# Patient Record
Sex: MALE | Race: WHITE | Employment: OTHER | URBAN - NONMETROPOLITAN AREA
[De-identification: names, ages, dates, MRNs, and addresses within clinical notes are randomized per-mention and may not be internally consistent; named-entity substitution may affect disease eponyms.]

---

## 2018-05-06 ENCOUNTER — HOSPITAL ENCOUNTER (INPATIENT)
Age: 68
LOS: 1 days | Discharge: LEFT BEFORE DISCHARGE | DRG: 313 | End: 2018-05-07
Attending: EMERGENCY MEDICINE | Admitting: INTERNAL MEDICINE
Payer: MEDICARE

## 2018-05-06 ENCOUNTER — APPOINTMENT (OUTPATIENT)
Dept: GENERAL RADIOLOGY | Age: 68
DRG: 313 | End: 2018-05-06
Payer: MEDICARE

## 2018-05-06 DIAGNOSIS — K30 INDIGESTION: ICD-10-CM

## 2018-05-06 DIAGNOSIS — D69.6 THROMBOCYTOPENIA (HCC): ICD-10-CM

## 2018-05-06 DIAGNOSIS — E87.1 HYPONATREMIA: ICD-10-CM

## 2018-05-06 DIAGNOSIS — R74.01 TRANSAMINITIS: ICD-10-CM

## 2018-05-06 DIAGNOSIS — R06.09 DOE (DYSPNEA ON EXERTION): ICD-10-CM

## 2018-05-06 DIAGNOSIS — F10.10 ALCOHOL ABUSE: ICD-10-CM

## 2018-05-06 DIAGNOSIS — R07.9 CHEST PAIN, UNSPECIFIED TYPE: Primary | ICD-10-CM

## 2018-05-06 LAB
ALBUMIN SERPL-MCNC: 3.9 G/DL (ref 3.5–5.2)
ALP BLD-CCNC: 123 U/L (ref 40–130)
ALT SERPL-CCNC: 120 U/L (ref 5–41)
ANION GAP SERPL CALCULATED.3IONS-SCNC: 21 MMOL/L (ref 7–19)
AST SERPL-CCNC: 181 U/L (ref 5–40)
BASOPHILS ABSOLUTE: 0.1 K/UL (ref 0–0.2)
BASOPHILS RELATIVE PERCENT: 1.4 % (ref 0–1)
BILIRUB SERPL-MCNC: 1.7 MG/DL (ref 0.2–1.2)
BILIRUBIN URINE: NEGATIVE
BLOOD, URINE: NEGATIVE
BUN BLDV-MCNC: 8 MG/DL (ref 8–23)
CALCIUM SERPL-MCNC: 8.9 MG/DL (ref 8.8–10.2)
CHLORIDE BLD-SCNC: 89 MMOL/L (ref 98–111)
CLARITY: CLEAR
CO2: 20 MMOL/L (ref 22–29)
COLOR: YELLOW
CREAT SERPL-MCNC: 0.6 MG/DL (ref 0.5–1.2)
EOSINOPHILS ABSOLUTE: 0.1 K/UL (ref 0–0.6)
EOSINOPHILS RELATIVE PERCENT: 1.2 % (ref 0–5)
ETHANOL: 61 MG/DL (ref 0–0.08)
GFR NON-AFRICAN AMERICAN: >60
GLUCOSE BLD-MCNC: 105 MG/DL (ref 74–109)
GLUCOSE URINE: NEGATIVE MG/DL
HCT VFR BLD CALC: 38.5 % (ref 42–52)
HEMOGLOBIN: 13.5 G/DL (ref 14–18)
INR BLD: 1.02 (ref 0.88–1.18)
KETONES, URINE: ABNORMAL MG/DL
LEUKOCYTE ESTERASE, URINE: NEGATIVE
LIPASE: 25 U/L (ref 13–60)
LYMPHOCYTES ABSOLUTE: 1.1 K/UL (ref 1.1–4.5)
LYMPHOCYTES RELATIVE PERCENT: 25.8 % (ref 20–40)
MCH RBC QN AUTO: 35.5 PG (ref 27–31)
MCHC RBC AUTO-ENTMCNC: 35.1 G/DL (ref 33–37)
MCV RBC AUTO: 101.3 FL (ref 80–94)
MONOCYTES ABSOLUTE: 0.3 K/UL (ref 0–0.9)
MONOCYTES RELATIVE PERCENT: 7.5 % (ref 0–10)
NEUTROPHILS ABSOLUTE: 2.6 K/UL (ref 1.5–7.5)
NEUTROPHILS RELATIVE PERCENT: 63.9 % (ref 50–65)
NITRITE, URINE: NEGATIVE
PDW BLD-RTO: 12.9 % (ref 11.5–14.5)
PERFORMED ON: NORMAL
PERFORMED ON: NORMAL
PH UA: 6
PLATELET # BLD: 126 K/UL (ref 130–400)
PMV BLD AUTO: 8.6 FL (ref 9.4–12.4)
POC TROPONIN I: 0.01 NG/ML (ref 0–0.08)
POC TROPONIN I: 0.01 NG/ML (ref 0–0.08)
POTASSIUM SERPL-SCNC: 3.5 MMOL/L (ref 3.5–5)
PRO-BNP: 87 PG/ML (ref 0–900)
PROTEIN UA: NEGATIVE MG/DL
PROTHROMBIN TIME: 13.3 SEC (ref 12–14.6)
RBC # BLD: 3.8 M/UL (ref 4.7–6.1)
SODIUM BLD-SCNC: 130 MMOL/L (ref 136–145)
SPECIFIC GRAVITY UA: 1.01
TOTAL PROTEIN: 7.3 G/DL (ref 6.6–8.7)
TROPONIN: <0.01 NG/ML (ref 0–0.03)
URINE REFLEX TO CULTURE: ABNORMAL
UROBILINOGEN, URINE: >=8 E.U./DL
WBC # BLD: 4.1 K/UL (ref 4.8–10.8)

## 2018-05-06 PROCEDURE — 99285 EMERGENCY DEPT VISIT HI MDM: CPT

## 2018-05-06 PROCEDURE — 71045 X-RAY EXAM CHEST 1 VIEW: CPT

## 2018-05-06 PROCEDURE — 80053 COMPREHEN METABOLIC PANEL: CPT

## 2018-05-06 PROCEDURE — 2140000000 HC CCU INTERMEDIATE R&B

## 2018-05-06 PROCEDURE — 83880 ASSAY OF NATRIURETIC PEPTIDE: CPT

## 2018-05-06 PROCEDURE — 6360000002 HC RX W HCPCS: Performed by: EMERGENCY MEDICINE

## 2018-05-06 PROCEDURE — 85610 PROTHROMBIN TIME: CPT

## 2018-05-06 PROCEDURE — 85025 COMPLETE CBC W/AUTO DIFF WBC: CPT

## 2018-05-06 PROCEDURE — 93005 ELECTROCARDIOGRAM TRACING: CPT

## 2018-05-06 PROCEDURE — 36415 COLL VENOUS BLD VENIPUNCTURE: CPT

## 2018-05-06 PROCEDURE — 2500000003 HC RX 250 WO HCPCS: Performed by: EMERGENCY MEDICINE

## 2018-05-06 PROCEDURE — G0480 DRUG TEST DEF 1-7 CLASSES: HCPCS

## 2018-05-06 PROCEDURE — 99285 EMERGENCY DEPT VISIT HI MDM: CPT | Performed by: EMERGENCY MEDICINE

## 2018-05-06 PROCEDURE — 99222 1ST HOSP IP/OBS MODERATE 55: CPT | Performed by: CLINICAL NURSE SPECIALIST

## 2018-05-06 PROCEDURE — 6370000000 HC RX 637 (ALT 250 FOR IP): Performed by: EMERGENCY MEDICINE

## 2018-05-06 PROCEDURE — 81003 URINALYSIS AUTO W/O SCOPE: CPT

## 2018-05-06 PROCEDURE — 2580000003 HC RX 258: Performed by: EMERGENCY MEDICINE

## 2018-05-06 PROCEDURE — 84484 ASSAY OF TROPONIN QUANT: CPT

## 2018-05-06 PROCEDURE — 83690 ASSAY OF LIPASE: CPT

## 2018-05-06 RX ORDER — ATORVASTATIN CALCIUM 40 MG/1
40 TABLET, FILM COATED ORAL DAILY
Status: DISCONTINUED | OUTPATIENT
Start: 2018-05-07 | End: 2018-05-07 | Stop reason: HOSPADM

## 2018-05-06 RX ORDER — ASPIRIN 81 MG/1
81 TABLET, CHEWABLE ORAL DAILY
Status: ON HOLD | COMMUNITY
End: 2020-11-18 | Stop reason: HOSPADM

## 2018-05-06 RX ORDER — ATENOLOL 50 MG/1
50 TABLET ORAL DAILY
Status: DISCONTINUED | OUTPATIENT
Start: 2018-05-07 | End: 2018-05-07 | Stop reason: HOSPADM

## 2018-05-06 RX ORDER — PRASUGREL 10 MG/1
10 TABLET, FILM COATED ORAL DAILY
Status: DISCONTINUED | OUTPATIENT
Start: 2018-05-07 | End: 2018-05-07 | Stop reason: HOSPADM

## 2018-05-06 RX ORDER — ASPIRIN 81 MG/1
81 TABLET, CHEWABLE ORAL DAILY
Status: DISCONTINUED | OUTPATIENT
Start: 2018-05-07 | End: 2018-05-07 | Stop reason: HOSPADM

## 2018-05-06 RX ADMIN — Medication 30 ML: at 19:20

## 2018-05-06 RX ADMIN — FOLIC ACID: 5 INJECTION, SOLUTION INTRAMUSCULAR; INTRAVENOUS; SUBCUTANEOUS at 19:40

## 2018-05-06 ASSESSMENT — ENCOUNTER SYMPTOMS
COUGH: 0
BACK PAIN: 0
ABDOMINAL PAIN: 0
NAUSEA: 0
SHORTNESS OF BREATH: 1
VOMITING: 0

## 2018-05-06 ASSESSMENT — PAIN SCALES - GENERAL
PAINLEVEL_OUTOF10: 0
PAINLEVEL_OUTOF10: 1

## 2018-05-07 VITALS
BODY MASS INDEX: 29.85 KG/M2 | SYSTOLIC BLOOD PRESSURE: 144 MMHG | HEART RATE: 74 BPM | WEIGHT: 220.4 LBS | DIASTOLIC BLOOD PRESSURE: 59 MMHG | TEMPERATURE: 97.8 F | OXYGEN SATURATION: 99 % | RESPIRATION RATE: 18 BRPM | HEIGHT: 72 IN

## 2018-05-07 PROBLEM — F10.10 ALCOHOL ABUSE: Chronic | Status: ACTIVE | Noted: 2018-05-07

## 2018-05-07 PROBLEM — R74.01 TRANSAMINITIS: Status: ACTIVE | Noted: 2018-05-07

## 2018-05-07 LAB
ALBUMIN SERPL-MCNC: 3.4 G/DL (ref 3.5–5.2)
ALP BLD-CCNC: 104 U/L (ref 40–130)
ALT SERPL-CCNC: 95 U/L (ref 5–41)
ANION GAP SERPL CALCULATED.3IONS-SCNC: 18 MMOL/L (ref 7–19)
AST SERPL-CCNC: 121 U/L (ref 5–40)
BILIRUB SERPL-MCNC: 1.8 MG/DL (ref 0.2–1.2)
BILIRUBIN DIRECT: 0.5 MG/DL (ref 0–0.3)
BILIRUBIN, INDIRECT: 1.3 MG/DL (ref 0.1–1)
BUN BLDV-MCNC: 10 MG/DL (ref 8–23)
CALCIUM SERPL-MCNC: 8.3 MG/DL (ref 8.8–10.2)
CHLORIDE BLD-SCNC: 96 MMOL/L (ref 98–111)
CHOLESTEROL, TOTAL: 94 MG/DL (ref 160–199)
CO2: 20 MMOL/L (ref 22–29)
CREAT SERPL-MCNC: 0.7 MG/DL (ref 0.5–1.2)
GFR NON-AFRICAN AMERICAN: >60
GLUCOSE BLD-MCNC: 80 MG/DL (ref 74–109)
HCT VFR BLD CALC: 34.8 % (ref 42–52)
HDLC SERPL-MCNC: 61 MG/DL (ref 55–121)
HEMOGLOBIN: 12.2 G/DL (ref 14–18)
LDL CHOLESTEROL CALCULATED: 21 MG/DL
MCH RBC QN AUTO: 35.8 PG (ref 27–31)
MCHC RBC AUTO-ENTMCNC: 35.1 G/DL (ref 33–37)
MCV RBC AUTO: 102.1 FL (ref 80–94)
PDW BLD-RTO: 12.8 % (ref 11.5–14.5)
PLATELET # BLD: 109 K/UL (ref 130–400)
PMV BLD AUTO: 8.9 FL (ref 9.4–12.4)
POTASSIUM REFLEX MAGNESIUM: 3.7 MMOL/L (ref 3.5–5)
RBC # BLD: 3.41 M/UL (ref 4.7–6.1)
SODIUM BLD-SCNC: 134 MMOL/L (ref 136–145)
TOTAL PROTEIN: 6.4 G/DL (ref 6.6–8.7)
TRIGL SERPL-MCNC: 58 MG/DL (ref 0–149)
TROPONIN: <0.01 NG/ML (ref 0–0.03)
TROPONIN: <0.01 NG/ML (ref 0–0.03)
WBC # BLD: 3.8 K/UL (ref 4.8–10.8)

## 2018-05-07 PROCEDURE — 85027 COMPLETE CBC AUTOMATED: CPT

## 2018-05-07 PROCEDURE — 2580000003 HC RX 258: Performed by: INTERNAL MEDICINE

## 2018-05-07 PROCEDURE — C8928 TTE W OR W/O FOL W/CON,STRES: HCPCS

## 2018-05-07 PROCEDURE — 80048 BASIC METABOLIC PNL TOTAL CA: CPT

## 2018-05-07 PROCEDURE — 6370000000 HC RX 637 (ALT 250 FOR IP): Performed by: CLINICAL NURSE SPECIALIST

## 2018-05-07 PROCEDURE — 99233 SBSQ HOSP IP/OBS HIGH 50: CPT | Performed by: PHYSICIAN ASSISTANT

## 2018-05-07 PROCEDURE — 6360000002 HC RX W HCPCS

## 2018-05-07 PROCEDURE — 36415 COLL VENOUS BLD VENIPUNCTURE: CPT

## 2018-05-07 PROCEDURE — 6360000004 HC RX CONTRAST MEDICATION: Performed by: INTERNAL MEDICINE

## 2018-05-07 PROCEDURE — 80061 LIPID PANEL: CPT

## 2018-05-07 PROCEDURE — 2580000003 HC RX 258: Performed by: CLINICAL NURSE SPECIALIST

## 2018-05-07 PROCEDURE — 80076 HEPATIC FUNCTION PANEL: CPT

## 2018-05-07 PROCEDURE — 84484 ASSAY OF TROPONIN QUANT: CPT

## 2018-05-07 RX ORDER — LORAZEPAM 1 MG/1
3 TABLET ORAL
Status: DISCONTINUED | OUTPATIENT
Start: 2018-05-07 | End: 2018-05-07 | Stop reason: HOSPADM

## 2018-05-07 RX ORDER — SODIUM CHLORIDE 0.9 % (FLUSH) 0.9 %
10 SYRINGE (ML) INJECTION PRN
Status: DISCONTINUED | OUTPATIENT
Start: 2018-05-07 | End: 2018-05-07 | Stop reason: SDUPTHER

## 2018-05-07 RX ORDER — LANOLIN ALCOHOL/MO/W.PET/CERES
1 CREAM (GRAM) TOPICAL DAILY
Status: DISCONTINUED | OUTPATIENT
Start: 2018-05-07 | End: 2018-05-07 | Stop reason: HOSPADM

## 2018-05-07 RX ORDER — LORAZEPAM 2 MG/ML
1 INJECTION INTRAMUSCULAR
Status: DISCONTINUED | OUTPATIENT
Start: 2018-05-07 | End: 2018-05-07 | Stop reason: HOSPADM

## 2018-05-07 RX ORDER — NITROGLYCERIN 0.4 MG/1
0.4 TABLET SUBLINGUAL EVERY 5 MIN PRN
Status: DISCONTINUED | OUTPATIENT
Start: 2018-05-07 | End: 2018-05-07 | Stop reason: HOSPADM

## 2018-05-07 RX ORDER — ACETAMINOPHEN 325 MG/1
650 TABLET ORAL EVERY 4 HOURS PRN
Status: DISCONTINUED | OUTPATIENT
Start: 2018-05-07 | End: 2018-05-07 | Stop reason: HOSPADM

## 2018-05-07 RX ORDER — SODIUM CHLORIDE 9 MG/ML
INJECTION, SOLUTION INTRAVENOUS
Status: COMPLETED | OUTPATIENT
Start: 2018-05-07 | End: 2018-05-07

## 2018-05-07 RX ORDER — DOBUTAMINE HYDROCHLORIDE 200 MG/100ML
10 INJECTION INTRAVENOUS CONTINUOUS PRN
Status: DISCONTINUED | OUTPATIENT
Start: 2018-05-07 | End: 2018-05-07 | Stop reason: ALTCHOICE

## 2018-05-07 RX ORDER — ONDANSETRON 2 MG/ML
4 INJECTION INTRAMUSCULAR; INTRAVENOUS EVERY 6 HOURS PRN
Status: DISCONTINUED | OUTPATIENT
Start: 2018-05-07 | End: 2018-05-07 | Stop reason: HOSPADM

## 2018-05-07 RX ORDER — LORAZEPAM 1 MG/1
4 TABLET ORAL
Status: DISCONTINUED | OUTPATIENT
Start: 2018-05-07 | End: 2018-05-07 | Stop reason: HOSPADM

## 2018-05-07 RX ORDER — LISINOPRIL 10 MG/1
10 TABLET ORAL DAILY
Status: DISCONTINUED | OUTPATIENT
Start: 2018-05-07 | End: 2018-05-07 | Stop reason: HOSPADM

## 2018-05-07 RX ORDER — LORAZEPAM 1 MG/1
1 TABLET ORAL
Status: DISCONTINUED | OUTPATIENT
Start: 2018-05-07 | End: 2018-05-07 | Stop reason: HOSPADM

## 2018-05-07 RX ORDER — DOBUTAMINE HYDROCHLORIDE 200 MG/100ML
10 INJECTION INTRAVENOUS CONTINUOUS
Status: DISCONTINUED | OUTPATIENT
Start: 2018-05-07 | End: 2018-05-07 | Stop reason: HOSPADM

## 2018-05-07 RX ORDER — SODIUM CHLORIDE 0.9 % (FLUSH) 0.9 %
10 SYRINGE (ML) INJECTION PRN
Status: DISCONTINUED | OUTPATIENT
Start: 2018-05-07 | End: 2018-05-07 | Stop reason: HOSPADM

## 2018-05-07 RX ORDER — LORAZEPAM 2 MG/ML
2 INJECTION INTRAMUSCULAR
Status: DISCONTINUED | OUTPATIENT
Start: 2018-05-07 | End: 2018-05-07 | Stop reason: HOSPADM

## 2018-05-07 RX ORDER — LORAZEPAM 2 MG/ML
3 INJECTION INTRAMUSCULAR
Status: DISCONTINUED | OUTPATIENT
Start: 2018-05-07 | End: 2018-05-07 | Stop reason: HOSPADM

## 2018-05-07 RX ORDER — MORPHINE SULFATE 10 MG/ML
2 INJECTION, SOLUTION INTRAMUSCULAR; INTRAVENOUS
Status: DISCONTINUED | OUTPATIENT
Start: 2018-05-07 | End: 2018-05-07 | Stop reason: HOSPADM

## 2018-05-07 RX ORDER — LORAZEPAM 1 MG/1
2 TABLET ORAL
Status: DISCONTINUED | OUTPATIENT
Start: 2018-05-07 | End: 2018-05-07 | Stop reason: HOSPADM

## 2018-05-07 RX ORDER — THIAMINE MONONITRATE (VIT B1) 100 MG
100 TABLET ORAL DAILY
Status: DISCONTINUED | OUTPATIENT
Start: 2018-05-07 | End: 2018-05-07 | Stop reason: HOSPADM

## 2018-05-07 RX ORDER — MORPHINE SULFATE 10 MG/ML
4 INJECTION, SOLUTION INTRAMUSCULAR; INTRAVENOUS
Status: DISCONTINUED | OUTPATIENT
Start: 2018-05-07 | End: 2018-05-07 | Stop reason: HOSPADM

## 2018-05-07 RX ORDER — SODIUM CHLORIDE 0.9 % (FLUSH) 0.9 %
10 SYRINGE (ML) INJECTION EVERY 12 HOURS SCHEDULED
Status: DISCONTINUED | OUTPATIENT
Start: 2018-05-07 | End: 2018-05-07 | Stop reason: SDUPTHER

## 2018-05-07 RX ORDER — LORAZEPAM 2 MG/ML
4 INJECTION INTRAMUSCULAR
Status: DISCONTINUED | OUTPATIENT
Start: 2018-05-07 | End: 2018-05-07 | Stop reason: HOSPADM

## 2018-05-07 RX ORDER — SODIUM CHLORIDE 0.9 % (FLUSH) 0.9 %
10 SYRINGE (ML) INJECTION EVERY 12 HOURS SCHEDULED
Status: DISCONTINUED | OUTPATIENT
Start: 2018-05-07 | End: 2018-05-07 | Stop reason: HOSPADM

## 2018-05-07 RX ADMIN — Medication 10 ML: at 15:12

## 2018-05-07 RX ADMIN — Medication 100 MG: at 08:49

## 2018-05-07 RX ADMIN — ATORVASTATIN CALCIUM 40 MG: 40 TABLET, FILM COATED ORAL at 08:49

## 2018-05-07 RX ADMIN — DOBUTAMINE HYDROCHLORIDE 10 MCG/KG/MIN: 200 INJECTION INTRAVENOUS at 15:20

## 2018-05-07 RX ADMIN — ASPIRIN 81 MG CHEWABLE TABLET 81 MG: 81 TABLET CHEWABLE at 08:49

## 2018-05-07 RX ADMIN — SODIUM CHLORIDE 250 ML: 9 INJECTION, SOLUTION INTRAVENOUS at 15:12

## 2018-05-07 RX ADMIN — FOLIC ACID TAB 400 MCG 1 MG: 400 TAB at 08:49

## 2018-05-07 RX ADMIN — Medication 10 ML: at 08:49

## 2018-05-07 RX ADMIN — PERFLUTREN 2.2 MG: 6.52 INJECTION, SUSPENSION INTRAVENOUS at 15:15

## 2018-05-07 ASSESSMENT — ENCOUNTER SYMPTOMS
HEARTBURN: 1
EYES NEGATIVE: 1
SHORTNESS OF BREATH: 1

## 2018-05-07 ASSESSMENT — PAIN SCALES - GENERAL: PAINLEVEL_OUTOF10: 0

## 2018-05-08 LAB
EKG P AXIS: 67 DEGREES
EKG P-R INTERVAL: 152 MS
EKG Q-T INTERVAL: 418 MS
EKG QRS DURATION: 112 MS
EKG QTC CALCULATION (BAZETT): 413 MS
EKG T AXIS: 32 DEGREES

## 2020-06-20 ENCOUNTER — APPOINTMENT (OUTPATIENT)
Dept: CT IMAGING | Age: 70
DRG: 286 | End: 2020-06-20
Payer: MEDICARE

## 2020-06-20 ENCOUNTER — APPOINTMENT (OUTPATIENT)
Dept: GENERAL RADIOLOGY | Age: 70
DRG: 286 | End: 2020-06-20
Payer: MEDICARE

## 2020-06-20 ENCOUNTER — HOSPITAL ENCOUNTER (INPATIENT)
Age: 70
LOS: 9 days | Discharge: ANOTHER HOSPITAL | DRG: 286 | End: 2020-06-29
Attending: EMERGENCY MEDICINE | Admitting: FAMILY MEDICINE
Payer: MEDICARE

## 2020-06-20 PROBLEM — I50.9 NEW ONSET OF CONGESTIVE HEART FAILURE (HCC): Status: ACTIVE | Noted: 2020-06-20

## 2020-06-20 PROBLEM — I48.91 ATRIAL FIBRILLATION WITH RVR (HCC): Status: ACTIVE | Noted: 2020-06-20

## 2020-06-20 PROBLEM — E78.5 DYSLIPIDEMIA: Status: ACTIVE | Noted: 2020-06-20

## 2020-06-20 LAB
ALBUMIN SERPL-MCNC: 3.6 G/DL (ref 3.5–5.2)
ALP BLD-CCNC: 113 U/L (ref 40–130)
ALT SERPL-CCNC: 5 U/L (ref 5–41)
ANION GAP SERPL CALCULATED.3IONS-SCNC: 13 MMOL/L (ref 7–19)
AST SERPL-CCNC: 13 U/L (ref 5–40)
BACTERIA: ABNORMAL /HPF
BASOPHILS ABSOLUTE: 0.1 K/UL (ref 0–0.2)
BASOPHILS RELATIVE PERCENT: 1.2 % (ref 0–1)
BILIRUB SERPL-MCNC: 2 MG/DL (ref 0.2–1.2)
BILIRUBIN URINE: NEGATIVE
BLOOD, URINE: NEGATIVE
BUN BLDV-MCNC: 13 MG/DL (ref 8–23)
CALCIUM SERPL-MCNC: 8.9 MG/DL (ref 8.8–10.2)
CHLORIDE BLD-SCNC: 98 MMOL/L (ref 98–111)
CLARITY: CLEAR
CO2: 22 MMOL/L (ref 22–29)
COLOR: YELLOW
CREAT SERPL-MCNC: 1.2 MG/DL (ref 0.5–1.2)
EOSINOPHILS ABSOLUTE: 0.2 K/UL (ref 0–0.6)
EOSINOPHILS RELATIVE PERCENT: 2.2 % (ref 0–5)
EPITHELIAL CELLS, UA: 1 /HPF (ref 0–5)
GFR NON-AFRICAN AMERICAN: >60
GLUCOSE BLD-MCNC: 105 MG/DL (ref 74–109)
GLUCOSE URINE: NEGATIVE MG/DL
HCT VFR BLD CALC: 40.1 % (ref 42–52)
HEMOGLOBIN: 13.1 G/DL (ref 14–18)
HYALINE CASTS: 0 /HPF (ref 0–8)
IMMATURE GRANULOCYTES #: 0 K/UL
INR BLD: 1.12 (ref 0.88–1.18)
KETONES, URINE: NEGATIVE MG/DL
LEUKOCYTE ESTERASE, URINE: ABNORMAL
LIPASE: 24 U/L (ref 13–60)
LYMPHOCYTES ABSOLUTE: 1.4 K/UL (ref 1.1–4.5)
LYMPHOCYTES RELATIVE PERCENT: 21.1 % (ref 20–40)
MCH RBC QN AUTO: 30.2 PG (ref 27–31)
MCHC RBC AUTO-ENTMCNC: 32.7 G/DL (ref 33–37)
MCV RBC AUTO: 92.4 FL (ref 80–94)
MONOCYTES ABSOLUTE: 0.4 K/UL (ref 0–0.9)
MONOCYTES RELATIVE PERCENT: 6.1 % (ref 0–10)
NEUTROPHILS ABSOLUTE: 4.7 K/UL (ref 1.5–7.5)
NEUTROPHILS RELATIVE PERCENT: 69.1 % (ref 50–65)
NITRITE, URINE: NEGATIVE
PDW BLD-RTO: 13.9 % (ref 11.5–14.5)
PH UA: 6 (ref 5–8)
PLATELET # BLD: 244 K/UL (ref 130–400)
PMV BLD AUTO: 9.7 FL (ref 9.4–12.4)
POTASSIUM SERPL-SCNC: 4.1 MMOL/L (ref 3.5–5)
PRO-BNP: 9061 PG/ML (ref 0–900)
PROTEIN UA: NEGATIVE MG/DL
PROTHROMBIN TIME: 14.4 SEC (ref 12–14.6)
RBC # BLD: 4.34 M/UL (ref 4.7–6.1)
RBC UA: 2 /HPF (ref 0–4)
SODIUM BLD-SCNC: 133 MMOL/L (ref 136–145)
SPECIFIC GRAVITY UA: 1.01 (ref 1–1.03)
TOTAL PROTEIN: 6.9 G/DL (ref 6.6–8.7)
TROPONIN: <0.01 NG/ML (ref 0–0.03)
TROPONIN: <0.01 NG/ML (ref 0–0.03)
TSH SERPL DL<=0.05 MIU/L-ACNC: 4.34 UIU/ML (ref 0.27–4.2)
UROBILINOGEN, URINE: 1 E.U./DL
WBC # BLD: 6.8 K/UL (ref 4.8–10.8)
WBC UA: 13 /HPF (ref 0–5)

## 2020-06-20 PROCEDURE — 84484 ASSAY OF TROPONIN QUANT: CPT

## 2020-06-20 PROCEDURE — 6370000000 HC RX 637 (ALT 250 FOR IP): Performed by: EMERGENCY MEDICINE

## 2020-06-20 PROCEDURE — 2580000003 HC RX 258: Performed by: EMERGENCY MEDICINE

## 2020-06-20 PROCEDURE — 36415 COLL VENOUS BLD VENIPUNCTURE: CPT

## 2020-06-20 PROCEDURE — 96374 THER/PROPH/DIAG INJ IV PUSH: CPT

## 2020-06-20 PROCEDURE — 83880 ASSAY OF NATRIURETIC PEPTIDE: CPT

## 2020-06-20 PROCEDURE — 87077 CULTURE AEROBIC IDENTIFY: CPT

## 2020-06-20 PROCEDURE — 85610 PROTHROMBIN TIME: CPT

## 2020-06-20 PROCEDURE — 6360000002 HC RX W HCPCS: Performed by: EMERGENCY MEDICINE

## 2020-06-20 PROCEDURE — 99285 EMERGENCY DEPT VISIT HI MDM: CPT

## 2020-06-20 PROCEDURE — 81001 URINALYSIS AUTO W/SCOPE: CPT

## 2020-06-20 PROCEDURE — 6370000000 HC RX 637 (ALT 250 FOR IP): Performed by: INTERNAL MEDICINE

## 2020-06-20 PROCEDURE — 2500000003 HC RX 250 WO HCPCS: Performed by: EMERGENCY MEDICINE

## 2020-06-20 PROCEDURE — 85025 COMPLETE CBC W/AUTO DIFF WBC: CPT

## 2020-06-20 PROCEDURE — 6360000004 HC RX CONTRAST MEDICATION: Performed by: EMERGENCY MEDICINE

## 2020-06-20 PROCEDURE — 71045 X-RAY EXAM CHEST 1 VIEW: CPT

## 2020-06-20 PROCEDURE — 71275 CT ANGIOGRAPHY CHEST: CPT

## 2020-06-20 PROCEDURE — 99223 1ST HOSP IP/OBS HIGH 75: CPT | Performed by: INTERNAL MEDICINE

## 2020-06-20 PROCEDURE — 87086 URINE CULTURE/COLONY COUNT: CPT

## 2020-06-20 PROCEDURE — 93005 ELECTROCARDIOGRAM TRACING: CPT | Performed by: EMERGENCY MEDICINE

## 2020-06-20 PROCEDURE — 87040 BLOOD CULTURE FOR BACTERIA: CPT

## 2020-06-20 PROCEDURE — 83690 ASSAY OF LIPASE: CPT

## 2020-06-20 PROCEDURE — 87186 SC STD MICRODIL/AGAR DIL: CPT

## 2020-06-20 PROCEDURE — 2140000000 HC CCU INTERMEDIATE R&B

## 2020-06-20 PROCEDURE — 80053 COMPREHEN METABOLIC PANEL: CPT

## 2020-06-20 PROCEDURE — 84443 ASSAY THYROID STIM HORMONE: CPT

## 2020-06-20 RX ORDER — DILTIAZEM HYDROCHLORIDE 5 MG/ML
5 INJECTION INTRAVENOUS ONCE
Status: COMPLETED | OUTPATIENT
Start: 2020-06-20 | End: 2020-06-20

## 2020-06-20 RX ORDER — AMLODIPINE BESYLATE 5 MG/1
5 TABLET ORAL DAILY
Status: ON HOLD | COMMUNITY
End: 2020-06-29 | Stop reason: HOSPADM

## 2020-06-20 RX ORDER — POLYETHYLENE GLYCOL 3350 17 G/17G
17 POWDER, FOR SOLUTION ORAL DAILY PRN
Status: DISCONTINUED | OUTPATIENT
Start: 2020-06-20 | End: 2020-06-29 | Stop reason: HOSPADM

## 2020-06-20 RX ORDER — ASPIRIN 81 MG/1
162 TABLET, CHEWABLE ORAL ONCE
Status: COMPLETED | OUTPATIENT
Start: 2020-06-20 | End: 2020-06-20

## 2020-06-20 RX ORDER — SODIUM CHLORIDE 0.9 % (FLUSH) 0.9 %
10 SYRINGE (ML) INJECTION EVERY 12 HOURS SCHEDULED
Status: DISCONTINUED | OUTPATIENT
Start: 2020-06-20 | End: 2020-06-24 | Stop reason: SDUPTHER

## 2020-06-20 RX ORDER — LISINOPRIL 5 MG/1
5 TABLET ORAL DAILY
Status: DISCONTINUED | OUTPATIENT
Start: 2020-06-21 | End: 2020-06-22

## 2020-06-20 RX ORDER — POTASSIUM CHLORIDE 1.5 G/1.77G
20 POWDER, FOR SOLUTION ORAL DAILY
Status: ON HOLD | COMMUNITY
End: 2020-06-29 | Stop reason: HOSPADM

## 2020-06-20 RX ORDER — ATORVASTATIN CALCIUM 40 MG/1
40 TABLET, FILM COATED ORAL DAILY
Status: DISCONTINUED | OUTPATIENT
Start: 2020-06-21 | End: 2020-06-29 | Stop reason: HOSPADM

## 2020-06-20 RX ORDER — ASPIRIN 81 MG/1
81 TABLET, CHEWABLE ORAL DAILY
Status: DISCONTINUED | OUTPATIENT
Start: 2020-06-21 | End: 2020-06-29 | Stop reason: HOSPADM

## 2020-06-20 RX ORDER — FUROSEMIDE 10 MG/ML
20 INJECTION INTRAMUSCULAR; INTRAVENOUS DAILY
Status: DISCONTINUED | OUTPATIENT
Start: 2020-06-21 | End: 2020-06-29 | Stop reason: HOSPADM

## 2020-06-20 RX ORDER — PRASUGREL 10 MG/1
10 TABLET, FILM COATED ORAL DAILY
Status: DISCONTINUED | OUTPATIENT
Start: 2020-06-21 | End: 2020-06-20

## 2020-06-20 RX ORDER — PROMETHAZINE HYDROCHLORIDE 12.5 MG/1
12.5 TABLET ORAL EVERY 6 HOURS PRN
Status: DISCONTINUED | OUTPATIENT
Start: 2020-06-20 | End: 2020-06-29 | Stop reason: HOSPADM

## 2020-06-20 RX ORDER — POTASSIUM CHLORIDE 20 MEQ/1
20 TABLET, EXTENDED RELEASE ORAL 2 TIMES DAILY
Status: DISCONTINUED | OUTPATIENT
Start: 2020-06-20 | End: 2020-06-29 | Stop reason: HOSPADM

## 2020-06-20 RX ORDER — AMLODIPINE BESYLATE 5 MG/1
5 TABLET ORAL DAILY
Status: DISCONTINUED | OUTPATIENT
Start: 2020-06-21 | End: 2020-06-22

## 2020-06-20 RX ORDER — ACETAMINOPHEN 325 MG/1
650 TABLET ORAL EVERY 6 HOURS PRN
Status: DISCONTINUED | OUTPATIENT
Start: 2020-06-20 | End: 2020-06-29 | Stop reason: HOSPADM

## 2020-06-20 RX ORDER — ATENOLOL 50 MG/1
50 TABLET ORAL DAILY
Status: DISCONTINUED | OUTPATIENT
Start: 2020-06-21 | End: 2020-06-22

## 2020-06-20 RX ORDER — FUROSEMIDE 10 MG/ML
20 INJECTION INTRAMUSCULAR; INTRAVENOUS ONCE
Status: COMPLETED | OUTPATIENT
Start: 2020-06-20 | End: 2020-06-20

## 2020-06-20 RX ORDER — ACETAMINOPHEN 650 MG/1
650 SUPPOSITORY RECTAL EVERY 6 HOURS PRN
Status: DISCONTINUED | OUTPATIENT
Start: 2020-06-20 | End: 2020-06-29 | Stop reason: HOSPADM

## 2020-06-20 RX ORDER — ONDANSETRON 2 MG/ML
4 INJECTION INTRAMUSCULAR; INTRAVENOUS EVERY 6 HOURS PRN
Status: DISCONTINUED | OUTPATIENT
Start: 2020-06-20 | End: 2020-06-29 | Stop reason: HOSPADM

## 2020-06-20 RX ORDER — SODIUM CHLORIDE 0.9 % (FLUSH) 0.9 %
10 SYRINGE (ML) INJECTION PRN
Status: DISCONTINUED | OUTPATIENT
Start: 2020-06-20 | End: 2020-06-24 | Stop reason: SDUPTHER

## 2020-06-20 RX ADMIN — ENOXAPARIN SODIUM 100 MG: 100 INJECTION SUBCUTANEOUS at 16:00

## 2020-06-20 RX ADMIN — DILTIAZEM HYDROCHLORIDE 5 MG: 5 INJECTION INTRAVENOUS at 14:14

## 2020-06-20 RX ADMIN — IOPAMIDOL 90 ML: 755 INJECTION, SOLUTION INTRAVENOUS at 14:42

## 2020-06-20 RX ADMIN — APIXABAN 5 MG: 5 TABLET, FILM COATED ORAL at 21:16

## 2020-06-20 RX ADMIN — FUROSEMIDE 20 MG: 10 INJECTION, SOLUTION INTRAVENOUS at 16:00

## 2020-06-20 RX ADMIN — ASPIRIN 81 MG 162 MG: 81 TABLET ORAL at 14:14

## 2020-06-20 RX ADMIN — DILTIAZEM HYDROCHLORIDE 5 MG/HR: 5 INJECTION INTRAVENOUS at 16:00

## 2020-06-20 ASSESSMENT — ENCOUNTER SYMPTOMS
VOMITING: 0
SHORTNESS OF BREATH: 1
BACK PAIN: 0
ABDOMINAL PAIN: 0

## 2020-06-20 ASSESSMENT — PAIN SCALES - GENERAL
PAINLEVEL_OUTOF10: 0
PAINLEVEL_OUTOF10: 0

## 2020-06-20 NOTE — H&P
use: Yes    Drug use: No    Sexual activity: None   Lifestyle    Physical activity     Days per week: None     Minutes per session: None    Stress: None   Relationships    Social connections     Talks on phone: None     Gets together: None     Attends Confucianist service: None     Active member of club or organization: None     Attends meetings of clubs or organizations: None     Relationship status: None    Intimate partner violence     Fear of current or ex partner: None     Emotionally abused: None     Physically abused: None     Forced sexual activity: None   Other Topics Concern    None   Social History Narrative    None        FAMILY HISTORY:  Family History   Problem Relation Age of Onset    No Known Problems Mother     Heart Disease Father          ALLERGIES:  No Known Allergies     PRIOR TO ADMISSION MEDS:  Medications Prior to Admission: potassium chloride (KLOR-CON) 20 MEQ packet, Take 20 mEq by mouth 2 times daily  amLODIPine (NORVASC) 5 MG tablet, Take 5 mg by mouth daily  aspirin 81 MG chewable tablet, Take 81 mg by mouth daily  atenolol (TENORMIN) 100 MG tablet, Take 50 mg by mouth daily. prasugrel (EFFIENT) 10 MG TABS, Take 10 mg by mouth daily. atorvastatin (LIPITOR) 40 MG tablet, Take 40 mg by mouth daily.      REVIEW OF SYSTEMS:  Constitutional:  No fevers, chills, nausea, vomiting, + tiredness and fatigue   Head:  No head injury, facial trauma   Eyes:  No acute visual changes, exudate, trauma   Ears:  No acute hearing loss, earaches   Nose: No nasal discharge, epistaxis   Neck: No new hoarseness, voice change, or new masses   Lungs:   No hemoptysis, pleurisy, SOB on exertion   Heart:  No chest pressure with exertion, + palpitations,    Abdomen:   No new masses, no bright red blood per rectum   Extremities: + difficulty ambulating due to weakness, no new lesions   Skin: No new changes in skin color, no rashes or lesions   Neurologic: No new motor or sensory changes       PHYSICAL 05/06/2018     A1C: No results for input(s): LABA1C in the last 72 hours. ABG:No results for input(s): PHART, FMG4HFO, PO2ART, RBZ3GXQ, BEART, HGBAE, U5PTUNFY, CARBOXHGBART in the last 72 hours. EKG:   Please see chart      IMAGING:  Xr Chest Portable    Result Date: 6/20/2020  1. There is a new dense right basilar consolidation, which partially obscures the right hemidiaphragm. This is concerning for a lower lobe pneumonia. Signed by Dr Marsha Nicholson on 6/20/2020 2:26 PM    Cta Pulmonary W Contrast    Result Date: 6/20/2020  1. No evidence of pulmonary embolus. 2. Evidence for pulmonary hypertension. Interstitial edema with bilateral layering pleural effusions. 3. There are small consolidations in the right middle lobe and left upper lobe lingula. This likely reflects small areas of antonio edema. Signed by Dr Marsha Nicholson on 6/20/2020 3:20 PM        Assessment and Plan:    Principal Problem:    New onset of congestive heart failure ([address]  Active Problems:    CAD (coronary artery disease)    Essential hypertension    Atrial fibrillation with RVR ([address]    Dyslipidemia  Resolved Problems:    * No resolved hospital problems. *     Admit patient to medical unit under full inpatient status  Continuous cardiac tele-monitoring  Patient given Aspirin in the ER  Monitor cardiac enzymes ×3  Full 2-D echo with color-flow and doppler ordered in am to evaluate cardiac structure and function  Continue with IV Cardizem drip for A. fib with RVR, to keep heart rate around 100   Keep patient NPO after midnight  Cardiology consultationfor evaluation, further treatment recommendations and work up      Repeat labs in a.m. Electrolyte replacement as per protocol. Patient will be monitored very closely on the floor. Further recommendations as per the hospital course.       Patient  is on DVT prophylaxis  Current medications reviewed  Lab work reviewed  Radiology/Chest x-ray films reviewed  Discussed with the nurse and addressed all

## 2020-06-20 NOTE — ED PROVIDER NOTES
negative except as noted above in the HPI. PAST MEDICAL HISTORY     Past Medical History:   Diagnosis Date    CAD (coronary artery disease)     Hypertension     Non-ST elevation MI (NSTEMI) ([address] 12/28/14         SURGICAL HISTORY       Past Surgical History:   Procedure Laterality Date    CARDIAC CATHETERIZATION  12/29/14  [facility]    angioplasty, stent to LAD. EF 45%    CHOLECYSTECTOMY           CURRENT MEDICATIONS       Previous Medications    AMLODIPINE (NORVASC) 5 MG TABLET    Take 5 mg by mouth daily    ASPIRIN 81 MG CHEWABLE TABLET    Take 81 mg by mouth daily    ATENOLOL (TENORMIN) 100 MG TABLET    Take 50 mg by mouth daily. ATORVASTATIN (LIPITOR) 40 MG TABLET    Take 40 mg by mouth daily. POTASSIUM CHLORIDE (KLOR-CON) 20 MEQ PACKET    Take 20 mEq by mouth 2 times daily    PRASUGREL (EFFIENT) 10 MG TABS    Take 10 mg by mouth daily. ALLERGIES     Patient has no known allergies. FAMILY HISTORY       Family History   Problem Relation Age of Onset    No Known Problems Mother     Heart Disease Father           SOCIAL HISTORY       Social History     Socioeconomic History    Marital status:      Spouse name: None    Number of children: None    Years of education: None    Highest education level: None   Occupational History    None   Social Needs    Financial resource strain: None    Food insecurity     Worry: None     Inability: None    Transportation needs     Medical: None     Non-medical: None   Tobacco Use    Smoking status: Former Smoker     Types: Cigars    Smokeless tobacco: Never Used   Substance and Sexual Activity    Alcohol use:  Yes    Drug use: No    Sexual activity: None   Lifestyle    Physical activity     Days per week: None     Minutes per session: None    Stress: None   Relationships    Social connections     Talks on phone: None     Gets together: None     Attends Rastafarian service: None     Active member of club or organization: None     Attends EKG: All EKG's are interpreted by the Emergency Department Physician who either signs or Co-signs this chart in the absence of a cardiologist.    Sinus arrhythmia versus a flutter rate 140    RADIOLOGY:   Non-plain film images such as CT, Ultrasound and MRI are read by the radiologist. Plainradiographic images are visualized and preliminarily interpreted by the emergency physician with the below findings:      Interpretation per the Radiologist below, if available at the time of this note:    CTA PULMONARY W CONTRAST   Final Result   1. No evidence of pulmonary embolus. 2. Evidence for pulmonary hypertension. Interstitial edema with   bilateral layering pleural effusions. 3. There are small consolidations in the right middle lobe and left   upper lobe lingula. This likely reflects small areas of antonio edema. Signed by Dr Felipe Neely on 6/20/2020 3:20 PM      XR CHEST PORTABLE   Final Result   1. There is a new dense right basilar consolidation, which partially   obscures the right hemidiaphragm. This is concerning for a lower lobe   pneumonia.    Signed by Dr Felipe Neely on 6/20/2020 2:26 PM            ED BEDSIDE ULTRASOUND:   Performed by ED Physician - none    LABS:  Labs Reviewed   COMPREHENSIVE METABOLIC PANEL - Abnormal; Notable for the following components:       Result Value    Sodium 133 (*)     Total Bilirubin 2.0 (*)     All other components within normal limits   CBC WITH AUTO DIFFERENTIAL - Abnormal; Notable for the following components:    RBC 4.34 (*)     Hemoglobin 13.1 (*)     Hematocrit 40.1 (*)     MCHC 32.7 (*)     Neutrophils % 69.1 (*)     Basophils % 1.2 (*)     All other components within normal limits   BRAIN NATRIURETIC PEPTIDE - Abnormal; Notable for the following components:    Pro-BNP 9,061 (*)     All other components within normal limits   TSH WITHOUT REFLEX - Abnormal; Notable for the following components:    TSH 4.340 (*)     All other components within normal limits CULTURE, BLOOD 1   CULTURE, BLOOD 2   LIPASE   PROTIME-INR   TROPONIN   URINE RT REFLEX TO CULTURE       All other labs were within normal range or not returned as of this dictation. EMERGENCY DEPARTMENT COURSE and DIFFERENTIALDIAGNOSIS/MDM:   Vitals:    Vitals:    06/20/20 1350 06/20/20 1500   BP:  100/72   Pulse: 138    Resp: 18    Temp: 98.1 °F (36.7 °C)    SpO2: 96%    Weight: 220 lb (99.8 kg)    Height: 6' (1.829 m)        MDM  Number of Diagnoses or Management Options  Acute congestive heart failure, unspecified heart failure type [facility]): new and requires workup  Atrial fibrillation with RVR ([address]): new and requires workup  Coronary artery disease involving native heart with angina pectoris, unspecified vessel or lesion type [facility]): new and requires workup  FENTON (dyspnea on exertion): new and requires workup  Pleural effusion: new and requires workup  Diagnosis management comments: Patient presents with dyspnea on exertion shortness of breath. He is found to likely be in a flutter on arrival.  His heart rate was stuck at 140 was probably an SVT a flutter. The patient got 5 ability broken into a slower A. fib and the rates around low 100s. The patient will be given Lovenox. He is in not on anticoagulation now. The patient has a history of LAD stents. He is not had any chest pain. The patient has a negative troponin this been going on for a week or 2. He does not have a cough or covert exposure. The x-ray was read as possible pneumonia I went ahead and scan him. To make sure there is no PE or obvious infection. It is all edema. I discussed with the radiologist and it is all pulmonary edema and effusions. Low-dose Lasix to start. His blood pressure is borderline. He really needs an echo more than anything he is not really in any distress he is not hypoxic now. Admit for echo, congestive heart failure new and acute. Atrial fibrillation new and acute.   Start anticoagulation admit with cardiology consult can be gotten inpatient. Patient needs an echo again. Amount and/or Complexity of Data Reviewed  Clinical lab tests: ordered and reviewed  Tests in the radiology section of CPT®: reviewed and ordered  Decide to obtain previous medical records or to obtain history from someone other than the patient: yes  Obtain history from someone other than the patient: yes  Discuss the patient with other providers: yes  Independent visualization of images, tracings, or specimens: yes    Risk of Complications, Morbidity, and/or Mortality  Presenting problems: high  Management options: high    Patient Progress  Patient progress: improved    CRITICAL CARE TIME   Total Critical Care time was 35 minutes, excluding separately reportable procedures. There was a high probability of clinically significant/life threatening deterioration in the patient's condition which required my urgent intervention. CONSULTS:  None    PROCEDURES:  Unless otherwise notedbelow, none     Procedures    FINAL IMPRESSION     1. Atrial fibrillation with RVR ([address]    2. Acute congestive heart failure, unspecified heart failure type ([address]    3. Pleural effusion    4. FENTON (dyspnea on exertion)    5. Coronary artery disease involving native heart with angina pectoris, unspecified vessel or lesion type ([address]          DISPOSITION/PLAN   DISPOSITION        PATIENT REFERRED TO:  No follow-up provider specified.     DISCHARGE MEDICATIONS:  New Prescriptions    No medications on file          (Please note that portions of this note were completed with a voice recognition program.  Efforts were made to edit the dictations butoccasionally words are mis-transcribed.)    Minus MD Bang (electronically signed)  AttendingEmergency Physician         Bre Cuenca MD  06/20/20 2252

## 2020-06-21 LAB
ALBUMIN SERPL-MCNC: 4 G/DL (ref 3.5–5.2)
ALP BLD-CCNC: 83 U/L (ref 40–130)
ALT SERPL-CCNC: 16 U/L (ref 5–41)
ANION GAP SERPL CALCULATED.3IONS-SCNC: 10 MMOL/L (ref 7–19)
AST SERPL-CCNC: 12 U/L (ref 5–40)
BILIRUB SERPL-MCNC: <0.2 MG/DL (ref 0.2–1.2)
BUN BLDV-MCNC: 23 MG/DL (ref 8–23)
CALCIUM SERPL-MCNC: 9.3 MG/DL (ref 8.8–10.2)
CHLORIDE BLD-SCNC: 97 MMOL/L (ref 98–111)
CHOLESTEROL, TOTAL: 165 MG/DL (ref 160–199)
CO2: 33 MMOL/L (ref 22–29)
CREAT SERPL-MCNC: <0.5 MG/DL (ref 0.5–1.2)
GFR NON-AFRICAN AMERICAN: >60
GLUCOSE BLD-MCNC: 138 MG/DL (ref 74–109)
HDLC SERPL-MCNC: 68 MG/DL (ref 55–121)
LDL CHOLESTEROL CALCULATED: 86 MG/DL
LV EF: 27 %
LVEF MODALITY: NORMAL
MAGNESIUM: 2.2 MG/DL (ref 1.6–2.4)
PHOSPHORUS: 2.8 MG/DL (ref 2.5–4.5)
POTASSIUM REFLEX MAGNESIUM: 4.6 MMOL/L (ref 3.5–5)
SODIUM BLD-SCNC: 140 MMOL/L (ref 136–145)
T4 FREE: 1.07 NG/DL (ref 0.93–1.7)
TOTAL PROTEIN: 6.2 G/DL (ref 6.6–8.7)
TRIGL SERPL-MCNC: 57 MG/DL (ref 0–149)
TROPONIN: <0.01 NG/ML (ref 0–0.03)

## 2020-06-21 PROCEDURE — 80061 LIPID PANEL: CPT

## 2020-06-21 PROCEDURE — 80053 COMPREHEN METABOLIC PANEL: CPT

## 2020-06-21 PROCEDURE — 36415 COLL VENOUS BLD VENIPUNCTURE: CPT

## 2020-06-21 PROCEDURE — 84100 ASSAY OF PHOSPHORUS: CPT

## 2020-06-21 PROCEDURE — 2580000003 HC RX 258: Performed by: HOSPITALIST

## 2020-06-21 PROCEDURE — 6360000004 HC RX CONTRAST MEDICATION: Performed by: INTERNAL MEDICINE

## 2020-06-21 PROCEDURE — 83735 ASSAY OF MAGNESIUM: CPT

## 2020-06-21 PROCEDURE — 2500000003 HC RX 250 WO HCPCS: Performed by: HOSPITALIST

## 2020-06-21 PROCEDURE — 99233 SBSQ HOSP IP/OBS HIGH 50: CPT | Performed by: INTERNAL MEDICINE

## 2020-06-21 PROCEDURE — 2140000000 HC CCU INTERMEDIATE R&B

## 2020-06-21 PROCEDURE — 6360000002 HC RX W HCPCS: Performed by: INTERNAL MEDICINE

## 2020-06-21 PROCEDURE — 84484 ASSAY OF TROPONIN QUANT: CPT

## 2020-06-21 PROCEDURE — 84439 ASSAY OF FREE THYROXINE: CPT

## 2020-06-21 PROCEDURE — 6360000002 HC RX W HCPCS: Performed by: HOSPITALIST

## 2020-06-21 PROCEDURE — C8929 TTE W OR WO FOL WCON,DOPPLER: HCPCS

## 2020-06-21 PROCEDURE — 6370000000 HC RX 637 (ALT 250 FOR IP): Performed by: HOSPITALIST

## 2020-06-21 PROCEDURE — 6370000000 HC RX 637 (ALT 250 FOR IP): Performed by: INTERNAL MEDICINE

## 2020-06-21 RX ORDER — FUROSEMIDE 10 MG/ML
40 INJECTION INTRAMUSCULAR; INTRAVENOUS ONCE
Status: COMPLETED | OUTPATIENT
Start: 2020-06-21 | End: 2020-06-21

## 2020-06-21 RX ADMIN — PERFLUTREN 1.65 MG: 6.52 INJECTION, SUSPENSION INTRAVENOUS at 09:00

## 2020-06-21 RX ADMIN — AMLODIPINE BESYLATE 5 MG: 5 TABLET ORAL at 09:59

## 2020-06-21 RX ADMIN — DILTIAZEM HYDROCHLORIDE 5 MG/HR: 5 INJECTION INTRAVENOUS at 04:53

## 2020-06-21 RX ADMIN — ATENOLOL 50 MG: 50 TABLET ORAL at 09:59

## 2020-06-21 RX ADMIN — APIXABAN 5 MG: 5 TABLET, FILM COATED ORAL at 09:58

## 2020-06-21 RX ADMIN — LISINOPRIL 5 MG: 5 TABLET ORAL at 09:58

## 2020-06-21 RX ADMIN — POTASSIUM CHLORIDE 20 MEQ: 20 TABLET, EXTENDED RELEASE ORAL at 21:32

## 2020-06-21 RX ADMIN — ATORVASTATIN CALCIUM 40 MG: 40 TABLET, FILM COATED ORAL at 09:59

## 2020-06-21 RX ADMIN — POTASSIUM CHLORIDE 20 MEQ: 20 TABLET, EXTENDED RELEASE ORAL at 09:58

## 2020-06-21 RX ADMIN — ASPIRIN 81 MG 81 MG: 81 TABLET ORAL at 09:59

## 2020-06-21 RX ADMIN — SODIUM CHLORIDE, PRESERVATIVE FREE 10 ML: 5 INJECTION INTRAVENOUS at 21:33

## 2020-06-21 RX ADMIN — APIXABAN 5 MG: 5 TABLET, FILM COATED ORAL at 21:32

## 2020-06-21 RX ADMIN — SODIUM CHLORIDE, PRESERVATIVE FREE 10 ML: 5 INJECTION INTRAVENOUS at 09:59

## 2020-06-21 RX ADMIN — ACETAMINOPHEN 650 MG: 325 TABLET, FILM COATED ORAL at 18:47

## 2020-06-21 RX ADMIN — FUROSEMIDE 40 MG: 10 INJECTION, SOLUTION INTRAMUSCULAR; INTRAVENOUS at 20:08

## 2020-06-21 RX ADMIN — SODIUM CHLORIDE, PRESERVATIVE FREE 1 G: 5 INJECTION INTRAVENOUS at 10:09

## 2020-06-21 RX ADMIN — FUROSEMIDE 20 MG: 10 INJECTION, SOLUTION INTRAMUSCULAR; INTRAVENOUS at 09:59

## 2020-06-21 ASSESSMENT — PAIN SCALES - GENERAL
PAINLEVEL_OUTOF10: 0
PAINLEVEL_OUTOF10: 2
PAINLEVEL_OUTOF10: 0

## 2020-06-21 NOTE — PROGRESS NOTES
Consents signed for CASTILLO and possible DCCV. Instructed pt not to eat or drink and he voiced understanding.  Electronically signed by Nehemiah Rojas RN on 6/21/2020 at 6:22 AM

## 2020-06-21 NOTE — PLAN OF CARE
Problem: Falls - Risk of:  Goal: Will remain free from falls  Description: Will remain free from falls  6/21/2020 0236 by Isidro Harvey RN  Outcome: Ongoing  6/21/2020 0233 by Isidro Harvey RN  Outcome: Ongoing  Goal: Absence of physical injury  Description: Absence of physical injury  6/21/2020 0236 by Isidro Harvey RN  Outcome: Ongoing  6/21/2020 0233 by Isidro Harvey RN  Outcome: Ongoing     Problem: Infection:  Goal: Will remain free from infection  Description: Will remain free from infection  6/21/2020 0236 by Isidro Harvey RN  Outcome: Ongoing  6/21/2020 0233 by Isidro Harvey RN  Outcome: Ongoing     Problem: Safety:  Goal: Free from accidental physical injury  Description: Free from accidental physical injury  6/21/2020 0236 by Isidro Harvey RN  Outcome: Ongoing  6/21/2020 0233 by Isidro Harvey RN  Outcome: Ongoing  Goal: Free from intentional harm  Description: Free from intentional harm  6/21/2020 0236 by Isidro Harvey RN  Outcome: Ongoing  6/21/2020 0233 by Isidro Harvey RN  Outcome: Ongoing     Problem: Daily Care:  Goal: Daily care needs are met  Description: Daily care needs are met  6/21/2020 0236 by Isidro Harvey RN  Outcome: Ongoing  6/21/2020 0233 by Isidro Harvey RN  Outcome: Ongoing     Problem: Pain:  Goal: Patient's pain/discomfort is manageable  Description: Patient's pain/discomfort is manageable  6/21/2020 0236 by Isidro Harvey RN  Outcome: Ongoing  6/21/2020 0233 by Isidro Harvey RN  Outcome: Ongoing     Problem: Skin Integrity:  Goal: Skin integrity will stabilize  Description: Skin integrity will stabilize  6/21/2020 0236 by Isidro Harvey RN  Outcome: Ongoing  6/21/2020 0233 by Isidro Harvey RN  Outcome: Ongoing     Problem: Discharge Planning:  Goal: Patients continuum of care needs are met  Description: Patients continuum of care needs are met  6/21/2020 0236 by Isidro Harvey RN  Outcome: Ongoing  6/21/2020 0233 by Isidro Harvey RN  Outcome: Ongoing   Electronically signed by Bonifacio Ybarra RN on 6/21/2020 at 2:36 AM

## 2020-06-21 NOTE — CONSULTS
(Principal) New onset of congestive heart failure ([facility]                PRIOR CARDIAC PROBLEM LIST  (IF APPLICABLE):    98/18/9132  Cath  anterolateral and apical hypo, EF 50%, 2 stents in the LAD and POA in the diagonal  5/8/2018  DSE negative for myocardial ischemia      Past Medical History:    Past Medical History:   Diagnosis Date    CAD (coronary artery disease)     Gout     Hypertension     Non-ST elevation MI (NSTEMI) ([facility] 12/28/14         Past Surgical History:    Past Surgical History:   Procedure Laterality Date    CARDIAC CATHETERIZATION  12/29/14  [facility]    angioplasty, stent to LAD. EF 45%    CHOLECYSTECTOMY           Home Medications:   Prior to Admission medications    Medication Sig Start Date End Date Taking? Authorizing Provider   potassium chloride (KLOR-CON) 20 MEQ packet Take 20 mEq by mouth daily    Yes Historical Provider, MD   amLODIPine (NORVASC) 5 MG tablet Take 5 mg by mouth daily   Yes Historical Provider, MD   aspirin 81 MG chewable tablet Take 81 mg by mouth daily   Yes Historical Provider, MD   atenolol (TENORMIN) 100 MG tablet Take 50 mg by mouth daily. 1/13/15  Yes Historical Provider, MD   prasugrel (EFFIENT) 10 MG TABS Take 10 mg by mouth daily. 12/30/14  Yes DELANEY Silverio MD   atorvastatin (LIPITOR) 40 MG tablet Take 40 mg by mouth daily. 12/30/14  Yes DELANEY Silverio MD        Facility Administered Medications:   Piero Benavides ON 6/21/2020] prasugrel  10 mg Oral Daily    [START ON 6/21/2020] atorvastatin  40 mg Oral Daily    [START ON 6/21/2020] atenolol  50 mg Oral Daily    [START ON 6/21/2020] aspirin  81 mg Oral Daily    potassium chloride  20 mEq Oral BID    [START ON 6/21/2020] amLODIPine  5 mg Oral Daily    sodium chloride flush  10 mL Intravenous 2 times per day    [START ON 6/21/2020] lisinopril  5 mg Oral Daily    [START ON 6/21/2020] furosemide  20 mg Intravenous Daily    [START ON 6/21/2020] enoxaparin  1 mg/kg Subcutaneous BID       Allergies: Patient has no known allergies. Social History:       Social History     Socioeconomic History    Marital status:      Spouse name: Not on file    Number of children: Not on file    Years of education: Not on file    Highest education level: Not on file   Occupational History    Not on file   Social Needs    Financial resource strain: Not on file    Food insecurity     Worry: Not on file     Inability: Not on file   [employer] needs     Medical: Not on file     Non-medical: Not on file   Tobacco Use    Smoking status: Former Smoker     Types: Cigars    Smokeless tobacco: Never Used   Substance and Sexual Activity    Alcohol use:  Yes     Alcohol/week: 3.0 standard drinks     Types: 3 Shots of liquor per week     Comment: daily    Drug use: No    Sexual activity: Not on file   Lifestyle    Physical activity     Days per week: Not on file     Minutes per session: Not on file    Stress: Not on file   Relationships    Social connections     Talks on phone: Not on file     Gets together: Not on file     Attends Yazdanism service: Not on file     Active member of club or organization: Not on file     Attends meetings of clubs or organizations: Not on file     Relationship status: Not on file    Intimate partner violence     Fear of current or ex partner: Not on file     Emotionally abused: Not on file     Physically abused: Not on file     Forced sexual activity: Not on file   Other Topics Concern    Not on file   Social History Narrative    Not on file       Family History:     Family History   Problem Relation Age of Onset    No Known Problems Mother     Heart Disease Father          REVIEW OF SYSTEMS:     Except as noted in the HPI, all other systems are negative        PHYSICAL EXAMINATION:     /78   Pulse 92   Temp 98.3 °F (36.8 °C) (Temporal)   Resp 20   Ht 6' (1.829 m)   Wt 197 lb 2 oz (89.4 kg)   SpO2 93%   BMI 26.73 kg/m²     GENERAL - well developed and well TSH:  Lab Results   Component Value Date    TSH 4.340 06/20/2020     UA:   Lab Results   Component Value Date    COLORU YELLOW 06/20/2020    PHUR 6.0 06/20/2020    WBCUA 13 06/20/2020    RBCUA 2 06/20/2020    BACTERIA 4+ 06/20/2020    CLARITYU Clear 06/20/2020    SPECGRAV 1.012 06/20/2020    LEUKOCYTESUR SMALL 06/20/2020    UROBILINOGEN 1.0 06/20/2020    BILIRUBINUR Negative 06/20/2020    BLOODU Negative 06/20/2020    GLUCOSEU Negative 06/20/2020             ALL THE CARDIOLOGY PROBLEMS ARE LISTED ABOVE; HOWEVER, THE FOLLOWING SPECIFIC CARDIAC PROBLEMS WERE ADDRESSED AND TREATED DURING THE HOSPITAL VISIT TODAY:                                                                                                                                                                                                                                            MEDICAL DECISION MAKING             Cardiac Specific Problem / Diagnosis  Discussion and Data Reviewed Diagnostic Procedures Ordered Management Options Selected           1.  Presenting problem / symptom    atrial fibrillation of uncertain duration  unknown   The Atrial Fibrillation Stroke Risk is calculated according to the     [id number] Score      Risk   Factors  Component Value   C CHF Yes 1   H HTN Yes 1   A2 Age >= 75 No,  (80 y.o.) 0   D DM No 0   S2 Prior Stroke/TIA No 0   V Vascular Disease No 0   A Age 74-69 Yes,  (80 y.o.) 1   Sc Sex male 0    [id number]  Score  3   Score last updated 6/20/20 8:24 PM CDT        [id number] Score Annual Stroke Risk %   0 0   1 1.3   2 2.2   3 3.2   4 4.0   5 6.7   6 9.8   7 9.6   8 12.5   9 15.2         Yes: echo Continue current medications:     Yes:            2. CAD Initial presentation during this evaluation   Review and summation of old records:    12/29/2014  Cath  anterolateral and apical hypo, EF 50%, 2 stents in the LAD and POA in the diagonal  5/8/2018  DSE negative for myocardial ischemia   No Continue current medications:    Yes:            3. Systemic arterial hypertension Initial presentation during this evaluation Systolic (52NOJ), FNZ:290 , Min:100 , MNF:822    Diastolic (00QPX), MNM:91, Min:72, Max:78   No Continue current medications:       yes         DISCUSSION AND PLAN:    1. I had a detailed discussion with the patient and / or family regarding the historical points, physical examination findings, and any diagnostic results supporting the admission diagnosis. The patient was educated on care and need for admission. questions were invited and answered. Patient and / or family shows understanding of admission information and agrees to follow. 2. Continue present medications except for changes as noted above    3. Continue to monitor rhythm    4. Further orders per clinical course. 5. Will ck echo, will probably need CASTILLO / DCCV. Dc the effient (has been on since stent in 12/2014). Begin eliquis. TTE tomorrow      Discussed with patient and family and nursing.     I greatly appreciate the opportunity and your confidence in allowing me to participate in the care of Adam Mcarthur    Electronically signed by Allison Eason MD on 6/20/20     [facility] [address] [place] [address]

## 2020-06-21 NOTE — PROGRESS NOTES
pressure for the lastr 36 hours has been:  Systolic (87AUM), YVY:065 , Min:81 , LZK:639    Diastolic (22ASH), LRU:15, Min:58, Max:87        Mariel Elliott is a 81 y.o. male with the following history as recorded in [facility]:    Patient Active Problem List    Diagnosis Date Noted    New onset of congestive heart failure ([facility] 06/20/2020     Priority: Low    Atrial fibrillation with RVR ([facility] 06/20/2020     Priority: Low    Dyslipidemia 06/20/2020     Priority: Low    Thrombocytopenia (HCC)      Priority: Low    Hyponatremia      Priority: Low    Alcohol abuse 05/07/2018     Priority: Low    Transaminitis 05/07/2018     Priority: Low    Chest pain 05/06/2018     Priority: Low    CAD (coronary artery disease)      Priority: Low    Essential hypertension      Priority: Low     Current Facility-Administered Medications   Medication Dose Route Frequency Provider Last Rate Last Dose    cefTRIAXone (ROCEPHIN) 1 g in sodium chloride (PF) 10 mL IV syringe  1 g Intravenous Q24H Carlos Cummins MD   1 g at 06/21/20 1009    dilTIAZem 125 mg in dextrose 5 % 125 mL infusion  5 mg/hr Intravenous Continuous Carlos Cummins MD   Stopped at 06/21/20 1458    atorvastatin (LIPITOR) tablet 40 mg  40 mg Oral Daily Carlos Cummins MD   40 mg at 06/21/20 0959    atenolol (TENORMIN) tablet 50 mg  50 mg Oral Daily Carlos Cummins MD   50 mg at 06/21/20 0959    aspirin chewable tablet 81 mg  81 mg Oral Daily Carlos Cummins MD   81 mg at 06/21/20 0959    potassium chloride (KLOR-CON M) extended release tablet 20 mEq  20 mEq Oral BID Carlos Cummins MD   20 mEq at 06/21/20 0958    amLODIPine (NORVASC) tablet 5 mg  5 mg Oral Daily Carlos Cummins MD   5 mg at 06/21/20 0959    sodium chloride flush 0.9 % injection 10 mL  10 mL Intravenous 2 times per day Carlos Cummins MD   10 mL at 06/21/20 0959    sodium chloride flush 0.9 % injection 10 mL  10 mL Intravenous PRN Carlos Cummins MD        acetaminophen (TYLENOL) tablet 650 mg  650 mg Oral Q6H PRN Carlos Cummins MD        Or    acetaminophen (TYLENOL) suppository 650 mg  650 mg Rectal Q6H PRN Carlos Cummins MD        polyethylene glycol (GLYCOLAX) packet 17 g  17 g Oral Daily PRN Carlos Cummins MD        promethazine (PHENERGAN) tablet 12.5 mg  12.5 mg Oral Q6H PRN Carlos Cummins MD        Or    ondansetron (ZOFRAN) injection 4 mg  4 mg Intravenous Q6H PRN Carlos Cummins MD        lisinopril (PRINIVIL;ZESTRIL) tablet 5 mg  5 mg Oral Daily Carlos Cummins MD   5 mg at 06/21/20 0958    furosemide (LASIX) injection 20 mg  20 mg Intravenous Daily Carlos Cummins MD   20 mg at 06/21/20 0959    apixaban (ELIQUIS) tablet 5 mg  5 mg Oral BID Oli Coronel MD   5 mg at 06/21/20 9941     Allergies: Patient has no known allergies. Past Medical History:   Diagnosis Date    CAD (coronary artery disease)     Gout     Hypertension     Non-ST elevation MI (NSTEMI) ([address] 12/28/14     Past Surgical History:   Procedure Laterality Date    CARDIAC CATHETERIZATION  12/29/14  [facility]    angioplasty, stent to LAD. EF 45%    CHOLECYSTECTOMY       Family History   Problem Relation Age of Onset    No Known Problems Mother     Heart Disease Father      Social History     Tobacco Use    Smoking status: Former Smoker     Types: Cigars    Smokeless tobacco: Never Used   Substance Use Topics    Alcohol use:  Yes     Alcohol/week: 3.0 standard drinks     Types: 3 Shots of liquor per week     Comment: daily          Review of Systems:    General:      Complaint / Symptom Yes / No / Description if Yes       Fatigue Yes:  chronic   Weight gain NA   Insomnia NA       Respiratory:        Complaint / Symptom Yes / No / Description if Yes       Cough No   Horseness NA       Cardiovascular:    Complaint / Symptom Yes / No / Description if Yes       Chest Pain No   Shortness of Air / Orthopnea Yes: chronic and are worsening:   Presyncope / Syncope No   Palpitations No         Objective:    BP (!) 100/58   Pulse 65   Temp 97 °F (36.1 °C) (Temporal) Age 74-69 Yes,  (80 y.o.) 1   Sc Sex male 0     [id number]  Score   3   Score last updated 6/20/20 8:24 PM CDT           [id number] Score Annual Stroke Risk %   0 0   1 1.3   2 2.2   3 3.2   4 4.0   5 6.7   6 9.8   7 9.6   8 12.5   9 15.2           Yes: echo Continue current medications:      Yes:                  2. CAD Initial presentation during this evaluation    Review and summation of old records:     12/29/2014  Cath  anterolateral and apical hypo, EF 50%, 2 stents in the LAD and POA in the diagonal  5/8/2018  DSE negative for myocardial ischemia    No Continue current medications:     Yes:                  3. Systemic arterial hypertension Initial presentation during this evaluation  The blood pressure for the lastr 36 hours has been:  Systolic (44IDZ), QPN:362 , Min:81 , LDM:653    Diastolic (44BHK), UGC:67, Min:58, Max:87    No Continue current medications:        yes          [address]:       Date Clinical Event Plan of Treatment           6/20/2020 Admitted for: dyspnea  Cardiology Concern:  Newly discovered AF  For CASTILLO / DCCV on Monday  Will check echo tomorrow   06/21/20 6/21/20 echo EF 27%, AUC indication 24, AUC score 8  For cath tomorrow as well                                   Date of the Proposed Procedure:  06/22/20      Proposed Procedure:  Selective left heart and coronary arteriography with possible percutaneous coronary interventon, (femoral approach), 06/22/20    :  VEGA Munoz MD    Indications:  6/21/20 echo EF 27%, AUC indication 24, AUC score 8     American Society of Anesthesiologists (ASA) Classification:  III    Plan of Sedation:  Moderate Sedation    Mallampati Classification:  II    I have examined this patient on 06/21/20  in PCU # [id number] in the presence of Denys Armendariz RN and find no interval changes since the original History and Physical / Consult as noted written by myself on 06/21/20     With the constellation of symptoms and these findings, I recommend cardiac catheterization and possibility of percutaneous coronary intervention. I discussed with him  in detail  the risks, benefits and alternatives to this procedure. The risks mentioned to him include but are not limited to:  vascular complications in ~ 3%, stroke <1%, renal dysfunction <5%, myocardial infarction <1%, coronary dissection <1%, need for emergency open heart surgery <1, and death <1% . He appeared to understand, had no questions, and agreed to proceed with this plan. Additionally, there are risks associated with moderate sedation which includes transient drop in blood pressure and need for assisted ventilation. This procedure is scheduled for 06/22/20 . Nikki Manzano MD           Date of the Proposed Procedures:   06/22/20     Proposed Procedures:  Transesophageal Echocardiography (CASTILLO) Guided - Direct Current Cardioversion (DDCV)    :  VEGA Booth MD    Indications:  Atrial Fibrillation of uncertain duration    American Society of Anesthesiologists (ASA) Classification III    Plan of Sedation:  Moderate Sedation    Mallampati Classification:  II    I have examined this patient and find no interval changes since the original History and Physical / Consult note written by myself, on 06/21/20     With the constellation of symptoms and these findings, I recommend Transesophageal Echocardiography (CASTILLO) Guided - Direct Current Cardioversion (DCCV). I discussed with him  in detail  the risks and benefits of these procedure, the first to be performed is the Transesophageal Echocardiography (CASTILLO). The risks mentioned to him are relative low but include trauma to the throat and esophagus, aspiration, pulmonary wheezing, accidental placement of the tube in the trachea and arrhythmia. The total risk of complications with transesophageal echocardiography (CASTILLO) is 0.2% to 0.5% and the risk of death is extremely rare (0.0004%).   (Modified from Braunwald's Heart Disease, 10th edition, page [phone]). After the Transesophageal Echocardiography is preformed and with acceptable findings, I discussed with him  in detail  the risks and benefits of Direct Current Cardioversion (DCCV). The risks of Direct Current Cardioversion I mentioned to him are rare but can potentially include:  More dangerous heart rhythms (which are treated with medications or a stronger electrical shock), other less dangerous abnormal rhythms, temporary low blood pressure, heart damage (usually temporary and without symptoms), heart failure, skin irritation, and dislodged blood clots, which can cause stroke, pulmonary embolism, or other problems. Additionally, there are risks associated with sedation which includes transient drop in blood pressure and need for assisted ventilation. These procedures are scheduled for 06/22/20 .     Ambrosio Amos MD

## 2020-06-21 NOTE — PROGRESS NOTES
Pt c/o being a little SOA, sat was 92%. Applied 2L O2 per NC, sat recheck was 98%. Will continue to monitor.  Electronically signed by Sindhu Lord RN on 6/20/2020 at 10:01 PM

## 2020-06-21 NOTE — PROGRESS NOTES
[place], [place], [address]      DEPARTMENT OF HOSPITALIST MEDICINE        PROGRESS  NOTE:    NOTE: Portions of this note have been copied forward, however, changed to reflect the most current clinical status of this patient. Patient:  Mark Fields  YOB: 1950  Date of Service: 6/21/2020  MRN: [id number]   Acct: [de-identified]   Primary Care Physician: Fang Shore MD  Advance Directive: Full Code  Admit Date: 6/20/2020       Hospital Day: 1      CHIEF COMPLAINT:  Chief Complaint   Patient presents with    Shortness of Breath     x 1 week    Irregular Heart Beat    Leg Swelling       SUBJECTIVE / [address]  COURSE:    6/20/2021  Patient feels better after aggressive management since admission. He underwent  echocardiogram which showed ejection fraction of 27%. Cardiology had a detailed discussion with the family and they are planning to take him for DC cardioversion for new onset A. fib as well as cardiac catheterization in the morning. Patient has been started on anticoagulation as per cardiology. 6/20/2020  HISTORY OF PRESENT ILLNESS:  Mark Fields is an 81 y.o. male. Very pleasant gentleman with CAD status post 2 stent placement in 2014 who is complaining of shortness of breath for the last couple of weeks with minimal exertion along with tiredness and fatigue which is getting worse. He finally agreed to come to  The ER for evaluation after insistence by the family, work-up was done which showed elevated BNP more than 9000 as well as fluid overload changes on chest x-ray consistent with new onset CHF. He also had A. fib with RVR which was improved on starting IV Cardizem drip. First set of cardiac enzymes was done which is normal.  He is being admitted for medical management, cardiopulmonary monitoring, cardiology evaluation and further work-up in the morning.     REVIEW  OF  SYSTEMS:    Constitutional:  No fevers, chills, nausea, vomiting,    Lungs:   No hemoptysis, pleurisy, + SOB   Heart:  No chest pressure with exertion, palpitations,    Abdomen:   No new masses, no bright red blood per rectum   Extremities: +difficulty ambulating due to weakness, +1 B/L pitting edema   Neurologic: No new motor or sensory changes       PAST MEDICAL HISTORY:  Past Medical History:   Diagnosis Date    CAD (coronary artery disease)     Gout     Hypertension     Non-ST elevation MI (NSTEMI) ([address] 12/28/14         PAST SURGICAL HISTORY:  Past Surgical History:   Procedure Laterality Date    CARDIAC CATHETERIZATION  12/29/14  [facility]    angioplasty, stent to LAD. EF 45%    CHOLECYSTECTOMY          FAMILY HISTORY:  Family History   Problem Relation Age of Onset    No Known Problems Mother     Heart Disease Father            OBJECTIVE:  BP (!) 100/58   Pulse 65   Temp 97 °F (36.1 °C) (Temporal)   Resp 18   Ht 6' (1.829 m)   Wt 196 lb 3.2 oz (89 kg)   SpO2 97%   BMI 26.61 kg/m²   No intake/output data recorded.     PHYSICAL  EXAMINATION:    VENKAT:  Awake, alert, oriented x 3, patient appears tired and fatigued   Head/Eyes:  Normocephalic, atraumatic, EOMI and PERRLA bilaterally   Respiratory:   Bilateral decreased air entry in both lung fields, mild B/L crackles, bilateral scattered rales   Cardiovascular:   Regularly irregular rate and rhythm, S1+S2+0, no rubs   Abdomen:   Soft, non-tender, bowel sounds +ve, no organomegaly   Extremities: Moves all, decreased range of motion, no edema   Neurologic: Awake, alert, oriented x 3, cranial nerves II-XII intact, no focal neurological deficits, sensory system intact   Psychiatric: Normal mood, non-suicidal       CURRENT MEDICATIONS:  Scheduled:   cefTRIAXone (ROCEPHIN) IV  1 g Intravenous Q24H    atorvastatin  40 mg Oral Daily    atenolol  50 mg Oral Daily    aspirin  81 mg Oral Daily    potassium chloride  20 mEq Oral BID    amLODIPine  5 mg Oral Daily    sodium chloride flush  10 mL Intravenous 2 times per day    lisinopril  5 mg Oral Daily    furosemide  20 mg Intravenous Daily    apixaban  5 mg Oral BID        PRN:  sodium chloride flush, 10 mL, PRN  acetaminophen, 650 mg, Q6H PRN    Or  acetaminophen, 650 mg, Q6H PRN  polyethylene glycol, 17 g, Daily PRN  promethazine, 12.5 mg, Q6H PRN    Or  ondansetron, 4 mg, Q6H PRN        Infusions:   dilTIAZem (CARDIZEM) 125 mg in dextrose 5% 125 mL infusion Stopped (06/21/20 1458)       Laboratory Data:  Recent Labs     06/20/20  1400   WBC 6.8   HGB 13.1*        Recent Labs     06/20/20  1400 06/21/20  0456   * 140   K 4.1 4.6   CL 98 97*   CO2 22 33*   BUN 13 23   CREATININE 1.2 <0.5   GLUCOSE 105 138*     Recent Labs     06/20/20  1400 06/21/20  0456   AST 13 12   ALT 5 16   BILITOT 2.0* <0.2   ALKPHOS 113 83     Troponin T:   Recent Labs     06/20/20  1400 06/20/20  2200 06/21/20  0456   TROPONINI <0.01 <0.01 <0.01     Pro-BNP: No results for input(s): BNP in the last 72 hours. INR:   Recent Labs     06/20/20  1400   INR 1.12     UA:  Recent Labs     06/20/20  1730   COLORU YELLOW   PHUR 6.0   WBCUA 13*   RBCUA 2   BACTERIA 4+*   CLARITYU Clear   SPECGRAV 1.012   LEUKOCYTESUR SMALL*   UROBILINOGEN 1.0   BILIRUBINUR Negative   BLOODU Negative   GLUCOSEU Negative     A1C: No results for input(s): LABA1C in the last 72 hours. ABG:No results for input(s): PHART, YRT6CKK, PO2ART, UWE0HEE, BEART, HGBAE, T6YKCENH, CARBOXHGBART in the last 72 hours. Imaging:    Xr Chest Portable    Result Date: 6/20/2020  1. There is a new dense right basilar consolidation, which partially obscures the right hemidiaphragm. This is concerning for a lower lobe pneumonia. Signed by Dr Salome Rausch on 6/20/2020 2:26 PM    Cta Pulmonary W Contrast    Result Date: 6/20/2020  1. No evidence of pulmonary embolus. 2. Evidence for pulmonary hypertension. Interstitial edema with bilateral layering pleural effusions. 3. There are small consolidations in the right middle lobe and left upper lobe lingula. This likely reflects small areas of antonio edema. Signed by Dr Heide George on 6/20/2020 3:20 PM       ASSESSMENT & PLAN:    Principal Problem:    New onset of congestive heart failure ([address]  Active Problems:    CAD (coronary artery disease)    Essential hypertension    Atrial fibrillation with RVR ([facility]    Dyslipidemia  Resolved Problems:    * No resolved hospital problems. *    Admit patient to medical unit under full inpatient status  Continuous cardiac tele-monitoring  Patient given Aspirin in the ER  Monitor cardiac enzymes ×3 which are negative  Full 2-D echo with color-flow and doppler showed ejection fraction of only 27%  Continue with IV diuresis with strict I's and O's monitoring  Continue with IV Cardizem drip for A. fib with RVR, to keep heart rate around 100   Keep patient NPO after midnight  Patient  going cardiac catheterization and DC cardioversion in the morning      Repeat labs in a.m. Electrolyte replacement as per protocol. Patient will be monitored very closely on the floor. Further recommendations as per the hospital course. Discharge Plan: By mid next week after cardiology work-up is complete and patient is stable      Patient  is on DVT prophylaxis  Current medications reviewed  Lab work reviewed  Radiology/Chest x-ray films reviewed  Treatment recommendations from suspecialities reviewed, appreciated and agreed with  Discussed with the nurse and addressed all questions/concerns  Discussed with Patient and/or Family at the bedside in detail . .. they understand and agree with the management plan. Sharmaine Ramirez MD  6/21/2020 6:58 PM      DISCLAIMER: This note was created with electronic voice recognition which does have occasional errors. If you have any questions regarding the content within the note please do not hesitate to contact me. .. Thanks.

## 2020-06-21 NOTE — PROGRESS NOTES
Notified hospitalist that pt has 4+ bacteria on his UA.  Electronically signed by Danny Denney RN on 6/21/2020 at 5:59 AM

## 2020-06-21 NOTE — PLAN OF CARE
Problem: Falls - Risk of:  Goal: Will remain free from falls  Description: Will remain free from falls  Outcome: Ongoing  Goal: Absence of physical injury  Description: Absence of physical injury  Outcome: Ongoing     Problem: Infection:  Goal: Will remain free from infection  Description: Will remain free from infection  Outcome: Ongoing     Problem: Safety:  Goal: Free from accidental physical injury  Description: Free from accidental physical injury  Outcome: Ongoing  Goal: Free from intentional harm  Description: Free from intentional harm  Outcome: Ongoing     Problem: Daily Care:  Goal: Daily care needs are met  Description: Daily care needs are met  Outcome: Ongoing     Problem: Pain:  Goal: Patient's pain/discomfort is manageable  Description: Patient's pain/discomfort is manageable  Outcome: Ongoing     Problem: Skin Integrity:  Goal: Skin integrity will stabilize  Description: Skin integrity will stabilize  Outcome: Ongoing     Problem: Discharge Planning:  Goal: Patients continuum of care needs are met  Description: Patients continuum of care needs are met  Outcome: Ongoing   Electronically signed by Gurvinder Huerta RN on 6/21/2020 at 2:34 AM

## 2020-06-21 NOTE — PROGRESS NOTES
Heart rate dropped to 60-70, dec Cardizem gtt to 5mg/hr. Will continue to monitor.  Electronically signed by Donnetta Gowers, RN on 6/21/2020 at 12:12 AM

## 2020-06-22 LAB
ANION GAP SERPL CALCULATED.3IONS-SCNC: 10 MMOL/L (ref 7–19)
BUN BLDV-MCNC: 17 MG/DL (ref 8–23)
CALCIUM SERPL-MCNC: 8.9 MG/DL (ref 8.8–10.2)
CHLORIDE BLD-SCNC: 101 MMOL/L (ref 98–111)
CO2: 23 MMOL/L (ref 22–29)
CREAT SERPL-MCNC: 1.6 MG/DL (ref 0.5–1.2)
GFR NON-AFRICAN AMERICAN: 43
GLUCOSE BLD-MCNC: 86 MG/DL (ref 74–109)
HCT VFR BLD CALC: 37 % (ref 42–52)
HEMOGLOBIN: 11.9 G/DL (ref 14–18)
MCH RBC QN AUTO: 30.5 PG (ref 27–31)
MCHC RBC AUTO-ENTMCNC: 32.2 G/DL (ref 33–37)
MCV RBC AUTO: 94.9 FL (ref 80–94)
ORGANISM: ABNORMAL
PDW BLD-RTO: 14.1 % (ref 11.5–14.5)
PLATELET # BLD: 233 K/UL (ref 130–400)
PMV BLD AUTO: 10.2 FL (ref 9.4–12.4)
POTASSIUM SERPL-SCNC: 4.4 MMOL/L (ref 3.5–5)
RBC # BLD: 3.9 M/UL (ref 4.7–6.1)
SODIUM BLD-SCNC: 134 MMOL/L (ref 136–145)
TROPONIN: 0.01 NG/ML (ref 0–0.03)
TROPONIN: <0.01 NG/ML (ref 0–0.03)
URINE CULTURE, ROUTINE: ABNORMAL
URINE CULTURE, ROUTINE: ABNORMAL
WBC # BLD: 6.3 K/UL (ref 4.8–10.8)

## 2020-06-22 PROCEDURE — 93325 DOPPLER ECHO COLOR FLOW MAPG: CPT

## 2020-06-22 PROCEDURE — B24BZZ4 ULTRASONOGRAPHY OF HEART WITH AORTA, TRANSESOPHAGEAL: ICD-10-PCS | Performed by: INTERNAL MEDICINE

## 2020-06-22 PROCEDURE — 80048 BASIC METABOLIC PNL TOTAL CA: CPT

## 2020-06-22 PROCEDURE — 2580000003 HC RX 258: Performed by: INTERNAL MEDICINE

## 2020-06-22 PROCEDURE — 37799 UNLISTED PX VASCULAR SURGERY: CPT

## 2020-06-22 PROCEDURE — 93320 DOPPLER ECHO COMPLETE: CPT | Performed by: INTERNAL MEDICINE

## 2020-06-22 PROCEDURE — 93325 DOPPLER ECHO COLOR FLOW MAPG: CPT | Performed by: INTERNAL MEDICINE

## 2020-06-22 PROCEDURE — 2580000003 HC RX 258: Performed by: HOSPITALIST

## 2020-06-22 PROCEDURE — 84484 ASSAY OF TROPONIN QUANT: CPT

## 2020-06-22 PROCEDURE — 6360000004 HC RX CONTRAST MEDICATION: Performed by: HOSPITALIST

## 2020-06-22 PROCEDURE — 6360000002 HC RX W HCPCS

## 2020-06-22 PROCEDURE — 93005 ELECTROCARDIOGRAM TRACING: CPT | Performed by: INTERNAL MEDICINE

## 2020-06-22 PROCEDURE — 2709999900 HC NON-CHARGEABLE SUPPLY

## 2020-06-22 PROCEDURE — 6360000002 HC RX W HCPCS: Performed by: INTERNAL MEDICINE

## 2020-06-22 PROCEDURE — 6370000000 HC RX 637 (ALT 250 FOR IP): Performed by: HOSPITALIST

## 2020-06-22 PROCEDURE — 99024 POSTOP FOLLOW-UP VISIT: CPT | Performed by: INTERNAL MEDICINE

## 2020-06-22 PROCEDURE — 2500000003 HC RX 250 WO HCPCS: Performed by: INTERNAL MEDICINE

## 2020-06-22 PROCEDURE — 36415 COLL VENOUS BLD VENIPUNCTURE: CPT

## 2020-06-22 PROCEDURE — 6360000002 HC RX W HCPCS: Performed by: HOSPITALIST

## 2020-06-22 PROCEDURE — B2111ZZ FLUOROSCOPY OF MULTIPLE CORONARY ARTERIES USING LOW OSMOLAR CONTRAST: ICD-10-PCS | Performed by: INTERNAL MEDICINE

## 2020-06-22 PROCEDURE — 93312 ECHO TRANSESOPHAGEAL: CPT | Performed by: INTERNAL MEDICINE

## 2020-06-22 PROCEDURE — 5A2204Z RESTORATION OF CARDIAC RHYTHM, SINGLE: ICD-10-PCS | Performed by: INTERNAL MEDICINE

## 2020-06-22 PROCEDURE — 92960 CARDIOVERSION ELECTRIC EXT: CPT | Performed by: INTERNAL MEDICINE

## 2020-06-22 PROCEDURE — 2500000003 HC RX 250 WO HCPCS

## 2020-06-22 PROCEDURE — 2100000000 HC CCU R&B

## 2020-06-22 PROCEDURE — 99152 MOD SED SAME PHYS/QHP 5/>YRS: CPT | Performed by: INTERNAL MEDICINE

## 2020-06-22 PROCEDURE — C1894 INTRO/SHEATH, NON-LASER: HCPCS

## 2020-06-22 PROCEDURE — 93458 L HRT ARTERY/VENTRICLE ANGIO: CPT | Performed by: INTERNAL MEDICINE

## 2020-06-22 PROCEDURE — B2151ZZ FLUOROSCOPY OF LEFT HEART USING LOW OSMOLAR CONTRAST: ICD-10-PCS | Performed by: INTERNAL MEDICINE

## 2020-06-22 PROCEDURE — 85027 COMPLETE CBC AUTOMATED: CPT

## 2020-06-22 PROCEDURE — 4A023N7 MEASUREMENT OF CARDIAC SAMPLING AND PRESSURE, LEFT HEART, PERCUTANEOUS APPROACH: ICD-10-PCS | Performed by: INTERNAL MEDICINE

## 2020-06-22 PROCEDURE — 6370000000 HC RX 637 (ALT 250 FOR IP): Performed by: INTERNAL MEDICINE

## 2020-06-22 PROCEDURE — 6370000000 HC RX 637 (ALT 250 FOR IP)

## 2020-06-22 PROCEDURE — 2700000000 HC OXYGEN THERAPY PER DAY

## 2020-06-22 PROCEDURE — 99153 MOD SED SAME PHYS/QHP EA: CPT | Performed by: INTERNAL MEDICINE

## 2020-06-22 RX ORDER — CARVEDILOL 3.12 MG/1
3.12 TABLET ORAL 2 TIMES DAILY WITH MEALS
Status: DISCONTINUED | OUTPATIENT
Start: 2020-06-22 | End: 2020-06-29 | Stop reason: HOSPADM

## 2020-06-22 RX ORDER — DIGOXIN 125 MCG
250 TABLET ORAL DAILY
Status: DISCONTINUED | OUTPATIENT
Start: 2020-06-22 | End: 2020-06-29 | Stop reason: HOSPADM

## 2020-06-22 RX ORDER — DOBUTAMINE HYDROCHLORIDE 200 MG/100ML
5 INJECTION INTRAVENOUS CONTINUOUS
Status: DISCONTINUED | OUTPATIENT
Start: 2020-06-22 | End: 2020-06-23

## 2020-06-22 RX ORDER — SODIUM CHLORIDE 9 MG/ML
INJECTION, SOLUTION INTRAVENOUS CONTINUOUS
Status: ACTIVE | OUTPATIENT
Start: 2020-06-22 | End: 2020-06-23

## 2020-06-22 RX ORDER — NOREPINEPHRINE BIT/0.9 % NACL 16MG/250ML
2 INFUSION BOTTLE (ML) INTRAVENOUS CONTINUOUS
Status: DISCONTINUED | OUTPATIENT
Start: 2020-06-22 | End: 2020-06-23

## 2020-06-22 RX ORDER — SPIRONOLACTONE 25 MG/1
25 TABLET ORAL DAILY
Status: DISCONTINUED | OUTPATIENT
Start: 2020-06-22 | End: 2020-06-29 | Stop reason: HOSPADM

## 2020-06-22 RX ADMIN — APIXABAN 5 MG: 5 TABLET, FILM COATED ORAL at 09:23

## 2020-06-22 RX ADMIN — SODIUM CHLORIDE, PRESERVATIVE FREE 1 G: 5 INJECTION INTRAVENOUS at 09:24

## 2020-06-22 RX ADMIN — SPIRONOLACTONE 25 MG: 25 TABLET ORAL at 20:46

## 2020-06-22 RX ADMIN — FUROSEMIDE 20 MG: 10 INJECTION, SOLUTION INTRAMUSCULAR; INTRAVENOUS at 09:23

## 2020-06-22 RX ADMIN — SODIUM CHLORIDE, PRESERVATIVE FREE 10 ML: 5 INJECTION INTRAVENOUS at 20:45

## 2020-06-22 RX ADMIN — SACUBITRIL AND VALSARTAN 1 TABLET: 24; 26 TABLET, FILM COATED ORAL at 20:46

## 2020-06-22 RX ADMIN — Medication 2 MCG/MIN: at 20:49

## 2020-06-22 RX ADMIN — SODIUM CHLORIDE: 9 INJECTION, SOLUTION INTRAVENOUS at 20:47

## 2020-06-22 RX ADMIN — ATENOLOL 50 MG: 50 TABLET ORAL at 09:23

## 2020-06-22 RX ADMIN — IOPAMIDOL 86 ML: 612 INJECTION, SOLUTION INTRAVENOUS at 17:01

## 2020-06-22 RX ADMIN — SODIUM CHLORIDE, PRESERVATIVE FREE 10 ML: 5 INJECTION INTRAVENOUS at 09:25

## 2020-06-22 RX ADMIN — ASPIRIN 81 MG 81 MG: 81 TABLET ORAL at 09:23

## 2020-06-22 RX ADMIN — DOBUTAMINE IN DEXTROSE 5 MCG/KG/MIN: 200 INJECTION, SOLUTION INTRAVENOUS at 20:47

## 2020-06-22 RX ADMIN — POTASSIUM CHLORIDE 20 MEQ: 20 TABLET, EXTENDED RELEASE ORAL at 20:46

## 2020-06-22 RX ADMIN — DIGOXIN 250 MCG: 125 TABLET ORAL at 20:46

## 2020-06-22 RX ADMIN — POTASSIUM CHLORIDE 20 MEQ: 20 TABLET, EXTENDED RELEASE ORAL at 09:25

## 2020-06-22 RX ADMIN — APIXABAN 5 MG: 5 TABLET, FILM COATED ORAL at 20:46

## 2020-06-22 RX ADMIN — AMLODIPINE BESYLATE 5 MG: 5 TABLET ORAL at 09:23

## 2020-06-22 RX ADMIN — ATORVASTATIN CALCIUM 40 MG: 40 TABLET, FILM COATED ORAL at 09:23

## 2020-06-22 RX ADMIN — CARVEDILOL 3.12 MG: 3.12 TABLET, FILM COATED ORAL at 20:46

## 2020-06-22 ASSESSMENT — PAIN DESCRIPTION - PAIN TYPE: TYPE: CHRONIC PAIN

## 2020-06-22 ASSESSMENT — PAIN SCALES - GENERAL
PAINLEVEL_OUTOF10: 0
PAINLEVEL_OUTOF10: 5

## 2020-06-22 ASSESSMENT — PAIN DESCRIPTION - LOCATION: LOCATION: BACK

## 2020-06-22 NOTE — PLAN OF CARE
Problem: Falls - Risk of:  Goal: Will remain free from falls  Description: Will remain free from falls  Outcome: Ongoing  Goal: Absence of physical injury  Description: Absence of physical injury  Outcome: Ongoing     Problem: Infection:  Goal: Will remain free from infection  Description: Will remain free from infection  Outcome: Ongoing     Problem: Safety:  Goal: Free from accidental physical injury  Description: Free from accidental physical injury  Outcome: Ongoing  Goal: Free from intentional harm  Description: Free from intentional harm  Outcome: Ongoing     Problem: Daily Care:  Goal: Daily care needs are met  Description: Daily care needs are met  Outcome: Ongoing     Problem: Pain:  Goal: Patient's pain/discomfort is manageable  Description: Patient's pain/discomfort is manageable  Outcome: Ongoing     Problem: Skin Integrity:  Goal: Skin integrity will stabilize  Description: Skin integrity will stabilize  Outcome: Ongoing     Problem: Discharge Planning:  Goal: Patients continuum of care needs are met  Description: Patients continuum of care needs are met  Outcome: Ongoing     Problem: Cardiac:  Goal: Ability to maintain an adequate cardiac output will improve  Description: Ability to maintain an adequate cardiac output will improve  Outcome: Ongoing  Goal: Complications related to the disease process, condition or treatment will be avoided or minimized  Description: Complications related to the disease process, condition or treatment will be avoided or minimized  Outcome: Ongoing   Electronically signed by Flavia Rosales RN on 6/22/2020 at 2:54 AM

## 2020-06-22 NOTE — PROGRESS NOTES
Monitor room called to report pt had 6.92 sec pause. Checked on pt and he was voiding in urinal while laying on his side. Pt denied feeling anything strange and had no complaints. Will continue to monitor.  Electronically signed by Felisa Serrano RN on 6/22/2020 at 1:35 AM

## 2020-06-22 NOTE — CONSULTS
Nutrition Assessment    Type and Reason for Visit: Initial, Consult    Nutrition Assessment: Consult received for CHF diet education. Education not appropriate at this time. Pt to undergo cardioversion and cardiac catheterization today. Pt NPO at this time. Will attempt education at a later date.      Malnutrition Assessment:  · Malnutrition Status: No malnutrition  · Context: Acute illness or injury    Nutrition Risk Level: Low    Nutrition Diagnosis:   · Problem: Altered nutrition-related lab values  · Etiology: related to Cardiac dysfunction     Signs and symptoms:  as evidenced by Lab values    Objective Information:  · Current Nutrition Therapies:  · Oral Diet Orders: NPO   · Oral Diet intake: %  · Oral Nutrition Supplement (ONS) Orders: None  · Anthropometric Measures:  · Ht: 6' (182.9 cm)   · Current Body Wt: 195 lb (88.5 kg)  · Ideal Body Wt: 178 lb (80.7 kg), % Ideal Body 110%  · BMI Classification: BMI 25.0 - 29.9 Overweight    Nutrition Interventions:   Continue current diet  Continued Inpatient Monitoring, Education Needed    Nutrition Evaluation:   · Evaluation: Goals set   · Goals: PO intake >50, improvement in BNP    · Monitoring: Nutrition Progression, Meal Intake, Pertinent Labs      Electronically signed by Curry Cast RD on 6/22/20 at 2:08 PM CDT    Contact Number: [phone]

## 2020-06-23 PROBLEM — I50.21 ACUTE SYSTOLIC HEART FAILURE (HCC): Status: ACTIVE | Noted: 2020-06-23

## 2020-06-23 PROBLEM — I95.9 HYPOTENSION: Status: ACTIVE | Noted: 2020-06-23

## 2020-06-23 LAB
ANION GAP SERPL CALCULATED.3IONS-SCNC: 15 MMOL/L (ref 7–19)
BUN BLDV-MCNC: 15 MG/DL (ref 8–23)
CALCIUM SERPL-MCNC: 8.4 MG/DL (ref 8.8–10.2)
CHLORIDE BLD-SCNC: 99 MMOL/L (ref 98–111)
CO2: 18 MMOL/L (ref 22–29)
CREAT SERPL-MCNC: 1.1 MG/DL (ref 0.5–1.2)
EKG P AXIS: 29 DEGREES
EKG P AXIS: 34 DEGREES
EKG P AXIS: 4 DEGREES
EKG P AXIS: NORMAL DEGREES
EKG P-R INTERVAL: 148 MS
EKG P-R INTERVAL: 164 MS
EKG P-R INTERVAL: 172 MS
EKG P-R INTERVAL: NORMAL MS
EKG Q-T INTERVAL: 320 MS
EKG Q-T INTERVAL: 388 MS
EKG Q-T INTERVAL: 440 MS
EKG Q-T INTERVAL: 452 MS
EKG QRS DURATION: 104 MS
EKG QRS DURATION: 112 MS
EKG QRS DURATION: 118 MS
EKG QRS DURATION: 118 MS
EKG QTC CALCULATION (BAZETT): 440 MS
EKG QTC CALCULATION (BAZETT): 456 MS
EKG QTC CALCULATION (BAZETT): 458 MS
EKG QTC CALCULATION (BAZETT): 460 MS
EKG T AXIS: 101 DEGREES
EKG T AXIS: 104 DEGREES
EKG T AXIS: 110 DEGREES
EKG T AXIS: 68 DEGREES
GFR NON-AFRICAN AMERICAN: >60
GLUCOSE BLD-MCNC: 79 MG/DL (ref 74–109)
HCT VFR BLD CALC: 34.3 % (ref 42–52)
HEMOGLOBIN: 11.2 G/DL (ref 14–18)
MCH RBC QN AUTO: 30.4 PG (ref 27–31)
MCHC RBC AUTO-ENTMCNC: 32.7 G/DL (ref 33–37)
MCV RBC AUTO: 93.2 FL (ref 80–94)
PDW BLD-RTO: 14.1 % (ref 11.5–14.5)
PLATELET # BLD: 203 K/UL (ref 130–400)
PMV BLD AUTO: 9.6 FL (ref 9.4–12.4)
POTASSIUM SERPL-SCNC: 4.5 MMOL/L (ref 3.5–5)
PRO-BNP: 5590 PG/ML (ref 0–900)
RBC # BLD: 3.68 M/UL (ref 4.7–6.1)
SODIUM BLD-SCNC: 132 MMOL/L (ref 136–145)
TROPONIN: 0.01 NG/ML (ref 0–0.03)
WBC # BLD: 5.8 K/UL (ref 4.8–10.8)

## 2020-06-23 PROCEDURE — 6370000000 HC RX 637 (ALT 250 FOR IP): Performed by: HOSPITALIST

## 2020-06-23 PROCEDURE — 2580000003 HC RX 258: Performed by: HOSPITALIST

## 2020-06-23 PROCEDURE — 6370000000 HC RX 637 (ALT 250 FOR IP): Performed by: INTERNAL MEDICINE

## 2020-06-23 PROCEDURE — 93005 ELECTROCARDIOGRAM TRACING: CPT | Performed by: INTERNAL MEDICINE

## 2020-06-23 PROCEDURE — 2100000000 HC CCU R&B

## 2020-06-23 PROCEDURE — 37799 UNLISTED PX VASCULAR SURGERY: CPT

## 2020-06-23 PROCEDURE — 6360000002 HC RX W HCPCS: Performed by: HOSPITALIST

## 2020-06-23 PROCEDURE — 83880 ASSAY OF NATRIURETIC PEPTIDE: CPT

## 2020-06-23 PROCEDURE — 80048 BASIC METABOLIC PNL TOTAL CA: CPT

## 2020-06-23 PROCEDURE — 6360000002 HC RX W HCPCS: Performed by: INTERNAL MEDICINE

## 2020-06-23 PROCEDURE — 99233 SBSQ HOSP IP/OBS HIGH 50: CPT | Performed by: INTERNAL MEDICINE

## 2020-06-23 PROCEDURE — 84484 ASSAY OF TROPONIN QUANT: CPT

## 2020-06-23 PROCEDURE — 93010 ELECTROCARDIOGRAM REPORT: CPT | Performed by: INTERNAL MEDICINE

## 2020-06-23 PROCEDURE — 85027 COMPLETE CBC AUTOMATED: CPT

## 2020-06-23 PROCEDURE — 6360000002 HC RX W HCPCS

## 2020-06-23 PROCEDURE — 2580000003 HC RX 258: Performed by: INTERNAL MEDICINE

## 2020-06-23 RX ORDER — MORPHINE SULFATE 4 MG/ML
2 INJECTION, SOLUTION INTRAMUSCULAR; INTRAVENOUS ONCE
Status: COMPLETED | OUTPATIENT
Start: 2020-06-23 | End: 2020-06-23

## 2020-06-23 RX ORDER — MORPHINE SULFATE 4 MG/ML
INJECTION, SOLUTION INTRAMUSCULAR; INTRAVENOUS
Status: COMPLETED
Start: 2020-06-23 | End: 2020-06-23

## 2020-06-23 RX ADMIN — SODIUM CHLORIDE, PRESERVATIVE FREE 10 ML: 5 INJECTION INTRAVENOUS at 20:52

## 2020-06-23 RX ADMIN — SACUBITRIL AND VALSARTAN 1 TABLET: 24; 26 TABLET, FILM COATED ORAL at 08:30

## 2020-06-23 RX ADMIN — MORPHINE SULFATE 2 MG: 4 INJECTION, SOLUTION INTRAMUSCULAR; INTRAVENOUS at 15:57

## 2020-06-23 RX ADMIN — SODIUM CHLORIDE, PRESERVATIVE FREE 1 G: 5 INJECTION INTRAVENOUS at 08:31

## 2020-06-23 RX ADMIN — ASPIRIN 81 MG 81 MG: 81 TABLET ORAL at 08:30

## 2020-06-23 RX ADMIN — APIXABAN 5 MG: 5 TABLET, FILM COATED ORAL at 20:52

## 2020-06-23 RX ADMIN — FUROSEMIDE 20 MG: 10 INJECTION, SOLUTION INTRAMUSCULAR; INTRAVENOUS at 08:30

## 2020-06-23 RX ADMIN — POTASSIUM CHLORIDE 20 MEQ: 20 TABLET, EXTENDED RELEASE ORAL at 08:30

## 2020-06-23 RX ADMIN — POTASSIUM CHLORIDE 20 MEQ: 20 TABLET, EXTENDED RELEASE ORAL at 20:52

## 2020-06-23 RX ADMIN — DIGOXIN 250 MCG: 125 TABLET ORAL at 09:03

## 2020-06-23 RX ADMIN — SACUBITRIL AND VALSARTAN 1 TABLET: 24; 26 TABLET, FILM COATED ORAL at 20:52

## 2020-06-23 RX ADMIN — APIXABAN 5 MG: 5 TABLET, FILM COATED ORAL at 08:30

## 2020-06-23 RX ADMIN — ATORVASTATIN CALCIUM 40 MG: 40 TABLET, FILM COATED ORAL at 08:30

## 2020-06-23 RX ADMIN — SPIRONOLACTONE 25 MG: 25 TABLET ORAL at 08:30

## 2020-06-23 RX ADMIN — AMIODARONE HYDROCHLORIDE 0.5 MG/MIN: 50 INJECTION, SOLUTION INTRAVENOUS at 20:06

## 2020-06-23 RX ADMIN — MORPHINE SULFATE 2 MG: 4 INJECTION INTRAVENOUS at 15:57

## 2020-06-23 RX ADMIN — CARVEDILOL 3.12 MG: 3.12 TABLET, FILM COATED ORAL at 08:30

## 2020-06-23 RX ADMIN — ACETAMINOPHEN 650 MG: 325 TABLET, FILM COATED ORAL at 04:34

## 2020-06-23 RX ADMIN — CARVEDILOL 3.12 MG: 3.12 TABLET, FILM COATED ORAL at 17:00

## 2020-06-23 ASSESSMENT — PAIN SCALES - GENERAL
PAINLEVEL_OUTOF10: 10
PAINLEVEL_OUTOF10: 5
PAINLEVEL_OUTOF10: 3

## 2020-06-23 NOTE — PROGRESS NOTES
[address]    Daily HOSPITAL Progress Note                            Date:  6/23/20  Patient: Gilmer Frye  Admission:  6/20/2020  1:47 PM  Admit DX: New onset of congestive heart failure ([address] [I50.9]  Age:  81 y.o., 1950        Date of Admission 6/20/2020  1:47 PM   Hospital Length of Stay  LOS: 3 days        Date / Time Of Initially Being Seen For This Daily Visit:  6/23/20, at approximately noon. At that time, I personally saw the patient and rounded with:  Didi Krishnamurthy RN    Date / Time That This Note Is Written:  6/23/2020  at  6:45 PM        The observations documented in this note, including the assessment   and plan are mine    Portions of this note have been copied forward, from prior notes, yet modified, to reflect today's clinical status of this patient. Reason for initial evaluation or the patient's initial complaint    1. Reason for Hospital Admission: Atrial fibrillation        2. Reason for Cardiology Consultation: Non ischemic cardiomyopathy         SUBJECTIVE:      Chief Complaint / Reason for the Visit   Follow up of:  Atrial fibrillation and non ischemic cardiomyopathy and systemic arterial hypertension    Family present and in room during examination:  No    At the time of the examination, the patient was:  Lying in bed      Specialty Problems        Cardiology Problems    CAD (coronary artery disease)        Essential hypertension        Chest pain        Atrial fibrillation with RVR ([address]        * (Principal) New onset of congestive heart failure ([address]        Acute systolic heart failure ([address]              Current Status Today According to the patient:  Today he is feeling \"ok\". Subjective:  Mr. Gilmer Frye is generally feeling are improving:  bp better    Mr. Gilmer Frye has the following cardiac complaints / symptoms today:    1. AF, now NSR on amiodarone    2. Non ischemic cardiomyopathy, will review meds    3.  Hypertension    The blood pressure for the lastr 36 hours has been:  Systolic (76ODO), ZUK:00 , Min:84 , EWN:520    Diastolic (57SWL), BOR:49, Min:60, Max:77        Anshul Sotelo is a 81 y.o. male with the following history as recorded in [facility]:    Patient Active Problem List    Diagnosis Date Noted    Acute systolic heart failure ([facility] 06/23/2020     Priority: Low    New onset of congestive heart failure ([facility] 06/20/2020     Priority: Low    Atrial fibrillation with RVR ([facility] 06/20/2020     Priority: Low    Dyslipidemia 06/20/2020     Priority: Low    Thrombocytopenia (HCC)      Priority: Low    Hyponatremia      Priority: Low    Alcohol abuse 05/07/2018     Priority: Low    Transaminitis 05/07/2018     Priority: Low    Chest pain 05/06/2018     Priority: Low    CAD (coronary artery disease)      Priority: Low    Essential hypertension      Priority: Low     Current Facility-Administered Medications   Medication Dose Route Frequency Provider Last Rate Last Dose    DOBUTamine (DOBUTREX) 500 mg in dextrose 5 % 250 mL infusion  5 mcg/kg/min Intravenous Continuous Chad Ni MD 13.3 mL/hr at 06/22/20 2047 5 mcg/kg/min at 06/22/20 2047    norepinephrine (LEVOPHED) 16 mg in sodium chloride 0.9 % 250 mL infusion  2 mcg/min Intravenous Continuous Chad Ni MD 1.9 mL/hr at 06/22/20 2049 2 mcg/min at 06/22/20 2049    sacubitril-valsartan (ENTRESTO) 24-26 MG per tablet 1 tablet  1 tablet Oral BID Chad Ni MD   1 tablet at 06/23/20 0830    carvedilol (COREG) tablet 3.125 mg  3.125 mg Oral BID WC Chad Ni MD   3.125 mg at 06/23/20 1700    spironolactone (ALDACTONE) tablet 25 mg  25 mg Oral Daily Chad Ni MD   25 mg at 06/23/20 0830    digoxin (LANOXIN) tablet 250 mcg  250 mcg Oral Daily Chad Ni MD   250 mcg at 06/23/20 0903    cefTRIAXone (ROCEPHIN) 1 g in sodium chloride (PF) 10 mL IV syringe  1 g Intravenous Q24H Carlos Cummins MD   1 g at 06/23/20 0831    atorvastatin (LIPITOR) tablet 40 mg  40 mg Oral Daily Carlos Cummins MD   40 mg at 06/23/20 0830    aspirin chewable tablet 81 mg  81 mg Oral Daily Carlos Cummins MD   81 mg at 06/23/20 0830    potassium chloride (KLOR-CON M) extended release tablet 20 mEq  20 mEq Oral BID Carlos Cummins MD   20 mEq at 06/23/20 0830    sodium chloride flush 0.9 % injection 10 mL  10 mL Intravenous 2 times per day Carlos Cummins MD   10 mL at 06/22/20 2045    sodium chloride flush 0.9 % injection 10 mL  10 mL Intravenous PRN Carlos Cummins MD        acetaminophen (TYLENOL) tablet 650 mg  650 mg Oral Q6H PRN Carlos Cummins MD   650 mg at 06/23/20 0434    Or    acetaminophen (TYLENOL) suppository 650 mg  650 mg Rectal Q6H PRN Carlos Cummins MD        polyethylene glycol (GLYCOLAX) packet 17 g  17 g Oral Daily PRN Carlos Cummins MD        promethazine (PHENERGAN) tablet 12.5 mg  12.5 mg Oral Q6H PRN Carlos Cummins MD        Or    ondansetron (ZOFRAN) injection 4 mg  4 mg Intravenous Q6H PRN Carlos Cummins MD        furosemide (LASIX) injection 20 mg  20 mg Intravenous Daily Carlos Cummins MD   20 mg at 06/23/20 0830    apixaban (ELIQUIS) tablet 5 mg  5 mg Oral BID Nikki Manzano MD   5 mg at 06/23/20 0830     Allergies: Patient has no known allergies. Past Medical History:   Diagnosis Date    CAD (coronary artery disease)     Gout     Hypertension     Non-ST elevation MI (NSTEMI) ([facility] 12/28/14     Past Surgical History:   Procedure Laterality Date    CARDIAC CATHETERIZATION  12/29/14  [facility]    angioplasty, stent to LAD. EF 45%    CHOLECYSTECTOMY       Family History   Problem Relation Age of Onset    No Known Problems Mother     Heart Disease Father      Social History     Tobacco Use    Smoking status: Former Smoker     Types: Cigars    Smokeless tobacco: Never Used   Substance Use Topics    Alcohol use:  Yes     Alcohol/week: 3.0 standard drinks     Types: 3 Shots of liquor per week     Comment: daily          Review of Systems:    General:      Complaint / Symptom Yes / No / Description if Yes       Fatigue Yes:  chronic   Weight gain NA   Insomnia NA       Respiratory:        Complaint / Symptom Yes / No / Description if Yes       Cough No   Horseness NA       Cardiovascular:    Complaint / Symptom Yes / No / Description if Yes       Chest Pain No   Shortness of Air / Orthopnea Yes: chronic and are improving:   Presyncope / Syncope No   Palpitations No         Objective:    BP 94/72   Pulse 53   Temp 97.9 °F (36.6 °C) (Temporal)   Resp 23   Ht 6' (1.829 m)   Wt 204 lb 8 oz (92.8 kg)   SpO2 93%   BMI 27.74 kg/m² ,     Intake/Output Summary (Last 24 hours) at 6/23/2020 1845  Last data filed at 6/23/2020 0600  Gross per 24 hour   Intake 1061.7 ml   Output 750 ml   Net 311.7 ml       GENERAL - well developed and well nourished, is somewhat an active participant in this examination  HEENT -  PERRLA, Hearing appears normal, conjunctiva and lids are normal, ears and nose appear normal  NECK - no thyromegaly, no JVD, trachea is in the midline  CARDIOVASCULAR - PMI is in the left mid line clavicular position, Normal S1 and S2 with a grade 1/6 systolic murmur. No S3 or S4    PULMONARY - No respiratory distress. scattered wheezes and rales.   Breath sounds in both  lung fields are Decreased  ABDOMEN  - soft, non tender, no rebound, no hepatomegaly or splenomegaly  MUSCULOSKELETAL  - Prone/Supine, digitals and nails are without clubbing or cyanosis  EXTREMITIES - trace edema  NEUROLOGIC - cranial nerves, II-XII, are normal  SKIN - turgor is normal, no rash  PSYCHIATRIC - normal mood and affect, alert and orientated x 3, judgement and insight appear appropriate      ASSESSMENT:    ALL THE CARDIOLOGY PROBLEMS ARE LISTED ABOVE; HOWEVER, THE FOLLOWING SPECIFIC CARDIAC PROBLEMS / CONDITIONS WERE ADDRESSED AND TREATED DURING THE HOSPITAL VISIT TODAY:                                                                                            MEDICAL DECISION MAKING                   Cardiac Specific Problem / Diagnosis   Discussion and Data Reviewed Diagnostic Procedures Ordered Management Options Selected                 1. Presenting problem / symptom     atrial fibrillation of uncertain duration  unknown    The Atrial Fibrillation Stroke Risk is calculated according to the      [id number] Score        Risk   Factors   Component Value   C CHF Yes 1   H HTN Yes 1   A2 Age >= 75 No,  (80 y.o.) 0   D DM No 0   S2 Prior Stroke/TIA No 0   V Vascular Disease No 0   A Age 74-69 Yes,  (80 y.o.) 1   Sc Sex male 0     [id number]  Score   3   Score last updated 6/20/20 8:24 PM CDT           [id number] Score Annual Stroke Risk %   0 0   1 1.3   2 2.2   3 3.2   4 4.0   5 6.7   6 9.8   7 9.6   8 12.5   9 15.2           Yes: echo Continue current medications:      Yes:                  2. CAD Initial presentation during this evaluation    Review and summation of old records:     12/29/2014  Cath  anterolateral and apical hypo, EF 50%, 2 stents in the LAD and POA in the diagonal  5/8/2018  DSE negative for myocardial ischemia    No Continue current medications:     Yes:                  3.  Systemic arterial hypertension Initial presentation during this evaluation The blood pressure for the lastr 36 hours has been:  Systolic (55RBP), MWO:64 , Min:84 , RYP:440    Diastolic (24PBU), JZF:57, Min:60, Max:77          No Continue current medications:        yes            DAILY [facility] COURSE:        Date Clinical Event Plan of Treatment           6/20/2020 Admitted [id number] [id number]  Cardiology Concern:  Newly discovered AF  For CASTILLO / DCCV on Monday  Will check echo tomorrow   06/21/20 6/21/20 echo EF 27%, AUC indication 24, AUC score 8  For cath tomorrow as well    06/22/20 6/22/20  Cath mild CAD, EF 5-10%, CASTILLO / DCCV successful on dilt and eliquis, will change to amiodarone To CCU, on levophed and dobutamine     Will discuss transfer options with him tomorrow, improved over the last little bit    06/23/20 improved Will dc drips and remove sheath                            CONSIDERATIONS, THOUGHTS, AND PLANS:     4. Continue present medications except for changes as noted above  5. Continue to monitor rhythm  6.  Further orders per clinical course.        Discussed with patient and nursing.     Electronically signed by Jaden Schultz MD on 06/23/20

## 2020-06-23 NOTE — PROCEDURES
[facility]        Three Procedures were performed in this single setting     1. Transesophageal Echocardiogram        .Date of Procedure:  6/22/20      : Mulugeta Brunner MD     Indications:  Paroxymal / newly discovered atrial fibrillation (sino - atrial node dysfunction) of uncertain duration     Description of Procedure:     Moderate Conscious Sedation Protocol Used During this Procedure -     An independent trained observer, registered nurse, administered medications at my direction. We, myself and the independent trained observer / registered nurse, monitored the patients's level of consciousness, vital signs, and physiological status, (including ECG and pulse oximetry) throughout the procedure duration. (See start and stop times below).            Anesthesia: Moderate   Sedation: 3 mg Midazolam (Versed)  25 mcg Fentanyl   Start time: 16:02   Stop time: 16:57   ASA Class: III   Estimated blood loss < 5 ml                Additionally, please review the \"[facility] Hem[facility] Procedure Report\", which is generated electronically through the [facility].  This report includes additional details regarding this procedure including, but not limited to:                 1.         Patient Data,              2.         Admission,              3.         Procedure,              4.         Hemodynamics,              5.         Vital Signs,              6.         Medications, including conscious sedation medications given during the procedure (as note above),              7.         Procedure Log,              8.         Device Usage,              9.         Signature Audit [place], and,              10.       Signatures.     It should be noted, that I sign the \"Signatures\" line electronically through the \"[facility] Def Portals\" tab on my computer.      The patient was placed int he left lateral decubitus position.  The probe was passed easily to the fundus of the stomach with adequate views obtained.     Findings:     1.  The aortic root was not well visualized. 2.  The aortic valve was tri leaflet.    3.  The mitral valve leaflets were of normal thickness and mobility.  There was moderate mitral regurgitation  4.  The left atrium was not well visualized.    5.  The left ventricle was of normal size with an estimated ejection fraction of > 55%. 6. Kira Comment was noted to no thrombus in the appendage, nor is there atrial \"smoke\"  7.  No significant pericardial effusion was detected. 8.  There was no intra cardiac communication noted with agitated saline injection. 9.  There was trace aortic insufficiency     Complications:  None     Impression:     This transesophageal echocardiogram revealed:     1.   No obvious intra cardiac communication or thrombus noted              2. Direct current cardioversion        Date of Procedure:   6/22/20       Indications:  Atrial fibrillation with no obvious intra cardiac communication or thrombus noted by CASTILLO (see above report).     Conscious Sedation Protocol Used During this Procedure - as above     After obtaining informed written consent and an appropriate level of conscious sedation, DCCV with 360 J was successful in restoring sinus rhythm.       Complications:  none        Impression:  Successful DC cardioversion of atrial fibrillation to normal sinus rhythm on Rythmol and eliquis           3.    Cardiac Catheterization         Cardiac Risk Profile -         Yes / No / Unknown           Gender Male   Cigarette Use No, but did use in the past   Family History of Cardiovascular Disease Yes: heart disease   Diabetes Mellitus no   Hypercholesteremia no   Hypertension yes         Prior Cardiac History -       12/29/2014  Cath  anterolateral and apical hypo, EF 50%, 2 stents in the LAD and POA in the diagonal  5/8/2018  DSE negative for myocardial ischemia  6/21/20 echo EF 27%, AUC indication 24, AUC score 8   6/22/20  Cath mild CAD, EF 5-10%, CASTILLO / DCCV successful of the vessel.      Left Anterior Coronary Artery:  The LAD is a moderate sized vessel with several diagonal branches. There is mild diffuse disease throughout the entire length of the vessel. There is a 40% proximal stenosis.       Left Circumflex Coronary Artery:  The LCx is a moderate sized vessel with several marginal branches. There is mild diffuse disease throughout the entire length of the vessel.      Right Coronary Artery:  The RCA is a moderate sized dominant vessel which arises from the right sinus of Valsalva. There is mild diffuse disease throughout the entire length of the vessel.         Left Ventriculogram:  The left ventriculogram is obtained in the right oblique projection. There is severe global profound hypokinesis,  The estimated visual ejection fraction is 5 - 10 %. There is no mitral regurgitation nor is there a pull back gradient seen across the aortic valve.        Summary:       1. Successful femoral artery ultrasound  2. Successful femoral artery arteriogram  3. Supervision of the administration of moderate conscious sedation  4. Mild coronary artery disease  5. Severe profound global left ventricular hypokinesis with a visually estimated ejection fraction of 5 - 10%    6.   Elevated left ventricular end diastolic pressure (LVEDP > 15 mmHg)           RECOMMENDATIONS:     Maximal support           Electronically signed by Hilda Bender MD on 6/22/20

## 2020-06-23 NOTE — PROGRESS NOTES
[id number] [facility]        Three Procedures were performed in this single setting     1. Transesophageal Echocardiogram        .Date of Procedure:  6/22/20      : Yohan Poole MD     Indications:  Paroxymal / newly discovered atrial fibrillation (sino - atrial node dysfunction) of uncertain duration     Description of Procedure:     Moderate Conscious Sedation Protocol Used During this Procedure -     An independent trained observer, registered nurse, administered medications at my direction. We, myself and the independent trained observer / registered nurse, monitored the patients's level of consciousness, vital signs, and physiological status, (including ECG and pulse oximetry) throughout the procedure duration. (See start and stop times below).            Anesthesia: Moderate   Sedation: 3 mg Midazolam (Versed)  25 mcg Fentanyl   Start time: 16:02   Stop time: 16:57   ASA Class: III   Estimated blood loss < 5 ml                Additionally, please review the \"[facility] Hemodynamic Procedure Report\", which is generated electronically through the [employer].  This report includes additional details regarding this procedure including, but not limited to:                 1.         Patient Data,              2.         Admission,              3.         Procedure,              4.         Hemodynamics,              5.         Vital Signs,              6.         Medications, including conscious sedation medications given during the procedure (as note above),              7.         Procedure Log,              8.         Device Usage,              9.         Signature Audit [place], and,              10.       Signatures.     It should be noted, that I sign the \"Signatures\" line electronically through the \"[facility]PF Def Portals\" tab on my computer.      The patient was placed int he left lateral decubitus position.  The probe was passed easily to the fundus of the stomach with adequate views obtained.     Findings:     1.  The aortic root was not well visualized. 2.  The aortic valve was tri leaflet.    3.  The mitral valve leaflets were of normal thickness and mobility.  There was moderate mitral regurgitation  4.  The left atrium was not well visualized.    5.  The left ventricle was of normal size with an estimated ejection fraction of > 55%. 6. Gearline Olena was noted to no thrombus in the appendage, nor is there atrial \"smoke\"  7.  No significant pericardial effusion was detected. 8.  There was no intra cardiac communication noted with agitated saline injection. 9.  There was trace aortic insufficiency     Complications:  None     Impression:     This transesophageal echocardiogram revealed:     1.   No obvious intra cardiac communication or thrombus noted              2. Direct current cardioversion        Date of Procedure:   6/22/20       Indications:  Atrial fibrillation with no obvious intra cardiac communication or thrombus noted by CASTILLO (see above report).     Conscious Sedation Protocol Used During this Procedure - as above     After obtaining informed written consent and an appropriate level of conscious sedation, DCCV with 360 J was successful in restoring sinus rhythm.       Complications:  none        Impression:  Successful DC cardioversion of atrial fibrillation to normal sinus rhythm on Rythmol and eliquis           3.    Cardiac Catheterization         Cardiac Risk Profile -         Yes / No / Unknown           Gender Male   Cigarette Use No, but did use in the past   Family History of Cardiovascular Disease Yes: heart disease   Diabetes Mellitus no   Hypercholesteremia no   Hypertension yes         Prior Cardiac History -       12/29/2014  Cath  anterolateral and apical hypo, EF 50%, 2 stents in the LAD and POA in the diagonal  5/8/2018  DSE negative for myocardial ischemia  6/21/20 echo EF 27%, AUC indication 24, AUC score 8   6/22/20  Cath mild CAD, EF 5-10%, CASTILLO / DCCV successful on dilt and eliquis, will change to amiodarone     Indications for Cardiac Catheterization -  6/21/20 echo EF 27%, AUC indication 24, AUC score 8  Diagnostic Catheterization Appropriate Use Criteria Description, [facility] 2012;59:[id number]     After obtaining informed written consent, the patient was brought to the catheterization laboratory where the right groin was prepared in the usual sterile fashion.         Conscious Sedation Protocol Used During this Procedure - as above        2% lidocaine was injected above the common femoral artery. Utilizing ultrasound assistance and the Seldinger technique, the common femoral artery was cannulated and bright red pulsatile blood returned. Using fluoroscopy guidance, the J-tip wire was placed in the common femoral artery. A 6-Fr sheath was inserted. Utilizing 6-Scottish Carlo catheters, selective coronary arteriography was performed followed by left ventriculography. At this stage, the equipment was removed. A right femoral arteriogram was performed to ensure patency of the vessel so that a vascular access closure device could be deployed. A 6-Scottish Mynx vascular access closure device was then inserted. Hemostasis was achieved. A sterile dressing was applied. He was taken to his room in stable and satisfactory condition. The results of the procedure were explained to the family in the cardiac catheterization laboratories consult / waiting room      Complications:  none        Results:     Hemodynamics:        Site Pressure mmHg           Left ventricular pressure 85/17 mmHg   Left ventricular end-diastolic pressure (LVEDP) 23 mmHg   Aortic pressure 82/53 mmHg   Mean aortic pressure 65 mmHg         Angiography:     Coronary Arteries:     Left Main Coronary Artery:  A large vessel which arises from the left sinus of Valsalva. It divides into the left anterior descending coronary artery and the left circumflex.   There is mild diffuse disease throughout the entire length

## 2020-06-23 NOTE — PROGRESS NOTES
[facility] [address]      DEPARTMENT OF HOSPITALIST MEDICINE        PROGRESS  NOTE:    NOTE: Portions of this note have been copied forward, however, changed to reflect the most current clinical status of this patient. Patient:  Mercedes Lepe  YOB: 1950  Date of Service: 6/23/2020  MRN: [id number]   Acct: [de-identified]   Primary Care Physician: Mercedes Duarte MD  Advance Directive: Full Code  Admit Date: 6/20/2020       Hospital Day: 3      CHIEF COMPLAINT:  Chief Complaint   Patient presents with    Shortness of Breath     x 1 week    Irregular Heart Beat    Leg Swelling       SUBJECTIVE / [address]  COURSE:    6/22/2020  Patient has had around thousand liters of net fluid output since admission on diuresis. He is planning to undergo CASTILLO and DC cardioversion later on today. 6/21/2020  Patient feels better after aggressive management since admission. He underwent  echocardiogram which showed ejection fraction of 27%. Cardiology had a detailed discussion with the family and they are planning to take him for DC cardioversion for new onset A. fib as well as cardiac catheterization in the morning. Patient has been started on anticoagulation as per cardiology. 6/20/2020  HISTORY OF PRESENT ILLNESS:  Mercedes Lepe is an 81 y.o. male. Very pleasant gentleman with CAD status post 2 stent placement in 2014 who is complaining of shortness of breath for the last couple of weeks with minimal exertion along with tiredness and fatigue which is getting worse. He finally agreed to come to  The ER for evaluation after insistence by the family, work-up was done which showed elevated BNP more than 9000 as well as fluid overload changes on chest x-ray consistent with new onset CHF. He also had A. fib with RVR which was improved on starting IV Cardizem drip.   First set of cardiac enzymes was done which is normal.  He is being admitted for medical management, cardiopulmonary monitoring, cardiology evaluation and further work-up in the morning. REVIEW  OF  SYSTEMS:    Constitutional:  No fevers, chills, nausea, vomiting,    Lungs:   No hemoptysis, pleurisy, + SOB   Heart:  No chest pressure with exertion, palpitations,    Abdomen:   No new masses, no bright red blood per rectum   Extremities: +difficulty ambulating due to weakness, +1 B/L pitting edema   Neurologic: No new motor or sensory changes       PAST MEDICAL HISTORY:  Past Medical History:   Diagnosis Date    CAD (coronary artery disease)     Gout     Hypertension     Non-ST elevation MI (NSTEMI) ([facility] [address] 12/28/14         PAST SURGICAL HISTORY:  Past Surgical History:   Procedure Laterality Date    CARDIAC CATHETERIZATION  12/29/14  [employer]    angioplasty, stent to LAD.  EF 45%    CHOLECYSTECTOMY          FAMILY HISTORY:  Family History   Problem Relation Age of Onset    No Known Problems Mother     Heart Disease Father            OBJECTIVE:  BP 94/72   Pulse 69   Temp 97.7 °F (36.5 °C) (Temporal)   Resp 24   Ht 6' (1.829 m)   Wt 195 lb (88.5 kg)   SpO2 97%   BMI 26.45 kg/m²   I/O this shift:  In: 226.6 [I.V.:226.6]  Out: 75 [Urine:75]    PHYSICAL  EXAMINATION:    VENKAT:  Awake, alert, oriented x 3, patient appears tired and fatigued   Head/Eyes:  Normocephalic, atraumatic, EOMI and PERRLA bilaterally   Respiratory:   Bilateral decreased air entry in both lung fields, mild B/L crackles, bilateral scattered rales   Cardiovascular:   Regularly irregular rate and rhythm, S1+S2+0, no rubs   Abdomen:   Soft, non-tender, bowel sounds +ve, no organomegaly   Extremities: Moves all, decreased range of motion, no edema   Neurologic: Awake, alert, oriented x 3, cranial nerves II-XII intact, no focal neurological deficits, sensory system intact   Psychiatric: Normal mood, non-suicidal       CURRENT MEDICATIONS:  Scheduled:   sacubitril-valsartan  1 tablet Oral BID    carvedilol  3.125 mg Oral BID WC    spironolactone  25 mg Oral Daily    digoxin  250 mcg Oral Daily    cefTRIAXone (ROCEPHIN) IV  1 g Intravenous Q24H    atorvastatin  40 mg Oral Daily    aspirin  81 mg Oral Daily    potassium chloride  20 mEq Oral BID    sodium chloride flush  10 mL Intravenous 2 times per day    furosemide  20 mg Intravenous Daily    apixaban  5 mg Oral BID        PRN:  sodium chloride flush, 10 mL, PRN  acetaminophen, 650 mg, Q6H PRN    Or  acetaminophen, 650 mg, Q6H PRN  polyethylene glycol, 17 g, Daily PRN  promethazine, 12.5 mg, Q6H PRN    Or  ondansetron, 4 mg, Q6H PRN        Infusions:   DOBUTamine 5 mcg/kg/min (06/22/20 2047)    norepinephrine 2 mcg/min (06/22/20 2049)    sodium chloride 100 mL/hr at 06/22/20 2047       Laboratory Data:  Recent Labs     06/20/20  1400 06/22/20  0305   WBC 6.8 6.3   HGB 13.1* 11.9*    233     Recent Labs     06/20/20  1400 06/21/20  0456 06/22/20  0305   * 140 134*   K 4.1 4.6 4.4   CL 98 97* 101   CO2 22 33* 23   BUN 13 23 17   CREATININE 1.2 <0.5 1.6*   GLUCOSE 105 138* 86     Recent Labs     06/20/20  1400 06/21/20  0456   AST 13 12   ALT 5 16   BILITOT 2.0* <0.2   ALKPHOS 113 83     Troponin T:   Recent Labs     06/21/20  0456 06/22/20  1813 06/22/20  2255   TROPONINI <0.01 <0.01 0.01     Pro-BNP: No results for input(s): BNP in the last 72 hours. INR:   Recent Labs     06/20/20  1400   INR 1.12     UA:  Recent Labs     06/20/20  1730   COLORU YELLOW   PHUR 6.0   WBCUA 13*   RBCUA 2   BACTERIA 4+*   CLARITYU Clear   SPECGRAV 1.012   LEUKOCYTESUR SMALL*   UROBILINOGEN 1.0   BILIRUBINUR Negative   BLOODU Negative   GLUCOSEU Negative     A1C: No results for input(s): LABA1C in the last 72 hours. ABG:No results for input(s): PHART, OAS0JOU, PO2ART, HNQ1JUE, BEART, HGBAE, F9PMVYMW, CARBOXHGBART in the last 72 hours. Imaging:    Xr Chest Portable    Result Date: 6/20/2020  1.  There is a new dense right basilar consolidation, which partially obscures the right hemidiaphragm. This is concerning for a lower lobe pneumonia. Signed by Dr Danial Duffy on 6/20/2020 2:26 PM    Cta Pulmonary W Contrast    Result Date: 6/20/2020  1. No evidence of pulmonary embolus. 2. Evidence for pulmonary hypertension. Interstitial edema with bilateral layering pleural effusions. 3. There are small consolidations in the right middle lobe and left upper lobe lingula. This likely reflects small areas of antonio edema. Signed by Dr Danial Duffy on 6/20/2020 3:20 PM       ASSESSMENT & PLAN:    Principal Problem:    New onset of congestive heart failure (HCC)  Active Problems:    CAD (coronary artery disease)    Essential hypertension    Atrial fibrillation with RVR (HCC)    Dyslipidemia    Acute systolic heart failure ([address]  Resolved Problems:    * No resolved hospital problems. *    Admit patient to medical unit under full inpatient status  Continuous cardiac tele-monitoring  Patient given Aspirin in the ER  Monitor cardiac enzymes ×3 which are negative  Full 2-D echo with color-flow and doppler showed ejection fraction of only 27%  Awaiting 2D echo later on today  Patient will go for DC cardioversion if there are no thrombotic clot noted in left atrium  Continue with IV diuresis with strict I's and O's monitoring  Continue with IV Cardizem drip for A. fib with RVR, to keep heart rate around 100   Rate to DC IV Cardizem drip to p.o. Cardizem   Continue to keep patient NPO   Patient going for CASTILLO and DC cardioversion today      Repeat labs in a.m. Electrolyte replacement as per protocol. Patient will be monitored very closely on the floor. Further recommendations as per the hospital course.         Discharge Plan: In next couple of days if cardiology work-up is complete and patient is stable for discharge      Patient  is on DVT prophylaxis  Current medications reviewed  Lab work reviewed  Radiology/Chest x-ray films reviewed  Treatment recommendations from suspecialities reviewed, appreciated and agreed with  Discussed with the nurse and addressed all questions/concerns  Discussed with Patient and/or Family at the bedside in detail . .. they understand and agree with the management plan. Carlos Cummins MD  6/23/2020 12:09 AM      DISCLAIMER: This note was created with electronic voice recognition which does have occasional errors. If you have any questions regarding the content within the note please do not hesitate to contact me. .. Thanks.

## 2020-06-23 NOTE — PROGRESS NOTES
[address]    Daily HOSPITAL Progress Note                            Date:  6/22/20  Patient: Ambrosio Cheema  Admission:  6/20/2020  1:47 PM  Admit DX: New onset of congestive heart failure ([address] [I50.9]  Age:  81 y.o., 1950        Date of Admission 6/20/2020  1:47 PM   Hospital Length of Stay  LOS: 2 days        Date / Time Of Initially Being Seen For This Daily Visit:  6/22/20, at approximately 1830. At that time, I personally saw the patient and rounded with:  CCU RN    Date / Time That This Note Is Written:  6/22/2020  at  8:05 PM        The observations documented in this note, including the assessment   and plan are mine    Portions of this note have been copied forward, from prior notes, yet modified, to reflect today's clinical status of this patient. Reason for initial evaluation or the patient's initial complaint    1. Reason for Hospital Admission: dyspena        2. Reason for Cardiology Consultation: Non ischemic cardiomyopathy         SUBJECTIVE:      Chief Complaint / Reason for the Visit   Follow up of:  dyspnea and non ischemic cardiomyopathy and systemic arterial hypertension    Family present and in room during examination:  No    At the time of the examination, the patient was:  Lying in bed      Specialty Problems        Cardiology Problems    CAD (coronary artery disease)        Essential hypertension        Chest pain        Atrial fibrillation with RVR ([address]        * (Principal) New onset of congestive heart failure ([address]              Current Status Today According to the patient:  Today he is feeling \"ok\". Subjective:  Mr. Ambrosio Cheema is generally feeling are worsening:  Became quite hypotensive after the cath    Mr. Ambrosio Cheema has the following cardiac complaints / symptoms today:    1. dyspnea, about the same    2. Non ischemic cardiomyopathy, on dopamine and levophed    3.  Hypertension    The blood pressure for the lastr 36 hours MD   5 mg at 06/22/20 4093    sodium chloride flush 0.9 % injection 10 mL  10 mL Intravenous 2 times per day Carlos Cummins MD   10 mL at 06/22/20 0925    sodium chloride flush 0.9 % injection 10 mL  10 mL Intravenous PRN Carlos Cummins MD        acetaminophen (TYLENOL) tablet 650 mg  650 mg Oral Q6H PRN Carlos Cummins MD   650 mg at 06/21/20 1847    Or    acetaminophen (TYLENOL) suppository 650 mg  650 mg Rectal Q6H PRN Carlos Cummins MD        polyethylene glycol (GLYCOLAX) packet 17 g  17 g Oral Daily PRN Carlos Cummins MD        promethazine (PHENERGAN) tablet 12.5 mg  12.5 mg Oral Q6H PRN Carlos Cummins MD        Or    ondansetron (ZOFRAN) injection 4 mg  4 mg Intravenous Q6H PRN Carlos Cummins MD        lisinopril (PRINIVIL;ZESTRIL) tablet 5 mg  5 mg Oral Daily Carlos Cummins MD   Stopped at 06/22/20 0924    furosemide (LASIX) injection 20 mg  20 mg Intravenous Daily Carlos Cummins MD   20 mg at 06/22/20 1123    apixaban (ELIQUIS) tablet 5 mg  5 mg Oral BID Mable Bazzi MD   5 mg at 06/22/20 4848     Allergies: Patient has no known allergies. Past Medical History:   Diagnosis Date    CAD (coronary artery disease)     Gout     Hypertension     Non-ST elevation MI (NSTEMI) ([facility] [address] 12/28/14     Past Surgical History:   Procedure Laterality Date    CARDIAC [facility]HETER[facility]  [facility]/14  [facility]    angioplasty, stent to LAD. EF 45%    CHOLECYSTECTOMY       Family History   Problem Relation Age of Onset    No Known Problems Mother     Heart Disease Father      Social History     Tobacco Use    Smoking status: Former Smoker     Types: Cigars    Smokeless tobacco: Never Used   Substance Use Topics    Alcohol use:  Yes     Alcohol/week: 3.0 standard drinks     Types: 3 Shots of liquor per week     Comment: daily          Review of Systems:    General:      Complaint / Symptom Yes / No / Description if Yes       Fatigue Yes:  chronic   Weight gain NA   Insomnia NA       Respiratory:        Complaint / Symptom Yes / No / Description if Yes       Cough No   Horseness NA       Cardiovascular:    Complaint / Symptom Yes / No / Description if Yes       Chest Pain No   Shortness of Air / Orthopnea Yes: chronic and are worsening:   Presyncope / Syncope No   Palpitations No         Objective:    BP 91/63   Pulse 61   Temp 96.9 °F (36.1 °C) (Temporal)   Resp 23   Ht 6' (1.829 m)   Wt 195 lb (88.5 kg)   SpO2 98%   BMI 26.45 kg/m² ,     Intake/Output Summary (Last 24 hours) at 6/22/2020 2005  Last data filed at 6/22/2020 1454  Gross per 24 hour   Intake 500 ml   Output 1017 ml   Net -517 ml       GENERAL - well developed and well nourished, is somewhat an active participant in this examination  HEENT -  PERRLA, Hearing appears normal, conjunctiva and lids are normal, ears and nose appear normal  NECK - no thyromegaly, no JVD, trachea is in the midline  CARDIOVASCULAR - PMI is in the left mid line clavicular position, Normal S1 and S2 with a grade 1/6 systolic murmur. No S3 or S4    PULMONARY - Yes: mild respiratory distress. scattered wheezes and rales. Breath sounds in both  lung fields are Decreased  ABDOMEN  - soft, non tender, no rebound, no hepatomegaly or splenomegaly  MUSCULOSKELETAL  - Prone/Supine, digitals and nails are without clubbing or cyanosis  EXTREMITIES - trace edema  NEUROLOGIC - cranial nerves, II-XII, are normal  SKIN - turgor is normal, no rash  PSYCHIATRIC - normal mood and affect, alert and orientated x 3, judgement and insight appear appropriate      ASSESSMENT:    ALL THE CARDIOLOGY PROBLEMS ARE LISTED ABOVE; HOWEVER, THE FOLLOWING SPECIFIC CARDIAC PROBLEMS / CONDITIONS WERE ADDRESSED AND TREATED DURING THE HOSPITAL VISIT TODAY:                                                                                            MEDICAL DECISION MAKING                   Cardiac Specific Problem / Diagnosis   Discussion and Data Reviewed Diagnostic Procedures Ordered Management Options Selected                 1. Presenting problem / symptom     atrial fibrillation of uncertain duration  unknown    The Atrial Fibrillation Stroke Risk is calculated according to the      [id number] Score        Risk   Factors   Component Value   C CHF Yes 1   H HTN Yes 1   A2 Age >= 75 No,  (80 y.o.) 0   D DM No 0   S2 Prior Stroke/TIA No 0   V Vascular Disease No 0   A Age 74-69 Yes,  (80 y.o.) 1   Sc Sex male 0     [id number]  Score   3   Score last updated 6/20/20 8:24 PM CDT           [id number] Score Annual Stroke Risk %   0 0   1 1.3   2 2.2   3 3.2   4 4.0   5 6.7   6 9.8   7 9.6   8 12.5   9 15.2           Yes: echo Continue current medications:      Yes:                  2. CAD Initial presentation during this evaluation    Review and summation of old records:     12/29/2014  Cath  anterolateral and apical hypo, EF 50%, 2 stents in the LAD and POA in the diagonal  5/8/2018  DSE negative for myocardial ischemia    No Continue current medications:     Yes:                  3. Systemic arterial hypertension Initial presentation during this evaluation The blood pressure for the lastr 36 hours has been:  Systolic (09UJP), NKU:82 , Min:81 , ROSALIE:475    Diastolic (18XKT), DBE:62, Min:58, Max:87       No Continue current medications:        yes            DAILY [address] COURSE:        Date Clinical Event Plan of Treatment           6/20/2020 Admitted for: dyspnea  Cardiology Concern:  Newly discovered AF  For CASTILLO / DCCV on Monday  Will check echo tomorrow   06/21/20 6/21/20 echo EF 27%, AUC indication 24, AUC score 8  For cath tomorrow as well    06/22/20 6/22/20  Cath mild CAD, EF 5-10%, CASTILLO / DCCV successful on dilt and eliquis, will change to amiodarone To CCU, on levophed and dobutamine    Will discuss transfer options with him tomorrow, improved over the last little bit                                  CONSIDERATIONS, THOUGHTS, AND PLANS:     4. Continue present medications except for changes as noted above  5.  Continue to monitor rhythm  6. Further orders per clinical course. Discussed with patient and nursing.      Electronically signed by Sheila Rodas MD on 06/22/20             [id number] [facility] [place]

## 2020-06-24 PROBLEM — E83.51 HYPOCALCEMIA: Status: ACTIVE | Noted: 2020-06-24

## 2020-06-24 PROBLEM — E87.5 HYPERKALEMIA: Status: ACTIVE | Noted: 2020-06-24

## 2020-06-24 PROBLEM — N17.9 ACUTE KIDNEY INJURY (HCC): Status: ACTIVE | Noted: 2020-06-24

## 2020-06-24 PROBLEM — J18.9 PNEUMONIA DUE TO INFECTIOUS ORGANISM: Status: ACTIVE | Noted: 2020-06-24

## 2020-06-24 LAB
ANION GAP SERPL CALCULATED.3IONS-SCNC: 13 MMOL/L (ref 7–19)
BUN BLDV-MCNC: 21 MG/DL (ref 8–23)
CALCIUM SERPL-MCNC: 8.5 MG/DL (ref 8.8–10.2)
CHLORIDE BLD-SCNC: 97 MMOL/L (ref 98–111)
CO2: 20 MMOL/L (ref 22–29)
CREAT SERPL-MCNC: 1.4 MG/DL (ref 0.5–1.2)
GFR NON-AFRICAN AMERICAN: 50
GLUCOSE BLD-MCNC: 79 MG/DL (ref 74–109)
HCT VFR BLD CALC: 36.9 % (ref 42–52)
HEMOGLOBIN: 11.8 G/DL (ref 14–18)
MCH RBC QN AUTO: 30.4 PG (ref 27–31)
MCHC RBC AUTO-ENTMCNC: 32 G/DL (ref 33–37)
MCV RBC AUTO: 95.1 FL (ref 80–94)
PDW BLD-RTO: 14.2 % (ref 11.5–14.5)
PLATELET # BLD: 236 K/UL (ref 130–400)
PMV BLD AUTO: 9.9 FL (ref 9.4–12.4)
POTASSIUM SERPL-SCNC: 5.2 MMOL/L (ref 3.5–5)
RBC # BLD: 3.88 M/UL (ref 4.7–6.1)
SODIUM BLD-SCNC: 130 MMOL/L (ref 136–145)
WBC # BLD: 7.5 K/UL (ref 4.8–10.8)

## 2020-06-24 PROCEDURE — 2580000003 HC RX 258: Performed by: INTERNAL MEDICINE

## 2020-06-24 PROCEDURE — 99233 SBSQ HOSP IP/OBS HIGH 50: CPT | Performed by: INTERNAL MEDICINE

## 2020-06-24 PROCEDURE — 80048 BASIC METABOLIC PNL TOTAL CA: CPT

## 2020-06-24 PROCEDURE — 36569 INSJ PICC 5 YR+ W/O IMAGING: CPT

## 2020-06-24 PROCEDURE — 36415 COLL VENOUS BLD VENIPUNCTURE: CPT

## 2020-06-24 PROCEDURE — 6370000000 HC RX 637 (ALT 250 FOR IP): Performed by: INTERNAL MEDICINE

## 2020-06-24 PROCEDURE — 2700000000 HC OXYGEN THERAPY PER DAY

## 2020-06-24 PROCEDURE — 6360000002 HC RX W HCPCS: Performed by: HOSPITALIST

## 2020-06-24 PROCEDURE — 6370000000 HC RX 637 (ALT 250 FOR IP): Performed by: HOSPITALIST

## 2020-06-24 PROCEDURE — 2580000003 HC RX 258: Performed by: HOSPITALIST

## 2020-06-24 PROCEDURE — 2500000003 HC RX 250 WO HCPCS: Performed by: INTERNAL MEDICINE

## 2020-06-24 PROCEDURE — 02HV33Z INSERTION OF INFUSION DEVICE INTO SUPERIOR VENA CAVA, PERCUTANEOUS APPROACH: ICD-10-PCS | Performed by: FAMILY MEDICINE

## 2020-06-24 PROCEDURE — 6360000002 HC RX W HCPCS: Performed by: INTERNAL MEDICINE

## 2020-06-24 PROCEDURE — 2100000000 HC CCU R&B

## 2020-06-24 PROCEDURE — 76937 US GUIDE VASCULAR ACCESS: CPT

## 2020-06-24 PROCEDURE — 85027 COMPLETE CBC AUTOMATED: CPT

## 2020-06-24 PROCEDURE — C1751 CATH, INF, PER/CENT/MIDLINE: HCPCS

## 2020-06-24 RX ORDER — NOREPINEPHRINE BIT/0.9 % NACL 16MG/250ML
2 INFUSION BOTTLE (ML) INTRAVENOUS CONTINUOUS
Status: DISCONTINUED | OUTPATIENT
Start: 2020-06-24 | End: 2020-06-29

## 2020-06-24 RX ORDER — SODIUM CHLORIDE 0.9 % (FLUSH) 0.9 %
10 SYRINGE (ML) INJECTION PRN
Status: DISCONTINUED | OUTPATIENT
Start: 2020-06-24 | End: 2020-06-29 | Stop reason: HOSPADM

## 2020-06-24 RX ORDER — SODIUM CHLORIDE 0.9 % (FLUSH) 0.9 %
10 SYRINGE (ML) INJECTION EVERY 12 HOURS SCHEDULED
Status: DISCONTINUED | OUTPATIENT
Start: 2020-06-24 | End: 2020-06-29 | Stop reason: HOSPADM

## 2020-06-24 RX ORDER — LIDOCAINE HYDROCHLORIDE 10 MG/ML
5 INJECTION, SOLUTION EPIDURAL; INFILTRATION; INTRACAUDAL; PERINEURAL ONCE
Status: COMPLETED | OUTPATIENT
Start: 2020-06-24 | End: 2020-06-24

## 2020-06-24 RX ADMIN — AMIODARONE HYDROCHLORIDE 0.5 MG/MIN: 50 INJECTION, SOLUTION INTRAVENOUS at 11:42

## 2020-06-24 RX ADMIN — LIDOCAINE HYDROCHLORIDE 5 ML: 10 INJECTION, SOLUTION EPIDURAL; INFILTRATION; INTRACAUDAL; PERINEURAL at 10:22

## 2020-06-24 RX ADMIN — SODIUM CHLORIDE, PRESERVATIVE FREE 1 G: 5 INJECTION INTRAVENOUS at 08:58

## 2020-06-24 RX ADMIN — ASPIRIN 81 MG 81 MG: 81 TABLET ORAL at 08:59

## 2020-06-24 RX ADMIN — SACUBITRIL AND VALSARTAN 1 TABLET: 24; 26 TABLET, FILM COATED ORAL at 08:58

## 2020-06-24 RX ADMIN — ATORVASTATIN CALCIUM 40 MG: 40 TABLET, FILM COATED ORAL at 08:59

## 2020-06-24 RX ADMIN — FUROSEMIDE 20 MG: 10 INJECTION, SOLUTION INTRAMUSCULAR; INTRAVENOUS at 08:58

## 2020-06-24 RX ADMIN — Medication 2 MCG/MIN: at 07:00

## 2020-06-24 RX ADMIN — SPIRONOLACTONE 25 MG: 25 TABLET ORAL at 08:59

## 2020-06-24 RX ADMIN — POTASSIUM CHLORIDE 20 MEQ: 20 TABLET, EXTENDED RELEASE ORAL at 09:15

## 2020-06-24 RX ADMIN — CARVEDILOL 3.12 MG: 3.12 TABLET, FILM COATED ORAL at 08:59

## 2020-06-24 RX ADMIN — SODIUM CHLORIDE, PRESERVATIVE FREE 10 ML: 5 INJECTION INTRAVENOUS at 09:00

## 2020-06-24 RX ADMIN — APIXABAN 5 MG: 5 TABLET, FILM COATED ORAL at 08:59

## 2020-06-24 RX ADMIN — SACUBITRIL AND VALSARTAN 1 TABLET: 24; 26 TABLET, FILM COATED ORAL at 20:32

## 2020-06-24 RX ADMIN — Medication 10 ML: at 20:33

## 2020-06-24 RX ADMIN — CARVEDILOL 3.12 MG: 3.12 TABLET, FILM COATED ORAL at 17:05

## 2020-06-24 RX ADMIN — APIXABAN 5 MG: 5 TABLET, FILM COATED ORAL at 20:32

## 2020-06-24 RX ADMIN — POTASSIUM CHLORIDE 20 MEQ: 20 TABLET, EXTENDED RELEASE ORAL at 20:32

## 2020-06-24 ASSESSMENT — PAIN SCALES - GENERAL
PAINLEVEL_OUTOF10: 0

## 2020-06-24 NOTE — PROGRESS NOTES
Hospitalist Progress Note  6/24/2020 4:41 PM  Subjective:   Admit Date: 6/20/2020  PCP: Yury Lowe MD    Chief Complaint: shortness of breath    Subjective: Patient examined this morning without new complaints. Heart rate remains well controlled. His blood pressure remains low end of normal but he denies any symptomatology including lightheadedness. He is eager for discharge to home soon. History is otherwise unchanged. [facility] Hospital History:   [copied from previous provider]  6/20/2020  Katina Harrison an 81 y. o. male.  Very pleasant gentleman with CAD status post 2 stent placement in 2014 who is complaining of shortness of breath for the last couple of weeks with minimal exertion along with tiredness and fatigue which is getting worse.  He finally agreed to come to [facility] for evaluation after insistence by the family, work-up was done which showed elevated BNP more than 9000 as well as fluid overload changes on chest x-ray consistent with new onset CHF.  He also had A. fib with RVR which was improved on starting IV Cardizem drip.  First set of cardiac enzymes was done which is normal.  He is being admitted for medical management, cardiopulmonary monitoring, cardiology evaluation and further work-up in the morning.  6/21/2020  Patient feels better after aggressive management since admission. He underwent  echocardiogram which showed ejection fraction of 27%. Cardiology had a detailed discussion with the family and they are planning to take him for DC cardioversion for new onset A. fib as well as cardiac catheterization in the morning. Patient has been started on anticoagulation as per cardiology. 6/22/2020  Patient has had around thousand liters of net fluid output since admission on diuresis. He is planning to undergo CASTILLO and DC cardioversion later on today. 6/23/2020  Patient underwent successful CASTILLO and DC cardioversion yesterday. Patient is still in CCU.   His hypotension has been controlled. Levophed and dopamine drips discontinued. Post cardiac cath sheath is in place which was discontinued in the afternoon and pressure applied for 45 minutes. Patient had a huge hematoma afterwards and additional pressure applied for 45 more minutes which helped resolution of hematoma. No bleeding was noted. [end copied portion]  6-24: Patient examined in CCU this morning. No further bleeding from groin, patient is feeling well and eager to work towards discharge to home. Awaiting cardiology input but likely stable for downgrade to PCU.    ROS: 14 point review of systems is negative except as specifically addressed above. DIET CARDIAC;     Intake/Output Summary (Last 24 hours) at 6/24/2020 1641  Last data filed at 6/24/2020 0530  Gross per 24 hour   Intake 10 ml   Output 475 ml   Net -465 ml     Medications:   norepinephrine 2 mcg/min (06/24/20 0700)    amiodarone 450mg/250ml D5W infusion 0.5 mg/min (06/24/20 1142)     Current Facility-Administered Medications   Medication Dose Route Frequency Provider Last Rate Last Dose    norepinephrine (LEVOPHED) 16 mg in sodium chloride 0.9 % 250 mL infusion  2 mcg/min Intravenous Continuous Marilyne Schirmer, MD 1.9 mL/hr at 06/24/20 0700 2 mcg/min at 06/24/20 0700    sodium chloride flush 0.9 % injection 10 mL  10 mL Intravenous 2 times per day Gina Smith,         sodium chloride flush 0.9 % injection 10 mL  10 mL Intravenous PRN Gina Smith DO        amiodarone (CORDARONE) 450 mg in dextrose 5 % 250 mL infusion  0.5 mg/min Intravenous Continuous Marilyne Schirmer, MD 16.7 mL/hr at 06/24/20 1142 0.5 mg/min at 06/24/20 1142    sacubitril-valsartan (ENTRESTO) 24-26 MG per tablet 1 tablet  1 tablet Oral BID Marilyne Schirmer, MD   1 tablet at 06/24/20 0858    carvedilol (COREG) tablet 3.125 mg  3.125 mg Oral BID WC Marilyne Schirmer, MD   3.125 mg at 06/24/20 0859    spironolactone (ALDACTONE) tablet 25 mg  25 mg Oral Daily Marilyne Schirmer, MD   25 mg at 06/24/20 0859    digoxin (LANOXIN) tablet 250 mcg  250 mcg Oral Daily Nikki Manzano MD   250 mcg at 06/23/20 9334    cefTRIAXone (ROCEPHIN) 1 g in sodium chloride (PF) 10 mL IV syringe  1 g Intravenous Q24H Carlos Cummins MD   1 g at 06/24/20 0858    atorvastatin (LIPITOR) tablet 40 mg  40 mg Oral Daily Carlos Cummins MD   40 mg at 06/24/20 0859    aspirin chewable tablet 81 mg  81 mg Oral Daily Carlos Cummins MD   81 mg at 06/24/20 0859    potassium chloride (KLOR-CON M) extended release tablet 20 mEq  20 mEq Oral BID Carlos Cummins MD   20 mEq at 06/24/20 0915    acetaminophen (TYLENOL) tablet 650 mg  650 mg Oral Q6H PRN Carlos Cummins MD   650 mg at 06/23/20 0434    Or    acetaminophen (TYLENOL) suppository 650 mg  650 mg Rectal Q6H PRN Carlos Cummins MD        polyethylene glycol (GLYCOLAX) packet 17 g  17 g Oral Daily PRN Carlos Cummins MD        promethazine (PHENERGAN) tablet 12.5 mg  12.5 mg Oral Q6H PRN Carlos Cummins MD        Or    ondansetron (ZOFRAN) injection 4 mg  4 mg Intravenous Q6H PRN Carlos Cummins MD        furosemide (LASIX) injection 20 mg  20 mg Intravenous Daily Carlos Cummins MD   20 mg at 06/24/20 0858    apixaban (ELIQUIS) tablet 5 mg  5 mg Oral BID Nikki Manzano MD   5 mg at 06/24/20 0859        Labs:     Recent Labs     06/22/20  0305 06/23/20  0505 06/24/20  0307   WBC 6.3 5.8 7.5   RBC 3.90* 3.68* 3.88*   HGB 11.9* 11.2* 11.8*   HCT 37.0* 34.3* 36.9*   MCV 94.9* 93.2 95.1*   MCH 30.5 30.4 30.4   MCHC 32.2* 32.7* 32.0*    203 236     Recent Labs     06/22/20  0305 06/23/20  0505 06/24/20  0307   * 132* 130*   K 4.4 4.5 5.2*   ANIONGAP 10 15 13    99 97*   CO2 23 18* 20*   BUN 17 15 21   CREATININE 1.6* 1.1 1.4*   GLUCOSE 86 79 79   CALCIUM 8.9 8.4* 8.5*     No results for input(s): MG, PHOS in the last 72 hours. No results for input(s): AST, ALT, ALB, BILITOT, ALKPHOS, ALB in the last 72 hours.   ABGs:No results for input(s): PH, PO2, PCO2, HCO3, BE, O2SAT in the last 72 hours. Troponin T:   Recent Labs     06/22/20  1813 06/22/20  2255 06/23/20  0505   TROPONINI <0.01 0.01 0.01     INR: No results for input(s): INR in the last 72 hours. Lactic Acid: No results for input(s): LACTA in the last 72 hours. Objective:   Vitals: BP (!) 93/57   Pulse 58   Temp 97.1 °F (36.2 °C) (Temporal)   Resp 14   Ht 6' (1.829 m)   Wt 204 lb 8 oz (92.8 kg)   SpO2 99%   BMI 27.74 kg/m²   24HR INTAKE/OUTPUT:      Intake/Output Summary (Last 24 hours) at 6/24/2020 1641  Last data filed at 6/24/2020 0530  Gross per 24 hour   Intake 10 ml   Output 475 ml   Net -465 ml     General appearance: alert and cooperative with exam  HEENT: atraumatic, eyes with clear conjunctiva and normal lids, pupils and irises normal, external ears and nose are normal, lips normal  Neck without masses, lympadenopathy, bruit, thyroid normal  Lungs: no increased work of breathing, diminished breath sounds bibasilar  Heart: regular rate and rhythm  Abdomen: soft, non-tender; bowel sounds normal; no masses,  no organomegaly  Extremities: edema trace bilaterally, modified Eli's negative  Neurologic: no focal neurologic deficits, normal sensation, alert and oriented, affect and mood appropriate  Skin: no rashes, nodules    Assessment and Plan:   Principal Problem:    New onset of congestive heart failure (HCC)  Active Problems:    CAD (coronary artery disease)    Essential hypertension    Hyponatremia    Atrial fibrillation with RVR (HCC)    Dyslipidemia    Acute systolic heart failure (HCC)    Hypotension    Hyperkalemia    Acute kidney injury ([facility] [address]    Hypocalcemia    Pneumonia due to infectious organism  Resolved Problems:    * No resolved hospital problems. *    POD #2 cardiac catheterization, DC cardioversion, and CASTILLO. Day #4 ceftriaxone empiric therapy for pneumonia. Responding to treatment clinically, will leave on this regimen rather than add doxycycline. Stable off pressors.  Will transfer to PCU when

## 2020-06-24 NOTE — PROCEDURES
PICC Line Insertion Procedure Note    Procedure: Insertion of #5 FR/18G PICC- Dual lumen    Indications:  Levophed/Amio    Procedure Details   Informed consent was obtained for the procedure, including local anesthetic. Risks and benefits were discussed. #5 FR/18G PICC inserted to the R Basilic vein per hospital protocol. Blood return:  yes    Findings:  Catheter inserted to 42 cm, with 1 cm exposed. Catheter was flushed with 20 cc NS. Patient did tolerate procedure well. Recommendations:  Tip location confirmed within the SVC by 3CG technology. Okay to use. PICC Brochure given to patient with teaching instruction.

## 2020-06-24 NOTE — PROGRESS NOTES
Pt in bed alert with no complains. Right groin arterial sheet intact. No hematoma noted. Dr Mario Kaiser rounded this morning and said to discontinue Dobutamine and levophed. D/giancarlo at 1045 as ordered. He said to pull the sheet out. Sheet pulled out at 1445 and pressure held for  45 minutes. No drainage noted. Pressure dressing applied. At [phone] went in to look at pt's site and patient had as huge hematoma. More pressure applied for another 45 minutes and Dr Mario Kaiser made aware. Morphine 4 mg given as ordered orders and A pressure dressing applied. No hematoma and bleeding noted.

## 2020-06-24 NOTE — PROGRESS NOTES
[place], [place], [address]      DEPARTMENT OF HOSPITALIST MEDICINE        PROGRESS  NOTE:    NOTE: Portions of this note have been copied forward, however, changed to reflect the most current clinical status of this patient. Patient:  Venkata Larios  YOB: 1950  Date of Service: 6/23/2020  MRN: [id number]   Acct: [de-identified]   Primary Care Physician: José Miguel Khalil MD  Advance Directive: Full Code  Admit Date: 6/20/2020       Hospital Day: 3      CHIEF COMPLAINT:  Chief Complaint   Patient presents with    Shortness of Breath     x 1 week    Irregular Heart Beat    Leg Swelling       SUBJECTIVE / [address]  COURSE:    6/23/2020  Patient underwent successful CASTILLO and DC cardioversion yesterday. Patient is still in CCU. His hypotension has been controlled. Levophed and dopamine drips discontinued. Post cardiac cath sheath is in place which was discontinued in the afternoon and pressure applied for 45 minutes. Patient had a huge hematoma afterwards and additional pressure applied for 45 more minutes which helped resolution of hematoma. No bleeding was noted. 6/22/2020  Patient has had around thousand liters of net fluid output since admission on diuresis. He is planning to undergo CASTILLO and DC cardioversion later on today. 6/21/2020  Patient feels better after aggressive management since admission. He underwent  echocardiogram which showed ejection fraction of 27%. Cardiology had a detailed discussion with the family and they are planning to take him for DC cardioversion for new onset A. fib as well as cardiac catheterization in the morning. Patient has been started on anticoagulation as per cardiology. 6/20/2020  HISTORY OF PRESENT ILLNESS:  Venkata Larios is an 81 y.o. male.   Very pleasant gentleman with CAD status post 2 stent placement in 2014 who is complaining of shortness of breath for the last couple of weeks with minimal exertion along with rhythm, S1+S2+0, no rubs   Abdomen:   Soft, non-tender, bowel sounds +ve, no organomegaly   Extremities: Moves all, decreased range of motion, no edema   Neurologic: Awake, alert, oriented x 3, cranial nerves II-XII intact, no focal neurological deficits, sensory system intact   Psychiatric: Normal mood, non-suicidal       CURRENT MEDICATIONS:  Scheduled:   sacubitril-valsartan  1 tablet Oral BID    carvedilol  3.125 mg Oral BID WC    spironolactone  25 mg Oral Daily    digoxin  250 mcg Oral Daily    cefTRIAXone (ROCEPHIN) IV  1 g Intravenous Q24H    atorvastatin  40 mg Oral Daily    aspirin  81 mg Oral Daily    potassium chloride  20 mEq Oral BID    sodium chloride flush  10 mL Intravenous 2 times per day    furosemide  20 mg Intravenous Daily    apixaban  5 mg Oral BID        PRN:  sodium chloride flush, 10 mL, PRN  acetaminophen, 650 mg, Q6H PRN    Or  acetaminophen, 650 mg, Q6H PRN  polyethylene glycol, 17 g, Daily PRN  promethazine, 12.5 mg, Q6H PRN    Or  ondansetron, 4 mg, Q6H PRN        Infusions:   amiodarone 450mg/250ml D5W infusion 0.5 mg/min (06/23/20 2006)       Laboratory Data:  Recent Labs     06/22/20  0305 06/23/20  0505   WBC 6.3 5.8   HGB 11.9* 11.2*    203     Recent Labs     06/21/20  0456 06/22/20  0305 06/23/20  0505    134* 132*   K 4.6 4.4 4.5   CL 97* 101 99   CO2 33* 23 18*   BUN 23 17 15   CREATININE <0.5 1.6* 1.1   GLUCOSE 138* 86 79     Recent Labs     06/21/20  0456   AST 12   ALT 16   BILITOT <0.2   ALKPHOS 83     Troponin T:   Recent Labs     06/22/20  1813 06/22/20  2255 06/23/20  0505   TROPONINI <0.01 0.01 0.01     Pro-BNP: No results for input(s): BNP in the last 72 hours. INR:   No results for input(s): INR in the last 72 hours.   UA:  No results for input(s): NITRITE, COLORU, PHUR, LABCAST, WBCUA, RBCUA, MUCUS, TRICHOMONAS, YEAST, BACTERIA, CLARITYU, SPECGRAV, LEUKOCYTESUR, UROBILINOGEN, BILIRUBINUR, BLOODU, GLUCOSEU, AMORPHOUS in the last 72 hours.    Invalid input(s): Era Feeling  A1C: No results for input(s): LABA1C in the last 72 hours. ABG:No results for input(s): PHART, UFH6FCY, PO2ART, GGJ0FJS, BEART, HGBAE, C7PCCGEW, CARBOXHGBART in the last 72 hours. Imaging:    Xr Chest Portable    Result Date: 6/20/2020  1. There is a new dense right basilar consolidation, which partially obscures the right hemidiaphragm. This is concerning for a lower lobe pneumonia. Signed by Dr Nellie Ovalle on 6/20/2020 2:26 PM    Cta Pulmonary W Contrast    Result Date: 6/20/2020  1. No evidence of pulmonary embolus. 2. Evidence for pulmonary hypertension. Interstitial edema with bilateral layering pleural effusions. 3. There are small consolidations in the right middle lobe and left upper lobe lingula. This likely reflects small areas of antonio edema. Signed by Dr Nellie Ovalle on 6/20/2020 3:20 PM       ASSESSMENT & PLAN:    Principal Problem:    New onset of congestive heart failure (HCC)  Active Problems:    CAD (coronary artery disease)    Essential hypertension    Atrial fibrillation with RVR (HCC)    Dyslipidemia    Acute systolic heart failure (HCC)    Hypotension  Resolved Problems:    * No resolved hospital problems. *      Continue with CCU care  Patient underwent successful DC cardioversion after CASTILLO  No evidence of left atrial thrombus found on CASTILLO  Patient is currently on normal sinus rhythm  Patient started on amiodarone status post cardioversion   Patient blood pressure is now stable  Patient's post-cath sheath has been removed  Full 2-D echo with color-flow and doppler showed ejection fraction of only 27%  Patient's EF determination during cardiac catheter even lower at around 10%  Continue with Entresto as well as IV and p.o. diuresis  PT/OT/case management evaluation ordered      Repeat labs in a.m. Electrolyte replacement as per protocol. Patient will be monitored very closely on the floor. Further recommendations as per the hospital course. Discharge Plan: In next couple of days when patient's vitals are stable, he is cleared by cardiology and case management arrangements are done. Patient  is on full anticoagulation with Eliquis which will cover for DVT prophylaxis  current medications reviewed  Lab work reviewed  Radiology/Chest x-ray films reviewed  Treatment recommendations from suspecialities reviewed, appreciated and agreed with  Discussed with the nurse and addressed all questions/concerns  Discussed with Patient and/or Family at the bedside in detail . .. they understand and agree with the management plan. Carlos Cummins MD  6/23/2020 10:13 PM      DISCLAIMER: This note was created with electronic voice recognition which does have occasional errors. If you have any questions regarding the content within the note please do not hesitate to contact me. .. Thanks.

## 2020-06-24 NOTE — PROGRESS NOTES
Pt's BP 63/45 map of 49, Dr Nishi Patton made aware and he said to start levophed. Started as ordered. Pt's BP up to 110/64 will continue to assess. Right groin puncture cite assessed. Old hematoma and bruising present. Pressure dressing on. BLE pulses palpable.

## 2020-06-24 NOTE — CARE COORDINATION
Contacted pt pharmacy to determine copay costs for Eliquis and informed no Rx coverage for pt on file. Spoke with the pt who reports nguyen snot have Rx coverage and SW explained will speak with the physician to inform. LEIA notified Dr Reagan Manzano via text and will continue to follow and assist with further dc needs of the pt.

## 2020-06-24 NOTE — CONSULTS
Pt is appropriate for CHF clinic. He is currently in CCU will continue to watch for status and when appropriate will do CHF teaching and arrange for 7-10 day CHF follow up visit after dc home.

## 2020-06-25 PROBLEM — R57.0 CARDIOGENIC SHOCK (HCC): Status: ACTIVE | Noted: 2020-06-25

## 2020-06-25 LAB
ANION GAP SERPL CALCULATED.3IONS-SCNC: 9 MMOL/L (ref 7–19)
BLOOD CULTURE, ROUTINE: NORMAL
BUN BLDV-MCNC: 25 MG/DL (ref 8–23)
CALCIUM SERPL-MCNC: 8.2 MG/DL (ref 8.8–10.2)
CHLORIDE BLD-SCNC: 98 MMOL/L (ref 98–111)
CO2: 23 MMOL/L (ref 22–29)
CREAT SERPL-MCNC: 1.3 MG/DL (ref 0.5–1.2)
CULTURE, BLOOD 2: NORMAL
GFR NON-AFRICAN AMERICAN: 55
GLUCOSE BLD-MCNC: 98 MG/DL (ref 74–109)
HCT VFR BLD CALC: 29.3 % (ref 42–52)
HEMOGLOBIN: 9.4 G/DL (ref 14–18)
MCH RBC QN AUTO: 30 PG (ref 27–31)
MCHC RBC AUTO-ENTMCNC: 32.1 G/DL (ref 33–37)
MCV RBC AUTO: 93.6 FL (ref 80–94)
PDW BLD-RTO: 14.5 % (ref 11.5–14.5)
PLATELET # BLD: 237 K/UL (ref 130–400)
PMV BLD AUTO: 9.8 FL (ref 9.4–12.4)
POTASSIUM SERPL-SCNC: 4.9 MMOL/L (ref 3.5–5)
RBC # BLD: 3.13 M/UL (ref 4.7–6.1)
SODIUM BLD-SCNC: 130 MMOL/L (ref 136–145)
WBC # BLD: 8.7 K/UL (ref 4.8–10.8)

## 2020-06-25 PROCEDURE — 2700000000 HC OXYGEN THERAPY PER DAY

## 2020-06-25 PROCEDURE — 6360000002 HC RX W HCPCS: Performed by: INTERNAL MEDICINE

## 2020-06-25 PROCEDURE — 2580000003 HC RX 258: Performed by: HOSPITALIST

## 2020-06-25 PROCEDURE — 85027 COMPLETE CBC AUTOMATED: CPT

## 2020-06-25 PROCEDURE — 6360000002 HC RX W HCPCS: Performed by: HOSPITALIST

## 2020-06-25 PROCEDURE — 6370000000 HC RX 637 (ALT 250 FOR IP): Performed by: HOSPITALIST

## 2020-06-25 PROCEDURE — 2580000003 HC RX 258: Performed by: INTERNAL MEDICINE

## 2020-06-25 PROCEDURE — 97530 THERAPEUTIC ACTIVITIES: CPT

## 2020-06-25 PROCEDURE — 97162 PT EVAL MOD COMPLEX 30 MIN: CPT

## 2020-06-25 PROCEDURE — 80048 BASIC METABOLIC PNL TOTAL CA: CPT

## 2020-06-25 PROCEDURE — 6370000000 HC RX 637 (ALT 250 FOR IP): Performed by: INTERNAL MEDICINE

## 2020-06-25 PROCEDURE — 97165 OT EVAL LOW COMPLEX 30 MIN: CPT

## 2020-06-25 PROCEDURE — 2100000000 HC CCU R&B

## 2020-06-25 RX ADMIN — POTASSIUM CHLORIDE 20 MEQ: 20 TABLET, EXTENDED RELEASE ORAL at 10:01

## 2020-06-25 RX ADMIN — Medication 10 ML: at 20:19

## 2020-06-25 RX ADMIN — DIGOXIN 250 MCG: 125 TABLET ORAL at 10:02

## 2020-06-25 RX ADMIN — FUROSEMIDE 20 MG: 10 INJECTION, SOLUTION INTRAMUSCULAR; INTRAVENOUS at 10:03

## 2020-06-25 RX ADMIN — CARVEDILOL 3.12 MG: 3.12 TABLET, FILM COATED ORAL at 18:22

## 2020-06-25 RX ADMIN — SACUBITRIL AND VALSARTAN 1 TABLET: 24; 26 TABLET, FILM COATED ORAL at 10:01

## 2020-06-25 RX ADMIN — CARVEDILOL 3.12 MG: 3.12 TABLET, FILM COATED ORAL at 10:03

## 2020-06-25 RX ADMIN — SACUBITRIL AND VALSARTAN 1 TABLET: 24; 26 TABLET, FILM COATED ORAL at 20:19

## 2020-06-25 RX ADMIN — SPIRONOLACTONE 25 MG: 25 TABLET ORAL at 10:03

## 2020-06-25 RX ADMIN — Medication 10 ML: at 10:04

## 2020-06-25 RX ADMIN — APIXABAN 5 MG: 5 TABLET, FILM COATED ORAL at 20:19

## 2020-06-25 RX ADMIN — SODIUM CHLORIDE, PRESERVATIVE FREE 1 G: 5 INJECTION INTRAVENOUS at 10:00

## 2020-06-25 RX ADMIN — ASPIRIN 81 MG 81 MG: 81 TABLET ORAL at 10:03

## 2020-06-25 RX ADMIN — AMIODARONE HYDROCHLORIDE 0.5 MG/MIN: 50 INJECTION, SOLUTION INTRAVENOUS at 03:18

## 2020-06-25 RX ADMIN — ATORVASTATIN CALCIUM 40 MG: 40 TABLET, FILM COATED ORAL at 10:01

## 2020-06-25 RX ADMIN — POTASSIUM CHLORIDE 20 MEQ: 20 TABLET, EXTENDED RELEASE ORAL at 20:19

## 2020-06-25 RX ADMIN — APIXABAN 5 MG: 5 TABLET, FILM COATED ORAL at 10:02

## 2020-06-25 ASSESSMENT — PAIN SCALES - GENERAL
PAINLEVEL_OUTOF10: 0

## 2020-06-25 NOTE — PROGRESS NOTES
06/25/20 1440   Subjective   Subjective Patient having problems with low BP agrees to work with therapy   Pain Screening   Patient Currently in Pain No   Oxygen Therapy   O2 Device Nasal cannula   O2 Flow Rate (L/min) 2 L/min   Bed Mobility   Supine to Sit Contact guard assistance   Transfers   Sit to Stand Contact guard assistance   Stand to sit Contact guard assistance   Bed to Chair Contact guard assistance   Comment BP in sitting 73/59   in standing   88/60. Patient asymptomatic then stepped to chair with RW   Ambulation   Ambulation? No   Safety Devices   Type of devices Call light within reach; Left in chair   Physical Therapy    Electronically signed by Salena Molina PTA on 6/25/2020 at 2:46 PM

## 2020-06-25 NOTE — PROGRESS NOTES
discontinued. Post cardiac cath sheath is in place which was discontinued in the afternoon and pressure applied for 45 minutes. Patient had a huge hematoma afterwards and additional pressure applied for 45 more minutes which helped resolution of hematoma. No bleeding was noted. [end copied portion]  6-24: Patient examined in CCU this morning. No further bleeding from groin, patient is feeling well and eager to work towards discharge to home. Awaiting cardiology input but likely stable for downgrade to PCU.  6-25: Patient still requiring norepinephrine drip today, not able to transfer to PCU yet. Cannot tolerate fluid support, will defer management to cardiology. ROS: 14 point review of systems is negative except as specifically addressed above. DIET CARDIAC;     Intake/Output Summary (Last 24 hours) at 6/25/2020 1427  Last data filed at 6/25/2020 1338  Gross per 24 hour   Intake 1130.35 ml   Output 1630 ml   Net -499.65 ml     Medications:   norepinephrine Stopped (06/25/20 1218)    amiodarone 450mg/250ml D5W infusion Stopped (06/25/20 1044)     Current Facility-Administered Medications   Medication Dose Route Frequency Provider Last Rate Last Dose    norepinephrine (LEVOPHED) 16 mg in sodium chloride 0.9 % 250 mL infusion  2 mcg/min Intravenous Continuous Nuris Miner MD   Stopped at 06/25/20 1218    sodium chloride flush 0.9 % injection 10 mL  10 mL Intravenous 2 times per day Viann Pennington, DO   10 mL at 06/25/20 1004    sodium chloride flush 0.9 % injection 10 mL  10 mL Intravenous PRN Viann Pennington, DO        amiodarone (CORDARONE) 450 mg in dextrose 5 % 250 mL infusion  0.5 mg/min Intravenous Continuous Nuris Miner MD   Stopped at 06/25/20 1044    sacubitril-valsartan (ENTRESTO) 24-26 MG per tablet 1 tablet  1 tablet Oral BID Nuris Miner MD   1 tablet at 06/25/20 1001    carvedilol (COREG) tablet 3.125 mg  3.125 mg Oral BID  Nuris Miner MD   3.125 mg at 06/25/20 1003  spironolactone (ALDACTONE) tablet 25 mg  25 mg Oral Daily Allison Eason MD   25 mg at 06/25/20 1003    digoxin (LANOXIN) tablet 250 mcg  250 mcg Oral Daily Allison Eason MD   250 mcg at 06/25/20 1002    cefTRIAXone (ROCEPHIN) 1 g in sodium chloride (PF) 10 mL IV syringe  1 g Intravenous Q24H Carlos Cummins MD   1 g at 06/25/20 1000    atorvastatin (LIPITOR) tablet 40 mg  40 mg Oral Daily Carlos Cummins MD   40 mg at 06/25/20 1001    aspirin chewable tablet 81 mg  81 mg Oral Daily Carlos Cummins MD   81 mg at 06/25/20 1003    potassium chloride (KLOR-CON M) extended release tablet 20 mEq  20 mEq Oral BID Carlos Cummins MD   20 mEq at 06/25/20 1001    acetaminophen (TYLENOL) tablet 650 mg  650 mg Oral Q6H PRN Carlos Cummins MD   650 mg at 06/23/20 0434    Or    acetaminophen (TYLENOL) suppository 650 mg  650 mg Rectal Q6H PRN Carlos Cummins MD        polyethylene glycol (GLYCOLAX) packet 17 g  17 g Oral Daily PRN Carlos Cummins MD        promethazine (PHENERGAN) tablet 12.5 mg  12.5 mg Oral Q6H PRN Carlos Cummins MD        Or    ondansetron (ZOFRAN) injection 4 mg  4 mg Intravenous Q6H PRN Carlos Cummins MD        furosemide (LASIX) injection 20 mg  20 mg Intravenous Daily Carlos Cummins MD   20 mg at 06/25/20 1003    apixaban (ELIQUIS) tablet 5 mg  5 mg Oral BID Allison Eason MD   5 mg at 06/25/20 1002        Labs:     Recent Labs     06/23/20  0505 06/24/20  0307 06/25/20  0324   WBC 5.8 7.5 8.7   RBC 3.68* 3.88* 3.13*   HGB 11.2* 11.8* 9.4*   HCT 34.3* 36.9* 29.3*   MCV 93.2 95.1* 93.6   MCH 30.4 30.4 30.0   MCHC 32.7* 32.0* 32.1*    236 237     Recent Labs     06/23/20  0505 06/24/20  0307 06/25/20  0324   * 130* 130*   K 4.5 5.2* 4.9   ANIONGAP 15 13 9   CL 99 97* 98   CO2 18* 20* 23   BUN 15 21 25*   CREATININE 1.1 1.4* 1.3*   GLUCOSE 79 79 98   CALCIUM 8.4* 8.5* 8.2*     No results for input(s): MG, PHOS in the last 72 hours.   No results for input(s): AST, ALT, ALB, BILITOT, ALKPHOS, ALB in the last 72 hours. ABGs:No results for input(s): PH, PO2, PCO2, HCO3, BE, O2SAT in the last 72 hours. Troponin T:   Recent Labs     06/22/20  1813 06/22/20  2255 06/23/20  0505   TROPONINI <0.01 0.01 0.01     INR: No results for input(s): INR in the last 72 hours. Lactic Acid: No results for input(s): LACTA in the last 72 hours. Objective:   Vitals: BP (!) 103/54   Pulse 50   Temp 97.6 °F (36.4 °C) (Temporal)   Resp 19   Ht 6' (1.829 m)   Wt 198 lb 9.6 oz (90.1 kg)   SpO2 99%   BMI 26.94 kg/m²   24HR INTAKE/OUTPUT:      Intake/Output Summary (Last 24 hours) at 6/25/2020 1427  Last data filed at 6/25/2020 1338  Gross per 24 hour   Intake 1130.35 ml   Output 1630 ml   Net -499.65 ml     General appearance: alert and cooperative with exam  HEENT: atraumatic, eyes with clear conjunctiva and normal lids, pupils and irises normal, external ears and nose are normal, lips normal  Neck without masses, lympadenopathy, bruit, thyroid normal  Lungs: no increased work of breathing, diminished breath sounds bibasilar  Heart: regular rate and rhythm  Abdomen: soft, non-tender; bowel sounds normal; no masses,  no organomegaly  Extremities: edema trace bilaterally, modified Eli's negative  Neurologic: no focal neurologic deficits, normal sensation, alert and oriented, affect and mood appropriate  Skin: no rashes, nodules    Assessment and Plan:   Principal Problem:    New onset of congestive heart failure (HCC)  Active Problems:    CAD (coronary artery disease)    Essential hypertension    Hyponatremia    Atrial fibrillation with RVR (HCC)    Dyslipidemia    Acute systolic heart failure (HCC)    Hypotension    Hyperkalemia    Acute kidney injury ([address]    Hypocalcemia    Pneumonia due to infectious organism    Cardiogenic shock ([facility] [address]  Resolved Problems:    * No resolved hospital problems. *    POD #3 cardiac catheterization, DC cardioversion, and CASTILLO. Day #5 ceftriaxone empiric therapy for pneumonia.  Responding to treatment

## 2020-06-25 NOTE — PROGRESS NOTES
Occupational Therapy   Occupational Therapy Initial Assessment  Date: 2020   Patient Name: Mercedes Lepe  MRN: [id number]     : 1950    Date of Service: 2020    Discharge Recommendations:          Assessment   Performance deficits / Impairments: Decreased functional mobility ; Decreased ADL status; Decreased endurance;Decreased balance  Assessment: Will progress as tolerated  Treatment Diagnosis: CHF  Prognosis: Good  Decision Making: Low Complexity  REQUIRES OT FOLLOW UP: Yes  Activity Tolerance  Activity Tolerance: Treatment limited secondary to medical complications (free text)  Activity Tolerance: Not symptomatic but sitting BP of 73/59, standing BP of 88/60           Patient Diagnosis(es): The primary encounter diagnosis was Atrial fibrillation with RVR ([facility] Diagnoses of Acute congestive heart failure, unspecified heart failure type ([facility] Pleural effusion, FENTON (dyspnea on exertion), and Coronary artery disease involving native heart with angina pectoris, unspecified vessel or lesion type [facility]) were also pertinent to this visit. has a past medical history of CAD (coronary artery disease), Gout, Hypertension, and Non-ST elevation MI (NSTEMI) ([facility] has a past surgical history that includes Cardiac catheterization (14  [facility]) and Cholecystectomy.     Treatment Diagnosis: CHF      Restrictions       Subjective   General  Chart Reviewed: Yes  Patient assessed for rehabilitation services?: Yes  Diagnosis: CHF  General Comment  Comments: Low BP, nursing said to take it easy and monitor BP  Patient Currently in Pain: No  Vital Signs  Patient Currently in Pain: No  Oxygen Therapy  O2 Device: Nasal cannula  O2 Flow Rate (L/min): 2 L/min  Social/Functional History  Social/Functional History  Lives With: Alone  ADL Assistance: Independent  Ambulation Assistance: Independent  Transfer Assistance: Independent  Active : Yes       Objective   Vision: Within Functional Limits  Hearing: Within

## 2020-06-25 NOTE — PROGRESS NOTES
[address]    Daily HOSPITAL Progress Note                            Date:  6/24/20  Patient: Venkata Larios  Admission:  6/20/2020  1:47 PM  Admit DX: New onset of congestive heart failure ([address] [I50.9]  Age:  81 y.o., 1950        Date of Admission 6/20/2020  1:47 PM   Hospital Length of Stay  LOS: 4 days        Date / Time Of Initially Being Seen For This Daily Visit:  6/24/20, at approximately 1400. At that time, I personally saw the patient and rounded with:  Stuart Osorio RN    Date / Time That This Note Is Written:  6/24/2020  at  9:31 PM        The observations documented in this note, including the assessment   and plan are mine    Portions of this note have been copied forward, from prior notes, yet modified, to reflect today's clinical status of this patient. Reason for initial evaluation or the patient's initial complaint    1. Reason for Hospital Admission: AF        2. Reason for Cardiology Consultation: Non ischemic cardiomyopathy         SUBJECTIVE:      Chief Complaint / Reason for the Visit   Follow up of:  AF and non ischemic cardiomyopathy and systemic arterial hypertension    Family present and in room during examination:  No    At the time of the examination, the patient was:  Lying in bed      Specialty Problems        Cardiology Problems    CAD (coronary artery disease)        Essential hypertension        Chest pain        Atrial fibrillation with RVR ([address]        * (Principal) New onset of congestive heart failure ([address]        Acute systolic heart failure ([address]        Hypotension              Current Status Today According to the patient:  Today he is feeling \"ok\". Subjective:  Mr. Venkata Larios is generally feeling show no change:  Weaning levophed    Mr. Venkata Larios has the following cardiac complaints / symptoms today:    1. AF, now NSR, no amiodaronie    2. Non ischemic cardiomyopathy, meds for now    3.  Hypertension    The blood pressure for the lastr 36 hours has been:  Systolic (21ZWG), KIK:52 , Min:72 , LDQ:135    Diastolic (02EBK), HMU:81, Min:42, Max:84        Jewel Nobles is a 81 y.o. male with the following history as recorded in [facility]:    Patient Active Problem List    Diagnosis Date Noted    Hyperkalemia 06/24/2020     Priority: Low    Acute kidney injury ([facility] 06/24/2020     Priority: Low    Hypocalcemia 06/24/2020     Priority: Low    Pneumonia due to infectious organism 06/24/2020     Priority: Low    Acute systolic heart failure ([facility] 06/23/2020     Priority: Low    Hypotension 06/23/2020     Priority: Low    New onset of congestive heart failure ([facility] 06/20/2020     Priority: Low    Atrial fibrillation with RVR ([facility] 06/20/2020     Priority: Low    Dyslipidemia 06/20/2020     Priority: Low    Thrombocytopenia (HCC)      Priority: Low    Hyponatremia      Priority: Low    Alcohol abuse 05/07/2018     Priority: Low    Transaminitis 05/07/2018     Priority: Low    Chest pain 05/06/2018     Priority: Low    CAD (coronary artery disease)      Priority: Low    Essential hypertension      Priority: Low     Current Facility-Administered Medications   Medication Dose Route Frequency Provider Last Rate Last Dose    norepinephrine (LEVOPHED) 16 mg in sodium chloride 0.9 % 250 mL infusion  2 mcg/min Intravenous Continuous Olivia Walls MD 1.9 mL/hr at 06/24/20 0700 2 mcg/min at 06/24/20 0700    sodium chloride flush 0.9 % injection 10 mL  10 mL Intravenous 2 times per day Fredrick Ferrer DO   10 mL at 06/24/20 2033    sodium chloride flush 0.9 % injection 10 mL  10 mL Intravenous PRN Fredrick Ferrer DO        amiodarone (CORDARONE) 450 mg in dextrose 5 % 250 mL infusion  0.5 mg/min Intravenous Continuous Olivia Walls MD 16.7 mL/hr at 06/24/20 1142 0.5 mg/min at 06/24/20 1142    sacubitril-valsartan (ENTRESTO) 24-26 MG per tablet 1 tablet  1 tablet Oral BID Olivia Walls MD   1 tablet at 06/24/20 2032    Smoker     Types: Cigars    Smokeless tobacco: Never Used   Substance Use Topics    Alcohol use: Yes     Alcohol/week: 3.0 standard drinks     Types: 3 Shots of liquor per week     Comment: daily          Review of Systems:    General:      Complaint / Symptom Yes / No / Description if Yes       Fatigue Yes:  chronic   Weight gain NA   Insomnia NA       Respiratory:        Complaint / Symptom Yes / No / Description if Yes       Cough No   Horseness NA       Cardiovascular:    Complaint / Symptom Yes / No / Description if Yes       Chest Pain No   Shortness of Air / Orthopnea Yes: chronic and show no change:   Presyncope / Syncope No   Palpitations No         Objective:    BP (!) 85/52   Pulse 53   Temp 98.5 °F (36.9 °C) (Temporal)   Resp 17   Ht 6' (1.829 m)   Wt 204 lb 8 oz (92.8 kg)   SpO2 98%   BMI 27.74 kg/m² ,     Intake/Output Summary (Last 24 hours) at 6/24/2020 2131  Last data filed at 6/24/2020 1705  Gross per 24 hour   Intake --   Output 925 ml   Net -925 ml       GENERAL - well developed and well nourished, is an active participant in this examination  HEENT -  PERRLA, Hearing appears normal, conjunctiva and lids are normal, ears and nose appear normal  NECK - no thyromegaly, no JVD, trachea is in the midline  CARDIOVASCULAR - PMI is in the left mid line clavicular position, Normal S1 and S2 with a grade 1/6 systolic murmur. No S3 or S4    PULMONARY - No respiratory distress. scattered wheezes and rales.   Breath sounds in both  lung fields are Decreased  ABDOMEN  - soft, non tender, no rebound, no hepatomegaly or splenomegaly  MUSCULOSKELETAL  - Prone/Supine, digitals and nails are without clubbing or cyanosis  EXTREMITIES - trace edema  NEUROLOGIC - cranial nerves, II-XII, are normal  SKIN - turgor is normal, no rash  PSYCHIATRIC - normal mood and affect, alert and orientated x 3, judgement and insight appear appropriate      ASSESSMENT:    ALL THE CARDIOLOGY PROBLEMS ARE LISTED ABOVE; HOWEVER, THE FOLLOWING SPECIFIC CARDIAC PROBLEMS / CONDITIONS WERE ADDRESSED AND TREATED DURING THE HOSPITAL VISIT TODAY:                                                                                            MEDICAL DECISION MAKING                   Cardiac Specific Problem / Diagnosis   Discussion and Data Reviewed Diagnostic Procedures Ordered Management Options Selected                 1. Presenting problem / symptom     atrial fibrillation of uncertain duration  unknown    The Atrial Fibrillation Stroke Risk is calculated according to the      [id number] Score        Risk   Factors   Component Value   C CHF Yes 1   H HTN Yes 1   A2 Age >= 75 No,  (80 y.o.) 0   D DM No 0   S2 Prior Stroke/TIA No 0   V Vascular Disease No 0   A Age 74-69 Yes,  (80 y.o.) 1   Sc Sex male 0     [id number]  Score   3   Score last updated 6/20/20 8:24 PM CDT           [id number] Score Annual Stroke Risk %   0 0   1 1.3   2 2.2   3 3.2   4 4.0   5 6.7   6 9.8   7 9.6   8 12.5   9 15.2           Yes: echo Continue current medications:      Yes:                  2. CAD Initial presentation during this evaluation    Review and summation of old records:     12/29/2014  Cath  anterolateral and apical hypo, EF 50%, 2 stents in the LAD and POA in the diagonal  5/8/2018  DSE negative for myocardial ischemia    No Continue current medications:     Yes:                  3.  Systemic arterial hypertension Initial presentation during this evaluation The blood pressure for the lastr 36 hours has been:  Systolic (08QIJ), CUK:72 , Min:72 , HRK:254    Diastolic (80FZF), IIX:28, Min:42, Max:84             No Continue current medications:        yes            DAILY [address] COURSE:        Date Clinical Event Plan of Treatment           6/20/2020 Admitted [id number] [place] [address]  Cardiology Concern:  Newly discovered AF  For CASTILLO / DCCV on Monday  Will check echo tomorrow   06/21/20 6/21/20 echo EF 27%, AUC indication 24, AUC score 8  For cath tomorrow as well    06/22/20 6/22/20  Cath mild CAD, EF 5-10%, CASTILLO / DCCV successful on dilt and eliquis, will change to amiodarone To CCU, on levophed and dobutamine     Will discuss transfer options with him tomorrow, improved over the last little bit    06/23/20  improved Will dc drips and remove sheath    06/24/20  Slightly hypotensive, placed on levophed Wean levophed                    CONSIDERATIONS, THOUGHTS, AND PLANS:     4. Continue present medications except for changes as noted above  5. Continue to monitor rhythm  6.  Further orders per clinical course.        Discussed with patient and nursing.     Electronically signed by Mae Jacobo MD on 06/24/20

## 2020-06-25 NOTE — PROGRESS NOTES
Physical Therapy. [facility]      Facility/Department: Health system CORONARY CARE UNIT  Initial Assessment    NAME: Marie Nobles  : 1950  MRN: [id number]    Date of Service: 2020    Discharge Recommendations:  Continue to assess pending progress, Patient would benefit from continued therapy after discharge   PT Equipment Recommendations  Other: ASSESSING    Assessment   Body structures, Functions, Activity limitations: Decreased functional mobility ; Decreased posture;Decreased endurance;Decreased strength  Assessment: Pt PRESENTS WITH DECREASED ENDURANCE AND IMPAIRMENT OF GT/MOBILITY RELATED TO CURRENT ILLNESS. Prognosis: Good  Decision Making: Medium Complexity  PT Education: Transfer Training;General Safety; Functional Mobility Training  REQUIRES PT FOLLOW UP: Yes  Activity Tolerance  Activity Tolerance: Patient Tolerated treatment well       Patient Diagnosis(es): The primary encounter diagnosis was Atrial fibrillation with RVR ([facility] Diagnoses of Acute congestive heart failure, unspecified heart failure type ([facility] Pleural effusion, FENTON (dyspnea on exertion), and Coronary artery disease involving native heart with angina pectoris, unspecified vessel or lesion type [facility]) were also pertinent to this visit. has a past medical history of CAD (coronary artery disease), Gout, Hypertension, and Non-ST elevation MI (NSTEMI) ([facility] has a past surgical history that includes Cardiac catheterization (14  [facility]) and Cholecystectomy. Restrictions     Vision/Hearing        Subjective  General  Diagnosis: NEW ONSET CHF  Follows Commands: Within Functional Limits  General Comment  Comments: CHART NOTES Pt WAS DISHELVED IN ER AND NOTED POOR CARE OF FEET. Subjective  Subjective: RN STATES OK TO WORK WITH pt BUT TO OBSERVE CLOSELY AND \"TAKE IT EASY\". Pt STATES HE WOULD LIKE TO GET OOB AND SIT UP IN RECLINER. REPORTS PAIN IN R LE WITH WB WHICH HE REPORTS AS CHRONIC.   Pain Screening  Patient Currently in Pain: No Orientation     Social/Functional History  Social/Functional History  Lives With: Alone  ADL Assistance: Independent  Ambulation Assistance: Independent  Transfer Assistance: Independent  Active : Yes  Cognition        Objective     Observation/Palpation  Edema: R LE>L LE WITH REDNESS AND FLAKING SKIN    AROM RLE (degrees)  RLE AROM: WFL  AROM LLE (degrees)  LLE AROM : WFL  Strength RLE  Comment: GROSSLY WFL WITH HIP FLEX -3/5 DUE IN PART TO PAIN  Strength LLE  Comment: GROSSLY 3 TO 3+/5     Sensation  Overall Sensation Status: (INTACT TO LIGHT TOUCH)  Bed mobility  Supine to Sit: Contact guard assistance;Minimal assistance  Transfers  Sit to Stand: Minimal Assistance  Stand to sit: Minimal Assistance  Bed to Chair: Minimal assistance  Stand Pivot Transfers: Minimal Assistance  Ambulation  Ambulation?: Yes  Ambulation 1  Surface: level tile  Device: No Device  Other Apparatus: O2(ATTACHED TO MONITOR)  Assistance: Minimal assistance  Quality of Gait: ANTALGIC WITH WB ON R LE, UNSTEADY.  NO REPORT OF DIZZINESS  Gait Deviations: Slow Hyacinth;Decreased step length;Decreased step height  Distance: 4 FT  Comments: WILL TRY RW     Balance  Comments: ABLE TO SIT EOB WITHOUT LOB        Plan   Plan  Times per week: 5-7  Current Treatment Recommendations: Strengthening, Gait Training, Patient/Caregiver Education & Training, Functional Mobility Training, Positioning, Transfer Training, Safety Education & Training  Plan Comment: TAKE RW, PROGRESS MOBILITY AS OK'D BY RN/MD  Safety Devices  Type of devices: Call light within reach, Left in chair    G-Code       OutComes Score                                                  AM-PAC Score             Goals  Short term goals  Time Frame for Short term goals: 2 WKS  Short term goal 1: SUP<>SIT, IND  Short term goal 2: SIT<>STAND, IND  Short term goal 3:  FT WITH AD AS INDICATED, SBA       Therapy Time   Individual Concurrent Group Co-treatment   Time In           Time

## 2020-06-25 NOTE — PLAN OF CARE
Problem: Falls - Risk of:  Goal: Will remain free from falls  Description: Will remain free from falls  Outcome: Met This Shift  Goal: Absence of physical injury  Description: Absence of physical injury  Outcome: Met This Shift     Problem: Safety:  Goal: Free from accidental physical injury  Description: Free from accidental physical injury  Outcome: Met This Shift  Goal: Free from intentional harm  Description: Free from intentional harm  Outcome: Met This Shift     Problem: Daily Care:  Goal: Daily care needs are met  Description: Daily care needs are met  Outcome: Met This Shift     Problem: Infection:  Goal: Will remain free from infection  Description: Will remain free from infection  Outcome: Ongoing     Problem: Pain:  Goal: Patient's pain/discomfort is manageable  Description: Patient's pain/discomfort is manageable  Outcome: Ongoing  Goal: Pain level will decrease  Description: Pain level will decrease  Outcome: Ongoing  Goal: Control of acute pain  Description: Control of acute pain  Outcome: Ongoing  Goal: Control of chronic pain  Description: Control of chronic pain  Outcome: Ongoing     Problem: Skin Integrity:  Goal: Skin integrity will stabilize  Description: Skin integrity will stabilize  Outcome: Ongoing     Problem: Discharge Planning:  Goal: Patients continuum of care needs are met  Description: Patients continuum of care needs are met  Outcome: Ongoing     Problem: Cardiac:  Goal: Ability to maintain an adequate cardiac output will improve  Description: Ability to maintain an adequate cardiac output will improve  Outcome: Ongoing  Goal: Complications related to the disease process, condition or treatment will be avoided or minimized  Description: Complications related to the disease process, condition or treatment will be avoided or minimized  Outcome: Ongoing     Problem: Infection - Central Venous Catheter-Associated Bloodstream Infection:  Goal: Will show no infection signs and symptoms  Description: Will show no infection signs and symptoms  Outcome: Ongoing

## 2020-06-25 NOTE — PROGRESS NOTES
Assumed care of patient at [phone], patient resting comfortably with no needs expressed and showing no signs of distress. Assessment complete and medications administered. Call light is within reach and bed alarm is set, will continue to monitor.   Electronically signed by Jeanie Cline RN on 6/24/2020 at 10:26 PM

## 2020-06-26 LAB
ANION GAP SERPL CALCULATED.3IONS-SCNC: 11 MMOL/L (ref 7–19)
BUN BLDV-MCNC: 23 MG/DL (ref 8–23)
CALCIUM SERPL-MCNC: 8.2 MG/DL (ref 8.8–10.2)
CHLORIDE BLD-SCNC: 100 MMOL/L (ref 98–111)
CO2: 23 MMOL/L (ref 22–29)
CREAT SERPL-MCNC: 1.2 MG/DL (ref 0.5–1.2)
GFR NON-AFRICAN AMERICAN: >60
GLUCOSE BLD-MCNC: 100 MG/DL (ref 74–109)
HCT VFR BLD CALC: 26.2 % (ref 42–52)
HEMOGLOBIN: 8.5 G/DL (ref 14–18)
INR BLD: 1.62 (ref 0.88–1.18)
MCH RBC QN AUTO: 30.4 PG (ref 27–31)
MCHC RBC AUTO-ENTMCNC: 32.4 G/DL (ref 33–37)
MCV RBC AUTO: 93.6 FL (ref 80–94)
PDW BLD-RTO: 14.3 % (ref 11.5–14.5)
PLATELET # BLD: 196 K/UL (ref 130–400)
PMV BLD AUTO: 9.7 FL (ref 9.4–12.4)
POTASSIUM SERPL-SCNC: 5 MMOL/L (ref 3.5–5)
PRO-BNP: 890 PG/ML (ref 0–900)
PROTHROMBIN TIME: 19.4 SEC (ref 12–14.6)
RBC # BLD: 2.8 M/UL (ref 4.7–6.1)
SODIUM BLD-SCNC: 134 MMOL/L (ref 136–145)
WBC # BLD: 8 K/UL (ref 4.8–10.8)

## 2020-06-26 PROCEDURE — 83880 ASSAY OF NATRIURETIC PEPTIDE: CPT

## 2020-06-26 PROCEDURE — 2580000003 HC RX 258: Performed by: INTERNAL MEDICINE

## 2020-06-26 PROCEDURE — 6370000000 HC RX 637 (ALT 250 FOR IP): Performed by: INTERNAL MEDICINE

## 2020-06-26 PROCEDURE — 2700000000 HC OXYGEN THERAPY PER DAY

## 2020-06-26 PROCEDURE — 85610 PROTHROMBIN TIME: CPT

## 2020-06-26 PROCEDURE — 2100000000 HC CCU R&B

## 2020-06-26 PROCEDURE — 6360000002 HC RX W HCPCS: Performed by: HOSPITALIST

## 2020-06-26 PROCEDURE — 85027 COMPLETE CBC AUTOMATED: CPT

## 2020-06-26 PROCEDURE — 2580000003 HC RX 258: Performed by: HOSPITALIST

## 2020-06-26 PROCEDURE — 80048 BASIC METABOLIC PNL TOTAL CA: CPT

## 2020-06-26 PROCEDURE — 6370000000 HC RX 637 (ALT 250 FOR IP): Performed by: HOSPITALIST

## 2020-06-26 RX ORDER — WARFARIN SODIUM 5 MG/1
5 TABLET ORAL
Status: DISCONTINUED | OUTPATIENT
Start: 2020-06-26 | End: 2020-06-26 | Stop reason: SDUPTHER

## 2020-06-26 RX ADMIN — Medication 10 ML: at 08:48

## 2020-06-26 RX ADMIN — SACUBITRIL AND VALSARTAN 1 TABLET: 24; 26 TABLET, FILM COATED ORAL at 08:47

## 2020-06-26 RX ADMIN — ATORVASTATIN CALCIUM 40 MG: 40 TABLET, FILM COATED ORAL at 08:48

## 2020-06-26 RX ADMIN — FUROSEMIDE 20 MG: 10 INJECTION, SOLUTION INTRAMUSCULAR; INTRAVENOUS at 08:48

## 2020-06-26 RX ADMIN — CARVEDILOL 3.12 MG: 3.12 TABLET, FILM COATED ORAL at 20:44

## 2020-06-26 RX ADMIN — SPIRONOLACTONE 25 MG: 25 TABLET ORAL at 08:48

## 2020-06-26 RX ADMIN — SACUBITRIL AND VALSARTAN 1 TABLET: 24; 26 TABLET, FILM COATED ORAL at 20:44

## 2020-06-26 RX ADMIN — CARVEDILOL 3.12 MG: 3.12 TABLET, FILM COATED ORAL at 08:47

## 2020-06-26 RX ADMIN — ASPIRIN 81 MG 81 MG: 81 TABLET ORAL at 08:48

## 2020-06-26 RX ADMIN — SODIUM CHLORIDE, PRESERVATIVE FREE 1 G: 5 INJECTION INTRAVENOUS at 08:48

## 2020-06-26 RX ADMIN — APIXABAN 5 MG: 5 TABLET, FILM COATED ORAL at 20:44

## 2020-06-26 RX ADMIN — APIXABAN 5 MG: 5 TABLET, FILM COATED ORAL at 08:48

## 2020-06-26 RX ADMIN — Medication 10 ML: at 20:44

## 2020-06-26 ASSESSMENT — PAIN SCALES - GENERAL
PAINLEVEL_OUTOF10: 0

## 2020-06-26 NOTE — PLAN OF CARE
Problem: Falls - Risk of:  Goal: Will remain free from falls  Description: Will remain free from falls  Outcome: Met This Shift  Goal: Absence of physical injury  Description: Absence of physical injury  Outcome: Met This Shift     Problem: Safety:  Goal: Free from accidental physical injury  Description: Free from accidental physical injury  Outcome: Met This Shift  Goal: Free from intentional harm  Description: Free from intentional harm  Outcome: Met This Shift     Problem: Daily Care:  Goal: Daily care needs are met  Description: Daily care needs are met  Outcome: Met This Shift     Problem: Pain:  Goal: Patient's pain/discomfort is manageable  Description: Patient's pain/discomfort is manageable  Outcome: Met This Shift  Goal: Pain level will decrease  Description: Pain level will decrease  Outcome: Met This Shift  Goal: Control of acute pain  Description: Control of acute pain  Outcome: Met This Shift  Goal: Control of chronic pain  Description: Control of chronic pain  Outcome: Met This Shift     Problem: Infection:  Goal: Will remain free from infection  Description: Will remain free from infection  Outcome: Ongoing     Problem: Skin Integrity:  Goal: Skin integrity will stabilize  Description: Skin integrity will stabilize  Outcome: Ongoing     Problem: Discharge Planning:  Goal: Patients continuum of care needs are met  Description: Patients continuum of care needs are met  Outcome: Ongoing     Problem: Cardiac:  Goal: Ability to maintain an adequate cardiac output will improve  Description: Ability to maintain an adequate cardiac output will improve  Outcome: Ongoing  Goal: Complications related to the disease process, condition or treatment will be avoided or minimized  Description: Complications related to the disease process, condition or treatment will be avoided or minimized  Outcome: Ongoing     Problem: Infection - Central Venous Catheter-Associated Bloodstream Infection:  Goal: Will show no infection signs and symptoms  Description: Will show no infection signs and symptoms  Outcome: Ongoing

## 2020-06-26 NOTE — PROGRESS NOTES
Clinical Pharmacy Note     Kailyn Yepez is a 81 y.o. male for whom pharmacy has been asked to manage warfarin therapy. Reason for Admission: Shortness of breath     Consulting Physician: Jones  Warfarin dose prior to admission: New start, patient previously receiving Effient  Warfarin indication: Atrial fibrillation with RVR   Target INR range: 2 - 3   Outpatient warfarin provider: New start          Past Medical History:   Diagnosis Date    CAD (coronary artery disease)      Gout      Hypertension      Non-ST elevation MI (NSTEMI) ([facility] 12/28/14      No results for input(s): INR in the last 72 hours. Recent Labs     06/24/20  0307 06/25/20  0324 06/26/20  0215   HGB 11.8* 9.4* 8.5*   HCT 36.9* 29.3* 26.2*    237 196         Current warfarin drug-drug interactions:   Amiodarone - Amiodarone may enhance the anticoagulant effect of Vitamin K Antagonists. Amiodarone may increase the serum concentration of Vitamin K Antagonists. Monitor patients extra closely for evidence of increased anticoagulant effects if amiodarone is initiated/dose increased, or decreased effects if amiodarone is discontinued/dose decreased, though due to the long half-life of amiodarone, any interaction may persist for several days/weeks following any dose decrease or discontinuation. An empiric warfarin dosage reduction of 30% to 50% at the initiation of amiodarone might be considered, with close monitoring of anticoagulant effect to further refine optimal dosing. Ceftriaxone - Cephalosporins may enhance the anticoagulant effect of Vitamin K Antagonists. Monitor for elevated INR and bleeding if a vitamin K antagonist is used in combination with cephalosporins. Enoxaparin - Bridging with Enoxaparin     Date INR Warfarin Dose   06/26/20  1.62  5 mg                                                      Note: Give Warfarin 5 mg po x 1 tonight. Daily PT/INR until stable within therapeutic range.       Thank you for the consult.      Electronically signed by Traci Bowie [facility] on 6/26/20 at 3:23 PM CDT

## 2020-06-26 NOTE — PROGRESS NOTES
Nutrition Assessment    Type and Reason for Visit: Consult    Nutrition Recommendations: follow up for questions that arise re: diet    Nutrition Assessment: CHF education completed with patient and family member. Pt does not use salt at all. However does consume foods hi in sodium such as potted meat, bologna, crackers. Discussed other foods he could use instead. Educational material given to pt and family    Malnutrition Assessment:  · Malnutrition Status: No malnutrition  · Context: Acute illness or injury  · Findings of the 6 clinical characteristics of malnutrition (Minimum of 2 out of 6 clinical characteristics is required to make the diagnosis of moderate or severe Protein Calorie Malnutrition based on AND/ASPEN Guidelines):  1. Energy Intake-Less than or equal to 50% of estimated energy requirement(2 days),      2. Weight Loss-No significant weight loss,    3. Fat Loss-No significant subcutaneous fat loss,    4. Muscle Loss-No significant muscle mass loss,    5. Fluid Accumulation-Moderate to severe fluid accumulation, Extremities  6.   Strength-Not measured    Nutrition Risk Level: Low    Nutrition Diagnosis:   · Problem: Altered nutrition-related lab values  · Etiology: related to Cardiac dysfunction     Signs and symptoms:  as evidenced by Lab values    Objective Information:  · Nutrition-Focused Physical Findings: well nourished  · Wound Type: None  · Current Nutrition Therapies:  · Oral Diet Orders: Cardiac   · Oral Diet intake: 26-50%  · Oral Nutrition Supplement (ONS) Orders: None    · Anthropometric Measures:  · Ht: 6' (182.9 cm)   · Current Body Wt: 194 lb 11 oz (88.3 kg)  · Admission Body Wt: 195 lb (88.5 kg)  · Ideal Body Wt: 178 lb (80.7 kg), % Ideal Body 110%  · BMI Classification: BMI 25.0 - 29.9 Overweight    Nutrition Interventions:   Continue current diet  Continued Inpatient Monitoring, Education Initiated, Education Completed    Nutrition Evaluation:   · Evaluation: Progressing toward goals   · Goals: PO intake >50, improvement in BNP    · Monitoring: Meal Intake, Diet Tolerance, Skin Integrity, Weight, Pertinent Labs, Patient/Family Education      Electronically signed by Cyn Daniel, MS, RD, LD on 6/26/20 at 2:32 PM CDT    Contact Number: [phone]

## 2020-06-26 NOTE — PROGRESS NOTES
Pt's sister, Maira Guzman,  had a heart-to-heart talk with her brother regarding his ability to be able to take care of himself at home. She is requesting a        Palliative Care consult.

## 2020-06-26 NOTE — PROGRESS NOTES
Assumed care of patient at [phone], patient resting comfortably with no signs of distress. Assessment complete and medications administered, patient is sinus will and on 2L O2 per NC. Call light is within reach and bed alarm is set, will continue to monitor.   Electronically signed by Lester Ashraf RN on 6/25/2020 at 10:09 PM

## 2020-06-26 NOTE — PROGRESS NOTES
Hospitalist Progress Note  6/26/2020 12:47 PM  Subjective:   Admit Date: 6/20/2020  PCP: Huy Morgan MD    Chief Complaint: shortness of breath    Subjective: Patient was up to chair and within room with PT yesterday. Blood pressure is improved, hypotension resolved off pressors. Noted that patient has no coverage for NOACs and will require warfarin on discharge so will change. History is otherwise unchanged. Cumulative Hospital History:   [copied from previous provider]  6/20/2020  Leo Nieves an 81 y. o. male.  Very pleasant gentleman with CAD status post 2 stent placement in 2014 who is complaining of shortness of breath for the last couple of weeks with minimal exertion along with tiredness and fatigue which is getting worse.  He finally agreed to come to [facility] for evaluation after insistence by the family, work-up was done which showed elevated BNP more than 9000 as well as fluid overload changes on chest x-ray consistent with new onset CHF.  He also had A. fib with RVR which was improved on starting IV Cardizem drip.  First set of cardiac enzymes was done which is normal.  He is being admitted for medical management, cardiopulmonary monitoring, cardiology evaluation and further work-up in the morning.  6/21/2020  Patient feels better after aggressive management since admission. He underwent  echocardiogram which showed ejection fraction of 27%. Cardiology had a detailed discussion with the family and they are planning to take him for DC cardioversion for new onset A. fib as well as cardiac catheterization in the morning. Patient has been started on anticoagulation as per cardiology. 6/22/2020  Patient has had around thousand liters of net fluid output since admission on diuresis. He is planning to undergo CASTILLO and DC cardioversion later on today. 6/23/2020  Patient underwent successful CASTILLO and DC cardioversion yesterday. Patient is still in CCU. His hypotension has been controlled. Levophed and dopamine drips discontinued. Post cardiac cath sheath is in place which was discontinued in the afternoon and pressure applied for 45 minutes. Patient had a huge hematoma afterwards and additional pressure applied for 45 more minutes which helped resolution of hematoma. No bleeding was noted. [end copied portion]  6-24: Patient examined in CCU this morning. No further bleeding from groin, patient is feeling well and eager to work towards discharge to home. Awaiting cardiology input but likely stable for downgrade to PCU.  6-25: Patient still requiring norepinephrine drip today, not able to transfer to PCU yet. Cannot tolerate fluid support, will defer management to cardiology. 6-26: Hypotension improved off pressors, patient working with physical therapy. Noted that patient cannot afford NOAC, will switch to warfarin with enoxaparin bridge. Possibly transfer to PCU today if cleared by cardiology. ROS: 14 point review of systems is negative except as specifically addressed above. DIET CARDIAC;     Intake/Output Summary (Last 24 hours) at 6/26/2020 1247  Last data filed at 6/26/2020 1126  Gross per 24 hour   Intake 896.24 ml   Output 2200 ml   Net -1303.76 ml     Medications:   norepinephrine Stopped (06/26/20 0600)    amiodarone 450mg/250ml D5W infusion Stopped (06/25/20 1044)     Current Facility-Administered Medications   Medication Dose Route Frequency Provider Last Rate Last Dose    enoxaparin (LOVENOX) injection 90 mg  1 mg/kg Subcutaneous BID Larose Fleischer, DO        norepinephrine (LEVOPHED) 16 mg in sodium chloride 0.9 % 250 mL infusion  2 mcg/min Intravenous Continuous Darshan Mercedes MD   Stopped at 06/26/20 0600    sodium chloride flush 0.9 % injection 10 mL  10 mL Intravenous 2 times per day Scott Castro DO   10 mL at 06/26/20 0848    sodium chloride flush 0.9 % injection 10 mL  10 mL Intravenous PRN Scott Castro DO        amiodarone (CORDARONE) 450 mg in dextrose 5 % 250 mL infusion  0.5 mg/min Intravenous Continuous Salbador Cifuentes MD   Stopped at 06/25/20 1044    sacubitril-valsartan (ENTRESTO) 24-26 MG per tablet 1 tablet  1 tablet Oral BID Salbador Cifuentes MD   1 tablet at 06/26/20 0847    carvedilol (COREG) tablet 3.125 mg  3.125 mg Oral BID WC Salbador Cifuentes MD   3.125 mg at 06/26/20 0847    spironolactone (ALDACTONE) tablet 25 mg  25 mg Oral Daily Salbador Cifuentes MD   25 mg at 06/26/20 0848    digoxin (LANOXIN) tablet 250 mcg  250 mcg Oral Daily Salbador Cifuentes MD   250 mcg at 06/25/20 1002    cefTRIAXone (ROCEPHIN) 1 g in sodium chloride (PF) 10 mL IV syringe  1 g Intravenous Q24H Carlos Cummins MD   1 g at 06/26/20 0848    atorvastatin (LIPITOR) tablet 40 mg  40 mg Oral Daily Carlos Cummins MD   40 mg at 06/26/20 0848    aspirin chewable tablet 81 mg  81 mg Oral Daily Carlos Cummins MD   81 mg at 06/26/20 0848    potassium chloride (KLOR-CON M) extended release tablet 20 mEq  20 mEq Oral BID Carlos Cummins MD   20 mEq at 06/25/20 2019    acetaminophen (TYLENOL) tablet 650 mg  650 mg Oral Q6H PRN Carlos Cummins MD   650 mg at 06/23/20 0434    Or    acetaminophen (TYLENOL) suppository 650 mg  650 mg Rectal Q6H PRN Carlos Cummins MD        polyethylene glycol (GLYCOLAX) packet 17 g  17 g Oral Daily PRN Carlos Cummins MD        promethazine (PHENERGAN) tablet 12.5 mg  12.5 mg Oral Q6H PRN Carlos Cummins MD        Or    ondansetron (ZOFRAN) injection 4 mg  4 mg Intravenous Q6H PRN Carlos Cummins MD        furosemide (LASIX) injection 20 mg  20 mg Intravenous Daily Carlos Cummins MD   20 mg at 06/26/20 0848        Labs:     Recent Labs     06/24/20  0307 06/25/20  0324 06/26/20  0215   WBC 7.5 8.7 8.0   RBC 3.88* 3.13* 2.80*   HGB 11.8* 9.4* 8.5*   HCT 36.9* 29.3* 26.2*   MCV 95.1* 93.6 93.6   MCH 30.4 30.0 30.4   MCHC 32.0* 32.1* 32.4*    237 196     Recent Labs     06/24/20  0307 06/25/20  0324 06/26/20  0215   * 130* 134*   K 5.2* 4.9 5.0   ANIONGAP 13 9 11   CL 97* 98 100   CO2 20* 23 23   BUN 21 25* 23   CREATININE 1.4* 1.3* 1.2   GLUCOSE 79 98 100   CALCIUM 8.5* 8.2* 8.2*     No results for input(s): MG, PHOS in the last 72 hours. No results for input(s): AST, ALT, ALB, BILITOT, ALKPHOS, ALB in the last 72 hours. ABGs:No results for input(s): PH, PO2, PCO2, HCO3, BE, O2SAT in the last 72 hours. Troponin T:   No results for input(s): TROPONINI in the last 72 hours. INR: No results for input(s): INR in the last 72 hours. Lactic Acid: No results for input(s): LACTA in the last 72 hours.     Objective:   Vitals: BP (!) 98/53   Pulse 59   Temp 97.3 °F (36.3 °C) (Temporal)   Resp 14   Ht 6' (1.829 m)   Wt 194 lb 11.2 oz (88.3 kg)   SpO2 100%   BMI 26.41 kg/m²   24HR INTAKE/OUTPUT:      Intake/Output Summary (Last 24 hours) at 6/26/2020 1247  Last data filed at 6/26/2020 1126  Gross per 24 hour   Intake 896.24 ml   Output 2200 ml   Net -1303.76 ml     General appearance: alert and cooperative with exam  HEENT: atraumatic, eyes with clear conjunctiva and normal lids, pupils and irises normal, external ears and nose are normal, lips normal  Neck without masses, lympadenopathy, bruit, thyroid normal  Lungs: no increased work of breathing, diminished breath sounds bibasilar  Heart: regular rate and rhythm  Abdomen: soft, non-tender; bowel sounds normal; no masses,  no organomegaly  Extremities: edema trace bilaterally, modified Eli's negative  Neurologic: no focal neurologic deficits, normal sensation, alert and oriented, affect and mood appropriate  Skin: no rashes, nodules    Assessment and Plan:   Principal Problem:    New onset of congestive heart failure (HCC)  Active Problems:    CAD (coronary artery disease)    Essential hypertension    Hyponatremia    Atrial fibrillation with RVR (HCC)    Dyslipidemia    Acute systolic heart failure (HCC)    Hypotension    Hyperkalemia    Acute kidney injury ([address]    Hypocalcemia    Pneumonia due to infectious organism Cardiogenic shock ([facility] [address]  Resolved Problems:    * No resolved hospital problems. *    POD #4 cardiac catheterization, DC cardioversion, and CASTILLO. Day #6 ceftriaxone empiric therapy for pneumonia. Responding to treatment clinically, will leave on this regimen rather than add doxycycline. Off norepinephrine. Continue to work with PT. Change apixaban to warfarin with enoxaparin bridge.     Advance Directive: Full Code    DVT prophylaxis: warfarin with enoxaparin bridge    Discharge planning: ANTONI Lutz, DO  Rounding Hospitalist

## 2020-06-27 LAB
ANION GAP SERPL CALCULATED.3IONS-SCNC: 10 MMOL/L (ref 7–19)
BUN BLDV-MCNC: 22 MG/DL (ref 8–23)
CALCIUM SERPL-MCNC: 8.4 MG/DL (ref 8.8–10.2)
CHLORIDE BLD-SCNC: 98 MMOL/L (ref 98–111)
CO2: 25 MMOL/L (ref 22–29)
CREAT SERPL-MCNC: 1 MG/DL (ref 0.5–1.2)
GFR NON-AFRICAN AMERICAN: >60
GLUCOSE BLD-MCNC: 78 MG/DL (ref 74–109)
HCT VFR BLD CALC: 25.7 % (ref 42–52)
HEMOGLOBIN: 8 G/DL (ref 14–18)
INR BLD: 1.59 (ref 0.88–1.18)
MCH RBC QN AUTO: 29.4 PG (ref 27–31)
MCHC RBC AUTO-ENTMCNC: 31.1 G/DL (ref 33–37)
MCV RBC AUTO: 94.5 FL (ref 80–94)
PDW BLD-RTO: 14.3 % (ref 11.5–14.5)
PLATELET # BLD: 180 K/UL (ref 130–400)
PMV BLD AUTO: 9.6 FL (ref 9.4–12.4)
POTASSIUM SERPL-SCNC: 4.6 MMOL/L (ref 3.5–5)
PROTHROMBIN TIME: 19.1 SEC (ref 12–14.6)
RBC # BLD: 2.72 M/UL (ref 4.7–6.1)
SODIUM BLD-SCNC: 133 MMOL/L (ref 136–145)
WBC # BLD: 5.9 K/UL (ref 4.8–10.8)

## 2020-06-27 PROCEDURE — 99232 SBSQ HOSP IP/OBS MODERATE 35: CPT | Performed by: INTERNAL MEDICINE

## 2020-06-27 PROCEDURE — 2700000000 HC OXYGEN THERAPY PER DAY

## 2020-06-27 PROCEDURE — 85610 PROTHROMBIN TIME: CPT

## 2020-06-27 PROCEDURE — 2100000000 HC CCU R&B

## 2020-06-27 PROCEDURE — 80048 BASIC METABOLIC PNL TOTAL CA: CPT

## 2020-06-27 PROCEDURE — 85027 COMPLETE CBC AUTOMATED: CPT

## 2020-06-27 PROCEDURE — 6370000000 HC RX 637 (ALT 250 FOR IP): Performed by: INTERNAL MEDICINE

## 2020-06-27 PROCEDURE — 2580000003 HC RX 258: Performed by: HOSPITALIST

## 2020-06-27 PROCEDURE — 6360000002 HC RX W HCPCS: Performed by: HOSPITALIST

## 2020-06-27 PROCEDURE — 2580000003 HC RX 258: Performed by: INTERNAL MEDICINE

## 2020-06-27 PROCEDURE — 6370000000 HC RX 637 (ALT 250 FOR IP): Performed by: HOSPITALIST

## 2020-06-27 RX ADMIN — FUROSEMIDE 20 MG: 10 INJECTION, SOLUTION INTRAMUSCULAR; INTRAVENOUS at 08:24

## 2020-06-27 RX ADMIN — APIXABAN 5 MG: 5 TABLET, FILM COATED ORAL at 08:25

## 2020-06-27 RX ADMIN — Medication 10 ML: at 19:27

## 2020-06-27 RX ADMIN — POTASSIUM CHLORIDE 20 MEQ: 20 TABLET, EXTENDED RELEASE ORAL at 15:17

## 2020-06-27 RX ADMIN — ATORVASTATIN CALCIUM 40 MG: 40 TABLET, FILM COATED ORAL at 08:24

## 2020-06-27 RX ADMIN — SODIUM CHLORIDE, PRESERVATIVE FREE 1 G: 5 INJECTION INTRAVENOUS at 08:25

## 2020-06-27 RX ADMIN — SPIRONOLACTONE 25 MG: 25 TABLET ORAL at 08:25

## 2020-06-27 RX ADMIN — CARVEDILOL 3.12 MG: 3.12 TABLET, FILM COATED ORAL at 07:55

## 2020-06-27 RX ADMIN — ASPIRIN 81 MG 81 MG: 81 TABLET ORAL at 08:24

## 2020-06-27 RX ADMIN — SACUBITRIL AND VALSARTAN 1 TABLET: 24; 26 TABLET, FILM COATED ORAL at 19:27

## 2020-06-27 RX ADMIN — Medication 10 ML: at 07:55

## 2020-06-27 RX ADMIN — APIXABAN 5 MG: 5 TABLET, FILM COATED ORAL at 19:26

## 2020-06-27 RX ADMIN — SACUBITRIL AND VALSARTAN 1 TABLET: 24; 26 TABLET, FILM COATED ORAL at 08:24

## 2020-06-27 RX ADMIN — POTASSIUM CHLORIDE 20 MEQ: 20 TABLET, EXTENDED RELEASE ORAL at 19:26

## 2020-06-27 ASSESSMENT — PAIN SCALES - GENERAL
PAINLEVEL_OUTOF10: 0
PAINLEVEL_OUTOF10: 0

## 2020-06-27 NOTE — PROGRESS NOTES
Pt and his sister, Rebecca Hernandez, asked Dr. Martina Hallman if he would reorder the Eliquis, as the patient can afford the cost.  Sister's concern was that pt would not follow up with the labs required for Coumadin therapy. Dr. Martina Hallman agreed to reorder Eliquis.

## 2020-06-27 NOTE — PLAN OF CARE
Problem: Falls - Risk of:  Goal: Will remain free from falls  Description: Will remain free from falls  Outcome: Ongoing  Goal: Absence of physical injury  Description: Absence of physical injury  Outcome: Ongoing     Problem: Infection:  Goal: Will remain free from infection  Description: Will remain free from infection  Outcome: Ongoing     Problem: Safety:  Goal: Free from accidental physical injury  Description: Free from accidental physical injury  Outcome: Ongoing  Goal: Free from intentional harm  Description: Free from intentional harm  Outcome: Ongoing     Problem: Daily Care:  Goal: Daily care needs are met  Description: Daily care needs are met  Outcome: Ongoing     Problem: Pain:  Goal: Patient's pain/discomfort is manageable  Description: Patient's pain/discomfort is manageable  Outcome: Ongoing  Goal: Pain level will decrease  Description: Pain level will decrease  Outcome: Ongoing  Goal: Control of acute pain  Description: Control of acute pain  Outcome: Ongoing  Goal: Control of chronic pain  Description: Control of chronic pain  Outcome: Ongoing     Problem: Skin Integrity:  Goal: Skin integrity will stabilize  Description: Skin integrity will stabilize  Outcome: Ongoing     Problem: Discharge Planning:  Goal: Patients continuum of care needs are met  Description: Patients continuum of care needs are met  Outcome: Ongoing     Problem: Cardiac:  Goal: Ability to maintain an adequate cardiac output will improve  Description: Ability to maintain an adequate cardiac output will improve  Outcome: Ongoing  Goal: Complications related to the disease process, condition or treatment will be avoided or minimized  Description: Complications related to the disease process, condition or treatment will be avoided or minimized  Outcome: Ongoing     Problem: Infection - Central Venous Catheter-Associated Bloodstream Infection:  Goal: Will show no infection signs and symptoms  Description: Will show no infection

## 2020-06-27 NOTE — PROGRESS NOTES
Hospitalist Progress Note  6/27/2020 12:54 PM  Subjective:   Admit Date: 6/20/2020  PCP: Iza López MD    Chief Complaint: shortness of breath    Subjective: Patient states he is feeling well this morning. Slightly bradycardic and remains a little hypotensive, not cleared for transfer to PCU yet. History is otherwise unchanged. Cumulative Hospital History:   [copied from previous provider]  6/20/2020  Melida nj 81 y. o. male.  Very pleasant gentleman with CAD status post 2 stent placement in 2014 who is complaining of shortness of breath for the last couple of weeks with minimal exertion along with tiredness and fatigue which is getting worse.  He finally agreed to come to [facility] for evaluation after insistence by the family, work-up was done which showed elevated BNP more than 9000 as well as fluid overload changes on chest x-ray consistent with new onset CHF.  He also had A. fib with RVR which was improved on starting IV Cardizem drip.  First set of cardiac enzymes was done which is normal.  He is being admitted for medical management, cardiopulmonary monitoring, cardiology evaluation and further work-up in the morning.  6/21/2020  Patient feels better after aggressive management since admission. He underwent  echocardiogram which showed ejection fraction of 27%. Cardiology had a detailed discussion with the family and they are planning to take him for DC cardioversion for new onset A. fib as well as cardiac catheterization in the morning. Patient has been started on anticoagulation as per cardiology. 6/22/2020  Patient has had around thousand liters of net fluid output since admission on diuresis. He is planning to undergo CASTILLO and DC cardioversion later on today. 6/23/2020  Patient underwent successful CASTILLO and DC cardioversion yesterday. Patient is still in CCU. His hypotension has been controlled. Levophed and dopamine drips discontinued.   Post cardiac cath sheath is in place which was discontinued in the afternoon and pressure applied for 45 minutes. Patient had a huge hematoma afterwards and additional pressure applied for 45 more minutes which helped resolution of hematoma. No bleeding was noted. [end copied portion]  6-24: Patient examined in CCU this morning. No further bleeding from groin, patient is feeling well and eager to work towards discharge to home. Awaiting cardiology input but likely stable for downgrade to PCU.  6-25: Patient still requiring norepinephrine drip today, not able to transfer to PCU yet. Cannot tolerate fluid support, will defer management to cardiology. 6-26: Hypotension improved off pressors, patient working with physical therapy. Noted that patient cannot afford NOAC, will switch to warfarin with enoxaparin bridge. ROS: 14 point review of systems is negative except as specifically addressed above. DIET CARDIAC;     Intake/Output Summary (Last 24 hours) at 6/27/2020 1254  Last data filed at 6/27/2020 0818  Gross per 24 hour   Intake 610 ml   Output 1550 ml   Net -940 ml     Medications:   norepinephrine Stopped (06/26/20 0600)    amiodarone 450mg/250ml D5W infusion Stopped (06/25/20 1044)     Current Facility-Administered Medications   Medication Dose Route Frequency Provider Last Rate Last Dose    apixaban (ELIQUIS) tablet 5 mg  5 mg Oral BID Bianca Garcia MD   5 mg at 06/27/20 0825    norepinephrine (LEVOPHED) 16 mg in sodium chloride 0.9 % 250 mL infusion  2 mcg/min Intravenous Continuous Bianca Garcia MD   Stopped at 06/26/20 0600    sodium chloride flush 0.9 % injection 10 mL  10 mL Intravenous 2 times per day Zoe Sails, DO   10 mL at 06/27/20 0755    sodium chloride flush 0.9 % injection 10 mL  10 mL Intravenous PRN Zoe Sails, DO        amiodarone (CORDARONE) 450 mg in dextrose 5 % 250 mL infusion  0.5 mg/min Intravenous Continuous Bianca Garcia MD   Stopped at 06/25/20 1044    sacubitril-valsartan (ENTRESTO) 24-26 MG per tablet 1 tablet  1 tablet Oral BID Darshan Mercedes MD   1 tablet at 06/27/20 0701    carvedilol (COREG) tablet 3.125 mg  3.125 mg Oral BID  Darshan Mercedes MD   3.125 mg at 06/27/20 8699    spironolactone (ALDACTONE) tablet 25 mg  25 mg Oral Daily Darshan Mercedes MD   25 mg at 06/27/20 0825    digoxin (LANOXIN) tablet 250 mcg  250 mcg Oral Daily Darshan Merecdes MD   250 mcg at 06/25/20 1002    cefTRIAXone (ROCEPHIN) 1 g in sodium chloride (PF) 10 mL IV syringe  1 g Intravenous Q24H Carlos Cummins MD   1 g at 06/27/20 0825    atorvastatin (LIPITOR) tablet 40 mg  40 mg Oral Daily Carlos Cummins MD   40 mg at 06/27/20 9199    aspirin chewable tablet 81 mg  81 mg Oral Daily Carlos Cummins MD   81 mg at 06/27/20 0824    potassium chloride (KLOR-CON M) extended release tablet 20 mEq  20 mEq Oral BID Carlos Cummins MD   20 mEq at 06/25/20 2019    acetaminophen (TYLENOL) tablet 650 mg  650 mg Oral Q6H PRN Carlos Cummins MD   650 mg at 06/23/20 0434    Or    acetaminophen (TYLENOL) suppository 650 mg  650 mg Rectal Q6H PRN Carlos Cummins MD        polyethylene glycol (GLYCOLAX) packet 17 g  17 g Oral Daily PRN Carlos Cummins MD        promethazine (PHENERGAN) tablet 12.5 mg  12.5 mg Oral Q6H PRN Carlos Cummins MD        Or    ondansetron (ZOFRAN) injection 4 mg  4 mg Intravenous Q6H PRN Carlos Cummins MD        furosemide (LASIX) injection 20 mg  20 mg Intravenous Daily Carlos Cummins MD   20 mg at 06/27/20 0824        Labs:     Recent Labs     06/25/20  0324 06/26/20  0215 06/27/20  0300   WBC 8.7 8.0 5.9   RBC 3.13* 2.80* 2.72*   HGB 9.4* 8.5* 8.0*   HCT 29.3* 26.2* 25.7*   MCV 93.6 93.6 94.5*   MCH 30.0 30.4 29.4   MCHC 32.1* 32.4* 31.1*    196 180     Recent Labs     06/25/20  0324 06/26/20  0215 06/27/20  0300   * 134* 133*   K 4.9 5.0 4.6   ANIONGAP 9 11 10   CL 98 100 98   CO2 23 23 25   BUN 25* 23 22   CREATININE 1.3* 1.2 1.0   GLUCOSE 98 100 78   CALCIUM 8.2* 8.2* 8.4*     No results for input(s): MG, PHOS in the last 72 hours. No results for input(s): AST, ALT, ALB, BILITOT, ALKPHOS, ALB in the last 72 hours. ABGs:No results for input(s): PH, PO2, PCO2, HCO3, BE, O2SAT in the last 72 hours. Troponin T:   No results for input(s): TROPONINI in the last 72 hours. INR:   Recent Labs     06/26/20  1310 06/27/20  0300   INR 1.62* 1.59*     Lactic Acid: No results for input(s): LACTA in the last 72 hours. Objective:   Vitals: BP (!) 96/50   Pulse 62   Temp 97.5 °F (36.4 °C) (Temporal)   Resp 20   Ht 6' (1.829 m)   Wt 191 lb 4.8 oz (86.8 kg)   SpO2 99%   BMI 25.94 kg/m²   24HR INTAKE/OUTPUT:      Intake/Output Summary (Last 24 hours) at 6/27/2020 1254  Last data filed at 6/27/2020 0818  Gross per 24 hour   Intake 610 ml   Output 1550 ml   Net -940 ml     General appearance: alert and cooperative with exam  HEENT: atraumatic, eyes with clear conjunctiva and normal lids, pupils and irises normal, external ears and nose are normal, lips normal  Neck without masses, lympadenopathy, bruit, thyroid normal  Lungs: no increased work of breathing, diminished breath sounds bibasilar  Heart: regular rate and rhythm  Abdomen: soft, non-tender; bowel sounds normal; no masses,  no organomegaly  Extremities: edema trace bilaterally, modified Eli's negative  Neurologic: no focal neurologic deficits, normal sensation, alert and oriented, affect and mood appropriate  Skin: no rashes, nodules    Assessment and Plan:   Principal Problem:    New onset of congestive heart failure (HCC)  Active Problems:    CAD (coronary artery disease)    Essential hypertension    Hyponatremia    Atrial fibrillation with RVR (HCC)    Dyslipidemia    Acute systolic heart failure (HCC)    Hypotension    Hyperkalemia    Acute kidney injury ([address]    Hypocalcemia    Pneumonia due to infectious organism    Cardiogenic shock ([address]  Resolved Problems:    * No resolved hospital problems.  *    POD #5 cardiac catheterization, DC cardioversion, and CASTILLO.    Day #7 ceftriaxone empiric therapy for pneumonia. Responding to treatment clinically, will leave on this regimen rather than add doxycycline. Off norepinephrine. Continue to work with PT.     Advance Directive: Full Code    DVT prophylaxis: warfarin with enoxaparin bridge    Discharge planning: TBEMMANUELLE Roth, DO  Rounding Hospitalist

## 2020-06-27 NOTE — PROGRESS NOTES
sinus rhythm on the monitor. Blood pressure little soft 96/50 heart rate 62. No other complaints or issues reported. Objective:   BP (!) 96/50   Pulse 62   Temp 97.5 °F (36.4 °C) (Temporal)   Resp 20   Ht 6' (1.829 m)   Wt 191 lb 4.8 oz (86.8 kg)   SpO2 99%   BMI 25.94 kg/m²       Intake/Output Summary (Last 24 hours) at 6/27/2020 1237  Last data filed at 6/27/2020 0818  Gross per 24 hour   Intake 610 ml   Output 1550 ml   Net -940 ml       Prior to Admission medications    Medication Sig Start Date End Date Taking? Authorizing Provider   potassium chloride (KLOR-CON) 20 MEQ packet Take 20 mEq by mouth daily    Yes Historical Provider, MD   amLODIPine (NORVASC) 5 MG tablet Take 5 mg by mouth daily   Yes Historical Provider, MD   aspirin 81 MG chewable tablet Take 81 mg by mouth daily   Yes Historical Provider, MD   atenolol (TENORMIN) 100 MG tablet Take 50 mg by mouth daily. 1/13/15  Yes Historical Provider, MD   prasugrel (EFFIENT) 10 MG TABS Take 10 mg by mouth daily. 12/30/14  Yes DELANEY Marquez MD   atorvastatin (LIPITOR) 40 MG tablet Take 40 mg by mouth daily. 12/30/14  Yes DELANEY Marquez MD        apixaban  5 mg Oral BID    sodium chloride flush  10 mL Intravenous 2 times per day    sacubitril-valsartan  1 tablet Oral BID    carvedilol  3.125 mg Oral BID WC    spironolactone  25 mg Oral Daily    digoxin  250 mcg Oral Daily    cefTRIAXone (ROCEPHIN) IV  1 g Intravenous Q24H    atorvastatin  40 mg Oral Daily    aspirin  81 mg Oral Daily    potassium chloride  20 mEq Oral BID    furosemide  20 mg Intravenous Daily       TELEMETRY: Sinus     Physical Exam:      Physical Exam      General:  Awake, alert, NAD  Skin:  Warm and dry  Neck:  no jvd , no carotid bruits  Chest:  Clear to auscultation, no wheezing or rales  Cardiovascular:  RRR E3S8 no murmurs, clicks, gallups, or rubs  Abdomen:  Soft nontender, nondistended, bowel sounds present  Extremities:  Edema: none       Lab Data:  CBC:   Recent Labs     06/25/20  0324 06/26/20  0215 06/27/20  0300   WBC 8.7 8.0 5.9   HGB 9.4* 8.5* 8.0*   HCT 29.3* 26.2* 25.7*   MCV 93.6 93.6 94.5*    196 180     BMP:   Recent Labs     06/25/20  0324 06/26/20  0215 06/27/20  0300   * 134* 133*   K 4.9 5.0 4.6   CL 98 100 98   CO2 23 23 25   BUN 25* 23 22   CREATININE 1.3* 1.2 1.0     LIVER PROFILE: No results for input(s): AST, ALT, LIPASE, BILIDIR, BILITOT, ALKPHOS in the last 72 hours. Invalid input(s): AMYLASE,  ALB  PT/INR:   Recent Labs     06/26/20  1310 06/27/20  0300   PROTIME 19.4* 19.1*   INR 1.62* 1.59*     APTT: No results for input(s): APTT in the last 72 hours. BNP:  No results for input(s): BNP in the last 72 hours. CK, CKMB, Troponin: @LABRCNT (CKTOTAL:3, CKMB:3, TROPONINI:3)@    IMAGING:  Xr Chest Portable    Result Date: 6/20/2020  XR CHEST PORTABLE 6/20/2020 1:15 PM HISTORY: Shortness of breath COMPARISON: Chest x-ray dated 5/6/2018. FINDINGS: Dense right basilar consolidation with partially obscured right hemidiaphragm. Blunting of the right costophrenic angle. Lung fields are otherwise clear. No pneumothorax. Heart size is stable. The pulmonary vasculature are nondilated. Old healed right clavicle fracture. No acute bony abnormality. 1. There is a new dense right basilar consolidation, which partially obscures the right hemidiaphragm. This is concerning for a lower lobe pneumonia. Signed by Dr Ramon Chang on 6/20/2020 2:26 PM    Cta Pulmonary W Contrast    Result Date: 6/20/2020  CTA PULMONARY W CONTRAST 6/20/2020 1:45 PM HISTORY: Abnormal chest radiograph, shortness of breath, A. fib COMPARISON: Chest x-ray dated 6/20/2020. DLP: 719 mGy cm TECHNIQUE: Helical tomographic images of the chest were obtained after the administration of intravenous contrast following angiogram protocol. Additionally, 3D MIP reconstructions in the coronal and sagittal planes were provided.  Automated exposure control was also utilized to decrease patient radiation dose. FINDINGS:  Pulmonary arteries: There is adequate enhancement of the pulmonary arteries to evaluate for central and segmental pulmonary emboli. There are no filling defects within the main, lobar, segmental or visualized subsegmental pulmonary arteries. Main pulmonary artery is dilated up to 3.6 cm. Aorta and great vessels: The aorta is not well opacified with contrast due to the phase of the exam. Great vessel origins are normal in caliber. Neck base: The imaged portion of the base of the neck appears unremarkable. Lungs: There is a small dense right middle lobe consolidation. There is an additional mixed groundglass and consolidative infiltrate in the left upper lobe lingula (series 6-image 32). Atelectasis identified in the right lower lobe. There are bilateral layering pleural effusions, moderate on the right and small on the left. Heart: Mild cardiomegaly. There is no pericardial effusion. Advanced coronary artery atheromatous calcification. Contrast reflux into the IVC. Lymph nodes: No pathologically enlarged mediastinal, hilar, or axillary lymph nodes are present. Bones and soft tissues: No acute bony abnormality. Upper abdomen: The imaged portion of the upper abdomen demonstrates no acute process. 1. No evidence of pulmonary embolus. 2. Evidence for pulmonary hypertension. Interstitial edema with bilateral layering pleural effusions. 3. There are small consolidations in the right middle lobe and left upper lobe lingula. This likely reflects small areas of antonio edema. Signed by Dr Yamila Brown on 6/20/2020 3:20 PM        Assessment:  1. Complaints of dyspnea  2. Coronary artery disease history of stent 2014  3. Echocardiogram 6/20/2020 ejection fraction 27% mild concentric LVH mild to moderate MR trivial AR mild TR  4.  CTA pulmonary 6/20/2020 no evidence of pulmonary embolism evidence of pulmonary hypertension interstitial edema bilateral layering pleural effusions small consolidations right middle lobe left upper lobe lingula likely represent small areas of antonio edema  5. Elevated proBNP level 9061 on 6/20/2020 consistent with acutely decompensated congestive heart failure now decreased 890 as of 6/26/2020 indicating good response  6. Chronic anticoagulation with Eliquis 5 mg p.o. twice daily  7. Hyperlipidemia    Plan:  1.  Continue current treatment cardiac status stable    Rufina Torres MD 6/27/2020 12:37 PM

## 2020-06-27 NOTE — PROGRESS NOTES
Offered pt a bath this morning, but he refused. He is hoping he can transfer to [facility] today and then take a shower in his room.  Electronically signed by Reece Schlatter, RN on 6/27/2020 at 4:16 AM

## 2020-06-28 LAB
ANION GAP SERPL CALCULATED.3IONS-SCNC: 11 MMOL/L (ref 7–19)
BUN BLDV-MCNC: 21 MG/DL (ref 8–23)
CALCIUM SERPL-MCNC: 8.6 MG/DL (ref 8.8–10.2)
CHLORIDE BLD-SCNC: 96 MMOL/L (ref 98–111)
CO2: 25 MMOL/L (ref 22–29)
CREAT SERPL-MCNC: 1.1 MG/DL (ref 0.5–1.2)
GFR NON-AFRICAN AMERICAN: >60
GLUCOSE BLD-MCNC: 81 MG/DL (ref 74–109)
HCT VFR BLD CALC: 25.6 % (ref 42–52)
HEMOGLOBIN: 8.4 G/DL (ref 14–18)
MCH RBC QN AUTO: 30.8 PG (ref 27–31)
MCHC RBC AUTO-ENTMCNC: 32.8 G/DL (ref 33–37)
MCV RBC AUTO: 93.8 FL (ref 80–94)
PDW BLD-RTO: 14.3 % (ref 11.5–14.5)
PLATELET # BLD: 203 K/UL (ref 130–400)
PMV BLD AUTO: 9.6 FL (ref 9.4–12.4)
POTASSIUM SERPL-SCNC: 4.5 MMOL/L (ref 3.5–5)
PRO-BNP: 1204 PG/ML (ref 0–900)
RBC # BLD: 2.73 M/UL (ref 4.7–6.1)
SODIUM BLD-SCNC: 132 MMOL/L (ref 136–145)
WBC # BLD: 7.3 K/UL (ref 4.8–10.8)

## 2020-06-28 PROCEDURE — 2580000003 HC RX 258: Performed by: INTERNAL MEDICINE

## 2020-06-28 PROCEDURE — 6360000002 HC RX W HCPCS: Performed by: HOSPITALIST

## 2020-06-28 PROCEDURE — 97116 GAIT TRAINING THERAPY: CPT

## 2020-06-28 PROCEDURE — 36415 COLL VENOUS BLD VENIPUNCTURE: CPT

## 2020-06-28 PROCEDURE — 2700000000 HC OXYGEN THERAPY PER DAY

## 2020-06-28 PROCEDURE — 6370000000 HC RX 637 (ALT 250 FOR IP): Performed by: INTERNAL MEDICINE

## 2020-06-28 PROCEDURE — 80048 BASIC METABOLIC PNL TOTAL CA: CPT

## 2020-06-28 PROCEDURE — 2580000003 HC RX 258: Performed by: HOSPITALIST

## 2020-06-28 PROCEDURE — 6370000000 HC RX 637 (ALT 250 FOR IP): Performed by: HOSPITALIST

## 2020-06-28 PROCEDURE — 99232 SBSQ HOSP IP/OBS MODERATE 35: CPT | Performed by: INTERNAL MEDICINE

## 2020-06-28 PROCEDURE — 2000000000 HC ICU R&B

## 2020-06-28 PROCEDURE — 85027 COMPLETE CBC AUTOMATED: CPT

## 2020-06-28 PROCEDURE — 83880 ASSAY OF NATRIURETIC PEPTIDE: CPT

## 2020-06-28 RX ORDER — FENTANYL CITRATE 50 UG/ML
25 INJECTION, SOLUTION INTRAMUSCULAR; INTRAVENOUS
Status: DISCONTINUED | OUTPATIENT
Start: 2020-06-28 | End: 2020-06-29

## 2020-06-28 RX ADMIN — FUROSEMIDE 20 MG: 10 INJECTION, SOLUTION INTRAMUSCULAR; INTRAVENOUS at 09:12

## 2020-06-28 RX ADMIN — FENTANYL CITRATE 25 MCG: 50 INJECTION, SOLUTION INTRAMUSCULAR; INTRAVENOUS at 21:15

## 2020-06-28 RX ADMIN — APIXABAN 5 MG: 5 TABLET, FILM COATED ORAL at 08:20

## 2020-06-28 RX ADMIN — Medication 10 ML: at 20:32

## 2020-06-28 RX ADMIN — ASPIRIN 81 MG 81 MG: 81 TABLET ORAL at 08:20

## 2020-06-28 RX ADMIN — SPIRONOLACTONE 25 MG: 25 TABLET ORAL at 08:20

## 2020-06-28 RX ADMIN — FENTANYL CITRATE 25 MCG: 50 INJECTION, SOLUTION INTRAMUSCULAR; INTRAVENOUS at 22:17

## 2020-06-28 RX ADMIN — POTASSIUM CHLORIDE 20 MEQ: 20 TABLET, EXTENDED RELEASE ORAL at 09:12

## 2020-06-28 RX ADMIN — CARVEDILOL 3.12 MG: 3.12 TABLET, FILM COATED ORAL at 08:20

## 2020-06-28 RX ADMIN — Medication 10 ML: at 09:13

## 2020-06-28 RX ADMIN — POTASSIUM CHLORIDE 20 MEQ: 20 TABLET, EXTENDED RELEASE ORAL at 20:29

## 2020-06-28 RX ADMIN — APIXABAN 5 MG: 5 TABLET, FILM COATED ORAL at 20:29

## 2020-06-28 RX ADMIN — CARVEDILOL 3.12 MG: 3.12 TABLET, FILM COATED ORAL at 20:33

## 2020-06-28 RX ADMIN — SODIUM CHLORIDE, PRESERVATIVE FREE 1 G: 5 INJECTION INTRAVENOUS at 09:12

## 2020-06-28 RX ADMIN — ATORVASTATIN CALCIUM 40 MG: 40 TABLET, FILM COATED ORAL at 09:11

## 2020-06-28 RX ADMIN — SACUBITRIL AND VALSARTAN 1 TABLET: 24; 26 TABLET, FILM COATED ORAL at 12:04

## 2020-06-28 ASSESSMENT — PAIN DESCRIPTION - PROGRESSION
CLINICAL_PROGRESSION: GRADUALLY IMPROVING
CLINICAL_PROGRESSION: GRADUALLY WORSENING

## 2020-06-28 ASSESSMENT — PAIN SCALES - GENERAL
PAINLEVEL_OUTOF10: 0
PAINLEVEL_OUTOF10: 4
PAINLEVEL_OUTOF10: 0
PAINLEVEL_OUTOF10: 0
PAINLEVEL_OUTOF10: 5
PAINLEVEL_OUTOF10: 0
PAINLEVEL_OUTOF10: 0

## 2020-06-28 ASSESSMENT — PAIN DESCRIPTION - ONSET
ONSET: PROGRESSIVE
ONSET: UNABLE TO TELL

## 2020-06-28 ASSESSMENT — PAIN DESCRIPTION - LOCATION
LOCATION: BACK
LOCATION: BACK

## 2020-06-28 ASSESSMENT — PAIN DESCRIPTION - DESCRIPTORS
DESCRIPTORS: ACHING
DESCRIPTORS: ACHING;DULL;DISCOMFORT

## 2020-06-28 ASSESSMENT — PAIN DESCRIPTION - PAIN TYPE
TYPE: CHRONIC PAIN
TYPE: CHRONIC PAIN

## 2020-06-28 ASSESSMENT — PAIN DESCRIPTION - FREQUENCY
FREQUENCY: INTERMITTENT
FREQUENCY: INTERMITTENT

## 2020-06-28 ASSESSMENT — PAIN DESCRIPTION - ORIENTATION
ORIENTATION: LOWER
ORIENTATION: LOWER

## 2020-06-28 ASSESSMENT — PAIN - FUNCTIONAL ASSESSMENT
PAIN_FUNCTIONAL_ASSESSMENT: PREVENTS OR INTERFERES SOME ACTIVE ACTIVITIES AND ADLS
PAIN_FUNCTIONAL_ASSESSMENT: PREVENTS OR INTERFERES SOME ACTIVE ACTIVITIES AND ADLS

## 2020-06-28 NOTE — PROGRESS NOTES
Cardiology Daily Note Indio Carlson MD      Patient:  Zuri López  [id number]    Patient Active Problem List    Diagnosis Date Noted    Cardiogenic shock ([address] 06/25/2020     Priority: Low    Hyperkalemia 06/24/2020     Priority: Low    Acute kidney injury ([address] 06/24/2020     Priority: Low    Hypocalcemia 06/24/2020     Priority: Low    Pneumonia due to infectious organism 06/24/2020     Priority: Low    Acute systolic heart failure ([address] 06/23/2020     Priority: Low    Hypotension 06/23/2020     Priority: Low    New onset of congestive heart failure ([address] 06/20/2020     Priority: Low    Atrial fibrillation with RVR ([address] 06/20/2020     Priority: Low    Dyslipidemia 06/20/2020     Priority: Low    Thrombocytopenia (HCC)      Priority: Low    Hyponatremia      Priority: Low    Alcohol abuse 05/07/2018     Priority: Low    Transaminitis 05/07/2018     Priority: Low    Chest pain 05/06/2018     Priority: Low    CAD (coronary artery disease)      Priority: Low    Essential hypertension      Priority: Low       Admit Date:  6/20/2020    Admission Problem List: Present on Admission:   New onset of congestive heart failure ([address]   CAD (coronary artery disease)   Atrial fibrillation with RVR ([address]   Essential hypertension   Dyslipidemia   Acute systolic heart failure (HCC)   Hypotension   Hyponatremia   Hyperkalemia   Acute kidney injury ([address]   Hypocalcemia   Pneumonia due to infectious organism   Cardiogenic shock [facility])      Cardiac Specific Data:  Specialty Problems        Cardiology Problems    CAD (coronary artery disease)        Essential hypertension        Chest pain        Atrial fibrillation with RVR (HCC)        * (Principal) New onset of congestive heart failure (HCC)        Acute systolic heart failure (HCC)        Hypotension        Cardiogenic shock (HCC)              Subjective:  Mr. Dina Gama seen today resting comfortably.   Reportedly had some mild bradycardia last evening Soft nontender, nondistended, bowel sounds present  Extremities:  Edema: none     Lab Data:  CBC:   Recent Labs     06/26/20  0215 06/27/20  0300 06/28/20  0400   WBC 8.0 5.9 7.3   HGB 8.5* 8.0* 8.4*   HCT 26.2* 25.7* 25.6*   MCV 93.6 94.5* 93.8    180 203     BMP:   Recent Labs     06/26/20  0215 06/27/20  0300 06/28/20  0400   * 133* 132*   K 5.0 4.6 4.5    98 96*   CO2 23 25 25   BUN 23 22 21   CREATININE 1.2 1.0 1.1     LIVER PROFILE: No results for input(s): AST, ALT, LIPASE, BILIDIR, BILITOT, ALKPHOS in the last 72 hours. Invalid input(s): AMYLASE,  ALB  PT/INR:   Recent Labs     06/26/20  1310 06/27/20  0300   PROTIME 19.4* 19.1*   INR 1.62* 1.59*     APTT: No results for input(s): APTT in the last 72 hours. BNP:  No results for input(s): BNP in the last 72 hours. CK, CKMB, Troponin: @LABRCNT (CKTOTAL:3, CKMB:3, TROPONINI:3)@    IMAGING:  Xr Chest Portable    Result Date: 6/20/2020  XR CHEST PORTABLE 6/20/2020 1:15 PM HISTORY: Shortness of breath COMPARISON: Chest x-ray dated 5/6/2018. FINDINGS: Dense right basilar consolidation with partially obscured right hemidiaphragm. Blunting of the right costophrenic angle. Lung fields are otherwise clear. No pneumothorax. Heart size is stable. The pulmonary vasculature are nondilated. Old healed right clavicle fracture. No acute bony abnormality. 1. There is a new dense right basilar consolidation, which partially obscures the right hemidiaphragm. This is concerning for a lower lobe pneumonia. Signed by Dr Amelia Arevalo on 6/20/2020 2:26 PM    Cta Pulmonary W Contrast    Result Date: 6/20/2020  CTA PULMONARY W CONTRAST 6/20/2020 1:45 PM HISTORY: Abnormal chest radiograph, shortness of breath, A. fib COMPARISON: Chest x-ray dated 6/20/2020. DLP: 719 mGy cm TECHNIQUE: Helical tomographic images of the chest were obtained after the administration of intravenous contrast following angiogram protocol.  Additionally, 3D MIP reconstructions in the coronal and sagittal planes were provided. Automated exposure control was also utilized to decrease patient radiation dose. FINDINGS:  Pulmonary arteries: There is adequate enhancement of the pulmonary arteries to evaluate for central and segmental pulmonary emboli. There are no filling defects within the main, lobar, segmental or visualized subsegmental pulmonary arteries. Main pulmonary artery is dilated up to 3.6 cm. Aorta and great vessels: The aorta is not well opacified with contrast due to the phase of the exam. Great vessel origins are normal in caliber. Neck base: The imaged portion of the base of the neck appears unremarkable. Lungs: There is a small dense right middle lobe consolidation. There is an additional mixed groundglass and consolidative infiltrate in the left upper lobe lingula (series 6-image 32). Atelectasis identified in the right lower lobe. There are bilateral layering pleural effusions, moderate on the right and small on the left. Heart: Mild cardiomegaly. There is no pericardial effusion. Advanced coronary artery atheromatous calcification. Contrast reflux into the IVC. Lymph nodes: No pathologically enlarged mediastinal, hilar, or axillary lymph nodes are present. Bones and soft tissues: No acute bony abnormality. Upper abdomen: The imaged portion of the upper abdomen demonstrates no acute process. 1. No evidence of pulmonary embolus. 2. Evidence for pulmonary hypertension. Interstitial edema with bilateral layering pleural effusions. 3. There are small consolidations in the right middle lobe and left upper lobe lingula. This likely reflects small areas of antonio edema. Signed by Dr Bansal Comes on 6/20/2020 3:20 PM        Assessment:  1. Complaints of dyspnea  2. Coronary artery disease history of stent 2014  3. Echocardiogram 6/20/2020 ejection fraction 27% mild concentric LVH mild to moderate MR trivial AR mild TR  4.  CTA pulmonary 6/20/2020 no evidence of pulmonary embolism evidence of pulmonary hypertension interstitial edema bilateral layering pleural effusions small consolidations right middle lobe left upper lobe lingula likely represent small areas of antonio edema  5. Elevated proBNP level 9061 on 6/20/2020 consistent with acutely decompensated congestive heart failure now decreased 890 as of 6/26/2020 indicating good response  6. Chronic anticoagulation with Eliquis 5 mg p.o. twice daily  7. Hyperlipidemia       Plan:  1. Continue current treatment including Coreg for now monitor blood pressure and heart rate response  2.  If tolerating well this morning could be transferred to the floor later    Socrates Olmedo MD 6/28/2020 10:32 AM

## 2020-06-28 NOTE — PROGRESS NOTES
Joselito Bynum transferred to 145 from [phone] via bed. At 17:45. Reason for transfer: lateral transfer from CCU to ICU, units were combined. Explained reason for transfer to Patient. Belongings: Glasses, phone, clothing, bag of belonging with patient at bedside . Soft chart transferred with patient: Yes. Telemetry box number N/A transferred with patient: N/A. Transferred on portable monitor  Report given to: PRAVIN Brady, at bedside by Amalia Ibarra, [address] [place] [address].       Electronically signed by Hannah Macias RN on 6/28/2020 at 5:57 PM

## 2020-06-28 NOTE — PROGRESS NOTES
dopamine drips discontinued. Post cardiac cath sheath is in place which was discontinued in the afternoon and pressure applied for 45 minutes. Patient had a huge hematoma afterwards and additional pressure applied for 45 more minutes which helped resolution of hematoma. No bleeding was noted. [end copied portion]  6-24: Patient examined in CCU this morning. No further bleeding from groin, patient is feeling well and eager to work towards discharge to home. Awaiting cardiology input but likely stable for downgrade to PCU.  6-25: Patient still requiring norepinephrine drip today, not able to transfer to PCU yet. Cannot tolerate fluid support, will defer management to cardiology. 6-26: Hypotension improved off pressors, patient working with physical therapy. Noted that patient cannot afford NOAC, will switch to warfarin with enoxaparin bridge. ROS: 14 point review of systems is negative except as specifically addressed above. DIET CARDIAC;     Intake/Output Summary (Last 24 hours) at 6/28/2020 1439  Last data filed at 6/28/2020 1355  Gross per 24 hour   Intake 360 ml   Output 1950 ml   Net -1590 ml     Medications:   norepinephrine Stopped (06/26/20 0600)    amiodarone 450mg/250ml D5W infusion Stopped (06/25/20 1044)     Current Facility-Administered Medications   Medication Dose Route Frequency Provider Last Rate Last Dose    apixaban (ELIQUIS) tablet 5 mg  5 mg Oral BID Ethel Carlton MD   5 mg at 06/28/20 0820    norepinephrine (LEVOPHED) 16 mg in sodium chloride 0.9 % 250 mL infusion  2 mcg/min Intravenous Continuous Ethel Carlton MD   Stopped at 06/26/20 0600    sodium chloride flush 0.9 % injection 10 mL  10 mL Intravenous 2 times per day Vanita Mckenzie DO   10 mL at 06/28/20 0913    sodium chloride flush 0.9 % injection 10 mL  10 mL Intravenous PRN Vanita Mckenzie DO        amiodarone (CORDARONE) 450 mg in dextrose 5 % 250 mL infusion  0.5 mg/min Intravenous Continuous Carlene Ferrer Holden Ashton MD   Stopped at 06/25/20 1044    sacubitril-valsartan (ENTRESTO) 24-26 MG per tablet 1 tablet  1 tablet Oral BID Arzella Bosworth, MD   1 tablet at 06/28/20 1204    carvedilol (COREG) tablet 3.125 mg  3.125 mg Oral BID WC Arzella Bosworth, MD   3.125 mg at 06/28/20 0820    spironolactone (ALDACTONE) tablet 25 mg  25 mg Oral Daily Arzella Bosworth, MD   25 mg at 06/28/20 0820    digoxin (LANOXIN) tablet 250 mcg  250 mcg Oral Daily Arzella Bosworth, MD   250 mcg at 06/25/20 1002    atorvastatin (LIPITOR) tablet 40 mg  40 mg Oral Daily Carlos Cummins MD   40 mg at 06/28/20 0911    aspirin chewable tablet 81 mg  81 mg Oral Daily Carlos Cummins MD   81 mg at 06/28/20 0820    potassium chloride (KLOR-CON M) extended release tablet 20 mEq  20 mEq Oral BID Carlos Cummins MD   20 mEq at 06/28/20 0912    acetaminophen (TYLENOL) tablet 650 mg  650 mg Oral Q6H PRN Carlos Cummins MD   650 mg at 06/23/20 0434    Or    acetaminophen (TYLENOL) suppository 650 mg  650 mg Rectal Q6H PRN Carlos Cummins MD        polyethylene glycol (GLYCOLAX) packet 17 g  17 g Oral Daily PRN Carlos Cummins MD        promethazine (PHENERGAN) tablet 12.5 mg  12.5 mg Oral Q6H PRN Carlos Cummins MD        Or    ondansetron (ZOFRAN) injection 4 mg  4 mg Intravenous Q6H PRN Carlos Cummins MD        furosemide (LASIX) injection 20 mg  20 mg Intravenous Daily Carlos Cummins MD   20 mg at 06/28/20 0912        Labs:     Recent Labs     06/26/20  0215 06/27/20  0300 06/28/20  0400   WBC 8.0 5.9 7.3   RBC 2.80* 2.72* 2.73*   HGB 8.5* 8.0* 8.4*   HCT 26.2* 25.7* 25.6*   MCV 93.6 94.5* 93.8   MCH 30.4 29.4 30.8   MCHC 32.4* 31.1* 32.8*    180 203     Recent Labs     06/26/20  0215 06/27/20  0300 06/28/20  0400   * 133* 132*   K 5.0 4.6 4.5   ANIONGAP 11 10 11    98 96*   CO2 23 25 25   BUN 23 22 21   CREATININE 1.2 1.0 1.1   GLUCOSE 100 78 81   CALCIUM 8.2* 8.4* 8.6*     No results for input(s): MG, PHOS in the last 72 hours.   No results for input(s): AST, ALT, ALB, BILITOT, ALKPHOS, ALB in the last 72 hours. ABGs:No results for input(s): PH, PO2, PCO2, HCO3, BE, O2SAT in the last 72 hours. Troponin T:   No results for input(s): TROPONINI in the last 72 hours. INR:   Recent Labs     06/26/20  1310 06/27/20  0300   INR 1.62* 1.59*     Lactic Acid: No results for input(s): LACTA in the last 72 hours. Objective:   Vitals: BP (!) 97/52   Pulse 66   Temp 97 °F (36.1 °C) (Temporal)   Resp 20   Ht 6' (1.829 m)   Wt 191 lb 4.8 oz (86.8 kg)   SpO2 93%   BMI 25.94 kg/m²   24HR INTAKE/OUTPUT:      Intake/Output Summary (Last 24 hours) at 6/28/2020 1439  Last data filed at 6/28/2020 1355  Gross per 24 hour   Intake 360 ml   Output 1950 ml   Net -1590 ml     General appearance: alert and cooperative with exam  HEENT: atraumatic, eyes with clear conjunctiva and normal lids, pupils and irises normal, external ears and nose are normal, lips normal  Neck without masses, lympadenopathy, bruit, thyroid normal  Lungs: no increased work of breathing, diminished breath sounds bibasilar  Heart: regular rate and rhythm  Abdomen: soft, non-tender; bowel sounds normal; no masses,  no organomegaly  Extremities: edema trace bilaterally, modified Eli's negative  Neurologic: no focal neurologic deficits, normal sensation, alert and oriented, affect and mood appropriate  Skin: no rashes, nodules    Assessment and Plan:   Principal Problem:    New onset of congestive heart failure (HCC)  Active Problems:    CAD (coronary artery disease)    Essential hypertension    Hyponatremia    Atrial fibrillation with RVR (HCC)    Dyslipidemia    Acute systolic heart failure (HCC)    Hypotension    Hyperkalemia    Acute kidney injury ([address]    Hypocalcemia    Pneumonia due to infectious organism    Cardiogenic shock ([address]  Resolved Problems:    * No resolved hospital problems. *    POD #6 cardiac catheterization, DC cardioversion, and CASTILLO.     Discontinue ceftriaxone due to completion of

## 2020-06-28 NOTE — PROGRESS NOTES
Physical Therapy  Mariel Lexi  [id number]     06/28/20 3289   Subjective   Subjective Agrees to work with therapy. Bed Mobility   Supine to Sit Stand by assistance   Transfers   Sit to Stand Stand by assistance   Stand to sit Stand by assistance   Ambulation   Ambulation? Yes   Ambulation 1   Surface level tile   Device Rolling Walker   Other Apparatus O2  (monitor lines)   Assistance Contact guard assistance   Distance 5'   Other Activities   Comment Patient did well with therapy. Patient is hoping to move to the floor today and able to increase gait distance. Patient agreed to sit up in recliner, positioned for comfort with all needs in reach. Short term goals   Time Frame for Short term goals 2 WKS   Short term goal 1 SUP<>SIT, IND   Short term goal 2 SIT<>STAND, IND   Short term goal 3  FT WITH AD AS INDICATED, SBA   Activity Tolerance   Activity Tolerance Patient Tolerated treatment well   Safety Devices   Type of devices Call light within reach; Left in chair   Electronically signed by Marvin Mills PTA on 6/28/2020 at 1:48 PM

## 2020-06-29 VITALS
WEIGHT: 189.2 LBS | HEART RATE: 70 BPM | RESPIRATION RATE: 17 BRPM | OXYGEN SATURATION: 96 % | DIASTOLIC BLOOD PRESSURE: 65 MMHG | SYSTOLIC BLOOD PRESSURE: 110 MMHG | BODY MASS INDEX: 25.63 KG/M2 | HEIGHT: 72 IN | TEMPERATURE: 98.7 F

## 2020-06-29 PROBLEM — Z51.5 PALLIATIVE CARE PATIENT: Status: ACTIVE | Noted: 2020-06-29

## 2020-06-29 LAB
ANION GAP SERPL CALCULATED.3IONS-SCNC: 12 MMOL/L (ref 7–19)
BUN BLDV-MCNC: 20 MG/DL (ref 8–23)
CALCIUM SERPL-MCNC: 8.8 MG/DL (ref 8.8–10.2)
CHLORIDE BLD-SCNC: 96 MMOL/L (ref 98–111)
CO2: 24 MMOL/L (ref 22–29)
CREAT SERPL-MCNC: 1 MG/DL (ref 0.5–1.2)
GFR NON-AFRICAN AMERICAN: >60
GLUCOSE BLD-MCNC: 85 MG/DL (ref 74–109)
HCT VFR BLD CALC: 27.6 % (ref 42–52)
HEMOGLOBIN: 9 G/DL (ref 14–18)
MCH RBC QN AUTO: 30.2 PG (ref 27–31)
MCHC RBC AUTO-ENTMCNC: 32.6 G/DL (ref 33–37)
MCV RBC AUTO: 92.6 FL (ref 80–94)
PDW BLD-RTO: 14.3 % (ref 11.5–14.5)
PLATELET # BLD: 240 K/UL (ref 130–400)
PMV BLD AUTO: 9.1 FL (ref 9.4–12.4)
POTASSIUM SERPL-SCNC: 4.8 MMOL/L (ref 3.5–5)
PRO-BNP: 1407 PG/ML (ref 0–900)
RBC # BLD: 2.98 M/UL (ref 4.7–6.1)
SODIUM BLD-SCNC: 132 MMOL/L (ref 136–145)
WBC # BLD: 8.3 K/UL (ref 4.8–10.8)

## 2020-06-29 PROCEDURE — 6370000000 HC RX 637 (ALT 250 FOR IP): Performed by: INTERNAL MEDICINE

## 2020-06-29 PROCEDURE — 99239 HOSP IP/OBS DSCHRG MGMT >30: CPT | Performed by: INTERNAL MEDICINE

## 2020-06-29 PROCEDURE — 6370000000 HC RX 637 (ALT 250 FOR IP): Performed by: HOSPITALIST

## 2020-06-29 PROCEDURE — 6360000002 HC RX W HCPCS: Performed by: HOSPITALIST

## 2020-06-29 PROCEDURE — 2580000003 HC RX 258: Performed by: INTERNAL MEDICINE

## 2020-06-29 PROCEDURE — 2700000000 HC OXYGEN THERAPY PER DAY

## 2020-06-29 PROCEDURE — 80048 BASIC METABOLIC PNL TOTAL CA: CPT

## 2020-06-29 PROCEDURE — 83880 ASSAY OF NATRIURETIC PEPTIDE: CPT

## 2020-06-29 PROCEDURE — 36592 COLLECT BLOOD FROM PICC: CPT

## 2020-06-29 PROCEDURE — 85027 COMPLETE CBC AUTOMATED: CPT

## 2020-06-29 RX ORDER — DIGOXIN 250 MCG
250 TABLET ORAL DAILY
Qty: 30 TABLET | Refills: 3 | Status: SHIPPED
Start: 2020-06-30 | End: 2020-07-28 | Stop reason: ALTCHOICE

## 2020-06-29 RX ORDER — AMIODARONE HYDROCHLORIDE 200 MG/1
200 TABLET ORAL DAILY
Status: DISCONTINUED | OUTPATIENT
Start: 2020-06-29 | End: 2020-06-29 | Stop reason: HOSPADM

## 2020-06-29 RX ORDER — CARVEDILOL 3.12 MG/1
3.12 TABLET ORAL 2 TIMES DAILY WITH MEALS
Qty: 60 TABLET | Refills: 3 | Status: SHIPPED
Start: 2020-06-29 | End: 2020-07-28 | Stop reason: ALTCHOICE

## 2020-06-29 RX ORDER — SPIRONOLACTONE 25 MG/1
25 TABLET ORAL DAILY
Qty: 30 TABLET | Refills: 3 | Status: SHIPPED | OUTPATIENT
Start: 2020-06-29 | End: 2020-07-28

## 2020-06-29 RX ORDER — AMIODARONE HYDROCHLORIDE 200 MG/1
200 TABLET ORAL DAILY
Qty: 30 TABLET | Refills: 3 | Status: SHIPPED | OUTPATIENT
Start: 2020-06-29 | End: 2020-07-28

## 2020-06-29 RX ADMIN — SACUBITRIL AND VALSARTAN 1 TABLET: 24; 26 TABLET, FILM COATED ORAL at 01:31

## 2020-06-29 RX ADMIN — Medication 10 ML: at 09:06

## 2020-06-29 RX ADMIN — CARVEDILOL 3.12 MG: 3.12 TABLET, FILM COATED ORAL at 07:28

## 2020-06-29 RX ADMIN — FUROSEMIDE 20 MG: 10 INJECTION, SOLUTION INTRAMUSCULAR; INTRAVENOUS at 09:06

## 2020-06-29 RX ADMIN — SACUBITRIL AND VALSARTAN 1 TABLET: 24; 26 TABLET, FILM COATED ORAL at 09:06

## 2020-06-29 RX ADMIN — APIXABAN 5 MG: 5 TABLET, FILM COATED ORAL at 09:05

## 2020-06-29 RX ADMIN — DIGOXIN 250 MCG: 125 TABLET ORAL at 09:05

## 2020-06-29 RX ADMIN — SPIRONOLACTONE 25 MG: 25 TABLET ORAL at 12:59

## 2020-06-29 RX ADMIN — CARVEDILOL 3.12 MG: 3.12 TABLET, FILM COATED ORAL at 18:23

## 2020-06-29 RX ADMIN — POTASSIUM CHLORIDE 20 MEQ: 20 TABLET, EXTENDED RELEASE ORAL at 09:06

## 2020-06-29 RX ADMIN — AMIODARONE HYDROCHLORIDE 200 MG: 200 TABLET ORAL at 12:56

## 2020-06-29 RX ADMIN — ATORVASTATIN CALCIUM 40 MG: 40 TABLET, FILM COATED ORAL at 09:06

## 2020-06-29 RX ADMIN — ASPIRIN 81 MG 81 MG: 81 TABLET ORAL at 09:05

## 2020-06-29 ASSESSMENT — PAIN SCALES - GENERAL
PAINLEVEL_OUTOF10: 0

## 2020-06-29 NOTE — PROGRESS NOTES
06/29/20 1445   General Comment   Comments No TX per [facility], RN. Patient preparing for TR to [facility]   Physical Therapy    Electronically signed by Pari Salcedo PTA on 6/29/2020 at 2:46 PM

## 2020-06-29 NOTE — PROGRESS NOTES
Consent for release of medical information to [facility] obtained from patient and placed to chart. [facility] contacted and made aware of pending transfer.

## 2020-06-29 NOTE — DISCHARGE SUMMARY
[facility]    Discharge Summary          I personally saw the patient and rounded with:  Chyna Rene, on  6/29/20      The observations documented in this note, including the assessment and plan are mine              Patient ID: Doc Mcgill      Patient's PCP: Gurinder Saba MD    Admit Date: 6/20/2020     Discharge Date:  06/29/20     Admitting Physician:  Denny Del Angel MD       Discharge Physician: Denny Del Angel MD     Discharge Diagnoses:    1. Non ischemic cardiomyopathy    12/29/2014  Cath  anterolateral and apical hypo, EF 50%, 2 stents in the LAD and POA in the diagonal  5/8/2018  DSE negative for myocardial ischemia  6/21/20 echo EF 27%, AUC indication 24, AUC score 8   6/22/20  Cath mild CAD, EF 5-10%, CASTILLO / DCCV successful on dilt and eliquis, will change to amiodarone    2. Systemic arterial hypertension  3. Prior cigarette use  4. Family history of cardiovascular disease      Cardiac Specific Diagnoses:    Specialty Problems        Cardiology Problems    CAD (coronary artery disease)        Essential hypertension        Chest pain        Atrial fibrillation with RVR (HCC)        * (Principal) New onset of congestive heart failure (HCC)        Acute systolic heart failure ([address]        Hypotension        Cardiogenic shock ([address]                The patient was seen and examined on day of discharge and this discharge summary is in conjunction with any daily progress note from day of discharge. History of Present Illness:     Doc Mcgill is a 81 y.o. male who presents to [facility] ED with symptoms / signs / problem or diagnosis of palpitatons / atrial fibrillation. His daughter noticed that he is more short of air. The last time she noticed him being less short of air was in February. When being examined this afternoon, he has had no symptoms of exertional chest discomfort, unusual or change in shortness of air, presyncope or syncope.   He does not feel his 06/29/2020       Renal:    Lab Results   Component Value Date     06/29/2020    K 4.8 06/29/2020    K 4.6 06/21/2020    CL 96 06/29/2020    CO2 24 06/29/2020    BUN 20 06/29/2020    CREATININE 1.0 06/29/2020    CALCIUM 8.8 06/29/2020    PHOS 2.8 06/21/2020         Discharge Medications:     Current Discharge Medication List           Details   amiodarone (CORDARONE) 200 MG tablet Take 1 tablet by mouth daily  Qty: 30 tablet, Refills: 3      apixaban (ELIQUIS) 5 MG TABS tablet Take 1 tablet by mouth 2 times daily  Qty: 60 tablet, Refills: 1      carvedilol (COREG) 3.125 MG tablet Take 1 tablet by mouth 2 times daily (with meals)  Qty: 60 tablet, Refills: 3      digoxin (LANOXIN) 250 MCG tablet Take 1 tablet by mouth daily  Qty: 30 tablet, Refills: 3      sacubitril-valsartan (ENTRESTO) 24-26 MG per tablet Take 1 tablet by mouth 2 times daily  Qty: 60 tablet, Refills: 1      spironolactone (ALDACTONE) 25 MG tablet Take 1 tablet by mouth daily  Qty: 30 tablet, Refills: 3              Details   aspirin 81 MG chewable tablet Take 81 mg by mouth daily      atorvastatin (LIPITOR) 40 MG tablet Take 40 mg by mouth daily. Condition At Discharge:  Improved    Disposition:        1. \"transfer\" to [address] [place] [address] under the care of Dr. Jayshree Grant  2. Follow up with cardiology as arranged  3. Follow up with primary care provider as arranged      (Note to coding and for clarification:   This discharge took > 30 minutes to arrange and included: discussion with the family, conversations (both orally and with texting) with advanced heart failure team and Dr. Jayshree Grant, completing a detailed discharge summary, signing appropriate transfer documents, and ensuring that the images (echocardiogram and cardiac catheterization) were copied and provided to the patient to take with him to the destination hospital.          Electronically signed by Olivia Walls MD on 6/29/20

## 2020-06-29 NOTE — CONSULTS
Attempted to complete PC eval at this time. Pt is being transferred to [facility] for higher level of care.      Electronically signed by Cassidy Savage RN on 6/29/2020 at 3:37 PM

## 2020-06-29 NOTE — PLAN OF CARE
Problem: Falls - Risk of:  Goal: Will remain free from falls  Description: Will remain free from falls  6/29/2020 0942 by Chan Courtney RN  Outcome: Ongoing  6/29/2020 0059 by Amanda Ba RN  Outcome: Ongoing  Goal: Absence of physical injury  Description: Absence of physical injury  6/29/2020 0942 by Chan Courtney RN  Outcome: Ongoing  6/29/2020 0059 by Amanda Ba RN  Outcome: Ongoing     Problem: Infection:  Goal: Will remain free from infection  Description: Will remain free from infection  6/29/2020 0942 by Chan Courtney RN  Outcome: Ongoing  6/29/2020 0059 by Amanda Ba RN  Outcome: Ongoing     Problem: Safety:  Goal: Free from accidental physical injury  Description: Free from accidental physical injury  6/29/2020 0942 by Chan Courtney RN  Outcome: Ongoing  6/29/2020 0059 by Amanda Ba RN  Outcome: Ongoing  Goal: Free from intentional harm  Description: Free from intentional harm  6/29/2020 0942 by Chan Courtney RN  Outcome: Ongoing  6/29/2020 0059 by Amanda Ba RN  Outcome: Ongoing     Problem: Daily Care:  Goal: Daily care needs are met  Description: Daily care needs are met  6/29/2020 0942 by Chan Courtney RN  Outcome: Ongoing  6/29/2020 0059 by Amanda Ba RN  Outcome: Ongoing     Problem: Pain:  Goal: Patient's pain/discomfort is manageable  Description: Patient's pain/discomfort is manageable  6/29/2020 0942 by Chan Courtney RN  Outcome: Ongoing  6/29/2020 0059 by Amanda Ba RN  Outcome: Ongoing  Goal: Pain level will decrease  Description: Pain level will decrease  6/29/2020 0942 by Chan Courtney RN  Outcome: Ongoing  6/29/2020 0059 by Amanda Ba RN  Outcome: Ongoing  Goal: Control of acute pain  Description: Control of acute pain  6/29/2020 0942 by Chan Courtney RN  Outcome: Ongoing  6/29/2020 0059 by Amanda Ba RN  Outcome: Ongoing  Goal: Control of chronic pain  Description: Control of chronic pain  6/29/2020 0942 by Chan Courtney RN  Outcome: Ongoing  6/29/2020 0059 by Corrie Philippe RN  Outcome: Ongoing     Problem: Skin Integrity:  Goal: Skin integrity will stabilize  Description: Skin integrity will stabilize  6/29/2020 0942 by Rene Robles RN  Outcome: Ongoing  6/29/2020 0059 by Corrie Philippe RN  Outcome: Ongoing     Problem: Discharge Planning:  Goal: Patients continuum of care needs are met  Description: Patients continuum of care needs are met  6/29/2020 0942 by Rene Robles RN  Outcome: Ongoing  6/29/2020 0059 by Corrie Philippe RN  Outcome: Ongoing     Problem: Cardiac:  Goal: Ability to maintain an adequate cardiac output will improve  Description: Ability to maintain an adequate cardiac output will improve  6/29/2020 0942 by Rene Robles RN  Outcome: Ongoing  6/29/2020 0059 by Corrie Philippe RN  Outcome: Ongoing  Goal: Complications related to the disease process, condition or treatment will be avoided or minimized  Description: Complications related to the disease process, condition or treatment will be avoided or minimized  6/29/2020 0942 by Rene Robles RN  Outcome: Ongoing  6/29/2020 0059 by Corrie Philippe RN  Outcome: Ongoing     Problem: Infection - Central Venous Catheter-Associated Bloodstream Infection:  Goal: Will show no infection signs and symptoms  Description: Will show no infection signs and symptoms  6/29/2020 0942 by Rene Robles RN  Outcome: Ongoing  6/29/2020 0059 by Corrie Philippe RN  Outcome: Ongoing

## 2020-06-29 NOTE — PROGRESS NOTES
Report given to Damion Goldberg RN at [facility], [facility] [phone]. Pt will be admitted to Dr. Gloria Jules.

## 2020-06-29 NOTE — PLAN OF CARE
Problem: Falls - Risk of:  Goal: Will remain free from falls  Outcome: Ongoing  Goal: Absence of physical injury  Outcome: Ongoing     Problem: Infection:  Goal: Will remain free from infection  Outcome: Ongoing     Problem: Safety:  Goal: Free from accidental physical injury  Outcome: Ongoing  Goal: Free from intentional harm  Outcome: Ongoing     Problem: Daily Care:  Goal: Daily care needs are met  Outcome: Ongoing     Problem: Pain:  Goal: Patient's pain/discomfort is manageable  Outcome: Ongoing  Goal: Pain level will decrease  Outcome: Ongoing  Goal: Control of acute pain  Outcome: Ongoing  Goal: Control of chronic pain  Outcome: Ongoing

## 2020-06-29 NOTE — PROGRESS NOTES
Griselda Drought RN in to speak with patient about transfer to [address] [place] [address] [place].

## 2020-06-29 NOTE — PROGRESS NOTES
Hospitalist Progress Note  6/29/2020 3:52 PM  Subjective:   Admit Date: 6/20/2020  PCP: José Miguel Khalil MD    Chief Complaint: shortness of breath    Subjective: Patient is feeling well today. Blood pressure again low today after receiving Entresto but later recovered. Cardiology feels he needs transfer and has arranged transfer to [place]. History is otherwise unchanged. [facility]:   [copied from previous provider]  6/20/2020  Nirmala Calderon an 81 y. o. male.  Very pleasant gentleman with CAD status post 2 stent placement in 2014 who is complaining of shortness of breath for the last couple of weeks with minimal exertion along with tiredness and fatigue which is getting worse.  He finally agreed to come to [facility] for evaluation after insistence by the family, work-up was done which showed elevated BNP more than 9000 as well as fluid overload changes on chest x-ray consistent with new onset CHF.  He also had A. fib with RVR which was improved on starting IV Cardizem drip.  First set of cardiac enzymes was done which is normal.  He is being admitted for medical management, cardiopulmonary monitoring, cardiology evaluation and further work-up in the morning.  6/21/2020  Patient feels better after aggressive management since admission. He underwent  echocardiogram which showed ejection fraction of 27%. Cardiology had a detailed discussion with the family and they are planning to take him for DC cardioversion for new onset A. fib as well as cardiac catheterization in the morning. Patient has been started on anticoagulation as per cardiology. 6/22/2020  Patient has had around thousand liters of net fluid output since admission on diuresis. He is planning to undergo CASTILLO and DC cardioversion later on today. 6/23/2020  Patient underwent successful CASTILLO and DC cardioversion yesterday. Patient is still in CCU. His hypotension has been controlled. Levophed and dopamine drips discontinued. Post cardiac cath sheath is in place which was discontinued in the afternoon and pressure applied for 45 minutes. Patient had a huge hematoma afterwards and additional pressure applied for 45 more minutes which helped resolution of hematoma. No bleeding was noted. [end copied portion]  6-24: Patient examined in CCU this morning. No further bleeding from groin, patient is feeling well and eager to work towards discharge to home. Awaiting cardiology input but likely stable for downgrade to PCU.  6-25: Patient still requiring norepinephrine drip today, not able to transfer to PCU yet. Cannot tolerate fluid support, will defer management to cardiology. 6-26: Hypotension improved off pressors, patient working with physical therapy. Noted that patient cannot afford NOAC, will switch to warfarin with enoxaparin bridge. 6-29: Transfer to [facility] per cardiology. ROS: 14 point review of systems is negative except as specifically addressed above. DIET CARDIAC;     Intake/Output Summary (Last 24 hours) at 6/29/2020 1552  Last data filed at 6/29/2020 1704  Gross per 24 hour   Intake 320 ml   Output 1250 ml   Net -930 ml     Medications:    Current Facility-Administered Medications   Medication Dose Route Frequency Provider Last Rate Last Dose    amiodarone (CORDARONE) tablet 200 mg  200 mg Oral Daily Martell Adame MD   200 mg at 06/29/20 1256    apixaban (ELIQUIS) tablet 5 mg  5 mg Oral BID Martell Adame MD   5 mg at 06/29/20 9232    sodium chloride flush 0.9 % injection 10 mL  10 mL Intravenous 2 times per day Mari Villalpando DO   10 mL at 06/29/20 2100    sodium chloride flush 0.9 % injection 10 mL  10 mL Intravenous PRN Mari Villalpando DO        sacubitril-valsartan (ENTRESTO) 24-26 MG per tablet 1 tablet  1 tablet Oral BID Martell Adame MD   1 tablet at 06/29/20 0906    carvedilol (COREG) tablet 3.125 mg  3.125 mg Oral BID WC Martell Adame MD   3.125 mg at 06/29/20 7333    spironolactone (ALDACTONE) tablet 25 mg  25 mg Oral Daily Salbador Cifuentes MD   25 mg at 06/29/20 1259    digoxin (LANOXIN) tablet 250 mcg  250 mcg Oral Daily Salbador Cifuentes MD   250 mcg at 06/29/20 0905    atorvastatin (LIPITOR) tablet 40 mg  40 mg Oral Daily Carlos Cummins MD   40 mg at 06/29/20 0906    aspirin chewable tablet 81 mg  81 mg Oral Daily Carlos Cummins MD   81 mg at 06/29/20 0905    potassium chloride (KLOR-CON M) extended release tablet 20 mEq  20 mEq Oral BID Carlos Cummins MD   20 mEq at 06/29/20 0906    acetaminophen (TYLENOL) tablet 650 mg  650 mg Oral Q6H PRN Carlos Cummins MD   650 mg at 06/23/20 0434    Or    acetaminophen (TYLENOL) suppository 650 mg  650 mg Rectal Q6H PRN Carlos Cummins MD        polyethylene glycol (GLYCOLAX) packet 17 g  17 g Oral Daily PRN Carlos Cummins MD        promethazine (PHENERGAN) tablet 12.5 mg  12.5 mg Oral Q6H PRN Carlos Cummins MD        Or    ondansetron (ZOFRAN) injection 4 mg  4 mg Intravenous Q6H PRN Carlos Cummins MD        furosemide (LASIX) injection 20 mg  20 mg Intravenous Daily Carlos Cummins MD   20 mg at 06/29/20 0906        Labs:     Recent Labs     06/27/20  0300 06/28/20  0400 06/29/20  0330   WBC 5.9 7.3 8.3   RBC 2.72* 2.73* 2.98*   HGB 8.0* 8.4* 9.0*   HCT 25.7* 25.6* 27.6*   MCV 94.5* 93.8 92.6   MCH 29.4 30.8 30.2   MCHC 31.1* 32.8* 32.6*    203 240     Recent Labs     06/27/20  0300 06/28/20  0400 06/29/20  0330   * 132* 132*   K 4.6 4.5 4.8   ANIONGAP 10 11 12   CL 98 96* 96*   CO2 25 25 24   BUN 22 21 20   CREATININE 1.0 1.1 1.0   GLUCOSE 78 81 85   CALCIUM 8.4* 8.6* 8.8     No results for input(s): MG, PHOS in the last 72 hours. No results for input(s): AST, ALT, ALB, BILITOT, ALKPHOS, ALB in the last 72 hours. ABGs:No results for input(s): PH, PO2, PCO2, HCO3, BE, O2SAT in the last 72 hours. Troponin T:   No results for input(s): TROPONINI in the last 72 hours.   INR:   Recent Labs     06/27/20  0300   INR 1.59*     Lactic Acid:

## 2020-06-30 NOTE — PROGRESS NOTES
[facility] here at [address] [facility] to transport pt to [address] [place], [place] [address]e was loaded onto stretcher and hooked to cardiac monitoring per EMS. Pt tolerated well. Pt alert and oriented and in no distress at time of discharge. Phoned [facility] unit and spoke to receiving nurse to let her know that pt is on the way.     Electronically signed by Deborah Hardin RN on 6/29/2020 at 8:00 PM

## 2020-06-30 NOTE — PLAN OF CARE
Pt transferred to [address] [place] [address]      Problem: Falls - Risk of:  Goal: Will remain free from falls  Description: Will remain free from falls  6/29/2020 2000 by Maia Burch RN  Outcome: Completed  6/29/2020 0942 by Angelica Ferrer RN  Outcome: Ongoing  Goal: Absence of physical injury  Description: Absence of physical injury  6/29/2020 2000 by Maia Burch RN  Outcome: Completed  6/29/2020 0942 by Angelica Ferrer RN  Outcome: Ongoing     Problem: Infection:  Goal: Will remain free from infection  Description: Will remain free from infection  6/29/2020 2000 by Maia Burch RN  Outcome: Completed  6/29/2020 0942 by Angelica Ferrer RN  Outcome: Ongoing     Problem: Safety:  Goal: Free from accidental physical injury  Description: Free from accidental physical injury  6/29/2020 2000 by Maia Burch RN  Outcome: Completed  6/29/2020 0942 by Angelica Ferrer RN  Outcome: Ongoing  Goal: Free from intentional harm  Description: Free from intentional harm  6/29/2020 2000 by Maia Burch RN  Outcome: Completed  6/29/2020 0942 by Angelica Ferrer RN  Outcome: Ongoing     Problem: Daily Care:  Goal: Daily care needs are met  Description: Daily care needs are met  6/29/2020 2000 by Maia Burch RN  Outcome: Completed  6/29/2020 0942 by Angelica Ferrer RN  Outcome: Ongoing     Problem: Pain:  Description: Pain management should include both nonpharmacologic and pharmacologic interventions.   Goal: Patient's pain/discomfort is manageable  Description: Patient's pain/discomfort is manageable  6/29/2020 2000 by Maia Burch RN  Outcome: Completed  6/29/2020 0942 by Angelica Ferrer RN  Outcome: Ongoing  Goal: Pain level will decrease  Description: Pain level will decrease  6/29/2020 2000 by Maia Burch RN  Outcome: Completed  6/29/2020 0942 by Angelica Ferrer RN  Outcome: Ongoing  Goal: Control of acute pain  Description: Control of acute pain  6/29/2020 2000 by Maia Burch

## 2020-07-07 ENCOUNTER — LAB REQUISITION (OUTPATIENT)
Dept: LAB | Facility: HOSPITAL | Age: 70
End: 2020-07-07

## 2020-07-07 DIAGNOSIS — Z00.00 ENCOUNTER FOR GENERAL ADULT MEDICAL EXAMINATION WITHOUT ABNORMAL FINDINGS: ICD-10-CM

## 2020-07-07 LAB
ANION GAP SERPL CALCULATED.3IONS-SCNC: 14 MMOL/L (ref 5–15)
BUN SERPL-MCNC: 51 MG/DL (ref 8–23)
BUN/CREAT SERPL: 30 (ref 7–25)
CALCIUM SPEC-SCNC: 8.7 MG/DL (ref 8.6–10.5)
CHLORIDE SERPL-SCNC: 92 MMOL/L (ref 98–107)
CO2 SERPL-SCNC: 22 MMOL/L (ref 22–29)
CREAT SERPL-MCNC: 1.7 MG/DL (ref 0.76–1.27)
GFR SERPL CREATININE-BSD FRML MDRD: 40 ML/MIN/1.73
GFR SERPL CREATININE-BSD FRML MDRD: 49 ML/MIN/1.73
GLUCOSE SERPL-MCNC: 82 MG/DL (ref 65–99)
POTASSIUM SERPL-SCNC: 4.5 MMOL/L (ref 3.5–5.2)
SODIUM SERPL-SCNC: 128 MMOL/L (ref 136–145)

## 2020-07-07 PROCEDURE — 36415 COLL VENOUS BLD VENIPUNCTURE: CPT | Performed by: FAMILY MEDICINE

## 2020-07-07 PROCEDURE — 80048 BASIC METABOLIC PNL TOTAL CA: CPT | Performed by: FAMILY MEDICINE

## 2020-07-10 ENCOUNTER — LAB REQUISITION (OUTPATIENT)
Dept: LAB | Facility: HOSPITAL | Age: 70
End: 2020-07-10

## 2020-07-10 DIAGNOSIS — Z00.00 ENCOUNTER FOR GENERAL ADULT MEDICAL EXAMINATION WITHOUT ABNORMAL FINDINGS: ICD-10-CM

## 2020-07-10 LAB
ANION GAP SERPL CALCULATED.3IONS-SCNC: 14 MMOL/L (ref 5–15)
BUN SERPL-MCNC: 48 MG/DL (ref 8–23)
BUN/CREAT SERPL: 26.5 (ref 7–25)
CALCIUM SPEC-SCNC: 9.8 MG/DL (ref 8.6–10.5)
CHLORIDE SERPL-SCNC: 99 MMOL/L (ref 98–107)
CO2 SERPL-SCNC: 24 MMOL/L (ref 22–29)
CREAT SERPL-MCNC: 1.81 MG/DL (ref 0.76–1.27)
GFR SERPL CREATININE-BSD FRML MDRD: 38 ML/MIN/1.73
GFR SERPL CREATININE-BSD FRML MDRD: 46 ML/MIN/1.73
GLUCOSE SERPL-MCNC: 98 MG/DL (ref 65–99)
POTASSIUM SERPL-SCNC: 4.3 MMOL/L (ref 3.5–5.2)
SODIUM SERPL-SCNC: 137 MMOL/L (ref 136–145)

## 2020-07-10 PROCEDURE — 80048 BASIC METABOLIC PNL TOTAL CA: CPT | Performed by: FAMILY MEDICINE

## 2020-07-13 ENCOUNTER — LAB REQUISITION (OUTPATIENT)
Dept: LAB | Facility: HOSPITAL | Age: 70
End: 2020-07-13

## 2020-07-13 DIAGNOSIS — Z00.00 ENCOUNTER FOR GENERAL ADULT MEDICAL EXAMINATION WITHOUT ABNORMAL FINDINGS: ICD-10-CM

## 2020-07-13 LAB
ANION GAP SERPL CALCULATED.3IONS-SCNC: 13 MMOL/L (ref 5–15)
BUN SERPL-MCNC: 49 MG/DL (ref 8–23)
BUN/CREAT SERPL: 28.3 (ref 7–25)
CALCIUM SPEC-SCNC: 9.1 MG/DL (ref 8.6–10.5)
CHLORIDE SERPL-SCNC: 97 MMOL/L (ref 98–107)
CO2 SERPL-SCNC: 23 MMOL/L (ref 22–29)
CREAT SERPL-MCNC: 1.73 MG/DL (ref 0.76–1.27)
GFR SERPL CREATININE-BSD FRML MDRD: 40 ML/MIN/1.73
GFR SERPL CREATININE-BSD FRML MDRD: 48 ML/MIN/1.73
GLUCOSE SERPL-MCNC: 82 MG/DL (ref 65–99)
POTASSIUM SERPL-SCNC: 4.5 MMOL/L (ref 3.5–5.2)
SODIUM SERPL-SCNC: 133 MMOL/L (ref 136–145)

## 2020-07-13 PROCEDURE — 36415 COLL VENOUS BLD VENIPUNCTURE: CPT | Performed by: NURSE PRACTITIONER

## 2020-07-13 PROCEDURE — 80048 BASIC METABOLIC PNL TOTAL CA: CPT | Performed by: NURSE PRACTITIONER

## 2020-07-15 LAB
ANION GAP SERPL CALCULATED.3IONS-SCNC: 19 MMOL/L (ref 7–19)
BUN BLDV-MCNC: 58 MG/DL (ref 8–23)
CALCIUM SERPL-MCNC: 8.5 MG/DL (ref 8.8–10.2)
CHLORIDE BLD-SCNC: 90 MMOL/L (ref 98–111)
CO2: 16 MMOL/L (ref 22–29)
CREAT SERPL-MCNC: 3.2 MG/DL (ref 0.5–1.2)
GFR AFRICAN AMERICAN: 23
GFR NON-AFRICAN AMERICAN: 19
GLUCOSE BLD-MCNC: 100 MG/DL (ref 74–109)
POTASSIUM SERPL-SCNC: 4.8 MMOL/L (ref 3.5–5)
PRO-BNP: 1173 PG/ML (ref 0–900)
SODIUM BLD-SCNC: 125 MMOL/L (ref 136–145)

## 2020-07-23 LAB — PRO-BNP: 2759 PG/ML (ref 0–900)

## 2020-07-27 LAB — PRO-BNP: 1095 PG/ML (ref 0–900)

## 2020-07-28 ENCOUNTER — OFFICE VISIT (OUTPATIENT)
Dept: CARDIOLOGY | Age: 70
End: 2020-07-28
Payer: MEDICARE

## 2020-07-28 VITALS
WEIGHT: 183 LBS | HEIGHT: 72 IN | HEART RATE: 56 BPM | SYSTOLIC BLOOD PRESSURE: 98 MMHG | BODY MASS INDEX: 24.79 KG/M2 | DIASTOLIC BLOOD PRESSURE: 50 MMHG

## 2020-07-28 DIAGNOSIS — I50.22 CHRONIC SYSTOLIC HEART FAILURE (HCC): ICD-10-CM

## 2020-07-28 PROBLEM — I42.8 NONISCHEMIC CARDIOMYOPATHY (HCC): Status: ACTIVE | Noted: 2020-07-28

## 2020-07-28 LAB
ANION GAP SERPL CALCULATED.3IONS-SCNC: 16 MMOL/L (ref 7–19)
BUN BLDV-MCNC: 45 MG/DL (ref 8–23)
CALCIUM SERPL-MCNC: 9.2 MG/DL (ref 8.8–10.2)
CHLORIDE BLD-SCNC: 99 MMOL/L (ref 98–111)
CO2: 20 MMOL/L (ref 22–29)
CREAT SERPL-MCNC: 1.7 MG/DL (ref 0.5–1.2)
GFR AFRICAN AMERICAN: 49
GFR NON-AFRICAN AMERICAN: 40
GLUCOSE BLD-MCNC: 88 MG/DL (ref 74–109)
POTASSIUM SERPL-SCNC: 5 MMOL/L (ref 3.5–5)
PRO-BNP: 1047 PG/ML (ref 0–900)
SODIUM BLD-SCNC: 135 MMOL/L (ref 136–145)

## 2020-07-28 PROCEDURE — 4040F PNEUMOC VAC/ADMIN/RCVD: CPT | Performed by: CLINICAL NURSE SPECIALIST

## 2020-07-28 PROCEDURE — G8427 DOCREV CUR MEDS BY ELIG CLIN: HCPCS | Performed by: CLINICAL NURSE SPECIALIST

## 2020-07-28 PROCEDURE — 93000 ELECTROCARDIOGRAM COMPLETE: CPT | Performed by: CLINICAL NURSE SPECIALIST

## 2020-07-28 PROCEDURE — 1036F TOBACCO NON-USER: CPT | Performed by: CLINICAL NURSE SPECIALIST

## 2020-07-28 PROCEDURE — G8420 CALC BMI NORM PARAMETERS: HCPCS | Performed by: CLINICAL NURSE SPECIALIST

## 2020-07-28 PROCEDURE — 1123F ACP DISCUSS/DSCN MKR DOCD: CPT | Performed by: CLINICAL NURSE SPECIALIST

## 2020-07-28 PROCEDURE — 99214 OFFICE O/P EST MOD 30 MIN: CPT | Performed by: CLINICAL NURSE SPECIALIST

## 2020-07-28 PROCEDURE — 1111F DSCHRG MED/CURRENT MED MERGE: CPT | Performed by: CLINICAL NURSE SPECIALIST

## 2020-07-28 PROCEDURE — 3017F COLORECTAL CA SCREEN DOC REV: CPT | Performed by: CLINICAL NURSE SPECIALIST

## 2020-07-28 RX ORDER — FUROSEMIDE 40 MG/1
40 TABLET ORAL SEE ADMIN INSTRUCTIONS
COMMUNITY
End: 2020-07-28 | Stop reason: SDUPTHER

## 2020-07-28 RX ORDER — ACETAMINOPHEN 325 MG/1
650 TABLET ORAL EVERY 6 HOURS PRN
Status: ON HOLD | COMMUNITY
End: 2020-11-18 | Stop reason: HOSPADM

## 2020-07-28 RX ORDER — LISINOPRIL 2.5 MG/1
2.5 TABLET ORAL DAILY
COMMUNITY
End: 2020-10-22

## 2020-07-28 RX ORDER — LEVOTHYROXINE SODIUM 0.12 MG/1
125 TABLET ORAL DAILY
Status: ON HOLD | COMMUNITY
End: 2020-11-18 | Stop reason: HOSPADM

## 2020-07-28 RX ORDER — ONDANSETRON 4 MG/1
4 TABLET, FILM COATED ORAL EVERY 8 HOURS PRN
COMMUNITY
End: 2020-10-02 | Stop reason: ALTCHOICE

## 2020-07-28 RX ORDER — FUROSEMIDE 40 MG/1
40 TABLET ORAL SEE ADMIN INSTRUCTIONS
Qty: 90 TABLET | Refills: 3 | Status: ON HOLD
Start: 2020-07-28 | End: 2020-11-03 | Stop reason: HOSPADM

## 2020-07-28 RX ORDER — METOPROLOL SUCCINATE 25 MG/1
12.5 TABLET, EXTENDED RELEASE ORAL DAILY
Status: ON HOLD | COMMUNITY
End: 2020-11-18 | Stop reason: HOSPADM

## 2020-07-28 RX ORDER — SPIRONOLACTONE 25 MG/1
12.5 TABLET ORAL DAILY
Qty: 45 TABLET | Refills: 3 | Status: ON HOLD
Start: 2020-07-28 | End: 2020-11-03 | Stop reason: HOSPADM

## 2020-07-28 RX ORDER — DOCUSATE SODIUM 100 MG/1
100 CAPSULE, LIQUID FILLED ORAL 2 TIMES DAILY
COMMUNITY
End: 2020-10-02 | Stop reason: ALTCHOICE

## 2020-07-28 RX ORDER — AMIODARONE HYDROCHLORIDE 200 MG/1
200 TABLET ORAL DAILY
Qty: 30 TABLET | Refills: 3 | Status: SHIPPED | OUTPATIENT
Start: 2020-07-28 | End: 2021-08-16

## 2020-07-28 ASSESSMENT — ENCOUNTER SYMPTOMS
FACIAL SWELLING: 0
WHEEZING: 0
VOMITING: 0
COUGH: 0
CHEST TIGHTNESS: 0
EYE REDNESS: 0
SHORTNESS OF BREATH: 0
ABDOMINAL PAIN: 0
NAUSEA: 0

## 2020-07-28 NOTE — PROGRESS NOTES
[facility], [address], [address]  [address]  Phone: [phone]  Fax: [phone]    OFFICE VISIT:  7/28/2020    [address]: 1950    Reason For Visit:  Christian Steiner is a 81 y.o. male who is here for Atrial Fibrillation (No cardiac sx today. ) and Hypertension       Diagnosis Orders   1. Chronic systolic heart failure [facility])  Basic Metabolic Panel    Brain Natriuretic Elier Cornelius MD, [facility]   2. Nonischemic cardiomyopathy [facility])  Johana Isaac MD, [facility]   3. Essential hypertension  EKG 12 lead    Johana Isaac MD, [facility]   4. Coronary artery disease involving native coronary artery of native heart without angina pectoris  Johana Isaac MD, [facility]   5. Atrial fibrillation with RVR [facility])  Ingrid Damon, Tammy Suarez MD, [facility]   6. Alcohol abuse  Johana Isaac MD, [facility]  Patient is here for hospital follow-up. He was admitted to [facility] in June and had a heart cath that showed an ejection fraction of 5 to 10% with cardiogenic shock. Patient was also in atrial fibrillation with rapid ventricular response and started on amiodarone. He was transferred to the advanced heart failure team at [facility]. At discharge she was sent to [facility] for a few days of rehab. He is now back at home with the assistance of caregivers. He is here with his sister at today's appointment. Sister simply drove him here today and really does not know much about his care. I currently do not have records from [facility] discharge. Patient did not bring in a medication list today so it is unclear what he is taking. Patient denies chest pain. His dyspnea has improved since hospital discharge. He denies orthopnea, PND, edema, or sudden weight gain.   He is weighing on a daily basis and in fact has lost weight he states. He did call Reggie Pearce and was instructed to increase a fluid pill due to weight gain since hospital discharge. He is unclear if he was supposed to go back to normal dosing    Patient sister is requesting an order for a lift chair and referral to her primary care provider    Iza López MD is PCP. Susan Smith has the following history as recorded in [facility]:    Patient Active Problem List    Diagnosis Date Noted    Chronic systolic heart failure ([address] 07/28/2020    Nonischemic cardiomyopathy ([address] 07/28/2020    Palliative care patient 06/29/2020    Cardiogenic shock ([address] 06/25/2020    Hyperkalemia 06/24/2020    Acute kidney injury ([address] 06/24/2020    Hypocalcemia 06/24/2020    Pneumonia due to infectious organism 48/57/7853    Acute systolic heart failure ([address] 06/23/2020    Hypotension 06/23/2020    New onset of congestive heart failure ([address] 06/20/2020    Atrial fibrillation with RVR ([address] 06/20/2020    Dyslipidemia 06/20/2020    Thrombocytopenia (HCC)     Hyponatremia     Alcohol abuse 05/07/2018    Transaminitis 05/07/2018    Chest pain 05/06/2018    CAD (coronary artery disease)     Essential hypertension      Past Medical History:   Diagnosis Date    CAD (coronary artery disease)     Gout     Hypertension     Non-ST elevation MI (NSTEMI) ([address] 12/28/14    Palliative care patient 06/29/2020     Past Surgical History:   Procedure Laterality Date    CARDIAC CATHETERIZATION  12/29/14  [facility] [employer]    angioplasty, stent to LAD. EF 45%    CHOLECYSTECTOMY       Family History   Problem Relation Age of Onset    No Known Problems Mother     Heart Disease Father      Social History     Tobacco Use    Smoking status: Former Smoker     Types: Cigars    Smokeless tobacco: Never Used   Substance Use Topics    Alcohol use:  Yes     Alcohol/week: 3.0 standard drinks     Types: 3 Shots of liquor per week     Comment: daily      Current Outpatient Medications   Medication Sig Dispense Refill    acetaminophen (TYLENOL) 325 MG tablet Take 650 mg by mouth every 6 hours as needed for Pain      docusate sodium (COLACE) 100 MG capsule Take 100 mg by mouth 2 times daily      levothyroxine (SYNTHROID) 125 MCG tablet Take 125 mcg by mouth Daily      lisinopril (PRINIVIL;ZESTRIL) 2.5 MG tablet Take 2.5 mg by mouth daily      metoprolol succinate (TOPROL XL) 25 MG extended release tablet Take 12.5 mg by mouth daily      ondansetron (ZOFRAN) 4 MG tablet Take 4 mg by mouth every 8 hours as needed for Nausea or Vomiting      spironolactone (ALDACTONE) 25 MG tablet Take 0.5 tablets by mouth daily 45 tablet 3    amiodarone (CORDARONE) 200 MG tablet Take 1 tablet by mouth daily 30 tablet 3    furosemide (LASIX) 40 MG tablet Take 1 tablet by mouth See Admin Instructions May take extra 40mg for weight gain 90 tablet 3    apixaban (ELIQUIS) 5 MG TABS tablet Take 1 tablet by mouth 2 times daily 60 tablet 1    atorvastatin (LIPITOR) 40 MG tablet Take 40 mg by mouth daily.  aspirin 81 MG chewable tablet Take 81 mg by mouth daily       No current facility-administered medications for this visit. Allergies: Patient has no known allergies. Review of Systems  Review of Systems   Constitutional: Positive for fatigue. Negative for activity change, diaphoresis, fever and unexpected weight change. HENT: Negative for facial swelling and nosebleeds. Eyes: Negative for redness and visual disturbance. Respiratory: Negative for cough, chest tightness, shortness of breath and wheezing. Cardiovascular: Negative for chest pain, palpitations and leg swelling. Gastrointestinal: Negative for abdominal pain, nausea and vomiting. Endocrine: Negative for cold intolerance and heat intolerance. Genitourinary: Negative for dysuria and hematuria. Musculoskeletal: Negative for arthralgias and myalgias. C/o balance issues   Skin: Negative for pallor and rash. Neurological: Negative for dizziness, seizures, syncope, weakness and light-headedness. Hematological: Does not bruise/bleed easily. Psychiatric/Behavioral: Negative for agitation. The patient is not nervous/anxious. Objective  Vital Signs - BP (!) 98/50   Pulse 56   Ht 6' (1.829 m)   Wt 183 lb (83 kg)   BMI 24.82 kg/m²    Wt Readings from Last 3 Encounters:   07/28/20 183 lb (83 kg)   06/29/20 189 lb 3.2 oz (85.8 kg)   05/07/18 220 lb 6.4 oz (100 kg)      Physical Exam  Vitals signs and nursing note reviewed. Constitutional:       General: He is not in acute distress. Appearance: He is well-developed. He is not diaphoretic. Comments: Deconditioned   HENT:      Head: Normocephalic and atraumatic. Right Ear: Hearing and external ear normal.      Left Ear: Hearing and external ear normal.      Nose: Nose normal.   Eyes:      General:         Right eye: No discharge. Left eye: No discharge. Pupils: Pupils are equal, round, and reactive to light. Neck:      Musculoskeletal: Neck supple. No muscular tenderness. Thyroid: No thyromegaly. Vascular: No carotid bruit or JVD. Trachea: No tracheal deviation. Cardiovascular:      Rate and Rhythm: Normal rate and regular rhythm. Heart sounds: Normal heart sounds. No murmur. No friction rub. No gallop. Pulmonary:      Effort: Pulmonary effort is normal. No respiratory distress. Breath sounds: Normal breath sounds. No wheezing or rales. Abdominal:      Palpations: Abdomen is soft. Tenderness: There is no abdominal tenderness. Musculoskeletal:         General: Swelling present. No deformity. Right lower leg: Edema (trace) present. Left lower leg: Edema (trace) present. Comments: Ambulates with cane   Skin:     General: Skin is warm and dry. Findings: No rash. Neurological:      General: No focal deficit present.       Mental Status: He is alert and oriented to person, place, and time. Cranial Nerves: No cranial nerve deficit. Psychiatric:         Mood and Affect: Mood normal.         Behavior: Behavior normal.         Judgment: Judgment normal.         Data:  Cardiac catheterization 6/20:               a  Successful femoral artery ultrasound            b.  Successful femoral artery arteriogram            c.  Supervision of the administration of moderate conscious sedation            d.  Mild coronary artery disease            e. Laurel Najera profound global left ventricular hypokinesis with a visually estimated ejection fraction of 5 - 10%              f.  Elevated left ventricular end diastolic pressure (LVEDP > 34 mmHg), the LVEDP was 23 mmHg     EKG shows sinus bradycardia rate 56 with a QTc interval of 0.449 ms    Assessment:     Diagnosis Orders   1. Chronic systolic heart failure [facility])  Basic Metabolic Panel    Brain Natriuretic Elier Cornelius MD, [facility]   2. Nonischemic cardiomyopathy [facility])  Yoli Rahman MD, [facility]   3. Essential hypertension  EKG 12 lead    Yoli Rahman MD, [facility]   4. Coronary artery disease involving native coronary artery of native heart without angina pectoris  Yoli Rahman MD, [facility]   5. Atrial fibrillation with RVR [facility])  Peewee Ham, Ariel Rojas MD, [facility]   6. Alcohol abuse  Yoli Rahman MD, [facility]     Nonischemic cardiomyopathy/chronic systolic heart failure NYHA class III, stage C-EF 5 to 10% at [facility]-patient appears euvolemic. He is likely on guideline directed medical therapy, but he does not have medication list with him today and I do not have hospital records. He is weighing on a daily basis. A fluid pill was increased recently to twice a day, but he is unable to tell me the name of the pill or who told him to do this and if it was temporary or permanent.   Check BMP clarify what he is taking. Also reviewed labs that were ordered earlier today. Potassium is borderline high at 5.0. Decrease spironolactone to 12.5 mg daily. Decrease furosemide to 40 mg once a day, may take extra 40 mg for weight gain of 3 pounds or more in 24 hours or 5 pounds in a week. Decrease amiodarone to 200 mg once a day. Patient verbalizes understanding    Call with any questionsor concerns  Follow up with Jorge Son MD for non cardiac problems  Report any new problems  Cardiovascular Fitness-Exercise as tolerated. Strive for 15 minutes of exercise most days of the week. Cardiac / HealthyDiet  Continue current medications as directed  Continue plan of treatment  It is always recommended that you bring your medicationsbottles with you to each visit - this is for your safety!        Naomia Carrel, APRN

## 2020-07-28 NOTE — PATIENT INSTRUCTIONS
Return in about 1 month (around 8/28/2020) for APRN. Work on pt assistance for [employer]- BNP, BMP  Refer  To Primary Care Provider  Order for [address] daily and report weight gain of 3lbs or more in 24hrs or 5lbs in one week. - Call for increasing shortness of breath or increasing swelling in feet and legs.     (This could mean you are retaining too much fluid)  - 2000mg low sodium diet  - Fluid restriction of 1500ml per day (about 6 cups of fluid per day)

## 2020-08-04 LAB — PRO-BNP: 710 PG/ML (ref 0–900)

## 2020-08-11 LAB — PRO-BNP: 335 PG/ML (ref 0–900)

## 2020-08-18 LAB
ANION GAP SERPL CALCULATED.3IONS-SCNC: 12 MMOL/L (ref 7–19)
BUN BLDV-MCNC: 48 MG/DL (ref 8–23)
CALCIUM SERPL-MCNC: 9.4 MG/DL (ref 8.8–10.2)
CHLORIDE BLD-SCNC: 102 MMOL/L (ref 98–111)
CO2: 20 MMOL/L (ref 22–29)
CREAT SERPL-MCNC: 1.7 MG/DL (ref 0.5–1.2)
GFR AFRICAN AMERICAN: 49
GFR NON-AFRICAN AMERICAN: 40
GLUCOSE BLD-MCNC: 89 MG/DL (ref 74–109)
POTASSIUM SERPL-SCNC: 5.6 MMOL/L (ref 3.5–5)
PRO-BNP: 445 PG/ML (ref 0–900)
SODIUM BLD-SCNC: 134 MMOL/L (ref 136–145)

## 2020-09-01 ENCOUNTER — OFFICE VISIT (OUTPATIENT)
Dept: CARDIOLOGY | Age: 70
End: 2020-09-01
Payer: MEDICARE

## 2020-09-01 VITALS
SYSTOLIC BLOOD PRESSURE: 124 MMHG | HEART RATE: 64 BPM | DIASTOLIC BLOOD PRESSURE: 68 MMHG | BODY MASS INDEX: 24.65 KG/M2 | HEIGHT: 72 IN | WEIGHT: 182 LBS

## 2020-09-01 PROBLEM — E78.2 MIXED HYPERLIPIDEMIA: Status: ACTIVE | Noted: 2020-09-01

## 2020-09-01 PROBLEM — I48.0 PAROXYSMAL ATRIAL FIBRILLATION (HCC): Status: ACTIVE | Noted: 2020-09-01

## 2020-09-01 PROCEDURE — 4040F PNEUMOC VAC/ADMIN/RCVD: CPT | Performed by: CLINICAL NURSE SPECIALIST

## 2020-09-01 PROCEDURE — G8420 CALC BMI NORM PARAMETERS: HCPCS | Performed by: CLINICAL NURSE SPECIALIST

## 2020-09-01 PROCEDURE — 99214 OFFICE O/P EST MOD 30 MIN: CPT | Performed by: CLINICAL NURSE SPECIALIST

## 2020-09-01 PROCEDURE — 1036F TOBACCO NON-USER: CPT | Performed by: CLINICAL NURSE SPECIALIST

## 2020-09-01 PROCEDURE — G8427 DOCREV CUR MEDS BY ELIG CLIN: HCPCS | Performed by: CLINICAL NURSE SPECIALIST

## 2020-09-01 PROCEDURE — 1123F ACP DISCUSS/DSCN MKR DOCD: CPT | Performed by: CLINICAL NURSE SPECIALIST

## 2020-09-01 PROCEDURE — 3017F COLORECTAL CA SCREEN DOC REV: CPT | Performed by: CLINICAL NURSE SPECIALIST

## 2020-09-01 ASSESSMENT — ENCOUNTER SYMPTOMS
WHEEZING: 0
NAUSEA: 0
SHORTNESS OF BREATH: 0
FACIAL SWELLING: 0
VOMITING: 0
CHEST TIGHTNESS: 0
EYE REDNESS: 0
COUGH: 0
ABDOMINAL PAIN: 0

## 2020-09-01 NOTE — PATIENT INSTRUCTIONS
Return in about 3 months (around 12/1/2020) for APRN. Refer to Dr. Hodan Peter for PCP    OK to take an extra Lasix for weight gain over 3lbs in 24 hours or 5lbs over a week. If weight is not improving by the next day, call the office.    - Weigh daily and report weight gain of 3lbs or more in 24hrs or 5lbs in one week. - Call for increasing shortness of breath or increasing swelling in feet and legs.     (This could mean you are retaining too much fluid)  - 2000mg low sodium diet  - Fluid restriction of 1500ml per day (about 6 cups of fluid per day)

## 2020-09-01 NOTE — PROGRESS NOTES
[facility] [address] [place], [address] [address]  Phone: [phone]  Fax: [phone]    OFFICE VISIT:  2020    Mark Fields - : 1950    Reason For Visit:  Simin Mckenzie is a 78 y.o. male who is here for Congestive Heart Failure (No cardiac sx today. ) and Coronary Artery Disease       Diagnosis Orders   1. Chronic systolic heart failure [facility])  Raffi Way MD, Internal Medicine, [place]   2. Nonischemic cardiomyopathy ([facility]     3. Coronary artery disease involving native coronary artery of native heart without angina pectoris  Raffi Way MD, Internal Medicine, [place]   4. Paroxysmal atrial fibrillation (HCC)     5. Essential hypertension  Raffi Way MD, Internal Medicine, [place]   6. Mixed hyperlipidemia     7. Alcohol abuse     8. History of hyperkalemia     9. On amiodarone therapy         HPI  Patient is here for CAD, systolic heart failure, hypertension, PAF, hyperlipidemia. He was admitted to [facility] in 2020 and had a heart cath that showed an ejection fraction of 5 to 10% with cardiogenic shock. Patient was also in atrial fibrillation with rapid ventricular response and started on amiodarone. He was transferred to the advanced heart failure team at [facility]. Repeat echo at [facility] on 2020 showed an EF of 40%. He is now back at home with the assistance of caregivers. He is here with his sister at today's appointment. Patient denies chest pain, dyspnea, orthopnea, PND, edema, palpitations or sudden weight gain. Abigail Sutherland He is weighing on a daily basis and in fact has lost weight he states. His PCP, Dr. Aiden Gonzalez, discontinued spironolactone about 2 weeks ago. He seems to be doing well without it with no sudden weight gain. He also has a history of hyperkalemia    Patient sister is requesting referral to an internist rather than primary care physician    Fang Shore MD is PCP.   Mark Fields has the following history as recorded in [facility]:    Patient Active Problem List    Diagnosis Date Noted    Mixed hyperlipidemia 09/01/2020    Paroxysmal atrial fibrillation ([facility] 09/01/2020    Chronic systolic heart failure ([address] 07/28/2020    Nonischemic cardiomyopathy ([facility] 07/28/2020    Palliative care patient 06/29/2020    Cardiogenic shock ([address] 06/25/2020    Hyperkalemia 06/24/2020    Acute kidney injury ([address] 06/24/2020    Hypocalcemia 06/24/2020    Pneumonia due to infectious organism 54/86/0307    Acute systolic heart failure ([address] 06/23/2020    Hypotension 06/23/2020    New onset of congestive heart failure ([facility] 06/20/2020    Atrial fibrillation with RVR ([facility] 06/20/2020    Dyslipidemia 06/20/2020    Thrombocytopenia ([facility]     Hyponatremia     Alcohol abuse 05/07/2018    Transaminitis 05/07/2018    Chest pain 05/06/2018    CAD (coronary artery disease)     Essential hypertension      Past Medical History:   Diagnosis Date    CAD (coronary artery disease)     Gout     Hypertension     Non-ST elevation MI (NSTEMI) ([facility] 12/28/14    Palliative care patient 06/29/2020     Past Surgical History:   Procedure Laterality Date    CARDIAC CATHETERIZATION  12/29/[facility]  [facility]    angioplasty, stent to LAD. EF 45%    CHOLECYSTECTOMY       Family History   Problem Relation Age of Onset    No Known Problems Mother     Heart Disease Father      Social History     Tobacco Use    Smoking status: Former Smoker     Types: Cigars    Smokeless tobacco: Never Used   Substance Use Topics    Alcohol use:  Yes     Alcohol/week: 3.0 standard drinks     Types: 3 Shots of liquor per week     Comment: daily      Current Outpatient Medications   Medication Sig Dispense Refill    acetaminophen (TYLENOL) 325 MG tablet Take 650 mg by mouth every 6 hours as needed for Pain      docusate sodium (COLACE) 100 MG capsule Take 100 mg by mouth 2 times daily      levothyroxine (SYNTHROID) 125 MCG tablet Take 125 mcg by mouth Daily      lisinopril (PRINIVIL;ZESTRIL) 2.5 MG tablet Take 2.5 mg by mouth daily      metoprolol succinate (TOPROL XL) 25 MG extended release tablet Take 12.5 mg by mouth daily      ondansetron (ZOFRAN) 4 MG tablet Take 4 mg by mouth every 8 hours as needed for Nausea or Vomiting      spironolactone (ALDACTONE) 25 MG tablet Take 0.5 tablets by mouth daily (Patient taking differently: Take 12.5 mg by mouth as needed ) 45 tablet 3    amiodarone (CORDARONE) 200 MG tablet Take 1 tablet by mouth daily 30 tablet 3    furosemide (LASIX) 40 MG tablet Take 1 tablet by mouth See Admin Instructions May take extra 40mg for weight gain 90 tablet 3    apixaban (ELIQUIS) 5 MG TABS tablet Take 1 tablet by mouth 2 times daily 60 tablet 1    aspirin 81 MG chewable tablet Take 81 mg by mouth daily      atorvastatin (LIPITOR) 40 MG tablet Take 40 mg by mouth daily. No current facility-administered medications for this visit. Allergies: Patient has no known allergies. Review of Systems  Review of Systems   Constitutional: Positive for fatigue. Negative for activity change, diaphoresis, fever and unexpected weight change. HENT: Negative for facial swelling and nosebleeds. Eyes: Negative for redness and visual disturbance. Respiratory: Negative for cough, chest tightness, shortness of breath and wheezing. Cardiovascular: Negative for chest pain, palpitations and leg swelling. Gastrointestinal: Negative for abdominal pain, nausea and vomiting. Endocrine: Negative for cold intolerance and heat intolerance. Genitourinary: Negative for dysuria and hematuria. Musculoskeletal: Negative for arthralgias and myalgias. C/o balance issues   Skin: Negative for pallor and rash. Neurological: Negative for dizziness, seizures, syncope, weakness and light-headedness. Hematological: Does not bruise/bleed easily. Psychiatric/Behavioral: Negative for agitation. The patient is not nervous/anxious. Objective  Vital Signs - /68   Pulse 64   Ht 6' (1.829 m)   Wt 182 lb (82.6 kg)   BMI 24.68 kg/m²    Wt Readings from Last 3 Encounters:   09/01/20 182 lb (82.6 kg)   07/28/20 183 lb (83 kg)   06/29/20 189 lb 3.2 oz (85.8 kg)      Physical Exam  Vitals signs and nursing note reviewed. Constitutional:       General: He is not in acute distress. Appearance: He is well-developed. He is not diaphoretic. Comments: Deconditioned   HENT:      Head: Normocephalic and atraumatic. Right Ear: Hearing and external ear normal.      Left Ear: Hearing and external ear normal.      Nose: Nose normal.   Eyes:      General:         Right eye: No discharge. Left eye: No discharge. Pupils: Pupils are equal, round, and reactive to light. Neck:      Musculoskeletal: Neck supple. No muscular tenderness. Thyroid: No thyromegaly. Vascular: No carotid bruit or JVD. Trachea: No tracheal deviation. Cardiovascular:      Rate and Rhythm: Normal rate and regular rhythm. Heart sounds: Normal heart sounds. No murmur. No friction rub. No gallop. Pulmonary:      Effort: Pulmonary effort is normal. No respiratory distress. Breath sounds: Normal breath sounds. No wheezing or rales. Abdominal:      Palpations: Abdomen is soft. Tenderness: There is no abdominal tenderness. Musculoskeletal:         General: Swelling present. No deformity. Right lower leg: Edema (trace) present. Left lower leg: Edema (trace) present. Comments: Ambulates with cane   Skin:     General: Skin is warm and dry. Findings: No rash. Neurological:      General: No focal deficit present. Mental Status: He is alert and oriented to person, place, and time. Cranial Nerves: No cranial nerve deficit.    Psychiatric:         Mood and Affect: Mood normal.         Behavior: Behavior normal.         Judgment: Judgment normal.         Data:  Cardiac catheterization 6/20:    months    Alcohol abuse- history of, patient currently reports moderation    Stable cardiovascular status. No evidence of overt heart failure,angina or dysrhythmia. Plan    Orders Placed This Encounter   Procedures   Tamika Kaplan MD, Internal Medicine, [place]     Referral Priority:   Routine     Referral Type:   Eval and Treat     Referral Reason:   Specialty Services Required     Referred to Provider:   Merary Nichols MD     Requested Specialty:   Internal Medicine     Number of Visits Requested:   1     Return in about 3 months (around 12/1/2020) for APRN. OK to take an extra Lasix for weight gain over 3lbs in 24 hours or 5lbs over a week. If weight is not improving by the next day, call the office. Refer to Dr. Dat Rausch to establish care    - Weigh daily and report weight gain of 3lbs or more in 24hrs or 5lbs in one week. - Call for increasing shortness of breath or increasing swelling in feet and legs. (This could mean you are retaining too much fluid)  - 2000mg low sodium diet  - Fluid restriction of 1500ml per day (about 6 cups of fluid per day)      Call with any questionsor concerns  Follow up with Leesa López MD for non cardiac problems  Report any new problems  Cardiovascular Fitness-Exercise as tolerated. Strive for 15 minutes of exercise most days of the week. Cardiac / HealthyDiet  Continue current medications as directed  Continue plan of treatment  It is always recommended that you bring your medicationsbottles with you to each visit - this is for your safety!        ADY Warren

## 2020-09-16 ENCOUNTER — HOSPITAL ENCOUNTER (EMERGENCY)
Age: 70
Discharge: HOME | End: 2020-09-16
Attending: EMERGENCY MEDICINE
Payer: MEDICARE

## 2020-09-16 ENCOUNTER — APPOINTMENT (OUTPATIENT)
Dept: GENERAL RADIOLOGY | Age: 70
End: 2020-09-16
Payer: MEDICARE

## 2020-09-16 VITALS
BODY MASS INDEX: 23.43 KG/M2 | TEMPERATURE: 100.6 F | WEIGHT: 173 LBS | SYSTOLIC BLOOD PRESSURE: 136 MMHG | HEIGHT: 72 IN | HEART RATE: 107 BPM | DIASTOLIC BLOOD PRESSURE: 87 MMHG | OXYGEN SATURATION: 98 % | RESPIRATION RATE: 20 BRPM

## 2020-09-16 DIAGNOSIS — R11.2 NON-INTRACTABLE VOMITING WITH NAUSEA, UNSPECIFIED VOMITING TYPE: Primary | ICD-10-CM

## 2020-09-16 LAB
ADENOVIRUS BY PCR: NOT DETECTED
ALBUMIN SERPL-MCNC: 4.1 G/DL (ref 3.5–5.2)
ALP BLD-CCNC: 120 U/L (ref 40–130)
ALT SERPL-CCNC: 32 U/L (ref 5–41)
ANION GAP SERPL CALCULATED.3IONS-SCNC: 16 MMOL/L (ref 7–19)
AST SERPL-CCNC: 35 U/L (ref 5–40)
BACTERIA: NEGATIVE /HPF
BASOPHILS ABSOLUTE: 0 K/UL (ref 0–0.2)
BASOPHILS RELATIVE PERCENT: 0.5 % (ref 0–1)
BILIRUB SERPL-MCNC: 1.3 MG/DL (ref 0.2–1.2)
BILIRUBIN URINE: NEGATIVE
BLOOD, URINE: ABNORMAL
BORDETELLA PARAPERTUSSIS BY PCR: NOT DETECTED
BORDETELLA PERTUSSIS BY PCR: NOT DETECTED
BUN BLDV-MCNC: 42 MG/DL (ref 8–23)
CALCIUM SERPL-MCNC: 9.2 MG/DL (ref 8.8–10.2)
CHLAMYDOPHILIA PNEUMONIAE BY PCR: NOT DETECTED
CHLORIDE BLD-SCNC: 96 MMOL/L (ref 98–111)
CLARITY: CLEAR
CO2: 22 MMOL/L (ref 22–29)
COLOR: YELLOW
CORONAVIRUS 229E BY PCR: NOT DETECTED
CORONAVIRUS HKU1 BY PCR: NOT DETECTED
CORONAVIRUS NL63 BY PCR: NOT DETECTED
CORONAVIRUS OC43 BY PCR: NOT DETECTED
CREAT SERPL-MCNC: 1.7 MG/DL (ref 0.5–1.2)
CRYSTALS, UA: ABNORMAL /HPF
EOSINOPHILS ABSOLUTE: 0.1 K/UL (ref 0–0.6)
EOSINOPHILS RELATIVE PERCENT: 2.8 % (ref 0–5)
EPITHELIAL CELLS, UA: 1 /HPF (ref 0–5)
GFR AFRICAN AMERICAN: 49
GFR NON-AFRICAN AMERICAN: 40
GLUCOSE BLD-MCNC: 106 MG/DL (ref 74–109)
GLUCOSE URINE: NEGATIVE MG/DL
HCT VFR BLD CALC: 30.3 % (ref 42–52)
HEMOGLOBIN: 9.8 G/DL (ref 14–18)
HUMAN METAPNEUMOVIRUS BY PCR: NOT DETECTED
HUMAN RHINOVIRUS/ENTEROVIRUS BY PCR: NOT DETECTED
HYALINE CASTS: 2 /HPF (ref 0–8)
IMMATURE GRANULOCYTES #: 0 K/UL
INFLUENZA A BY PCR: NOT DETECTED
INFLUENZA B BY PCR: NOT DETECTED
KETONES, URINE: NEGATIVE MG/DL
LACTIC ACID: 2.8 MMOL/L (ref 0.5–1.9)
LEUKOCYTE ESTERASE, URINE: NEGATIVE
LYMPHOCYTES ABSOLUTE: 0.3 K/UL (ref 1.1–4.5)
LYMPHOCYTES RELATIVE PERCENT: 7.6 % (ref 20–40)
MCH RBC QN AUTO: 30.2 PG (ref 27–31)
MCHC RBC AUTO-ENTMCNC: 32.3 G/DL (ref 33–37)
MCV RBC AUTO: 93.2 FL (ref 80–94)
MONOCYTES ABSOLUTE: 0 K/UL (ref 0–0.9)
MONOCYTES RELATIVE PERCENT: 0.9 % (ref 0–10)
MYCOPLASMA PNEUMONIAE BY PCR: NOT DETECTED
NEUTROPHILS ABSOLUTE: 3.7 K/UL (ref 1.5–7.5)
NEUTROPHILS RELATIVE PERCENT: 88 % (ref 50–65)
NITRITE, URINE: NEGATIVE
PARAINFLUENZA VIRUS 1 BY PCR: NOT DETECTED
PARAINFLUENZA VIRUS 2 BY PCR: NOT DETECTED
PARAINFLUENZA VIRUS 3 BY PCR: NOT DETECTED
PARAINFLUENZA VIRUS 4 BY PCR: NOT DETECTED
PDW BLD-RTO: 15.1 % (ref 11.5–14.5)
PH UA: 5 (ref 5–8)
PLATELET # BLD: 253 K/UL (ref 130–400)
PMV BLD AUTO: 8.6 FL (ref 9.4–12.4)
POTASSIUM REFLEX MAGNESIUM: 4.7 MMOL/L (ref 3.5–5)
PROTEIN UA: NEGATIVE MG/DL
RBC # BLD: 3.25 M/UL (ref 4.7–6.1)
RBC UA: 3 /HPF (ref 0–4)
RESPIRATORY SYNCYTIAL VIRUS BY PCR: NOT DETECTED
SARS-COV-2, PCR: NOT DETECTED
SODIUM BLD-SCNC: 134 MMOL/L (ref 136–145)
SPECIFIC GRAVITY UA: 1.01 (ref 1–1.03)
TOTAL PROTEIN: 7.8 G/DL (ref 6.6–8.7)
UROBILINOGEN, URINE: 0.2 E.U./DL
WBC # BLD: 4.2 K/UL (ref 4.8–10.8)
WBC UA: 1 /HPF (ref 0–5)

## 2020-09-16 PROCEDURE — 83605 ASSAY OF LACTIC ACID: CPT

## 2020-09-16 PROCEDURE — 0202U NFCT DS 22 TRGT SARS-COV-2: CPT

## 2020-09-16 PROCEDURE — 99283 EMERGENCY DEPT VISIT LOW MDM: CPT

## 2020-09-16 PROCEDURE — 99284 EMERGENCY DEPT VISIT MOD MDM: CPT

## 2020-09-16 PROCEDURE — 80053 COMPREHEN METABOLIC PANEL: CPT

## 2020-09-16 PROCEDURE — 0100U HC RESPIRPTHGN MULT REV TRANS & AMP PRB TECH 21 TRGT: CPT

## 2020-09-16 PROCEDURE — 85025 COMPLETE CBC W/AUTO DIFF WBC: CPT

## 2020-09-16 PROCEDURE — 81001 URINALYSIS AUTO W/SCOPE: CPT

## 2020-09-16 PROCEDURE — 71045 X-RAY EXAM CHEST 1 VIEW: CPT

## 2020-09-16 PROCEDURE — 96374 THER/PROPH/DIAG INJ IV PUSH: CPT

## 2020-09-16 PROCEDURE — 2580000003 HC RX 258: Performed by: EMERGENCY MEDICINE

## 2020-09-16 PROCEDURE — 36415 COLL VENOUS BLD VENIPUNCTURE: CPT

## 2020-09-16 PROCEDURE — 96375 TX/PRO/DX INJ NEW DRUG ADDON: CPT

## 2020-09-16 PROCEDURE — 6360000002 HC RX W HCPCS: Performed by: EMERGENCY MEDICINE

## 2020-09-16 PROCEDURE — 99999 PR OFFICE/OUTPT VISIT,PROCEDURE ONLY: CPT | Performed by: EMERGENCY MEDICINE

## 2020-09-16 RX ORDER — 0.9 % SODIUM CHLORIDE 0.9 %
1000 INTRAVENOUS SOLUTION INTRAVENOUS ONCE
Status: COMPLETED | OUTPATIENT
Start: 2020-09-16 | End: 2020-09-16

## 2020-09-16 RX ORDER — LORAZEPAM 2 MG/ML
2 INJECTION INTRAMUSCULAR ONCE
Status: COMPLETED | OUTPATIENT
Start: 2020-09-16 | End: 2020-09-16

## 2020-09-16 RX ORDER — ONDANSETRON 2 MG/ML
4 INJECTION INTRAMUSCULAR; INTRAVENOUS ONCE
Status: COMPLETED | OUTPATIENT
Start: 2020-09-16 | End: 2020-09-16

## 2020-09-16 RX ORDER — ONDANSETRON 4 MG/1
4 TABLET, ORALLY DISINTEGRATING ORAL EVERY 8 HOURS PRN
Qty: 15 TABLET | Refills: 0 | Status: SHIPPED | OUTPATIENT
Start: 2020-09-16 | End: 2020-10-02 | Stop reason: ALTCHOICE

## 2020-09-16 RX ADMIN — LORAZEPAM 2 MG: 2 INJECTION INTRAMUSCULAR; INTRAVENOUS at 16:10

## 2020-09-16 RX ADMIN — ONDANSETRON 4 MG: 2 INJECTION INTRAMUSCULAR; INTRAVENOUS at 16:10

## 2020-09-16 RX ADMIN — SODIUM CHLORIDE 1000 ML: 9 INJECTION, SOLUTION INTRAVENOUS at 17:48

## 2020-09-16 ASSESSMENT — ENCOUNTER SYMPTOMS
SORE THROAT: 0
ABDOMINAL PAIN: 0
NAUSEA: 1
DIARRHEA: 0
COUGH: 0

## 2020-09-16 NOTE — ED NOTES
Bed: 08  Expected date:   Expected time:   Means of arrival:   Comments:  Pt in room     Karlie Nunez, PRAVIN  09/16/20 9036

## 2020-09-16 NOTE — ED PROVIDER NOTES
[facility] EMERGENCY DEPT  eMERGENCY dEPARTMENT eNCOUnter      Pt Name: Chapincito Miranda  MRN: [id number]  [place] 1950  Date of evaluation: 9/16/2020  Provider: Anny Zacarias MD    [address] [place] [address]       Chief Complaint   Patient presents with    Emesis     starting approx 1 hour ago         HISTORY OF PRESENT ILLNESS   (Location/Symptom, Timing/Onset,Context/Setting, Quality, Duration, Modifying Factors, Severity)  Note limiting factors. Chapincito Miranda is a 78 y.o. male who presents to the emergency department      The history is provided by the patient. Nausea & Vomiting   Severity:  Moderate  Duration:  1 hour  Number of daily episodes:  3  Quality:  Bilious material  Progression:  Unchanged  Chronicity:  New  Recent urination:  Normal  Relieved by:  None tried  Worsened by:  Nothing  Ineffective treatments:  None tried  Associated symptoms: fever (noted here)    Associated symptoms: no abdominal pain, no arthralgias, no chills, no cough, no diarrhea, no headaches, no myalgias, no sore throat and no URI    Associated symptoms comment:  Tremor. Daily drinker  Risk factors: alcohol use (last pm)        NursingNotes were reviewed. REVIEW OF SYSTEMS    (2-9 systems for level 4, 10 or more for level 5)     Review of Systems   Constitutional: Positive for fever (noted here). Negative for chills. HENT: Negative for sore throat. Respiratory: Negative for cough. Gastrointestinal: Positive for nausea. Negative for abdominal pain and diarrhea. Musculoskeletal: Negative for arthralgias and myalgias. Neurological: Negative for headaches. All other systems reviewed and are negative. Except as noted above the remainder of the review of systems was reviewed and negative.        PAST MEDICAL HISTORY     Past Medical History:   Diagnosis Date    CAD (coronary artery disease)     Gout     Hypertension     Non-ST elevation MI (NSTEMI) ([facility] [address] 12/28/14    Palliative care patient 06/29/2020 SURGICALHISTORY       Past Surgical History:   Procedure Laterality Date    CARDIAC CATHETERIZATION  12/29/14  [facility]    angioplasty, stent to LAD. EF 45%    CHOLECYSTECTOMY           CURRENT MEDICATIONS       Discharge Medication List as of 9/16/2020  6:14 PM      CONTINUE these medications which have NOT CHANGED    Details   acetaminophen (TYLENOL) 325 MG tablet Take 650 mg by mouth every 6 hours as needed for PainHistorical Med      aspirin 81 MG chewable tablet Take 81 mg by mouth dailyHistorical Med      docusate sodium (COLACE) 100 MG capsule Take 100 mg by mouth 2 times dailyHistorical Med      levothyroxine (SYNTHROID) 125 MCG tablet Take 125 mcg by mouth DailyHistorical Med      lisinopril (PRINIVIL;ZESTRIL) 2.5 MG tablet Take 2.5 mg by mouth dailyHistorical Med      metoprolol succinate (TOPROL XL) 25 MG extended release tablet Take 12.5 mg by mouth dailyHistorical Med      ondansetron (ZOFRAN) 4 MG tablet Take 4 mg by mouth every 8 hours as needed for Nausea or VomitingHistorical Med      spironolactone (ALDACTONE) 25 MG tablet Take 0.5 tablets by mouth daily, Disp-45 tablet,R-3Dose decreaseNormal      amiodarone (CORDARONE) 200 MG tablet Take 1 tablet by mouth daily, Disp-30 tablet,R-3Dose decreaseNormal      furosemide (LASIX) 40 MG tablet Take 1 tablet by mouth See Admin Instructions May take extra 40mg for weight gain, Disp-90 tablet,R-3Dose decreaseNormal      apixaban (ELIQUIS) 5 MG TABS tablet Take 1 tablet by mouth 2 times daily, Disp-60 tablet, R-1Normal      atorvastatin (LIPITOR) 40 MG tablet Take 40 mg by mouth daily. ALLERGIES     Patient has no known allergies.     FAMILY HISTORY       Family History   Problem Relation Age of Onset    No Known Problems Mother     Heart Disease Father           SOCIAL HISTORY       Social History     Socioeconomic History    Marital status:      Spouse name: None    Number of children: None    Years of education: None    Highest education level: None   Occupational History    None   Social Needs    Financial resource strain: None    Food insecurity     Worry: None     Inability: None    Transportation needs     Medical: None     Non-medical: None   Tobacco Use    Smoking status: Former Smoker     Types: Cigars    Smokeless tobacco: Never Used   Substance and Sexual Activity    Alcohol use: Yes     Alcohol/week: 3.0 standard drinks     Types: 3 Shots of liquor per week     Comment: daily    Drug use: No    Sexual activity: None   Lifestyle    Physical activity     Days per week: None     Minutes per session: None    Stress: None   Relationships    Social connections     Talks on phone: None     Gets together: None     Attends Congregation service: None     Active member of club or organization: None     Attends meetings of clubs or organizations: None     Relationship status: None    Intimate partner violence     Fear of current or ex partner: None     Emotionally abused: None     Physically abused: None     Forced sexual activity: None   Other Topics Concern    None   Social History Narrative    None       SCREENINGS    Joaquina Coma Scale  Eye Opening: Spontaneous  Best Verbal Response: Oriented  Best Motor Response: Obeys commands  Joaquina Coma Scale Score: 15 @FLOW([id number])@      PHYSICAL EXAM    (up to 7 for level 4, 8 or more for level 5)     ED Triage Vitals   BP Temp Temp Source Pulse Resp SpO2 Height Weight   09/16/20 1541 09/16/20 1540 09/16/20 1540 09/16/20 1541 09/16/20 1541 -- 09/16/20 1540 09/16/20 1540   136/87 100.6 °F (38.1 °C) Oral 107 20  6' (1.829 m) 173 lb (78.5 kg)       Physical Exam  Vitals signs and nursing note reviewed. Constitutional:       General: He is not in acute distress. Appearance: Normal appearance. He is normal weight. He is not ill-appearing, toxic-appearing or diaphoretic.    HENT:      Nose: Nose normal.      Mouth/Throat:      Mouth: Mucous membranes are moist.      Pharynx: Oropharynx is clear. Eyes:      Conjunctiva/sclera: Conjunctivae normal.   Neck:      Musculoskeletal: Normal range of motion and neck supple. Cardiovascular:      Rate and Rhythm: Regular rhythm. Tachycardia present. Heart sounds: Normal heart sounds. Comments: Mild tachy  Pulmonary:      Effort: Pulmonary effort is normal.      Breath sounds: Normal breath sounds. Abdominal:      Tenderness: There is no abdominal tenderness. Musculoskeletal:      Right lower leg: No edema. Left lower leg: No edema. Skin:     General: Skin is warm and dry. Neurological:      General: No focal deficit present. Mental Status: He is alert and oriented to person, place, and time. Cranial Nerves: No cranial nerve deficit. Psychiatric:         Mood and Affect: Mood normal.         DIAGNOSTIC RESULTS     EKG: All EKG's are interpreted by the Emergency Department Physician who either signs or Co-signsthis chart in the absence of a cardiologist.        RADIOLOGY:   [place] Gun such as CT, Ultrasound and MRI are read by the radiologist. Plain radiographic images are visualized and preliminarily interpreted by the emergency physician with the below findings:        Interpretation per the Radiologist below, if available at the time ofthis note:    XR CHEST PORTABLE   Final Result   . No acute disease.    Signed by Dr Royce Cavanaugh on 9/16/2020 5:14 PM            ED BEDSIDE ULTRASOUND:   Performed by ED Physician - none    LABS:  Labs Reviewed   CBC WITH AUTO DIFFERENTIAL - Abnormal; Notable for the following components:       Result Value    WBC 4.2 (*)     RBC 3.25 (*)     Hemoglobin 9.8 (*)     Hematocrit 30.3 (*)     MCHC 32.3 (*)     RDW 15.1 (*)     MPV 8.6 (*)     Neutrophils % 88.0 (*)     Lymphocytes % 7.6 (*)     Lymphocytes Absolute 0.3 (*)     All other components within normal limits   COMPREHENSIVE METABOLIC PANEL W/ REFLEX TO MG FOR LOW K - Abnormal; Notable for the following components:    Sodium 134 (*)     Chloride 96 (*)     BUN 42 (*)     CREATININE 1.7 (*)     GFR Non- 40 (*)     GFR  49 (*)     Total Bilirubin 1.3 (*)     All other components within normal limits   URINE RT REFLEX TO CULTURE - Abnormal; Notable for the following components:    Blood, Urine TRACE (*)     All other components within normal limits   LACTIC ACID, PLASMA - Abnormal; Notable for the following components:    Lactic Acid 2.8 (*)     All other components within normal limits    Narrative:     CALL  [facility]  [facility] tel. ,  Chemistry results called to and read back by Marcin Ramos in ED, 09/16/2020  17:30, by [address] - Abnormal; Notable for the following components:    Bacteria, UA NEGATIVE (*)     Crystals, UA NEG (*)     All other components within normal limits   RESPIRATORY PANEL, MOLECULAR       All other labs were within normal range or not returned as of this dictation. EMERGENCY DEPARTMENT COURSE and DIFFERENTIAL DIAGNOSIS/MDM:   Vitals:    Vitals:    09/16/20 1540 09/16/20 1541 09/16/20 1828   BP:  136/87    Pulse:  107    Resp:  20    Temp: 100.6 °F (38.1 °C)     TempSrc: Oral     SpO2:   98%   Weight: 173 lb (78.5 kg)     Height: 6' (1.829 m)             MDM  Number of Diagnoses or Management Options  Non-intractable vomiting with nausea, unspecified vomiting type:   Diagnosis management comments: Improved at d/c. No emesis in ED. CRITICAL CARE TIME   Total Critical Care time was 0 minutes, excluding separately reportable procedures. There was a high probability of clinically significant/lifethreatening deterioration in the patient's condition which required my urgent intervention. CONSULTS:  None    PROCEDURES:  Unless otherwise noted below, none     Procedures    FINAL IMPRESSION      1.  Non-intractable vomiting with nausea, unspecified vomiting type          DISPOSITION/PLAN   DISPOSITION        PATIENT REFERRED TO:  Erin Webb MD  [address] [facility]  [phone]      As needed      DISCHARGE MEDICATIONS:  Discharge Medication List as of 9/16/2020  6:14 PM      START taking these medications    Details   ondansetron (ZOFRAN ODT) 4 MG disintegrating tablet Take 1 tablet by mouth every 8 hours as needed for Nausea or Vomiting, Disp-15 tablet,R-0Print                (Please note that portions of this note were completed with a voice recognition program.  Efforts were made to edit the dictations but occasionally words are mis-transcribed.)    Brandy Gross MD (electronically signed)  Attending Emergency Physician         Brandy Gross MD  09/16/20 3616

## 2020-09-17 ENCOUNTER — CARE COORDINATION (OUTPATIENT)
Dept: CARE COORDINATION | Age: 70
End: 2020-09-17

## 2020-09-17 NOTE — CARE COORDINATION
ACM unable to reach pt for CT call following ER visit on 9/16. Pt does not have voicemail. ACM left HIPAA compliant msg on sister's phone - requested that she ask pt to call back to AC.   Electronically signed by Melinda Medina RN on 9/17/2020 at 10:52 AM

## 2020-09-17 NOTE — ACP (ADVANCE CARE PLANNING)
Advance Care Planning   The patient has the following advanced directives on file:  Advanced Directives     Power of IDA & [employer] PATO Will    Not on File Filed on 05/06/18          The patient has appointed the following active healthcare agents:    Primary Decision Maker: Wm Edgar - Brother/Sister - [phone]    The Patient has the following current code status:    Code Status: Prior    Visit Documentation:  I called and spoke with Zuri López regarding Advance Care Planning and Advance Care Directives, including Living Will and [address] [place] [address]. I discussed Advance Care Planning with Zuri López today which included the importance of making their choices for care and treatment in the case of a health event that adversely affects their decision-making abilities. He has not completed the Advance Care Directives. He said he has an active health care agent at this time. Zuri López was encouraged to complete the declaration forms and provide a signed copy of his medical records.    Ray Vazquez RN  9/17/2020

## 2020-09-17 NOTE — CARE COORDINATION
Patient contacted regarding recent discharge and COVID-19 risk. Discussed COVID-19 related testing which was available at this time. Test results were negative. Patient informed of results, if available? Yes     Care Transition Nurse/ Ambulatory Care Manager contacted the family by telephone to perform post discharge assessment. Verified name and  with patient as identifiers. Patient has following risk factors of: heart failure. CTN/ACM reviewed discharge instructions, medical action plan and red flags related to discharge diagnosis. Reviewed and educated them on any new and changed medications related to discharge diagnosis. Advised obtaining a 90-day supply of all daily and as-needed medications. Marjan Bills said he has had no further vomiting since his ED visit. He plans to get his Zofran today but does not feel he needs it currently. Pt has been eating and drinking without difficulty. He denied nausea, fever, CP, SOA or weakness. He said he does not want to make an appointment with his PCP as he feels back to normal. Pt was encouraged to get a flu shot but said he does not get them routinely. Pt said he is aware of the COVID precautions and is very careful to avoid exposure. Pt agreed to f/u with ACM as below. Education provided regarding infection prevention, and signs and symptoms of COVID-19 and when to seek medical attention with patient who verbalized understanding. Discussed exposure protocols and quarantine from [address] you at higher risk for severe illness  and given an opportunity for questions and concerns. The patient agrees to contact the COVID-19 hotline [phone] or PCP office for questions related to their healthcare. CTN/ACM provided contact information for future reference. From CDC: Are you at higher risk for severe illness?  Wash your hands often.  Avoid close contact (6 feet, which is about two arm lengths) with people who are sick.    Put distance between yourself and other people if COVID-19 is spreading in your community.  Clean and disinfect frequently touched surfaces.  Avoid all cruise travel and non-essential air travel.  Call your healthcare professional if you have concerns about COVID-19 and your underlying condition or if you are sick. For more information on steps you can take to protect yourself, see CDC's How to [id number] [place] [address] for follow-up call in 7-14 days based on severity of symptoms and risk factors.

## 2020-10-01 ENCOUNTER — CARE COORDINATION (OUTPATIENT)
Dept: CARE COORDINATION | Age: 70
End: 2020-10-01

## 2020-10-01 NOTE — CARE COORDINATION
Your Patient resolved from the Care Transitions episode on 10/1/2020  Discussed COVID-19 related testing which was available at this time. Test results were negative. Patient informed of results, if available? Yes    Patient/family has been provided the following resources and education related to COVID-19:                         Signs, symptoms and red flags related to COVID-19            CDC exposure and quarantine guidelines            [facility] exposure contact - [phone]            Contact for their local Department of Health                 Patient currently reports that the following symptoms have improved:  no new/worsening symptoms     No further outreach scheduled with this CTN/ACM. Episode of Care resolved. Patient has this CTN/ACM contact information if future needs arise.

## 2020-10-01 NOTE — PROGRESS NOTES
[facility] INTERNAL MEDICINE  [address] [place] [address] Felipe Membreno [address]  Phone: [phone]  Fax: [phone]    Visit Date:  10/2/2020    SUBJECTIVE:     Trish Alamo is a 78 y.o. male presents today in the office for:    Chief Complaint   Patient presents with   [address]       HPI  78year old male NPV  HX of Nonischemic Cardiomyopathy, recently went home from NH, afte he was discharged from [place]  PAF on anticoagulation  HX of CKD, unclear etiology  Hypothyroidism  Chronic Low Back pains,   Frequent falls  Remote Hx of alcohol consumption  Lives alone, single with a dog  Has sisters    Past Medical History:   Diagnosis Date    CAD (coronary artery disease)     Gout     Hypertension     Non-ST elevation MI (NSTEMI) ([address] 14    Palliative care patient 2020       Past Surgical History:   Procedure Laterality Date    CARDIAC CATHETERIZATION  14  [facility]    angioplasty, stent to LAD. EF 45%    CHOLECYSTECTOMY         Social History     Socioeconomic History    Marital status:      Spouse name: Not on file    Number of children: Not on file    Years of education: Not on file    Highest education level: Not on file   Occupational History    Not on file   Social Needs    Financial resource strain: Not on file    Food insecurity     Worry: Not on file     Inability: Not on file    Transportation needs     Medical: Not on file     Non-medical: Not on file   Tobacco Use    Smoking status: Former Smoker     Packs/day: 1.00     Years: 3.00     Pack years: 3.00     Types: Cigars     Start date:      Last attempt to quit:      Years since quittin.7    Smokeless tobacco: Never Used    Tobacco comment: on occasion   Substance and Sexual Activity    Alcohol use:  Yes     Alcohol/week: 3.0 standard drinks     Types: 3 Shots of liquor per week     Frequency: 4 or more times a week     Drinks per session: 1 or 2     Binge frequency: Daily or almost daily  Basophils Absolute 09/16/2020 0.00  0.00 - 0.20 K/uL Final    Sodium 09/16/2020 134* 136 - 145 mmol/L Final    Potassium reflex Magnesium 09/16/2020 4.7  3.5 - 5.0 mmol/L Final    Chloride 09/16/2020 96* 98 - 111 mmol/L Final    CO2 09/16/2020 22  22 - 29 mmol/L Final    Anion Gap 09/16/2020 16  7 - 19 mmol/L Final    Glucose 09/16/2020 106  74 - 109 mg/dL Final    BUN 09/16/2020 42* 8 - 23 mg/dL Final    CREATININE 09/16/2020 1.7* 0.5 - 1.2 mg/dL Final    GFR Non- 09/16/2020 40* >60 Final    Comment: This calculation may be inaccurate for patients under the age of 25 years. For ages 25 and older, a GFR >60 mL/min/1.73m2 (not corrected for weight) is  valid for stable renal function.  GFR  09/16/2020 49* >59 Final    Comment: Chronic Kidney Disease: less than 60 ml/min/1.73 sq.m. Kidney Failure: less than 15 ml/min/1.73 sq.m. Results valid for patients 18 years and older.       Calcium 09/16/2020 9.2  8.8 - 10.2 mg/dL Final    Total Protein 09/16/2020 7.8  6.6 - 8.7 g/dL Final    Alb 09/16/2020 4.1  3.5 - 5.2 g/dL Final    Total Bilirubin 09/16/2020 1.3* 0.2 - 1.2 mg/dL Final    Alkaline Phosphatase 09/16/2020 120  40 - 130 U/L Final    ALT 09/16/2020 32  5 - 41 U/L Final    AST 09/16/2020 35  5 - 40 U/L Final    Color, UA 09/16/2020 YELLOW  Straw/Yellow Final    Clarity, UA 09/16/2020 Clear  Clear Final    Glucose, Ur 09/16/2020 Negative  Negative mg/dL Final    Bilirubin Urine 09/16/2020 Negative  Negative Final    Ketones, Urine 09/16/2020 Negative  Negative mg/dL Final    Specific [place], UA 09/16/2020 1.012  1.005 - 1.030 Final    Blood, Urine 09/16/2020 TRACE* Negative Final    pH, UA 09/16/2020 5.0  5.0 - 8.0 Final    Protein, UA 09/16/2020 Negative  Negative mg/dL Final    Urobilinogen, Urine 09/16/2020 0.2  <2.0 E.U./dL Final    Nitrite, Urine 09/16/2020 Negative  Negative Final    Leukocyte Esterase, Urine 09/16/2020 Negative Negative Final    Lactic Acid 09/16/2020 2.8* 0.5 - 1.9 mmol/L Final    Adenovirus by PCR 09/16/2020 Not Detected  Not Detected Final    Bordetella parapertussis by PCR 09/16/2020 Not Detected  Not Detected Final    Bordetella pertussis by PCR 09/16/2020 Not Detected  Not Detected Final    Chlamydophilia pneumoniae by PCR 09/16/2020 Not Detected  Not Detected Final    Coronavirus 229E by PCR 09/16/2020 Not Detected  Not Detected Final    Coronavirus HKU1 by PCR 09/16/2020 Not Detected  Not Detected Final    Coronavirus NL63 by PCR 09/16/2020 Not Detected  Not Detected Final    Coronavirus OC43 by PCR 09/16/2020 Not Detected  Not Detected Final    SARS-CoV-2, PCR 09/16/2020 Not Detected  Not Detected Final    Comment: This test has been authorized by FDA under an  Emergency Use Authorization (EUA). This test is only authorized for the duration of the  time of declaration that circumstances exist justifying the  authorization of the emergency use of in vitro diagnostic  testing for detection of the SARS-CoV-2 virus  and/or diagnosis of COVID-19 infection under  section 564 (b)(1) of the Act, 21 U. S.C. 462ECK-8 (b) (1),  unless the authorization is terminated or revoked sooner.     Patient Fact SettlementContracts.gl  Provider Fact SettlementContracts.gl    METHODOLOGY: Multiplex PCR      Human Metapneumovirus by PCR 09/16/2020 Not Detected  Not Detected Final    Human Rhinovirus/Enterovirus by PCR 09/16/2020 Not Detected  Not Detected Final    Influenza A by PCR 09/16/2020 Not Detected  Not Detected Final    Influenza B by PCR 09/16/2020 Not Detected  Not Detected Final    Mycoplasma pneumoniae by PCR 09/16/2020 Not Detected  Not Detected Final    Parainfluenza Virus 1 by PCR 09/16/2020 Not Detected  Not Detected Final    Parainfluenza Virus 2 by PCR 09/16/2020 Not Detected  Not Detected Final    Parainfluenza Virus 3 by PCR 09/16/2020 Not Detected Not Detected Final    Parainfluenza Virus 4 by PCR 09/16/2020 Not Detected  Not Detected Final    Respiratory Syncytial Virus by PCR 09/16/2020 Not Detected  Not Detected Final    Bacteria, UA 09/16/2020 NEGATIVE* None Seen /HPF Final    Crystals, UA 09/16/2020 NEG* None Seen /HPF Final    Hyaline Casts, UA 09/16/2020 2  0 - 8 /HPF Final    WBC, UA 09/16/2020 1  0 - 5 /HPF Final    RBC, UA 09/16/2020 3  0 - 4 /HPF Final    Epithelial Cells, UA 09/16/2020 1  0 - 5 /HPF Final    Comment: Urinalysis microscopic performed using the  automated methodology (AUWI analyzer). ASSESSMENT:      Diagnosis Orders   1. Nonischemic cardiomyopathy ([facility] [address] [address] , may need echo if low EF possibly need and Aaron Martinez MD, Cardiology, [place]   2. Paroxysmal atrial fibrillation ([facility] [address] on A/C Comprehensive Metabolic Panel    Jeremy Bills MD, Cardiology, [place]   3. Coronary artery disease involving native coronary artery of native heart without angina pectoris  CBC Auto Differential    Hemoglobin A1C   4. Mixed hyperlipidemia   Cont Atorvastatin Lipid Panel   5. Acquired hypothyroidism  TSH without Reflex   6. Hypovitaminosis D  Vitamin D 25 Hydroxy    vitamin D (ERGOCALCIFEROL) 1.25 MG (00636 UT) CAPS capsule   7. Stage 3 chronic kidney disease, unspecified whether stage 3a or 3b CKD  Comprehensive Metabolic Panel    Urinalysis   8. Alcohol abuse  Gamma GT   9. Low back pain with sciatica, sciatica laterality unspecified, unspecified back pain laterality, unspecified chronicity  XR LUMBAR SPINE (2-3 VIEWS)   10. Need for vaccination against Streptococcus pneumoniae  PNEUMOVAX 23 subcutaneous/IM (Pneumococcal polysaccharide vaccine 23-valent >= 1yo)   11.  Compression fracture of T12 vertebra, initial encounter ([facility])  vitamin D (ERGOCALCIFEROL) 1.25 MG (11072 UT) CAPS capsule    Tissue Transglutaminase Antobody IgA w Reflex    Testosterone Free and Total Male    PTH, Intact    Electrophoresis Protein, Serum    DEXA BONE DENSITY 2 SITES       PLAN:      Medications Ordered:  Orders Placed This Encounter   Medications    vitamin D (ERGOCALCIFEROL) 1.25 MG (86154 UT) CAPS capsule     Sig: Take 1 capsule by mouth once a week     Dispense:  12 capsule     Refill:  1       Other Orders Placed:  Orders Placed This Encounter   Procedures    XR LUMBAR SPINE (2-3 VIEWS)     Standing Status:   Future     Number of Occurrences:   1     Standing Expiration Date:   10/2/2021     Order Specific Question:   Reason for exam:     Answer:   back pain    DEXA BONE DENSITY 2 SITES     Standing Status:   Future     Standing Expiration Date:   10/4/2021     Order Specific Question:   Reason for exam:     Answer:   compression Fx T 12    PNEUMOVAX 23 subcutaneous/IM (Pneumococcal polysaccharide vaccine 23-valent >= 3yo)    CBC Auto Differential     Standing Status:   Future     Number of Occurrences:   1     Standing Expiration Date:   1/2/2021    Comprehensive Metabolic Panel     Standing Status:   Future     Number of Occurrences:   1     Standing Expiration Date:   1/2/2021    Hemoglobin A1C     Standing Status:   Future     Standing Expiration Date:   1/2/2021    Lipid Panel     Standing Status:   Future     Number of Occurrences:   1     Standing Expiration Date:   1/2/2021     Order Specific Question:   Is Patient Fasting?/# of Hours     Answer:   yes    TSH without Reflex     Standing Status:   Future     Number of Occurrences:   1     Standing Expiration Date:   1/2/2021    Vitamin D 25 Hydroxy     Standing Status:   Future     Number of Occurrences:   1     Standing Expiration Date:   1/2/2021    Urinalysis     Standing Status:   Future     Number of Occurrences:   1     Standing Expiration Date:   1/2/2021    Gamma GT     Standing Status:   Future     Number of Occurrences:   1     Standing Expiration Date:   1/2/2021    Tissue Transglutaminase Antobody IgA w Reflex     Standing Status:   Future Standing Expiration Date:   10/4/2021    Testosterone Free and Total Male     Standing Status:   Future     Standing Expiration Date:   10/4/2021    PTH, Intact     Standing Status:   Future     Standing Expiration Date:   10/4/2021    Electrophoresis Protein, Serum     Standing Status:   Future     Standing Expiration Date:   10/4/2021   Carmen Negrete MD, Cardiology, [place]     Standing Status:   Future     Standing Expiration Date:   1/2/2021     Referral Priority:   Routine     Referral Type:   Eval and Treat     Referral Reason:   Specialty Services Required     Referred to Provider:   Cristina Mccall DO     Requested Specialty:   Cardiology     Number of Visits Requested:   1       Return in about 2 months (around 12/2/2020).            Electronically signed by Peterson Spencer MD on 10/4/2020 at 7:36 PM

## 2020-10-02 ENCOUNTER — HOSPITAL ENCOUNTER (OUTPATIENT)
Dept: GENERAL RADIOLOGY | Age: 70
Discharge: HOME | End: 2020-10-02
Payer: MEDICARE

## 2020-10-02 ENCOUNTER — OFFICE VISIT (OUTPATIENT)
Dept: INTERNAL MEDICINE | Age: 70
End: 2020-10-02
Payer: MEDICARE

## 2020-10-02 VITALS
HEIGHT: 72 IN | HEART RATE: 103 BPM | WEIGHT: 180 LBS | SYSTOLIC BLOOD PRESSURE: 138 MMHG | DIASTOLIC BLOOD PRESSURE: 80 MMHG | OXYGEN SATURATION: 93 % | BODY MASS INDEX: 24.38 KG/M2

## 2020-10-02 DIAGNOSIS — F10.10 ALCOHOL ABUSE: Chronic | ICD-10-CM

## 2020-10-02 DIAGNOSIS — I48.0 PAROXYSMAL ATRIAL FIBRILLATION (HCC): ICD-10-CM

## 2020-10-02 DIAGNOSIS — E55.9 HYPOVITAMINOSIS D: ICD-10-CM

## 2020-10-02 DIAGNOSIS — E03.9 ACQUIRED HYPOTHYROIDISM: ICD-10-CM

## 2020-10-02 DIAGNOSIS — I25.10 CORONARY ARTERY DISEASE INVOLVING NATIVE CORONARY ARTERY OF NATIVE HEART WITHOUT ANGINA PECTORIS: ICD-10-CM

## 2020-10-02 DIAGNOSIS — N18.30 STAGE 3 CHRONIC KIDNEY DISEASE, UNSPECIFIED WHETHER STAGE 3A OR 3B CKD (HCC): ICD-10-CM

## 2020-10-02 DIAGNOSIS — E78.2 MIXED HYPERLIPIDEMIA: ICD-10-CM

## 2020-10-02 PROBLEM — I50.21 ACUTE SYSTOLIC HEART FAILURE (HCC): Status: RESOLVED | Noted: 2020-06-23 | Resolved: 2020-10-02

## 2020-10-02 PROBLEM — R57.0 CARDIOGENIC SHOCK (HCC): Status: RESOLVED | Noted: 2020-06-25 | Resolved: 2020-10-02

## 2020-10-02 PROBLEM — N17.9 ACUTE KIDNEY INJURY (HCC): Status: RESOLVED | Noted: 2020-06-24 | Resolved: 2020-10-02

## 2020-10-02 PROBLEM — E87.5 HYPERKALEMIA: Status: RESOLVED | Noted: 2020-06-24 | Resolved: 2020-10-02

## 2020-10-02 PROBLEM — I48.91 ATRIAL FIBRILLATION WITH RVR (HCC): Status: RESOLVED | Noted: 2020-06-20 | Resolved: 2020-10-02

## 2020-10-02 PROBLEM — I50.9 NEW ONSET OF CONGESTIVE HEART FAILURE (HCC): Status: RESOLVED | Noted: 2020-06-20 | Resolved: 2020-10-02

## 2020-10-02 PROBLEM — E83.51 HYPOCALCEMIA: Status: RESOLVED | Noted: 2020-06-24 | Resolved: 2020-10-02

## 2020-10-02 PROBLEM — R07.9 CHEST PAIN: Status: RESOLVED | Noted: 2018-05-06 | Resolved: 2020-10-02

## 2020-10-02 PROBLEM — I50.22 CHRONIC SYSTOLIC HEART FAILURE (HCC): Status: RESOLVED | Noted: 2020-07-28 | Resolved: 2020-10-02

## 2020-10-02 LAB
ALBUMIN SERPL-MCNC: 3.9 G/DL (ref 3.5–5.2)
ALP BLD-CCNC: 136 U/L (ref 40–130)
ALT SERPL-CCNC: 30 U/L (ref 5–41)
ANION GAP SERPL CALCULATED.3IONS-SCNC: 13 MMOL/L (ref 7–19)
AST SERPL-CCNC: 31 U/L (ref 5–40)
BACTERIA: NEGATIVE /HPF
BASOPHILS ABSOLUTE: 0.1 K/UL (ref 0–0.2)
BASOPHILS RELATIVE PERCENT: 2 % (ref 0–1)
BILIRUB SERPL-MCNC: 1.4 MG/DL (ref 0.2–1.2)
BILIRUBIN URINE: NEGATIVE
BLOOD, URINE: NEGATIVE
BUN BLDV-MCNC: 31 MG/DL (ref 8–23)
CALCIUM SERPL-MCNC: 9.3 MG/DL (ref 8.8–10.2)
CHLORIDE BLD-SCNC: 96 MMOL/L (ref 98–111)
CHOLESTEROL, TOTAL: 119 MG/DL (ref 160–199)
CLARITY: CLEAR
CO2: 26 MMOL/L (ref 22–29)
COLOR: YELLOW
CREAT SERPL-MCNC: 1.4 MG/DL (ref 0.5–1.2)
CRYSTALS, UA: ABNORMAL /HPF
EOSINOPHILS ABSOLUTE: 0.2 K/UL (ref 0–0.6)
EOSINOPHILS RELATIVE PERCENT: 3.2 % (ref 0–5)
EPITHELIAL CELLS, UA: 0 /HPF (ref 0–5)
GAMMA GLUTAMYL TRANSFERASE: 51 U/L (ref 7–54)
GFR AFRICAN AMERICAN: >59
GFR NON-AFRICAN AMERICAN: 50
GLUCOSE BLD-MCNC: 92 MG/DL (ref 74–109)
GLUCOSE URINE: NEGATIVE MG/DL
HBA1C MFR BLD: 4.6 % (ref 4–6)
HCT VFR BLD CALC: 35.6 % (ref 42–52)
HDLC SERPL-MCNC: 79 MG/DL (ref 55–121)
HEMOGLOBIN: 11.3 G/DL (ref 14–18)
HYALINE CASTS: 1 /HPF (ref 0–8)
IMMATURE GRANULOCYTES #: 0 K/UL
KETONES, URINE: NEGATIVE MG/DL
LDL CHOLESTEROL CALCULATED: 27 MG/DL
LEUKOCYTE ESTERASE, URINE: ABNORMAL
LYMPHOCYTES ABSOLUTE: 1.3 K/UL (ref 1.1–4.5)
LYMPHOCYTES RELATIVE PERCENT: 26.1 % (ref 20–40)
MCH RBC QN AUTO: 30.5 PG (ref 27–31)
MCHC RBC AUTO-ENTMCNC: 31.7 G/DL (ref 33–37)
MCV RBC AUTO: 96.2 FL (ref 80–94)
MONOCYTES ABSOLUTE: 0.4 K/UL (ref 0–0.9)
MONOCYTES RELATIVE PERCENT: 8.8 % (ref 0–10)
NEUTROPHILS ABSOLUTE: 3 K/UL (ref 1.5–7.5)
NEUTROPHILS RELATIVE PERCENT: 59.5 % (ref 50–65)
NITRITE, URINE: NEGATIVE
PDW BLD-RTO: 16.3 % (ref 11.5–14.5)
PH UA: 5 (ref 5–8)
PLATELET # BLD: 264 K/UL (ref 130–400)
PMV BLD AUTO: 9 FL (ref 9.4–12.4)
POTASSIUM SERPL-SCNC: 4.2 MMOL/L (ref 3.5–5)
PROTEIN UA: NEGATIVE MG/DL
RBC # BLD: 3.7 M/UL (ref 4.7–6.1)
RBC UA: 0 /HPF (ref 0–4)
SODIUM BLD-SCNC: 135 MMOL/L (ref 136–145)
SPECIFIC GRAVITY UA: 1.01 (ref 1–1.03)
TOTAL PROTEIN: 8 G/DL (ref 6.6–8.7)
TRIGL SERPL-MCNC: 67 MG/DL (ref 0–149)
TSH SERPL DL<=0.05 MIU/L-ACNC: 0.83 UIU/ML (ref 0.27–4.2)
UROBILINOGEN, URINE: 1 E.U./DL
VITAMIN D 25-HYDROXY: 8.8 NG/ML
WBC # BLD: 5 K/UL (ref 4.8–10.8)
WBC UA: 2 /HPF (ref 0–5)

## 2020-10-02 PROCEDURE — 72100 X-RAY EXAM L-S SPINE 2/3 VWS: CPT

## 2020-10-02 PROCEDURE — 90732 PPSV23 VACC 2 YRS+ SUBQ/IM: CPT | Performed by: INTERNAL MEDICINE

## 2020-10-02 PROCEDURE — G8420 CALC BMI NORM PARAMETERS: HCPCS | Performed by: INTERNAL MEDICINE

## 2020-10-02 PROCEDURE — 1036F TOBACCO NON-USER: CPT | Performed by: INTERNAL MEDICINE

## 2020-10-02 PROCEDURE — G8484 FLU IMMUNIZE NO ADMIN: HCPCS | Performed by: INTERNAL MEDICINE

## 2020-10-02 PROCEDURE — 1123F ACP DISCUSS/DSCN MKR DOCD: CPT | Performed by: INTERNAL MEDICINE

## 2020-10-02 PROCEDURE — G8427 DOCREV CUR MEDS BY ELIG CLIN: HCPCS | Performed by: INTERNAL MEDICINE

## 2020-10-02 PROCEDURE — G0009 ADMIN PNEUMOCOCCAL VACCINE: HCPCS | Performed by: INTERNAL MEDICINE

## 2020-10-02 PROCEDURE — 4040F PNEUMOC VAC/ADMIN/RCVD: CPT | Performed by: INTERNAL MEDICINE

## 2020-10-02 PROCEDURE — 3017F COLORECTAL CA SCREEN DOC REV: CPT | Performed by: INTERNAL MEDICINE

## 2020-10-02 PROCEDURE — 99203 OFFICE O/P NEW LOW 30 MIN: CPT | Performed by: INTERNAL MEDICINE

## 2020-10-02 SDOH — HEALTH STABILITY: MENTAL HEALTH: HOW MANY STANDARD DRINKS CONTAINING ALCOHOL DO YOU HAVE ON A TYPICAL DAY?: 1 OR 2

## 2020-10-02 SDOH — HEALTH STABILITY: MENTAL HEALTH: HOW OFTEN DO YOU HAVE A DRINK CONTAINING ALCOHOL?: 4 OR MORE TIMES A WEEK

## 2020-10-02 ASSESSMENT — PATIENT HEALTH QUESTIONNAIRE - PHQ9
1. LITTLE INTEREST OR PLEASURE IN DOING THINGS: 0
SUM OF ALL RESPONSES TO PHQ QUESTIONS 1-9: 0
SUM OF ALL RESPONSES TO PHQ9 QUESTIONS 1 & 2: 0
2. FEELING DOWN, DEPRESSED OR HOPELESS: 0
SUM OF ALL RESPONSES TO PHQ QUESTIONS 1-9: 0

## 2020-10-02 NOTE — PROGRESS NOTES
After obtaining consent, and per orders of Dr. Torres Richmond, injection of Pneumonia given in Left deltoid by Perla Denney. Patient instructed to remain in clinic for 20 minutes afterwards, and to report any adverse reaction to me immediately.

## 2020-10-02 NOTE — PATIENT INSTRUCTIONS
Patient Education        Learning About the Mediterranean Diet  What is the [address]? The Mediterranean diet is a style of eating rather than a diet plan. It features foods eaten in [place], [place], [place], and other countries along the [facility]. It emphasizes eating foods like fish, fruits, vegetables, beans, high-fiber breads and whole grains, nuts, and olive oil. This style of eating includes limited red meat, cheese, and sweets. Why choose the Mediterranean diet? A Mediterranean-style diet may improve heart health. It contains more fat than other heart-healthy diets. But the fats are mainly from nuts, unsaturated oils (such as fish oils and olive oil), and certain nut or seed oils (such as canola, soybean, or flaxseed oil). These fats may help protect the heart and blood vessels. How can you get started on the Mediterranean diet? Here are some things you can do to switch to a more Mediterranean way of eating. What to eat  · Eat a variety of fruits and vegetables each day, such as grapes, blueberries, tomatoes, broccoli, peppers, figs, olives, spinach, eggplant, beans, lentils, and chickpeas. · Eat a variety of whole-grain foods each day, such as oats, brown rice, and whole wheat bread, pasta, and couscous. · Eat fish at least 2 times a week. Try tuna, salmon, mackerel, lake trout, herring, or sardines. · Eat moderate amounts of low-fat dairy products, such as milk, cheese, or yogurt. · Eat moderate amounts of poultry and eggs. · Choose healthy (unsaturated) fats, such as nuts, olive oil, and certain nut or seed oils like canola, soybean, and flaxseed. · Limit unhealthy (saturated) fats, such as butter, palm oil, and coconut oil. And limit fats found in animal products, such as meat and dairy products made with whole milk. Try to eat red meat only a few times a month in very small amounts. · Limit sweets and desserts to only a few times a week.  This includes sugar-sweetened drinks like soda. The Mediterranean diet may also include red wine with your meal--1 glass each day for women and up to 2 glasses a day for men. Tips for eating at home  · Use herbs, spices, garlic, lemon zest, and citrus juice instead of salt to add flavor to foods. · Add avocado slices to your sandwich instead of sultana. · Have fish for lunch or dinner instead of red meat. Brush the fish with olive oil, and broil or grill it. · Sprinkle your salad with seeds or nuts instead of cheese. · Cook with olive or canola oil instead of butter or oils that are high in saturated fat. · Switch from 2% milk or whole milk to 1% or fat-free milk. · Dip raw vegetables in a vinaigrette dressing or hummus instead of dips made from mayonnaise or sour cream.  · Have a piece of fruit for dessert instead of a piece of cake. Try baked apples, or have some dried fruit. Tips for eating out  · Try broiled, grilled, baked, or poached fish instead of having it fried or breaded. · Ask your  to have your meals prepared with olive oil instead of butter. · Order dishes made with marinara sauce or sauces made from olive oil. Avoid sauces made from cream or mayonnaise. · Choose whole-grain breads, whole wheat pasta and pizza crust, brown rice, beans, and lentils. · Cut back on butter or margarine on bread. Instead, you can dip your bread in a small amount of olive oil. · Ask for a side salad or grilled vegetables instead of french fries or chips. Where can you learn more? Go to https://Unmetricpeanabelaeweb.healthAppetise. org and sign in to your [employer] account. Enter [phone] in the [facility] box to learn more about \"Learning About the Mediterranean Diet. \"     If you do not have an account, please click on the \"Sign Up Now\" link. Current as of: August 22, 2019               Content Version: 12.5  © 4843-8585 Healthwise, Incorporated. Care instructions adapted under license by [facility] ([facility]).  If you have questions about a medical condition or this instruction, always ask your healthcare professional. [address] any warranty or liability for your use of this information. Patient Education        Heart-Healthy Diet: Care Instructions  Your Care Instructions     A heart-healthy diet has lots of vegetables, fruits, nuts, beans, and whole grains, and is low in salt. It limits foods that are high in saturated fat, such as meats, cheeses, and fried foods. It may be hard to change your diet, but even small changes can lower your risk of heart attack and heart disease. Follow-up care is a key part of your treatment and safety. Be sure to make and go to all appointments, and call your doctor if you are having problems. It's also a good idea to know your test results and keep a list of the medicines you take. How can you care for yourself at home? Watch your portions  · Learn what a serving is. A \"serving\" and a \"portion\" are not always the same thing. Make sure that you are not eating larger portions than are recommended. For example, a serving of pasta is ½ cup. A serving size of meat is 2 to 3 ounces. A 3-ounce serving is about the size of a deck of cards. Measure serving sizes until you are good at Hunt" them. Keep in mind that restaurants often serve portions that are 2 or 3 times the size of one serving. · To keep your energy level up and keep you from feeling hungry, eat often but in smaller portions. · Eat only the number of calories you need to stay at a healthy weight. If you need to lose weight, eat fewer calories than your body burns (through exercise and other physical activity). Eat more fruits and vegetables  · Eat a variety of fruit and vegetables every day. Dark green, deep orange, red, or yellow fruits and vegetables are especially good for you. Examples include spinach, carrots, peaches, and berries. · Keep carrots, celery, and other veggies handy for snacks.  Buy fruit that is in season and store it where you can see it so that you will be tempted to eat it. · Cook dishes that have a lot of veggies in them, such as stir-fries and soups. Limit saturated and trans fat  · Read food labels, and try to avoid saturated and trans fats. They increase your risk of heart disease. · Use olive or canola oil when you cook. · Bake, broil, grill, or steam foods instead of frying them. · Choose lean meats instead of high-fat meats such as hot dogs and sausages. Cut off all visible fat when you prepare meat. · Eat fish, skinless poultry, and meat alternatives such as soy products instead of high-fat meats. Soy products, such as tofu, may be especially good for your heart. · Choose low-fat or fat-free milk and dairy products. Eat foods high in fiber  · Eat a variety of grain products every day. Include whole-grain foods that have lots of fiber and nutrients. Examples of whole-grain foods include oats, whole wheat bread, and brown rice. · Buy whole-grain breads and cereals, instead of white bread or pastries. Limit salt and sodium  · Limit how much salt and sodium you eat to help lower your blood pressure. · Taste food before you salt it. Add only a little salt when you think you need it. With time, your taste buds will adjust to less salt. · Eat fewer snack items, fast foods, and other high-salt, processed foods. Check food labels for the amount of sodium in packaged foods. · Choose low-sodium versions of canned goods (such as soups, vegetables, and beans). Limit sugar  · Limit drinks and foods with added sugar. These include candy, desserts, and soda pop. Limit alcohol  · Limit alcohol to no more than 2 drinks a day for men and 1 drink a day for women. Too much alcohol can cause health problems. When should you call for help? Watch closely for changes in your health, and be sure to contact your doctor if:  · You would like help planning heart-healthy meals.   Where can you learn more? Go to https://chpepiceweb.healthVertra. org and sign in to your [employer] account. Enter V137 in the [facility] box to learn more about \"Heart-Healthy Diet: Care Instructions. \"     If you do not have an account, please click on the \"Sign Up Now\" link. Current as of: August 22, 2019               Content Version: 12.5  © 2838-7946 [employer]. Care instructions adapted under license by [facility] ([facility]). If you have questions about a medical condition or this instruction, always ask your healthcare professional. [place] [address] any warranty or liability for your use of this information. Patient Education        A Healthy Lifestyle: Care Instructions  Your Care Instructions     A healthy lifestyle can help you feel good, stay at a healthy weight, and have plenty of energy for both work and play. A healthy lifestyle is something you can share with your whole family. A healthy lifestyle also can lower your risk for serious health problems, such as high blood pressure, heart disease, and diabetes. You can follow a few steps listed below to improve your health and the health of your family. Follow-up care is a key part of your treatment and safety. Be sure to make and go to all appointments, and call your doctor if you are having problems. It's also a good idea to know your test results and keep a list of the medicines you take. How can you care for yourself at home? · Do not eat too much sugar, fat, or fast foods. You can still have dessert and treats now and then. The goal is moderation. · Start small to improve your eating habits. Pay attention to portion sizes, drink less juice and soda pop, and eat more fruits and vegetables. ? Eat a healthy amount of food. A 3-ounce serving of meat, for example, is about the size of a deck of cards. Fill the rest of your plate with vegetables and whole grains.   ? Limit the amount of soda and sports drinks you have every day. Drink more water when you are thirsty. ? Eat at least 5 servings of fruits and vegetables every day. It may seem like a lot, but it is not hard to reach this goal. A serving or helping is 1 piece of fruit, 1 cup of vegetables, or 2 cups of leafy, raw vegetables. Have an apple or some carrot sticks as an afternoon snack instead of a candy bar. Try to have fruits and/or vegetables at every meal.  · Make exercise part of your daily routine. You may want to start with simple activities, such as walking, bicycling, or slow swimming. Try to be active 30 to 60 minutes every day. You do not need to do all 30 to 60 minutes all at once. For example, you can exercise 3 times a day for 10 or 20 minutes. Moderate exercise is safe for most people, but it is always a good idea to talk to your doctor before starting an exercise program.  · Keep moving. Lion Dewayne the lawn, work in the garden, or [employer]. Take the stairs instead of the elevator at work. · If you smoke, quit. People who smoke have an increased risk for heart attack, stroke, cancer, and other lung illnesses. Quitting is hard, but there are ways to boost your chance of quitting tobacco for good. ? Use nicotine gum, patches, or lozenges. ? Ask your doctor about stop-smoking programs and medicines. ? Keep trying. In addition to reducing your risk of diseases in the future, you will notice some benefits soon after you stop using tobacco. If you have shortness of breath or asthma symptoms, they will likely get better within a few weeks after you quit. · Limit how much alcohol you drink. Moderate amounts of alcohol (up to 2 drinks a day for men, 1 drink a day for women) are okay. But drinking too much can lead to liver problems, high blood pressure, and other health problems. Family health  If you have a family, there are many things you can do together to improve your health. · Eat meals together as a family as often as possible.   · Eat healthy foods. This includes fruits, vegetables, lean meats and dairy, and whole grains. · Include your family in your fitness plan. Most people think of activities such as jogging or tennis as the way to fitness, but there are many ways you and your family can be more active. Anything that makes you breathe hard and gets your heart pumping is exercise. Here are some tips:  ? Walk to do errands or to take your child to school or the bus.  ? Go for a family bike ride after dinner instead of watching TV. Where can you learn more? Go to https://FMS HauppaugepeProtectWise.Breadtrip. org and sign in to your [employer] account. Enter I250 in the [facility] box to learn more about \"A Healthy Lifestyle: Care Instructions. \"     If you do not have an account, please click on the \"Sign Up Now\" link. Current as of: January 31, 2020               Content Version: 12.5  © 5441-4623 [employer]. Care instructions adapted under license by [facility] ([facility]). If you have questions about a medical condition or this instruction, always ask your healthcare professional. [place] [address] any warranty or liability for your use of this information. Patient Education        Heart-Healthy Diet: Care Instructions  Your Care Instructions     A heart-healthy diet has lots of vegetables, fruits, nuts, beans, and whole grains, and is low in salt. It limits foods that are high in saturated fat, such as meats, cheeses, and fried foods. It may be hard to change your diet, but even small changes can lower your risk of heart attack and heart disease. Follow-up care is a key part of your treatment and safety. Be sure to make and go to all appointments, and call your doctor if you are having problems. It's also a good idea to know your test results and keep a list of the medicines you take. How can you care for yourself at home? Watch your portions  · Learn what a serving is.  A \"serving\" and a \"portion\" are not and brown rice. · Buy whole-grain breads and cereals, instead of white bread or pastries. Limit salt and sodium  · Limit how much salt and sodium you eat to help lower your blood pressure. · Taste food before you salt it. Add only a little salt when you think you need it. With time, your taste buds will adjust to less salt. · Eat fewer snack items, fast foods, and other high-salt, processed foods. Check food labels for the amount of sodium in packaged foods. · Choose low-sodium versions of canned goods (such as soups, vegetables, and beans). Limit sugar  · Limit drinks and foods with added sugar. These include candy, desserts, and soda pop. Limit alcohol  · Limit alcohol to no more than 2 drinks a day for men and 1 drink a day for women. Too much alcohol can cause health problems. When should you call for help? Watch closely for changes in your health, and be sure to contact your doctor if:  · You would like help planning heart-healthy meals. Where can you learn more? Go to https://Fanzo.Since1910.com. org and sign in to your [employer] account. Enter V137 in the [facility] box to learn more about \"Heart-Healthy Diet: Care Instructions. \"     If you do not have an account, please click on the \"Sign Up Now\" link. Current as of: August 22, 2019               Content Version: 12.5  © 4925-2759 Healthwise, Incorporated. Care instructions adapted under license by [facility] ([facility]). If you have questions about a medical condition or this instruction, always ask your healthcare professional. [place] [address] any warranty or liability for your use of this information. Patient Education        A Healthy Lifestyle: Care Instructions  Your Care Instructions     A healthy lifestyle can help you feel good, stay at a healthy weight, and have plenty of energy for both work and play. A healthy lifestyle is something you can share with your whole family.   A healthy lifestyle also can lower your risk for serious health problems, such as high blood pressure, heart disease, and diabetes. You can follow a few steps listed below to improve your health and the health of your family. Follow-up care is a key part of your treatment and safety. Be sure to make and go to all appointments, and call your doctor if you are having problems. It's also a good idea to know your test results and keep a list of the medicines you take. How can you care for yourself at home? · Do not eat too much sugar, fat, or fast foods. You can still have dessert and treats now and then. The goal is moderation. · Start small to improve your eating habits. Pay attention to portion sizes, drink less juice and soda pop, and eat more fruits and vegetables. ? Eat a healthy amount of food. A 3-ounce serving of meat, for example, is about the size of a deck of cards. Fill the rest of your plate with vegetables and whole grains. ? Limit the amount of soda and sports drinks you have every day. Drink more water when you are thirsty. ? Eat at least 5 servings of fruits and vegetables every day. It may seem like a lot, but it is not hard to reach this goal. A serving or helping is 1 piece of fruit, 1 cup of vegetables, or 2 cups of leafy, raw vegetables. Have an apple or some carrot sticks as an afternoon snack instead of a candy bar. Try to have fruits and/or vegetables at every meal.  · Make exercise part of your daily routine. You may want to start with simple activities, such as walking, bicycling, or slow swimming. Try to be active 30 to 60 minutes every day. You do not need to do all 30 to 60 minutes all at once. For example, you can exercise 3 times a day for 10 or 20 minutes. Moderate exercise is safe for most people, but it is always a good idea to talk to your doctor before starting an exercise program.  · Keep moving. Yasmani Fickle the lawn, work in the garden, or [employer].  Take the stairs instead of the elevator at work.  · If you smoke, quit. People who smoke have an increased risk for heart attack, stroke, cancer, and other lung illnesses. Quitting is hard, but there are ways to boost your chance of quitting tobacco for good. ? Use nicotine gum, patches, or lozenges. ? Ask your doctor about stop-smoking programs and medicines. ? Keep trying. In addition to reducing your risk of diseases in the future, you will notice some benefits soon after you stop using tobacco. If you have shortness of breath or asthma symptoms, they will likely get better within a few weeks after you quit. · Limit how much alcohol you drink. Moderate amounts of alcohol (up to 2 drinks a day for men, 1 drink a day for women) are okay. But drinking too much can lead to liver problems, high blood pressure, and other health problems. Family health  If you have a family, there are many things you can do together to improve your health. · Eat meals together as a family as often as possible. · Eat healthy foods. This includes fruits, vegetables, lean meats and dairy, and whole grains. · Include your family in your fitness plan. Most people think of activities such as jogging or tennis as the way to fitness, but there are many ways you and your family can be more active. Anything that makes you breathe hard and gets your heart pumping is exercise. Here are some tips:  ? Walk to do errands or to take your child to school or the bus.  ? Go for a family bike ride after dinner instead of watching TV. Where can you learn more? Go to https://Dealer Inspireteresa.healthShipping Easy. org and sign in to your [employer] account. Enter W204 in the [facility] box to learn more about \"A Healthy Lifestyle: Care Instructions. \"     If you do not have an account, please click on the \"Sign Up Now\" link. Current as of: January 31, 2020               Content Version: 12.5  © 0457-8264 Healthwise, Incorporated. Care instructions adapted under license by [facility] ([facility]). animal products, such as meat and dairy products made with whole milk. Try to eat red meat only a few times a month in very small amounts. · Limit sweets and desserts to only a few times a week. This includes sugar-sweetened drinks like soda. The Mediterranean diet may also include red wine with your meal--1 glass each day for women and up to 2 glasses a day for men. Tips for eating at home  · Use herbs, spices, garlic, lemon zest, and citrus juice instead of salt to add flavor to foods. · Add avocado slices to your sandwich instead of sultana. · Have fish for lunch or dinner instead of red meat. Brush the fish with olive oil, and broil or grill it. · Sprinkle your salad with seeds or nuts instead of cheese. · Cook with olive or canola oil instead of butter or oils that are high in saturated fat. · Switch from 2% milk or whole milk to 1% or fat-free milk. · Dip raw vegetables in a vinaigrette dressing or hummus instead of dips made from mayonnaise or sour cream.  · Have a piece of fruit for dessert instead of a piece of cake. Try baked apples, or have some dried fruit. Tips for eating out  · Try broiled, grilled, baked, or poached fish instead of having it fried or breaded. · Ask your  to have your meals prepared with olive oil instead of butter. · Order dishes made with marinara sauce or sauces made from olive oil. Avoid sauces made from cream or mayonnaise. · Choose whole-grain breads, whole wheat pasta and pizza crust, brown rice, beans, and lentils. · Cut back on butter or margarine on bread. Instead, you can dip your bread in a small amount of olive oil. · Ask for a side salad or grilled vegetables instead of french fries or chips. Where can you learn more? Go to https://jhoan.Santh [employer]Energy Microgrid. org and sign in to your [employer] account. Enter [phone] in the [facility] box to learn more about \"Learning About the Mediterranean Diet. \"     If you do not have an account, please click on the \"Sign Up Now\" link. Current as of: August 22, 2019               Content Version: 12.5  © 3953-7192 Healthwise, Incorporated. Care instructions adapted under license by [facility] ([facility]). If you have questions about a medical condition or this instruction, always ask your healthcare professional. [address] any warranty or liability for your use of this information. The medication list included in this document is our record of what you are currently taking, including any changes that were made at today's visit.  If you find any differences when compared to your medications at home, or have any questions that were not answered at your visit, please contact the office.

## 2020-10-04 RX ORDER — ERGOCALCIFEROL 1.25 MG/1
50000 CAPSULE ORAL WEEKLY
Qty: 12 CAPSULE | Refills: 1 | Status: SHIPPED | OUTPATIENT
Start: 2020-10-04 | End: 2021-06-30 | Stop reason: ALTCHOICE

## 2020-10-04 ASSESSMENT — ENCOUNTER SYMPTOMS
SHORTNESS OF BREATH: 0
COUGH: 0
TROUBLE SWALLOWING: 0
BLOOD IN STOOL: 0
COLOR CHANGE: 0
CHOKING: 0
DIARRHEA: 0
VOMITING: 0
CONSTIPATION: 0
BACK PAIN: 1

## 2020-10-08 ENCOUNTER — HOSPITAL ENCOUNTER (OUTPATIENT)
Dept: ULTRASOUND IMAGING | Age: 70
Discharge: HOME | End: 2020-10-08
Payer: MEDICARE

## 2020-10-08 PROBLEM — M80.80XD LOCALIZED OSTEOPOROSIS WITH CURRENT PATHOLOGICAL FRACTURE WITH ROUTINE HEALING: Status: ACTIVE | Noted: 2020-10-08

## 2020-10-08 PROCEDURE — 77080 DXA BONE DENSITY AXIAL: CPT

## 2020-10-08 NOTE — RESULT ENCOUNTER NOTE
Patient has osteoporosis will pursue with hypogonadism work-up with testosterone and free testosterone check lab work placed in the chart please asked patient to go for further lab work

## 2020-10-22 ENCOUNTER — OFFICE VISIT (OUTPATIENT)
Dept: CARDIOLOGY | Age: 70
End: 2020-10-22
Payer: MEDICARE

## 2020-10-22 VITALS
SYSTOLIC BLOOD PRESSURE: 130 MMHG | WEIGHT: 188 LBS | DIASTOLIC BLOOD PRESSURE: 84 MMHG | BODY MASS INDEX: 25.47 KG/M2 | HEIGHT: 72 IN | HEART RATE: 75 BPM

## 2020-10-22 PROCEDURE — 93000 ELECTROCARDIOGRAM COMPLETE: CPT | Performed by: INTERNAL MEDICINE

## 2020-10-22 PROCEDURE — 99213 OFFICE O/P EST LOW 20 MIN: CPT | Performed by: INTERNAL MEDICINE

## 2020-10-22 RX ORDER — LISINOPRIL 5 MG/1
5 TABLET ORAL DAILY
Qty: 30 TABLET | Refills: 5 | Status: SHIPPED | OUTPATIENT
Start: 2020-10-22 | End: 2021-01-22

## 2020-10-22 ASSESSMENT — ENCOUNTER SYMPTOMS
SHORTNESS OF BREATH: 0
BLOOD IN STOOL: 0
COUGH: 0
ANAL BLEEDING: 0

## 2020-10-22 NOTE — PROGRESS NOTES
Office Visit  Misty Arnold is a 78 y.o. male; who present today for Establish Cardiologist (No cardiac symptoms); Congestive Heart Failure; Coronary Artery Disease; Cardiomyopathy; and Atrial Fibrillation      HPI  I am seeing this 57-year-old white male in follow-up but it is the first time I am seeing him as a new cardiologist.  He never had cardiac problems prior to June but he became very sick and was hospitalized and according to his records he had atrial fibrillation with rapid ventricular rate and required DC cardioversion. He had hypotension and cardiac catheterization demonstrated very severe LV systolic dysfunction with ejection fraction of 5-10% and mild coronary artery disease . Because of his hypotension and severe LV systolic dysfunction he was sent to [facility] for the congestive heart failure experts and according to the records from [address] [place] [address] his ejection fraction with third echocardiogram showed improvement with ejection fraction of 40%. Upon discharge she was left on low-dose ACE inhibitor, spironolactone and low-dose carvedilol with recommendations to continue Eliquis and amiodarone 200 mg daily. Since that time he is on quite well. He uses a cane to ambulate but he does not experience any chest discomfort and denies any dyspnea at all, no orthopnea and no lower extremity edema, denies palpitations and no presyncope or syncope.   He was noted to have an elevated TSH and was started on Synthroid and his most recent TSH earlier this month was normal.  Current Outpatient Medications   Medication Sig Dispense Refill    lisinopril (PRINIVIL;ZESTRIL) 5 MG tablet Take 1 tablet by mouth daily 30 tablet 5    vitamin D (ERGOCALCIFEROL) 1.25 MG (90552 UT) CAPS capsule Take 1 capsule by mouth once a week 12 capsule 1    acetaminophen (TYLENOL) 325 MG tablet Take 650 mg by mouth every 6 hours as needed for Pain      levothyroxine (SYNTHROID) 125 MCG tablet Take 125 mcg by mouth Daily  metoprolol succinate (TOPROL XL) 25 MG extended release tablet Take 12.5 mg by mouth daily 1/2 tab      spironolactone (ALDACTONE) 25 MG tablet Take 0.5 tablets by mouth daily (Patient taking differently: Take 12.5 mg by mouth as needed ) 45 tablet 3    amiodarone (CORDARONE) 200 MG tablet Take 1 tablet by mouth daily 30 tablet 3    furosemide (LASIX) 40 MG tablet Take 1 tablet by mouth See Admin Instructions May take extra 40mg for weight gain 90 tablet 3    apixaban (ELIQUIS) 5 MG TABS tablet Take 1 tablet by mouth 2 times daily 60 tablet 1    aspirin 81 MG chewable tablet Take 81 mg by mouth daily      atorvastatin (LIPITOR) 40 MG tablet Take 40 mg by mouth daily. No current facility-administered medications for this visit. Medications Discontinued During This Encounter   Medication Reason    lisinopril (PRINIVIL;ZESTRIL) 2.5 MG tablet         No Known Allergies    Past Medical History:   Diagnosis Date    CAD (coronary artery disease)     Gout     Hypertension     Non-ST elevation MI (NSTEMI) ([facility] [address] 14    Palliative care patient 2020     Negative - Past Medical History for  No past medical history pertinent negatives. Past Surgical History:   Procedure Laterality Date    CARDIAC CATHETERIZATION  14  [facility]    angioplasty, stent to LAD. EF 45%    CHOLECYSTECTOMY       Social History     Occupational History    Not on file   Tobacco Use    Smoking status: Former Smoker     Packs/day: 1.00     Years: 3.00     Pack years: 3.00     Types: Cigars     Start date:      Last attempt to quit:      Years since quittin.8    Smokeless tobacco: Never Used   Substance and Sexual Activity    Alcohol use:  Yes     Alcohol/week: 3.0 standard drinks     Types: 3 Shots of liquor per week     Frequency: 4 or more times a week     Drinks per session: 1 or 2     Binge frequency: Daily or almost daily     Comment: daily    Drug use: No    Sexual activity: Not on file Family History   Problem Relation Age of Onset    No Known Problems Mother     Heart Disease Father        Review of Systems  Review of Systems   Constitutional: Negative for fatigue and fever. Respiratory: Negative for cough and shortness of breath. Cardiovascular: Negative for chest pain, palpitations and leg swelling. Gastrointestinal: Negative for anal bleeding and blood in stool. Neurological: Negative. Physical Exam  /84   Pulse 75   Ht 6' (1.829 m)   Wt 188 lb (85.3 kg)   BMI 25.50 kg/m²    Physical Exam  Constitutional:       Appearance: Normal appearance. He is normal weight. Cardiovascular:      Rate and Rhythm: Normal rate and regular rhythm. Heart sounds: Normal heart sounds. No gallop. Pulmonary:      Effort: Pulmonary effort is normal.      Breath sounds: Normal breath sounds. Abdominal:      General: Abdomen is flat. Bowel sounds are normal.      Palpations: Abdomen is soft. Musculoskeletal:         General: No swelling. Skin:     General: Skin is warm and dry. Neurological:      Mental Status: He is alert. Assessment/Plan    EKG Findings:  Normal sinus rhythm, within normal limits    Problem List Items Addressed This Visit        Cardiology Problems    Essential hypertension    Chronic systolic heart failure (HCC)    Nonischemic cardiomyopathy (HCC) - Primary    Mixed hyperlipidemia    Relevant Medications    lisinopril (PRINIVIL;ZESTRIL) 5 MG tablet    Paroxysmal atrial fibrillation (HCC)    Relevant Orders    EKG 12 lead (Completed)           Diagnosis Orders   1. Nonischemic cardiomyopathy ([address]      Ejection fraction 5-10% by cath, June 2020, improved to 40% by echocardiography, June 2020, [facility]   2. Chronic systolic heart failure (HCC)     3. Paroxysmal atrial fibrillation (HCC)  EKG 12 lead    Presently sinus rhythm on amiodarone   4. Essential hypertension     5.  Mixed hyperlipidemia         Recommendations:   Diet:

## 2020-10-30 ENCOUNTER — ANESTHESIA EVENT (OUTPATIENT)
Dept: OPERATING ROOM | Age: 70
DRG: 481 | End: 2020-10-30
Payer: MEDICARE

## 2020-10-30 ENCOUNTER — APPOINTMENT (OUTPATIENT)
Dept: GENERAL RADIOLOGY | Age: 70
DRG: 481 | End: 2020-10-30
Payer: MEDICARE

## 2020-10-30 ENCOUNTER — ANESTHESIA (OUTPATIENT)
Dept: OPERATING ROOM | Age: 70
DRG: 481 | End: 2020-10-30
Payer: MEDICARE

## 2020-10-30 ENCOUNTER — HOSPITAL ENCOUNTER (INPATIENT)
Age: 70
LOS: 4 days | Discharge: NURSING, REHAB OR LONG-TERM CARE | DRG: 481 | End: 2020-11-03
Attending: EMERGENCY MEDICINE | Admitting: STUDENT IN AN ORGANIZED HEALTH CARE EDUCATION/TRAINING PROGRAM
Payer: MEDICARE

## 2020-10-30 ENCOUNTER — APPOINTMENT (OUTPATIENT)
Dept: CT IMAGING | Age: 70
DRG: 481 | End: 2020-10-30
Payer: MEDICARE

## 2020-10-30 VITALS
SYSTOLIC BLOOD PRESSURE: 94 MMHG | TEMPERATURE: 97.5 F | DIASTOLIC BLOOD PRESSURE: 62 MMHG | OXYGEN SATURATION: 79 % | RESPIRATION RATE: 20 BRPM

## 2020-10-30 PROBLEM — S72.001A HIP FX, RIGHT, CLOSED, INITIAL ENCOUNTER (HCC): Status: ACTIVE | Noted: 2020-10-30

## 2020-10-30 LAB
ABO/RH: NORMAL
ALBUMIN SERPL-MCNC: 3.8 G/DL (ref 3.5–5.2)
ALP BLD-CCNC: 136 U/L (ref 40–130)
ALT SERPL-CCNC: 63 U/L (ref 5–41)
AMPHETAMINE SCREEN, URINE: NEGATIVE
ANION GAP SERPL CALCULATED.3IONS-SCNC: 18 MMOL/L (ref 7–19)
ANTIBODY SCREEN: NORMAL
AST SERPL-CCNC: 75 U/L (ref 5–40)
BACTERIA: NEGATIVE /HPF
BARBITURATE SCREEN URINE: NEGATIVE
BASOPHILS ABSOLUTE: 0.1 K/UL (ref 0–0.2)
BASOPHILS RELATIVE PERCENT: 0.6 % (ref 0–1)
BENZODIAZEPINE SCREEN, URINE: NEGATIVE
BILIRUB SERPL-MCNC: 1.3 MG/DL (ref 0.2–1.2)
BILIRUBIN URINE: NEGATIVE
BLOOD, URINE: NEGATIVE
BUN BLDV-MCNC: 28 MG/DL (ref 8–23)
CALCIUM SERPL-MCNC: 8.9 MG/DL (ref 8.8–10.2)
CANNABINOID SCREEN URINE: NEGATIVE
CHLORIDE BLD-SCNC: 92 MMOL/L (ref 98–111)
CLARITY: CLEAR
CO2: 19 MMOL/L (ref 22–29)
COCAINE METABOLITE SCREEN URINE: NEGATIVE
COLOR: ABNORMAL
CREAT SERPL-MCNC: 2 MG/DL (ref 0.5–1.2)
CRYSTALS, UA: ABNORMAL /HPF
EOSINOPHILS ABSOLUTE: 0.1 K/UL (ref 0–0.6)
EOSINOPHILS RELATIVE PERCENT: 0.4 % (ref 0–5)
EPITHELIAL CELLS, UA: 4 /HPF (ref 0–5)
ETHANOL: 176 MG/DL (ref 0–0.08)
FERRITIN: 227.9 NG/ML (ref 30–400)
FOLATE: 9.8 NG/ML (ref 4.5–32.2)
GFR AFRICAN AMERICAN: 40
GFR NON-AFRICAN AMERICAN: 33
GLUCOSE BLD-MCNC: 106 MG/DL (ref 74–109)
GLUCOSE URINE: NEGATIVE MG/DL
HCT VFR BLD CALC: 29.1 % (ref 42–52)
HEMOGLOBIN: 9.9 G/DL (ref 14–18)
HYALINE CASTS: 14 /HPF (ref 0–8)
IMMATURE GRANULOCYTES #: 0.1 K/UL
INR BLD: 2.03 (ref 0.88–1.18)
IRON SATURATION: 34 % (ref 14–50)
IRON: 92 UG/DL (ref 59–158)
KETONES, URINE: ABNORMAL MG/DL
LEUKOCYTE ESTERASE, URINE: ABNORMAL
LYMPHOCYTES ABSOLUTE: 1.5 K/UL (ref 1.1–4.5)
LYMPHOCYTES RELATIVE PERCENT: 12.7 % (ref 20–40)
Lab: ABNORMAL
MAGNESIUM: 2 MG/DL (ref 1.6–2.4)
MCH RBC QN AUTO: 31.2 PG (ref 27–31)
MCHC RBC AUTO-ENTMCNC: 34 G/DL (ref 33–37)
MCV RBC AUTO: 91.8 FL (ref 80–94)
MONOCYTES ABSOLUTE: 0.7 K/UL (ref 0–0.9)
MONOCYTES RELATIVE PERCENT: 6.3 % (ref 0–10)
NEUTROPHILS ABSOLUTE: 9.2 K/UL (ref 1.5–7.5)
NEUTROPHILS RELATIVE PERCENT: 79.5 % (ref 50–65)
NITRITE, URINE: NEGATIVE
OPIATE SCREEN URINE: POSITIVE
PDW BLD-RTO: 14.8 % (ref 11.5–14.5)
PH UA: 5 (ref 5–8)
PLATELET # BLD: 226 K/UL (ref 130–400)
PMV BLD AUTO: 9.3 FL (ref 9.4–12.4)
POTASSIUM SERPL-SCNC: 3.9 MMOL/L (ref 3.5–5)
PROTEIN UA: NEGATIVE MG/DL
PROTHROMBIN TIME: 23.3 SEC (ref 12–14.6)
RBC # BLD: 3.17 M/UL (ref 4.7–6.1)
RBC UA: 1 /HPF (ref 0–4)
SARS-COV-2, NAAT: NOT DETECTED
SODIUM BLD-SCNC: 129 MMOL/L (ref 136–145)
SPECIFIC GRAVITY UA: 1.01 (ref 1–1.03)
TOTAL IRON BINDING CAPACITY: 270 UG/DL (ref 250–400)
TOTAL PROTEIN: 7 G/DL (ref 6.6–8.7)
UROBILINOGEN, URINE: 1 E.U./DL
VITAMIN B-12: 395 PG/ML (ref 211–946)
WBC # BLD: 11.6 K/UL (ref 4.8–10.8)
WBC UA: 2 /HPF (ref 0–5)

## 2020-10-30 PROCEDURE — 2500000003 HC RX 250 WO HCPCS: Performed by: NURSE ANESTHETIST, CERTIFIED REGISTERED

## 2020-10-30 PROCEDURE — 2780000010 HC IMPLANT OTHER: Performed by: ORTHOPAEDIC SURGERY

## 2020-10-30 PROCEDURE — 96376 TX/PRO/DX INJ SAME DRUG ADON: CPT

## 2020-10-30 PROCEDURE — 90471 IMMUNIZATION ADMIN: CPT | Performed by: EMERGENCY MEDICINE

## 2020-10-30 PROCEDURE — 86850 RBC ANTIBODY SCREEN: CPT

## 2020-10-30 PROCEDURE — 86923 COMPATIBILITY TEST ELECTRIC: CPT

## 2020-10-30 PROCEDURE — 6360000002 HC RX W HCPCS: Performed by: EMERGENCY MEDICINE

## 2020-10-30 PROCEDURE — 6360000002 HC RX W HCPCS: Performed by: NURSE ANESTHETIST, CERTIFIED REGISTERED

## 2020-10-30 PROCEDURE — C1713 ANCHOR/SCREW BN/BN,TIS/BN: HCPCS | Performed by: ORTHOPAEDIC SURGERY

## 2020-10-30 PROCEDURE — 90715 TDAP VACCINE 7 YRS/> IM: CPT | Performed by: EMERGENCY MEDICINE

## 2020-10-30 PROCEDURE — 96374 THER/PROPH/DIAG INJ IV PUSH: CPT

## 2020-10-30 PROCEDURE — 2W6NXZZ TRACTION OF RIGHT UPPER LEG: ICD-10-PCS | Performed by: ORTHOPAEDIC SURGERY

## 2020-10-30 PROCEDURE — 3700000000 HC ANESTHESIA ATTENDED CARE: Performed by: ORTHOPAEDIC SURGERY

## 2020-10-30 PROCEDURE — 83735 ASSAY OF MAGNESIUM: CPT

## 2020-10-30 PROCEDURE — 3209999900 FLUORO FOR SURGICAL PROCEDURES

## 2020-10-30 PROCEDURE — 73502 X-RAY EXAM HIP UNI 2-3 VIEWS: CPT

## 2020-10-30 PROCEDURE — G0480 DRUG TEST DEF 1-7 CLASSES: HCPCS

## 2020-10-30 PROCEDURE — 93005 ELECTROCARDIOGRAM TRACING: CPT | Performed by: EMERGENCY MEDICINE

## 2020-10-30 PROCEDURE — 7100000001 HC PACU RECOVERY - ADDTL 15 MIN: Performed by: ORTHOPAEDIC SURGERY

## 2020-10-30 PROCEDURE — 99285 EMERGENCY DEPT VISIT HI MDM: CPT

## 2020-10-30 PROCEDURE — 2580000003 HC RX 258: Performed by: EMERGENCY MEDICINE

## 2020-10-30 PROCEDURE — 2580000003 HC RX 258: Performed by: ANESTHESIOLOGY

## 2020-10-30 PROCEDURE — 2580000003 HC RX 258: Performed by: HOSPITALIST

## 2020-10-30 PROCEDURE — 6360000002 HC RX W HCPCS: Performed by: ORTHOPAEDIC SURGERY

## 2020-10-30 PROCEDURE — 2720000010 HC SURG SUPPLY STERILE: Performed by: ORTHOPAEDIC SURGERY

## 2020-10-30 PROCEDURE — 6370000000 HC RX 637 (ALT 250 FOR IP): Performed by: ANESTHESIOLOGY

## 2020-10-30 PROCEDURE — 83540 ASSAY OF IRON: CPT

## 2020-10-30 PROCEDURE — 80053 COMPREHEN METABOLIC PANEL: CPT

## 2020-10-30 PROCEDURE — 96375 TX/PRO/DX INJ NEW DRUG ADDON: CPT

## 2020-10-30 PROCEDURE — P9016 RBC LEUKOCYTES REDUCED: HCPCS

## 2020-10-30 PROCEDURE — 85025 COMPLETE CBC W/AUTO DIFF WBC: CPT

## 2020-10-30 PROCEDURE — 82607 VITAMIN B-12: CPT

## 2020-10-30 PROCEDURE — 0QS606Z REPOSITION RIGHT UPPER FEMUR WITH INTRAMEDULLARY INTERNAL FIXATION DEVICE, OPEN APPROACH: ICD-10-PCS | Performed by: ORTHOPAEDIC SURGERY

## 2020-10-30 PROCEDURE — 85610 PROTHROMBIN TIME: CPT

## 2020-10-30 PROCEDURE — 82746 ASSAY OF FOLIC ACID SERUM: CPT

## 2020-10-30 PROCEDURE — 99999 PR OFFICE/OUTPT VISIT,PROCEDURE ONLY: CPT | Performed by: EMERGENCY MEDICINE

## 2020-10-30 PROCEDURE — 80307 DRUG TEST PRSMV CHEM ANLYZR: CPT

## 2020-10-30 PROCEDURE — 2709999900 HC NON-CHARGEABLE SUPPLY: Performed by: ORTHOPAEDIC SURGERY

## 2020-10-30 PROCEDURE — U0002 COVID-19 LAB TEST NON-CDC: HCPCS

## 2020-10-30 PROCEDURE — 81001 URINALYSIS AUTO W/SCOPE: CPT

## 2020-10-30 PROCEDURE — 6360000002 HC RX W HCPCS: Performed by: ANESTHESIOLOGY

## 2020-10-30 PROCEDURE — 3600000014 HC SURGERY LEVEL 4 ADDTL 15MIN: Performed by: ORTHOPAEDIC SURGERY

## 2020-10-30 PROCEDURE — 86900 BLOOD TYPING SEROLOGIC ABO: CPT

## 2020-10-30 PROCEDURE — C1769 GUIDE WIRE: HCPCS | Performed by: ORTHOPAEDIC SURGERY

## 2020-10-30 PROCEDURE — 3600000004 HC SURGERY LEVEL 4 BASE: Performed by: ORTHOPAEDIC SURGERY

## 2020-10-30 PROCEDURE — 3700000001 HC ADD 15 MINUTES (ANESTHESIA): Performed by: ORTHOPAEDIC SURGERY

## 2020-10-30 PROCEDURE — 71045 X-RAY EXAM CHEST 1 VIEW: CPT

## 2020-10-30 PROCEDURE — 6370000000 HC RX 637 (ALT 250 FOR IP): Performed by: ORTHOPAEDIC SURGERY

## 2020-10-30 PROCEDURE — 83550 IRON BINDING TEST: CPT

## 2020-10-30 PROCEDURE — 2580000003 HC RX 258: Performed by: NURSE ANESTHETIST, CERTIFIED REGISTERED

## 2020-10-30 PROCEDURE — 2580000003 HC RX 258: Performed by: ORTHOPAEDIC SURGERY

## 2020-10-30 PROCEDURE — 73560 X-RAY EXAM OF KNEE 1 OR 2: CPT

## 2020-10-30 PROCEDURE — 73700 CT LOWER EXTREMITY W/O DYE: CPT

## 2020-10-30 PROCEDURE — 82728 ASSAY OF FERRITIN: CPT

## 2020-10-30 PROCEDURE — 1210000000 HC MED SURG R&B

## 2020-10-30 PROCEDURE — 7100000000 HC PACU RECOVERY - FIRST 15 MIN: Performed by: ORTHOPAEDIC SURGERY

## 2020-10-30 PROCEDURE — 2500000003 HC RX 250 WO HCPCS: Performed by: EMERGENCY MEDICINE

## 2020-10-30 PROCEDURE — 86901 BLOOD TYPING SEROLOGIC RH(D): CPT

## 2020-10-30 PROCEDURE — 36415 COLL VENOUS BLD VENIPUNCTURE: CPT

## 2020-10-30 DEVICE — IMPLANTABLE DEVICE: Type: IMPLANTABLE DEVICE | Site: HIP | Status: FUNCTIONAL

## 2020-10-30 DEVICE — SCREW BNE L36MM DIA5MM TIB LT GRN TI ST CANN LOK FULL THRD: Type: IMPLANTABLE DEVICE | Site: HIP | Status: FUNCTIONAL

## 2020-10-30 RX ORDER — ONDANSETRON 2 MG/ML
4 INJECTION INTRAMUSCULAR; INTRAVENOUS EVERY 6 HOURS PRN
Status: DISCONTINUED | OUTPATIENT
Start: 2020-10-30 | End: 2020-11-03 | Stop reason: HOSPADM

## 2020-10-30 RX ORDER — MORPHINE SULFATE 4 MG/ML
2 INJECTION, SOLUTION INTRAMUSCULAR; INTRAVENOUS
Status: DISCONTINUED | OUTPATIENT
Start: 2020-10-30 | End: 2020-10-30

## 2020-10-30 RX ORDER — LIDOCAINE HYDROCHLORIDE 10 MG/ML
INJECTION, SOLUTION EPIDURAL; INFILTRATION; INTRACAUDAL; PERINEURAL PRN
Status: DISCONTINUED | OUTPATIENT
Start: 2020-10-30 | End: 2020-10-30 | Stop reason: SDUPTHER

## 2020-10-30 RX ORDER — ETOMIDATE 2 MG/ML
INJECTION INTRAVENOUS PRN
Status: DISCONTINUED | OUTPATIENT
Start: 2020-10-30 | End: 2020-10-30 | Stop reason: SDUPTHER

## 2020-10-30 RX ORDER — PHYTONADIONE 5 MG/1
5 TABLET ORAL ONCE
Status: DISCONTINUED | OUTPATIENT
Start: 2020-10-30 | End: 2020-10-31

## 2020-10-30 RX ORDER — SODIUM CHLORIDE 0.9 % (FLUSH) 0.9 %
10 SYRINGE (ML) INJECTION EVERY 12 HOURS SCHEDULED
Status: DISCONTINUED | OUTPATIENT
Start: 2020-10-30 | End: 2020-10-30 | Stop reason: HOSPADM

## 2020-10-30 RX ORDER — PHYTONADIONE 10 MG/ML
5 INJECTION, EMULSION INTRAMUSCULAR; INTRAVENOUS; SUBCUTANEOUS ONCE
Status: DISCONTINUED | OUTPATIENT
Start: 2020-10-30 | End: 2020-10-30

## 2020-10-30 RX ORDER — ONDANSETRON 2 MG/ML
4 INJECTION INTRAMUSCULAR; INTRAVENOUS ONCE
Status: COMPLETED | OUTPATIENT
Start: 2020-10-30 | End: 2020-10-30

## 2020-10-30 RX ORDER — FUROSEMIDE 40 MG/1
40 TABLET ORAL SEE ADMIN INSTRUCTIONS
Status: DISCONTINUED | OUTPATIENT
Start: 2020-10-30 | End: 2020-10-30

## 2020-10-30 RX ORDER — METOPROLOL SUCCINATE 25 MG/1
12.5 TABLET, EXTENDED RELEASE ORAL DAILY
Status: DISCONTINUED | OUTPATIENT
Start: 2020-10-30 | End: 2020-10-31

## 2020-10-30 RX ORDER — SODIUM CHLORIDE 0.9 % (FLUSH) 0.9 %
10 SYRINGE (ML) INJECTION EVERY 12 HOURS SCHEDULED
Status: DISCONTINUED | OUTPATIENT
Start: 2020-10-30 | End: 2020-11-03 | Stop reason: HOSPADM

## 2020-10-30 RX ORDER — MORPHINE SULFATE 4 MG/ML
2 INJECTION, SOLUTION INTRAMUSCULAR; INTRAVENOUS ONCE
Status: COMPLETED | OUTPATIENT
Start: 2020-10-30 | End: 2020-10-30

## 2020-10-30 RX ORDER — SODIUM CHLORIDE 0.9 % (FLUSH) 0.9 %
10 SYRINGE (ML) INJECTION PRN
Status: DISCONTINUED | OUTPATIENT
Start: 2020-10-30 | End: 2020-10-30 | Stop reason: HOSPADM

## 2020-10-30 RX ORDER — MEPERIDINE HYDROCHLORIDE 50 MG/ML
12.5 INJECTION INTRAMUSCULAR; INTRAVENOUS; SUBCUTANEOUS EVERY 5 MIN PRN
Status: DISCONTINUED | OUTPATIENT
Start: 2020-10-30 | End: 2020-10-30 | Stop reason: HOSPADM

## 2020-10-30 RX ORDER — LEVOTHYROXINE SODIUM 0.12 MG/1
125 TABLET ORAL DAILY
Status: DISCONTINUED | OUTPATIENT
Start: 2020-10-31 | End: 2020-11-03 | Stop reason: HOSPADM

## 2020-10-30 RX ORDER — LORAZEPAM 2 MG/ML
1 INJECTION INTRAMUSCULAR EVERY 6 HOURS PRN
Status: DISCONTINUED | OUTPATIENT
Start: 2020-10-30 | End: 2020-11-03 | Stop reason: HOSPADM

## 2020-10-30 RX ORDER — PROMETHAZINE HYDROCHLORIDE 12.5 MG/1
12.5 TABLET ORAL EVERY 6 HOURS PRN
Status: DISCONTINUED | OUTPATIENT
Start: 2020-10-30 | End: 2020-11-03 | Stop reason: HOSPADM

## 2020-10-30 RX ORDER — LISINOPRIL 5 MG/1
5 TABLET ORAL DAILY
Status: DISCONTINUED | OUTPATIENT
Start: 2020-10-30 | End: 2020-10-30

## 2020-10-30 RX ORDER — SODIUM CHLORIDE, SODIUM LACTATE, POTASSIUM CHLORIDE, CALCIUM CHLORIDE 600; 310; 30; 20 MG/100ML; MG/100ML; MG/100ML; MG/100ML
INJECTION, SOLUTION INTRAVENOUS CONTINUOUS PRN
Status: DISCONTINUED | OUTPATIENT
Start: 2020-10-30 | End: 2020-10-30

## 2020-10-30 RX ORDER — OXYCODONE HYDROCHLORIDE 5 MG/1
5 TABLET ORAL EVERY 4 HOURS PRN
Status: DISCONTINUED | OUTPATIENT
Start: 2020-10-30 | End: 2020-11-03 | Stop reason: HOSPADM

## 2020-10-30 RX ORDER — FENTANYL CITRATE 50 UG/ML
50 INJECTION, SOLUTION INTRAMUSCULAR; INTRAVENOUS
Status: DISCONTINUED | OUTPATIENT
Start: 2020-10-30 | End: 2020-10-30 | Stop reason: HOSPADM

## 2020-10-30 RX ORDER — PROMETHAZINE HYDROCHLORIDE 25 MG/1
12.5 TABLET ORAL EVERY 6 HOURS PRN
Status: DISCONTINUED | OUTPATIENT
Start: 2020-10-30 | End: 2020-10-30 | Stop reason: SDUPTHER

## 2020-10-30 RX ORDER — HYDROMORPHONE HYDROCHLORIDE 1 MG/ML
0.5 INJECTION, SOLUTION INTRAMUSCULAR; INTRAVENOUS; SUBCUTANEOUS
Status: DISCONTINUED | OUTPATIENT
Start: 2020-10-30 | End: 2020-10-31

## 2020-10-30 RX ORDER — MORPHINE SULFATE 4 MG/ML
2 INJECTION, SOLUTION INTRAMUSCULAR; INTRAVENOUS EVERY 5 MIN PRN
Status: DISCONTINUED | OUTPATIENT
Start: 2020-10-30 | End: 2020-10-30 | Stop reason: HOSPADM

## 2020-10-30 RX ORDER — POLYETHYLENE GLYCOL 3350 17 G/17G
17 POWDER, FOR SOLUTION ORAL DAILY PRN
Status: DISCONTINUED | OUTPATIENT
Start: 2020-10-30 | End: 2020-11-03 | Stop reason: HOSPADM

## 2020-10-30 RX ORDER — ASPIRIN 81 MG/1
81 TABLET, CHEWABLE ORAL DAILY
Status: DISCONTINUED | OUTPATIENT
Start: 2020-10-30 | End: 2020-10-31

## 2020-10-30 RX ORDER — MORPHINE SULFATE 4 MG/ML
4 INJECTION, SOLUTION INTRAMUSCULAR; INTRAVENOUS EVERY 5 MIN PRN
Status: DISCONTINUED | OUTPATIENT
Start: 2020-10-30 | End: 2020-10-30 | Stop reason: HOSPADM

## 2020-10-30 RX ORDER — FENTANYL CITRATE 50 UG/ML
INJECTION, SOLUTION INTRAMUSCULAR; INTRAVENOUS PRN
Status: DISCONTINUED | OUTPATIENT
Start: 2020-10-30 | End: 2020-10-30 | Stop reason: SDUPTHER

## 2020-10-30 RX ORDER — MORPHINE SULFATE 4 MG/ML
4 INJECTION, SOLUTION INTRAMUSCULAR; INTRAVENOUS EVERY 4 HOURS PRN
Status: DISCONTINUED | OUTPATIENT
Start: 2020-10-30 | End: 2020-10-31

## 2020-10-30 RX ORDER — SODIUM CHLORIDE, SODIUM LACTATE, POTASSIUM CHLORIDE, CALCIUM CHLORIDE 600; 310; 30; 20 MG/100ML; MG/100ML; MG/100ML; MG/100ML
INJECTION, SOLUTION INTRAVENOUS CONTINUOUS
Status: DISCONTINUED | OUTPATIENT
Start: 2020-10-30 | End: 2020-10-30

## 2020-10-30 RX ORDER — ACETAMINOPHEN 325 MG/1
650 TABLET ORAL EVERY 6 HOURS PRN
Status: DISCONTINUED | OUTPATIENT
Start: 2020-10-30 | End: 2020-10-30 | Stop reason: SDUPTHER

## 2020-10-30 RX ORDER — SODIUM CHLORIDE 0.9 % (FLUSH) 0.9 %
10 SYRINGE (ML) INJECTION EVERY 12 HOURS SCHEDULED
Status: DISCONTINUED | OUTPATIENT
Start: 2020-10-30 | End: 2020-10-30 | Stop reason: SDUPTHER

## 2020-10-30 RX ORDER — DIPHENHYDRAMINE HYDROCHLORIDE 50 MG/ML
12.5 INJECTION INTRAMUSCULAR; INTRAVENOUS
Status: DISCONTINUED | OUTPATIENT
Start: 2020-10-30 | End: 2020-10-30 | Stop reason: HOSPADM

## 2020-10-30 RX ORDER — OXYCODONE HYDROCHLORIDE 5 MG/1
10 TABLET ORAL EVERY 4 HOURS PRN
Status: DISCONTINUED | OUTPATIENT
Start: 2020-10-30 | End: 2020-10-31

## 2020-10-30 RX ORDER — HYDROMORPHONE HYDROCHLORIDE 1 MG/ML
0.5 INJECTION, SOLUTION INTRAMUSCULAR; INTRAVENOUS; SUBCUTANEOUS EVERY 5 MIN PRN
Status: DISCONTINUED | OUTPATIENT
Start: 2020-10-30 | End: 2020-10-30 | Stop reason: HOSPADM

## 2020-10-30 RX ORDER — ACETAMINOPHEN 325 MG/1
650 TABLET ORAL EVERY 6 HOURS PRN
Status: DISCONTINUED | OUTPATIENT
Start: 2020-10-30 | End: 2020-11-03 | Stop reason: HOSPADM

## 2020-10-30 RX ORDER — SODIUM CHLORIDE 0.9 % (FLUSH) 0.9 %
10 SYRINGE (ML) INJECTION PRN
Status: DISCONTINUED | OUTPATIENT
Start: 2020-10-30 | End: 2020-11-03 | Stop reason: HOSPADM

## 2020-10-30 RX ORDER — ROCURONIUM BROMIDE 10 MG/ML
INJECTION, SOLUTION INTRAVENOUS PRN
Status: DISCONTINUED | OUTPATIENT
Start: 2020-10-30 | End: 2020-10-30 | Stop reason: SDUPTHER

## 2020-10-30 RX ORDER — SODIUM CHLORIDE 9 MG/ML
INJECTION, SOLUTION INTRAVENOUS CONTINUOUS
Status: DISCONTINUED | OUTPATIENT
Start: 2020-10-30 | End: 2020-11-01

## 2020-10-30 RX ORDER — EPHEDRINE SULFATE 50 MG/ML
INJECTION, SOLUTION INTRAVENOUS PRN
Status: DISCONTINUED | OUTPATIENT
Start: 2020-10-30 | End: 2020-10-30 | Stop reason: SDUPTHER

## 2020-10-30 RX ORDER — MORPHINE SULFATE 4 MG/ML
4 INJECTION, SOLUTION INTRAMUSCULAR; INTRAVENOUS
Status: DISCONTINUED | OUTPATIENT
Start: 2020-10-30 | End: 2020-10-30 | Stop reason: HOSPADM

## 2020-10-30 RX ORDER — PROPOFOL 10 MG/ML
INJECTION, EMULSION INTRAVENOUS PRN
Status: DISCONTINUED | OUTPATIENT
Start: 2020-10-30 | End: 2020-10-30 | Stop reason: SDUPTHER

## 2020-10-30 RX ORDER — AMIODARONE HYDROCHLORIDE 200 MG/1
200 TABLET ORAL DAILY
Status: DISCONTINUED | OUTPATIENT
Start: 2020-10-30 | End: 2020-11-03 | Stop reason: HOSPADM

## 2020-10-30 RX ORDER — HYDRALAZINE HYDROCHLORIDE 20 MG/ML
5 INJECTION INTRAMUSCULAR; INTRAVENOUS EVERY 10 MIN PRN
Status: DISCONTINUED | OUTPATIENT
Start: 2020-10-30 | End: 2020-10-30 | Stop reason: HOSPADM

## 2020-10-30 RX ORDER — MIDAZOLAM HYDROCHLORIDE 1 MG/ML
2 INJECTION INTRAMUSCULAR; INTRAVENOUS
Status: DISCONTINUED | OUTPATIENT
Start: 2020-10-30 | End: 2020-10-30 | Stop reason: HOSPADM

## 2020-10-30 RX ORDER — LIDOCAINE HYDROCHLORIDE 10 MG/ML
1 INJECTION, SOLUTION EPIDURAL; INFILTRATION; INTRACAUDAL; PERINEURAL
Status: DISCONTINUED | OUTPATIENT
Start: 2020-10-30 | End: 2020-10-30 | Stop reason: HOSPADM

## 2020-10-30 RX ORDER — SCOLOPAMINE TRANSDERMAL SYSTEM 1 MG/1
1 PATCH, EXTENDED RELEASE TRANSDERMAL ONCE
Status: DISCONTINUED | OUTPATIENT
Start: 2020-10-30 | End: 2020-11-02

## 2020-10-30 RX ORDER — PROMETHAZINE HYDROCHLORIDE 25 MG/ML
6.25 INJECTION, SOLUTION INTRAMUSCULAR; INTRAVENOUS
Status: DISCONTINUED | OUTPATIENT
Start: 2020-10-30 | End: 2020-10-30 | Stop reason: HOSPADM

## 2020-10-30 RX ORDER — METOCLOPRAMIDE HYDROCHLORIDE 5 MG/ML
10 INJECTION INTRAMUSCULAR; INTRAVENOUS
Status: DISCONTINUED | OUTPATIENT
Start: 2020-10-30 | End: 2020-10-30 | Stop reason: HOSPADM

## 2020-10-30 RX ORDER — ACETAMINOPHEN 650 MG/1
650 SUPPOSITORY RECTAL EVERY 6 HOURS PRN
Status: DISCONTINUED | OUTPATIENT
Start: 2020-10-30 | End: 2020-11-03 | Stop reason: HOSPADM

## 2020-10-30 RX ORDER — LABETALOL HYDROCHLORIDE 5 MG/ML
5 INJECTION, SOLUTION INTRAVENOUS EVERY 10 MIN PRN
Status: DISCONTINUED | OUTPATIENT
Start: 2020-10-30 | End: 2020-10-30 | Stop reason: HOSPADM

## 2020-10-30 RX ORDER — ONDANSETRON 2 MG/ML
4 INJECTION INTRAMUSCULAR; INTRAVENOUS EVERY 6 HOURS PRN
Status: DISCONTINUED | OUTPATIENT
Start: 2020-10-30 | End: 2020-10-30 | Stop reason: SDUPTHER

## 2020-10-30 RX ORDER — SODIUM CHLORIDE 0.9 % (FLUSH) 0.9 %
10 SYRINGE (ML) INJECTION PRN
Status: DISCONTINUED | OUTPATIENT
Start: 2020-10-30 | End: 2020-10-30 | Stop reason: SDUPTHER

## 2020-10-30 RX ORDER — SODIUM CHLORIDE, SODIUM LACTATE, POTASSIUM CHLORIDE, CALCIUM CHLORIDE 600; 310; 30; 20 MG/100ML; MG/100ML; MG/100ML; MG/100ML
INJECTION, SOLUTION INTRAVENOUS CONTINUOUS PRN
Status: DISCONTINUED | OUTPATIENT
Start: 2020-10-30 | End: 2020-10-30 | Stop reason: SDUPTHER

## 2020-10-30 RX ORDER — SPIRONOLACTONE 25 MG/1
12.5 TABLET ORAL DAILY
Status: DISCONTINUED | OUTPATIENT
Start: 2020-10-30 | End: 2020-10-30

## 2020-10-30 RX ORDER — HYDROMORPHONE HYDROCHLORIDE 1 MG/ML
0.25 INJECTION, SOLUTION INTRAMUSCULAR; INTRAVENOUS; SUBCUTANEOUS EVERY 5 MIN PRN
Status: DISCONTINUED | OUTPATIENT
Start: 2020-10-30 | End: 2020-10-30 | Stop reason: HOSPADM

## 2020-10-30 RX ORDER — MORPHINE SULFATE 4 MG/ML
4 INJECTION, SOLUTION INTRAMUSCULAR; INTRAVENOUS ONCE
Status: COMPLETED | OUTPATIENT
Start: 2020-10-30 | End: 2020-10-30

## 2020-10-30 RX ORDER — SUCCINYLCHOLINE CHLORIDE 20 MG/ML
INJECTION INTRAMUSCULAR; INTRAVENOUS PRN
Status: DISCONTINUED | OUTPATIENT
Start: 2020-10-30 | End: 2020-10-30 | Stop reason: SDUPTHER

## 2020-10-30 RX ORDER — ATORVASTATIN CALCIUM 40 MG/1
40 TABLET, FILM COATED ORAL DAILY
Status: DISCONTINUED | OUTPATIENT
Start: 2020-10-30 | End: 2020-11-03 | Stop reason: HOSPADM

## 2020-10-30 RX ORDER — SODIUM CHLORIDE 9 MG/ML
INJECTION, SOLUTION INTRAVENOUS CONTINUOUS
Status: DISCONTINUED | OUTPATIENT
Start: 2020-10-30 | End: 2020-10-30 | Stop reason: SDUPTHER

## 2020-10-30 RX ADMIN — HYDROMORPHONE HYDROCHLORIDE 0.5 MG: 1 INJECTION, SOLUTION INTRAMUSCULAR; INTRAVENOUS; SUBCUTANEOUS at 18:06

## 2020-10-30 RX ADMIN — FENTANYL CITRATE 50 MCG: 50 INJECTION INTRAMUSCULAR; INTRAVENOUS at 16:03

## 2020-10-30 RX ADMIN — ROCURONIUM BROMIDE 5 MG: 10 INJECTION, SOLUTION INTRAVENOUS at 16:03

## 2020-10-30 RX ADMIN — ATORVASTATIN CALCIUM 40 MG: 40 TABLET, FILM COATED ORAL at 22:09

## 2020-10-30 RX ADMIN — MORPHINE SULFATE 4 MG: 4 INJECTION, SOLUTION INTRAMUSCULAR; INTRAVENOUS at 05:30

## 2020-10-30 RX ADMIN — LIDOCAINE HYDROCHLORIDE 50 MG: 10 INJECTION, SOLUTION EPIDURAL; INFILTRATION; INTRACAUDAL; PERINEURAL at 16:03

## 2020-10-30 RX ADMIN — Medication 2 G: at 16:14

## 2020-10-30 RX ADMIN — APIXABAN 5 MG: 5 TABLET, FILM COATED ORAL at 22:09

## 2020-10-30 RX ADMIN — SUCCINYLCHOLINE CHLORIDE 100 MG: 20 INJECTION, SOLUTION INTRAMUSCULAR; INTRAVENOUS at 16:03

## 2020-10-30 RX ADMIN — MORPHINE SULFATE 2 MG: 4 INJECTION, SOLUTION INTRAMUSCULAR; INTRAVENOUS at 04:54

## 2020-10-30 RX ADMIN — TETANUS TOXOID, REDUCED DIPHTHERIA TOXOID AND ACELLULAR PERTUSSIS VACCINE, ADSORBED 0.5 ML: 5; 2.5; 8; 8; 2.5 SUSPENSION INTRAMUSCULAR at 04:00

## 2020-10-30 RX ADMIN — PHENYLEPHRINE HYDROCHLORIDE 100 MCG: 10 INJECTION INTRAVENOUS at 16:53

## 2020-10-30 RX ADMIN — PROPOFOL 30 MG: 10 INJECTION, EMULSION INTRAVENOUS at 16:03

## 2020-10-30 RX ADMIN — PHENYLEPHRINE HYDROCHLORIDE 100 MCG: 10 INJECTION INTRAVENOUS at 16:48

## 2020-10-30 RX ADMIN — SODIUM CHLORIDE: 9 INJECTION, SOLUTION INTRAVENOUS at 22:17

## 2020-10-30 RX ADMIN — FENTANYL CITRATE 50 MCG: 50 INJECTION INTRAMUSCULAR; INTRAVENOUS at 16:31

## 2020-10-30 RX ADMIN — EPHEDRINE SULFATE 10 MG: 50 INJECTION INTRAMUSCULAR; INTRAVENOUS; SUBCUTANEOUS at 16:56

## 2020-10-30 RX ADMIN — FENTANYL CITRATE 50 MCG: 50 INJECTION INTRAMUSCULAR; INTRAVENOUS at 16:19

## 2020-10-30 RX ADMIN — PHENYLEPHRINE HYDROCHLORIDE 100 MCG: 10 INJECTION INTRAVENOUS at 16:43

## 2020-10-30 RX ADMIN — FOLIC ACID: 5 INJECTION, SOLUTION INTRAMUSCULAR; INTRAVENOUS; SUBCUTANEOUS at 06:33

## 2020-10-30 RX ADMIN — SODIUM CHLORIDE, SODIUM LACTATE, POTASSIUM CHLORIDE, AND CALCIUM CHLORIDE: 600; 310; 30; 20 INJECTION, SOLUTION INTRAVENOUS at 15:59

## 2020-10-30 RX ADMIN — SUGAMMADEX 200 MG: 100 INJECTION, SOLUTION INTRAVENOUS at 17:17

## 2020-10-30 RX ADMIN — SODIUM CHLORIDE, SODIUM LACTATE, POTASSIUM CHLORIDE, AND CALCIUM CHLORIDE: 600; 310; 30; 20 INJECTION, SOLUTION INTRAVENOUS at 14:53

## 2020-10-30 RX ADMIN — ROCURONIUM BROMIDE 45 MG: 10 INJECTION, SOLUTION INTRAVENOUS at 16:09

## 2020-10-30 RX ADMIN — ONDANSETRON 4 MG: 2 INJECTION INTRAMUSCULAR; INTRAVENOUS at 04:53

## 2020-10-30 RX ADMIN — ASPIRIN 81 MG: 81 TABLET, CHEWABLE ORAL at 22:08

## 2020-10-30 RX ADMIN — ACETAMINOPHEN 650 MG: 325 TABLET ORAL at 22:09

## 2020-10-30 RX ADMIN — SODIUM CHLORIDE: 9 INJECTION, SOLUTION INTRAVENOUS at 08:11

## 2020-10-30 RX ADMIN — PHENYLEPHRINE HYDROCHLORIDE 100 MCG: 10 INJECTION INTRAVENOUS at 16:30

## 2020-10-30 RX ADMIN — PHENYLEPHRINE HYDROCHLORIDE 100 MCG: 10 INJECTION INTRAVENOUS at 17:11

## 2020-10-30 RX ADMIN — ETOMIDATE 10 MG: 2 INJECTION, SOLUTION INTRAVENOUS at 16:03

## 2020-10-30 ASSESSMENT — ENCOUNTER SYMPTOMS
SINUS PRESSURE: 0
PHOTOPHOBIA: 0
NAUSEA: 0
COLOR CHANGE: 0
ABDOMINAL PAIN: 0
CONSTIPATION: 0
CHEST TIGHTNESS: 0
WHEEZING: 0
VOMITING: 0
EYE PAIN: 0
SHORTNESS OF BREATH: 0
BACK PAIN: 0
TROUBLE SWALLOWING: 0
SHORTNESS OF BREATH: 0
DIARRHEA: 0

## 2020-10-30 ASSESSMENT — PAIN SCALES - GENERAL
PAINLEVEL_OUTOF10: 8
PAINLEVEL_OUTOF10: 7
PAINLEVEL_OUTOF10: 7
PAINLEVEL_OUTOF10: 6
PAINLEVEL_OUTOF10: 7

## 2020-10-30 ASSESSMENT — PAIN DESCRIPTION - ORIENTATION: ORIENTATION: RIGHT

## 2020-10-30 ASSESSMENT — PAIN DESCRIPTION - LOCATION: LOCATION: KNEE

## 2020-10-30 ASSESSMENT — LIFESTYLE VARIABLES: SMOKING_STATUS: 0

## 2020-10-30 NOTE — PROGRESS NOTES
Informed consent explained to patient about his surgery. Patient stated that he did not have any questions or concerns. Informed consent in patient chart.  Electronically signed by Yesenia Bellamy RN on 10/30/2020 at 11:17 AM

## 2020-10-30 NOTE — OP NOTE
with appropriate placement of hardware.     Wound was then copiously irrigated with bulb syringe lavage. The deep tissue  was approximated with Vicryl sutures. Skin was closed with staples. The  patient was placed in a sterile dressing. He awoke from anesthesia without  difficulty and transferred to the PACU in stable condition.  All sponge,  needle, and instrument counts were correct at the end of the procedure.           Romeo Ackerman MD

## 2020-10-30 NOTE — H&P
History and Physical    Patient Name:  Jairo Dunham    :  1950    Chief Complaint:   Hip fx    History of Present Illness:   Jairo Dunham presents to [facility] after a fall today when his right knee became weak and made him fall to the ground. Initially he thought he broke the knee. He has knee pain. In ED he was found to have right hip fx, ortho is consulted. Currenlty pain controlled with 4 mg IV Morphine. Denies headache, N/V/abd pain/D/C/urinary issues. Denies CP, palpitations, dyspnea. Pt has PMH of alcoholism. Alchochol level is 176. Labs show hyponatremia, GER vs CKD, elevated LFTs, anemia, and elevated INR. Pt is started on IVFs, and given vit K. Anemia work up pending. He will receive banana bag. PMH afib on chronic anticoagulation with eliquis. Past Medical History:   has a past medical history of CAD (coronary artery disease), Gout, Hypertension, Non-ST elevation MI (NSTEMI) ([facility] [address] and Palliative care patient. Surgical History:   has a past surgical history that includes Cardiac catheterization (14  [facility]) and Cholecystectomy. Social History:   reports that he quit smoking about 41 years ago. His smoking use included cigars. He started smoking about 44 years ago. He has a 3.00 pack-year smoking history. He has never used smokeless tobacco. He reports current alcohol use of about 3.0 standard drinks of alcohol per week. He reports that he does not use drugs. Family History:  family history includes Heart Disease in his father; No Known Problems in his mother. Medications:  Prior to Admission medications    Medication Sig Start Date End Date Taking?  Authorizing Provider   lisinopril (PRINIVIL;ZESTRIL) 5 MG tablet Take 1 tablet by mouth daily 10/22/20  Yes George Kaufman DO   vitamin D (ERGOCALCIFEROL) 1.25 MG (93429 UT) CAPS capsule Take 1 capsule by mouth once a week 10/4/20  Yes Medardo Smith MD   levothyroxine (SYNTHROID) 125 MCG tablet Take 125 mcg by mouth Daily   Yes Historical Provider, MD   metoprolol succinate (TOPROL XL) 25 MG extended release tablet Take 12.5 mg by mouth daily 1/2 tab   Yes Historical Provider, MD   spironolactone (ALDACTONE) 25 MG tablet Take 0.5 tablets by mouth daily  Patient taking differently: Take 12.5 mg by mouth as needed  7/28/20  Yes Verner Lyons, APRN   amiodarone (CORDARONE) 200 MG tablet Take 1 tablet by mouth daily 7/28/20  Yes Verner Lyons, APRN   apixaban (ELIQUIS) 5 MG TABS tablet Take 1 tablet by mouth 2 times daily 6/29/20  Yes Amy Garg MD   aspirin 81 MG chewable tablet Take 81 mg by mouth daily   Yes Historical Provider, MD   atorvastatin (LIPITOR) 40 MG tablet Take 40 mg by mouth daily. 12/30/14  Yes DELANEY Carlson MD   acetaminophen (TYLENOL) 325 MG tablet Take 650 mg by mouth every 6 hours as needed for Pain    Historical Provider, MD   furosemide (LASIX) 40 MG tablet Take 1 tablet by mouth See Admin Instructions May take extra 40mg for weight gain 7/28/20   Verner Lyons, APRN       Allergies:  Patient has no known allergies. Review of Systems:   · Constitutional: there has been no unanticipated weight loss. There's been change in energy level activity level. · Eyes: No visual changes or diplopia. No scleral icterus. · ENT: No Headaches, hearing loss or vertigo. No mouth sores or sore throat. · Cardiovascular: No chest pain, palpitations or loss of consciousness. No pleuritic pain or phlebitis. No orthopnea, PND or peripheral edema. · Respiratory: No cough or wheezing, no sputum production. No hemoptysis. No dyspnea     · Gastrointestinal: No abdominal pain, appetite loss, blood in stools. No change in bowel habits. No N/V. No blood per rectum. · Genitourinary: No dysuria, trouble voiding, or hematuria. · Musculoskeletal:  Right knee pain, right hip fx  · Integumentary: No rash or pruritis.   · Neurological: No headache, diplopia, change in muscle strength, numbness or tingling. Psychiatric: No anxiety, or depression. · Endocrine: No temperature intolerance. No excessive thirst, fluid intake, or urination. No tremor. · Hematologic/Lymphatic: No abnormal bruising or bleeding, blood clots or swollen lymph nodes. · Allergic/Immunologic: No nasal congestion or hives. Physical Examination:    Vital Signs: /64   Pulse 54   Temp 98.1 °F (36.7 °C)   Resp 18   Ht 6' (1.829 m)   Wt 175 lb (79.4 kg)   SpO2 96%   BMI 23.73 kg/m²   General appearance: Well preserved, mesomorphic body habitus, alert, mild to moderate distress. Skin: Skin color, texture, turgor normal. No rashes or lesions. No induration or tightening palpated. Head: Normocephalic. No masses, lesions, tenderness or abnormalities  Eyes: conjunctivae/corneas clear. EOM's intact. Sclera non icteric. Ears: External ears normal.  Hearing normal to finger rub. Nose/Sinuses: Nares normal. Septum midline. Mucosa normal. No drainage or sinus tenderness. Oropharynx: Lips, mucosa, and tongue normal. Oropharynx clear with no exudate seen. Neck: Neck supple, and symmetric. No adenopathy. Trachea is midline. Carotids brisk in upstroke without bruits, No abnormal JVP noted at 45°. Lungs: Lungs clear to auscultation bilaterally. No retractions or use of accessory muscles. No vocal fremitus. No ronchi, crackle or rale. Heart:  S1 > S2. Regular rate and rhythm. No gallop, murmur, rub, palpable thrill or heave noted. Abdomen: Abdomen soft, non-tender. BS normal. No masses, organomegaly. No hernia noted. Extremities: Extremities normal. No deformities, edema, or skin discoloration. No cyanosis or clubbing noted to the nails. Peripheral pulses 4/4. Musculoskeletal: Spine ROM normal.   Neuro: Cranial nerves intact. No focal weakness. Sensory: grossly normal to touch.      Pertinent Labs:  CBC:   Recent Labs     10/30/20  0452   WBC 11.6*   RBC 3.17*   HGB 9.9*   HCT 29.1*   MCV 91.8   MCH 31.2*   MCHC 34.0   RDW 14.8*      MPV 9.3*     BMP:   Recent Labs     10/30/20  0452   *   K 3.9   CL 92*   CO2 19*   BUN 28*   CREATININE 2.0*   GLUCOSE 106   CALCIUM 8.9     ABGs: No results for input(s): PO2, PCO2, PH, HCO3, BE, O2SAT in the last 72 hours. INR:   Recent Labs     10/30/20  0452   INR 2.03*   PROTIME 23.3*     BNP:    Lab Results   Component Value Date    .6 12/27/2014     TSH:   Lab Results   Component Value Date    TSH 0.831 10/02/2020     Cardiac Injury Profile:   Lab Results   Component Value Date    TROPONINI 0.01 06/23/2020     Lipid Profile: No components found for: CHLPL  Lab Results   Component Value Date    TRIG 67 10/02/2020    TRIG 57 06/21/2020    TRIG 58 05/07/2018     Lab Results   Component Value Date    HDL 79 10/02/2020    HDL 68 06/21/2020    HDL 61 05/07/2018     Lab Results   Component Value Date    LDLCALC 27 10/02/2020    [address] [employer] 86 06/21/2020    [address] [employer] 21 05/07/2018     No results found for: LABVLDL  Hemoglobin A1C:   Lab Results   Component Value Date    LABA1C 4.6 10/02/2020     No results found for: EAG      Assessment/Plan:  ·   Right hip fx- per ortho- cont pain control and ivf  · Hyponatremia- ivf  · Alcohol intoxication - ivf  · Coagulopathy- vit k now   · afib with chronic anticoagulation - hold blood thinner prior to surgery   · GER vs CKD- IVF  · Anemia - work up  Patient Active Problem List:     CAD (coronary artery disease)     Essential hypertension     Alcohol abuse     Transaminitis     Dyslipidemia     Mixed hyperlipidemia         Npo for now unless ortho says otherwise           I have reviewed my findings and recommendations in detail with Kwaku Austin.     David Lombardo MD     Admission level 2

## 2020-10-30 NOTE — ED NOTES
Report called to 5th floor nurse. Will transport pt up when nurse is available in ER to transport.      Jaciel Briseno RN  10/30/20 [id number]

## 2020-10-30 NOTE — ED PROVIDER NOTES
[facility] EMERGENCY DEPT  eMERGENCY dEPARTMENT eNCOUnter      Pt Name: Megan Traylor  MRN: [id number]  [place] 1950  Date of evaluation: 10/30/2020  Provider: Nacho Zapata MD    [address] [place] [address]       Chief Complaint   Patient presents with    Knee Pain     \"my right knee gave out\"    Abrasion     left forearm         HISTORY OF PRESENT ILLNESS   (Location/Symptom, Timing/Onset,Context/Setting, Quality, Duration, Modifying Factors, Severity)  Note limiting factors. Megan Traylor is a 78 y.o. male who presents to the emergency department for right hip and knee pain. Pt had gotten up to go to bathroom. He was on the way to the bathroom when the knee \"buckled\". He had pain in the knee immediately after the fall. He is now also c/o right pain following the fall. ROM at the right hip is limited now due to pain. He was not able get up after the fall 2/2 pain. HPI    NursingNotes were reviewed. REVIEW OF SYSTEMS    (2-9 systems for level 4, 10 or more for level 5)     Review of Systems   Constitutional: Negative for fatigue and fever. HENT: Negative for congestion, drooling, sinus pressure and trouble swallowing. Eyes: Negative for photophobia, pain and visual disturbance. Respiratory: Negative for chest tightness, shortness of breath and wheezing. Cardiovascular: Negative for chest pain, palpitations and leg swelling. Gastrointestinal: Negative for abdominal pain, constipation, diarrhea, nausea and vomiting. Genitourinary: Negative for dysuria, flank pain and urgency. Musculoskeletal: Positive for arthralgias (right hip pain). Negative for back pain, myalgias, neck pain and neck stiffness. Skin: Negative for color change, pallor and rash. Neurological: Negative for dizziness, facial asymmetry, speech difficulty, weakness, light-headedness, numbness and headaches. Psychiatric/Behavioral: Negative for agitation, confusion, hallucinations and suicidal ideas.  The patient is not nervous/anxious. PAST MEDICALHISTORY     Past Medical History:   Diagnosis Date    CAD (coronary artery disease)     Gout     Hypertension     Non-ST elevation MI (NSTEMI) ([facility] [address] 12/28/14    Palliative care patient 06/29/2020         SURGICAL HISTORY       Past Surgical History:   Procedure Laterality Date    CARDIAC CATHETERIZATION  12/29/14  [facility]    angioplasty, stent to LAD. EF 45%    CHOLECYSTECTOMY           CURRENT MEDICATIONS     Previous Medications    ACETAMINOPHEN (TYLENOL) 325 MG TABLET    Take 650 mg by mouth every 6 hours as needed for Pain    AMIODARONE (CORDARONE) 200 MG TABLET    Take 1 tablet by mouth daily    APIXABAN (ELIQUIS) 5 MG TABS TABLET    Take 1 tablet by mouth 2 times daily    ASPIRIN 81 MG CHEWABLE TABLET    Take 81 mg by mouth daily    ATORVASTATIN (LIPITOR) 40 MG TABLET    Take 40 mg by mouth daily. FUROSEMIDE (LASIX) 40 MG TABLET    Take 1 tablet by mouth See Admin Instructions May take extra 40mg for weight gain    LEVOTHYROXINE (SYNTHROID) 125 MCG TABLET    Take 125 mcg by mouth Daily    LISINOPRIL (PRINIVIL;ZESTRIL) 5 MG TABLET    Take 1 tablet by mouth daily    METOPROLOL SUCCINATE (TOPROL XL) 25 MG EXTENDED RELEASE TABLET    Take 12.5 mg by mouth daily 1/2 tab    SPIRONOLACTONE (ALDACTONE) 25 MG TABLET    Take 0.5 tablets by mouth daily    VITAMIN D (ERGOCALCIFEROL) 1.25 MG (60582 UT) CAPS CAPSULE    Take 1 capsule by mouth once a week       ALLERGIES     Patient has no known allergies.     FAMILY HISTORY       Family History   Problem Relation Age of Onset    No Known Problems Mother     Heart Disease Father           SOCIAL HISTORY       Social History     Socioeconomic History    Marital status:      Spouse name: None    Number of children: None    Years of education: None    Highest education level: None   Occupational History    None   Social Needs    Financial resource strain: None    Food insecurity     Worry: None     Inability: None    Transportation needs     Medical: None     Non-medical: None   Tobacco Use    Smoking status: Former Smoker     Packs/day: 1.00     Years: 3.00     Pack years: 3.00     Types: Cigars     Start date:      Last attempt to quit:      Years since quittin.8    Smokeless tobacco: Never Used   Substance and Sexual Activity    Alcohol use: Yes     Alcohol/week: 3.0 standard drinks     Types: 3 Shots of liquor per week     Frequency: 4 or more times a week     Drinks per session: 1 or 2     Binge frequency: Daily or almost daily     Comment: daily    Drug use: No    Sexual activity: Yes     Partners: Female   Lifestyle    Physical activity     Days per week: None     Minutes per session: None    Stress: None   Relationships    Social connections     Talks on phone: None     Gets together: None     Attends [employer] service: None     Active member of club or organization: None     Attends meetings of clubs or organizations: None     Relationship status: None    Intimate partner violence     Fear of current or ex partner: None     Emotionally abused: None     Physically abused: None     Forced sexual activity: None   Other Topics Concern    None   Social History Narrative    None       SCREENINGS    [place] Coma Scale  Eye Opening: Spontaneous  Best Verbal Response: Oriented  Best Motor Response: Obeys commands  [place] Coma Scale Score: 15        PHYSICAL EXAM    (up to 7 for level 4, 8 or more for level 5)     ED Triage Vitals [10/30/20 0351]   BP Temp Temp src Pulse Resp SpO2 Height Weight   100/64 98.1 °F (36.7 °C) -- 87 20 98 % 6' (1.829 m) 175 lb (79.4 kg)       Physical Exam  Vitals signs and nursing note reviewed. Constitutional:       General: He is not in acute distress. Appearance: He is well-developed. HENT:      Head: Normocephalic and atraumatic. Nose: Nose normal.      Mouth/Throat:      Mouth: Mucous membranes are moist.      Pharynx: Oropharynx is clear.    Eyes: Conjunctiva/sclera: Conjunctivae normal.      Pupils: Pupils are equal, round, and reactive to light. Neck:      Musculoskeletal: Normal range of motion and neck supple. Cardiovascular:      Rate and Rhythm: Normal rate and regular rhythm. Heart sounds: Normal heart sounds. No murmur. Pulmonary:      Effort: Pulmonary effort is normal.      Breath sounds: Normal breath sounds. No wheezing or rales. Abdominal:      General: Bowel sounds are normal. There is no distension. Palpations: Abdomen is soft. Tenderness: There is no abdominal tenderness. There is no guarding or rebound. Musculoskeletal:         General: Tenderness and signs of injury present. No deformity. Right hip: He exhibits decreased range of motion, decreased strength, tenderness and bony tenderness. Comments: Movement & strength at the right hip is reduced 2/2 pain. Pulses intact distally. Patient is not complaining of any pain with palpation to his knee. He tells me that is all coming from the hip. Skin:     General: Skin is warm and dry. Capillary Refill: Capillary refill takes less than 2 seconds. Neurological:      General: No focal deficit present. Mental Status: He is alert and oriented to person, place, and time. Cranial Nerves: No cranial nerve deficit. Psychiatric:         Mood and Affect: Mood normal.         Behavior: Behavior normal.         Thought Content: Thought content normal.         DIAGNOSTIC RESULTS     EKG: All EKG's areinterpreted by the Emergency Department Physician who either signs or Co-signs this chart in the absence of a cardiologist.        RADIOLOGY:  Non-plain film images such as CT, Ultrasound and MRI are read by the radiologist. Plain radiographic images are visualized and preliminarily interpreted bythe emergency physician with the below findings:    Severe osteoarthritis, osteophytes present, but I do not appreciate a fracture.     X-ray right hip: I do not appreciate an acute fracture or dislocation. After CT confirmed the FX, I can now appreciate the fx location on the XR. CT RIGHT HIP:    There is a comminuted impacted right intertrochanteric femur fracture with fracture lucencies through the basicervical femoral neck, greater trochanter, and lesser trochanter, Varus angulation of the right hip. No evidence of dislocation. Advanced degenerative disc disease of the right hip with subchondral cysts and superolateral osteophytes. Vascular calcifications. CXR:  No acute process      XR KNEE RIGHT (1-2 VIEWS)    (Results Pending)   XR HIP 2-3 VW W PELVIS RIGHT    (Results Pending)   CT HIP RIGHT WO CONTRAST    (Results Pending)   XR CHEST PORTABLE    (Results Pending)           LABS:  Labs Reviewed   CBC WITH AUTO DIFFERENTIAL - Abnormal; Notable for the following components:       Result Value    WBC 11.6 (*)     RBC 3.17 (*)     Hemoglobin 9.9 (*)     Hematocrit 29.1 (*)     MCH 31.2 (*)     RDW 14.8 (*)     MPV 9.3 (*)     Neutrophils % 79.5 (*)     Lymphocytes % 12.7 (*)     Neutrophils Absolute 9.2 (*)     All other components within normal limits   COMPREHENSIVE METABOLIC PANEL - Abnormal; Notable for the following components:    Sodium 129 (*)     Chloride 92 (*)     CO2 19 (*)     BUN 28 (*)     CREATININE 2.0 (*)     GFR Non- 33 (*)     GFR  40 (*)     Total Bilirubin 1.3 (*)     Alkaline Phosphatase 136 (*)     ALT 63 (*)     AST 75 (*)     All other components within normal limits   PROTIME-INR - Abnormal; Notable for the following components:    Protime 23.3 (*)     INR 2.03 (*)     All other components within normal limits   ETHANOL   URINE DRUG SCREEN   URINE RT REFLEX TO CULTURE   COVID-19   COVID-19   COVID-19   TYPE AND SCREEN       All other labs were within normal range or not returned as of this dictation.     EMERGENCY DEPARTMENT COURSE and DIFFERENTIAL DIAGNOSIS/MDM:   Vitals:    Vitals:    10/30/20 7112 10/30/20 0356 10/30/20 0532   BP: 100/64  102/64   Pulse: 87 80 54   Resp: 20  18   Temp: 98.1 °F (36.7 °C)     SpO2: 98%  96%   Weight: 175 lb (79.4 kg)     Height: 6' (1.829 m)         MDM  Number of Diagnoses or Management Options  Acute alcoholic intoxication without complication ([facility] [address]): new and requires workup  Anemia, unspecified type: new and requires workup  Closed nondisplaced intertrochanteric fracture of right femur, initial encounter [facility]): new and requires workup  Renal insufficiency: new and requires workup  Diagnosis management comments: Patient was found to have a right comminuted intertrochanteric fracture. I spoke to Dr. Lawyer Hook on-call for Ortho. He would like the hospitalist admit this patient. He asked that patient be kept n.p.o. Patient will be admitted to the hospitalist service for further evaluation and treatment of this intertrochanteric fracture. Amount and/or Complexity of Data Reviewed  Decide to obtain previous medical records or to obtain history from someone other than the patient: yes    Patient Progress  Patient progress: stable      Reassessment      CONSULTS:  [address] [place] TO HOSPITALIST    PROCEDURES:  Unless otherwise noted below, none     Procedures    FINAL IMPRESSION      1. Closed nondisplaced intertrochanteric fracture of right femur, initial encounter ([facility] [address]    2. Renal insufficiency    3. Anemia, unspecified type    4. Acute alcoholic intoxication without complication ([facility] [address]          DISPOSITION/PLAN   DISPOSITION Decision To Admit 10/30/2020 05:25:46 AM      PATIENT REFERRED TO:  No follow-up provider specified.     DISCHARGE MEDICATIONS:  New Prescriptions    No medications on file          (Please note that portions of this note were completed with a voice recognition program.  Efforts were made to edit thedictations but occasionally words are mis-transcribed.)    Edelmira Alamo MD (electronically signed)  Attending Emergency Physician         Marv Monk MD  10/30/20 Garo Bullock MD  10/30/20 3653

## 2020-10-30 NOTE — CONSULTS
Orthopaedic Surgery  Inpatient Consultation    Bandar Tipton (1950)  10/30/2020    Requesting Physician: DR Satish Wong Physician: DR De La Rosa Number  10/30/2020  3:47 AM    CHIEF COMPLAINT:  right HIP FX S/P FALL    History Obtained From:  Patient/CHART    HISTORY OF PRESENT ILLNESS:                The patient is a 78 y.o. male who presents with above chief complaint. Bandar Tipton is a 78 y.o. male who presents to the emergency department for right hip and knee pain. Pt had gotten up to go to bathroom. He was on the way to the bathroom when the knee \"buckled\". He had pain in the knee immediately after the fall. He is now also c/o right pain following the fall. ROM at the right hip is limited now due to pain. He was not able get up after the fall 2/2 pain. PAIN IS MODERATE/CONSTANT/DULL. AGGRAVATED WITH MOVEMENT, BETTER WITH REST. NO SIMILAR C/O. DENIES ANY OTHER ISSUES. Past Medical History:        Diagnosis Date    CAD (coronary artery disease)     Gout     Hypertension     Non-ST elevation MI (NSTEMI) ([facility] [address] 12/28/14    Palliative care patient 06/29/2020       Past Surgical History:        Procedure Laterality Date    CARDIAC CATHETERIZATION  12/29/14  [facility]    angioplasty, stent to LAD.  EF 45%    CHOLECYSTECTOMY         Current Medications:   Current Facility-Administered Medications: 0.9 % sodium chloride infusion, , Intravenous, Continuous  sodium chloride flush 0.9 % injection 10 mL, 10 mL, Intravenous, 2 times per day  sodium chloride flush 0.9 % injection 10 mL, 10 mL, Intravenous, PRN  acetaminophen (TYLENOL) tablet 650 mg, 650 mg, Oral, Q6H PRN **OR** acetaminophen (TYLENOL) suppository 650 mg, 650 mg, Rectal, Q6H PRN  polyethylene glycol (GLYCOLAX) packet 17 g, 17 g, Oral, Daily PRN  promethazine (PHENERGAN) tablet 12.5 mg, 12.5 mg, Oral, Q6H PRN **OR** ondansetron (ZOFRAN) injection 4 mg, 4 mg, Intravenous, Q6H PRN  [START ON 10/31/2020] enoxaparin (LOVENOX) injection 30 mg, 30 mg, Subcutaneous, Daily  morphine injection 2 mg, 2 mg, Intravenous, Q2H PRN  phytonadione (VITAMIN K) tablet 5 mg, 5 mg, Oral, Once  Prior to Admission medications    Medication Sig Start Date End Date Taking? Authorizing Provider   lisinopril (PRINIVIL;ZESTRIL) 5 MG tablet Take 1 tablet by mouth daily 10/22/20  Yes Maryann Kaufman DO   vitamin D (ERGOCALCIFEROL) 1.25 MG (88795 UT) CAPS capsule Take 1 capsule by mouth once a week 10/4/20  Yes Erich Smith MD   levothyroxine (SYNTHROID) 125 MCG tablet Take 125 mcg by mouth Daily   Yes Historical Provider, MD   metoprolol succinate (TOPROL XL) 25 MG extended release tablet Take 12.5 mg by mouth daily 1/2 tab   Yes Historical Provider, MD   spironolactone (ALDACTONE) 25 MG tablet Take 0.5 tablets by mouth daily  Patient taking differently: Take 12.5 mg by mouth as needed  7/28/20  Yes ADY Pinto   amiodarone (CORDARONE) 200 MG tablet Take 1 tablet by mouth daily 7/28/20  Yes ADY Pinto   apixaban (ELIQUIS) 5 MG TABS tablet Take 1 tablet by mouth 2 times daily 6/29/20  Yes Latoya Long MD   aspirin 81 MG chewable tablet Take 81 mg by mouth daily   Yes Historical Provider, MD   atorvastatin (LIPITOR) 40 MG tablet Take 40 mg by mouth daily. 12/30/14  Yes DELANEY Alston MD   acetaminophen (TYLENOL) 325 MG tablet Take 650 mg by mouth every 6 hours as needed for Pain    Historical Provider, MD   furosemide (LASIX) 40 MG tablet Take 1 tablet by mouth See Admin Instructions May take extra 40mg for weight gain 7/28/20   ADY Pinto       Allergies:  Patient has no known allergies.     Social History:   Social History     Socioeconomic History    Marital status:      Spouse name: Not on file    Number of children: Not on file    Years of education: Not on file    Highest education level: Not on file   Occupational History    Not on file   Social Needs    Financial resource strain: Not on file    Food insecurity Worry: Not on file     Inability: Not on file    Transportation needs     Medical: Not on file     Non-medical: Not on file   Tobacco Use    Smoking status: Former Smoker     Packs/day: 1.00     Years: 3.00     Pack years: 3.00     Types: Cigars     Start date:      Last attempt to quit:      Years since quittin.8    Smokeless tobacco: Never Used   Substance and Sexual Activity    Alcohol use:  Yes     Alcohol/week: 3.0 standard drinks     Types: 3 Shots of liquor per week     Frequency: 4 or more times a week     Drinks per session: 1 or 2     Binge frequency: Daily or almost daily     Comment: daily    Drug use: No    Sexual activity: Yes     Partners: Female   Lifestyle    Physical activity     Days per week: Not on file     Minutes per session: Not on file    Stress: Not on file   Relationships    Social connections     Talks on phone: Not on file     Gets together: Not on file     Attends Christianity service: Not on file     Active member of club or organization: Not on file     Attends meetings of clubs or organizations: Not on file     Relationship status: Not on file    Intimate partner violence     Fear of current or ex partner: Not on file     Emotionally abused: Not on file     Physically abused: Not on file     Forced sexual activity: Not on file   Other Topics Concern    Not on file   Social History Narrative    Not on file       Family History:   Family History   Problem Relation Age of Onset    No Known Problems Mother     Heart Disease Father        REVIEW OF SYSTEMS:    CONSTITUTIONAL:  negative for  fevers, chills, sweats, fatigue, malaise, anorexia and weight loss  EYES:  negative for  double vision, blurred vision and visual disturbance  HEENT:  negative for  hearing loss, tinnitus, ear drainage, earaches, nasal congestion, epistaxis, snoring, sore mouth, sore throat, hoarseness and voice change  RESPIRATORY:  negative for  dry cough, cough with sputum, dyspnea, wheezing, hemoptysis, chest pain, pleuritic pain and cyanosis  CARDIOVASCULAR:  negative for  chest pain, palpitations, orthopnea, exertional chest pressure/discomfort, edema  GASTROINTESTINAL:  negative for nausea, vomiting, change in bowel habits, diarrhea, constipation, abdominal pain, jaundice, dysphagia, regurgitation, hematemesis and hemtochezia  GENITOURINARY:  negative for frequency, dysuria, nocturia, hesitancy and hematuria  INTEGUMENT/BREAST:  negative for rash, skin lesion(s), dryness, skin color change, pruritus, changes in hair and changes in nails  HEMATOLOGIC/LYMPHATIC:  negative for easy bruising, bleeding, lymphadenopathy, petechiae and swelling/edema  ALLERGIC/IMMUNOLOGIC:  negative for recurrent infections and urticaria  ENDOCRINE:  negative for heat intolerance, cold intolerance, tremor, weight changes, hair loss and diabetic symptoms including neither polyuria nor polydipsia  MUSCULOSKELETAL:  AS ABOVE  NEUROLOGICAL:  negative for headaches, dizziness, seizures, memory problems, speech problems, visual disturbance, coordination problems, gait problems, tremor, syncope and near syncope  BEHAVIOR/PSYCH:  negative for depressed mood, elated mood, increased agitation and anxiety    PHYSICAL EXAM:    Vitals:   Vitals:    10/30/20 0351 10/30/20 0356 10/30/20 0532   BP: 100/64  102/64   Pulse: 87 80 54   Resp: 20  18   Temp: 98.1 °F (36.7 °C)     SpO2: 98%  96%   Weight: 175 lb (79.4 kg)     Height: 6' (1.829 m)       General:  Appears stated age, no distress. Orientation:  Alert and oriented to time, place, and person. Mood and Affect:  Cooperative and pleasant. Gait:  Resting comfortably in bed. Cardiovascular:  Symmetric 1-2 plus pulses in upper and lower extremities. Lymph:  No cervical or inguinal lymphadenopathy noted. Sensation:  Grossly intact to light touch. DTR:  Normal, no pathologic reflexes.   Coordination/balance:  NOT TESTED    Musculoskeletal:  Right upper extremity exam:  There is no (1-2 VIEWS) [[id number]]      Resulted: 10/30/20 0710     Updated: 10/30/20 2641     Narrative:      Examination. XR KNEE RIGHT (1-2 VIEWS) 10/30/2020 3:00 AM   History: The patient fell and complains of pain in the right kidney   with swelling and bruising. The frontal and crosstable portable lateral views of the right kidney   are obtained. There is no previous study for comparison. There is marked diffuse osteopenia which may obscure a subtle   nondisplaced fracture. No obvious displaced fracture is noted. There is severe, grade 3-4, chronic osteoarthritis, more severely   involving the tibiofemoral compartment. There are bone fragments in the posterior aspect of the tibiofemoral   articulation which may represent loose bodies. Similar small bony   fragment seen located anteriorly in the tibiofemoral articulation. Severe atheromatous changes of the femoral popliteal and tibial   arteries are seen. There is marked soft tissue atrophy. Impression:      No displaced fracture noted. Due to marked osteopenia, a subtle nondisplaced fracture may be   obscured. If clinically warranted further evaluation with CT scan of   the knee joint may be obtained   The severe chronic osteoarthritis. XR HIP 2-3 Ben [place]von PELVIS RIGHT [[id number]]      Resulted: 10/30/20 9229     Updated: 10/30/20 0716     Narrative:      Examination. XR HIP 2-3 VW W PELVIS RIGHT 10/30/2020 3:29 AM   History: The patient fell and complains of right hip pain. A frontal projection of the pelvis and frontal and crosstable   laterally of the right hip are obtained. There is no previous study   for comparison. There is an intertrochanteric fracture of the right proximal femur. No   significant displacement. There is osteochondral fracture of the femoral head which may be a   chronic process. This may be a stress fracture of the femoral neck? Gómez Rodgers The severe chronic osteoarthritis of right hip joints.    There is moderate diffuse osteopenia. There is deformity of the inferior pubic rami and ischiopubic junction   which may represent a previous trauma/healed fractures. Sacroiliac joints and symphysis pubis are normal.    Impression:      An intertrochanteric fracture of the right proximal femur. No significant displacement. An osteochondral fracture of the femoral head which probably represent   a chronic process. There is probable stress fracture of the femoral neck? .   Further follow-up with CT scan of the right hip pain may be obtained. Signed by Dr Jessenia Rosales on 10/30/2020 7:14 AM            IMPRESSION/RECOMMENDATIONS:    Assessment:   RIGHT INTERTROCHANTERIC FEMUR FX, CLOSED NONDISPLACED S/P FALL AT HOME    Plan:  1. SHORT TFN THIS AFTERNOON. WILL FOLLOW ACCORDINGLY. THANKS FOR THE CONSULT. Approximately 30 minutes was spent face to face with the patient discussing the current condition. All risks and benefits of treatment options were discussed at great length and all expressed understanding and agreement with medial reasoning.     Johana Balbuena PA-C 10/30/20 7:14 AM

## 2020-10-30 NOTE — ED NOTES
Bed: 08  Expected date: 10/30/20  Expected time:   Means of arrival: [facility]  Comments:  [facility] knee pain     Germania Mauricio [place]  10/30/20 5119

## 2020-10-30 NOTE — ANESTHESIA PRE PROCEDURE
Department of Anesthesiology  Preprocedure Note       Name:  Petra Alcazar   Age:  78 y.o.  :  1950                                          MRN:  [id number]         Date:  10/30/2020      Surgeon: Matty Callejas):  Melva Michael MD    Procedure: Procedure(s):  TFN, SHORT    Medications prior to admission:   Prior to Admission medications    Medication Sig Start Date End Date Taking? Authorizing Provider   lisinopril (PRINIVIL;ZESTRIL) 5 MG tablet Take 1 tablet by mouth daily 10/22/20  Yes Raz Kaufman DO   vitamin D (ERGOCALCIFEROL) 1.25 MG (23666 UT) CAPS capsule Take 1 capsule by mouth once a week 10/4/20  Yes Claudeen Cannon Teves-Mani, MD   levothyroxine (SYNTHROID) 125 MCG tablet Take 125 mcg by mouth Daily   Yes Historical Provider, MD   metoprolol succinate (TOPROL XL) 25 MG extended release tablet Take 12.5 mg by mouth daily 1/2 tab   Yes Historical Provider, MD   spironolactone (ALDACTONE) 25 MG tablet Take 0.5 tablets by mouth daily  Patient taking differently: Take 12.5 mg by mouth as needed  20  Yes ADY Espino   amiodarone (CORDARONE) 200 MG tablet Take 1 tablet by mouth daily 20  Yes ADY Espino   apixaban (ELIQUIS) 5 MG TABS tablet Take 1 tablet by mouth 2 times daily 20  Yes Angel Brothers MD   aspirin 81 MG chewable tablet Take 81 mg by mouth daily   Yes Historical Provider, MD   atorvastatin (LIPITOR) 40 MG tablet Take 40 mg by mouth daily. 14  Yes DELANEY Thomson MD   acetaminophen (TYLENOL) 325 MG tablet Take 650 mg by mouth every 6 hours as needed for Pain    Historical Provider, MD   furosemide (LASIX) 40 MG tablet Take 1 tablet by mouth See Admin Instructions May take extra 40mg for weight gain 20   ADY Espino       Current medications:    Current Facility-Administered Medications   Medication Dose Route Frequency Provider Last Rate Last Dose    [MAR Hold] 0.9 % sodium chloride infusion   Intravenous Continuous Mayra Gutierrez MD 75 mL/hr at 10/30/20 0811      [MAR Hold] sodium chloride flush 0.9 % injection 10 mL  10 mL Intravenous 2 times per day Burgess Uri MD        [facility] Hold] sodium chloride flush 0.9 % injection 10 mL  10 mL Intravenous PRN Burgess Uri MD        [facility] Hold] acetaminophen (TYLENOL) tablet 650 mg  650 mg Oral Q6H PRN Burgess Uri MD        Or   [facility] Hold] acetaminophen (TYLENOL) suppository 650 mg  650 mg Rectal Q6H PRN Burgess Uri MD        [facility] Hold] polyethylene glycol (GLYCOLAX) packet 17 g  17 g Oral Daily PRN Burgess Uri MD        [facility] Hold] promethazine (PHENERGAN) tablet 12.5 mg  12.5 mg Oral Q6H PRN Burgess Uri MD        Or   [facility] Hold] ondansetron [facility]) injection 4 mg  4 mg Intravenous Q6H PRN Burgess Uri MD        [facility] Hold] phytonadione (VITAMIN K) tablet 5 mg  5 mg Oral Once Burgess Uri MD        [facility] Hold] morphine injection 4 mg  4 mg Intravenous Q4H PRN Burgess Uri MD        [facility] Hold] LORazepam (ATIVAN) injection 1 mg  1 mg Intravenous Q6H PRN Burgess Uri MD        lactated ringers infusion   Intravenous Continuous Cindy Gaitan  mL/hr at 10/30/20 1453         Allergies:  No Known Allergies    Problem List:    Patient Active Problem List   Diagnosis Code    CAD (coronary artery disease) I25.10    Essential hypertension I10    Alcohol abuse F10.10    Transaminitis R74.01    Hyponatremia E87.1    Dyslipidemia E78.5    Hypotension I95.9    Pneumonia due to infectious organism J18.9    Palliative care patient Z51.5    Chronic systolic heart failure ([address] I50.22    Nonischemic cardiomyopathy ([facility] [address] I42.8    Mixed hyperlipidemia E78.2    Paroxysmal atrial fibrillation (HCC) I48.0    Localized osteoporosis with current pathological fracture with routine healing M80.80XD    Hip fx, right, closed, initial encounter ([facility] [address] S72.001A       Past Medical History:        Diagnosis Date    CAD (coronary artery disease)     Gout     Hypertension     Non-ST elevation MI (NSTEMI) ([facility] [facility] 14    Palliative care patient 2020       Past Surgical History:        Procedure Laterality Date    CARDIAC CATHETERIZATION  14  [facility]    angioplasty, stent to LAD. EF 45%    CHOLECYSTECTOMY         Social History:    Social History     Tobacco Use    Smoking status: Former Smoker     Packs/day: 1.00     Years: 3.00     Pack years: 3.00     Types: Cigars     Start date:      Last attempt to quit:      Years since quittin.8    Smokeless tobacco: Never Used   Substance Use Topics    Alcohol use: Yes     Alcohol/week: 3.0 standard drinks     Types: 3 Shots of liquor per week     Frequency: 4 or more times a week     Drinks per session: 1 or 2     Binge frequency: Daily or almost daily     Comment: daily                                Counseling given: Not Answered      Vital Signs (Current):   Vitals:    10/30/20 0356 10/30/20 0532 10/30/20 0825 10/30/20 1011   BP:  102/64 (!) 86/52 (!) 103/58   Pulse: 80 54 58 64   Resp:  18 18 18   Temp:   97.3 °F (36.3 °C) 97.3 °F (36.3 °C)   TempSrc:   Temporal Temporal   SpO2:  96% 92% 93%   Weight:       Height:                                                  BP Readings from Last 3 Encounters:   10/30/20 (!) 103/58   10/22/20 130/84   10/02/20 138/80       NPO Status: Time of last liquid consumption:                         Time of last solid consumption:                         Date of last liquid consumption: 10/29/20                        Date of last solid food consumption: 10/29/20    BMI:   Wt Readings from Last 3 Encounters:   10/30/20 175 lb (79.4 kg)   10/22/20 188 lb (85.3 kg)   10/02/20 180 lb (81.6 kg)     Body mass index is 23.73 kg/m².     CBC:   Lab Results   Component Value Date    WBC 11.6 10/30/2020    RBC 3.17 10/30/2020    HGB 9.9 10/30/2020    HCT 29.1 10/30/2020    MCV 91.8 10/30/2020    RDW 14.8 10/30/2020     10/30/2020       CMP:   Lab Results   Component Value Date     10/30/2020    K 3.9 10/30/2020    K 4.7 09/16/2020    CL 92 10/30/2020    CO2 19 10/30/2020    BUN 28 10/30/2020    CREATININE 2.0 10/30/2020    GFRAA 40 10/30/2020    LABGLOM 33 10/30/2020    GLUCOSE 106 10/30/2020    PROT 7.0 10/30/2020    CALCIUM 8.9 10/30/2020    BILITOT 1.3 10/30/2020    ALKPHOS 136 10/30/2020    AST 75 10/30/2020    ALT 63 10/30/2020       POC Tests: No results for input(s): POCGLU, POCNA, POCK, POCCL, POCBUN, POCHEMO, POCHCT in the last 72 hours. Coags:   Lab Results   Component Value Date    PROTIME 23.3 10/30/2020    INR 2.03 10/30/2020       HCG (If Applicable): No results found for: PREGTESTUR, PREGSERUM, HCG, HCGQUANT     ABGs: No results found for: PHART, PO2ART, JLW9DJY, KHW5RKW, BEART, F2WCENEG     Type & Screen (If Applicable):  No results found for: LABABO, LABRH    Drug/Infectious Status (If Applicable):  No results found for: HIV, HEPCAB    COVID-19 Screening (If Applicable):   Lab Results   Component Value Date    COVID19 Not Detected 10/30/2020    COVID19 Not Detected 09/16/2020         Anesthesia Evaluation  Patient summary reviewed and Nursing notes reviewed no history of anesthetic complications:   Airway: Mallampati: II  TM distance: >3 FB   Neck ROM: full  Mouth opening: > = 3 FB Dental: normal exam         Pulmonary:Negative Pulmonary ROS and normal exam  breath sounds clear to auscultation  (+) pneumonia:      (-) shortness of breath and not a current smoker          Patient did not smoke on day of surgery.                  Cardiovascular:    (+) hypertension:, past MI:, CAD:, dysrhythmias: atrial fibrillation, CHF: systolic, hyperlipidemia    (-)  angina    NYHA Classification: I  ECG reviewed  Rhythm: irregular  Rate: normal           Beta Blocker:  Not on Beta Blocker         Neuro/Psych:   Negative Neuro/Psych ROS  (+) psychiatric history:   (-) seizures, CVA and depression/anxiety            GI/Hepatic/Renal: Neg GI/Hepatic/Renal ROS       (-) hiatal hernia and GERD       Endo/Other: Negative Endo/Other ROS   (+) blood dyscrasia: anticoagulation therapy and anemia:., electrolyte abnormalities, . Pt had PAT visit. Abdominal:       Abdomen: soft. Vascular:                                        Anesthesia Plan      general     ASA 4     (Iv zofran within 30 min of closing   nonischeimc cardiomyopathy ef 10% very high risk )  Induction: intravenous. BIS  MIPS: Postoperative opioids intended and Prophylactic antiemetics administered. Anesthetic plan and risks discussed with patient. Use of blood products discussed with patient whom. Plan discussed with CRNA.     Attending anesthesiologist reviewed and agrees with Pre Eval content              Renee Syed MD   10/30/2020

## 2020-10-31 PROBLEM — D62 POSTOPERATIVE ANEMIA DUE TO ACUTE BLOOD LOSS: Status: ACTIVE | Noted: 2020-10-31

## 2020-10-31 LAB
ALBUMIN SERPL-MCNC: 2.8 G/DL (ref 3.5–5.2)
ALP BLD-CCNC: 104 U/L (ref 40–130)
ALT SERPL-CCNC: 74 U/L (ref 5–41)
ANION GAP SERPL CALCULATED.3IONS-SCNC: 11 MMOL/L (ref 7–19)
ANION GAP SERPL CALCULATED.3IONS-SCNC: 14 MMOL/L (ref 7–19)
AST SERPL-CCNC: 119 U/L (ref 5–40)
BILIRUB SERPL-MCNC: 1.2 MG/DL (ref 0.2–1.2)
BUN BLDV-MCNC: 35 MG/DL (ref 8–23)
BUN BLDV-MCNC: 38 MG/DL (ref 8–23)
CALCIUM SERPL-MCNC: 7.2 MG/DL (ref 8.8–10.2)
CALCIUM SERPL-MCNC: 7.3 MG/DL (ref 8.8–10.2)
CHLORIDE BLD-SCNC: 96 MMOL/L (ref 98–111)
CHLORIDE BLD-SCNC: 97 MMOL/L (ref 98–111)
CO2: 20 MMOL/L (ref 22–29)
CO2: 21 MMOL/L (ref 22–29)
CREAT SERPL-MCNC: 2.2 MG/DL (ref 0.5–1.2)
CREAT SERPL-MCNC: 2.5 MG/DL (ref 0.5–1.2)
EKG P AXIS: 61 DEGREES
EKG P-R INTERVAL: 116 MS
EKG Q-T INTERVAL: 532 MS
EKG QRS DURATION: 126 MS
EKG QTC CALCULATION (BAZETT): 518 MS
EKG T AXIS: 85 DEGREES
GFR AFRICAN AMERICAN: 31
GFR AFRICAN AMERICAN: 36
GFR NON-AFRICAN AMERICAN: 26
GFR NON-AFRICAN AMERICAN: 30
GLUCOSE BLD-MCNC: 101 MG/DL (ref 74–109)
GLUCOSE BLD-MCNC: 124 MG/DL (ref 74–109)
HCT VFR BLD CALC: 19.3 % (ref 42–52)
HCT VFR BLD CALC: 20.6 % (ref 42–52)
HCT VFR BLD CALC: 22.4 % (ref 42–52)
HCT VFR BLD CALC: 25.3 % (ref 42–52)
HEMOGLOBIN: 6.2 G/DL (ref 14–18)
HEMOGLOBIN: 6.7 G/DL (ref 14–18)
HEMOGLOBIN: 7.3 G/DL (ref 14–18)
HEMOGLOBIN: 8.1 G/DL (ref 14–18)
INR BLD: 2.27 (ref 0.88–1.18)
MAGNESIUM: 1.7 MG/DL (ref 1.6–2.4)
MCH RBC QN AUTO: 30.3 PG (ref 27–31)
MCH RBC QN AUTO: 30.4 PG (ref 27–31)
MCH RBC QN AUTO: 30.5 PG (ref 27–31)
MCH RBC QN AUTO: 30.7 PG (ref 27–31)
MCHC RBC AUTO-ENTMCNC: 32 G/DL (ref 33–37)
MCHC RBC AUTO-ENTMCNC: 32.1 G/DL (ref 33–37)
MCHC RBC AUTO-ENTMCNC: 32.5 G/DL (ref 33–37)
MCHC RBC AUTO-ENTMCNC: 32.6 G/DL (ref 33–37)
MCV RBC AUTO: 93.3 FL (ref 80–94)
MCV RBC AUTO: 94.5 FL (ref 80–94)
MCV RBC AUTO: 94.8 FL (ref 80–94)
MCV RBC AUTO: 95.1 FL (ref 80–94)
PDW BLD-RTO: 14.9 % (ref 11.5–14.5)
PDW BLD-RTO: 15 % (ref 11.5–14.5)
PDW BLD-RTO: 15.1 % (ref 11.5–14.5)
PDW BLD-RTO: 15.1 % (ref 11.5–14.5)
PLATELET # BLD: 65 K/UL (ref 130–400)
PLATELET # BLD: 74 K/UL (ref 130–400)
PLATELET # BLD: 75 K/UL (ref 130–400)
PLATELET # BLD: 99 K/UL (ref 130–400)
PMV BLD AUTO: 9.3 FL (ref 9.4–12.4)
PMV BLD AUTO: 9.4 FL (ref 9.4–12.4)
PMV BLD AUTO: 9.7 FL (ref 9.4–12.4)
PMV BLD AUTO: 9.8 FL (ref 9.4–12.4)
POTASSIUM SERPL-SCNC: 4.3 MMOL/L (ref 3.5–5)
POTASSIUM SERPL-SCNC: 4.4 MMOL/L (ref 3.5–5)
PROTHROMBIN TIME: 25.5 SEC (ref 12–14.6)
RBC # BLD: 2.03 M/UL (ref 4.7–6.1)
RBC # BLD: 2.18 M/UL (ref 4.7–6.1)
RBC # BLD: 2.4 M/UL (ref 4.7–6.1)
RBC # BLD: 2.67 M/UL (ref 4.7–6.1)
SODIUM BLD-SCNC: 128 MMOL/L (ref 136–145)
SODIUM BLD-SCNC: 131 MMOL/L (ref 136–145)
TOTAL PROTEIN: 5 G/DL (ref 6.6–8.7)
TSH SERPL DL<=0.05 MIU/L-ACNC: 0.34 UIU/ML (ref 0.27–4.2)
WBC # BLD: 6.4 K/UL (ref 4.8–10.8)
WBC # BLD: 8.1 K/UL (ref 4.8–10.8)
WBC # BLD: 8.9 K/UL (ref 4.8–10.8)
WBC # BLD: 9.4 K/UL (ref 4.8–10.8)

## 2020-10-31 PROCEDURE — 2580000003 HC RX 258: Performed by: HOSPITALIST

## 2020-10-31 PROCEDURE — 80053 COMPREHEN METABOLIC PANEL: CPT

## 2020-10-31 PROCEDURE — 2700000000 HC OXYGEN THERAPY PER DAY

## 2020-10-31 PROCEDURE — 36430 TRANSFUSION BLD/BLD COMPNT: CPT

## 2020-10-31 PROCEDURE — 85610 PROTHROMBIN TIME: CPT

## 2020-10-31 PROCEDURE — 36415 COLL VENOUS BLD VENIPUNCTURE: CPT

## 2020-10-31 PROCEDURE — 84443 ASSAY THYROID STIM HORMONE: CPT

## 2020-10-31 PROCEDURE — 6370000000 HC RX 637 (ALT 250 FOR IP): Performed by: ORTHOPAEDIC SURGERY

## 2020-10-31 PROCEDURE — 85027 COMPLETE CBC AUTOMATED: CPT

## 2020-10-31 PROCEDURE — 2580000003 HC RX 258: Performed by: STUDENT IN AN ORGANIZED HEALTH CARE EDUCATION/TRAINING PROGRAM

## 2020-10-31 PROCEDURE — P9016 RBC LEUKOCYTES REDUCED: HCPCS

## 2020-10-31 PROCEDURE — 2580000003 HC RX 258: Performed by: ORTHOPAEDIC SURGERY

## 2020-10-31 PROCEDURE — 93010 ELECTROCARDIOGRAM REPORT: CPT | Performed by: INTERNAL MEDICINE

## 2020-10-31 PROCEDURE — 6370000000 HC RX 637 (ALT 250 FOR IP): Performed by: HOSPITALIST

## 2020-10-31 PROCEDURE — 6360000002 HC RX W HCPCS: Performed by: ORTHOPAEDIC SURGERY

## 2020-10-31 PROCEDURE — 83735 ASSAY OF MAGNESIUM: CPT

## 2020-10-31 PROCEDURE — 1210000000 HC MED SURG R&B

## 2020-10-31 RX ORDER — 0.9 % SODIUM CHLORIDE 0.9 %
500 INTRAVENOUS SOLUTION INTRAVENOUS ONCE
Status: COMPLETED | OUTPATIENT
Start: 2020-10-31 | End: 2020-10-31

## 2020-10-31 RX ORDER — 0.9 % SODIUM CHLORIDE 0.9 %
20 INTRAVENOUS SOLUTION INTRAVENOUS ONCE
Status: COMPLETED | OUTPATIENT
Start: 2020-10-31 | End: 2020-10-31

## 2020-10-31 RX ORDER — PHYTONADIONE 5 MG/1
5 TABLET ORAL ONCE
Status: DISCONTINUED | OUTPATIENT
Start: 2020-10-31 | End: 2020-10-31

## 2020-10-31 RX ORDER — 0.9 % SODIUM CHLORIDE 0.9 %
1000 INTRAVENOUS SOLUTION INTRAVENOUS ONCE
Status: COMPLETED | OUTPATIENT
Start: 2020-10-31 | End: 2020-10-31

## 2020-10-31 RX ADMIN — SODIUM CHLORIDE 20 ML: 9 INJECTION, SOLUTION INTRAVENOUS at 05:46

## 2020-10-31 RX ADMIN — Medication 2 G: at 00:28

## 2020-10-31 RX ADMIN — LEVOTHYROXINE SODIUM 125 MCG: 125 TABLET ORAL at 10:49

## 2020-10-31 RX ADMIN — APIXABAN 5 MG: 5 TABLET, FILM COATED ORAL at 10:49

## 2020-10-31 RX ADMIN — AMIODARONE HYDROCHLORIDE 200 MG: 200 TABLET ORAL at 10:49

## 2020-10-31 RX ADMIN — ATORVASTATIN CALCIUM 40 MG: 40 TABLET, FILM COATED ORAL at 10:49

## 2020-10-31 RX ADMIN — SODIUM CHLORIDE 500 ML: 9 INJECTION, SOLUTION INTRAVENOUS at 04:39

## 2020-10-31 RX ADMIN — SODIUM CHLORIDE, PRESERVATIVE FREE 10 ML: 5 INJECTION INTRAVENOUS at 22:04

## 2020-10-31 RX ADMIN — SODIUM CHLORIDE 1000 ML: 9 INJECTION, SOLUTION INTRAVENOUS at 15:15

## 2020-10-31 NOTE — PROGRESS NOTES
Orthopedic Surgery Progress Note    Sharon Mcbride  10/31/2020    Subjective:     Post-Operative Day # 1 s/p right intramedullary nailing    Systemic or Specific Complaints: No Complaints    Objective:     BP (!) 82/41   Pulse 77   Temp 97.5 °F (36.4 °C) (Temporal)   Resp 18   Ht 6' (1.829 m)   Wt 175 lb (79.4 kg)   SpO2 97%   BMI 23.73 kg/m²     General: alert, appears stated age and cooperative   Wound: clean, dry, intact              Dressing: clean, dry, and intact   Extremity: Distal NVI            DVT Exam: No evidence of DVT seen on physical exam.                   Data Review  CBC:   Lab Results   Component Value Date    WBC 9.4 10/31/2020    RBC 2.03 10/31/2020    HGB 6.2 10/31/2020    HCT 19.3 10/31/2020    PLT 99 10/31/2020       Assessment:     Status Post right hip surgery, doing well     Plan:      1:  DVT prophylaxis  2:  Continue pain control  3:  Physical therapy as per protocol  4:  Anticipate discharge when medically stable  5: Weight bearing as tolerated    Richy Baca

## 2020-10-31 NOTE — PROGRESS NOTES
Physical Therapy    Name: Damián Lai  MRN:  [id number]  Date of service:  10/31/2020   Pt. receiving 2nd unit of blood and still hypotensive with BP in 70s/40s. Will hold PT at this time.   Electronically signed by Lucia Bishop PT on 10/31/2020 at 3:28 PM

## 2020-10-31 NOTE — PROGRESS NOTES
Daily Progress Note    Date:10/31/2020  Patient: Shikha Tinajero  : 1950  BCX:[id number]  CODE:Full Code No additional code details  PCP:Ramona Smith MD    Admit Date: 10/30/2020  3:47 AM   LOS: 1 day       Subjective:   Patient underwent right intramedullary nailing on 10/30 for right intertrochanteric hip fracture. Significant drop in Hgb <7, which remained below 7 on repeat after transfusion. Received 2 units pRBCs. Has been hypotension. No chest pain, shortness of breath, palpitations, or lightheadedness. No back pain, epistaxis, hemoptysis, hematemesis, hematochezia or melena.       Review of Systems    Comprehensive ROS completed and is negative except as otherwise noted    Objective:      Vital signs in last 24 hours:  Patient Vitals for the past 24 hrs:   BP Temp Temp src Pulse Resp SpO2   10/31/20 0729 (!) 82/41 97.5 °F (36.4 °C) Temporal 77 18 97 %   10/31/20 0615 (!) 88/52 98.4 °F (36.9 °C) Temporal 88 18 98 %   10/31/20 0545 (!) 82/49 98.2 °F (36.8 °C) Temporal 83 18 97 %   10/31/20 0240 88/60 98.7 °F (37.1 °C) Temporal 91 18 97 %   10/31/20 0049 (!) 88/52 98.7 °F (37.1 °C) Temporal 90 18 92 %   10/30/20 2320 (!) 81/53 97 °F (36.1 °C) Temporal 90 18 94 %   10/30/20 2128 (!) 82/52 97.6 °F (36.4 °C) Temporal 92 18 98 %   10/30/20 2027 (!) 87/53 97.5 °F (36.4 °C) Temporal 82 18 94 %   10/30/20 1956 (!) 87/56 97 °F (36.1 °C) Temporal 68 16 94 %   10/30/20 190 (!) 90/58 97 °F (36.1 °C) Temporal 70 17 90 %   10/30/20 1830 (!) 97/50 97.5 °F (36.4 °C) -- 65 9 100 %   10/30/20 1820 -- -- -- 69 -- --   10/30/20 1815 (!) 100/51 -- -- 71 11 95 %   10/30/20 1805 -- -- -- 85 -- --   10/30/20 1800 (!) 91/59 -- -- 78 13 94 %   10/30/20 1755 (!) 98/50 -- -- 80 9 92 %   10/30/20 1750 (!) 93/55 -- -- 87 14 93 %   10/30/20 1745 98/79 97.5 °F (36.4 °C) Temporal 82 14 92 %     Physical exam    General: No acute distress  Neck: Supple, nontender  HEENT: Normocephalic/atraumatic, no scleral icterus  Cardiovascular: Normal rate with regular rhythm, peripheral pulses symmetric  Respiratory: Lungs clear to auscultation bilaterally  Abdomen: Soft, nontender, nondistended, bowel sounds present  Neurologic: No focal neurologic deficits  Extremities: Operative site clean dry and intact, neurovascularly intact distally  Skin: No rashes appreciated    Lab Review   Recent Results (from the past 24 hour(s))   Urine Drug Screen    Collection Time: 10/30/20  3:02 PM   Result Value Ref Range    Amphetamine Screen, Urine Negative Negative <1000 ng/mL    Barbiturate Screen, Ur Negative Negative < 200 ng/mL    Benzodiazepine Screen, Urine Negative Negative <100 ng/mL    Cannabinoid Scrn, Ur Negative Negative <50 ng/mL    Cocaine Metabolite Screen, Urine Negative Negative <300 ng/mL    Opiate Scrn, Ur Positive (A) Negative < 300 ng/mL    Drug Screen Comment: see below    Urinalysis Reflex to Culture    Collection Time: 10/30/20  3:02 PM    Specimen: Urine, clean catch   Result Value Ref Range    Color, UA DARK YELLOW (A) Straw/Yellow    Clarity, UA Clear Clear    Glucose, Ur Negative Negative mg/dL    Bilirubin Urine Negative Negative    Ketones, Urine TRACE (A) Negative mg/dL    Specific Gravity, UA 1.014 1.005 - 1.030    Blood, Urine Negative Negative    pH, UA 5.0 5.0 - 8.0    Protein, UA Negative Negative mg/dL    Urobilinogen, Urine 1.0 <2.0 E.U./dL    Nitrite, Urine Negative Negative    Leukocyte Esterase, Urine TRACE (A) Negative   Microscopic Urinalysis    Collection Time: 10/30/20  3:02 PM   Result Value Ref Range    Bacteria, UA NEGATIVE (A) None Seen /HPF    Crystals, UA NEG (A) None Seen /HPF    Hyaline Casts, UA 14 (H) 0 - 8 /HPF    WBC, UA 2 0 - 5 /HPF    RBC, UA 1 0 - 4 /HPF    Epithelial Cells, UA 4 0 - 5 /HPF   CBC    Collection Time: 10/31/20  2:55 AM   Result Value Ref Range    WBC 9.4 4.8 - 10.8 K/uL    RBC 2.03 (L) 4.70 - 6.10 M/uL    Hemoglobin 6.2 (L) 14.0 - 18.0 g/dL    Hematocrit 19.3 (LL) 42.0 - 52.0 %    MCV 95.1 (H) 80.0 - 94.0 fL    MCH 30.5 27.0 - 31.0 pg    MCHC 32.1 (L) 33.0 - 37.0 g/dL    RDW 15.0 (H) 11.5 - 14.5 %    Platelets 99 (L) 204 - 400 K/uL    MPV 9.3 (L) 9.4 - 12.4 fL   Comprehensive Metabolic Panel    Collection Time: 10/31/20  2:55 AM   Result Value Ref Range    Sodium 131 (L) 136 - 145 mmol/L    Potassium 4.3 3.5 - 5.0 mmol/L    Chloride 97 (L) 98 - 111 mmol/L    CO2 20 (L) 22 - 29 mmol/L    Anion Gap 14 7 - 19 mmol/L    Glucose 101 74 - 109 mg/dL    BUN 35 (H) 8 - 23 mg/dL    CREATININE 2.5 (H) 0.5 - 1.2 mg/dL    GFR Non-African American 26 (A) >60    GFR  31 (L) >59    Calcium 7.2 (L) 8.8 - 10.2 mg/dL    Total Protein 5.0 (L) 6.6 - 8.7 g/dL    Alb 2.8 (L) 3.5 - 5.2 g/dL    Total Bilirubin 1.2 0.2 - 1.2 mg/dL    Alkaline Phosphatase 104 40 - 130 U/L    ALT 74 (H) 5 - 41 U/L     (H) 5 - 40 U/L   Magnesium    Collection Time: 10/31/20  2:55 AM   Result Value Ref Range    Magnesium 1.7 1.6 - 2.4 mg/dL   Protime-INR    Collection Time: 10/31/20  2:55 AM   Result Value Ref Range    Protime 25.5 (H) 12.0 - 14.6 sec    INR 2.27 (H) 0.88 - 1.18   TSH without Reflex    Collection Time: 10/31/20  2:55 AM   Result Value Ref Range    TSH 0.340 0.270 - 4.200 uIU/mL   Basic Metabolic Panel    Collection Time: 10/31/20 10:14 AM   Result Value Ref Range    Sodium 128 (L) 136 - 145 mmol/L    Potassium 4.4 3.5 - 5.0 mmol/L    Chloride 96 (L) 98 - 111 mmol/L    CO2 21 (L) 22 - 29 mmol/L    Anion Gap 11 7 - 19 mmol/L    Glucose 124 (H) 74 - 109 mg/dL    BUN 38 (H) 8 - 23 mg/dL    CREATININE 2.2 (H) 0.5 - 1.2 mg/dL    GFR Non-African American 30 (A) >60    GFR  36 (L) >59    Calcium 7.3 (L) 8.8 - 10.2 mg/dL   CBC    Collection Time: 10/31/20 10:14 AM   Result Value Ref Range    WBC 8.9 4.8 - 10.8 K/uL    RBC 2.18 (L) 4.70 - 6.10 M/uL    Hemoglobin 6.7 (L) 14.0 - 18.0 g/dL    Hematocrit 20.6 (L) 42.0 - 52.0 %    MCV 94.5 (H) 80.0 - 94.0 fL    MCH 30.7 27.0 - 31.0 pg MCHC 32.5 (L) 33.0 - 37.0 g/dL    RDW 14.9 (H) 11.5 - 14.5 %    Platelets 75 (L) 012 - 400 K/uL    MPV 9.4 9.4 - 12.4 fL       I/O last 3 completed shifts: In: 3806 [P.O.:650; I.V.:2831; Blood:325]  Out: 1020 [Urine:870; Blood:150]  No intake/output data recorded. Current Facility-Administered Medications:     apixaban (ELIQUIS) tablet 5 mg, 5 mg, Oral, BID, Rachel Johnson MD    acetaminophen (TYLENOL) tablet 650 mg, 650 mg, Oral, Q6H PRN, 650 mg at 10/30/20 2209 **OR** acetaminophen (TYLENOL) suppository 650 mg, 650 mg, Rectal, Q6H PRN, Simeon Crystal MD    polyethylene glycol [facility]) packet 17 g, 17 g, Oral, Daily PRN, Simeon Crystal MD    LORazepam (ATIVAN) injection 1 mg, 1 mg, Intravenous, Q6H PRN, Simeon Crystal MD    amiodarone (CORDARONE) tablet 200 mg, 200 mg, Oral, Daily, Simeon Crystal MD    atorvastatin (LIPITOR) tablet 40 mg, 40 mg, Oral, Daily, Simeon Crystal MD, 40 mg at 10/30/20 2209    levothyroxine (SYNTHROID) tablet 125 mcg, 125 mcg, Oral, Daily, Simeon Crystal MD    sodium chloride flush 0.9 % injection 10 mL, 10 mL, Intravenous, 2 times per day, Simeon Crystal MD    sodium chloride flush 0.9 % injection 10 mL, 10 mL, Intravenous, PRN, Simeon Crystal MD    promethazine (PHENERGAN) tablet 12.5 mg, 12.5 mg, Oral, Q6H PRN **OR** ondansetron (ZOFRAN) injection 4 mg, 4 mg, Intravenous, Q6H PRN, Simeon Crystal MD    0.9 % sodium chloride infusion, , Intravenous, Continuous, Simeon Crystal MD, Last Rate: 125 mL/hr at 10/30/20 2217    ceFAZolin (ANCEF) 2 g in 0.9% sodium chloride 50 mL IVPB, 2 g, Intravenous, Q8H, Simeon Crystal MD, Stopped at 10/31/20 0058    oxyCODONE (ROXICODONE) immediate release tablet 5 mg, 5 mg, Oral, Q4H PRN **OR** [DISCONTINUED] oxyCODONE (ROXICODONE) immediate release tablet 10 mg, 10 mg, Oral, Q4H PRN, Simeon Crystal MD      Assessment/plan  Active Problems:    Hip fx, right, closed, initial encounter [facility])  Resolved Problems:    * No resolved hospital problems.  *        Right intertrochanteric hip fracture  -S/p cephalomedullary nailing on 10/30  -Postoperative management per orthopedics  -Pain control  -PT/OT  --Supportive care    Post-operative Acute blood loss anemia, on Eliquis  Hypotension, due to hypovolemia with blood loss  --Volume resuscitate with crystalloid and pRBCs  --CBC q6h, transfuse for Hgb <7 and hemodynamic instability  --monitor closely for improvement in hemodynamics following crystalloid and transfusions    Chronic issues:   Paroxysmal A. fib-continue amiodarone, anticoagulated on Eliquis  Hypothyroidism-levothyroxine  CAD/hypertension-currently holding regimen    DVT prophylaxis: Hold Eliquis    Disposition TBD        Monica Jack MD 10/31/2020 10:47 AM

## 2020-10-31 NOTE — PROGRESS NOTES
Physical Therapy  Name: Nathaniel Yu  MRN:  [id number]  Date of service:  10/31/2020  Pt. receiving 1st unit of 2 of blood per RN, Andrea Teresa. Will f/u at a later time.    Electronically signed by Bharat Campbell PT on 10/31/2020 at 3:26 PM

## 2020-11-01 LAB
HCT VFR BLD CALC: 21.5 % (ref 42–52)
HCT VFR BLD CALC: 22.3 % (ref 42–52)
HCT VFR BLD CALC: 23.1 % (ref 42–52)
HEMOGLOBIN: 7 G/DL (ref 14–18)
HEMOGLOBIN: 7.2 G/DL (ref 14–18)
HEMOGLOBIN: 7.5 G/DL (ref 14–18)
MCH RBC QN AUTO: 30.3 PG (ref 27–31)
MCH RBC QN AUTO: 30.3 PG (ref 27–31)
MCH RBC QN AUTO: 30.5 PG (ref 27–31)
MCHC RBC AUTO-ENTMCNC: 32.3 G/DL (ref 33–37)
MCHC RBC AUTO-ENTMCNC: 32.5 G/DL (ref 33–37)
MCHC RBC AUTO-ENTMCNC: 32.6 G/DL (ref 33–37)
MCV RBC AUTO: 93.1 FL (ref 80–94)
MCV RBC AUTO: 93.7 FL (ref 80–94)
MCV RBC AUTO: 93.9 FL (ref 80–94)
PDW BLD-RTO: 15 % (ref 11.5–14.5)
PDW BLD-RTO: 15.2 % (ref 11.5–14.5)
PDW BLD-RTO: 15.2 % (ref 11.5–14.5)
PLATELET # BLD: 74 K/UL (ref 130–400)
PLATELET # BLD: 80 K/UL (ref 130–400)
PLATELET # BLD: 86 K/UL (ref 130–400)
PMV BLD AUTO: 9.6 FL (ref 9.4–12.4)
PMV BLD AUTO: 9.8 FL (ref 9.4–12.4)
PMV BLD AUTO: 9.9 FL (ref 9.4–12.4)
RBC # BLD: 2.31 M/UL (ref 4.7–6.1)
RBC # BLD: 2.38 M/UL (ref 4.7–6.1)
RBC # BLD: 2.46 M/UL (ref 4.7–6.1)
WBC # BLD: 5.6 K/UL (ref 4.8–10.8)
WBC # BLD: 6.3 K/UL (ref 4.8–10.8)
WBC # BLD: 6.4 K/UL (ref 4.8–10.8)

## 2020-11-01 PROCEDURE — 36415 COLL VENOUS BLD VENIPUNCTURE: CPT

## 2020-11-01 PROCEDURE — 1210000000 HC MED SURG R&B

## 2020-11-01 PROCEDURE — 97162 PT EVAL MOD COMPLEX 30 MIN: CPT

## 2020-11-01 PROCEDURE — 6370000000 HC RX 637 (ALT 250 FOR IP): Performed by: ORTHOPAEDIC SURGERY

## 2020-11-01 PROCEDURE — 2580000003 HC RX 258: Performed by: ORTHOPAEDIC SURGERY

## 2020-11-01 PROCEDURE — 97530 THERAPEUTIC ACTIVITIES: CPT

## 2020-11-01 PROCEDURE — 2700000000 HC OXYGEN THERAPY PER DAY

## 2020-11-01 PROCEDURE — 97116 GAIT TRAINING THERAPY: CPT

## 2020-11-01 PROCEDURE — 85027 COMPLETE CBC AUTOMATED: CPT

## 2020-11-01 RX ADMIN — ATORVASTATIN CALCIUM 40 MG: 40 TABLET, FILM COATED ORAL at 08:21

## 2020-11-01 RX ADMIN — AMIODARONE HYDROCHLORIDE 200 MG: 200 TABLET ORAL at 08:21

## 2020-11-01 RX ADMIN — ACETAMINOPHEN 650 MG: 325 TABLET ORAL at 00:14

## 2020-11-01 RX ADMIN — OXYCODONE 5 MG: 5 TABLET ORAL at 05:45

## 2020-11-01 RX ADMIN — OXYCODONE 5 MG: 5 TABLET ORAL at 00:15

## 2020-11-01 RX ADMIN — LEVOTHYROXINE SODIUM 125 MCG: 125 TABLET ORAL at 05:45

## 2020-11-01 RX ADMIN — SODIUM CHLORIDE: 9 INJECTION, SOLUTION INTRAVENOUS at 05:45

## 2020-11-01 RX ADMIN — OXYCODONE 5 MG: 5 TABLET ORAL at 14:13

## 2020-11-01 ASSESSMENT — PAIN SCALES - GENERAL
PAINLEVEL_OUTOF10: 5

## 2020-11-01 ASSESSMENT — PAIN DESCRIPTION - LOCATION: LOCATION: LEG

## 2020-11-01 ASSESSMENT — PAIN DESCRIPTION - DESCRIPTORS: DESCRIPTORS: SORE

## 2020-11-01 ASSESSMENT — PAIN DESCRIPTION - PAIN TYPE: TYPE: SURGICAL PAIN

## 2020-11-01 ASSESSMENT — PAIN - FUNCTIONAL ASSESSMENT: PAIN_FUNCTIONAL_ASSESSMENT: PREVENTS OR INTERFERES WITH ALL ACTIVE AND SOME PASSIVE ACTIVITIES

## 2020-11-01 ASSESSMENT — PAIN DESCRIPTION - ORIENTATION: ORIENTATION: RIGHT

## 2020-11-01 NOTE — PROGRESS NOTES
Daily Progress Note    Date:2020  Patient: Shikha Tinajero  : 1950  WQ  CODE:Full Code No additional code details  PCP:Ramona Smith MD    Admit Date: 10/30/2020  3:47 AM   LOS: 2 days       Subjective:   Hemodynamic status improved following crystalloid resuscitation and transfusion with 2 units PRBCs. Patient out of bed, resting comfortably in chair. He denies any shortness of breath, chest pain, palpitations, lightheadedness/dizziness. He denies any nausea/vomiting, epistaxis, hematochezia, melena or any other bleeding episodes.       Review of Systems    Comprehensive ROS completed and is negative except as otherwise noted    Objective:      Vital signs in last 24 hours:  Patient Vitals for the past 24 hrs:   BP Temp Temp src Pulse Resp SpO2   20 1414 -- -- -- -- 16 90 %   20 1136 (!) 91/52 97.5 °F (36.4 °C) Temporal 56 18 91 %   20 1019 -- -- -- -- -- (!) 81 %   20 0625 104/65 96.8 °F (36 °C) Temporal 60 16 92 %   20 0322 (!) 98/56 97 °F (36.1 °C) Temporal 64 18 97 %   20 0008 102/61 98.2 °F (36.8 °C) Temporal 75 18 95 %   10/31/20 1941 (!) 99/56 98.8 °F (37.1 °C) Temporal 85 17 96 %   10/31/20 1659 (!) 94/58 98.8 °F (37.1 °C) Temporal 79 14 93 %     Physical exam     General: No acute distress  Neck: Supple, nontender  HEENT: Normocephalic/atraumatic, no scleral icterus  Cardiovascular: Normal rate with regular rhythm, peripheral pulses symmetric  Respiratory: Lungs clear to auscultation bilaterally  Abdomen: Soft, nontender, nondistended, bowel sounds present  Neurologic: No focal neurologic deficits  Extremities: Operative site clean dry and intact  Skin: No rashes appreciated       Lab Review   Recent Results (from the past 24 hour(s))   CBC    Collection Time: 10/31/20  3:56 PM   Result Value Ref Range    WBC 8.1 4.8 - 10.8 K/uL    RBC 2.67 (L) 4.70 - 6.10 M/uL    Hemoglobin 8.1 (L) 14.0 - 18.0 g/dL    Hematocrit 25.3 (L) 42.0 - 52.0 % MCV 94.8 (H) 80.0 - 94.0 fL    MCH 30.3 27.0 - 31.0 pg    MCHC 32.0 (L) 33.0 - 37.0 g/dL    RDW 15.1 (H) 11.5 - 14.5 %    Platelets 65 (L) 677 - 400 K/uL    MPV 9.8 9.4 - 12.4 fL   CBC    Collection Time: 10/31/20 10:16 PM   Result Value Ref Range    WBC 6.4 4.8 - 10.8 K/uL    RBC 2.40 (L) 4.70 - 6.10 M/uL    Hemoglobin 7.3 (L) 14.0 - 18.0 g/dL    Hematocrit 22.4 (L) 42.0 - 52.0 %    MCV 93.3 80.0 - 94.0 fL    MCH 30.4 27.0 - 31.0 pg    MCHC 32.6 (L) 33.0 - 37.0 g/dL    RDW 15.1 (H) 11.5 - 14.5 %    Platelets 74 (L) 518 - 400 K/uL    MPV 9.7 9.4 - 12.4 fL   CBC    Collection Time: 11/01/20  4:16 AM   Result Value Ref Range    WBC 6.4 4.8 - 10.8 K/uL    RBC 2.46 (L) 4.70 - 6.10 M/uL    Hemoglobin 7.5 (L) 14.0 - 18.0 g/dL    Hematocrit 23.1 (L) 42.0 - 52.0 %    MCV 93.9 80.0 - 94.0 fL    MCH 30.5 27.0 - 31.0 pg    MCHC 32.5 (L) 33.0 - 37.0 g/dL    RDW 15.0 (H) 11.5 - 14.5 %    Platelets 74 (L) 207 - 400 K/uL    MPV 9.6 9.4 - 12.4 fL   CBC    Collection Time: 11/01/20 10:14 AM   Result Value Ref Range    WBC 6.3 4.8 - 10.8 K/uL    RBC 2.38 (L) 4.70 - 6.10 M/uL    Hemoglobin 7.2 (L) 14.0 - 18.0 g/dL    Hematocrit 22.3 (L) 42.0 - 52.0 %    MCV 93.7 80.0 - 94.0 fL    MCH 30.3 27.0 - 31.0 pg    MCHC 32.3 (L) 33.0 - 37.0 g/dL    RDW 15.2 (H) 11.5 - 14.5 %    Platelets 86 (L) 225 - 400 K/uL    MPV 9.8 9.4 - 12.4 fL       I/O last 3 completed shifts: In: 2893.3 [P.O.:210; I.V.:677; Blood:2006.3]  Out: 750 [Urine:750]  I/O this shift:   In: 450 [P.O.:450]  Out: 150 [Urine:150]      Current Facility-Administered Medications:     [Held by provider] apixaban (ELIQUIS) tablet 5 mg, 5 mg, Oral, BID, Kostas Lipscomb MD, 5 mg at 10/31/20 1049    acetaminophen (TYLENOL) tablet 650 mg, 650 mg, Oral, Q6H PRN, 650 mg at 11/01/20 0014 **OR** acetaminophen (TYLENOL) suppository 650 mg, 650 mg, Rectal, Q6H PRN, Mauro Vogel MD    polyethylene glycol [facility]) packet 17 g, 17 g, Oral, Daily PRN, Mauro Vogel MD    LORazepam (ATIVAN) injection 1 mg, 1 mg, Intravenous, Q6H PRN, Nichole Tse MD    amiodarone (CORDARONE) tablet 200 mg, 200 mg, Oral, Daily, Nichole Tse MD, 200 mg at 11/01/20 6520    atorvastatin (LIPITOR) tablet 40 mg, 40 mg, Oral, Daily, Nichole Tse MD, 40 mg at 11/01/20 6662    levothyroxine (SYNTHROID) tablet 125 mcg, 125 mcg, Oral, Daily, Nichole Tse MD, 125 mcg at 11/01/20 0545    sodium chloride flush 0.9 % injection 10 mL, 10 mL, Intravenous, 2 times per day, Nichole Tse MD, 10 mL at 10/31/20 2204    sodium chloride flush 0.9 % injection 10 mL, 10 mL, Intravenous, PRN, Nichole Tse MD    promethazine (PHENERGAN) tablet 12.5 mg, 12.5 mg, Oral, Q6H PRN **OR** ondansetron (ZOFRAN) injection 4 mg, 4 mg, Intravenous, Q6H PRN, Nichole Tse MD    0.9 % sodium chloride infusion, , Intravenous, Continuous, Nichole Tse MD, Last Rate: 125 mL/hr at 11/01/20 0545    oxyCODONE (ROXICODONE) immediate release tablet 5 mg, 5 mg, Oral, Q4H PRN, 5 mg at 11/01/20 1413 **OR** [DISCONTINUED] oxyCODONE (ROXICODONE) immediate release tablet 10 mg, 10 mg, Oral, Q4H PRN, Nichole Tse MD        Assessment/plan  Principal Problem:    Hip fx, right, closed, initial encounter [facility])  Active Problems:    Hypotension    Postoperative anemia due to acute blood loss    CAD (coronary artery disease)    Dyslipidemia    Chronic systolic heart failure (HCC)    Paroxysmal atrial fibrillation ([facility] [address]  Resolved Problems:    * No resolved hospital problems. [facility] course: 81-year-old male with chronic systolic heart failure, CAD, paroxysmal A. fib on Eliquis presented following a fall and found to have right hip fracture and subsequently underwent cephalomedullary nailing on 10/30 with orthopedic surgery. Patient did develop some postoperative acute blood loss anemia (on Eliquis), with hemoglobin persistently below 7 requiring transfusion with 2 units PRBCs.   He also developed some hypotension which improved with crystalloid resuscitation and blood products. Eliquis on hold.   Working with PT and will benefit from rehab.     Right intertrochanteric hip fracture  -S/p cephalomedullary nailing on 10/30  -Postoperative management per orthopedics  -Pain control  -PT/OT  --Supportive care     Post-operative Acute blood loss anemia, on Eliquis  Hypotension, due to hypovolemia with blood loss ---Improved  --Volume resuscitated  --scale back CBC to q12h  -s/p 2 units pRBCs    Chronic systolic heart failure  --d/c IVFs     Chronic issues:   Paroxysmal A. fib-continue amiodarone, hold Eliquis  Hypothyroidism-levothyroxine  CAD/hypertension-currently holding regimen     DVT prophylaxis: Hold Eliquis     Disposition pending improvement in anemia and rehab/placement        Daniel Lane MD 11/1/2020 2:31 PM

## 2020-11-01 NOTE — PROGRESS NOTES
Physical Therapy    Facility/Department: [facility] SURG SERVICES  Initial Assessment    NAME: Yamila Strickland  : 1950  MRN: [id number]    Date of Service: 2020    Discharge Recommendations:  Continue to assess pending progress, 24 hour supervision or assist, [address] [facility], Patient would benefit from continued therapy after discharge        Assessment   Body structures, Functions, Activity limitations: Decreased functional mobility ; Decreased ROM; Decreased strength;Decreased safe awareness;Decreased endurance;Decreased posture; Increased pain;Decreased balance;Decreased coordination  Assessment: Pt. will benefit from PT to decrease impairments. Pt. a fall risk and should use RW, gait belt and 2A for transfers. Anticipate pt will need cont PT once d/c from [facility] with 24 hr. care due to weakness and difficulty with transfers. Pt. plans on having a friend come live with him for a little while upon d/c, but rehab vs SNF may be more realistic at this time. Treatment Diagnosis: impaired gait and mobility  Prognosis: Good  Decision Making: Medium Complexity  PT Education: Goals;PT Role;Plan of Care;General Safety;Transfer Training;Family Education;Gait Training;Functional Mobility Training;Weight-bearing Education  Patient Education: use of call light for staff A  Barriers to Learning: none noted  REQUIRES PT FOLLOW UP: Yes  Activity Tolerance  Activity Tolerance: Patient limited by fatigue;Patient limited by pain; Patient limited by endurance  Activity Tolerance: O2 sat in mid 80s during transfer       Patient Diagnosis(es): The primary encounter diagnosis was Closed nondisplaced intertrochanteric fracture of right femur, initial encounter ([facility] [address] Diagnoses of Renal insufficiency, Anemia, unspecified type, and Acute alcoholic intoxication without complication ([facility] [address] were also pertinent to this visit.      has a past medical history of CAD (coronary artery disease), Gout, Hypertension, Non-ST elevation MI (NSTEMI) ([facility] [address] and Palliative care patient. has a past surgical history that includes Cardiac catheterization (12/29/14  [address]); Cholecystectomy; and Femur fracture surgery (Right, 10/30/2020). Restrictions  Restrictions/Precautions  Restrictions/Precautions: Weight Bearing, Fall Risk  Required Braces or Orthoses?: No  Lower Extremity Weight Bearing Restrictions  Right Lower Extremity Weight Bearing: Weight Bearing As Tolerated  Vision/Hearing  Vision: Impaired  Vision Exceptions: Wears glasses for reading;Wears glasses at all times  Hearing: Within functional limits     Subjective  General  Chart Reviewed: Yes  Patient assessed for rehabilitation services?: Yes  Response To Previous Treatment: Not applicable  Family / Caregiver Present: No  Referring Practitioner: Melva Michael MD  Referral Date : 10/31/20  Diagnosis: RIGHT INTERTROCHANTERIC HIP FRACTURE, renal insufficiency, anemia, acute alcohol intoxication  Follows Commands: Within Functional Limits  General Comment  Comments: CEPHALOMEDULLARY NAILING OF A RIGHT INTERTROCHANTERIC HIP FRACTURE 10/30  Subjective  Subjective: Pt. willing to get out of bed. Pain Screening  Patient Currently in Pain: Yes  Pain Assessment  Pain Assessment: 0-10  Pain Level: 5  Pain Type: Surgical pain  Pain Location: Leg  Pain Orientation: Right  Pain Descriptors: Sore  Functional Pain Assessment: Prevents or interferes with all active and some passive activities  Non-Pharmaceutical Pain Intervention(s): Ambulation/Increased Activity;Cold applied; Rest;Repositioned  Response to Pain Intervention: Patient Satisfied  Vital Signs  Patient Currently in Pain: Yes  Oxygen Therapy  SpO2: (!) 81 %(upon entry to pt's room, nasal cannula slipped to side of pt's face, repositioned in nostrils and pt's sat increased to 90, then 95% once up in chair)  O2 Device: Nasal cannula  O2 Flow Rate (L/min): 2 L/min  Pre Treatment Pain Screening  Intervention List: Patient able to continue with treatment    Orientation  Orientation  Overall Orientation Status: Within Normal Limits  Social/Functional History  Social/Functional History  Lives With: Alone  Type of Home: House  Home Layout: One level  Home Access: Ramped entrance  Home Equipment: Standard walker, Cane  ADL Assistance: Independent  Ambulation Assistance: Independent(typically uses cane to amb.)  Transfer Assistance: Independent  Active : Yes  Occupation: Retired  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  Additional Comments: pt plans on having a friend move in with him for a little while to help once he goes home  Cognition   Cognition  Overall Cognitive Status: WNL    Objective     Observation/Palpation  Posture: Fair  Observation: forward head, increased thoracic spine flexion, IV    AROM RLE (degrees)  RLE AROM: Exceptions  RLE General AROM: knee and hip flex to 90 in sitting, but difficulty extending knee against gravity, ankle WFLs  AROM LLE (degrees)  LLE AROM : WFL  Strength RLE  Strength RLE: Exception  Comment: hip and knee 2/5 grossly, ankle 3/5  Strength LLE  Strength LLE: WFL  Comment: grossly 4/5     Sensation  Overall Sensation Status: WFL  Bed mobility  Supine to Sit: Moderate assistance  Sit to Supine: Unable to assess  Comment: pt sat on EOB x 5 mins CGA  Transfers  Sit to Stand: Moderate Assistance  Stand to sit: Moderate Assistance(pt sat quickly once in front of chair)  Bed to Chair: Moderate assistance  Stand Pivot Transfers: Moderate Assistance  Comment: small shuffling steps to chair  Ambulation  Ambulation?: Yes  WB Status: FWBAT RLE  Ambulation 1  Surface: level tile  Device: Rolling Walker  Assistance: Moderate assistance  Quality of Gait: unsteady, v. cues to roll wx, not pick it up  Gait Deviations: Slow Hyacinth; Shuffles;Decreased step height;Decreased step length  Distance: 2' to chair  Comments: IV, O2     Balance  Posture: Fair  Sitting - Static: Fair;+  Sitting - Dynamic: Fair;+  Standing - Static: Poor;+  Standing - Dynamic: Poor  Exercises  Comments: AP x 10 reps and LAQ x 5 reps in sitting     Plan   Plan  Times per week: 3-7  Times per day: Daily  Plan weeks: 2 wks  Current Treatment Recommendations: Strengthening, ROM, Balance Training, Functional Mobility Training, Transfer Training, Endurance Training, Gait Training, Safety Education & Training, Positioning, Equipment Evaluation, Education, & procurement, Patient/Caregiver Education & Training  Plan Comment: cont. PT per POC. Safety Devices  Type of devices: Gait belt, Patient at risk for falls, Nurse notified, Left in chair, Call light within reach(ice pack on R hip and B feet for the floor.  Pt. educated to fall for help when wanting to get back to bed or recline in chair)    G-Code       OutComes Score                                                  AM-PAC Score             Goals  Short term goals  Time Frame for Short term goals: 2 wks  Short term goal 1: supine to sit indep  Short term goal 2: sit to stand indep  Short term goal 3: amb. 48' with RW SBA, FWBAT  Short term goal 4: up/down 1 step Min A  Patient Goals   Patient goals : go home       Therapy Time   Individual Concurrent Group Co-treatment   Time In           Time Out           Minutes                   Anthony Martin PT    Electronically signed by Anthony Martin PT on 11/1/2020 at 10:32 AM

## 2020-11-01 NOTE — PROGRESS NOTES
Physical Therapy  Rasta Escoto  [id number]     11/01/20 1410   Subjective   Subjective Patient up in chair and states he is ready to go back to bed. Bed Mobility   Sit to Supine Moderate assistance   Transfers   Sit to Stand Moderate Assistance;Maximum Assistance   Stand to sit Moderate Assistance;Maximum Assistance   Bed to Chair Maximum assistance   Stand Pivot Transfers Maximum Assistance   Other Activities   Comment Patient having a lot of pain this treatment. Patient required Max assist to pivot from chair to bed. Patient was not bearing weight through R LE secondary to pain. Patient returned to supine, positioned for comfort with all needs in reach. Short term goals   Time Frame for Short term goals 2 wks   Short term goal 1 supine to sit indep   Short term goal 2 sit to stand indep   Short term goal 3 amb. 48' with RW SBA, FWBAT   Short term goal 4 up/down 1 step Min A   Activity Tolerance   Activity Tolerance Patient limited by pain; Patient limited by endurance   Safety Devices   Type of devices Bed alarm in place;Call light within reach; Left in bed   Electronically signed by Rhea Kelley PTA on 11/1/2020 at 2:15 PM

## 2020-11-02 LAB
ANION GAP SERPL CALCULATED.3IONS-SCNC: 6 MMOL/L (ref 7–19)
BLOOD BANK DISPENSE STATUS: NORMAL
BLOOD BANK PRODUCT CODE: NORMAL
BPU ID: NORMAL
BUN BLDV-MCNC: 23 MG/DL (ref 8–23)
CALCIUM SERPL-MCNC: 7.5 MG/DL (ref 8.8–10.2)
CHLORIDE BLD-SCNC: 103 MMOL/L (ref 98–111)
CO2: 23 MMOL/L (ref 22–29)
CREAT SERPL-MCNC: 1.2 MG/DL (ref 0.5–1.2)
DESCRIPTION BLOOD BANK: NORMAL
GFR AFRICAN AMERICAN: >59
GFR NON-AFRICAN AMERICAN: >60
GLUCOSE BLD-MCNC: 89 MG/DL (ref 74–109)
HCT VFR BLD CALC: 20.5 % (ref 42–52)
HCT VFR BLD CALC: 25.3 % (ref 42–52)
HCT VFR BLD CALC: 25.4 % (ref 42–52)
HEMOGLOBIN: 6.6 G/DL (ref 14–18)
HEMOGLOBIN: 8.2 G/DL (ref 14–18)
HEMOGLOBIN: 8.3 G/DL (ref 14–18)
MCH RBC QN AUTO: 30.5 PG (ref 27–31)
MCH RBC QN AUTO: 30.6 PG (ref 27–31)
MCHC RBC AUTO-ENTMCNC: 32.2 G/DL (ref 33–37)
MCHC RBC AUTO-ENTMCNC: 32.8 G/DL (ref 33–37)
MCV RBC AUTO: 93 FL (ref 80–94)
MCV RBC AUTO: 94.9 FL (ref 80–94)
PDW BLD-RTO: 15.2 % (ref 11.5–14.5)
PDW BLD-RTO: 15.8 % (ref 11.5–14.5)
PLATELET # BLD: 113 K/UL (ref 130–400)
PLATELET # BLD: 91 K/UL (ref 130–400)
PMV BLD AUTO: 9.5 FL (ref 9.4–12.4)
PMV BLD AUTO: 9.7 FL (ref 9.4–12.4)
POTASSIUM SERPL-SCNC: 4 MMOL/L (ref 3.5–5)
RBC # BLD: 2.16 M/UL (ref 4.7–6.1)
RBC # BLD: 2.72 M/UL (ref 4.7–6.1)
SODIUM BLD-SCNC: 132 MMOL/L (ref 136–145)
WBC # BLD: 5.6 K/UL (ref 4.8–10.8)
WBC # BLD: 6.1 K/UL (ref 4.8–10.8)

## 2020-11-02 PROCEDURE — 6370000000 HC RX 637 (ALT 250 FOR IP): Performed by: ORTHOPAEDIC SURGERY

## 2020-11-02 PROCEDURE — 97530 THERAPEUTIC ACTIVITIES: CPT

## 2020-11-02 PROCEDURE — 1210000000 HC MED SURG R&B

## 2020-11-02 PROCEDURE — 36415 COLL VENOUS BLD VENIPUNCTURE: CPT

## 2020-11-02 PROCEDURE — P9016 RBC LEUKOCYTES REDUCED: HCPCS

## 2020-11-02 PROCEDURE — 2580000003 HC RX 258: Performed by: ORTHOPAEDIC SURGERY

## 2020-11-02 PROCEDURE — 2580000003 HC RX 258: Performed by: HOSPITALIST

## 2020-11-02 PROCEDURE — 85018 HEMOGLOBIN: CPT

## 2020-11-02 PROCEDURE — 85027 COMPLETE CBC AUTOMATED: CPT

## 2020-11-02 PROCEDURE — 36430 TRANSFUSION BLD/BLD COMPNT: CPT

## 2020-11-02 PROCEDURE — 80048 BASIC METABOLIC PNL TOTAL CA: CPT

## 2020-11-02 PROCEDURE — 2700000000 HC OXYGEN THERAPY PER DAY

## 2020-11-02 PROCEDURE — 85014 HEMATOCRIT: CPT

## 2020-11-02 PROCEDURE — 97165 OT EVAL LOW COMPLEX 30 MIN: CPT

## 2020-11-02 PROCEDURE — 6370000000 HC RX 637 (ALT 250 FOR IP): Performed by: HOSPITALIST

## 2020-11-02 RX ORDER — 0.9 % SODIUM CHLORIDE 0.9 %
20 INTRAVENOUS SOLUTION INTRAVENOUS ONCE
Status: COMPLETED | OUTPATIENT
Start: 2020-11-02 | End: 2020-11-02

## 2020-11-02 RX ORDER — PANTOPRAZOLE SODIUM 40 MG/1
40 TABLET, DELAYED RELEASE ORAL
Status: DISCONTINUED | OUTPATIENT
Start: 2020-11-02 | End: 2020-11-03 | Stop reason: HOSPADM

## 2020-11-02 RX ADMIN — LEVOTHYROXINE SODIUM 125 MCG: 125 TABLET ORAL at 05:56

## 2020-11-02 RX ADMIN — OXYCODONE 5 MG: 5 TABLET ORAL at 05:56

## 2020-11-02 RX ADMIN — ACETAMINOPHEN 650 MG: 325 TABLET ORAL at 05:56

## 2020-11-02 RX ADMIN — PANTOPRAZOLE SODIUM 40 MG: 40 TABLET, DELAYED RELEASE ORAL at 05:56

## 2020-11-02 RX ADMIN — SODIUM CHLORIDE 20 ML: 9 INJECTION, SOLUTION INTRAVENOUS at 05:57

## 2020-11-02 RX ADMIN — SODIUM CHLORIDE, PRESERVATIVE FREE 10 ML: 5 INJECTION INTRAVENOUS at 08:47

## 2020-11-02 RX ADMIN — OXYCODONE 5 MG: 5 TABLET ORAL at 16:07

## 2020-11-02 RX ADMIN — SODIUM CHLORIDE, PRESERVATIVE FREE 10 ML: 5 INJECTION INTRAVENOUS at 20:12

## 2020-11-02 RX ADMIN — AMIODARONE HYDROCHLORIDE 200 MG: 200 TABLET ORAL at 08:47

## 2020-11-02 RX ADMIN — ATORVASTATIN CALCIUM 40 MG: 40 TABLET, FILM COATED ORAL at 08:47

## 2020-11-02 ASSESSMENT — PAIN SCALES - GENERAL
PAINLEVEL_OUTOF10: 4
PAINLEVEL_OUTOF10: 6
PAINLEVEL_OUTOF10: 2

## 2020-11-02 ASSESSMENT — PAIN DESCRIPTION - LOCATION: LOCATION: LEG

## 2020-11-02 ASSESSMENT — PAIN DESCRIPTION - ORIENTATION: ORIENTATION: RIGHT

## 2020-11-02 ASSESSMENT — PAIN SCALES - WONG BAKER: WONGBAKER_NUMERICALRESPONSE: 6

## 2020-11-02 NOTE — PROGRESS NOTES
Physical Therapy  Name: Megan Traylor  MRN:  [id number]  Date of service:  11/2/2020 11/02/20 1534   Subjective   Subjective Pt states he wants to get back to bed, states he does not think he will be able to stand and take any steps. Bed Mobility   Sit to Supine Minimal assistance   Transfers   Sit to Stand Moderate Assistance;Maximum Assistance  (to richi stedy)   Stand to sit Moderate Assistance   Comment Pt transferred from recliner to bed via richi stedy. Ambulation   Ambulation? No   WB Status FWBAT RLE   Short term goals   Time Frame for Short term goals 2 wks   Short term goal 1 supine to sit indep   Short term goal 2 sit to stand indep   Short term goal 3 amb. 48' with RW SBA, FWBAT   Short term goal 4 up/down 1 step Min A   Conditions Requiring Skilled Therapeutic Intervention   Body structures, Functions, Activity limitations Decreased functional mobility ; Decreased ROM; Decreased strength;Decreased safe awareness;Decreased endurance;Decreased posture; Increased pain;Decreased balance;Decreased coordination   Assessment Pt had difficulty coming to standing in richi stedy. Pt feeling weak and states he is unable to take any steps this afternoon. . Pt required gown and bed change. Activity Tolerance   Activity Tolerance Patient limited by pain; Patient limited by endurance   Safety Devices   Type of devices Left in bed;Call light within reach; Bed alarm in place         Electronically signed by Renae Anderson PTA on 11/2/2020 at 3:38 PM

## 2020-11-02 NOTE — PROGRESS NOTES
Result Value Ref Range    WBC 6.3 4.8 - 10.8 K/uL    RBC 2.38 (L) 4.70 - 6.10 M/uL    Hemoglobin 7.2 (L) 14.0 - 18.0 g/dL    Hematocrit 22.3 (L) 42.0 - 52.0 %    MCV 93.7 80.0 - 94.0 fL    MCH 30.3 27.0 - 31.0 pg    MCHC 32.3 (L) 33.0 - 37.0 g/dL    RDW 15.2 (H) 11.5 - 14.5 %    Platelets 86 (L) 397 - 400 K/uL    MPV 9.8 9.4 - 12.4 fL   CBC    Collection Time: 11/01/20  5:17 PM   Result Value Ref Range    WBC 5.6 4.8 - 10.8 K/uL    RBC 2.31 (L) 4.70 - 6.10 M/uL    Hemoglobin 7.0 (L) 14.0 - 18.0 g/dL    Hematocrit 21.5 (L) 42.0 - 52.0 %    MCV 93.1 80.0 - 94.0 fL    MCH 30.3 27.0 - 31.0 pg    MCHC 32.6 (L) 33.0 - 37.0 g/dL    RDW 15.2 (H) 11.5 - 14.5 %    Platelets 80 (L) 574 - 400 K/uL    MPV 9.9 9.4 - 12.4 fL   CBC    Collection Time: 11/02/20  2:39 AM   Result Value Ref Range    WBC 5.6 4.8 - 10.8 K/uL    RBC 2.16 (L) 4.70 - 6.10 M/uL    Hemoglobin 6.6 (L) 14.0 - 18.0 g/dL    Hematocrit 20.5 (L) 42.0 - 52.0 %    MCV 94.9 (H) 80.0 - 94.0 fL    MCH 30.6 27.0 - 31.0 pg    MCHC 32.2 (L) 33.0 - 37.0 g/dL    RDW 15.2 (H) 11.5 - 14.5 %    Platelets 91 (L) 796 - 400 K/uL    MPV 9.7 9.4 - 12.4 fL   Basic Metabolic Panel    Collection Time: 11/02/20  2:39 AM   Result Value Ref Range    Sodium 132 (L) 136 - 145 mmol/L    Potassium 4.0 3.5 - 5.0 mmol/L    Chloride 103 98 - 111 mmol/L    CO2 23 22 - 29 mmol/L    Anion Gap 6 (L) 7 - 19 mmol/L    Glucose 89 74 - 109 mg/dL    BUN 23 8 - 23 mg/dL    CREATININE 1.2 0.5 - 1.2 mg/dL    GFR Non-African American >60 >60    GFR African American >59 >59    Calcium 7.5 (L) 8.8 - 10.2 mg/dL       I/O last 3 completed shifts:   In: 7408 [P.O.:990; I.V.:4950; Blood:300]  Out: 1200 [Urine:1200]  I/O this shift:  In: 221.2 [I.V.:221.2]  Out: 100 [Urine:100]      Current Facility-Administered Medications:     pantoprazole (PROTONIX) tablet 40 mg, 40 mg, Oral, [facility], Zina Schilder, MD, 40 mg at 11/02/20 0556    [Held by provider] apixaban (ELIQUIS) tablet 5 mg, 5 mg, Oral, BID, Michelle Wakefield MD, 5 mg at 10/31/20 1049    acetaminophen (TYLENOL) tablet 650 mg, 650 mg, Oral, Q6H PRN, 650 mg at 11/02/20 0556 **OR** acetaminophen (TYLENOL) suppository 650 mg, 650 mg, Rectal, Q6H PRN, Jabari Nolasco MD    polyethylene glycol [facility]) packet 17 g, 17 g, Oral, Daily PRN, Jabari Nolasco MD    LORazepam (ATIVAN) injection 1 mg, 1 mg, Intravenous, Q6H PRN, Jabari Nolasco MD    amiodarone (CORDARONE) tablet 200 mg, 200 mg, Oral, Daily, Jabari Nolasco MD, 200 mg at 11/02/20 0847    atorvastatin (LIPITOR) tablet 40 mg, 40 mg, Oral, Daily, Jabari Nolasco MD, 40 mg at 11/02/20 0847    levothyroxine (SYNTHROID) tablet 125 mcg, 125 mcg, Oral, Daily, Jabari Nolasco MD, 125 mcg at 11/02/20 0556    sodium chloride flush 0.9 % injection 10 mL, 10 mL, Intravenous, 2 times per day, Jabari Nolasco MD, 10 mL at 11/02/20 0847    sodium chloride flush 0.9 % injection 10 mL, 10 mL, Intravenous, PRN, Jabari Nolasco MD    promethazine (PHENERGAN) tablet 12.5 mg, 12.5 mg, Oral, Q6H PRN **OR** ondansetron (ZOFRAN) injection 4 mg, 4 mg, Intravenous, Q6H PRN, Jabari Nolasco MD    oxyCODONE (ROXICODONE) immediate release tablet 5 mg, 5 mg, Oral, Q4H PRN, 5 mg at 11/02/20 0556 **OR** [DISCONTINUED] oxyCODONE (ROXICODONE) immediate release tablet 10 mg, 10 mg, Oral, Q4H PRN, Jabari Nolasco MD      Assessment/plan  Principal Problem:    Hip fx, right, closed, initial encounter [facility])  Active Problems:    Hypotension    Postoperative anemia due to acute blood loss    CAD (coronary artery disease)    Dyslipidemia    Chronic systolic heart failure (HCC)    Paroxysmal atrial fibrillation ([facility] [address]  Resolved Problems:    * No resolved hospital problems. *        Hospital course: 29-year-old male with chronic systolic heart failure, CAD, paroxysmal A. fib on Eliquis presented following a fall and found to have right hip fracture and subsequently underwent cephalomedullary nailing on 10/30 with orthopedic surgery.   Patient did develop some

## 2020-11-02 NOTE — PROGRESS NOTES
Occupational Therapy   Occupational Therapy Initial Assessment  Date: 2020   Patient Name: Jairo Dunham  MRN: [id number]     : 1950    Date of Service: 2020    Discharge Recommendations:          Assessment   Performance deficits / Impairments: Decreased ADL status; Decreased balance;Decreased functional mobility ; Decreased endurance  Assessment: Pt. lived alone previously and would be a good candidate for inpatient rehab unit due to PLOF and motivation  Treatment Diagnosis: s/p R hip nail  Prognosis: Good  Decision Making: Low Complexity  REQUIRES OT FOLLOW UP: Yes  Activity Tolerance  Activity Tolerance: Patient Tolerated treatment well           Patient Diagnosis(es): The primary encounter diagnosis was Closed nondisplaced intertrochanteric fracture of right femur, initial encounter ([facility] Diagnoses of Renal insufficiency, Anemia, unspecified type, and Acute alcoholic intoxication without complication ([facility] were also pertinent to this visit. has a past medical history of CAD (coronary artery disease), Gout, Hypertension, Non-ST elevation MI (NSTEMI) ([facility] and Palliative care patient. has a past surgical history that includes Cardiac catheterization (14  [facility]); Cholecystectomy; and Femur fracture surgery (Right, 10/30/2020).     Treatment Diagnosis: s/p R hip nail      Restrictions  Restrictions/Precautions  Restrictions/Precautions: Weight Bearing, Fall Risk  Required Braces or Orthoses?: No  Lower Extremity Weight Bearing Restrictions  Right Lower Extremity Weight Bearing: Weight Bearing As Tolerated    Subjective   General  Chart Reviewed: Yes  Patient assessed for rehabilitation services?: Yes  Family / Caregiver Present: No  Diagnosis: s/p R hip nail  Patient Currently in Pain: Yes  Pain Assessment  Pain Assessment: Faces  Stark-Baker Pain Rating: Hurts even more  Pain Location: Leg  Pain Orientation: Right  Vital Signs  Temp: 97.5 °F (36.4 °C)  Temp Source: Temporal  Pulse: 65  Heart Rate Source: Monitor  Resp: 18  BP: 107/62  BP Location: Left Arm  BP Upper/Lower: Upper  Patient Currently in Pain: Yes  Oxygen Therapy  SpO2: 96 %  O2 Device: Nasal cannula  Social/Functional History  Social/Functional History  Lives With: Alone  Type of Home: House  Home Layout: One level  Home Access: Ramped entrance  Home Equipment: Standard walker, Cane  ADL Assistance: Independent  Ambulation Assistance: Independent  Transfer Assistance: Independent  Active : Yes  Occupation: Retired  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  Additional Comments: pt plans on having a friend move in with him for a little while to help once he goes home       Objective   Vision: Impaired  Vision Exceptions: Wears glasses for reading;Wears glasses at all times  Hearing: Within functional limits    Orientation  Overall Orientation Status: Within Normal Limits        ADL  Feeding: Independent  Grooming: Independent  UE Bathing: Supervision  LE Bathing: Maximum assistance  UE Dressing: Supervision  LE Dressing: Maximum assistance  Toileting: Moderate assistance           Transfers  Stand Step Transfers: Minimal assistance; Moderate assistance  Sit to stand:  Moderate assistance  Transfer Comments: Pt with limited weight bearing ability in RLE     Cognition  Overall Cognitive Status: WNL                 LUE AROM (degrees)  LUE AROM : WNL  RUE AROM (degrees)  RUE AROM : WNL  RUE Strength  Gross RUE Strength: WFL                   Plan   Plan  Times per week: 3-5x/week  Times per day: Daily    G-Code     OutComes Score                                                  AM-PAC Score             Goals  Short term goals  Time Frame for Short term goals: 1 week  Short term goal 1: CGA with toilet tfers  Short term goal 2: CGA with clothing mgmt in standing  Short term goal 3: [place]  with seated  LB dsg  Long term goals  Long term goal 1: Return to PLOF       Therapy Time   Individual Concurrent Group Co-treatment Time In           Time Out           Minutes                   Mikey Whitten, OT

## 2020-11-02 NOTE — CARE COORDINATION
The [address] at [facility]  Notification of Admission Decision      [x] Patient has been accepted for admit to [facility] on     [address], 11/3/2020  :       Please write discharge orders and summary prior to discharge. [] Patient acceptance to Rehab pending the following :    [] Eval in progress       [] Patient determined to be ineligible for services at [facility] because : We recommend you consider        Thank you for your referral, we appreciate you.

## 2020-11-03 ENCOUNTER — HOSPITAL ENCOUNTER (INPATIENT)
Age: 70
LOS: 15 days | Discharge: HOME HEALTH | DRG: 560 | End: 2020-11-18
Attending: PSYCHIATRY & NEUROLOGY | Admitting: PSYCHIATRY & NEUROLOGY
Payer: MEDICARE

## 2020-11-03 VITALS
HEIGHT: 72 IN | DIASTOLIC BLOOD PRESSURE: 73 MMHG | TEMPERATURE: 97.8 F | HEART RATE: 70 BPM | OXYGEN SATURATION: 97 % | RESPIRATION RATE: 14 BRPM | BODY MASS INDEX: 23.7 KG/M2 | SYSTOLIC BLOOD PRESSURE: 124 MMHG | WEIGHT: 175 LBS

## 2020-11-03 PROBLEM — N17.9 ACUTE KIDNEY INJURY (HCC): Status: ACTIVE | Noted: 2020-11-03

## 2020-11-03 PROBLEM — S72.124D CLOSED NONDISPLACED FRACTURE OF LESSER TROCHANTER OF RIGHT FEMUR WITH ROUTINE HEALING: Status: ACTIVE | Noted: 2020-11-03

## 2020-11-03 LAB
ANION GAP SERPL CALCULATED.3IONS-SCNC: 10 MMOL/L (ref 7–19)
BUN BLDV-MCNC: 17 MG/DL (ref 8–23)
CALCIUM SERPL-MCNC: 8.1 MG/DL (ref 8.8–10.2)
CHLORIDE BLD-SCNC: 105 MMOL/L (ref 98–111)
CO2: 19 MMOL/L (ref 22–29)
CREAT SERPL-MCNC: 1.2 MG/DL (ref 0.5–1.2)
GFR AFRICAN AMERICAN: >59
GFR NON-AFRICAN AMERICAN: >60
GLUCOSE BLD-MCNC: 88 MG/DL (ref 74–109)
HCT VFR BLD CALC: 23.4 % (ref 42–52)
HEMOGLOBIN: 7.7 G/DL (ref 14–18)
MCH RBC QN AUTO: 30.1 PG (ref 27–31)
MCHC RBC AUTO-ENTMCNC: 32.9 G/DL (ref 33–37)
MCV RBC AUTO: 91.4 FL (ref 80–94)
PDW BLD-RTO: 15.6 % (ref 11.5–14.5)
PLATELET # BLD: 123 K/UL (ref 130–400)
PMV BLD AUTO: 9.3 FL (ref 9.4–12.4)
POTASSIUM REFLEX MAGNESIUM: 4 MMOL/L (ref 3.5–5)
RBC # BLD: 2.56 M/UL (ref 4.7–6.1)
SODIUM BLD-SCNC: 134 MMOL/L (ref 136–145)
WBC # BLD: 5.6 K/UL (ref 4.8–10.8)

## 2020-11-03 PROCEDURE — 80048 BASIC METABOLIC PNL TOTAL CA: CPT

## 2020-11-03 PROCEDURE — 2580000003 HC RX 258: Performed by: STUDENT IN AN ORGANIZED HEALTH CARE EDUCATION/TRAINING PROGRAM

## 2020-11-03 PROCEDURE — 1180000000 HC REHAB R&B

## 2020-11-03 PROCEDURE — 2700000000 HC OXYGEN THERAPY PER DAY

## 2020-11-03 PROCEDURE — 85027 COMPLETE CBC AUTOMATED: CPT

## 2020-11-03 PROCEDURE — 6370000000 HC RX 637 (ALT 250 FOR IP): Performed by: STUDENT IN AN ORGANIZED HEALTH CARE EDUCATION/TRAINING PROGRAM

## 2020-11-03 PROCEDURE — 6370000000 HC RX 637 (ALT 250 FOR IP): Performed by: ORTHOPAEDIC SURGERY

## 2020-11-03 PROCEDURE — 2580000003 HC RX 258: Performed by: ORTHOPAEDIC SURGERY

## 2020-11-03 PROCEDURE — 6370000000 HC RX 637 (ALT 250 FOR IP): Performed by: PSYCHIATRY & NEUROLOGY

## 2020-11-03 PROCEDURE — 6370000000 HC RX 637 (ALT 250 FOR IP): Performed by: HOSPITALIST

## 2020-11-03 PROCEDURE — 97116 GAIT TRAINING THERAPY: CPT

## 2020-11-03 PROCEDURE — 1210000000 HC MED SURG R&B

## 2020-11-03 PROCEDURE — 36415 COLL VENOUS BLD VENIPUNCTURE: CPT

## 2020-11-03 RX ORDER — SODIUM CHLORIDE 0.9 % (FLUSH) 0.9 %
10 SYRINGE (ML) INJECTION PRN
Status: DISCONTINUED | OUTPATIENT
Start: 2020-11-03 | End: 2020-11-18 | Stop reason: HOSPADM

## 2020-11-03 RX ORDER — LISINOPRIL 5 MG/1
5 TABLET ORAL DAILY
Status: DISCONTINUED | OUTPATIENT
Start: 2020-11-03 | End: 2020-11-18 | Stop reason: HOSPADM

## 2020-11-03 RX ORDER — ATORVASTATIN CALCIUM 40 MG/1
40 TABLET, FILM COATED ORAL DAILY
Status: CANCELLED | OUTPATIENT
Start: 2020-11-04

## 2020-11-03 RX ORDER — SODIUM CHLORIDE 0.9 % (FLUSH) 0.9 %
10 SYRINGE (ML) INJECTION EVERY 12 HOURS SCHEDULED
Status: CANCELLED | OUTPATIENT
Start: 2020-11-03

## 2020-11-03 RX ORDER — ONDANSETRON 2 MG/ML
4 INJECTION INTRAMUSCULAR; INTRAVENOUS EVERY 6 HOURS PRN
Status: CANCELLED | OUTPATIENT
Start: 2020-11-03

## 2020-11-03 RX ORDER — SODIUM CHLORIDE 0.9 % (FLUSH) 0.9 %
10 SYRINGE (ML) INJECTION EVERY 12 HOURS SCHEDULED
Status: DISCONTINUED | OUTPATIENT
Start: 2020-11-03 | End: 2020-11-18 | Stop reason: HOSPADM

## 2020-11-03 RX ORDER — PANTOPRAZOLE SODIUM 40 MG/1
40 TABLET, DELAYED RELEASE ORAL
Status: DISCONTINUED | OUTPATIENT
Start: 2020-11-04 | End: 2020-11-18 | Stop reason: HOSPADM

## 2020-11-03 RX ORDER — PANTOPRAZOLE SODIUM 40 MG/1
40 TABLET, DELAYED RELEASE ORAL
Status: CANCELLED | OUTPATIENT
Start: 2020-11-04

## 2020-11-03 RX ORDER — POLYETHYLENE GLYCOL 3350 17 G/17G
17 POWDER, FOR SOLUTION ORAL DAILY PRN
Status: CANCELLED | OUTPATIENT
Start: 2020-11-03

## 2020-11-03 RX ORDER — POLYETHYLENE GLYCOL 3350 17 G/17G
17 POWDER, FOR SOLUTION ORAL DAILY
Status: DISCONTINUED | OUTPATIENT
Start: 2020-11-03 | End: 2020-11-18 | Stop reason: HOSPADM

## 2020-11-03 RX ORDER — SODIUM CHLORIDE 0.9 % (FLUSH) 0.9 %
10 SYRINGE (ML) INJECTION PRN
Status: CANCELLED | OUTPATIENT
Start: 2020-11-03

## 2020-11-03 RX ORDER — LORAZEPAM 2 MG/ML
1 INJECTION INTRAMUSCULAR EVERY 6 HOURS PRN
Status: CANCELLED | OUTPATIENT
Start: 2020-11-03

## 2020-11-03 RX ORDER — PROMETHAZINE HYDROCHLORIDE 12.5 MG/1
12.5 TABLET ORAL EVERY 6 HOURS PRN
Status: CANCELLED | OUTPATIENT
Start: 2020-11-03

## 2020-11-03 RX ORDER — AMIODARONE HYDROCHLORIDE 200 MG/1
200 TABLET ORAL DAILY
Status: CANCELLED | OUTPATIENT
Start: 2020-11-04

## 2020-11-03 RX ORDER — ACETAMINOPHEN 650 MG/1
650 SUPPOSITORY RECTAL EVERY 6 HOURS PRN
Status: CANCELLED | OUTPATIENT
Start: 2020-11-03

## 2020-11-03 RX ORDER — METOPROLOL SUCCINATE 25 MG/1
12.5 TABLET, EXTENDED RELEASE ORAL DAILY
Status: DISCONTINUED | OUTPATIENT
Start: 2020-11-03 | End: 2020-11-18 | Stop reason: HOSPADM

## 2020-11-03 RX ORDER — SODIUM PHOSPHATE, DIBASIC AND SODIUM PHOSPHATE, MONOBASIC 7; 19 G/133ML; G/133ML
1 ENEMA RECTAL DAILY PRN
Status: DISCONTINUED | OUTPATIENT
Start: 2020-11-03 | End: 2020-11-18 | Stop reason: HOSPADM

## 2020-11-03 RX ORDER — OXYCODONE HYDROCHLORIDE 5 MG/1
5 TABLET ORAL EVERY 4 HOURS PRN
Status: CANCELLED | OUTPATIENT
Start: 2020-11-03

## 2020-11-03 RX ORDER — METOPROLOL SUCCINATE 25 MG/1
12.5 TABLET, EXTENDED RELEASE ORAL DAILY
Status: CANCELLED | OUTPATIENT
Start: 2020-11-03

## 2020-11-03 RX ORDER — POLYETHYLENE GLYCOL 3350 17 G/17G
17 POWDER, FOR SOLUTION ORAL DAILY PRN
Status: DISCONTINUED | OUTPATIENT
Start: 2020-11-03 | End: 2020-11-18 | Stop reason: HOSPADM

## 2020-11-03 RX ORDER — OXYCODONE HYDROCHLORIDE 5 MG/1
5 TABLET ORAL EVERY 4 HOURS PRN
Status: DISCONTINUED | OUTPATIENT
Start: 2020-11-03 | End: 2020-11-07

## 2020-11-03 RX ORDER — ATORVASTATIN CALCIUM 40 MG/1
40 TABLET, FILM COATED ORAL DAILY
Status: DISCONTINUED | OUTPATIENT
Start: 2020-11-04 | End: 2020-11-18 | Stop reason: HOSPADM

## 2020-11-03 RX ORDER — ONDANSETRON 2 MG/ML
4 INJECTION INTRAMUSCULAR; INTRAVENOUS EVERY 6 HOURS PRN
Status: DISCONTINUED | OUTPATIENT
Start: 2020-11-03 | End: 2020-11-18 | Stop reason: HOSPADM

## 2020-11-03 RX ORDER — ACETAMINOPHEN 325 MG/1
650 TABLET ORAL EVERY 6 HOURS PRN
Status: CANCELLED | OUTPATIENT
Start: 2020-11-03

## 2020-11-03 RX ORDER — ACETAMINOPHEN 325 MG/1
650 TABLET ORAL EVERY 4 HOURS PRN
Status: DISCONTINUED | OUTPATIENT
Start: 2020-11-03 | End: 2020-11-18 | Stop reason: HOSPADM

## 2020-11-03 RX ORDER — LEVOTHYROXINE SODIUM 0.12 MG/1
125 TABLET ORAL DAILY
Status: CANCELLED | OUTPATIENT
Start: 2020-11-04

## 2020-11-03 RX ORDER — LORAZEPAM 2 MG/ML
1 INJECTION INTRAMUSCULAR EVERY 6 HOURS PRN
Status: DISCONTINUED | OUTPATIENT
Start: 2020-11-03 | End: 2020-11-18 | Stop reason: HOSPADM

## 2020-11-03 RX ORDER — ERGOCALCIFEROL 1.25 MG/1
50000 CAPSULE ORAL WEEKLY
Status: DISCONTINUED | OUTPATIENT
Start: 2020-11-03 | End: 2020-11-18 | Stop reason: HOSPADM

## 2020-11-03 RX ORDER — LISINOPRIL 5 MG/1
5 TABLET ORAL DAILY
Status: CANCELLED | OUTPATIENT
Start: 2020-11-03

## 2020-11-03 RX ORDER — PROMETHAZINE HYDROCHLORIDE 12.5 MG/1
12.5 TABLET ORAL EVERY 6 HOURS PRN
Status: DISCONTINUED | OUTPATIENT
Start: 2020-11-03 | End: 2020-11-18 | Stop reason: HOSPADM

## 2020-11-03 RX ORDER — ACETAMINOPHEN 650 MG/1
650 SUPPOSITORY RECTAL EVERY 6 HOURS PRN
Status: DISCONTINUED | OUTPATIENT
Start: 2020-11-03 | End: 2020-11-18 | Stop reason: HOSPADM

## 2020-11-03 RX ORDER — AMIODARONE HYDROCHLORIDE 200 MG/1
200 TABLET ORAL DAILY
Status: DISCONTINUED | OUTPATIENT
Start: 2020-11-04 | End: 2020-11-18 | Stop reason: HOSPADM

## 2020-11-03 RX ORDER — ERGOCALCIFEROL 1.25 MG/1
50000 CAPSULE ORAL WEEKLY
Status: CANCELLED | OUTPATIENT
Start: 2020-11-03

## 2020-11-03 RX ORDER — ACETAMINOPHEN 325 MG/1
650 TABLET ORAL EVERY 6 HOURS PRN
Status: DISCONTINUED | OUTPATIENT
Start: 2020-11-03 | End: 2020-11-03 | Stop reason: SDUPTHER

## 2020-11-03 RX ORDER — SENNA PLUS 8.6 MG/1
1 TABLET ORAL 2 TIMES DAILY
Status: DISCONTINUED | OUTPATIENT
Start: 2020-11-03 | End: 2020-11-14

## 2020-11-03 RX ADMIN — OXYCODONE 5 MG: 5 TABLET ORAL at 07:36

## 2020-11-03 RX ADMIN — SODIUM CHLORIDE, PRESERVATIVE FREE 10 ML: 5 INJECTION INTRAVENOUS at 09:03

## 2020-11-03 RX ADMIN — OXYCODONE HYDROCHLORIDE 5 MG: 5 TABLET ORAL at 18:08

## 2020-11-03 RX ADMIN — LEVOTHYROXINE SODIUM 125 MCG: 125 TABLET ORAL at 06:06

## 2020-11-03 RX ADMIN — AMIODARONE HYDROCHLORIDE 200 MG: 200 TABLET ORAL at 09:03

## 2020-11-03 RX ADMIN — OXYCODONE 5 MG: 5 TABLET ORAL at 15:55

## 2020-11-03 RX ADMIN — STANDARDIZED SENNA CONCENTRATE 8.6 MG: 8.6 TABLET ORAL at 21:25

## 2020-11-03 RX ADMIN — ATORVASTATIN CALCIUM 40 MG: 40 TABLET, FILM COATED ORAL at 09:03

## 2020-11-03 RX ADMIN — SODIUM CHLORIDE, PRESERVATIVE FREE 10 ML: 5 INJECTION INTRAVENOUS at 21:25

## 2020-11-03 RX ADMIN — PANTOPRAZOLE SODIUM 40 MG: 40 TABLET, DELAYED RELEASE ORAL at 06:06

## 2020-11-03 ASSESSMENT — PAIN SCALES - GENERAL
PAINLEVEL_OUTOF10: 0
PAINLEVEL_OUTOF10: 2
PAINLEVEL_OUTOF10: 7
PAINLEVEL_OUTOF10: 0
PAINLEVEL_OUTOF10: 3

## 2020-11-03 NOTE — PLAN OF CARE
saturation management, energy conservation, stress management techniques, fall prevention, alarms protocol, seating and positioning, skin/wound care, pressure relief instruction,dressing changes,  infection protection, DVT prophylaxis, and/or assistance with in room safety with transfers to bed, toilet, wheelchair, shower as well as bathroom activities and hygiene. Patient/caregiver education for:   [x] Disease/sustained injury/management      [x] Medication Use   [] Surgical intervention   [x] Safety   [] Body mechanics and or joint protection   [] Health maintenance       IRF-DEQUAN  Bladder and Bowel Continence  Bladder Continence: Always continent  Bowel Continence: Always continent       PHYSICAL THERAPY:  Goals:                  Short term goals  Time Frame for Short term goals: 1 week  Short term goal 1: CGA all bed mobility  Short term goal 2: Min assist transfers  Short term goal 3: CGA WC propulsion 150'  Short term goal 4: CGA amb 48' w/RW            Long term goals  Time Frame for Long term goals : 2 weeks  Long term goal 1: IND bed mobility  Long term goal 2: IND transfers  Long term goal 3: IND WC propulsion 150'  Long term goal 4: IND amb 150'+ w/RW  Long term goal 5: IND HEP  These goals were reviewed with this patient at the time of assessment and Merlinda Mantel is in agreement.      Plan of Care: Frequency:  [x] 5 days per week, 90 minutes per day                             []  5 days per week, 60 minutes per day               Current Treatment Recommendations: Strengthening, ROM, Balance Training, Functional Mobility Training, Transfer Training, ADL/Self-care Training, IADL Training, Endurance Training, Wheelchair Mobility Training, Gait Training, Stair training, Home Exercise Program, Modalities, Patient/Caregiver Education & Training, Safety Education & Training, Pain Management    IRF-DEQUAN  Roll Left and Right  Assistance Needed: Partial/moderate assistance  CARE Score: 3  Discharge Goal: Independent  Sit to Lying  Assistance Needed: Partial/moderate assistance  CARE Score: 3  Discharge Goal: Independent  Lying to Sitting on Side of Bed  Assistance Needed: Partial/moderate assistance  CARE Score: 3  Discharge Goal: Independent  Sit to Stand  Assistance Needed: Partial/moderate assistance  CARE Score: 3  Discharge Goal: Independent  Chair/Bed-to-Chair Transfer  Assistance Needed: Partial/moderate assistance  CARE Score: 3  Discharge Goal: Independent  Car Transfer  Reason if not Attempted: Not attempted due to medical condition or safety concerns  CARE Score: 88  Discharge Goal: Independent  Walk 10 Feet  Assistance Needed: Partial/moderate assistance  CARE Score: 3  Discharge Goal: Independent  Walk 50 Feet with Two Turns  Reason if not Attempted: Not attempted due to medical condition or safety concerns  CARE Score: 88  Discharge Goal: Independent  Walk 150 Feet  Reason if not Attempted: Not attempted due to medical condition or safety concerns  CARE Score: 88  Discharge Goal: Independent  Walking 10 Feet on Uneven Surfaces  Reason if not Attempted: Not attempted due to medical condition or safety concerns  CARE Score: 88  Discharge Goal: Not Attempted  1 Step (Curb)  Reason if not Attempted: Not attempted due to medical condition or safety concerns  CARE Score: 88  Discharge Goal: Not Attempted  4 Steps  Reason if not Attempted: Not attempted due to medical condition or safety concerns  CARE Score: 88  Discharge Goal: Not Attempted  12 Steps  Reason if not Attempted: Not attempted due to medical condition or safety concerns  CARE Score: 88  Discharge Goal: Not Attempted  Wheel 50 Feet with Two Turns  Reason if not Attempted: Not attempted due to medical condition or safety concerns  CARE Score: 88  Discharge Goal: Independent  Wheel 150 Feet  Reason if not Attempted: Not attempted due to medical condition or safety concerns  CARE Score: 88  Discharge Goal: Independent    OCCUPATIONAL THERAPY:  Goals:             Short term goals  Time Frame for Short term goals: 1 week  Short term goal 1: Supervision with toilet hygiene. Short term goal 2: Supervision with toilet transfer. Short term goal 3: Supervision wih showering/bathing with AE as needed. Short term goal 4: Supervision with LB dressing with AE as needed. Short term goal 5: Supervision with UB dressing. Short term goal 6: Supervision with putting on/taking off footwear with AE as needed. :  Long term goals  Time Frame for Long term goals : 2 weeks  Long term goal 1: Modified Independent with toilet hygiene. Long term goal 2: Modified Independent with toilet transfer. Long term goal 3: Modified Independent wih showering/bathing with AE as needed. Long term goal 4: Modified Independent with LB dressing with AE as needed. Long term goal 5: Independent with UB dressing. Long term goal 7: Modified Independent with putting on/taking off footwear with AE as needed. Long term goal 8: Verbalize DME needs.   Long term goal 9: Complete IADL/homemaking task with Modified [place]. :    These goals were reviewed with this patient at the time of assessment and Concetta Nance is in agreement    Plan of Care: Frequency:   [x] 5 days per week, 90 minutes per day     [] 5 days per week, 60 minutes per day                Plan  Current Treatment Recommendations: Gait Training, Patient/Caregiver Education & Training, Strengthening, Home Management Training, Balance Training, Equipment Evaluation, Education, & procurement, Functional Mobility Training, Endurance Training, Self-Care / ADL, Safety Education & Training            IRF-DEQUAN  Eating  Assistance Needed: Setup or clean-up assistance  CARE Score: 5  Discharge Goal: Independent  Oral Hygiene  Assistance Needed: Setup or clean-up assistance  CARE Score: 5  Discharge Goal: Independent  Toileting Hygiene  Assistance Needed: Substantial/maximal assistance  CARE Score: 2  Discharge Goal: Independent  Shower/Bathe Self  Assistance Needed: Partial/moderate assistance  CARE Score: 3  Discharge Goal: Independent  Upper Body Dressing  Assistance Needed: Partial/moderate assistance  CARE Score: 3  Discharge Goal: Independent  Lower Body Dressing  Assistance Needed: Substantial/maximal assistance  CARE Score: 2  Discharge Goal: Independent  Putting On/Taking Off Footwear  Assistance Needed: Dependent  CARE Score: 1  Discharge Goal: Independent  Toilet Transfer  Assistance Needed: Partial/moderate assistance  CARE Score: 3  Discharge Goal: Independent  Picking Up Object  Assistance Needed: Dependent  CARE Score: 1  Discharge Goal: Supervision or touching assistance  Treatments may include IADL retraining, strengthening, safety education and training, patient/caregiver education and training, equipment evaluation/ training/procurement, neuromuscular reeducation, wheelchair mobility training. SPEECH THERAPY:   Plan of Care and Goals:   LTG    FIM Goals: Comprehension:    Expression:    Social:    Problem Solving:    Memory:                                                  IRF-DEQUAN  Hearing, Speech, and Vision  Expression of Ideas and Wants: Without difficulty  Understanding Verbal and Non-Verbal Content: Understands  Cognitive Patterns  Cognitive Pattern Assessment Used: BIMS  Repetition of Three Words (First Attempt): 3  Temporal Orientation: Year: Correct  Temporal Orientation: Month: Accurate within 5 days  Temporal Orientation: Day: Correct  Able to recall \"sock: Yes, no cue required  Able to recall \"blue\": Yes, no cue required  Able to recall \"bed\": Yes, no cue required  BIMS Summary Score: 15          Plan of Care:  Frequency:   [] 5 days per week, 60 minutes per day                            [x] Not appropriate for Speech Therapy  Treatments may include speech/language/communication therapy, cognitive training, group therapy, education, and/or dysphagia therapy based on the above goals. anticipated functional outcomes: Independent household ambulation with assistive device  IPOC brief synthesis: Acute inpatient rehabilitation with occupational   physical therapy, 180 minutes, 5 every 7   days will address basic and advancing mobility with self-care   instruction and adaptive equipment training. Caregiver education will   be offered. Expected length  of stay prior to the supervised level of   functional discharge to home with home health services is 13 days. Assessment and Plan:  1. Right hip fracture status post cephalomedullary nailing-pain control/PT/OT  2. DVT prophylaxis-SCDs  3. History of A. fib-Eliquis currently on hold. On amiodarone  4. Acute blood loss anemia with recent transfusions. Stable. Continue to monitor  5.   Essential hypertension-continue current medications and monitoring  Patient's functional assessment  Prior to hospitalization the patient was continent of bowel and bladder             Case Mgmt: Electronically signed by IVAN Topete on 11/4/2020 at 3:34 PM    OT: Electronically signed by Madeline Winters OT on 11/4/20 at 3:30 PM CST    PT:Electronically signed by Kirby Garza PT on 11/4/20 at 4:20 PM CST    RN: Electronically signed by Isrrael Bryan RN on 11/3/20 at 4:37 PM CST    ST:Electronically signed by Jaime Dockery, SLP on 11/4/20 at 4:03 PM CST

## 2020-11-03 NOTE — PROGRESS NOTES
Physical Therapy  Name: Mejia Murray  MRN:  [id number]  Date of service:  11/3/2020     11/03/20 0958   Subjective   Subjective Pt states he thinks he will be able to take steps to recliner. He also states his R knee is hurting limiting his amb. Bed Mobility   Supine to Sit Minimal assistance   Scooting Minimal assistance   Transfers   Sit to Stand Moderate Assistance;Minimal Assistance   Stand to sit Minimal Assistance   Bed to Chair Minimal assistance   Ambulation   Ambulation? Yes   WB Status FWBAT RLE   Ambulation 1   Surface level tile   Device Rolling Walker   Assistance Minimal assistance   Gait Deviations Slow Hyacinth; [place]uffles;Decreased step height;Decreased step length   Distance 2' from bed to recliner   Short term goals   Time Frame for Short term goals 2 wks   Short term goal 1 supine to sit indep   Short term goal 2 sit to stand indep   Short term goal 3 amb. 48' with RW SBA, FWBAT   Short term goal 4 up/down 1 step Min A   Conditions Requiring Skilled Therapeutic Intervention   Body structures, Functions, Activity limitations Decreased functional mobility ; Decreased ROM; Decreased strength;Decreased safe awareness;Decreased endurance;Decreased posture; Increased pain;Decreased balance;Decreased coordination   Assessment Pt was able to take steps from bed to recliner but unable to amb any further. Pt would need 24/7 care until his mobility improves. Activity Tolerance   Activity Tolerance Patient limited by pain; Patient limited by endurance   Safety Devices   Type of devices Left in chair;Call light within reach       Electronically signed by Susan Brooks PTA on 11/3/2020 at 10:02 AM

## 2020-11-03 NOTE — PLAN OF CARE
Problem: Falls - Risk of:  Goal: Will remain free from falls  Description: Will remain free from falls  11/3/2020 0031 by Vicente Lynn RN  Outcome: Ongoing  11/2/2020 1821 by José Miguel Landis RN  Outcome: Ongoing  Goal: Absence of physical injury  Description: Absence of physical injury  11/3/2020 0031 by Vicente Lynn RN  Outcome: Ongoing  11/2/2020 1821 by José Miguel Landis RN  Outcome: Ongoing     Problem: Pain:  Goal: Pain level will decrease  Description: Pain level will decrease  11/3/2020 0031 by Vicente Lynn RN  Outcome: Ongoing  11/2/2020 1821 by José Miguel Landis RN  Outcome: Ongoing  Goal: Control of acute pain  Description: Control of acute pain  11/3/2020 0031 by Vicente Lynn RN  Outcome: Ongoing  11/2/2020 1821 by José Miguel Landis RN  Outcome: Ongoing  Goal: Control of chronic pain  Description: Control of chronic pain  11/3/2020 0031 by Vicente Lynn RN  Outcome: Ongoing  11/2/2020 1821 by José Miguel Landis RN  Outcome: Ongoing

## 2020-11-03 NOTE — DISCHARGE SUMMARY
Discharge Summary    NAME: Bandar Tipton  :  1950  MRN:  [id number]    Admit date:  10/30/2020  Discharge date:   11/3/2020    Admitting Physician:  Basilio Stanley MD    Advance Directive: Full Code    Consults: Orthopedic Surgery    Primary Care Physician:  Mila Cuevas MD    Discharge Diagnoses:  Principal Problem:    Hip fx, right, closed, initial encounter ([facility] [address]     Acute kidney injury ([facility] [address]    Hypotension    Postoperative anemia due to acute blood loss    CAD (coronary artery disease)    Dyslipidemia    Chronic systolic heart failure (HCC)    Paroxysmal atrial fibrillation ([facility] [address]      Significant Diagnostic Studies:   Xr Knee Right (1-2 Views)    Result Date: 10/30/2020  Examination. XR KNEE RIGHT (1-2 VIEWS) 10/30/2020 3:00 AM History: The patient fell and complains of pain in the right kidney with swelling and bruising. The frontal and crosstable portable lateral views of the right kidney are obtained. There is no previous study for comparison. There is marked diffuse osteopenia which may obscure a subtle nondisplaced fracture. No obvious displaced fracture is noted. There is severe, grade 3-4, chronic osteoarthritis, more severely involving the tibiofemoral compartment. There are bone fragments in the posterior aspect of the tibiofemoral articulation which may represent loose bodies. Similar small bony fragment seen located anteriorly in the tibiofemoral articulation. Severe atheromatous changes of the femoral popliteal and tibial arteries are seen. There is marked soft tissue atrophy. No displaced fracture noted. Due to marked osteopenia, a subtle nondisplaced fracture may be obscured. If clinically warranted further evaluation with CT scan of the knee joint may be obtained The severe chronic osteoarthritis. Signed by Dr Sia Gifford on 10/30/2020 7:10 AM    Xr Chest Portable    Result Date: 10/30/2020  XR CHEST PORTABLE 10/30/2020 7:20 AM History: Hip fracture. Preoperative evaluation. Portable chest x-ray compared with 9/16/2020. The heart size is normal. The mediastinum is within normal limits. The lungs are adequately expanded with no pneumonia or pneumothorax. There is no significant pleural fluid. No congestive failure changes. 1. No acute disease. Signed by Dr Jessica Meraz on 10/30/2020 7:20 AM    Ct Hip Right Wo Contrast    Result Date: 10/30/2020  Examination. CT HIP RIGHT WO CONTRAST 10/30/2020 3:49 AM History: The patient fell and complains of right hip pain and decreased range of motion. DLP: 987 mGycm. The CT scan of the right hip is performed without intravenous or intra-articular contrast enhancement. The images are acquired in axial plane with subsequent reconstruction in coronal and sagittal planes. There is no previous study for comparison. The correlation is made with the radiographs of the pelvis and right hip obtained earlier today. There is a comminuted intertrochanteric fracture fracture of the proximal femur through the base of the neck with a moderate  varus rotation. There is moderate impaction of the bony fragments. There is osteochondrosis and subchondral cystic changes involving the posterior aspect of the femoral head. The subtle irregularity of the proximal femoral neck suggesting stress fracture is not seen in this study. There are moderate size osteophytes from the acetabulum and the femoral head with asymmetrical narrowing of the hip joint space suggesting chronic osteoarthritis, grade 3. A comminuted intertrochanteric fracture of the right proximal femur with a moderate varus displacement and impaction. The other findings as above. The above study was initially reviewed and reported by [facility]. I do not find any discrepancies.  Signed by Dr Vandana Carver on 10/30/2020 7:22 AM    Xr Hip 2-3 Vw W Pelvis Right    Result Date: 11/2/2020  EXAMINATION:  XR HIP 2-3 VW W PELVIS RIGHT Recent Labs     11/02/20  0239 11/03/20  0349   * 134*   K 4.0 4.0    105   CO2 23 19*   BUN 23 17   CREATININE 1.2 1.2   GLUCOSE 89 88           Procedures:    Date of Procedure: 10/30/2020     OPERATIVE NOTE     PREOPERATIVE DIAGNOSIS: RIGHT INTERTROCHANTERIC HIP FRACTURE          POSTOPERATIVE DIAGNOSIS:  RIGHT INTERTROCHANTERIC HIP FRACTURE     PROCEDURE:  Procedure(s): CEPHALOMEDULLARY NAILING OF A RIGHT INTERTROCHANTERIC HIP FRACTURE      SURGEON:  Duncan Alexandre MD     ASSISTANT:  Lebron Rodriguez PA-C  The assistant aided in limb position as well as retractor placement.  The assistant was also critical in implantation of the intramedullary device and helped to hold the fracture reduced.  In addition to this, the assistant was responsible for wound closure.  It was necessary to have the assistant present in order to complete the procedure.     ANESTHESIA:  General     ESTIMATED BLOOD LOSS:  Minimal.     COMPLICATIONS:  None.     CONDITION:  Stable.     IMPLANTS:  Synthes Short  Size 11 TFN     BRIEF HISTORY: This is a 46-year-old male who presented to the Emergency  Department where x-rays demonstrated an intertrochanteric femur fracture after a fall onto the hip. Based on this, decision made to take the patient to the operating room for a  cephalomedullary nailing.     DESCRIPTION OF PROCEDURE: The patient was interviewed in the preanesthesia  area where the right hip was marked with a marking pen. The patient was then  taken to the operative suite where general endotracheal anesthesia was  performed by the anesthesia team. A timeout was then called, confirming the  patient, the operative site as well as the planned procedure and the  administration of antibiotics. The patient was then positioned on a standard  fracture table with the non-operative limb placed in a well-padded well leg  haynes, taking care to pad the peroneal nerve.  The operative limb was placed  in a well-padded traction boot, and she was placed over a padded perineal  post. Initially, gentle traction was placed across the limb and care was  taken to make sure that the patella was facing directly anterior.    Intraoperative fluoroscopy was then obtained to confirm our reduction. The  patient was then prepped and draped in a standard sterile fashion using  ChloraPrep.     Fluoroscopy was brought in to localize the tip of the trochanter. An incision  was then made from the tip of the trochanter approximately 5 cm proximally.    Careful dissection was then carried down through the subcutaneous tissue and  the glutofemoral fascia down to the tip of the trochanter. A guidepin was  then placed at the tip of the trochanter and advanced down the femur. This  was checked on the AP and lateral views to be certain it was at the tip of the  trochanter on the center of the canal on the lateral view. We then brought  the Synthes entry reamer in to enter the femoral canal.     A size 11 short Synthes TFN nail was then placed down the shaft of the femur.    The outrigger guide was placed and a small incision was made laterally and  careful dissection was carried down to the lateral cortex of the femur. The  outrigger sleeve was then placed against the lateral cortex of the femur  through our incision. A guidepin was then placed into the center position of the femoral head on the  AP and  lateral view. This measured for an 354HT helical blade. The outer cortex was drilled we then reamed 5 mm short of our measured distance into the femoral head. We then placed the 072 mm helical blade in  a standard fashion into the subchondral bone approximately 5 mm from the tip  of the subchondral bone on the AP and lateral views in the center of the  femoral head. The nail was then locked proximally.       Our attention was then turned distally to the interlocking screw. A standard  32 mm bicortical interlocking screw was placed through the sleeve of the outrigger  guide. The outrigger was then removed. AP and lateral views confirmed an  appropriate reduction of the fracture with appropriate placement of hardware.     Wound was then copiously irrigated with bulb syringe lavage. The deep tissue  was approximated with Vicryl sutures. Skin was closed with staples. The  patient was placed in a sterile dressing. He awoke from anesthesia without  difficulty and transferred to the PACU in stable condition. All sponge,  needle, and instrument counts were correct at the end of the procedure.     Duncan Alexandre MD           Hospital Course:    61-year-old male with chronic systolic heart failure, CAD, paroxysmal A. fib on Eliquis presented following a fall and found to have right hip fracture and subsequently underwent cephalomedullary nailing on 10/30 with orthopedic surgery.  Also with GER and Patient did develop some postoperative acute blood loss anemia (on Eliquis, which has been on hold) with some hypotension which improved with crystalloid resuscitation and blood products, noted to have hemoglobin persistently below 7 requiring transfusion with 2 units PRBCs on 10/31, then required 3rd unit pRBCs on 11/2 with Hgb <7. Hemodynamics and EGR improved with resuscitation.    Approved for [facility] inpatient rehab on 11/2. H/H remained stable and renal function improved. Stable for discharge 11/3 with plan for readmit to [facility] inpatient rehab. Follow up with Orthopedics for post-op evaluation as scheduled.       Physical Exam:  Vital Signs: /73   Pulse 70   Temp 97.8 °F (36.6 °C) (Temporal)   Resp 14   Ht 6' (1.829 m)   Wt 175 lb (79.4 kg)   SpO2 97%   BMI 23.73 kg/m²   Physical exam     General: No acute distress  Neck: Supple, nontender  HEENT: Normocephalic/atraumatic, no scleral icterus  Cardiovascular: Normal rate with regular rhythm, peripheral pulses symmetric  Respiratory: Lungs clear to auscultation bilaterally  Abdomen: Soft, nontender, nondistended, bowel sounds present  Neurologic: No focal neurologic deficits  Extremities: Operative site clean dry and intact  Skin: No rashes appreciated       Discharge Medications:       Manas Salmeron   Home Medication Instructions ZNT:[id number]    Printed on:11/03/20 1035   Medication Information                      acetaminophen (TYLENOL) 325 MG tablet  Take 650 mg by mouth every 6 hours as needed for Pain             amiodarone (CORDARONE) 200 MG tablet  Take 1 tablet by mouth daily             aspirin 81 MG chewable tablet  Take 81 mg by mouth daily             atorvastatin (LIPITOR) 40 MG tablet  Take 40 mg by mouth daily. levothyroxine (SYNTHROID) 125 MCG tablet  Take 125 mcg by mouth Daily             lisinopril (PRINIVIL;ZESTRIL) 5 MG tablet  Take 1 tablet by mouth daily             metoprolol succinate (TOPROL XL) 25 MG extended release tablet  Take 12.5 mg by mouth daily 1/2 tab             vitamin D (ERGOCALCIFEROL) 1.25 MG (37884 UT) CAPS capsule  Take 1 capsule by mouth once a week                 Discharge Instructions: Follow up with Marilyn Rhodes MD.  Take medications as directed. Resume activity as tolerated. Diet:  Cardiac diet    Disposition: Patient is medically stable and will be discharged and readmit to [facility]. Time spent on discharge over 30 minutes.     Signed:  Anya Castellano MD  11/3/2020 10:39 AM

## 2020-11-03 NOTE — PROGRESS NOTES
Misty Arnold arrived to room # 0   Presented with: right hip fx  Mental Status: Patient is oriented and alert. Vitals:    11/03/20 1623   BP: (!) 154/78   Pulse: 70   Resp: 16   Temp: 97.8 °F (36.6 °C)   SpO2: 97%     Patient safety contract and falls prevention contract reviewed with patient Yes. Oriented Patient to room. Call light within reach. Yes.   Needs, issues or concerns expressed at this time: no.      Electronically signed by Good [place] RN on 11/3/2020 at 4:36 PM

## 2020-11-03 NOTE — PROGRESS NOTES
Palliative Care/Spiritual Care: Met with pt who says he had a fall and fractured his hip. Pt says \"they put a nail in it. \"         Advance Directives: Pt says he doesn't not have an AD/LW but his sister Alexis Jara will make medical decisions. Pt is a full code and wants CPR and to be placed on a ventilator. Pt has a previous ACP note with no changes to decision maker. SEE ACP NOTE. Pain/other symptoms: Pt is having pain in his knee but is also receiving pain medication. Social/Spiritual: Pt says he does not have a Roman Catholic or jitendra preference. Pt/family discussion r/t goals:  Pt has two sisters and a brother. Pt lives at home alone but says he has a lady that will come and stay with him to help him when he goes home from the hospital. Prior to hospitalization, pt says he cared for himself and his daily needs. Goal is to return home with previous quality of life and previous daily living skills. Provided spiritual care with sustaining presence, nurtured hope, and prayer. Pt expressed gratitude for spiritual care.       Electronically signed by Fernandez Cid on 11/3/2020 at 3:13 PM

## 2020-11-04 LAB
ANION GAP SERPL CALCULATED.3IONS-SCNC: 6 MMOL/L (ref 7–19)
BASOPHILS ABSOLUTE: 0 K/UL (ref 0–0.2)
BASOPHILS RELATIVE PERCENT: 0.9 % (ref 0–1)
BUN BLDV-MCNC: 14 MG/DL (ref 8–23)
CALCIUM SERPL-MCNC: 8.5 MG/DL (ref 8.8–10.2)
CHLORIDE BLD-SCNC: 104 MMOL/L (ref 98–111)
CO2: 26 MMOL/L (ref 22–29)
CREAT SERPL-MCNC: 1 MG/DL (ref 0.5–1.2)
EOSINOPHILS ABSOLUTE: 0.2 K/UL (ref 0–0.6)
EOSINOPHILS RELATIVE PERCENT: 4.1 % (ref 0–5)
GFR AFRICAN AMERICAN: >59
GFR NON-AFRICAN AMERICAN: >60
GLUCOSE BLD-MCNC: 82 MG/DL (ref 74–109)
HCT VFR BLD CALC: 23.7 % (ref 42–52)
HEMOGLOBIN: 7.6 G/DL (ref 14–18)
IMMATURE GRANULOCYTES #: 0 K/UL
LYMPHOCYTES ABSOLUTE: 1 K/UL (ref 1.1–4.5)
LYMPHOCYTES RELATIVE PERCENT: 23 % (ref 20–40)
MCH RBC QN AUTO: 30 PG (ref 27–31)
MCHC RBC AUTO-ENTMCNC: 32.1 G/DL (ref 33–37)
MCV RBC AUTO: 93.7 FL (ref 80–94)
MONOCYTES ABSOLUTE: 0.6 K/UL (ref 0–0.9)
MONOCYTES RELATIVE PERCENT: 13.4 % (ref 0–10)
NEUTROPHILS ABSOLUTE: 2.5 K/UL (ref 1.5–7.5)
NEUTROPHILS RELATIVE PERCENT: 57.7 % (ref 50–65)
PDW BLD-RTO: 15.4 % (ref 11.5–14.5)
PLATELET # BLD: 114 K/UL (ref 130–400)
PMV BLD AUTO: 8.9 FL (ref 9.4–12.4)
POTASSIUM REFLEX MAGNESIUM: 4.2 MMOL/L (ref 3.5–5)
PREALBUMIN: 8 MG/DL (ref 20–40)
RBC # BLD: 2.53 M/UL (ref 4.7–6.1)
SODIUM BLD-SCNC: 136 MMOL/L (ref 136–145)
WBC # BLD: 4.3 K/UL (ref 4.8–10.8)

## 2020-11-04 PROCEDURE — 6370000000 HC RX 637 (ALT 250 FOR IP): Performed by: PSYCHIATRY & NEUROLOGY

## 2020-11-04 PROCEDURE — 6370000000 HC RX 637 (ALT 250 FOR IP): Performed by: STUDENT IN AN ORGANIZED HEALTH CARE EDUCATION/TRAINING PROGRAM

## 2020-11-04 PROCEDURE — 80048 BASIC METABOLIC PNL TOTAL CA: CPT

## 2020-11-04 PROCEDURE — 97161 PT EVAL LOW COMPLEX 20 MIN: CPT

## 2020-11-04 PROCEDURE — 36415 COLL VENOUS BLD VENIPUNCTURE: CPT

## 2020-11-04 PROCEDURE — 97535 SELF CARE MNGMENT TRAINING: CPT

## 2020-11-04 PROCEDURE — 99223 1ST HOSP IP/OBS HIGH 75: CPT | Performed by: PSYCHIATRY & NEUROLOGY

## 2020-11-04 PROCEDURE — 2700000000 HC OXYGEN THERAPY PER DAY

## 2020-11-04 PROCEDURE — 84134 ASSAY OF PREALBUMIN: CPT

## 2020-11-04 PROCEDURE — 97110 THERAPEUTIC EXERCISES: CPT

## 2020-11-04 PROCEDURE — 97165 OT EVAL LOW COMPLEX 30 MIN: CPT

## 2020-11-04 PROCEDURE — 85025 COMPLETE CBC W/AUTO DIFF WBC: CPT

## 2020-11-04 PROCEDURE — 1180000000 HC REHAB R&B

## 2020-11-04 PROCEDURE — 97530 THERAPEUTIC ACTIVITIES: CPT

## 2020-11-04 PROCEDURE — 2580000003 HC RX 258: Performed by: STUDENT IN AN ORGANIZED HEALTH CARE EDUCATION/TRAINING PROGRAM

## 2020-11-04 PROCEDURE — 97116 GAIT TRAINING THERAPY: CPT

## 2020-11-04 RX ADMIN — ATORVASTATIN CALCIUM 40 MG: 40 TABLET, FILM COATED ORAL at 09:04

## 2020-11-04 RX ADMIN — PANTOPRAZOLE SODIUM 40 MG: 40 TABLET, DELAYED RELEASE ORAL at 06:07

## 2020-11-04 RX ADMIN — POLYETHYLENE GLYCOL 3350 17 G: 17 POWDER, FOR SOLUTION ORAL at 09:04

## 2020-11-04 RX ADMIN — OXYCODONE HYDROCHLORIDE 5 MG: 5 TABLET ORAL at 13:30

## 2020-11-04 RX ADMIN — METOPROLOL SUCCINATE 12.5 MG: 25 TABLET, EXTENDED RELEASE ORAL at 09:04

## 2020-11-04 RX ADMIN — OXYCODONE HYDROCHLORIDE 5 MG: 5 TABLET ORAL at 09:34

## 2020-11-04 RX ADMIN — STANDARDIZED SENNA CONCENTRATE 8.6 MG: 8.6 TABLET ORAL at 09:04

## 2020-11-04 RX ADMIN — AMIODARONE HYDROCHLORIDE 200 MG: 200 TABLET ORAL at 09:04

## 2020-11-04 RX ADMIN — SODIUM CHLORIDE, PRESERVATIVE FREE 10 ML: 5 INJECTION INTRAVENOUS at 21:14

## 2020-11-04 RX ADMIN — SODIUM CHLORIDE, PRESERVATIVE FREE 10 ML: 5 INJECTION INTRAVENOUS at 09:13

## 2020-11-04 RX ADMIN — LEVOTHYROXINE SODIUM 125 MCG: 25 TABLET ORAL at 06:07

## 2020-11-04 RX ADMIN — OXYCODONE HYDROCHLORIDE 5 MG: 5 TABLET ORAL at 21:13

## 2020-11-04 RX ADMIN — LISINOPRIL 5 MG: 5 TABLET ORAL at 09:04

## 2020-11-04 RX ADMIN — STANDARDIZED SENNA CONCENTRATE 8.6 MG: 8.6 TABLET ORAL at 21:13

## 2020-11-04 RX ADMIN — OXYCODONE HYDROCHLORIDE 5 MG: 5 TABLET ORAL at 04:16

## 2020-11-04 ASSESSMENT — PAIN SCALES - GENERAL
PAINLEVEL_OUTOF10: 4
PAINLEVEL_OUTOF10: 0
PAINLEVEL_OUTOF10: 5
PAINLEVEL_OUTOF10: 2
PAINLEVEL_OUTOF10: 0
PAINLEVEL_OUTOF10: 4
PAINLEVEL_OUTOF10: 5
PAINLEVEL_OUTOF10: 0

## 2020-11-04 ASSESSMENT — PAIN DESCRIPTION - PAIN TYPE
TYPE: ACUTE PAIN

## 2020-11-04 ASSESSMENT — PAIN DESCRIPTION - LOCATION
LOCATION: HIP;LEG
LOCATION: LEG;HIP
LOCATION: LEG;HIP

## 2020-11-04 ASSESSMENT — PAIN DESCRIPTION - PROGRESSION
CLINICAL_PROGRESSION: NOT CHANGED
CLINICAL_PROGRESSION: NOT CHANGED

## 2020-11-04 ASSESSMENT — PAIN DESCRIPTION - FREQUENCY
FREQUENCY: CONTINUOUS
FREQUENCY: CONTINUOUS

## 2020-11-04 ASSESSMENT — PAIN DESCRIPTION - ONSET
ONSET: ON-GOING
ONSET: ON-GOING

## 2020-11-04 ASSESSMENT — PAIN DESCRIPTION - ORIENTATION
ORIENTATION: RIGHT

## 2020-11-04 ASSESSMENT — PAIN - FUNCTIONAL ASSESSMENT
PAIN_FUNCTIONAL_ASSESSMENT: ACTIVITIES ARE NOT PREVENTED
PAIN_FUNCTIONAL_ASSESSMENT: PREVENTS OR INTERFERES SOME ACTIVE ACTIVITIES AND ADLS

## 2020-11-04 ASSESSMENT — PAIN DESCRIPTION - DESCRIPTORS
DESCRIPTORS: ACHING
DESCRIPTORS: ACHING
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
## 2020-11-04 NOTE — H&P
[facility]   History and Physical        Patient:   Cathie Angela  MR#:    [id number]  Account Number:                   [id number]      Room:    [address] [place] [address]   YOB: 1950  Date of Progress Note: 11/4/2020  Time of Note                           7:30 AM  Attending Physician:  Brayden Nix MD        Admitting diagnosis: Right hip fracture status post cephalomedullary nailing    Secondary diagnoses: CHF, atrial fibrillation, coronary artery disease, hypertension, hypothyroidism, acute blood loss anemia with a history of 3 units of packed red blood cells recently. CHIEF COMPLAINT: Right hip pain      HISTORY OF PRESENT ILLNESS:   This 78 y.o. male  with chronic systolic heart failure, CAD, paroxysmal A. fib on Eliquis admitted to [facility] on  10/30/2020 following a fall at his home. Pt has PMH of alcoholism. Alcohol level on presentation was 176. Banana bag was hung and withdrawal protocol followed. Pt has not shown any signs of withdrawal. X-ray revealed a right hip fracture. Dr Tangela Perdue was consulted and pt underwent a cephalomedullary nailing of his R hip on 10/30. He will be WBAT in his RLE. Post op, pt had an GER and did develop some postoperative acute blood loss anemia (on Eliquis, which has been on hold) with some hypotension, which improved with crystalloid resuscitation and blood products. He was  noted to have hemoglobin persistently below 7 requiring transfusion with 2 units PRBCs on 10/31, then required 3rd unit pRBCs on 11/2 with Hgb <7. Hemodynamics and GER improved with resuscitation. H/H remained stable and renal function improved. He is now medically stable and is participating with therapy. He is ready to begin rehab w/plan of returning home after rehab dc w/assistance from a friend. REVIEW OF SYSTEMS:  Constitutional - No fever or chills. No diaphoresis or significant fatigue. HENT -  No tinnitus or significant hearing loss.   Eyes - no sudden vision change or eye pain  Respiratory - no significant shortness of breath or cough  Cardiovascular - no chest pain No palpitations or significant leg swelling  Gastrointestinal - no abdominal swelling or pain. Genitourinary - No difficulty urinating, dysuria  Musculoskeletal - no back pain or myalgia. Skin - no color change or rash  Neurologic - No seizures. No lateralizing weakness. Hematologic - no easy bruising or excessive bleeding. Psychiatric - no severe anxiety or nervousness. All other review of systems are negative. Past Medical History:      Diagnosis Date    CAD (coronary artery disease)     Gout     Hypertension     Non-ST elevation MI (NSTEMI) ([facility] [address] 12/28/14    Palliative care patient 06/29/2020       Past Surgical History:      Procedure Laterality Date    CARDIAC CATHETERIZATION  12/29/14  [facility]    angioplasty, stent to LAD.  EF 45%    CHOLECYSTECTOMY      FEMUR FRACTURE SURGERY Right 10/30/2020    TFN, SHORT performed by Alesia Chin MD at [address] [place] [address]       Medications in Hospital:      Current Facility-Administered Medications:     influenza quadrivalent subunit vaccine (FLUCELVAX) injection 0.5 mL, 0.5 mL, Intramuscular, Once, Christine House MD    oxyCODONE (ROXICODONE) immediate release tablet 5 mg, 5 mg, Oral, Q4H PRN, Karol Cooper MD, 5 mg at 11/04/20 0416    lisinopril (PRINIVIL;ZESTRIL) tablet 5 mg, 5 mg, Oral, Daily, Karol Cooper MD    metoprolol succinate (TOPROL XL) extended release tablet 12.5 mg, 12.5 mg, Oral, Daily, Karol Cooper MD    vitamin D (ERGOCALCIFEROL) capsule 50,000 Units, 50,000 Units, Oral, Weekly, Karol Cooper MD    promethazine (PHENERGAN) tablet 12.5 mg, 12.5 mg, Oral, Q6H PRN **OR** ondansetron (ZOFRAN) injection 4 mg, 4 mg, Intravenous, Q6H PRN, Karol Cooper MD    sodium chloride flush 0.9 % injection 10 mL, 10 mL, Intravenous, 2 times per day, Karol Cooper MD, 10 mL at 11/03/20 2125    sodium chloride flush 0.9 % injection 10 mL, 10 mL, Intravenous, PRN, Hunt Cam MD Ramon    amiodarone (CORDARONE) tablet 200 mg, 200 mg, Oral, Daily, Myrna Sykes MD    atorvastatin (LIPITOR) tablet 40 mg, 40 mg, Oral, Daily, Myrna Sykes MD    levothyroxine (SYNTHROID) tablet 125 mcg, 125 mcg, Oral, Daily, Myrna Sykes MD, 125 mcg at 11/04/20 0607    [DISCONTINUED] acetaminophen (TYLENOL) tablet 650 mg, 650 mg, Oral, Q6H PRN **OR** acetaminophen (TYLENOL) suppository 650 mg, 650 mg, Rectal, Q6H PRN, Myrna Sykes MD    polyethylene glycol (GLYCOLAX) packet 17 g, 17 g, Oral, Daily PRN, Myrna Sykes MD    LORazepam (ATIVAN) injection 1 mg, 1 mg, Intravenous, Q6H PRN, Myrna Sykes MD    pantoprazole (PROTONIX) tablet 40 mg, 40 mg, Oral, QAM AC, Myrna Sykes MD, 40 mg at 11/04/20 0607    acetaminophen (TYLENOL) tablet 650 mg, 650 mg, Oral, Q4H PRN, Joshua Nugent MD    fleet rectal enema 1 enema, 1 enema, Rectal, Daily PRN, Joshua Nugent MD    [facility]) tablet 8.6 mg, 1 tablet, Oral, BID, Joshua Nugent MD, 8.6 mg at 11/03/20 2125    polyethylene glycol (GLYCOLAX) packet 17 g, 17 g, Oral, Daily, Joshua Nugent MD    Allergies:  Patient has no known allergies. Social History:   TOBACCO:   reports that he quit smoking about 41 years ago. His smoking use included cigars. He started smoking about 44 years ago. He has a 3.00 pack-year smoking history. He has never used smokeless tobacco.  ETOH:   reports current alcohol use of about 3.0 standard drinks of alcohol per week. Family History:       Problem Relation Age of Onset    No Known Problems Mother     Heart Disease Father            Physical Exam:    Vitals: BP (!) 143/79   Pulse 64   Temp 96.9 °F (36.1 °C) (Temporal)   Resp 20   Ht 6' (1.829 m)   Wt 175 lb (79.4 kg)   SpO2 97%   BMI 23.73 kg/m²     Constitutional - well developed, well nourished.     Eyes - conjunctiva normal.  Pupils react to light  Ear, nose, throat -hearing intact to finger rub No scars, masses, or lesions over external nose or ears, no atrophy of tongue  Neck-symmetric, no masses noted, no jugular vein distension  Respiration- chest wall appears symmetric, good expansion,   normal effort without use of accessory muscles  Cardiovascular- RRR without m,r,g  Musculoskeletal - no significant wasting of muscles noted, no bony deformities  Extremities-no clubbing, cyanosis or edema. Hammertoes bilaterally  Skin - warm, dry, and intact. No rash, erythema, or pallor. Psychiatric - mood, affect, and behavior appear normal.      Neurological exam  Awake, alert, fluent oriented x 3 appropriate affect  Attention and concentration appear appropriate  Recent and remote memory appears unremarkable  Speech normal without dysarthria  No clear issues with language of fund of knowledge    Cranial Nerve Exam   CN II- Visual fields grossly unremarkable  CN III, IV,VI-EOMI, No nystagmus, conjugate eye movements, no ptosis  CN VII-no facial assymetry  CN VIII-Hearing intact   CN IX and X- Palate elevates in midline  CN XI-good shoulder shrug  CN XII-Tongue midline with no fasciculations or fibrillations    Motor Exam  V/V throughout upper and lower extremities bilaterally, no cogwheeling, normal tone    Sensory Exam  Diminished pinprick and vibration in the lower extremities. Reflexes   Absent throughout  No clonus ankles  No Kelley's sign bilateral hands    Tremors- no tremors in hands or head noted    Gait  Not tested    Coordination  Finger to nose-unremarkable        CBC:   Recent Labs     11/02/20  1759 11/03/20  0349 11/04/20  0533   WBC 6.1 5.6 4.3*   HGB 8.3* 7.7* 7.6*   * 123* 114*       BMP:    Recent Labs     11/02/20  0239 11/03/20  0349 11/04/20  0533   * 134* 136   K 4.0 4.0 4.2    105 104   CO2 23 19* 26   BUN 23 17 14   CREATININE 1.2 1.2 1.0   GLUCOSE 89 88 82       Hepatic: No results for input(s): AST, ALT, ALB, BILITOT, ALKPHOS in the last 72 hours.     Lipids: No results for input(s): CHOL, HDL in occupational therapy and/or speech therapy    Intensive therapy: The patient requires and is reasonably expected to actively participate in at least 3 hours of therapy per day at least 5 days per week, and expected to make measurable improvements that will be of practical value to improve the patient's functional capacity or adaptation to impairments. In addition, therapy treatments will begin within 36 hours from midnight of the day of the patient's admission to the inpatient rehabilitation facility    Expected duration and frequency therapy: 180 minutes per day, 5 days per week    Interdisciplinary team: The patient demonstrates the need for an interdisciplinary team for active management of the following medical issues including ataxia, motor planning, balance, disease management, elimination, endurance, family training, education, independent ADLs, pain management, precautions, range of motion, safety, strength, and transfers    I have reviewed the preadmission screening documents and concur with the findings. I believe the patient meets criteria and is sufficiently stable to allow participation in the program. This requires an intensive level of therapy, close medical supervision, and an interdisciplinary team approach provided through an individualized plan of care. I approve admitting this patient for an intensive inpatient rehabilitation program.      Marvetta Severs.  Whitley Michelle MD

## 2020-11-04 NOTE — PROGRESS NOTES
4 Eyes Skin Assessment    Sunny Whittaker is being assessed upon: Admission    I agree that Day Adam, along with Rommel Castillo, RN (either 2 RN's or 1 LPN and 1 RN) have performed a thorough Head to Toe Skin Assessment on the patient. ALL assessment sites listed below have been assessed. Areas assessed by both nurses:     [x]   Head, Face, and Ears   [x]   Shoulders, Back, and Chest  [x]   Arms, Elbows, and Hands   [x]   Coccyx, Sacrum, and Ischium  [x]   Legs, Feet, and Heels    Does the Patient have Skin Breakdown? Yes, wound(s) noted upon assessment. It is the responsibility of the Primary Nurse to assure that the following documentation, preventions, orders, and consults are complete on the above noted wound(s): Wound LDA initiated. LDA Flowsheet Documentation includes the [place]-wound, Wound Assessment, Measurements, Dressing Treatment, Drainage, and Color.     Christiano Prevention initiated: Yes  Wound Care Orders initiated: Yes    [address]  nurse consulted for Pressure Injury (Stage 3,4, Unstageable, DTI, NWPT, and Complex wounds) and New or Established Ostomies: Yes        Primary Nurse eSignature: Zoe David RN on 11/3/2020 at 6:00 PM      Co-Signer eSignature: Electronically signed by Ken Castañeda RN on 11/3/2020 at 6:22 PM

## 2020-11-04 NOTE — PROGRESS NOTES
Wound care consultation completed. Consult was for concern of deep tissue pressure injury to the right ankle. There was brown debris adhered to the skin at the right ankle. Cleaned with soap and water and all debris removed. Skin is intact but edematous at the right ankle and foot. No pressure injury noted at this assessment. No need for wound care at this time. Recommend float heels at all times while in bed.

## 2020-11-04 NOTE — PROGRESS NOTES
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     Objective   Vision: Impaired  Vision Exceptions: Wears glasses for reading  Hearing: Within functional limits    Orientation  Overall Orientation Status: Within Normal Limits     Balance  Sitting Balance: Stand by assistance  Standing Balance: Moderate assistance     Bed mobility  Supine to Sit: Moderate assistance  Scooting: Contact guard assistance  Transfers  Stand Step Transfers: Moderate assistance  Sit to stand:  Moderate assistance  Stand to sit: Contact guard assistance  Transfer Comments: bed to w/c     LUE AROM (degrees)  LUE AROM : WFL  Left Hand AROM (degrees)  Left Hand AROM: WFL  RUE AROM (degrees)  RUE AROM : WFL  Right Hand AROM (degrees)  Right Hand AROM: WFL  LUE Strength  L Hand General: 4/5  LUE Strength Comment: General: 4/5  RUE Strength  R Hand General: 4/5  RUE Strength Comment: General: 4/5     Plan   Plan  Current Treatment Recommendations: Gait Training, Patient/Caregiver Education & Training, Strengthening, Home Management Training, Balance Training, Equipment Evaluation, Education, & procurement, Functional Mobility Training, Endurance Training, Self-Care / ADL, Safety Education & Training       OutComes Score     11/04/20 0820   Eating   Assistance Needed Setup or clean-up assistance   CARE Score 5   Discharge Goal 6   Oral Hygiene   Assistance Needed Setup or clean-up assistance   CARE Score 5   Discharge Goal 6   [address] assistance   CARE Score 2   Discharge Goal 6   Shower/Bathe Self   Assistance Needed Partial/moderate assistance   CARE Score 3   Discharge Goal 6   Upper Body Dressing   Assistance Needed Partial/moderate assistance   CARE Score 3   Discharge Goal 6   Lower Body Dressing   Assistance Needed Substantial/maximal assistance   CARE Score 2   Discharge Goal 6   Putting On/Taking Off Footwear   Assistance Needed Dependent   CARE Score 1   Discharge Goal 6        11/04/20 0820   Toilet Transfer   Assistance Needed Partial/moderate assistance   CARE Score 3   Discharge Goal 6        11/04/20 0820   Picking Up Object   Assistance Needed Dependent   CARE Score 1   Discharge Goal 4     Goals  Short term goals  Time Frame for Short term goals: 1 week  Short term goal 1: Supervision with toilet hygiene. Short term goal 2: Supervision with toilet transfer. Short term goal 3: Supervision wih showering/bathing with AE as needed. Short term goal 4: Supervision with LB dressing with AE as needed. Short term goal 5: Supervision with UB dressing. Short term goal 6: Supervision with putting on/taking off footwear with AE as needed. Long term goals  Time Frame for Long term goals : 2 weeks  Long term goal 1: Modified Independent with toilet hygiene. Long term goal 2: Modified Independent with toilet transfer. Long term goal 3: Modified Independent wih showering/bathing with AE as needed. Long term goal 4: Modified Independent with LB dressing with AE as needed. Long term goal 5: Independent with UB dressing. Long term goal 7: Modified Independent with putting on/taking off footwear with AE as needed. Long term goal 8: Verbalize DME needs. Long term goal 9: Complete IADL/homemaking task with Modified [place].        Therapy Time   Individual Concurrent Group Co-treatment   Time In 0820         Time Out 1000         Minutes 100         Timed Code Treatment Minutes: 830 [facility]

## 2020-11-05 LAB
ANION GAP SERPL CALCULATED.3IONS-SCNC: 10 MMOL/L (ref 7–19)
BASOPHILS ABSOLUTE: 0 K/UL (ref 0–0.2)
BASOPHILS RELATIVE PERCENT: 0.6 % (ref 0–1)
BUN BLDV-MCNC: 13 MG/DL (ref 8–23)
CALCIUM SERPL-MCNC: 8.4 MG/DL (ref 8.8–10.2)
CHLORIDE BLD-SCNC: 100 MMOL/L (ref 98–111)
CO2: 24 MMOL/L (ref 22–29)
CREAT SERPL-MCNC: 0.8 MG/DL (ref 0.5–1.2)
EOSINOPHILS ABSOLUTE: 0.1 K/UL (ref 0–0.6)
EOSINOPHILS RELATIVE PERCENT: 2.5 % (ref 0–5)
GFR AFRICAN AMERICAN: >59
GFR NON-AFRICAN AMERICAN: >60
GLUCOSE BLD-MCNC: 80 MG/DL (ref 74–109)
HCT VFR BLD CALC: 22.4 % (ref 42–52)
HEMOGLOBIN: 7.3 G/DL (ref 14–18)
IMMATURE GRANULOCYTES #: 0 K/UL
LYMPHOCYTES ABSOLUTE: 1 K/UL (ref 1.1–4.5)
LYMPHOCYTES RELATIVE PERCENT: 20.5 % (ref 20–40)
MCH RBC QN AUTO: 30 PG (ref 27–31)
MCHC RBC AUTO-ENTMCNC: 32.6 G/DL (ref 33–37)
MCV RBC AUTO: 92.2 FL (ref 80–94)
MONOCYTES ABSOLUTE: 0.7 K/UL (ref 0–0.9)
MONOCYTES RELATIVE PERCENT: 15.3 % (ref 0–10)
NEUTROPHILS ABSOLUTE: 2.9 K/UL (ref 1.5–7.5)
NEUTROPHILS RELATIVE PERCENT: 60.5 % (ref 50–65)
PDW BLD-RTO: 15 % (ref 11.5–14.5)
PLATELET # BLD: 156 K/UL (ref 130–400)
PMV BLD AUTO: 9.1 FL (ref 9.4–12.4)
POTASSIUM REFLEX MAGNESIUM: 4.1 MMOL/L (ref 3.5–5)
RBC # BLD: 2.43 M/UL (ref 4.7–6.1)
SODIUM BLD-SCNC: 134 MMOL/L (ref 136–145)
WBC # BLD: 4.8 K/UL (ref 4.8–10.8)

## 2020-11-05 PROCEDURE — 36415 COLL VENOUS BLD VENIPUNCTURE: CPT

## 2020-11-05 PROCEDURE — 2700000000 HC OXYGEN THERAPY PER DAY

## 2020-11-05 PROCEDURE — 99232 SBSQ HOSP IP/OBS MODERATE 35: CPT | Performed by: PSYCHIATRY & NEUROLOGY

## 2020-11-05 PROCEDURE — 97530 THERAPEUTIC ACTIVITIES: CPT

## 2020-11-05 PROCEDURE — 1180000000 HC REHAB R&B

## 2020-11-05 PROCEDURE — 2580000003 HC RX 258: Performed by: STUDENT IN AN ORGANIZED HEALTH CARE EDUCATION/TRAINING PROGRAM

## 2020-11-05 PROCEDURE — 97110 THERAPEUTIC EXERCISES: CPT

## 2020-11-05 PROCEDURE — 6370000000 HC RX 637 (ALT 250 FOR IP): Performed by: STUDENT IN AN ORGANIZED HEALTH CARE EDUCATION/TRAINING PROGRAM

## 2020-11-05 PROCEDURE — 97535 SELF CARE MNGMENT TRAINING: CPT

## 2020-11-05 PROCEDURE — 6370000000 HC RX 637 (ALT 250 FOR IP): Performed by: PSYCHIATRY & NEUROLOGY

## 2020-11-05 PROCEDURE — 80048 BASIC METABOLIC PNL TOTAL CA: CPT

## 2020-11-05 PROCEDURE — 85025 COMPLETE CBC W/AUTO DIFF WBC: CPT

## 2020-11-05 RX ORDER — ASPIRIN 325 MG
325 TABLET ORAL DAILY
Status: DISCONTINUED | OUTPATIENT
Start: 2020-11-05 | End: 2020-11-17

## 2020-11-05 RX ADMIN — STANDARDIZED SENNA CONCENTRATE 8.6 MG: 8.6 TABLET ORAL at 20:56

## 2020-11-05 RX ADMIN — SODIUM PHOSPHATE 1 ENEMA: 7; 19 ENEMA RECTAL at 20:56

## 2020-11-05 RX ADMIN — LISINOPRIL 5 MG: 5 TABLET ORAL at 08:20

## 2020-11-05 RX ADMIN — PANTOPRAZOLE SODIUM 40 MG: 40 TABLET, DELAYED RELEASE ORAL at 05:50

## 2020-11-05 RX ADMIN — STANDARDIZED SENNA CONCENTRATE 8.6 MG: 8.6 TABLET ORAL at 08:20

## 2020-11-05 RX ADMIN — SODIUM CHLORIDE, PRESERVATIVE FREE 10 ML: 5 INJECTION INTRAVENOUS at 22:20

## 2020-11-05 RX ADMIN — OXYCODONE HYDROCHLORIDE 5 MG: 5 TABLET ORAL at 20:56

## 2020-11-05 RX ADMIN — OXYCODONE HYDROCHLORIDE 5 MG: 5 TABLET ORAL at 10:38

## 2020-11-05 RX ADMIN — OXYCODONE HYDROCHLORIDE 5 MG: 5 TABLET ORAL at 01:23

## 2020-11-05 RX ADMIN — ASPIRIN 325 MG ORAL TABLET 325 MG: 325 PILL ORAL at 09:38

## 2020-11-05 RX ADMIN — AMIODARONE HYDROCHLORIDE 200 MG: 200 TABLET ORAL at 08:20

## 2020-11-05 RX ADMIN — OXYCODONE HYDROCHLORIDE 5 MG: 5 TABLET ORAL at 05:50

## 2020-11-05 RX ADMIN — SODIUM CHLORIDE, PRESERVATIVE FREE 10 ML: 5 INJECTION INTRAVENOUS at 08:22

## 2020-11-05 RX ADMIN — METOPROLOL SUCCINATE 12.5 MG: 25 TABLET, EXTENDED RELEASE ORAL at 08:20

## 2020-11-05 RX ADMIN — LEVOTHYROXINE SODIUM 125 MCG: 25 TABLET ORAL at 05:50

## 2020-11-05 RX ADMIN — ATORVASTATIN CALCIUM 40 MG: 40 TABLET, FILM COATED ORAL at 08:20

## 2020-11-05 RX ADMIN — POLYETHYLENE GLYCOL 3350 17 G: 17 POWDER, FOR SOLUTION ORAL at 08:20

## 2020-11-05 ASSESSMENT — PAIN SCALES - GENERAL
PAINLEVEL_OUTOF10: 4
PAINLEVEL_OUTOF10: 2
PAINLEVEL_OUTOF10: 0
PAINLEVEL_OUTOF10: 4
PAINLEVEL_OUTOF10: 3
PAINLEVEL_OUTOF10: 5

## 2020-11-05 ASSESSMENT — PAIN DESCRIPTION - LOCATION
LOCATION: HIP
LOCATION: HIP;LEG
LOCATION: HIP;LEG

## 2020-11-05 ASSESSMENT — PAIN DESCRIPTION - ORIENTATION
ORIENTATION: RIGHT

## 2020-11-05 ASSESSMENT — PAIN DESCRIPTION - PAIN TYPE
TYPE: ACUTE PAIN
TYPE: ACUTE PAIN

## 2020-11-05 ASSESSMENT — PAIN DESCRIPTION - DESCRIPTORS: DESCRIPTORS: ACHING

## 2020-11-05 NOTE — PROGRESS NOTES
Visited with pt to initiate Palliative care. Pt says he is doing ok and agreed rehab is working him a lot. Provided spiritual care with sustaining presence, nurtured hope, and prayer. Pt expressed gratitude for spiritual care.     Electronically signed by Lashawn Castaneda on 11/5/2020 at 2:41 PM

## 2020-11-05 NOTE — PLAN OF CARE
Problem: Falls - Risk of:  Goal: Will remain free from falls  Description: Will remain free from falls  11/5/2020 1146 by Maxi Andrade LPN  Outcome: Ongoing  11/4/2020 2354 by Alyssia Hsieh LPN  Outcome: Ongoing  Goal: Absence of physical injury  Description: Absence of physical injury  11/5/2020 1146 by Maxi Andrade LPN  Outcome: Ongoing  11/4/2020 2354 by Alyssia Hsieh LPN  Outcome: Ongoing     Problem: SAFETY  Goal: Free from accidental physical injury  11/5/2020 1146 by Maxi Andrade LPN  Outcome: Ongoing  11/4/2020 2354 by Alyssia Hsieh LPN  Outcome: Ongoing     Problem: DAILY CARE  Goal: Daily care needs are met  11/5/2020 1146 by Maxi Andrade LPN  Outcome: Ongoing  11/4/2020 2354 by Alyssia Hsieh LPN  Outcome: Ongoing     Problem: PAIN  Goal: Patient's pain/discomfort is manageable  11/5/2020 1146 by Maxi Andrade LPN  Outcome: Ongoing  11/4/2020 2354 by Alyssia Hsieh LPN  Outcome: Ongoing     Problem: SKIN INTEGRITY  Goal: Skin integrity is maintained or improved  11/5/2020 1146 by Maxi Andrade LPN  Outcome: Ongoing  11/4/2020 2354 by Alyssia Hsieh LPN  Outcome: Ongoing     Problem: KNOWLEDGE DEFICIT  Goal: Patient/S.O. demonstrates understanding of disease process, treatment plan, medications, and discharge instructions.   11/5/2020 1146 by Maxi Andrade LPN  Outcome: Ongoing  11/4/2020 2354 by Alyssia Hsieh LPN  Outcome: Ongoing     Problem: DISCHARGE BARRIERS  Goal: Patient's continuum of care needs are met  11/5/2020 1146 by Maxi Andrade LPN  Outcome: Ongoing  11/4/2020 2354 by Alyssia Hsieh LPN  Outcome: Ongoing     Problem: Pain:  Goal: Pain level will decrease  Description: Pain level will decrease  Outcome: Ongoing     Problem: Infection - Surgical Site:  Goal: Will show no infection signs and symptoms  Description: Will show no infection signs and symptoms  Outcome: Ongoing

## 2020-11-05 NOTE — PROGRESS NOTES
Patient:   Kam Coffman  MR#:    [id number]   Room:    3595/872-   YOB: 1950  Date of Progress Note: 11/5/2020  Time of Note                           8:55 AM  Consulting Physician:   Vincent Breaux M.D. Attending Physician:  Vincent Breaux MD     CHIEF COMPLAINT: Right hip pain     Subjective  This 78 y.o. male  with chronic systolic heart failure, CAD, paroxysmal A. fib on Eliquis admitted to [facility] on  10/30/2020 following a fall at his home. Pt has PMH of alcoholism. Alcohol level on presentation was 176. Banana bag was hung and withdrawal protocol followed. Pt has not shown any signs of withdrawal. X-ray revealed a right hip fracture. Dr Makayla Monteiro was consulted and pt underwent a cephalomedullary nailing of his R hip on 10/30. He will be WBAT in his RLE. Post op, pt had an GER and did develop some postoperative acute blood loss anemia (on Eliquis, which has been on hold) with some hypotension, which improved with crystalloid resuscitation and blood products. He was  noted to have hemoglobin persistently below 7 requiring transfusion with 2 units PRBCs on 10/31, then required 3rd unit pRBCs on 11/2 with Hgb <7. Hemodynamics and GER improved with resuscitation. H/H remained stable and renal function improved. He is now medically stable and is participating with therapy. No complaints this morning  REVIEW OF SYSTEMS:  Constitutional: No fevers No chills  Neck:No stiffness  Respiratory: No shortness of breath  Cardiovascular: No chest pain No palpitations  Gastrointestinal: No abdominal pain    Genitourinary: No Dysuria  Neurological: No headache, no confusion      PHYSICAL EXAM:  /66   Pulse 90   Temp 97.6 °F (36.4 °C) (Temporal)   Resp 17   Ht 6' (1.829 m)   Wt 175 lb (79.4 kg)   SpO2 100%   BMI 23.73 kg/m²     Constitutional: he appears well-developed and well-nourished. Eyes - conjunctiva normal.  Pupils react to light  Ear, nose, throat -hearing intact to voice.  No scars, masses, or lesions over external nose or ears, no atrophy of tongue  Neck-symmetric, no masses noted, no jugular vein distension  Respiration- chest wall appears symmetric, good expansion,   normal effort without use of accessory muscles  Cardiovascular- RRR  Musculoskeletal - no significant wasting of muscles noted, no bony deformities, gait no gross ataxia  Extremities-no clubbing, cyanosis or edema  Skin - warm, dry, and intact. No rash, erythema, or pallor. Psychiatric - mood, affect, and behavior appear normal.      Neurology  NEUROLOGICAL EXAM:      Mental status   Awake, alert, fluent oriented x 3 appropriate affect  Attention and concentration appear appropriate  Recent and remote memory appears unremarkable  Speech normal without dysarthria  No clear issues with language       Cranial Nerves   CN II- Visual fields grossly unremarkable  CN III, IV,VI-EOMI, No nystagmus, conjugate eye movements, no ptosis  CN VII-no facial asymmetry  CN VIII-Hearing intact   CN IX and X- Palate elevates in midline  CN XI-good shoulder shrug  CN XII-Tongue midline with no fasciculations or fibrillations          Motor function  Antigravity x 4     Sensory function Intact to light touch     Cerebellar F-N intact     Tremor None present     Gait                  Not tested           Nursing/pcp notes, imaging,labs and vitals reviewed.      PT,OT and/or speech notes reviewed    Lab Results   Component Value Date    WBC 4.8 11/05/2020    HGB 7.3 (L) 11/05/2020    HCT 22.4 (L) 11/05/2020    MCV 92.2 11/05/2020     11/05/2020     Lab Results   Component Value Date     (L) 11/05/2020    K 4.1 11/05/2020     11/05/2020    CO2 24 11/05/2020    BUN 13 11/05/2020    CREATININE 0.8 11/05/2020    GLUCOSE 80 11/05/2020    CALCIUM 8.4 (L) 11/05/2020    PROT 5.0 (L) 10/31/2020    LABALBU 2.8 (L) 10/31/2020    BILITOT 1.2 10/31/2020    ALKPHOS 104 10/31/2020     (H) 10/31/2020    ALT 74 (H) 10/31/2020    LABGLOM >60 11/05/2020    GFRAA >59 11/05/2020     Lab Results   Component Value Date    INR 2.27 (H) 10/31/2020    INR 2.03 (H) 10/30/2020    INR 1.59 (H) 06/27/2020   No results found for: PHENYTOIN, ESR, CRP  BED MOBILITY  Bed Mobility  Rolling: Minimal assistance  Supine to Sit: Moderate assistance  Sit to Supine: Moderate assistance(LE on bed)  Scooting: Contact guard assistance  TRANSFERS  Sit to Stand: Moderate Assistance      Bed to Chair: Minimal assistance, Moderate assistance        WHEELCHAIR PROPULSION 1  WHEELCHAIR PROPULSION 2  AMBULATION 1  Ambulation 1  Surface: level tile  Device: Rolling Walker  Assistance: Minimal assistance  Quality of Gait: Unsteady, FLOR knee flexion w/static standing, VC's to weight shift and sequence gait, fwd flexed posture, decrease stance time on R leg  Gait Deviations: Slow Hyac[place], Decreased step length, Decreased step height  Distance: 10'x2  Comments: 3 point gait pattern    Objective   Vision: Impaired  Vision Exceptions: Wears glasses for reading  Hearing: Within functional limits    Orientation  Overall Orientation Status: Within Normal Limits  Balance  Sitting Balance: Stand by assistance  Standing Balance: Moderate assistance  Bed mobility  Supine to Sit: Moderate assistance  Scooting: Contact guard assistance  Transfers  Stand Step Transfers: Moderate assistance  Sit to stand: Moderate assistance  Stand to sit: Contact guard assistance  Transfer Comments: bed to w/c  LUE AROM (degrees)  LUE AROM : WFL  Left Hand AROM (degrees)  Left Hand AROM: WFL  RUE AROM (degrees)  RUE AROM : WFL  Right Hand AROM (degrees)  Right Hand AROM: WFL  LUE Strength  L Hand General: 4/5  LUE Strength Comment: General: 4/5  RUE Strength  R Hand General: 4/5  RUE Strength Comment: General: 4/5    RECORD REVIEW: Previous medical records, medications were reviewed at today's visit    IMPRESSION:   1. Right hip fracture status post cephalomedullary nailing-pain control/PT/OT  2.   DVT prophylaxis-SCDs

## 2020-11-05 NOTE — PLAN OF CARE
Problem: Falls - Risk of:  Goal: Will remain free from falls  Description: Will remain free from falls  11/4/2020 2354 by Deon Mercado LPN  Outcome: Ongoing  11/4/2020 1443 by Madhu Garcia RN  Outcome: Ongoing  Goal: Absence of physical injury  Description: Absence of physical injury  11/4/2020 2354 by Deon Mercado LPN  Outcome: Ongoing  11/4/2020 1443 by Madhu Garcia RN  Outcome: Ongoing     Problem: SAFETY  Goal: Free from accidental physical injury  11/4/2020 2354 by Deon Mercado LPN  Outcome: Ongoing  11/4/2020 1443 by Madhu Garcia RN  Outcome: Ongoing     Problem: DAILY CARE  Goal: Daily care needs are met  11/4/2020 2354 by Deon Mercado LPN  Outcome: Ongoing  11/4/2020 1443 by Madhu Garcia RN  Outcome: Ongoing     Problem: PAIN  Goal: Patient's pain/discomfort is manageable  11/4/2020 2354 by Deon Mercado LPN  Outcome: Ongoing  11/4/2020 1443 by Madhu Garcia RN  Outcome: Ongoing     Problem: SKIN INTEGRITY  Goal: Skin integrity is maintained or improved  11/4/2020 2354 by Deon Mercado LPN  Outcome: Ongoing  11/4/2020 1443 by Madhu Garcia RN  Outcome: Ongoing     Problem: KNOWLEDGE DEFICIT  Goal: Patient/S.O. demonstrates understanding of disease process, treatment plan, medications, and discharge instructions.   11/4/2020 2354 by Deon Mercado LPN  Outcome: Ongoing  11/4/2020 1443 by Madhu Garcia RN  Outcome: Ongoing     Problem: DISCHARGE BARRIERS  Goal: Patient's continuum of care needs are met  11/4/2020 2354 by Deon Mercado LPN  Outcome: Ongoing  11/4/2020 1443 by Madhu Garcia RN  Outcome: Ongoing

## 2020-11-05 NOTE — PROGRESS NOTES
11/05/20 1345   Other exercises   Other exercises 1 supine bilat le ex 2x10   Conditions Requiring Skilled Therapeutic Intervention   Body structures, Functions, Activity limitations Decreased functional mobility ; Decreased ADL status; Decreased ROM; Decreased strength;Decreased safe awareness;Decreased endurance;Decreased balance;Decreased high-level IADLs;Decreased coordination; Increased pain;Decreased posture   Assessment significant right lower extremity range of motion limitations due to pain. also left lower extremity limitations to a lesser extent.

## 2020-11-05 NOTE — PROGRESS NOTES
11/05/20 0815   Oxygen Therapy   O2 Device Nasal cannula   O2 Flow Rate (L/min) 2 L/min   Bed Mobility   Rolling   (did not roll)   Supine to Sit Moderate assistance  (triplanar to left; assist with right lower extremity)   Sit to Supine Moderate assistance;Minimal assistance   Transfers   Sit to Stand Moderate Assistance  (FEET EXCESSIVLEY ANTERIOR, EXCESSIVE USE OF UES)   Stand to sit Moderate Assistance  (DECREASED ECCENTRIC CONTRO)   Bed to Chair Moderate assistance  (STAND PIVOT WITH RW)   Other exercises   Other exercises 1 SITTING BILAT LE EX   Conditions Requiring Skilled Therapeutic Intervention   Body structures, Functions, Activity limitations Decreased functional mobility ; Decreased ADL status; Decreased ROM; Decreased strength;Decreased safe awareness;Decreased endurance;Decreased balance;Decreased high-level IADLs;Decreased coordination; Increased pain;Decreased posture   Assessment SIGNIFICANT RIGHT LE PAIN.

## 2020-11-05 NOTE — PROGRESS NOTES
Occupational Therapy  Facility/Department: [facility] 8 REHAB UNIT  Daily Treatment Note  NAME: Rasta Escoto  : 1950  MRN: [id number]    Date of Service: 2020    Discharge Recommendations:  Continue to assess pending progress     Assessment   Performance deficits / Impairments: Decreased endurance;Decreased coordination;Decreased functional mobility ; Decreased ADL status; Decreased balance;Decreased strength;Decreased high-level IADLs  Assessment: Pt was shaking severely due to begin cold. Pt was dressed in warm clothing and placed in bed with warm blankets. Oxygen level dropped from 96% to 89%. Pt placed on 1 L O2. Treatment Diagnosis: s/p R hip nail  OT Education: ADL Adaptive Strategies  Patient Education: Pt was instructed on using reacher to don pants. Activity Tolerance  Activity Tolerance: Patient Tolerated treatment well  Safety Devices  Type of devices: Left in bed;Bed alarm in place;Call light within reach       Patient Diagnosis(es): There were no encounter diagnoses. has a past medical history of CAD (coronary artery disease), Gout, Hypertension, Non-ST elevation MI (NSTEMI) ([facility] [address] and Palliative care patient. has a past surgical history that includes Cardiac catheterization (14  [facility]); Cholecystectomy; and Femur fracture surgery (Right, 10/30/2020). Restrictions  Restrictions/Precautions  Restrictions/Precautions: Weight Bearing, Fall Risk  Required Braces or Orthoses?: No  Lower Extremity Weight Bearing Restrictions  Right Lower Extremity Weight Bearing: Weight Bearing As Tolerated    Objective    Balance  Sitting Balance: Stand by assistance  Standing Balance: Moderate assistance  Bed mobility  Sit to Supine: Moderate assistance  Transfers  Stand Step Transfers: Moderate assistance  Sit to stand:  Moderate assistance  Stand to sit: Contact guard assistance  Transfer Comments: w/c to bed         20 1000   Oxygen Therapy   SpO2 96 %   Pulse Oximeter Device Mode Intermittent Pulse Oximeter Device Location Left   O2 Device None (Room air)        11/05/20 1040   Oxygen Therapy   SpO2 (!) 89 %   Pulse Oximeter Device Mode Intermittent   Pulse Oximeter Device Location Left   O2 Device None (Room air)      11/05/20 1055   Oxygen Therapy   SpO2 100 %   Pulse Oximeter Device Mode Intermittent   Pulse Oximeter Device Location Left   O2 Device Nasal cannula   O2 Flow Rate (L/min) 1 L/min       Plan   Plan  Current Treatment Recommendations: Gait Training, Patient/Caregiver Education & Training, Strengthening, Home Management Training, Balance Training, Equipment Evaluation, Education, & procurement, Functional Mobility Training, Endurance Training, Self-Care / ADL, Safety Education & Training     OutComes Score       11/05/20 1000   Upper Body Dressing   Assistance Needed Supervision or touching assistance   Comment CGA   CARE Score 4   Lower Body Dressing   Assistance Needed Substantial/maximal assistance   Comment Max A with reacher   CARE Score 2   Putting On/Taking Off Footwear   Assistance Needed Substantial/maximal assistance   Comment Max A   CARE Score 2     Goals  Short term goals  Time Frame for Short term goals: 1 week  Short term goal 1: Supervision with toilet hygiene. Short term goal 2: Supervision with toilet transfer. Short term goal 3: Supervision wih showering/bathing with AE as needed. Short term goal 4: Supervision with LB dressing with AE as needed. Short term goal 5: Supervision with UB dressing. Short term goal 6: Supervision with putting on/taking off footwear with AE as needed. Long term goals  Time Frame for Long term goals : 2 weeks  Long term goal 1: Modified Independent with toilet hygiene. Long term goal 2: Modified Independent with toilet transfer. Long term goal 3: Modified Independent wih showering/bathing with AE as needed. Long term goal 4: Modified Independent with LB dressing with AE as needed. Long term goal 5: Independent with UB dressing.   Long term goal 7: Modified Independent with putting on/taking off footwear with AE as needed. Long term goal 8: Verbalize DME needs. Long term goal 9: Complete IADL/homemaking task with Modified [place].        Therapy Time   Individual Concurrent Group Co-treatment   Time In 1000         Time Out 1100         Minutes 60         Timed Code Treatment Minutes: [address]

## 2020-11-05 NOTE — PROGRESS NOTES
Occupational Therapy  Facility/Department: [facility] 8 REHAB UNIT  Daily Treatment Note  NAME: Ari Jenkins  : 1950  MRN: [id number]    Date of Service: 2020    Discharge Recommendations:  Continue to assess pending progress     Assessment   Performance deficits / Impairments: Decreased endurance;Decreased coordination;Decreased functional mobility ; Decreased ADL status; Decreased balance;Decreased strength;Decreased high-level IADLs  Assessment: Pt's condition appears to have improved since this morning. Treatment Diagnosis: s/p R hip nail  OT Education: ADL Adaptive Strategies  Patient Education: Pt was instructed on using reacher to don pants. Activity Tolerance  Activity Tolerance: Patient Tolerated treatment well  Safety Devices  Safety Devices in place: Yes  Type of devices: Left in bed;Bed alarm in place;Call light within reach       Patient Diagnosis(es): There were no encounter diagnoses. has a past medical history of CAD (coronary artery disease), Gout, Hypertension, Non-ST elevation MI (NSTEMI) ([facility] [address] and Palliative care patient. has a past surgical history that includes Cardiac catheterization (14  [facility]); Cholecystectomy; and Femur fracture surgery (Right, 10/30/2020).     Restrictions  Restrictions/Precautions  Restrictions/Precautions: Weight Bearing, Fall Risk  Required Braces or Orthoses?: No  Lower Extremity Weight Bearing Restrictions  Right Lower Extremity Weight Bearing: Weight Bearing As Tolerated    Objective    Type of ROM/Therapeutic Exercise  Type of ROM/Therapeutic Exercise: Free weights  Comment: BUE 2#, 2 sets of 15 reps, all planes         20 1435   Oxygen Therapy   SpO2 98 %   Pulse Oximeter Device Mode Intermittent   Pulse Oximeter Device Location Right   O2 Device Nasal cannula   O2 Flow Rate (L/min) 1 L/min     Plan   Plan  Current Treatment Recommendations: Gait Training, Patient/Caregiver Education & Training, Strengthening, Home Management Training, Balance Training, Equipment Evaluation, Education, & procurement, Functional Mobility Training, Endurance Training, Self-Care / ADL, Safety Education & Training    Goals  Short term goals  Time Frame for Short term goals: 1 week  Short term goal 1: Supervision with toilet hygiene. Short term goal 2: Supervision with toilet transfer. Short term goal 3: Supervision wih showering/bathing with AE as needed. Short term goal 4: Supervision with LB dressing with AE as needed. Short term goal 5: Supervision with UB dressing. Short term goal 6: Supervision with putting on/taking off footwear with AE as needed. Long term goals  Time Frame for Long term goals : 2 weeks  Long term goal 1: Modified Independent with toilet hygiene. Long term goal 2: Modified Independent with toilet transfer. Long term goal 3: Modified Independent wih showering/bathing with AE as needed. Long term goal 4: Modified Independent with LB dressing with AE as needed. Long term goal 5: Independent with UB dressing. Long term goal 7: Modified Independent with putting on/taking off footwear with AE as needed. Long term goal 8: Verbalize DME needs. Long term goal 9: Complete IADL/homemaking task with Modified [place].        Therapy Time   Individual Concurrent Group Co-treatment   Time In 2533         Time Out 1505         Minutes 30         Timed Code Treatment Minutes: [address]

## 2020-11-06 LAB
ANION GAP SERPL CALCULATED.3IONS-SCNC: 11 MMOL/L (ref 7–19)
BUN BLDV-MCNC: 14 MG/DL (ref 8–23)
CALCIUM SERPL-MCNC: 7.9 MG/DL (ref 8.8–10.2)
CHLORIDE BLD-SCNC: 99 MMOL/L (ref 98–111)
CO2: 23 MMOL/L (ref 22–29)
CREAT SERPL-MCNC: 0.9 MG/DL (ref 0.5–1.2)
GFR AFRICAN AMERICAN: >59
GFR NON-AFRICAN AMERICAN: >60
GLUCOSE BLD-MCNC: 83 MG/DL (ref 74–109)
HCT VFR BLD CALC: 22.9 % (ref 42–52)
HEMOGLOBIN: 7.4 G/DL (ref 14–18)
MCH RBC QN AUTO: 30.3 PG (ref 27–31)
MCHC RBC AUTO-ENTMCNC: 32.3 G/DL (ref 33–37)
MCV RBC AUTO: 93.9 FL (ref 80–94)
PDW BLD-RTO: 14.8 % (ref 11.5–14.5)
PLATELET # BLD: 198 K/UL (ref 130–400)
PMV BLD AUTO: 9.2 FL (ref 9.4–12.4)
POTASSIUM REFLEX MAGNESIUM: 3.9 MMOL/L (ref 3.5–5)
RBC # BLD: 2.44 M/UL (ref 4.7–6.1)
REASON FOR REJECTION: NORMAL
REJECTED TEST: NORMAL
SODIUM BLD-SCNC: 133 MMOL/L (ref 136–145)
WBC # BLD: 5.2 K/UL (ref 4.8–10.8)

## 2020-11-06 PROCEDURE — 85027 COMPLETE CBC AUTOMATED: CPT

## 2020-11-06 PROCEDURE — 97530 THERAPEUTIC ACTIVITIES: CPT

## 2020-11-06 PROCEDURE — 97110 THERAPEUTIC EXERCISES: CPT

## 2020-11-06 PROCEDURE — 36415 COLL VENOUS BLD VENIPUNCTURE: CPT

## 2020-11-06 PROCEDURE — 6370000000 HC RX 637 (ALT 250 FOR IP): Performed by: STUDENT IN AN ORGANIZED HEALTH CARE EDUCATION/TRAINING PROGRAM

## 2020-11-06 PROCEDURE — 97116 GAIT TRAINING THERAPY: CPT

## 2020-11-06 PROCEDURE — 97535 SELF CARE MNGMENT TRAINING: CPT

## 2020-11-06 PROCEDURE — 2700000000 HC OXYGEN THERAPY PER DAY

## 2020-11-06 PROCEDURE — 2580000003 HC RX 258: Performed by: STUDENT IN AN ORGANIZED HEALTH CARE EDUCATION/TRAINING PROGRAM

## 2020-11-06 PROCEDURE — 1180000000 HC REHAB R&B

## 2020-11-06 PROCEDURE — 80048 BASIC METABOLIC PNL TOTAL CA: CPT

## 2020-11-06 PROCEDURE — 6370000000 HC RX 637 (ALT 250 FOR IP): Performed by: PSYCHIATRY & NEUROLOGY

## 2020-11-06 PROCEDURE — 99232 SBSQ HOSP IP/OBS MODERATE 35: CPT | Performed by: PSYCHIATRY & NEUROLOGY

## 2020-11-06 RX ORDER — IRON POLYSACCHARIDE COMPLEX 150 MG
150 CAPSULE ORAL DAILY
Status: DISCONTINUED | OUTPATIENT
Start: 2020-11-06 | End: 2020-11-18 | Stop reason: HOSPADM

## 2020-11-06 RX ADMIN — LEVOTHYROXINE SODIUM 125 MCG: 25 TABLET ORAL at 05:12

## 2020-11-06 RX ADMIN — LISINOPRIL 5 MG: 5 TABLET ORAL at 08:33

## 2020-11-06 RX ADMIN — STANDARDIZED SENNA CONCENTRATE 8.6 MG: 8.6 TABLET ORAL at 20:46

## 2020-11-06 RX ADMIN — SODIUM CHLORIDE, PRESERVATIVE FREE 10 ML: 5 INJECTION INTRAVENOUS at 08:35

## 2020-11-06 RX ADMIN — PANTOPRAZOLE SODIUM 40 MG: 40 TABLET, DELAYED RELEASE ORAL at 05:12

## 2020-11-06 RX ADMIN — OXYCODONE HYDROCHLORIDE 5 MG: 5 TABLET ORAL at 17:30

## 2020-11-06 RX ADMIN — OXYCODONE HYDROCHLORIDE 5 MG: 5 TABLET ORAL at 09:01

## 2020-11-06 RX ADMIN — SODIUM CHLORIDE, PRESERVATIVE FREE 10 ML: 5 INJECTION INTRAVENOUS at 20:46

## 2020-11-06 RX ADMIN — AMIODARONE HYDROCHLORIDE 200 MG: 200 TABLET ORAL at 08:33

## 2020-11-06 RX ADMIN — OXYCODONE HYDROCHLORIDE 5 MG: 5 TABLET ORAL at 13:12

## 2020-11-06 RX ADMIN — ASPIRIN 325 MG ORAL TABLET 325 MG: 325 PILL ORAL at 08:32

## 2020-11-06 RX ADMIN — STANDARDIZED SENNA CONCENTRATE 8.6 MG: 8.6 TABLET ORAL at 08:33

## 2020-11-06 RX ADMIN — Medication 150 MG: at 17:29

## 2020-11-06 RX ADMIN — OXYCODONE HYDROCHLORIDE 5 MG: 5 TABLET ORAL at 05:12

## 2020-11-06 RX ADMIN — METOPROLOL SUCCINATE 12.5 MG: 25 TABLET, EXTENDED RELEASE ORAL at 08:32

## 2020-11-06 RX ADMIN — ATORVASTATIN CALCIUM 40 MG: 40 TABLET, FILM COATED ORAL at 08:33

## 2020-11-06 ASSESSMENT — PAIN SCALES - GENERAL
PAINLEVEL_OUTOF10: 5
PAINLEVEL_OUTOF10: 0
PAINLEVEL_OUTOF10: 5
PAINLEVEL_OUTOF10: 0
PAINLEVEL_OUTOF10: 0
PAINLEVEL_OUTOF10: 5
PAINLEVEL_OUTOF10: 4

## 2020-11-06 ASSESSMENT — PAIN DESCRIPTION - ORIENTATION
ORIENTATION: RIGHT

## 2020-11-06 ASSESSMENT — PAIN DESCRIPTION - LOCATION
LOCATION: HIP;LEG

## 2020-11-06 ASSESSMENT — PAIN - FUNCTIONAL ASSESSMENT
PAIN_FUNCTIONAL_ASSESSMENT: ACTIVITIES ARE NOT PREVENTED
PAIN_FUNCTIONAL_ASSESSMENT: ACTIVITIES ARE NOT PREVENTED

## 2020-11-06 ASSESSMENT — PAIN DESCRIPTION - PAIN TYPE
TYPE: ACUTE PAIN

## 2020-11-06 ASSESSMENT — PAIN DESCRIPTION - PROGRESSION
CLINICAL_PROGRESSION: NOT CHANGED
CLINICAL_PROGRESSION: NOT CHANGED

## 2020-11-06 ASSESSMENT — PAIN DESCRIPTION - DESCRIPTORS
DESCRIPTORS: ACHING

## 2020-11-06 ASSESSMENT — PAIN DESCRIPTION - FREQUENCY
FREQUENCY: CONTINUOUS

## 2020-11-06 ASSESSMENT — PAIN DESCRIPTION - ONSET
ONSET: ON-GOING
ONSET: ON-GOING

## 2020-11-06 NOTE — PROGRESS NOTES
Patient:   Megan Traylor  MR#:    [id number]   Room:    [facility]-   YOB: 1950  Date of Progress Note: 11/6/2020  Time of Note                           12:47 PM  Consulting Physician:   Jackie Kohli M.D. Attending Physician:  Jackie Kohli MD     CHIEF COMPLAINT: Right hip pain     Subjective  This 78 y.o. male  with chronic systolic heart failure, CAD, paroxysmal A. fib on Eliquis admitted to [address] [place] [address] on  10/30/2020 following a fall at his home. Pt has PMH of alcoholism. Alcohol level on presentation was 176. Banana bag was hung and withdrawal protocol followed. Pt has not shown any signs of withdrawal. X-ray revealed a right hip fracture. Dr Curt Edwards was consulted and pt underwent a cephalomedullary nailing of his R hip on 10/30. He will be WBAT in his RLE. Post op, pt had an GER and did develop some postoperative acute blood loss anemia (on Eliquis, which has been on hold) with some hypotension, which improved with crystalloid resuscitation and blood products. He was  noted to have hemoglobin persistently below 7 requiring transfusion with 2 units PRBCs on 10/31, then required 3rd unit pRBCs on 11/2 with Hgb <7. Hemodynamics and GER improved with resuscitation. H/H remained stable and renal function improved. He is now medically stable and is participating with therapy. No complaints today  REVIEW OF SYSTEMS:  Constitutional: No fevers No chills  Neck:No stiffness  Respiratory: No shortness of breath  Cardiovascular: No chest pain No palpitations  Gastrointestinal: No abdominal pain    Genitourinary: No Dysuria  Neurological: No headache, no confusion      PHYSICAL EXAM:  /70   Pulse 69   Temp 98 °F (36.7 °C) (Temporal)   Resp 16   Ht 6' (1.829 m)   Wt 175 lb (79.4 kg)   SpO2 96%   BMI 23.73 kg/m²     Constitutional: he appears well-developed and well-nourished. Eyes - conjunctiva normal.  Pupils react to light  Ear, nose, throat -hearing intact to voice.  No scars, masses, or lesions over external nose or ears, no atrophy of tongue  Neck-symmetric, no masses noted, no jugular vein distension  Respiration- chest wall appears symmetric, good expansion,   normal effort without use of accessory muscles  Cardiovascular- RRR  Musculoskeletal - no significant wasting of muscles noted, no bony deformities, gait no gross ataxia  Extremities-no clubbing, cyanosis or edema  Skin - warm, dry, and intact. No rash, erythema, or pallor. Psychiatric - mood, affect, and behavior appear normal.      Neurology  NEUROLOGICAL EXAM:      Mental status   Awake, alert, fluent oriented x 3 appropriate affect  Attention and concentration appear appropriate  Recent and remote memory appears unremarkable  Speech normal without dysarthria  No clear issues with language       Cranial Nerves   CN II- Visual fields grossly unremarkable  CN III, IV,VI-EOMI, No nystagmus, conjugate eye movements, no ptosis  CN VII-no facial asymmetry  CN VIII-Hearing intact   CN IX and X- Palate elevates in midline  CN XI-good shoulder shrug  CN XII-Tongue midline with no fasciculations or fibrillations          Motor function  Antigravity x 4     Sensory function Intact to light touch     Cerebellar F-N intact     Tremor None present     Gait                  Not tested           Nursing/pcp notes, imaging,labs and vitals reviewed.      PT,OT and/or speech notes reviewed    Lab Results   Component Value Date    WBC 5.2 11/06/2020    HGB 7.4 (L) 11/06/2020    HCT 22.9 (L) 11/06/2020    MCV 93.9 11/06/2020     11/06/2020     Lab Results   Component Value Date     (L) 11/06/2020    K 3.9 11/06/2020    CL 99 11/06/2020    CO2 23 11/06/2020    BUN 14 11/06/2020    CREATININE 0.9 11/06/2020    GLUCOSE 83 11/06/2020    CALCIUM 7.9 (L) 11/06/2020    PROT 5.0 (L) 10/31/2020    LABALBU 2.8 (L) 10/31/2020    BILITOT 1.2 10/31/2020    ALKPHOS 104 10/31/2020     (H) 10/31/2020    ALT 74 (H) 10/31/2020    LABGLOM >60 11/06/2020 GFRAA >59 11/06/2020     Lab Results   Component Value Date    INR 2.27 (H) 10/31/2020    INR 2.03 (H) 10/30/2020    INR 1.59 (H) 06/27/2020   No results found for: PHENYTOIN, ESR, CRP  BED MOBILITY  Bed Mobility  Rolling: Minimal assistance  Supine to Sit: Moderate assistance  Sit to Supine: Moderate assistance(LE on bed)  Scooting: Contact guard assistance  TRANSFERS  Sit to Stand: Moderate Assistance      Bed to Chair: Minimal assistance, Moderate assistance        WHEELCHAIR PROPULSION 1  WHEELCHAIR PROPULSION 2  AMBULATION 1  Ambulation 1  Surface: level tile  Device: Rolling Walker  Assistance: Minimal assistance  Quality of Gait: Unsteady, FLOR knee flexion w/static standing, VC's to weight shift and sequence gait, fwd flexed posture, decrease stance time on R leg  Gait Deviations: Slow [place], Decreased step length, Decreased step height  Distance: 10'x2  Comments: 3 point gait pattern    Objective   Vision: Impaired  Vision Exceptions: Wears glasses for reading  Hearing: Within functional limits    Orientation  Overall Orientation Status: Within Normal Limits  Balance  Sitting Balance: Stand by assistance  Standing Balance: Moderate assistance  Bed mobility  Supine to Sit: Moderate assistance  Scooting: Contact guard assistance  Transfers  Stand Step Transfers: Moderate assistance  Sit to stand: Moderate assistance  Stand to sit: Contact guard assistance  Transfer Comments: bed to w/c  LUE AROM (degrees)  LUE AROM : WFL  Left Hand AROM (degrees)  Left Hand AROM: WFL  RUE AROM (degrees)  RUE AROM : WFL  Right Hand AROM (degrees)  Right Hand AROM: WFL  LUE Strength  L Hand General: 4/5  LUE Strength Comment: General: 4/5  RUE Strength  R Hand General: 4/5  RUE Strength Comment: General: 4/5    RECORD REVIEW: Previous medical records, medications were reviewed at today's visit    IMPRESSION:   1. Right hip fracture status post cephalomedullary nailing-pain control/PT/OT  2.   DVT prophylaxis-SCDs and aspirin for now. Due to severe anemia will continue to hold Eliquis for now. 3.  History of A. fib-Eliquis currently on hold. Hemoglobin remains very low. On amiodarone  4. Acute blood loss anemia with recent transfusions. Stable. Continue to monitor  5.   Essential hypertension-continue current medications and monitoring    Staff next week

## 2020-11-06 NOTE — PROGRESS NOTES
Occupational Therapy  Facility/Department: [facility] 8 REHAB UNIT  Daily Treatment Note  NAME: Maryana Francois  : 1950  MRN: [id number]    Date of Service: 2020    Discharge Recommendations:  Continue to assess pending progress     Assessment   Performance deficits / Impairments: Decreased endurance;Decreased coordination;Decreased functional mobility ; Decreased ADL status; Decreased balance;Decreased strength;Decreased high-level IADLs  Treatment Diagnosis: s/p R hip nail  Activity Tolerance  Activity Tolerance: Patient Tolerated treatment well;Patient limited by pain  Safety Devices  Safety Devices in place: Yes  Type of devices: Left in bed;Bed alarm in place;Call light within reach       Patient Diagnosis(es): There were no encounter diagnoses. has a past medical history of CAD (coronary artery disease), Gout, Hypertension, Non-ST elevation MI (NSTEMI) ([facility] [address] and Palliative care patient. has a past surgical history that includes Cardiac catheterization (14  [facility]); Cholecystectomy; and Femur fracture surgery (Right, 10/30/2020).     Restrictions  Restrictions/Precautions  Restrictions/Precautions: Weight Bearing, Fall Risk  Required Braces or Orthoses?: No  Lower Extremity Weight Bearing Restrictions  Right Lower Extremity Weight Bearing: Weight Bearing As Tolerated    Objective    Balance  Sitting Balance: Stand by assistance  Standing Balance: Minimal assistance  Standing Balance  Time: 45 seconds  Activity: standing at grab bar     Transfers  Stand Step Transfers: Minimal assistance  Sit to stand: Minimal assistance  Stand to sit: Contact guard assistance  Transfer Comments: w/c to bed     Type of ROM/Therapeutic Exercise  Type of ROM/Therapeutic Exercise: Rafy  Comment: DIEUDONNEE 5#, 3 sets of 15 reps, all planes     Plan   Plan  Current Treatment Recommendations: Gait Training, Patient/Caregiver Education & Training, Strengthening, Home Management Training, Balance Training, Equipment Evaluation, Education, & procurement, Functional Mobility Training, Endurance Training, Self-Care / ADL, Safety Education & Training     OutComes Score       11/06/20 1000   Oral Hygiene   Assistance Needed Setup or clean-up assistance   CARE Score 5   Putting On/Taking Off Footwear   Assistance Needed Partial/moderate assistance   Comment Mod A with sock aid and dressing stick to don/doff socks. CARE Score 3     Goals  Short term goals  Time Frame for Short term goals: 1 week  Short term goal 1: Supervision with toilet hygiene. Short term goal 2: Supervision with toilet transfer. Short term goal 3: Supervision wih showering/bathing with AE as needed. Short term goal 4: Supervision with LB dressing with AE as needed. Short term goal 5: Supervision with UB dressing. Short term goal 6: Supervision with putting on/taking off footwear with AE as needed. Long term goals  Time Frame for Long term goals : 2 weeks  Long term goal 1: Modified Independent with toilet hygiene. Long term goal 2: Modified Independent with toilet transfer. Long term goal 3: Modified Independent wih showering/bathing with AE as needed. Long term goal 4: Modified Independent with LB dressing with AE as needed. Long term goal 5: Independent with UB dressing. Long term goal 7: Modified Independent with putting on/taking off footwear with AE as needed. Long term goal 8: Verbalize DME needs. Long term goal 9: Complete IADL/homemaking task with Modified [place].        Therapy Time   Individual Concurrent Group Co-treatment   Time In   1000       Time Out   1110       Minutes   70       Timed Code Treatment Minutes: [address]

## 2020-11-06 NOTE — PLAN OF CARE
Problem: Falls - Risk of:  Goal: Will remain free from falls  Description: Will remain free from falls  11/6/2020 0124 by Vinson Felty, LPN  Outcome: Ongoing  11/5/2020 1146 by Surinder Abarca LPN  Outcome: Ongoing  Goal: Absence of physical injury  Description: Absence of physical injury  11/6/2020 0124 by Vinson Felty, LPN  Outcome: Ongoing  11/5/2020 1146 by Surinder Aabrca LPN  Outcome: Ongoing     Problem: SAFETY  Goal: Free from accidental physical injury  11/6/2020 0124 by Vinson Felty, LPN  Outcome: Ongoing  11/5/2020 1146 by Surinder Abarca LPN  Outcome: Ongoing     Problem: DAILY CARE  Goal: Daily care needs are met  11/6/2020 0124 by Vinson Felty, LPN  Outcome: Ongoing  11/5/2020 1146 by Surinder Abarca LPN  Outcome: Ongoing     Problem: PAIN  Goal: Patient's pain/discomfort is manageable  11/6/2020 0124 by Vinson Felty, LPN  Outcome: Ongoing  11/5/2020 1146 by Surinder Abarca LPN  Outcome: Ongoing     Problem: SKIN INTEGRITY  Goal: Skin integrity is maintained or improved  11/6/2020 0124 by Vinson Felty, LPN  Outcome: Ongoing  11/5/2020 1146 by Surinder Abarca LPN  Outcome: Ongoing     Problem: KNOWLEDGE DEFICIT  Goal: Patient/S.O. demonstrates understanding of disease process, treatment plan, medications, and discharge instructions.   11/6/2020 0124 by Vinson Felty, LPN  Outcome: Ongoing  11/5/2020 1146 by Surinder Abarca LPN  Outcome: Ongoing     Problem: DISCHARGE BARRIERS  Goal: Patient's continuum of care needs are met  11/6/2020 0124 by Vinson Felty, LPN  Outcome: Ongoing  11/5/2020 1146 by Surinder Abarca LPN  Outcome: Ongoing     Problem: Pain:  Goal: Pain level will decrease  Description: Pain level will decrease  11/6/2020 0124 by Vinson Felty, LPN  Outcome: Ongoing  11/5/2020 1146 by Surinder Abarca LPN  Outcome: Ongoing     Problem: Infection - Surgical Site:  Goal: Will show no infection signs and symptoms  Description: Will show no

## 2020-11-06 NOTE — PROGRESS NOTES
Occupational Therapy  Facility/Department: [facility] 8 REHAB UNIT  Daily Treatment Note  NAME: Ari Jenkins  : 1950  MRN: [id number]    Date of Service: 2020    Discharge Recommendations:  Continue to assess pending progress     Assessment   Performance deficits / Impairments: Decreased endurance;Decreased coordination;Decreased functional mobility ; Decreased ADL status; Decreased balance;Decreased strength;Decreased high-level IADLs  Treatment Diagnosis: s/p R hip nail  Activity Tolerance  Activity Tolerance: Patient Tolerated treatment well  Safety Devices  Safety Devices in place: Yes  Type of devices: Left in bed;Bed alarm in place;Call light within reach       Patient Diagnosis(es): There were no encounter diagnoses. has a past medical history of CAD (coronary artery disease), Gout, Hypertension, Non-ST elevation MI (NSTEMI) ([facility] [address] and Palliative care patient. has a past surgical history that includes Cardiac catheterization (14  [facility]); Cholecystectomy; and Femur fracture surgery (Right, 10/30/2020).     Restrictions  Restrictions/Precautions  Restrictions/Precautions: Weight Bearing  Required Braces or Orthoses?: No  Lower Extremity Weight Bearing Restrictions  Right Lower Extremity Weight Bearing: Weight Bearing As Tolerated     Objective    Type of ROM/Therapeutic Exercise  Type of ROM/Therapeutic Exercise: Cane/Wand;Free weights  Comment: Cane/Wand: 3#, 2 sets of 10 reps, 3 planes  Free weights: 2#, 1 set of 15 reps, 3 planes        Plan   Plan  Current Treatment Recommendations: Gait Training, Patient/Caregiver Education & Training, Strengthening, Home Management Training, Balance Training, Equipment Evaluation, Education, & procurement, Functional Mobility Training, Endurance Training, Self-Care / ADL, Safety Education & Training  Plan Comment: Continue to work with AE    Goals  Short term goals  Time Frame for Short term goals: 1 week  Short term goal 1: Supervision with toilet hygiene. Short term goal 2: Supervision with toilet transfer. Short term goal 3: Supervision wih showering/bathing with AE as needed. Short term goal 4: Supervision with LB dressing with AE as needed. Short term goal 5: Supervision with UB dressing. Short term goal 6: Supervision with putting on/taking off footwear with AE as needed. Long term goals  Time Frame for Long term goals : 2 weeks  Long term goal 1: Modified Independent with toilet hygiene. Long term goal 2: Modified Independent with toilet transfer. Long term goal 3: Modified Independent wih showering/bathing with AE as needed. Long term goal 4: Modified Independent with LB dressing with AE as needed. Long term goal 5: Independent with UB dressing. Long term goal 7: Modified Independent with putting on/taking off footwear with AE as needed. Long term goal 8: Verbalize DME needs. Long term goal 9: Complete IADL/homemaking task with Modified [place].       Therapy Time   Individual Concurrent Group Co-treatment   Time In   1430       Time Out   1450       Minutes   20       Timed Code Treatment Minutes: [address]

## 2020-11-07 LAB
ABO/RH: NORMAL
ANION GAP SERPL CALCULATED.3IONS-SCNC: 11 MMOL/L (ref 7–19)
ANTIBODY SCREEN: NORMAL
BLOOD BANK DISPENSE STATUS: NORMAL
BLOOD BANK PRODUCT CODE: NORMAL
BPU ID: NORMAL
BUN BLDV-MCNC: 15 MG/DL (ref 8–23)
CALCIUM SERPL-MCNC: 8.2 MG/DL (ref 8.8–10.2)
CHLORIDE BLD-SCNC: 100 MMOL/L (ref 98–111)
CO2: 24 MMOL/L (ref 22–29)
CREAT SERPL-MCNC: 0.9 MG/DL (ref 0.5–1.2)
DESCRIPTION BLOOD BANK: NORMAL
GFR AFRICAN AMERICAN: >59
GFR NON-AFRICAN AMERICAN: >60
GLUCOSE BLD-MCNC: 82 MG/DL (ref 74–109)
HCT VFR BLD CALC: 21.1 % (ref 42–52)
HEMOGLOBIN: 6.8 G/DL (ref 14–18)
MCH RBC QN AUTO: 29.3 PG (ref 27–31)
MCHC RBC AUTO-ENTMCNC: 32.2 G/DL (ref 33–37)
MCV RBC AUTO: 90.9 FL (ref 80–94)
PDW BLD-RTO: 14.9 % (ref 11.5–14.5)
PLATELET # BLD: 221 K/UL (ref 130–400)
PMV BLD AUTO: 9.2 FL (ref 9.4–12.4)
POTASSIUM REFLEX MAGNESIUM: 3.7 MMOL/L (ref 3.5–5)
RBC # BLD: 2.32 M/UL (ref 4.7–6.1)
SODIUM BLD-SCNC: 135 MMOL/L (ref 136–145)
WBC # BLD: 4.9 K/UL (ref 4.8–10.8)

## 2020-11-07 PROCEDURE — 86923 COMPATIBILITY TEST ELECTRIC: CPT

## 2020-11-07 PROCEDURE — 36415 COLL VENOUS BLD VENIPUNCTURE: CPT

## 2020-11-07 PROCEDURE — 99232 SBSQ HOSP IP/OBS MODERATE 35: CPT | Performed by: PSYCHIATRY & NEUROLOGY

## 2020-11-07 PROCEDURE — 2700000000 HC OXYGEN THERAPY PER DAY

## 2020-11-07 PROCEDURE — 2580000003 HC RX 258: Performed by: PSYCHIATRY & NEUROLOGY

## 2020-11-07 PROCEDURE — 86850 RBC ANTIBODY SCREEN: CPT

## 2020-11-07 PROCEDURE — 6370000000 HC RX 637 (ALT 250 FOR IP): Performed by: STUDENT IN AN ORGANIZED HEALTH CARE EDUCATION/TRAINING PROGRAM

## 2020-11-07 PROCEDURE — 36430 TRANSFUSION BLD/BLD COMPNT: CPT

## 2020-11-07 PROCEDURE — 80048 BASIC METABOLIC PNL TOTAL CA: CPT

## 2020-11-07 PROCEDURE — P9016 RBC LEUKOCYTES REDUCED: HCPCS

## 2020-11-07 PROCEDURE — 2580000003 HC RX 258: Performed by: STUDENT IN AN ORGANIZED HEALTH CARE EDUCATION/TRAINING PROGRAM

## 2020-11-07 PROCEDURE — 97535 SELF CARE MNGMENT TRAINING: CPT

## 2020-11-07 PROCEDURE — 86900 BLOOD TYPING SEROLOGIC ABO: CPT

## 2020-11-07 PROCEDURE — 1180000000 HC REHAB R&B

## 2020-11-07 PROCEDURE — 6370000000 HC RX 637 (ALT 250 FOR IP): Performed by: PSYCHIATRY & NEUROLOGY

## 2020-11-07 PROCEDURE — 86901 BLOOD TYPING SEROLOGIC RH(D): CPT

## 2020-11-07 PROCEDURE — 85027 COMPLETE CBC AUTOMATED: CPT

## 2020-11-07 RX ORDER — LIDOCAINE 4 G/G
1 PATCH TOPICAL DAILY
Status: DISCONTINUED | OUTPATIENT
Start: 2020-11-07 | End: 2020-11-18 | Stop reason: HOSPADM

## 2020-11-07 RX ORDER — 0.9 % SODIUM CHLORIDE 0.9 %
20 INTRAVENOUS SOLUTION INTRAVENOUS ONCE
Status: COMPLETED | OUTPATIENT
Start: 2020-11-07 | End: 2020-11-07

## 2020-11-07 RX ORDER — OXYCODONE AND ACETAMINOPHEN 7.5; 325 MG/1; MG/1
1 TABLET ORAL 4 TIMES DAILY
Status: DISCONTINUED | OUTPATIENT
Start: 2020-11-07 | End: 2020-11-18 | Stop reason: HOSPADM

## 2020-11-07 RX ADMIN — PANTOPRAZOLE SODIUM 40 MG: 40 TABLET, DELAYED RELEASE ORAL at 05:12

## 2020-11-07 RX ADMIN — METOPROLOL SUCCINATE 12.5 MG: 25 TABLET, EXTENDED RELEASE ORAL at 08:09

## 2020-11-07 RX ADMIN — ASPIRIN 325 MG ORAL TABLET 325 MG: 325 PILL ORAL at 08:09

## 2020-11-07 RX ADMIN — OXYCODONE HYDROCHLORIDE 5 MG: 5 TABLET ORAL at 00:27

## 2020-11-07 RX ADMIN — OXYCODONE HYDROCHLORIDE AND ACETAMINOPHEN 1 TABLET: 7.5; 325 TABLET ORAL at 17:09

## 2020-11-07 RX ADMIN — OXYCODONE HYDROCHLORIDE AND ACETAMINOPHEN 1 TABLET: 7.5; 325 TABLET ORAL at 12:39

## 2020-11-07 RX ADMIN — AMIODARONE HYDROCHLORIDE 200 MG: 200 TABLET ORAL at 08:09

## 2020-11-07 RX ADMIN — OXYCODONE HYDROCHLORIDE 5 MG: 5 TABLET ORAL at 09:29

## 2020-11-07 RX ADMIN — SODIUM CHLORIDE 20 ML: 9 INJECTION, SOLUTION INTRAVENOUS at 10:38

## 2020-11-07 RX ADMIN — LEVOTHYROXINE SODIUM 125 MCG: 25 TABLET ORAL at 05:12

## 2020-11-07 RX ADMIN — STANDARDIZED SENNA CONCENTRATE 8.6 MG: 8.6 TABLET ORAL at 20:37

## 2020-11-07 RX ADMIN — ATORVASTATIN CALCIUM 40 MG: 40 TABLET, FILM COATED ORAL at 08:09

## 2020-11-07 RX ADMIN — STANDARDIZED SENNA CONCENTRATE 8.6 MG: 8.6 TABLET ORAL at 08:09

## 2020-11-07 RX ADMIN — LISINOPRIL 5 MG: 5 TABLET ORAL at 08:09

## 2020-11-07 RX ADMIN — OXYCODONE HYDROCHLORIDE AND ACETAMINOPHEN 1 TABLET: 7.5; 325 TABLET ORAL at 20:37

## 2020-11-07 RX ADMIN — OXYCODONE HYDROCHLORIDE 5 MG: 5 TABLET ORAL at 05:12

## 2020-11-07 RX ADMIN — Medication 150 MG: at 08:09

## 2020-11-07 RX ADMIN — SODIUM CHLORIDE, PRESERVATIVE FREE 10 ML: 5 INJECTION INTRAVENOUS at 20:37

## 2020-11-07 RX ADMIN — POLYETHYLENE GLYCOL 3350 17 G: 17 POWDER, FOR SOLUTION ORAL at 08:09

## 2020-11-07 ASSESSMENT — PAIN SCALES - GENERAL
PAINLEVEL_OUTOF10: 5
PAINLEVEL_OUTOF10: 5
PAINLEVEL_OUTOF10: 3
PAINLEVEL_OUTOF10: 4
PAINLEVEL_OUTOF10: 4
PAINLEVEL_OUTOF10: 3
PAINLEVEL_OUTOF10: 6
PAINLEVEL_OUTOF10: 5
PAINLEVEL_OUTOF10: 5
PAINLEVEL_OUTOF10: 4

## 2020-11-07 ASSESSMENT — PAIN DESCRIPTION - PROGRESSION
CLINICAL_PROGRESSION: NOT CHANGED
CLINICAL_PROGRESSION: NOT CHANGED

## 2020-11-07 ASSESSMENT — PAIN DESCRIPTION - FREQUENCY
FREQUENCY: CONTINUOUS
FREQUENCY: INTERMITTENT

## 2020-11-07 ASSESSMENT — PAIN - FUNCTIONAL ASSESSMENT: PAIN_FUNCTIONAL_ASSESSMENT: ACTIVITIES ARE NOT PREVENTED

## 2020-11-07 ASSESSMENT — PAIN DESCRIPTION - LOCATION
LOCATION: BACK;HIP
LOCATION: HIP;LEG

## 2020-11-07 ASSESSMENT — PAIN DESCRIPTION - PAIN TYPE
TYPE: ACUTE PAIN
TYPE: CHRONIC PAIN

## 2020-11-07 ASSESSMENT — PAIN DESCRIPTION - ORIENTATION
ORIENTATION: RIGHT
ORIENTATION: RIGHT

## 2020-11-07 ASSESSMENT — PAIN DESCRIPTION - DESCRIPTORS
DESCRIPTORS: ACHING
DESCRIPTORS: ACHING

## 2020-11-07 ASSESSMENT — PAIN DESCRIPTION - ONSET
ONSET: ON-GOING
ONSET: ON-GOING

## 2020-11-07 NOTE — PLAN OF CARE
Problem: Falls - Risk of:  Goal: Will remain free from falls  Description: Will remain free from falls  11/7/2020 0035 by Arthur Moreno LPN  Outcome: Ongoing  11/6/2020 1142 by Danyelle Jama RN  Outcome: Ongoing  Goal: Absence of physical injury  Description: Absence of physical injury  11/7/2020 0035 by Arthur Moreno LPN  Outcome: Ongoing  11/6/2020 1142 by Danyelle Jama RN  Outcome: Ongoing     Problem: SAFETY  Goal: Free from accidental physical injury  11/7/2020 0035 by Arthur Moreno LPN  Outcome: Ongoing  11/6/2020 1142 by Danyelle Jama RN  Outcome: Ongoing     Problem: DAILY CARE  Goal: Daily care needs are met  11/7/2020 0035 by Arthur Moreno LPN  Outcome: Ongoing  11/6/2020 1142 by Danyelle Jama RN  Outcome: Ongoing     Problem: PAIN  Goal: Patient's pain/discomfort is manageable  11/7/2020 0035 by Arthur Moreno LPN  Outcome: Ongoing  11/6/2020 1142 by Danyelle Jama RN  Outcome: Ongoing     Problem: SKIN INTEGRITY  Goal: Skin integrity is maintained or improved  11/7/2020 0035 by Arthur Moreno LPN  Outcome: Ongoing  11/6/2020 1142 by Danyelle Jama RN  Outcome: Ongoing     Problem: KNOWLEDGE DEFICIT  Goal: Patient/S.O. demonstrates understanding of disease process, treatment plan, medications, and discharge instructions.   11/7/2020 0035 by Arthur Moreno LPN  Outcome: Ongoing  11/6/2020 1142 by Danyelle Jama RN  Outcome: Ongoing     Problem: DISCHARGE BARRIERS  Goal: Patient's continuum of care needs are met  11/7/2020 0035 by Arthur Moreno LPN  Outcome: Ongoing  11/6/2020 1142 by Danyelle Jama RN  Outcome: Ongoing     Problem: Pain:  Goal: Pain level will decrease  Description: Pain level will decrease  11/7/2020 0035 by Arthur Moreno LPN  Outcome: Ongoing  11/6/2020 1142 by Danyelle Jama RN  Outcome: Ongoing     Problem: Infection - Surgical Site:  Goal: Will show no infection signs and symptoms  Description: Will show no infection signs and symptoms  11/7/2020 0035 by Arthur Moreno LPN  Outcome: Ongoing  11/6/2020 1142 by Bartolo Enriquez RN  Outcome: Ongoing     Problem: Skin Integrity:  Goal: Will show no infection signs and symptoms  Description: Will show no infection signs and symptoms  11/7/2020 0035 by Layla Vargas LPN  Outcome: Ongoing  11/6/2020 1142 by Bartolo Enriquez RN  Outcome: Ongoing  Goal: Absence of new skin breakdown  Description: Absence of new skin breakdown  11/7/2020 0035 by Layla Vargas LPN  Outcome: Ongoing  11/6/2020 1142 by Bartolo Enriquez RN  Outcome: Ongoing

## 2020-11-07 NOTE — PROGRESS NOTES
11/07/20 1015 11/07/20 1036   Vital Signs   Temp  --  99.2 °F (37.3 °C)   Pulse  --  61   Resp  --  20   BP  --  (!) 105/59   Oxygen Therapy   SpO2  --  96 %   Conditions Requiring Skilled Therapeutic Intervention   Assessment On hold per Nsg.  Getting Blood.   --    Electronically signed by Sarika Henson PTA on 11/7/2020 at 11:02 AM

## 2020-11-07 NOTE — PROGRESS NOTES
Occupational Therapy     11/07/20 0815   General   Diagnosis s/p R hip nail   Pain Assessment   Patient Currently in Pain No   Pain Assessment 0-10   Pain Level 6   Pain Type Chronic pain   Pain Location Back; Hip   Pain Orientation Right   Pain Descriptors Aching   Pain Frequency Intermittent   Clinical Progression Not changed   Response to Pain Intervention Patient Satisfied   Balance   Sitting Balance Stand by assistance   Standing Balance Minimal assistance  (At RW.)   Transfers   Stand Step Transfers Minimal assistance  (Using RW.)   Sit to stand Minimal assistance   Stand to sit Contact guard assistance  (Cues to control sit.)   Assessment   Performance deficits / Impairments Decreased endurance;Decreased coordination;Decreased functional mobility ; Decreased ADL status; Decreased balance;Decreased strength;Decreased high-level IADLs   Treatment Diagnosis s/p R hip nail   Timed Code Treatment Minutes 45 Minutes   Activity Tolerance   Activity Tolerance Patient limited by pain   Safety Devices   Safety Devices in place Yes   Type of devices Bed alarm in place;Call light within reach   Plan   Current Treatment Recommendations Gait Training;Patient/Caregiver Education & Training;Strengthening;Home Management Training;Balance Training;Equipment Evaluation, Education, & procurement; Functional Mobility Training; Endurance Training;Self-Care / ADL; Safety Education & Training      11/07/20 0815   Oral Hygiene   Assistance Needed Setup or clean-up assistance   CARE Score 5   Shower/Bathe Self   Assistance Needed Partial/moderate assistance   CARE Score 3   Upper Body Dressing   Assistance Needed Partial/moderate assistance   Comment Min A.    CARE Score 3   Lower Body Dressing   Assistance Needed Substantial/maximal assistance   CARE Score 2

## 2020-11-07 NOTE — PROGRESS NOTES
Patient:   Afshan Miles  MR#:    [id number]   Room:    [facility]6/147-29   YOB: 1950  Date of Progress Note: 11/7/2020  Time of Note                           9:47 AM  Consulting Physician:   Brooklyn Wyatt M.D. Attending Physician:  Brooklyn Wyatt MD     CHIEF COMPLAINT: Right hip pain     Subjective  This 78 y.o. male  with chronic systolic heart failure, CAD, paroxysmal A. fib on Eliquis admitted to [facility] on  10/30/2020 following a fall at his home. Pt has PMH of alcoholism. Alcohol level on presentation was 176. Banana bag was hung and withdrawal protocol followed. Pt has not shown any signs of withdrawal. X-ray revealed a right hip fracture. Dr Landon Hernandez was consulted and pt underwent a cephalomedullary nailing of his R hip on 10/30. He will be WBAT in his RLE. Post op, pt had an GER and did develop some postoperative acute blood loss anemia (on Eliquis, which has been on hold) with some hypotension, which improved with crystalloid resuscitation and blood products. He was  noted to have hemoglobin persistently below 7 requiring transfusion with 2 units PRBCs on 10/31, then required 3rd unit pRBCs on 11/2 with Hgb <7. Hemodynamics and GER improved with resuscitation. H/H remained stable and renal function improved. He is now medically stable and is participating with therapy. Complains of chronic lower back pain which is aggravating him. Usually takes Advil at home. REVIEW OF SYSTEMS:  Constitutional: No fevers No chills  Neck:No stiffness  Respiratory: No shortness of breath  Cardiovascular: No chest pain No palpitations  Gastrointestinal: No abdominal pain    Genitourinary: No Dysuria  Neurological: No headache, no confusion      PHYSICAL EXAM:  /65   Pulse 59   Temp 96.4 °F (35.8 °C)   Resp 16   Ht 6' (1.829 m)   Wt 175 lb (79.4 kg)   SpO2 95%   BMI 23.73 kg/m²     Constitutional: he appears well-developed and well-nourished.    Eyes - conjunctiva normal.  Pupils react to light  Ear, nose, throat -hearing intact to voice. No scars, masses, or lesions over external nose or ears, no atrophy of tongue  Neck-symmetric, no masses noted, no jugular vein distension  Respiration- chest wall appears symmetric, good expansion,   normal effort without use of accessory muscles  Cardiovascular- RRR  Musculoskeletal - no significant wasting of muscles noted, no bony deformities, gait no gross ataxia  Extremities-no clubbing, cyanosis or edema  Skin - warm, dry, and intact. No rash, erythema, or pallor. Psychiatric - mood, affect, and behavior appear normal.      Neurology  NEUROLOGICAL EXAM:      Mental status   Awake, alert, fluent oriented x 3 appropriate affect  Attention and concentration appear appropriate  Recent and remote memory appears unremarkable  Speech normal without dysarthria  No clear issues with language       Cranial Nerves   CN II- Visual fields grossly unremarkable  CN III, IV,VI-EOMI, No nystagmus, conjugate eye movements, no ptosis  CN VII-no facial asymmetry  CN VIII-Hearing intact   CN IX and X- Palate elevates in midline  CN XI-good shoulder shrug  CN XII-Tongue midline with no fasciculations or fibrillations          Motor function  Antigravity x 4     Sensory function Intact to light touch     Cerebellar F-N intact     Tremor None present     Gait                  Not tested           Nursing/pcp notes, imaging,labs and vitals reviewed.      PT,OT and/or speech notes reviewed    Lab Results   Component Value Date    WBC 4.9 11/07/2020    HGB 6.8 (L) 11/07/2020    HCT 21.1 (L) 11/07/2020    MCV 90.9 11/07/2020     11/07/2020     Lab Results   Component Value Date     (L) 11/07/2020    K 3.7 11/07/2020     11/07/2020    CO2 24 11/07/2020    BUN 15 11/07/2020    CREATININE 0.9 11/07/2020    GLUCOSE 82 11/07/2020    CALCIUM 8.2 (L) 11/07/2020    PROT 5.0 (L) 10/31/2020    LABALBU 2.8 (L) 10/31/2020    BILITOT 1.2 10/31/2020    ALKPHOS 104 10/31/2020 cephalomedullary nailing-pain control/PT/OT  2. DVT prophylaxis-SCDs and aspirin for now. Due to severe anemia will continue to hold Eliquis for now. 3.  History of A. fib-Eliquis currently on hold. Hemoglobin remains very low. On amiodarone  4. Acute blood loss anemia with recent transfusions. Hemoglobin below 7 this morning transfuse 1 unit. 5.  Essential hypertension-continue current medications and monitoring  6. Chronic low back pain. We will schedule hydrocodone and increase the dose slightly. Lidoderm patch.     Staff next week

## 2020-11-07 NOTE — PLAN OF CARE
Problem: Falls - Risk of:  Goal: Will remain free from falls  Description: Will remain free from falls  11/7/2020 0036 by Vinson Felty, LPN  Outcome: Ongoing  11/7/2020 0035 by Vinson Felty, LPN  Outcome: Ongoing  11/6/2020 1142 by Laura Goldstein RN  Outcome: Ongoing  Goal: Absence of physical injury  Description: Absence of physical injury  11/7/2020 0036 by Vinson Felty, LPN  Outcome: Ongoing  11/7/2020 0035 by Vinson Felty, LPN  Outcome: Ongoing  11/6/2020 1142 by Laura Goldstein RN  Outcome: Ongoing     Problem: SAFETY  Goal: Free from accidental physical injury  11/7/2020 0036 by Vinson Felty, LPN  Outcome: Ongoing  11/7/2020 0035 by Vinson Felty, LPN  Outcome: Ongoing  11/6/2020 1142 by Laura Goldstein RN  Outcome: Ongoing     Problem: DAILY CARE  Goal: Daily care needs are met  11/7/2020 0036 by Vinson Felty, LPN  Outcome: Ongoing  11/7/2020 0035 by Vinson Felty, LPN  Outcome: Ongoing  11/6/2020 1142 by Laura Goldstein RN  Outcome: Ongoing     Problem: PAIN  Goal: Patient's pain/discomfort is manageable  11/7/2020 0036 by Vinson Felty, LPN  Outcome: Ongoing  11/7/2020 0035 by Vinson Felty, LPN  Outcome: Ongoing  11/6/2020 1142 by Laura Goldstein RN  Outcome: Ongoing     Problem: SKIN INTEGRITY  Goal: Skin integrity is maintained or improved  11/7/2020 0036 by Vinson Felty, LPN  Outcome: Ongoing  11/7/2020 0035 by Vinson Felty, LPN  Outcome: Ongoing  11/6/2020 1142 by Laura Goldstein RN  Outcome: Ongoing     Problem: KNOWLEDGE DEFICIT  Goal: Patient/S.O. demonstrates understanding of disease process, treatment plan, medications, and discharge instructions.   11/7/2020 0036 by Vinson Felty, LPN  Outcome: Ongoing  11/7/2020 0035 by Vinson Felty, LPN  Outcome: Ongoing  11/6/2020 1142 by Laura Goldstein RN  Outcome: Ongoing     Problem: DISCHARGE BARRIERS  Goal: Patient's continuum of care needs are met  11/7/2020 0036 by Vinson Felty, LPN  Outcome: Ongoing  11/7/2020 0035 by Sol Novak ONEAL Brooks  Outcome: Ongoing  11/6/2020 1142 by Ananda Edwards RN  Outcome: Ongoing     Problem: Pain:  Goal: Pain level will decrease  Description: Pain level will decrease  11/7/2020 0036 by Rhiannon Cardoza LPN  Outcome: Ongoing  11/7/2020 0035 by Rhiannon Cardoza LPN  Outcome: Ongoing  11/6/2020 1142 by Ananda Edwards RN  Outcome: Ongoing     Problem: Infection - Surgical Site:  Goal: Will show no infection signs and symptoms  Description: Will show no infection signs and symptoms  11/7/2020 0036 by Rhiannon Cardoza LPN  Outcome: Ongoing  11/7/2020 0035 by Rhiannon Cardoza LPN  Outcome: Ongoing  11/6/2020 1142 by Ananda Edwards RN  Outcome: Ongoing     Problem: Skin Integrity:  Goal: Will show no infection signs and symptoms  Description: Will show no infection signs and symptoms  11/7/2020 0036 by Rhiannon Cardoza LPN  Outcome: Ongoing  11/7/2020 0035 by Rhiannon Cardoza LPN  Outcome: Ongoing  11/6/2020 1142 by Ananda Edwards RN  Outcome: Ongoing  Goal: Absence of new skin breakdown  Description: Absence of new skin breakdown  11/7/2020 0036 by Rhiannon Cardoza LPN  Outcome: Ongoing  11/7/2020 0035 by Rhiannon Cardoza LPN  Outcome: Ongoing  11/6/2020 1142 by Ananda Edwards RN  Outcome: Ongoing

## 2020-11-08 LAB
ANION GAP SERPL CALCULATED.3IONS-SCNC: 8 MMOL/L (ref 7–19)
BUN BLDV-MCNC: 17 MG/DL (ref 8–23)
CALCIUM SERPL-MCNC: 8 MG/DL (ref 8.8–10.2)
CHLORIDE BLD-SCNC: 100 MMOL/L (ref 98–111)
CO2: 26 MMOL/L (ref 22–29)
CREAT SERPL-MCNC: 1 MG/DL (ref 0.5–1.2)
GFR AFRICAN AMERICAN: >59
GFR NON-AFRICAN AMERICAN: >60
GLUCOSE BLD-MCNC: 80 MG/DL (ref 74–109)
HCT VFR BLD CALC: 23.6 % (ref 42–52)
HEMOGLOBIN: 7.7 G/DL (ref 14–18)
MCH RBC QN AUTO: 29.6 PG (ref 27–31)
MCHC RBC AUTO-ENTMCNC: 32.6 G/DL (ref 33–37)
MCV RBC AUTO: 90.8 FL (ref 80–94)
PDW BLD-RTO: 15.2 % (ref 11.5–14.5)
PLATELET # BLD: 280 K/UL (ref 130–400)
PMV BLD AUTO: 9 FL (ref 9.4–12.4)
POTASSIUM REFLEX MAGNESIUM: 3.8 MMOL/L (ref 3.5–5)
RBC # BLD: 2.6 M/UL (ref 4.7–6.1)
SODIUM BLD-SCNC: 134 MMOL/L (ref 136–145)
WBC # BLD: 5.9 K/UL (ref 4.8–10.8)

## 2020-11-08 PROCEDURE — 36415 COLL VENOUS BLD VENIPUNCTURE: CPT

## 2020-11-08 PROCEDURE — 1180000000 HC REHAB R&B

## 2020-11-08 PROCEDURE — 2700000000 HC OXYGEN THERAPY PER DAY

## 2020-11-08 PROCEDURE — 6370000000 HC RX 637 (ALT 250 FOR IP): Performed by: STUDENT IN AN ORGANIZED HEALTH CARE EDUCATION/TRAINING PROGRAM

## 2020-11-08 PROCEDURE — 85027 COMPLETE CBC AUTOMATED: CPT

## 2020-11-08 PROCEDURE — 2580000003 HC RX 258: Performed by: STUDENT IN AN ORGANIZED HEALTH CARE EDUCATION/TRAINING PROGRAM

## 2020-11-08 PROCEDURE — 6370000000 HC RX 637 (ALT 250 FOR IP): Performed by: PSYCHIATRY & NEUROLOGY

## 2020-11-08 PROCEDURE — 80048 BASIC METABOLIC PNL TOTAL CA: CPT

## 2020-11-08 RX ADMIN — ASPIRIN 325 MG ORAL TABLET 325 MG: 325 PILL ORAL at 08:49

## 2020-11-08 RX ADMIN — SODIUM CHLORIDE, PRESERVATIVE FREE 10 ML: 5 INJECTION INTRAVENOUS at 08:57

## 2020-11-08 RX ADMIN — LEVOTHYROXINE SODIUM 125 MCG: 25 TABLET ORAL at 05:53

## 2020-11-08 RX ADMIN — OXYCODONE HYDROCHLORIDE AND ACETAMINOPHEN 1 TABLET: 7.5; 325 TABLET ORAL at 08:50

## 2020-11-08 RX ADMIN — POLYETHYLENE GLYCOL 3350 17 G: 17 POWDER, FOR SOLUTION ORAL at 08:51

## 2020-11-08 RX ADMIN — STANDARDIZED SENNA CONCENTRATE 8.6 MG: 8.6 TABLET ORAL at 20:54

## 2020-11-08 RX ADMIN — OXYCODONE HYDROCHLORIDE AND ACETAMINOPHEN 1 TABLET: 7.5; 325 TABLET ORAL at 17:19

## 2020-11-08 RX ADMIN — Medication 150 MG: at 08:50

## 2020-11-08 RX ADMIN — LISINOPRIL 5 MG: 5 TABLET ORAL at 08:49

## 2020-11-08 RX ADMIN — OXYCODONE HYDROCHLORIDE AND ACETAMINOPHEN 1 TABLET: 7.5; 325 TABLET ORAL at 12:34

## 2020-11-08 RX ADMIN — PANTOPRAZOLE SODIUM 40 MG: 40 TABLET, DELAYED RELEASE ORAL at 05:53

## 2020-11-08 RX ADMIN — OXYCODONE HYDROCHLORIDE AND ACETAMINOPHEN 1 TABLET: 7.5; 325 TABLET ORAL at 20:54

## 2020-11-08 RX ADMIN — METOPROLOL SUCCINATE 12.5 MG: 25 TABLET, EXTENDED RELEASE ORAL at 08:55

## 2020-11-08 RX ADMIN — AMIODARONE HYDROCHLORIDE 200 MG: 200 TABLET ORAL at 08:49

## 2020-11-08 RX ADMIN — ATORVASTATIN CALCIUM 40 MG: 40 TABLET, FILM COATED ORAL at 08:49

## 2020-11-08 ASSESSMENT — PAIN SCALES - GENERAL
PAINLEVEL_OUTOF10: 4
PAINLEVEL_OUTOF10: 3
PAINLEVEL_OUTOF10: 4
PAINLEVEL_OUTOF10: 6
PAINLEVEL_OUTOF10: 0

## 2020-11-08 ASSESSMENT — PAIN DESCRIPTION - ORIENTATION
ORIENTATION: RIGHT

## 2020-11-08 ASSESSMENT — PAIN DESCRIPTION - PAIN TYPE
TYPE: ACUTE PAIN

## 2020-11-08 ASSESSMENT — PAIN DESCRIPTION - LOCATION
LOCATION: LEG
LOCATION: LEG
LOCATION: HIP

## 2020-11-08 ASSESSMENT — PAIN DESCRIPTION - PROGRESSION
CLINICAL_PROGRESSION: NOT CHANGED

## 2020-11-08 ASSESSMENT — PAIN - FUNCTIONAL ASSESSMENT
PAIN_FUNCTIONAL_ASSESSMENT: ACTIVITIES ARE NOT PREVENTED

## 2020-11-08 ASSESSMENT — PAIN DESCRIPTION - DESCRIPTORS
DESCRIPTORS: ACHING

## 2020-11-08 ASSESSMENT — PAIN DESCRIPTION - FREQUENCY
FREQUENCY: CONTINUOUS

## 2020-11-08 ASSESSMENT — PAIN DESCRIPTION - ONSET
ONSET: ON-GOING

## 2020-11-08 NOTE — PLAN OF CARE
Problem: Falls - Risk of:  Goal: Will remain free from falls  Description: Will remain free from falls  Outcome: Ongoing  Goal: Absence of physical injury  Description: Absence of physical injury  Outcome: Ongoing     Problem: SAFETY  Goal: Free from accidental physical injury  Outcome: Ongoing     Problem: DAILY CARE  Goal: Daily care needs are met  Outcome: Ongoing     Problem: PAIN  Goal: Patient's pain/discomfort is manageable  Outcome: Ongoing     Problem: SKIN INTEGRITY  Goal: Skin integrity is maintained or improved  Outcome: Ongoing     Problem: KNOWLEDGE DEFICIT  Goal: Patient/S.O. demonstrates understanding of disease process, treatment plan, medications, and discharge instructions.   Outcome: Ongoing     Problem: DISCHARGE BARRIERS  Goal: Patient's continuum of care needs are met  Outcome: Ongoing     Problem: Pain:  Goal: Pain level will decrease  Description: Pain level will decrease  Outcome: Ongoing     Problem: Infection - Surgical Site:  Goal: Will show no infection signs and symptoms  Description: Will show no infection signs and symptoms  Outcome: Ongoing     Problem: Skin Integrity:  Goal: Will show no infection signs and symptoms  Description: Will show no infection signs and symptoms  Outcome: Ongoing  Goal: Absence of new skin breakdown  Description: Absence of new skin breakdown  Outcome: Ongoing

## 2020-11-09 LAB
ANION GAP SERPL CALCULATED.3IONS-SCNC: 8 MMOL/L (ref 7–19)
BASOPHILS ABSOLUTE: 0.1 K/UL (ref 0–0.2)
BASOPHILS RELATIVE PERCENT: 1.2 % (ref 0–1)
BUN BLDV-MCNC: 17 MG/DL (ref 8–23)
CALCIUM SERPL-MCNC: 8.1 MG/DL (ref 8.8–10.2)
CHLORIDE BLD-SCNC: 102 MMOL/L (ref 98–111)
CO2: 26 MMOL/L (ref 22–29)
CREAT SERPL-MCNC: 1.1 MG/DL (ref 0.5–1.2)
EOSINOPHILS ABSOLUTE: 0.2 K/UL (ref 0–0.6)
EOSINOPHILS RELATIVE PERCENT: 2.8 % (ref 0–5)
GFR AFRICAN AMERICAN: >59
GFR NON-AFRICAN AMERICAN: >60
GLUCOSE BLD-MCNC: 78 MG/DL (ref 74–109)
HCT VFR BLD CALC: 25.6 % (ref 42–52)
HEMOGLOBIN: 8 G/DL (ref 14–18)
IMMATURE GRANULOCYTES #: 0 K/UL
LYMPHOCYTES ABSOLUTE: 0.9 K/UL (ref 1.1–4.5)
LYMPHOCYTES RELATIVE PERCENT: 15.6 % (ref 20–40)
MCH RBC QN AUTO: 29.4 PG (ref 27–31)
MCHC RBC AUTO-ENTMCNC: 31.3 G/DL (ref 33–37)
MCV RBC AUTO: 94.1 FL (ref 80–94)
MONOCYTES ABSOLUTE: 0.5 K/UL (ref 0–0.9)
MONOCYTES RELATIVE PERCENT: 8.7 % (ref 0–10)
NEUTROPHILS ABSOLUTE: 4.3 K/UL (ref 1.5–7.5)
NEUTROPHILS RELATIVE PERCENT: 71.4 % (ref 50–65)
PDW BLD-RTO: 15.2 % (ref 11.5–14.5)
PLATELET # BLD: 333 K/UL (ref 130–400)
PMV BLD AUTO: 8.7 FL (ref 9.4–12.4)
POTASSIUM REFLEX MAGNESIUM: 4.1 MMOL/L (ref 3.5–5)
RBC # BLD: 2.72 M/UL (ref 4.7–6.1)
SODIUM BLD-SCNC: 136 MMOL/L (ref 136–145)
WBC # BLD: 6 K/UL (ref 4.8–10.8)

## 2020-11-09 PROCEDURE — 97116 GAIT TRAINING THERAPY: CPT

## 2020-11-09 PROCEDURE — 36415 COLL VENOUS BLD VENIPUNCTURE: CPT

## 2020-11-09 PROCEDURE — 1180000000 HC REHAB R&B

## 2020-11-09 PROCEDURE — 6370000000 HC RX 637 (ALT 250 FOR IP): Performed by: STUDENT IN AN ORGANIZED HEALTH CARE EDUCATION/TRAINING PROGRAM

## 2020-11-09 PROCEDURE — 2700000000 HC OXYGEN THERAPY PER DAY

## 2020-11-09 PROCEDURE — 2580000003 HC RX 258: Performed by: STUDENT IN AN ORGANIZED HEALTH CARE EDUCATION/TRAINING PROGRAM

## 2020-11-09 PROCEDURE — 6370000000 HC RX 637 (ALT 250 FOR IP): Performed by: PSYCHIATRY & NEUROLOGY

## 2020-11-09 PROCEDURE — 97110 THERAPEUTIC EXERCISES: CPT

## 2020-11-09 PROCEDURE — 97530 THERAPEUTIC ACTIVITIES: CPT

## 2020-11-09 PROCEDURE — 80048 BASIC METABOLIC PNL TOTAL CA: CPT

## 2020-11-09 PROCEDURE — 97535 SELF CARE MNGMENT TRAINING: CPT

## 2020-11-09 PROCEDURE — 99232 SBSQ HOSP IP/OBS MODERATE 35: CPT | Performed by: PSYCHIATRY & NEUROLOGY

## 2020-11-09 PROCEDURE — 85025 COMPLETE CBC W/AUTO DIFF WBC: CPT

## 2020-11-09 RX ADMIN — PANTOPRAZOLE SODIUM 40 MG: 40 TABLET, DELAYED RELEASE ORAL at 05:54

## 2020-11-09 RX ADMIN — OXYCODONE HYDROCHLORIDE AND ACETAMINOPHEN 1 TABLET: 7.5; 325 TABLET ORAL at 12:50

## 2020-11-09 RX ADMIN — METOPROLOL SUCCINATE 12.5 MG: 25 TABLET, EXTENDED RELEASE ORAL at 09:00

## 2020-11-09 RX ADMIN — AMIODARONE HYDROCHLORIDE 200 MG: 200 TABLET ORAL at 09:00

## 2020-11-09 RX ADMIN — ASPIRIN 325 MG ORAL TABLET 325 MG: 325 PILL ORAL at 09:00

## 2020-11-09 RX ADMIN — POLYETHYLENE GLYCOL 3350 17 G: 17 POWDER, FOR SOLUTION ORAL at 21:02

## 2020-11-09 RX ADMIN — LISINOPRIL 5 MG: 5 TABLET ORAL at 09:00

## 2020-11-09 RX ADMIN — SODIUM CHLORIDE, PRESERVATIVE FREE 10 ML: 5 INJECTION INTRAVENOUS at 21:03

## 2020-11-09 RX ADMIN — STANDARDIZED SENNA CONCENTRATE 8.6 MG: 8.6 TABLET ORAL at 09:00

## 2020-11-09 RX ADMIN — ATORVASTATIN CALCIUM 40 MG: 40 TABLET, FILM COATED ORAL at 09:00

## 2020-11-09 RX ADMIN — Medication 150 MG: at 09:00

## 2020-11-09 RX ADMIN — OXYCODONE HYDROCHLORIDE AND ACETAMINOPHEN 1 TABLET: 7.5; 325 TABLET ORAL at 21:02

## 2020-11-09 RX ADMIN — LEVOTHYROXINE SODIUM 125 MCG: 25 TABLET ORAL at 05:53

## 2020-11-09 RX ADMIN — STANDARDIZED SENNA CONCENTRATE 8.6 MG: 8.6 TABLET ORAL at 21:02

## 2020-11-09 RX ADMIN — OXYCODONE HYDROCHLORIDE AND ACETAMINOPHEN 1 TABLET: 7.5; 325 TABLET ORAL at 16:38

## 2020-11-09 RX ADMIN — POLYETHYLENE GLYCOL 3350 17 G: 17 POWDER, FOR SOLUTION ORAL at 09:00

## 2020-11-09 RX ADMIN — OXYCODONE HYDROCHLORIDE AND ACETAMINOPHEN 1 TABLET: 7.5; 325 TABLET ORAL at 09:00

## 2020-11-09 RX ADMIN — SODIUM CHLORIDE, PRESERVATIVE FREE 10 ML: 5 INJECTION INTRAVENOUS at 09:15

## 2020-11-09 ASSESSMENT — PAIN SCALES - GENERAL
PAINLEVEL_OUTOF10: 4
PAINLEVEL_OUTOF10: 0
PAINLEVEL_OUTOF10: 3
PAINLEVEL_OUTOF10: 2
PAINLEVEL_OUTOF10: 2
PAINLEVEL_OUTOF10: 4
PAINLEVEL_OUTOF10: 5
PAINLEVEL_OUTOF10: 4
PAINLEVEL_OUTOF10: 4

## 2020-11-09 ASSESSMENT — PAIN DESCRIPTION - LOCATION
LOCATION: HIP
LOCATION: LEG

## 2020-11-09 ASSESSMENT — PAIN DESCRIPTION - FREQUENCY
FREQUENCY: INTERMITTENT
FREQUENCY: CONTINUOUS

## 2020-11-09 ASSESSMENT — PAIN - FUNCTIONAL ASSESSMENT: PAIN_FUNCTIONAL_ASSESSMENT: ACTIVITIES ARE NOT PREVENTED

## 2020-11-09 ASSESSMENT — PAIN DESCRIPTION - PAIN TYPE
TYPE: ACUTE PAIN
TYPE: ACUTE PAIN

## 2020-11-09 ASSESSMENT — PAIN DESCRIPTION - DESCRIPTORS
DESCRIPTORS: ACHING
DESCRIPTORS: ACHING

## 2020-11-09 ASSESSMENT — PAIN DESCRIPTION - PROGRESSION: CLINICAL_PROGRESSION: NOT CHANGED

## 2020-11-09 ASSESSMENT — PAIN DESCRIPTION - ONSET: ONSET: ON-GOING

## 2020-11-09 ASSESSMENT — PAIN DESCRIPTION - ORIENTATION
ORIENTATION: RIGHT
ORIENTATION: RIGHT

## 2020-11-09 NOTE — PLAN OF CARE
Problem: Falls - Risk of:  Goal: Will remain free from falls  Description: Will remain free from falls  11/8/2020 2331 by Jerelene Severin, LPN  Outcome: Ongoing  11/8/2020 1041 by Sunil Richardson RN  Outcome: Ongoing  Goal: Absence of physical injury  Description: Absence of physical injury  11/8/2020 2331 by Jerelene Severin, LPN  Outcome: Ongoing  11/8/2020 1041 by Sunil Richardson RN  Outcome: Ongoing     Problem: SAFETY  Goal: Free from accidental physical injury  11/8/2020 2331 by Jerelene Severin, LPN  Outcome: Ongoing  11/8/2020 1041 by Sunil Richardson RN  Outcome: Ongoing     Problem: DAILY CARE  Goal: Daily care needs are met  11/8/2020 2331 by Jerelene Severin, LPN  Outcome: Ongoing  11/8/2020 1041 by Sunil Richardson RN  Outcome: Ongoing     Problem: PAIN  Goal: Patient's pain/discomfort is manageable  11/8/2020 2331 by Jerelene Severin, LPN  Outcome: Ongoing  11/8/2020 1041 by Sunil Richardson RN  Outcome: Ongoing     Problem: SKIN INTEGRITY  Goal: Skin integrity is maintained or improved  11/8/2020 2331 by Jerelene Severin, LPN  Outcome: Ongoing  11/8/2020 1041 by Sunil Richardson RN  Outcome: Ongoing     Problem: KNOWLEDGE DEFICIT  Goal: Patient/S.O. demonstrates understanding of disease process, treatment plan, medications, and discharge instructions.   11/8/2020 2331 by Jerelene Severin, LPN  Outcome: Ongoing  11/8/2020 1041 by Sunil Richardson RN  Outcome: Ongoing     Problem: DISCHARGE BARRIERS  Goal: Patient's continuum of care needs are met  11/8/2020 2331 by Jerelene Severin, LPN  Outcome: Ongoing  11/8/2020 1041 by Sunil Richardson RN  Outcome: Ongoing     Problem: Pain:  Goal: Pain level will decrease  Description: Pain level will decrease  11/8/2020 2331 by Jerelene Severin, LPN  Outcome: Ongoing  11/8/2020 1041 by Sunil Richardson RN  Outcome: Ongoing     Problem: Infection - Surgical Site:  Goal: Will show no infection signs and symptoms  Description: Will show no infection signs and symptoms  11/8/2020 2331 by Jerelene Severin, LPN  Outcome: Ongoing  11/8/2020 1041 by Danyelle Jama RN  Outcome: Ongoing     Problem: Skin Integrity:  Goal: Will show no infection signs and symptoms  Description: Will show no infection signs and symptoms  11/8/2020 2331 by Danis Alas LPN  Outcome: Ongoing  11/8/2020 1041 by Danyelle Jama RN  Outcome: Ongoing  Goal: Absence of new skin breakdown  Description: Absence of new skin breakdown  11/8/2020 2331 by Danis Alas LPN  Outcome: Ongoing  11/8/2020 1041 by Danyelle Jama RN  Outcome: Ongoing

## 2020-11-09 NOTE — PLAN OF CARE
Problem: Falls - Risk of:  Goal: Will remain free from falls  Description: Will remain free from falls  11/9/2020 1255 by Delma Almeida RN  Outcome: Ongoing  11/8/2020 2331 by Elma Merino LPN  Outcome: Ongoing  Goal: Absence of physical injury  Description: Absence of physical injury  11/9/2020 1255 by Delma Almeida RN  Outcome: Ongoing  11/8/2020 2331 by Elma Merino LPN  Outcome: Ongoing     Problem: SAFETY  Goal: Free from accidental physical injury  11/9/2020 1255 by Delma Almeida RN  Outcome: Ongoing  11/8/2020 2331 by Elma Merino LPN  Outcome: Ongoing     Problem: DAILY CARE  Goal: Daily care needs are met  11/9/2020 1255 by Delma Almeida RN  Outcome: Ongoing  11/8/2020 2331 by Elma Merino LPN  Outcome: Ongoing     Problem: PAIN  Goal: Patient's pain/discomfort is manageable  11/9/2020 1255 by Delma Almeida RN  Outcome: Ongoing  11/8/2020 2331 by Elma Merino LPN  Outcome: Ongoing     Problem: SKIN INTEGRITY  Goal: Skin integrity is maintained or improved  11/9/2020 1255 by Delma Almeida RN  Outcome: Ongoing  11/8/2020 2331 by Elma Merino LPN  Outcome: Ongoing     Problem: KNOWLEDGE DEFICIT  Goal: Patient/S.O. demonstrates understanding of disease process, treatment plan, medications, and discharge instructions.   11/9/2020 1255 by Delma Almeida RN  Outcome: Ongoing  11/8/2020 2331 by Elma Merino LPN  Outcome: Ongoing     Problem: DISCHARGE BARRIERS  Goal: Patient's continuum of care needs are met  11/9/2020 1255 by Delma Almeida RN  Outcome: Ongoing  11/8/2020 2331 by Elma Merino LPN  Outcome: Ongoing     Problem: Pain:  Goal: Pain level will decrease  Description: Pain level will decrease  11/9/2020 1255 by Delma Almeida RN  Outcome: Ongoing  11/8/2020 2331 by Elma Merino LPN  Outcome: Ongoing     Problem: Infection - Surgical Site:  Goal: Will show no infection signs and symptoms  Description: Will show no infection signs and symptoms  11/9/2020 1255 by Rivas Solitario RN  Outcome: Ongoing  11/8/2020 2331 by Parvin Lewis LPN  Outcome: Ongoing     Problem: Skin Integrity:  Goal: Will show no infection signs and symptoms  Description: Will show no infection signs and symptoms  11/9/2020 1255 by Rivas Solitario RN  Outcome: Ongoing  11/8/2020 2331 by Parvin Lewis LPN  Outcome: Ongoing  Goal: Absence of new skin breakdown  Description: Absence of new skin breakdown  11/9/2020 1255 by Rivas Solitario RN  Outcome: Ongoing  11/8/2020 2331 by Parvin Lewis LPN  Outcome: Ongoing

## 2020-11-09 NOTE — PROGRESS NOTES
11/09/20 1407 11/09/20 1429 11/09/20 1430   Bed Mobility   Sit to Supine  --   --  Minimal assistance   Transfers   Sit to Stand Contact guard assistance  --   --    Stand to sit Contact guard assistance  --   --    Bed to Chair Contact guard assistance  (With RW)  --   --    Ambulation   Ambulation? Yes  --   --    WB Status WBAT RLE  --   --    Ambulation 1   Surface level tile  --   --    Device Echo[employer]  --   --    Other Apparatus O2  (2L)  --   --    Assistance Contact guard assistance  --   --    Quality of Gait Good three point gait sequence w/RW, began to step through w/LLE, fwd flexed posture, increased knee flexion on R  --   --    Gait Deviations Slow Hyacinth  --   --    Distance 20'  --   --    Comments [employer] turns, pt showed signs of SOA, Stepping through w/ left foot  --   --    Exercises   Comments  --  Seated FLOR LE exercises 10-15 reps manual resistance LLE and AAROM RLE  --    Conditions Requiring Skilled Therapeutic Intervention   Body structures, Functions, Activity limitations  --   --   --    Assessment  --   --   --    Activity Tolerance   Activity Tolerance  --  Patient Tolerated treatment well  --    Safety Devices   Type of devices  --   --  Bed alarm in place;Call light within reach      11/09/20 1431   Bed Mobility   Sit to Supine  --    Transfers   Sit to Stand  --    Stand to sit  --    Bed to Chair  --    Ambulation   Ambulation?  --    WB Status  --    Ambulation 1   Surface  --    Device  --    Other Apparatus  --    Assistance  --    Quality of Gait  --    Gait Deviations  --    Distance  --    Comments  --    Exercises   Comments  --    Conditions Requiring Skilled Therapeutic Intervention   Body structures, Functions, Activity limitations Decreased functional mobility ; Decreased ADL status; Decreased ROM; Decreased strength;Decreased safe awareness;Decreased endurance;Decreased balance;Decreased high-level IADLs;Decreased coordination; Increased pain;Decreased posture Assessment Pt amb 20' w/ CGA and performed seated BLE ex x15. RLE ROM is limited due to pain.    Activity Tolerance   Activity Tolerance  --    Safety Devices   Type of devices  --    Electronically signed by Maricel Pritchard PTA on 11/9/2020 at 3:27 PM

## 2020-11-09 NOTE — PROGRESS NOTES
Occupational Therapy  Facility/Department: [facility] 8 REHAB UNIT  Daily Treatment Note  NAME: Canelo Brooks  : 1950  MRN: [id number]    Date of Service: 2020    Discharge Recommendations:  Continue to assess pending progress       Assessment   Performance deficits / Impairments: Decreased endurance;Decreased coordination;Decreased functional mobility ; Decreased ADL status; Decreased balance;Decreased strength;Decreased high-level IADLs  Treatment Diagnosis: s/p R hip nail  Activity Tolerance  Activity Tolerance: Patient Tolerated treatment well  Safety Devices  Safety Devices in place: Yes(Left with PT)  Type of devices: Left in chair         Patient Diagnosis(es): There were no encounter diagnoses. has a past medical history of CAD (coronary artery disease), Gout, Hypertension, Non-ST elevation MI (NSTEMI) ([facility] [address] and Palliative care patient. has a past surgical history that includes Cardiac catheterization (14  Done In :60 Seconds); Cholecystectomy; and Femur fracture surgery (Right, 10/30/2020).     Restrictions  Restrictions/Precautions  Restrictions/Precautions: Weight Bearing, Fall Risk  Required Braces or Orthoses?: No  Lower Extremity Weight Bearing Restrictions  Right Lower Extremity Weight Bearing: Weight Bearing As Tolerated  Objective       Instrumental ADL's  Instrumental ADLs: Yes  Light Housekeeping  Light Housekeeping Level: Levern Bangura Housekeeping Level of Assistance: Contact guard assistance  Light Housekeeping: Standing and folding laundry     Balance  Sitting Balance: Stand by assistance  Standing Balance: Contact guard assistance  Standing Balance  Time: 3 minutes x2  Activity: 2 handed static standing task     Transfers  Stand Step Transfers: Contact guard assistance  Sit to stand: Contact guard assistance  Stand to sit: Contact guard assistance  Transfer Comments: bed>w/c using RW        Type of ROM/Therapeutic Exercise  Type of ROM/Therapeutic Exercise: Cane/Rick  Comment: 3#; 3 sets of 10 in all planes       Plan   Plan  Current Treatment Recommendations: Gait Training, Patient/Caregiver Education & Training, Strengthening, Home Management Training, Balance Training, Equipment Evaluation, Education, & procurement, Functional Mobility Training, Endurance Training, Self-Care / ADL, Safety Education & Training  Plan Comment: Continue to work with AE       OutComes Score       11/09/20 1310   Putting On/Taking Off Footwear   Assistance Needed Supervision or touching assistance   Comment Donning/doffing socks with dressing stick and sock aide-VCs, will need to assess shoes   CARE Score 4       Goals  Short term goals  Time Frame for Short term goals: 1 week  Short term goal 1: Supervision with toilet hygiene. Short term goal 2: Supervision with toilet transfer. Short term goal 3: Supervision wih showering/bathing with AE as needed. Short term goal 4: Supervision with LB dressing with AE as needed. Short term goal 5: Supervision with UB dressing. Short term goal 6: Supervision with putting on/taking off footwear with AE as needed. Long term goals  Time Frame for Long term goals : 2 weeks  Long term goal 1: Modified Independent with toilet hygiene. Long term goal 2: Modified Independent with toilet transfer. Long term goal 3: Modified Independent wih showering/bathing with AE as needed. Long term goal 4: Modified Independent with LB dressing with AE as needed. Long term goal 5: Independent with UB dressing. Long term goal 7: Modified Independent with putting on/taking off footwear with AE as needed. Long term goal 8: Verbalize DME needs. Long term goal 9: Complete IADL/homemaking task with Modified [place].        Therapy Time   Individual Concurrent Group Co-treatment   Time In   1310       Time Out   1355       Minutes   45       Timed Code Treatment Minutes: [address], OT

## 2020-11-09 NOTE — PROGRESS NOTES
Occupational Therapy  Facility/Department: [facility] 8 REHAB UNIT  Daily Treatment Note  NAME: Kwaku Austin  : 1950  MRN: [id number]    Date of Service: 2020    Discharge Recommendations:  Continue to assess pending progress       Assessment   Performance deficits / Impairments: Decreased endurance;Decreased coordination;Decreased functional mobility ; Decreased ADL status; Decreased balance;Decreased strength;Decreased high-level IADLs  Treatment Diagnosis: s/p R hip nail  Activity Tolerance  Activity Tolerance: Patient Tolerated treatment well  Safety Devices  Safety Devices in place: Yes  Type of devices: Bed alarm in place;Call light within reach         Patient Diagnosis(es): There were no encounter diagnoses. has a past medical history of CAD (coronary artery disease), Gout, Hypertension, Non-ST elevation MI (NSTEMI) ([facility] [address] and Palliative care patient. has a past surgical history that includes [place] Cholecystectomy; and Femur fracture surgery (Right, 10/30/2020).     Restrictions  Restrictions/Precautions  Restrictions/Precautions: Weight Bearing, Fall Risk  Required Braces or Orthoses?: No  Lower Extremity Weight Bearing Restrictions  Right Lower Extremity Weight Bearing: Weight Bearing As Tolerated     20 1000   Vital Signs   Pulse 54   Patient Currently in Pain Yes   Pain Assessment   Pain Assessment 0-10   Pain Level 4   Pain Type Acute pain   Pain Location Hip   Pain Orientation Right   Pain Descriptors Aching   Pain Frequency Intermittent   Oxygen Therapy   SpO2 100 %   Pulse Oximeter Device Mode Intermittent   Pulse Oximeter Device Location Left   O2 Device None (Room air)     Objective    Balance  Sitting Balance: Stand by assistance  Standing Balance: Contact guard assistance  Standing Balance  Time: 2 minutes  Activity: one handed static standing task  Functional Mobility  Functional - Mobility Device: Rolling Walker  Activity: Other  Assist Level: Contact guard assistance  Functional Mobility Comments: short distance     Transfers  Stand Step Transfers: Contact guard assistance  Sit to stand: Contact guard assistance  Stand to sit: Contact guard assistance  Transfer Comments: bed>w/c using RW           Type of ROM/Therapeutic Exercise  Type of ROM/Therapeutic Exercise: Cane/Wand;Rickshaw  Comment: Cane/wand: 3#, 10 reps in 1 plane; Rickshaw: 10#, 3 sets of 10        Plan   Plan  Current Treatment Recommendations: Gait Training, Patient/Caregiver Education & Training, Strengthening, Home Management Training, Balance Training, Equipment Evaluation, Education, & procurement, Functional Mobility Training, Endurance Training, Self-Care / ADL, Safety Education & Training  Plan Comment: Continue to work with AE       Goals  Short term goals  Time Frame for Short term goals: 1 week  Short term goal 1: Supervision with toilet hygiene. Short term goal 2: Supervision with toilet transfer. Short term goal 3: Supervision wih showering/bathing with AE as needed. Short term goal 4: Supervision with LB dressing with AE as needed. Short term goal 5: Supervision with UB dressing. Short term goal 6: Supervision with putting on/taking off footwear with AE as needed. Long term goals  Time Frame for Long term goals : 2 weeks  Long term goal 1: Modified Independent with toilet hygiene. Long term goal 2: Modified Independent with toilet transfer. Long term goal 3: Modified Independent wih showering/bathing with AE as needed. Long term goal 4: Modified Independent with LB dressing with AE as needed. Long term goal 5: Independent with UB dressing. Long term goal 7: Modified Independent with putting on/taking off footwear with AE as needed. Long term goal 8: Verbalize DME needs. Long term goal 9: Complete IADL/homemaking task with Modified [place].        Therapy Time   Individual Concurrent Group Co-treatment   Time In   1000       Time Out   1100       Minutes   60 Timed Code Treatment Minutes: 75 [facility] [place], OT

## 2020-11-09 NOTE — PROGRESS NOTES
Patient:   Eleni Maciel  MR#:    [id number]   Room:    0430/608-74   YOB: 1950  Date of Progress Note: 11/9/2020  Time of Note                           8:31 AM  Consulting Physician:   Debora Manzano M.D. Attending Physician:  Debora Manzano MD     CHIEF COMPLAINT: Right hip pain     Subjective  This 78 y.o. male  with chronic systolic heart failure, CAD, paroxysmal A. fib on Eliquis admitted to [facility] on  10/30/2020 following a fall at his home. Pt has PMH of alcoholism. Alcohol level on presentation was 176. Banana bag was hung and withdrawal protocol followed. Pt has not shown any signs of withdrawal. X-ray revealed a right hip fracture. Dr Sofie Chávez was consulted and pt underwent a cephalomedullary nailing of his R hip on 10/30. He will be WBAT in his RLE. Post op, pt had an GER and did develop some postoperative acute blood loss anemia (on Eliquis, which has been on hold) with some hypotension, which improved with crystalloid resuscitation and blood products. He was  noted to have hemoglobin persistently below 7 requiring transfusion with 2 units PRBCs on 10/31, then required 3rd unit pRBCs on 11/2 with Hgb <7. Hemodynamics and GER improved with resuscitation. H/H remained stable and renal function improved. He is now medically stable and is participating with therapy. Did better over the rest of the weekend. No complaints this morning. REVIEW OF SYSTEMS:  Constitutional: No fevers No chills  Neck:No stiffness  Respiratory: No shortness of breath  Cardiovascular: No chest pain No palpitations  Gastrointestinal: No abdominal pain    Genitourinary: No Dysuria  Neurological: No headache, no confusion      PHYSICAL EXAM:  /65   Pulse 53   Temp 98.2 °F (36.8 °C) (Temporal)   Resp 18   Ht 6' (1.829 m)   Wt 178 lb 4.8 oz (80.9 kg)   SpO2 96%   BMI 24.18 kg/m²     Constitutional: he appears well-developed and well-nourished.    Eyes - conjunctiva normal.  Pupils react to light  Ear, nose, throat -hearing intact to voice. No scars, masses, or lesions over external nose or ears, no atrophy of tongue  Neck-symmetric, no masses noted, no jugular vein distension  Respiration- chest wall appears symmetric, good expansion,   normal effort without use of accessory muscles  Cardiovascular- RRR  Musculoskeletal - no significant wasting of muscles noted, no bony deformities, gait no gross ataxia  Extremities-no clubbing, cyanosis or edema  Skin - warm, dry, and intact. No rash, erythema, or pallor. Psychiatric - mood, affect, and behavior appear normal.      Neurology  NEUROLOGICAL EXAM:      Mental status   Awake, alert, fluent oriented x 3 appropriate affect  Attention and concentration appear appropriate  Recent and remote memory appears unremarkable  Speech normal without dysarthria  No clear issues with language       Cranial Nerves   CN II- Visual fields grossly unremarkable  CN III, IV,VI-EOMI, No nystagmus, conjugate eye movements, no ptosis  CN VII-no facial asymmetry  CN VIII-Hearing intact   CN IX and X- Palate elevates in midline  CN XI-good shoulder shrug  CN XII-Tongue midline with no fasciculations or fibrillations          Motor function  Antigravity x 4     Sensory function Intact to light touch     Cerebellar F-N intact     Tremor None present     Gait                  Not tested           Nursing/pcp notes, imaging,labs and vitals reviewed.      PT,OT and/or speech notes reviewed    Lab Results   Component Value Date    WBC 6.0 11/09/2020    HGB 8.0 (L) 11/09/2020    HCT 25.6 (L) 11/09/2020    MCV 94.1 (H) 11/09/2020     11/09/2020     Lab Results   Component Value Date     11/09/2020    K 4.1 11/09/2020     11/09/2020    CO2 26 11/09/2020    BUN 17 11/09/2020    CREATININE 1.1 11/09/2020    GLUCOSE 78 11/09/2020    CALCIUM 8.1 (L) 11/09/2020    PROT 5.0 (L) 10/31/2020    LABALBU 2.8 (L) 10/31/2020    BILITOT 1.2 10/31/2020    ALKPHOS 104 10/31/2020     (H) 10/31/2020    ALT 74 (H) 10/31/2020    LABGLOM >60 11/09/2020    GFRAA >59 11/09/2020     Lab Results   Component Value Date    INR 2.27 (H) 10/31/2020    INR 2.03 (H) 10/30/2020    INR 1.59 (H) 06/27/2020   No results found for: PHENYTOIN, ESR, CRP  BED MOBILITY  Bed Mobility  Rolling: Minimal assistance  Supine to Sit: Moderate assistance  Sit to Supine: Moderate assistance(LE on bed)  Scooting: Contact guard assistance  TRANSFERS  Sit to Stand: Moderate Assistance      Bed to Chair: Minimal assistance, Moderate assistance        WHEELCHAIR PROPULSION 1  WHEELCHAIR PROPULSION 2  AMBULATION 1  Ambulation 1  Surface: level tile  Device: Rolling Walker  Assistance: Minimal assistance  Quality of Gait: Unsteady, FLOR knee flexion w/static standing, VC's to weight shift and sequence gait, fwd flexed posture, decrease stance time on R leg  Gait Deviations: Slow Hyacinth, Decreased step length, Decreased step height  Distance: 10'x2  Comments: 3 point gait pattern    Objective   Vision: Impaired  Vision Exceptions: Wears glasses for reading  Hearing: Within functional limits    Orientation  Overall Orientation Status: Within Normal Limits  Balance  Sitting Balance: Stand by assistance  Standing Balance: Moderate assistance  Bed mobility  Supine to Sit: Moderate assistance  Scooting: Contact guard assistance  Transfers  Stand Step Transfers: Moderate assistance  Sit to stand: Moderate assistance  Stand to sit: Contact guard assistance  Transfer Comments: bed to w/c  LUE AROM (degrees)  LUE AROM : WFL  Left Hand AROM (degrees)  Left Hand AROM: WFL  RUE AROM (degrees)  RUE AROM : WFL  Right Hand AROM (degrees)  Right Hand AROM: WFL  LUE Strength  L Hand General: 4/5  LUE Strength Comment: General: 4/5  RUE Strength  R Hand General: 4/5  RUE Strength Comment: General: 4/5    RECORD REVIEW: Previous medical records, medications were reviewed at today's visit    IMPRESSION:   1.   Right hip fracture status post cephalomedullary nailing-pain control/PT/OT  2. DVT prophylaxis-SCDs and aspirin for now. Due to severe anemia will continue to hold Eliquis for now. 3.  History of A. fib-Eliquis currently on hold. Hemoglobin remains very low. On amiodarone. Hopefully can resume Eliquis later this week if hemoglobin remains stable/improving  4. Acute blood loss anemia with recent transfusions. Status post 1 unit packed red blood cells on 11/7. Hemoglobin improved. On Niferex  5. Essential hypertension-continue current medications and monitoring  6. Chronic low back pain.  scheduled hydrocodone and increase the dose slightly. Lidoderm patch.   Improved    Staff this week

## 2020-11-09 NOTE — PROGRESS NOTES
11/09/20 0900   Restrictions/Precautions   Restrictions/Precautions Weight Bearing   Required Braces or Orthoses? No   Lower Extremity Weight Bearing Restrictions   Right Lower Extremity Weight Bearing Weight Bearing As Tolerated   Pain Assessment   Pain Level 4   Bed Mobility   Rolling Stand by assistance   Supine to Sit Contact guard assistance   Scooting Contact guard assistance   Transfers   Sit to Stand Contact guard assistance   Stand to sit Contact guard assistance   Ambulation   Ambulation? Yes   WB Status WBAT RLE   Ambulation 1   Surface level tile   Device Rolling Walker   Other Apparatus O2;Wheelchair follow   Assistance Contact guard assistance   Quality of Gait Good three point gait sequence w/RW, began to step through w/LLE, fwd flexed posture, increased knee flexion on R   Gait Deviations Slow Hyacinth   Distance 15'   Comments Pt is able to tolerate more weight through RLE, VC's to take smaller steps   Stairs/Curb   Stairs?  No   Wheelchair Activities   Propulsion Yes   Propulsion 1   Propulsion Manual   Level Level Tile   Method RUE;LUE   Level of Assistance Contact guard assistance   Description/ Details Pt's room to therapy gym   Distance 225'   Balance   Posture Poor   Standing - Static Fair   Standing - Dynamic Fair   Conditions Requiring Skilled Therapeutic Intervention   Assessment Improved bed mobility, transfers and amb today requiring less assitance to perform, pt is progressing well    Activity Tolerance   Activity Tolerance Patient Tolerated treatment well   Safety Devices   Type of devices   (Left w/OT)     Electronically signed by BRITANY Berger on 11/9/2020 at 10:02 AM

## 2020-11-10 LAB
ANION GAP SERPL CALCULATED.3IONS-SCNC: 7 MMOL/L (ref 7–19)
BUN BLDV-MCNC: 18 MG/DL (ref 8–23)
CALCIUM SERPL-MCNC: 8.2 MG/DL (ref 8.8–10.2)
CHLORIDE BLD-SCNC: 102 MMOL/L (ref 98–111)
CO2: 28 MMOL/L (ref 22–29)
CREAT SERPL-MCNC: 1.1 MG/DL (ref 0.5–1.2)
GFR AFRICAN AMERICAN: >59
GFR NON-AFRICAN AMERICAN: >60
GLUCOSE BLD-MCNC: 80 MG/DL (ref 74–109)
HCT VFR BLD CALC: 25.8 % (ref 42–52)
HEMOGLOBIN: 8 G/DL (ref 14–18)
MCH RBC QN AUTO: 29.4 PG (ref 27–31)
MCHC RBC AUTO-ENTMCNC: 31 G/DL (ref 33–37)
MCV RBC AUTO: 94.9 FL (ref 80–94)
PDW BLD-RTO: 15.2 % (ref 11.5–14.5)
PLATELET # BLD: 392 K/UL (ref 130–400)
PMV BLD AUTO: 8.8 FL (ref 9.4–12.4)
POTASSIUM REFLEX MAGNESIUM: 3.9 MMOL/L (ref 3.5–5)
RBC # BLD: 2.72 M/UL (ref 4.7–6.1)
SODIUM BLD-SCNC: 137 MMOL/L (ref 136–145)
WBC # BLD: 4.8 K/UL (ref 4.8–10.8)

## 2020-11-10 PROCEDURE — 97110 THERAPEUTIC EXERCISES: CPT

## 2020-11-10 PROCEDURE — 2580000003 HC RX 258: Performed by: STUDENT IN AN ORGANIZED HEALTH CARE EDUCATION/TRAINING PROGRAM

## 2020-11-10 PROCEDURE — 97535 SELF CARE MNGMENT TRAINING: CPT

## 2020-11-10 PROCEDURE — 1180000000 HC REHAB R&B

## 2020-11-10 PROCEDURE — 80048 BASIC METABOLIC PNL TOTAL CA: CPT

## 2020-11-10 PROCEDURE — 97116 GAIT TRAINING THERAPY: CPT

## 2020-11-10 PROCEDURE — 6370000000 HC RX 637 (ALT 250 FOR IP): Performed by: STUDENT IN AN ORGANIZED HEALTH CARE EDUCATION/TRAINING PROGRAM

## 2020-11-10 PROCEDURE — 99232 SBSQ HOSP IP/OBS MODERATE 35: CPT | Performed by: PSYCHIATRY & NEUROLOGY

## 2020-11-10 PROCEDURE — 97530 THERAPEUTIC ACTIVITIES: CPT

## 2020-11-10 PROCEDURE — 85027 COMPLETE CBC AUTOMATED: CPT

## 2020-11-10 PROCEDURE — 36415 COLL VENOUS BLD VENIPUNCTURE: CPT

## 2020-11-10 PROCEDURE — 2700000000 HC OXYGEN THERAPY PER DAY

## 2020-11-10 PROCEDURE — 6370000000 HC RX 637 (ALT 250 FOR IP): Performed by: PSYCHIATRY & NEUROLOGY

## 2020-11-10 RX ADMIN — Medication 150 MG: at 08:27

## 2020-11-10 RX ADMIN — LEVOTHYROXINE SODIUM 125 MCG: 25 TABLET ORAL at 06:01

## 2020-11-10 RX ADMIN — OXYCODONE HYDROCHLORIDE AND ACETAMINOPHEN 1 TABLET: 7.5; 325 TABLET ORAL at 16:58

## 2020-11-10 RX ADMIN — OXYCODONE HYDROCHLORIDE AND ACETAMINOPHEN 1 TABLET: 7.5; 325 TABLET ORAL at 12:56

## 2020-11-10 RX ADMIN — STANDARDIZED SENNA CONCENTRATE 8.6 MG: 8.6 TABLET ORAL at 22:49

## 2020-11-10 RX ADMIN — OXYCODONE HYDROCHLORIDE AND ACETAMINOPHEN 1 TABLET: 7.5; 325 TABLET ORAL at 08:27

## 2020-11-10 RX ADMIN — SODIUM CHLORIDE, PRESERVATIVE FREE 10 ML: 5 INJECTION INTRAVENOUS at 08:25

## 2020-11-10 RX ADMIN — ATORVASTATIN CALCIUM 40 MG: 40 TABLET, FILM COATED ORAL at 08:26

## 2020-11-10 RX ADMIN — METOPROLOL SUCCINATE 12.5 MG: 25 TABLET, EXTENDED RELEASE ORAL at 08:26

## 2020-11-10 RX ADMIN — SODIUM CHLORIDE, PRESERVATIVE FREE 10 ML: 5 INJECTION INTRAVENOUS at 21:23

## 2020-11-10 RX ADMIN — POLYETHYLENE GLYCOL 3350 17 G: 17 POWDER, FOR SOLUTION ORAL at 08:29

## 2020-11-10 RX ADMIN — POLYETHYLENE GLYCOL 3350 17 G: 17 POWDER, FOR SOLUTION ORAL at 21:22

## 2020-11-10 RX ADMIN — AMIODARONE HYDROCHLORIDE 200 MG: 200 TABLET ORAL at 08:27

## 2020-11-10 RX ADMIN — PANTOPRAZOLE SODIUM 40 MG: 40 TABLET, DELAYED RELEASE ORAL at 06:01

## 2020-11-10 RX ADMIN — LISINOPRIL 5 MG: 5 TABLET ORAL at 08:27

## 2020-11-10 RX ADMIN — STANDARDIZED SENNA CONCENTRATE 8.6 MG: 8.6 TABLET ORAL at 08:27

## 2020-11-10 RX ADMIN — OXYCODONE HYDROCHLORIDE AND ACETAMINOPHEN 1 TABLET: 7.5; 325 TABLET ORAL at 21:22

## 2020-11-10 RX ADMIN — ASPIRIN 325 MG ORAL TABLET 325 MG: 325 PILL ORAL at 08:27

## 2020-11-10 RX ADMIN — ERGOCALCIFEROL 50000 UNITS: 1.25 CAPSULE ORAL at 08:25

## 2020-11-10 ASSESSMENT — PAIN DESCRIPTION - ORIENTATION
ORIENTATION: RIGHT
ORIENTATION: RIGHT

## 2020-11-10 ASSESSMENT — PAIN DESCRIPTION - DESCRIPTORS: DESCRIPTORS: ACHING;SORE

## 2020-11-10 ASSESSMENT — PAIN DESCRIPTION - LOCATION
LOCATION: HIP
LOCATION: HIP

## 2020-11-10 ASSESSMENT — PAIN DESCRIPTION - PAIN TYPE
TYPE: ACUTE PAIN
TYPE: ACUTE PAIN

## 2020-11-10 ASSESSMENT — PAIN SCALES - GENERAL
PAINLEVEL_OUTOF10: 4
PAINLEVEL_OUTOF10: 4
PAINLEVEL_OUTOF10: 6
PAINLEVEL_OUTOF10: 0
PAINLEVEL_OUTOF10: 4

## 2020-11-10 ASSESSMENT — PAIN DESCRIPTION - PROGRESSION: CLINICAL_PROGRESSION: NOT CHANGED

## 2020-11-10 ASSESSMENT — PAIN DESCRIPTION - ONSET: ONSET: ON-GOING

## 2020-11-10 ASSESSMENT — PAIN DESCRIPTION - FREQUENCY: FREQUENCY: CONTINUOUS

## 2020-11-10 NOTE — PROGRESS NOTES
Nutrition Assessment     Type and Reason for Visit: Reassess    Nutrition Assessment:  Pt continues to be nutritionally stable with PO intake avg  >50% and many meals >75%. Wt gain of 3 lbs documented since admit. Pt remains at low risk for nutritional decline. Will continue to follow. Malnutrition Assessment:  Malnutrition Status: No malnutrition    Nutrition Related Findings: well-nourished      Current Nutrition Therapies:    DIET CARDIAC;     Anthropometric Measures:  · Height: 6' (182.9 cm)  · Current Body Wt: 178 lb 4.8 oz (80.9 kg)   · BMI: 24.2    Nutrition Diagnosis:   · Increased nutrient needs related to acute injury/trauma as evidenced by wounds      Nutrition Interventions:   Food and/or Nutrient Delivery:  Continue Current Diet  Nutrition Education/Counseling:  No recommendation at this time   Coordination of Nutrition Care:  Continue to monitor while inpatient    Goals:  PO intake >50%, wt stable, incision healing       Nutrition Monitoring and Evaluation:   Behavioral-Environmental Outcomes:  None Identified   Food/Nutrient Intake Outcomes:  Food and Nutrient Intake  Physical Signs/Symptoms Outcomes:  Biochemical Data, Nutrition Focused Physical Findings, Skin, Weight     Discharge Planning:    No discharge needs at this time     Electronically signed by Samuel Estes RD, LD on 11/10/20 at 12:55 PM CST    Contact: [phone]

## 2020-11-10 NOTE — PROGRESS NOTES
Name: Concetta Nance  MRN:  [id number]  Date of service:  11/10/2020       11/10/20 1007 11/10/20 1008 11/10/20 1015   Restrictions/Precautions   Restrictions/Precautions Weight Bearing; Fall Risk  --   --    Lower Extremity Weight Bearing Restrictions   Right Lower Extremity Weight Bearing Weight Bearing As Tolerated  --   --    Subjective   Subjective  --  Pt brought from OT room. Agrees to therapy. --    Pain Screening   Patient Currently in Pain  --  Yes  --    Pain Assessment   Pain Assessment  --  0-10  --    Pain Level  --  4  --    Pain Type  --  Acute pain  --    Pain Location  --  Hip  --    Pain Orientation  --  Right  --    Bed Mobility   Rolling  --   --   --    Sit to Supine  --   --   --    Scooting  --   --   --    Transfers   Sit to Stand  --   --   --    Stand to sit  --   --   --    Bed to Chair  --   --   --    Lateral Transfers  --   --   --    Comment  --   --   --    Ambulation   Ambulation?  --   --  Yes   WB Status  --   --  WBAT RLE   Ambulation 1   Surface  --   --  level tile   Device  --   --  Rolling Walker   Other Apparatus  --   --  O2;Wheelchair follow   Assistance  --   --  Contact guard assistance   Quality of Gait  --   --  Gait speed decreased, Limited weight through RLE, increased knee flexion, slight forward flexed posture, eyes down   Gait Deviations  --   --  Slow Hyacinth;Decreased step length   Distance  --   --  15'   Comments  --   --  Pt has a step to pattern with LLE stepping to RLE, increasing weight bear through RLE. States distance is limited due to pain through RLE rather than endurance.    Exercises   Comments  --   --   --    Activity Tolerance   Activity Tolerance  --   --   --    Safety Devices   Type of devices  --   --   --       11/10/20 1021 11/10/20 1033 11/10/20 1045   Restrictions/Precautions   Restrictions/Precautions  --   --   --    Lower Extremity Weight Bearing Restrictions   Right Lower Extremity Weight Bearing  --   --   --    Subjective   Subjective --   --   --    Pain Screening   Patient Currently in Pain  --   --   --    Pain Assessment   Pain Assessment  --   --   --    Pain Level  --   --   --    Pain Type  --   --   --    Pain Location  --   --   --    Pain Orientation  --   --   --    Bed Mobility   Rolling  --   --  Stand by assistance   Sit to Supine  --   --  Stand by assistance   Scooting  --   --  Stand by assistance   Transfers   Sit to Stand  --   --   --    Stand to sit  --   --   --    Bed to Chair  --   --   --    Lateral Transfers  --   --   --    Comment  --   --   --    Ambulation   Ambulation?  --   --   --    WB Status  --   --   --    Ambulation 1   Surface  --   --   --    Device  --   --   --    Other Apparatus  --   --   --    Assistance  --   --   --    Quality of Gait  --   --   --    Gait Deviations  --   --   --    Distance  --   --   --    Comments  --   --   --    Exercises   Comments  --  Seated BLE x15 AAROM RLE, manually resisted LLE. Attempt to get on bed and perform supine this afternoon.   --    Activity Tolerance   Activity Tolerance Patient limited by pain  (R hip)  --   --    Safety Devices   Type of devices  --   --   --       11/10/20 1046 11/10/20 1049   Restrictions/Precautions   Restrictions/Precautions  --   --    Lower Extremity Weight Bearing Restrictions   Right Lower Extremity Weight Bearing  --   --    Subjective   Subjective  --   --    Pain Screening   Patient Currently in Pain  --   --    Pain Assessment   Pain Assessment  --   --    Pain Level  --   --    Pain Type  --   --    Pain Location  --   --    Pain Orientation  --   --    Bed Mobility   Rolling  --   --    Sit to Supine  --   --    Scooting  --   --    Transfers   Sit to Stand Contact guard assistance  --    Stand to sit Contact guard assistance  --    Bed to Chair Contact guard assistance  --    Lateral Transfers Contact guard assistance;Minimal Assistance  (Min A ti lift RLE into bed)  --    Comment Pt SBA with transfers requiring min A to lift RLE into bed when performing stand step transfer WC to bed w/ RW.  --    Ambulation   Ambulation?  --   --    WB Status  --   --    Ambulation 1   Surface  --   --    Device  --   --    Other Apparatus  --   --    Assistance  --   --    Quality of Gait  --   --    Gait Deviations  --   --    Distance  --   --    Comments  --   --    Exercises   Comments  --   --    Activity Tolerance   Activity Tolerance  --   --    Safety Devices   Type of devices  --  Bed alarm in place;Call light within reach       Electronically signed by Noemí Moreau PTA on 11/10/2020 at 10:53 AM

## 2020-11-10 NOTE — PROGRESS NOTES
Occupational Therapy  Facility/Department: [facility] 8 REHAB UNIT  Daily Treatment Note  NAME: Ari Jenkins  : 1950  MRN: [id number]    Date of Service: 11/10/2020    Discharge Recommendations:  Continue to assess pending progress       Assessment   Performance deficits / Impairments: Decreased endurance;Decreased coordination;Decreased functional mobility ; Decreased ADL status; Decreased balance;Decreased strength;Decreased high-level IADLs  Assessment: Patient now on room air. O2 sats above 95%. Treatment Diagnosis: s/p R hip nail  Prognosis: Good  OT Education: Transfer Training  Activity Tolerance  Activity Tolerance: Patient Tolerated treatment well  Safety Devices  Safety Devices in place: Yes(Left with PT)  Type of devices: Left in chair         Patient Diagnosis(es): There were no encounter diagnoses. has a past medical history of CAD (coronary artery disease), Gout, Hypertension, Non-ST elevation MI (NSTEMI) ([facility] [address] and Palliative care patient. has a past surgical history that includes Cardiac catheterization (14  [facility]); Cholecystectomy; and Femur fracture surgery (Right, 10/30/2020). Restrictions  Restrictions/Precautions  Restrictions/Precautions: Weight Bearing, Fall Risk  Required Braces or Orthoses?: No  Lower Extremity Weight Bearing Restrictions  Right Lower Extremity Weight Bearing: Weight Bearing As Tolerated  Subjective   General  Patient assessed for rehabilitation services?: Yes  Diagnosis: s/p R hip nail  Vital Signs  Pulse: 54  Oxygen Therapy  SpO2: 95 %  Pulse Oximeter Device Mode: Intermittent  Pulse Oximeter Device Location: Left;Finger  O2 Device: None (Room air)   Objective    Balance  Sitting Balance: Supervision  Standing Balance: Contact guard assistance  Functional Mobility  Functional - Mobility Device: Rolling Walker  Activity: To/from bathroom; Other  Assist Level: Contact guard assistance  Functional Mobility Comments: and short distance  Shower Transfers  Shower - Transfer From: Casandra White - Transfer Type: To and From  Shower - Transfer To: Shower seat with back  Shower - Technique: To right; To left  Shower Transfers: Contact Guard  Shower Transfers Comments: Stepping over lip using GB     Transfers  Sit to stand: Contact guard assistance  Stand to sit: Contact guard assistance     Type of ROM/Therapeutic Exercise  Type of ROM/Therapeutic Exercise: Free weights  Comment: BUE 2#, 2 sets of 10 in all planes        Plan   Plan  Current Treatment Recommendations: Gait Training, Patient/Caregiver Education & Training, Strengthening, Home Management Training, Balance Training, Equipment Evaluation, Education, & procurement, Functional Mobility Training, Endurance Training, Self-Care / ADL, Safety Education & Training  Plan Comment: Continue to work with AE       OutComes Score       11/10/20 1305   Toilet Transfer   Assistance Needed Supervision or touching assistance   Comment CGA   CARE Score 4       Goals  Short term goals  Time Frame for Short term goals: 1 week  Short term goal 1: Supervision with toilet hygiene. Short term goal 2: Supervision with toilet transfer. Short term goal 3: Supervision wih showering/bathing with AE as needed. Short term goal 4: Supervision with LB dressing with AE as needed. Short term goal 5: Supervision with UB dressing. Short term goal 6: Supervision with putting on/taking off footwear with AE as needed. Long term goals  Time Frame for Long term goals : 2 weeks  Long term goal 1: Modified Independent with toilet hygiene. Long term goal 2: Modified Independent with toilet transfer. Long term goal 3: Modified Independent wih showering/bathing with AE as needed. Long term goal 4: Modified Independent with LB dressing with AE as needed. Long term goal 5: Independent with UB dressing. Long term goal 7: Modified Independent with putting on/taking off footwear with AE as needed. Long term goal 8: Verbalize DME needs.   Long term goal 9: Complete IADL/homemaking task with Modified [place].        Therapy Time   Individual Concurrent Group Co-treatment   Time In 6768         Time Out 1345         Minutes 40         Timed Code Treatment Minutes: [address], OT

## 2020-11-10 NOTE — PROGRESS NOTES
--   --    Subjective   Subjective  --   --   --    Pain Screening   Patient Currently in Pain  --   --   --    Pain Assessment   Pain Assessment  --   --   --    Pain Level  --   --   --    Vital Signs   Pulse  --   --   --    Resp  --  18  --    Oxygen Therapy   SpO2  --  98 %  --    Pulse Oximeter Device Mode  --   --   --    Pulse Oximeter Device Location  --   --   --    O2 Device  --  Nasal cannula  --    O2 Flow Rate (L/min)  --  2 L/min  --    Bed Mobility   Rolling  --   --  Stand by assistance   Sit to Supine  --   --  Minimal assistance   Transfers   Sit to Stand Contact guard assistance  --   --    Stand to sit Contact guard assistance  --   --    Ambulation   Ambulation? Yes  --   --    WB Status WBAT  --   --    Ambulation 1   Surface level tile  --   --    Device Rolling Walker  --   --    Other Apparatus Wheelchair follow  --   --    Assistance Contact guard assistance  --   --    Quality of Gait decreased weight through RLE, increased knee flexion in RLE, LLE step to RLE, decreased stance time on RLE  --   --    Gait Deviations Slow Hyacinth;Decreased step length;Deviated path  --   --    Distance 30'  --   --    Comments Pt amb without O2, pre amb stats 99, post amb stats 98. Pt able to amb further this afternoon compared to this morning. --   --    Wheelchair Activities   Propulsion Yes  --   --    Propulsion 1   Propulsion Manual  --   --    Level Level Tile  --   --    Method RUE;LUE;RLE;LLE  (BLE for 250')  --   --    Level of Assistance Stand by assistance  --   --    Description/ Details Pt able to propel self one lap around floor, used BUE throughout entire 500', only being able to tolerate use of BLE for 250'. Reports BLE mm to be burning due to use during propulsion.   --   --    Distance 750', 225'  (225' only BUE)  --   --    Exercises   Comments  --   --   --    Conditions Requiring Skilled Therapeutic Intervention   Body structures, Functions, Activity limitations  --   --   -- Assessment  --   --   --    Safety Devices   Type of devices  --   --   --       11/10/20 1443 11/10/20 1446 11/10/20 1447   Restrictions/Precautions   Restrictions/Precautions  --   --   --    Lower Extremity Weight Bearing Restrictions   Right Lower Extremity Weight Bearing  --   --   --    Subjective   Subjective  --   --   --    Pain Screening   Patient Currently in Pain  --   --   --    Pain Assessment   Pain Assessment  --   --   --    Pain Level  --   --   --    Vital Signs   Pulse  --   --   --    Resp  --   --   --    Oxygen Therapy   SpO2  --   --   --    Pulse Oximeter Device Mode  --   --   --    Pulse Oximeter Device Location  --   --   --    O2 Device  --   --   --    O2 Flow Rate (L/min)  --   --   --    Bed Mobility   Rolling  --   --   --    Sit to Supine  --   --   --    Transfers   Sit to Stand  --   --   --    Stand to sit  --   --   --    Ambulation   Ambulation?  --   --   --    WB Status  --   --   --    Ambulation 1   Surface  --   --   --    Device  --   --   --    Other Apparatus  --   --   --    Assistance  --   --   --    Quality of Gait  --   --   --    Gait Deviations  --   --   --    Distance  --   --   --    Comments  --   --   --    Wheelchair Activities   Propulsion  --   --   --    Propulsion 1   Propulsion  --   --   --    Level  --   --   --    Method  --   --   --    Level of Assistance  --   --   --    Description/ Details  --   --   --    Distance  --   --   --    Exercises   Comments  --   --  Supine BLE ex x10-15 AROM LLE and AAROM RLE. RLE ROM limited due to pain in hip. Conditions Requiring Skilled Therapeutic Intervention   Body structures, Functions, Activity limitations Decreased functional mobility ; Decreased ADL status; Decreased ROM; Decreased strength;Decreased endurance;Decreased balance;Decreased high-level IADLs;Decreased coordination; Increased pain  --   --    Assessment Pt tolerated session within limits of pain today. Pt amb 30' CGA without O2.  Pt maintained good O2 stats only dropping from 99 to 98 post amb. Pt propulsed self down santo for 750' and 225' SBA only requiring VC to correct deviation. Pt performed supine BLE ex x10-15 AROm on LLE and AAROM on RLE.  Limited in ROM on RLE due to pain in hip.  --   --    Safety Devices   Type of devices  --  Bed alarm in place;Call light within reach  --        Electronically signed by Saqib Church PTA on 11/10/2020 at 3:39 PM

## 2020-11-10 NOTE — PROGRESS NOTES
Occupational Therapy     11/10/20 0900   General   Diagnosis s/p R hip nail   Pain Assessment   Patient Currently in Pain Yes   Pain Assessment 0-10   Pain Level 4   Pain Type Acute pain   Pain Location Hip   Pain Orientation Right   Pain Descriptors Aching; Sore   Pain Frequency Continuous   Clinical Progression Not changed   Response to Pain Intervention Patient Satisfied   Balance   Sitting Balance Supervision   Standing Balance Contact guard assistance   Transfers   Stand Step Transfers Contact guard assistance   Sit to stand Contact guard assistance   Stand to sit Contact guard assistance   Toilet Transfers   Toilet - Technique Stand step   Equipment Used Grab bars   Toilet Transfer Contact guard assistance   Type of ROM/Therapeutic Exercise   Type of ROM/Therapeutic Exercise Rafy   Comment SURESH 10# 3 sets x 15 reps. Assessment   Performance deficits / Impairments Decreased endurance;Decreased coordination;Decreased functional mobility ; Decreased ADL status; Decreased balance;Decreased strength;Decreased high-level IADLs   Treatment Diagnosis s/p R hip nail   Prognosis Good   Timed Code Treatment Minutes 60 Minutes   Activity Tolerance   Activity Tolerance Patient Tolerated treatment well   Safety Devices   Safety Devices in place Yes   Type of devices Gait belt  (Given to PT.)   Plan   Current Treatment Recommendations Gait Training;Patient/Caregiver Education & Training;Strengthening;Home Management Training;Balance Training;Equipment Evaluation, Education, & procurement; Functional Mobility Training; Endurance Training;Self-Care / ADL; Safety Education & Training      11/10/20 0900   Oral Hygiene   Assistance Needed Independent   CARE Score 6   Toileting Hygiene   Assistance Needed Partial/moderate assistance   Comment Mod A.    CARE Score 3   Shower/Bathe Self   Assistance Needed Partial/moderate assistance   CARE Score 3   Upper Body Dressing   Assistance Needed Supervision or touching assistance   CARE Score 4   Lower Body Dressing   Assistance Needed Substantial/maximal assistance   CARE Score 2   Putting On/Taking Off Footwear   Assistance Needed Supervision or touching assistance   Comment Utilized AE. CARE Score 4      11/10/20 0900   Toilet Transfer   Assistance Needed Supervision or touching assistance   Comment CGA.    CARE Score 4

## 2020-11-10 NOTE — PLAN OF CARE
infection signs and symptoms  11/10/2020 1208 by Alyssia Hurst RN  Outcome: Ongoing  11/10/2020 0040 by Jamil Hernández LPN  Outcome: Ongoing     Problem: Skin Integrity:  Goal: Will show no infection signs and symptoms  Description: Will show no infection signs and symptoms  11/10/2020 1208 by Alyssia Hurst RN  Outcome: Ongoing  11/10/2020 0040 by Jamil Hernández LPN  Outcome: Ongoing  Goal: Absence of new skin breakdown  Description: Absence of new skin breakdown  11/10/2020 1208 by Alyssia Hurst RN  Outcome: Ongoing  11/10/2020 0040 by Jamil Hernández LPN  Outcome: Ongoing

## 2020-11-10 NOTE — PLAN OF CARE
Problem: Falls - Risk of:  Goal: Will remain free from falls  Description: Will remain free from falls  11/10/2020 0040 by Ashutosh Rincon LPN  Outcome: Ongoing  11/9/2020 1255 by Keke Glass RN  Outcome: Ongoing  Goal: Absence of physical injury  Description: Absence of physical injury  11/10/2020 0040 by Ashutosh Rincon LPN  Outcome: Ongoing  11/9/2020 1255 by Keke Glass RN  Outcome: Ongoing     Problem: SAFETY  Goal: Free from accidental physical injury  11/10/2020 0040 by Ashutosh Rincon LPN  Outcome: Ongoing  11/9/2020 1255 by Keke Glass RN  Outcome: Ongoing     Problem: DAILY CARE  Goal: Daily care needs are met  11/10/2020 0040 by Ashutosh Rincon LPN  Outcome: Ongoing  11/9/2020 1255 by Keke Glass RN  Outcome: Ongoing     Problem: PAIN  Goal: Patient's pain/discomfort is manageable  11/10/2020 0040 by Ashutosh Rincon LPN  Outcome: Ongoing  11/9/2020 1255 by Keke Glass RN  Outcome: Ongoing     Problem: SKIN INTEGRITY  Goal: Skin integrity is maintained or improved  11/10/2020 0040 by Ashutosh Rincon LPN  Outcome: Ongoing  11/9/2020 1255 by Keke Glass RN  Outcome: Ongoing     Problem: KNOWLEDGE DEFICIT  Goal: Patient/S.O. demonstrates understanding of disease process, treatment plan, medications, and discharge instructions.   11/10/2020 0040 by Ashutosh Rincon LPN  Outcome: Ongoing  11/9/2020 1255 by Keke Glass RN  Outcome: Ongoing     Problem: DISCHARGE BARRIERS  Goal: Patient's continuum of care needs are met  11/10/2020 0040 by Ashutosh Rincon LPN  Outcome: Ongoing  11/9/2020 1255 by Keke Glass RN  Outcome: Ongoing     Problem: Pain:  Goal: Pain level will decrease  Description: Pain level will decrease  11/10/2020 0040 by Ashutosh Rincon LPN  Outcome: Ongoing  11/9/2020 1255 by Keke Glass RN  Outcome: Ongoing     Problem: Infection - Surgical Site:  Goal: Will show no infection signs and symptoms  Description: Will show no infection

## 2020-11-10 NOTE — PROGRESS NOTES
Patient:   Merlinda Mantel  MR#:    [id number]   Room:    [address] [place] [address]   YOB: 1950  Date of Progress Note: 11/10/2020  Time of Note                           11:24 AM  Consulting Physician:   Evin Farnsworth M.D. Attending Physician:  Evin Farnsworth MD     CHIEF COMPLAINT: Right hip pain     Subjective  This 78 y.o. male  with chronic systolic heart failure, CAD, paroxysmal A. fib on Eliquis admitted to [facility] on  10/30/2020 following a fall at his home. Pt has PMH of alcoholism. Alcohol level on presentation was 176. Banana bag was hung and withdrawal protocol followed. Pt has not shown any signs of withdrawal. X-ray revealed a right hip fracture. Dr Saray Vela was consulted and pt underwent a cephalomedullary nailing of his R hip on 10/30. He will be WBAT in his RLE. Post op, pt had an GER and did develop some postoperative acute blood loss anemia (on Eliquis, which has been on hold) with some hypotension, which improved with crystalloid resuscitation and blood products. He was  noted to have hemoglobin persistently below 7 requiring transfusion with 2 units PRBCs on 10/31, then required 3rd unit pRBCs on 11/2 with Hgb <7. Hemodynamics and GER improved with resuscitation. H/H remained stable and renal function improved. He is now medically stable and is participating with therapy. No complaints today. Feeling better  REVIEW OF SYSTEMS:  Constitutional: No fevers No chills  Neck:No stiffness  Respiratory: No shortness of breath  Cardiovascular: No chest pain No palpitations  Gastrointestinal: No abdominal pain    Genitourinary: No Dysuria  Neurological: No headache, no confusion      PHYSICAL EXAM:  BP (!) 132/59   Pulse 51   Temp 97.6 °F (36.4 °C) (Temporal)   Resp 17   Ht 6' (1.829 m)   Wt 178 lb 4.8 oz (80.9 kg)   SpO2 98%   BMI 24.18 kg/m²     Constitutional: he appears well-developed and well-nourished.    Eyes - conjunctiva normal.  Pupils react to light  Ear, nose, throat -hearing intact 10/31/2020    LABGLOM >60 11/10/2020    GFRAA >59 11/10/2020     Lab Results   Component Value Date    INR 2.27 (H) 10/31/2020    INR 2.03 (H) 10/30/2020    INR 1.59 (H) 06/27/2020   No results found for: PHENYTOIN, ESR, CRP      11/10/20 1007 11/10/20 1008 11/10/20 1015   Restrictions/Precautions   Restrictions/Precautions Weight Bearing; Fall Risk  --   --    Lower Extremity Weight Bearing Restrictions   Right Lower Extremity Weight Bearing Weight Bearing As Tolerated  --   --    Subjective   Subjective  --  Pt brought from OT room. Agrees to therapy. --    Pain Screening   Patient Currently in Pain  --  Yes  --    Pain Assessment   Pain Assessment  --  0-10  --    Pain Level  --  4  --    Pain Type  --  Acute pain  --    Pain Location  --  Hip  --    Pain Orientation  --  Right  --    Bed Mobility   Rolling  --   --   --    Sit to Supine  --   --   --    Scooting  --   --   --    Transfers   Sit to Stand  --   --   --    Stand to sit  --   --   --    Bed to Chair  --   --   --    Lateral Transfers  --   --   --    Comment  --   --   --    Ambulation   Ambulation?  --   --  Yes   WB Status  --   --  WBAT RLE   Ambulation 1   Surface  --   --  level tile   Device  --   --  Rolling Walker   Other Apparatus  --   --  O2;Wheelchair follow   Assistance  --   --  Contact guard assistance   Quality of Gait  --   --  Gait speed decreased, Limited weight through RLE, increased knee flexion, slight forward flexed posture, eyes down   Gait Deviations  --   --  Slow Hyacinth;Decreased step length   Distance  --   --  15'   Comments  --   --  Pt has a step to pattern with LLE stepping to RLE, increasing weight bear through RLE. States distance is limited due to pain through RLE rather than endurance.    Exercises   Comments  --   --   --    Activity Tolerance   Activity Tolerance  --   --   --    Safety Devices   Type of devices  --   --   --         11/10/20 1021 11/10/20 1033 11/10/20 1045   Restrictions/Precautions assistance  --    Lateral Transfers Contact guard assistance;Minimal Assistance  (Min A ti lift RLE into bed)  --    Comment Pt SBA with transfers requiring min A to lift RLE into bed when performing stand step transfer WC to bed w/ RW.  --    Ambulation   Ambulation?  --   --    WB Status  --   --    Ambulation 1   Surface  --   --    Device  --   --    Other Apparatus  --   --    Assistance  --   --    Quality of Gait  --   --    Gait Deviations  --   --    Distance  --   --    Comments  --   --    Exercises   Comments  --   --    Activity Tolerance   Activity Tolerance  --   --    Safety Devices   Type of devices  --  Bed alarm in place;Call light within reach      Objective    Instrumental ADL's  Instrumental ADLs: Yes  Light Housekeeping  Light Housekeeping Level: Walker  Light Housekeeping Level of Assistance: Contact guard assistance  Light Housekeeping: Standing and folding laundry  Balance  Sitting Balance: Stand by assistance  Standing Balance: Contact guard assistance  Standing Balance  Time: 3 minutes x2  Activity: 2 handed static standing task  Transfers  Stand Step Transfers: Contact guard assistance  Sit to stand: Contact guard assistance  Stand to sit: Contact guard assistance  Transfer Comments: bed>w/c using RW  Type of ROM/Therapeutic Exercise  Type of ROM/Therapeutic Exercise: Cane/Wand  Comment: 3#; 3 sets of 10 in all planes  RECORD REVIEW: Previous medical records, medications were reviewed at today's visit    IMPRESSION:   1. Right hip fracture status post cephalomedullary nailing-pain control/PT/OT  2. DVT prophylaxis-SCDs and aspirin for now. Due to severe anemia will continue to hold Eliquis for now. 3.  History of A. fib-Eliquis currently on hold. Hemoglobin remains very low. On amiodarone. Hopefully can resume Eliquis later this week if hemoglobin remains stable/improving  4. Acute blood loss anemia with recent transfusions. Status post 1 unit packed red blood cells on 11/7. Hemoglobin improved. On Niferex. Stable but still low  5. Essential hypertension-continue current medications and monitoring  6. Chronic low back pain.  scheduled hydrocodone and increase the dose slightly. Lidoderm patch.   Improved    Staffing tomorrow

## 2020-11-11 LAB
HAPTOGLOBIN: 214 MG/DL (ref 30–200)
HCT VFR BLD CALC: 25 % (ref 42–52)
HCT VFR BLD CALC: 25.6 % (ref 42–52)
HCT VFR BLD CALC: 26.5 % (ref 42–52)
HEMOGLOBIN: 7.8 G/DL (ref 14–18)
HEMOGLOBIN: 8.2 G/DL (ref 14–18)
LACTATE DEHYDROGENASE: 211 U/L (ref 91–215)
MCH RBC QN AUTO: 29 PG (ref 27–31)
MCHC RBC AUTO-ENTMCNC: 31.2 G/DL (ref 33–37)
MCV RBC AUTO: 92.9 FL (ref 80–94)
PDW BLD-RTO: 15.1 % (ref 11.5–14.5)
PLATELET # BLD: 439 K/UL (ref 130–400)
PMV BLD AUTO: 8.9 FL (ref 9.4–12.4)
RBC # BLD: 2.69 M/UL (ref 4.7–6.1)
RETICULOCYTE ABSOLUTE COUNT: 0.05 M/UL (ref 0.03–0.12)
RETICULOCYTE COUNT PCT: 1.75 % (ref 0.5–1.5)
WBC # BLD: 5 K/UL (ref 4.8–10.8)

## 2020-11-11 PROCEDURE — 6370000000 HC RX 637 (ALT 250 FOR IP): Performed by: PSYCHIATRY & NEUROLOGY

## 2020-11-11 PROCEDURE — 2580000003 HC RX 258: Performed by: STUDENT IN AN ORGANIZED HEALTH CARE EDUCATION/TRAINING PROGRAM

## 2020-11-11 PROCEDURE — 97530 THERAPEUTIC ACTIVITIES: CPT

## 2020-11-11 PROCEDURE — 83615 LACTATE (LD) (LDH) ENZYME: CPT

## 2020-11-11 PROCEDURE — 85018 HEMOGLOBIN: CPT

## 2020-11-11 PROCEDURE — 1180000000 HC REHAB R&B

## 2020-11-11 PROCEDURE — 85045 AUTOMATED RETICULOCYTE COUNT: CPT

## 2020-11-11 PROCEDURE — 83010 ASSAY OF HAPTOGLOBIN QUANT: CPT

## 2020-11-11 PROCEDURE — 6370000000 HC RX 637 (ALT 250 FOR IP): Performed by: STUDENT IN AN ORGANIZED HEALTH CARE EDUCATION/TRAINING PROGRAM

## 2020-11-11 PROCEDURE — 85027 COMPLETE CBC AUTOMATED: CPT

## 2020-11-11 PROCEDURE — 97110 THERAPEUTIC EXERCISES: CPT

## 2020-11-11 PROCEDURE — 99233 SBSQ HOSP IP/OBS HIGH 50: CPT | Performed by: PSYCHIATRY & NEUROLOGY

## 2020-11-11 PROCEDURE — 97535 SELF CARE MNGMENT TRAINING: CPT

## 2020-11-11 PROCEDURE — 85014 HEMATOCRIT: CPT

## 2020-11-11 PROCEDURE — 36415 COLL VENOUS BLD VENIPUNCTURE: CPT

## 2020-11-11 PROCEDURE — 97116 GAIT TRAINING THERAPY: CPT

## 2020-11-11 RX ADMIN — STANDARDIZED SENNA CONCENTRATE 8.6 MG: 8.6 TABLET ORAL at 07:48

## 2020-11-11 RX ADMIN — METOPROLOL SUCCINATE 12.5 MG: 25 TABLET, EXTENDED RELEASE ORAL at 07:48

## 2020-11-11 RX ADMIN — STANDARDIZED SENNA CONCENTRATE 8.6 MG: 8.6 TABLET ORAL at 21:11

## 2020-11-11 RX ADMIN — ATORVASTATIN CALCIUM 40 MG: 40 TABLET, FILM COATED ORAL at 07:49

## 2020-11-11 RX ADMIN — LEVOTHYROXINE SODIUM 125 MCG: 25 TABLET ORAL at 05:34

## 2020-11-11 RX ADMIN — SODIUM CHLORIDE, PRESERVATIVE FREE 10 ML: 5 INJECTION INTRAVENOUS at 21:12

## 2020-11-11 RX ADMIN — ASPIRIN 325 MG ORAL TABLET 325 MG: 325 PILL ORAL at 07:47

## 2020-11-11 RX ADMIN — OXYCODONE HYDROCHLORIDE AND ACETAMINOPHEN 1 TABLET: 7.5; 325 TABLET ORAL at 12:19

## 2020-11-11 RX ADMIN — Medication 150 MG: at 07:48

## 2020-11-11 RX ADMIN — OXYCODONE HYDROCHLORIDE AND ACETAMINOPHEN 1 TABLET: 7.5; 325 TABLET ORAL at 21:11

## 2020-11-11 RX ADMIN — OXYCODONE HYDROCHLORIDE AND ACETAMINOPHEN 1 TABLET: 7.5; 325 TABLET ORAL at 17:13

## 2020-11-11 RX ADMIN — SODIUM CHLORIDE, PRESERVATIVE FREE 10 ML: 5 INJECTION INTRAVENOUS at 07:54

## 2020-11-11 RX ADMIN — PANTOPRAZOLE SODIUM 40 MG: 40 TABLET, DELAYED RELEASE ORAL at 05:34

## 2020-11-11 RX ADMIN — AMIODARONE HYDROCHLORIDE 200 MG: 200 TABLET ORAL at 07:49

## 2020-11-11 RX ADMIN — OXYCODONE HYDROCHLORIDE AND ACETAMINOPHEN 1 TABLET: 7.5; 325 TABLET ORAL at 07:50

## 2020-11-11 RX ADMIN — LISINOPRIL 5 MG: 5 TABLET ORAL at 07:49

## 2020-11-11 ASSESSMENT — PAIN DESCRIPTION - ORIENTATION
ORIENTATION: RIGHT

## 2020-11-11 ASSESSMENT — PAIN SCALES - GENERAL
PAINLEVEL_OUTOF10: 3
PAINLEVEL_OUTOF10: 4
PAINLEVEL_OUTOF10: 0
PAINLEVEL_OUTOF10: 5
PAINLEVEL_OUTOF10: 4
PAINLEVEL_OUTOF10: 8
PAINLEVEL_OUTOF10: 6
PAINLEVEL_OUTOF10: 4

## 2020-11-11 ASSESSMENT — ENCOUNTER SYMPTOMS
BLOOD IN STOOL: 0
SORE THROAT: 0
COUGH: 0
TROUBLE SWALLOWING: 0
EYE REDNESS: 0
SHORTNESS OF BREATH: 0
COLOR CHANGE: 0
WHEEZING: 0

## 2020-11-11 ASSESSMENT — PAIN DESCRIPTION - LOCATION
LOCATION: HIP
LOCATION: HIP
LOCATION: BACK;HIP
LOCATION: HIP

## 2020-11-11 ASSESSMENT — PAIN DESCRIPTION - ONSET
ONSET: ON-GOING

## 2020-11-11 ASSESSMENT — PAIN DESCRIPTION - PAIN TYPE
TYPE: ACUTE PAIN
TYPE: ACUTE PAIN;CHRONIC PAIN

## 2020-11-11 ASSESSMENT — PAIN DESCRIPTION - PROGRESSION
CLINICAL_PROGRESSION: GRADUALLY WORSENING
CLINICAL_PROGRESSION: GRADUALLY WORSENING
CLINICAL_PROGRESSION: NOT CHANGED
CLINICAL_PROGRESSION: NOT CHANGED

## 2020-11-11 ASSESSMENT — PAIN DESCRIPTION - DESCRIPTORS
DESCRIPTORS: SORE;ACHING
DESCRIPTORS: ACHING;SORE
DESCRIPTORS: ACHING;SORE;DISCOMFORT

## 2020-11-11 ASSESSMENT — PAIN - FUNCTIONAL ASSESSMENT: PAIN_FUNCTIONAL_ASSESSMENT: ACTIVITIES ARE NOT PREVENTED

## 2020-11-11 ASSESSMENT — PAIN DESCRIPTION - FREQUENCY
FREQUENCY: CONTINUOUS

## 2020-11-11 NOTE — PROGRESS NOTES
Occupational Therapy     11/11/20 4315   General   Diagnosis s/p R hip nail   Pain Assessment   Patient Currently in Pain Yes   Pain Assessment 0-10   Pain Level 6   Pain Type Acute pain   Pain Location Hip   Pain Orientation Right   Pain Descriptors Aching;Sore;Discomfort   Pain Frequency Continuous   Clinical Progression Gradually worsening   Response to Pain Intervention Patient Satisfied   Balance   Sitting Balance Supervision   Standing Balance Contact guard assistance   Functional Mobility   Functional - Mobility Device Rolling Walker   Activity To/from bathroom   Assist Level Contact guard assistance   Transfers   Sit to stand Contact guard assistance   Stand to sit Stand by assistance   Toilet Transfers   Toilet - Technique Ambulating   Equipment Used Standard bedside commode   Toilet Transfer Contact guard assistance   Shower Transfers   Shower - Transfer From [employer] - Transfer Type To and From   [employer] - Transfer To Shower seat with back   Shower - Technique Ambulating   Shower Transfers Contact Guard   Type of ROM/Therapeutic Exercise   Type of ROM/Therapeutic Exercise Rafy Alva BUE 12.5# 3 sets x 15 reps. Assessment   Performance deficits / Impairments Decreased endurance;Decreased coordination;Decreased functional mobility ; Decreased ADL status; Decreased balance;Decreased strength;Decreased high-level IADLs   Assessment Pt. progressing well, has made significant improvements in functional mobility and transfers, and shows overall improvement in endurance during activities. Would benefit from continued skilled therapy to address deficits and increase independence during ADLs/IADLs. Treatment Diagnosis s/p R hip nail   Prognosis Good   Timed Code Treatment Minutes 45 Minutes   Activity Tolerance   Activity Tolerance Patient Tolerated treatment well   Safety Devices   Safety Devices in place Yes   Type of devices Call light within reach; Bed alarm in place   Plan   Current Treatment Recommendations Gait Training;Patient/Caregiver Education & Training;Strengthening;Home Management Training;Balance Training;Equipment Evaluation, Education, & procurement; Functional Mobility Training; Endurance Training;Self-Care / ADL; Safety Education & Training

## 2020-11-11 NOTE — PROGRESS NOTES
Susan Merida  [id number]     11/11/20 1108 11/11/20 1113 11/11/20 1141   Restrictions/Precautions   Restrictions/Precautions Weight Bearing; Fall Risk  --   --    Lower Extremity Weight Bearing Restrictions   Right Lower Extremity Weight Bearing Weight Bearing As Tolerated  --   --    Subjective   Subjective Pt brought from OT, agrees to therapy. --   --    Pain Screening   Patient Currently in Pain No  --   --    Pain Assessment   Pain Assessment 0-10  --   --    Pain Level 0  --   --    Bed Mobility   Bridging  --   --   --    Rolling  --   --   --    Sit to Supine  --   --   --    Scooting  --   --   --    Transfers   Sit to Stand  --   --   --    Stand to sit  --   --   --    Bed to Chair  --   --   --    Lateral Transfers  --   --   --    Ambulation   Ambulation?  --  Yes  --    WB Status  --  WBAT  --    Ambulation 1   Surface  --  level tile  --    Device  --  Rolling Walker  --    Assistance  --  Contact guard assistance  --    Quality of Gait  --  decreased weight through RLE, increased knee flexion in RLE, LLE step to RLE, decreased stance time on RLE  --    Gait Deviations  --  Slow Hyacinth;Decreased step length  --    Distance  --  60'x2  --    Comments  --  Pt gait is improving. States that amb is becoming less difficult day by day. Turns incorporated. --    Wheelchair Activities   Propulsion  --  Yes  --    Propulsion 1   Propulsion  --  Manual  --    Level  --  Level Tile  --    Method  --  RUE;LUE;RLE;LLE  (BLE for 250')  --    Level of Assistance  --  Stand by assistance  --    Description/ Details  --  Pt able to propel self one lap around floor. Use of BLE used throughout as tolerated down short halls. BUE used throughout whole lap.   --    Distance  --  878',296'  --    Exercises   Comments  --   --   --    Conditions Requiring Skilled Therapeutic Intervention   Body structures, Functions, Activity limitations  --   --   --    Assessment  --   --   --    Activity Tolerance   Activity Tolerance --   --  Patient limited by pain  (R hip)   Safety Devices   Type of devices  --   --   --       11/11/20 1143 11/11/20 1144 11/11/20 1200   Restrictions/Precautions   Restrictions/Precautions  --   --   --    Lower Extremity Weight Bearing Restrictions   Right Lower Extremity Weight Bearing  --   --   --    Subjective   Subjective  --   --   --    Pain Screening   Patient Currently in Pain  --   --   --    Pain Assessment   Pain Assessment  --   --   --    Pain Level  --   --   --    Bed Mobility   Bridging  --   --  Stand by assistance   Rolling  --   --  Stand by assistance   Sit to Supine  --   --  Stand by assistance   Scooting  --   --  Stand by assistance   Transfers   Sit to Stand  --   --   --    Stand to sit  --   --   --    Bed to Chair  --   --   --    Lateral Transfers  --   --   --    Ambulation   Ambulation?  --   --   --    WB Status  --   --   --    Ambulation 1   Surface  --   --   --    Device  --   --   --    Assistance  --   --   --    Quality of Gait  --   --   --    Gait Deviations  --   --   --    Distance  --   --   --    Comments  --   --   --    Wheelchair Activities   Propulsion  --   --   --    Propulsion 1   Propulsion  --   --   --    Level  --   --   --    Method  --   --   --    Level of Assistance  --   --   --    Description/ Details  --   --   --    Distance  --   --   --    Exercises   Comments Supine BLE ex x10-15 AROM LLE and AAROM RLE. RLE ROM limited due to pain in hip.  --   --    Conditions Requiring Skilled Therapeutic Intervention   Body structures, Functions, Activity limitations  --  Decreased functional mobility ; Decreased ADL status; Decreased ROM; Decreased strength;Decreased endurance;Decreased balance;Decreased high-level IADLs;Decreased coordination; Increased pain  --    Assessment  --  Pt tolerated session within limits of pain today. Pt amb 60'x2 CGA without WC follow. Pt propulsed self down santo for 550' and 225' SBA only requiring VC to correct deviation.  Pt performed supine BLE ex x10-15 AROM on LLE and AAROM on RLE. Limited in ROM on RLE due to pain in hip.   --    Activity Tolerance   Activity Tolerance  --   --   --    Safety Devices   Type of devices  --   --   --       11/11/20 1201 11/11/20 1202   Restrictions/Precautions   Restrictions/Precautions  --   --    Lower Extremity Weight Bearing Restrictions   Right Lower Extremity Weight Bearing  --   --    Subjective   Subjective  --   --    Pain Screening   Patient Currently in Pain  --   --    Pain Assessment   Pain Assessment  --   --    Pain Level  --   --    Bed Mobility   Bridging  --   --    Rolling  --   --    Sit to Supine  --   --    Scooting  --   --    Transfers   Sit to Stand Contact guard assistance  --    Stand to sit Contact guard assistance  --    Bed to Chair Contact guard assistance  --    Lateral Transfers Contact guard assistance  (Stand step WC to bed w/ RW)  --    Ambulation   Ambulation?  --   --    WB Status  --   --    Ambulation 1   Surface  --   --    Device  --   --    Assistance  --   --    Quality of Gait  --   --    Gait Deviations  --   --    Distance  --   --    Comments  --   --    Wheelchair Activities   Propulsion  --   --    Propulsion 1   Propulsion  --   --    Level  --   --    Method  --   --    Level of Assistance  --   --    Description/ Details  --   --    Distance  --   --    Exercises   Comments  --   --    Conditions Requiring Skilled Therapeutic Intervention   Body structures, Functions, Activity limitations  --   --    Assessment  --   --    Activity Tolerance   Activity Tolerance  --   --    Safety Devices   Type of devices  --  Bed alarm in place;Call light within reach   Electronically signed by Sobeida Ryan PTA on 11/11/2020 at 12:03 PM

## 2020-11-11 NOTE — PROGRESS NOTES
Patient:   Shikha Tinajero  MR#:    [id number]   Room:    9051/079-03   YOB: 1950  Date of Progress Note: 11/11/2020  Time of Note                           8:17 AM  Consulting Physician:   Luz Maria Craig M.D. Attending Physician:  Luz Maria Craig MD     CHIEF COMPLAINT: Right hip pain     Subjective  This 78 y.o. male  with chronic systolic heart failure, CAD, paroxysmal A. fib on Eliquis admitted to [facility] on  10/30/2020 following a fall at his home. Pt has PMH of alcoholism. Alcohol level on presentation was 176. Banana bag was hung and withdrawal protocol followed. Pt has not shown any signs of withdrawal. X-ray revealed a right hip fracture. Dr Nancy Esparza was consulted and pt underwent a cephalomedullary nailing of his R hip on 10/30. He will be WBAT in his RLE. Post op, pt had an GER and did develop some postoperative acute blood loss anemia (on Eliquis, which has been on hold) with some hypotension, which improved with crystalloid resuscitation and blood products. He was  noted to have hemoglobin persistently below 7 requiring transfusion with 2 units PRBCs on 10/31, then required 3rd unit pRBCs on 11/2 with Hgb <7. Hemodynamics and GER improved with resuscitation. H/H remained stable and renal function improved. He is now medically stable and is participating with therapy. No complaints this am  REVIEW OF SYSTEMS:  Constitutional: No fevers No chills  Neck:No stiffness  Respiratory: No shortness of breath  Cardiovascular: No chest pain No palpitations  Gastrointestinal: No abdominal pain    Genitourinary: No Dysuria  Neurological: No headache, no confusion      PHYSICAL EXAM:  /71   Pulse 55   Temp 97.5 °F (36.4 °C) (Temporal)   Resp 16   Ht 6' (1.829 m)   Wt 178 lb 4.8 oz (80.9 kg)   SpO2 90%   BMI 24.18 kg/m²     Constitutional: he appears well-developed and well-nourished. Eyes - conjunctiva normal.  Pupils react to light  Ear, nose, throat -hearing intact to voice.  No scars, masses, or lesions over external nose or ears, no atrophy of tongue  Neck-symmetric, no masses noted, no jugular vein distension  Respiration- chest wall appears symmetric, good expansion,   normal effort without use of accessory muscles  Cardiovascular- RRR  Musculoskeletal - no significant wasting of muscles noted, no bony deformities, gait no gross ataxia  Extremities-no clubbing, cyanosis or edema  Skin - warm, dry, and intact. No rash, erythema, or pallor. Psychiatric - mood, affect, and behavior appear normal.      Neurology  NEUROLOGICAL EXAM:      Mental status   Awake, alert, fluent oriented x 3 appropriate affect  Attention and concentration appear appropriate  Recent and remote memory appears unremarkable  Speech normal without dysarthria  No clear issues with language       Cranial Nerves   CN II- Visual fields grossly unremarkable  CN III, IV,VI-EOMI, No nystagmus, conjugate eye movements, no ptosis  CN VII-no facial asymmetry  CN VIII-Hearing intact   CN IX and X- Palate elevates in midline  CN XI-good shoulder shrug  CN XII-Tongue midline with no fasciculations or fibrillations          Motor function  Antigravity x 4     Sensory function Intact to light touch     Cerebellar F-N intact     Tremor None present     Gait                  Not tested           Nursing/pcp notes, imaging,labs and vitals reviewed.      PT,OT and/or speech notes reviewed    Lab Results   Component Value Date    WBC 5.0 11/11/2020    HGB 7.8 (L) 11/11/2020    HCT 25.0 (L) 11/11/2020    MCV 92.9 11/11/2020     (H) 11/11/2020     Lab Results   Component Value Date     11/10/2020    K 3.9 11/10/2020     11/10/2020    CO2 28 11/10/2020    BUN 18 11/10/2020    CREATININE 1.1 11/10/2020    GLUCOSE 80 11/10/2020    CALCIUM 8.2 (L) 11/10/2020    PROT 5.0 (L) 10/31/2020    LABALBU 2.8 (L) 10/31/2020    BILITOT 1.2 10/31/2020    ALKPHOS 104 10/31/2020     (H) 10/31/2020    ALT 74 (H) 10/31/2020    LABGLOM >60 w/RW     Long term goals  Time Frame for Long term goals : 2 weeks  Long term goal 1: IND bed mobility  Long term goal 2: IND transfers  Long term goal 3: IND WC propulsion 150'  Long term goal 4: IND amb 150'+ w/RW  Long term goal 5: IND HEP     ASSESSMENT:  Decreased RLE knee extension noted w/ex, improved strength noted in LLE     SPEECH THERAPY        OCCUPATIONAL THERAPY     CURRENT IRF-DEQUAN SCORES  Eating: CARE Score: 5  Oral Hygiene: CARE Score: 5  Toileting: CARE Score: 2  Shower/Bathe: CARE Score: 3  Upper Body Dressing: CARE Score: 3  Lower Body Dressing: CARE Score: 2  Footwear: CARE Score: 4  Toilet Transfers: CARE Score: 3  Picking Up Object:  CARE Score: 1        UE Functioning:  BUE AROM WFL      Pain Assessment:  Pain Level: 4  Pain Location: Hip     STGs:  Short term goals  Time Frame for Short term goals: 1 week  Short term goal 1: Supervision with toilet hygiene. Short term goal 2: Supervision with toilet transfer. Short term goal 3: Supervision wih showering/bathing with AE as needed. Short term goal 4: Supervision with LB dressing with AE as needed. Short term goal 5: Supervision with UB dressing. Short term goal 6: Supervision with putting on/taking off footwear with AE as needed.     LTGs:  Long term goals  Time Frame for Long term goals : 2 weeks  Long term goal 1: Modified Independent with toilet hygiene. Long term goal 2: Modified Independent with toilet transfer. Long term goal 3: Modified Independent wih showering/bathing with AE as needed. Long term goal 4: Modified Independent with LB dressing with AE as needed. Long term goal 5: Independent with UB dressing. Long term goal 7: Modified Independent with putting on/taking off footwear with AE as needed. Long term goal 8: Verbalize DME needs. Long term goal 9: Complete IADL/homemaking task with Modified [place].     Assessment:  Decreased endurance; Decreased coordination; Decreased functional mobility ;  Decreased ADL

## 2020-11-11 NOTE — PROGRESS NOTES
Trish Alamo  [id number]     11/11/20 1316 11/11/20 1317 11/11/20 1319   Restrictions/Precautions   Restrictions/Precautions Weight Bearing; Fall Risk  --   --    Lower Extremity Weight Bearing Restrictions   Right Lower Extremity Weight Bearing Weight Bearing As Tolerated  --   --    Subjective   Subjective Pt in bed, agrees to therapy. --   --    Pain Screening   Patient Currently in Pain Yes  --   --    Pain Assessment   Pain Assessment 0-10  --   --    Pain Level 5  --   --    Pain Type Acute pain  --   --    Pain Location Hip  --   --    Pain Orientation Right  --   --    Bed Mobility   Bridging  --   --  Stand by assistance   Rolling  --   --  Stand by assistance   Supine to Sit  --   --  Stand by assistance   Sit to Supine  --   --  Stand by assistance   Scooting  --   --  Stand by assistance   Transfers   Sit to Stand  --   --   --    Stand to sit  --   --   --    Car Transfer  --   --   --    Ambulation   Ambulation?  --  Yes  --    WB Status  --  WBAT  --    Ambulation 1   Surface  --  level tile  --    Device  --  Rolling Walker  --    Assistance  --  Contact guard assistance  --    Quality of Gait  --  decreased weight through RLE, increased knee flexion in RLE, LLE step to RLE, decreased stance time on RLE  --    Gait Deviations  --  Slow Hyacinth;Decreased step length  --    Distance  --  60', 76'  --    Comments  --  Pt amb to water fountain and back to gym then from gym to OT room. --    Wheelchair Activities   Propulsion  --  Yes  --    Propulsion 1   Propulsion  --  Manual  --    Level  --  Level Tile  --    Method  --  RUE;LUE  --    Level of Assistance  --  Stand by assistance  --    Description/ Details  --  Pt able to propel self one lap around floor. Use of BLE used throughout as tolerated down short halls. BUE used throughout whole lap.   --    Distance  --  225'  --    Exercises   Comments  --   --   --    Conditions Requiring Skilled Therapeutic Intervention   Body structures, Functions, Activity limitations  --   --   --    Assessment  --   --   --    Activity Tolerance   Activity Tolerance  --   --   --       11/11/20 1327 11/11/20 1339 11/11/20 1340   Restrictions/Precautions   Restrictions/Precautions  --   --   --    Lower Extremity Weight Bearing Restrictions   Right Lower Extremity Weight Bearing  --   --   --    Subjective   Subjective  --   --   --    Pain Screening   Patient Currently in Pain  --   --   --    Pain Assessment   Pain Assessment  --   --   --    Pain Level  --   --   --    Pain Type  --   --   --    Pain Location  --   --   --    Pain Orientation  --   --   --    Bed Mobility   Bridging  --   --   --    Rolling  --   --   --    Supine to Sit  --   --   --    Sit to Supine  --   --   --    Scooting  --   --   --    Transfers   Sit to Stand Contact guard assistance  --   --    Stand to sit Contact guard assistance  --   --    Car Transfer Contact guard assistance  --   --    Ambulation   Ambulation?  --   --   --    WB Status  --   --   --    Ambulation 1   Surface  --   --   --    Device  --   --   --    Assistance  --   --   --    Quality of Gait  --   --   --    Gait Deviations  --   --   --    Distance  --   --   --    Comments  --   --   --    Wheelchair Activities   Propulsion  --   --   --    Propulsion 1   Propulsion  --   --   --    Level  --   --   --    Method  --   --   --    Level of Assistance  --   --   --    Description/ Details  --   --   --    Distance  --   --   --    Exercises   Comments  --  Pt performed car transfer CGA being able to lif r leg into car himself.   --    Conditions Requiring Skilled Therapeutic Intervention   Body structures, Functions, Activity limitations  --   --   --    Assessment  --   --   --    Activity Tolerance   Activity Tolerance  --   --  Patient limited by pain  (R hip)      11/11/20 1341   Restrictions/Precautions   Restrictions/Precautions  --    Lower Extremity Weight Bearing Restrictions   Right Lower Extremity Weight Bearing

## 2020-11-11 NOTE — PROGRESS NOTES
Occupational Therapy     11/11/20 1000   Pain Assessment   Patient Currently in Pain No   Pain Assessment 0-10   Pain Level 4   Pain Type Acute pain;Chronic pain   Pain Location Back; Hip   Pain Orientation Right   Pain Descriptors Sore;Aching   Pain Frequency Continuous   Clinical Progression Not changed   Response to Pain Intervention Patient Satisfied   Balance   Sitting Balance Supervision   Standing Balance Contact guard assistance   Functional Mobility   Functional - Mobility Device Rolling Walker   Activity Other   Assist Level Contact guard assistance   Transfers   Sit to stand Contact guard assistance   Stand to sit Contact guard assistance   Type of ROM/Therapeutic Exercise   Type of ROM/Therapeutic Exercise Cane/Wand   Comment 2# 1 set x 15 reps. Short term goals   Short term goal 4 MET   Assessment   Performance deficits / Impairments Decreased endurance;Decreased coordination;Decreased functional mobility ; Decreased ADL status; Decreased balance;Decreased strength;Decreased high-level IADLs   Treatment Diagnosis s/p R hip nail   Prognosis Good   Timed Code Treatment Minutes 60 Minutes   Activity Tolerance   Activity Tolerance Patient Tolerated treatment well   Safety Devices   Safety Devices in place Yes   Type of devices Gait belt  (Given to PT.)   Plan   Current Treatment Recommendations Gait Training;Patient/Caregiver Education & Training;Strengthening;Home Management Training;Balance Training;Equipment Evaluation, Education, & procurement; Functional Mobility Training; Endurance Training;Self-Care / ADL; Safety Education & Training      11/11/20 1000   Oral Hygiene   Assistance Needed Independent   CARE Score 6   Shower/Bathe Self   Assistance Needed Partial/moderate assistance   Comment Min A. CARE Score 3   Upper Body Dressing   Assistance Needed Supervision or touching assistance   Comment Supervision.    CARE Score 4   Lower Body Dressing   Assistance Needed Partial/moderate assistance   Comment

## 2020-11-11 NOTE — PLAN OF CARE
Problem: Falls - Risk of:  Goal: Will remain free from falls  Description: Will remain free from falls  11/11/2020 0004 by Michelle Graves LPN  Outcome: Ongoing  11/10/2020 1208 by Malu Nichols RN  Outcome: Ongoing  Goal: Absence of physical injury  Description: Absence of physical injury  11/11/2020 0004 by Michelle Graves LPN  Outcome: Ongoing  11/10/2020 1208 by Malu Nichols RN  Outcome: Ongoing     Problem: SAFETY  Goal: Free from accidental physical injury  11/11/2020 0004 by Michelle Graves LPN  Outcome: Ongoing  11/10/2020 1208 by Malu Nichols RN  Outcome: Ongoing     Problem: DAILY CARE  Goal: Daily care needs are met  11/11/2020 0004 by Michelle Graves LPN  Outcome: Ongoing  11/10/2020 1208 by Malu Nichols RN  Outcome: Ongoing     Problem: PAIN  Goal: Patient's pain/discomfort is manageable  11/11/2020 0004 by Michelle Graves LPN  Outcome: Ongoing  11/10/2020 1208 by Malu Nichols RN  Outcome: Ongoing     Problem: SKIN INTEGRITY  Goal: Skin integrity is maintained or improved  11/11/2020 0004 by Michelle Graves LPN  Outcome: Ongoing  11/10/2020 1208 by Malu Nichols RN  Outcome: Ongoing     Problem: KNOWLEDGE DEFICIT  Goal: Patient/S.O. demonstrates understanding of disease process, treatment plan, medications, and discharge instructions.   11/11/2020 0004 by Michelle Graves LPN  Outcome: Ongoing  11/10/2020 1208 by Malu Nichols RN  Outcome: Ongoing     Problem: DISCHARGE BARRIERS  Goal: Patient's continuum of care needs are met  11/11/2020 0004 by Michelle Graves LPN  Outcome: Ongoing  11/10/2020 1208 by Malu Nichols RN  Outcome: Ongoing     Problem: Pain:  Goal: Pain level will decrease  Description: Pain level will decrease  11/11/2020 0004 by Michelle Graves LPN  Outcome: Ongoing  11/10/2020 1208 by Malu Nichols RN  Outcome: Ongoing     Problem: Infection - Surgical Site:  Goal: Will show no infection signs and symptoms  Description: Will show no infection signs and symptoms  11/11/2020 0004 by Tiff Braun LPN  Outcome: Ongoing  11/10/2020 1208 by Luís Dash RN  Outcome: Ongoing     Problem: Skin Integrity:  Goal: Will show no infection signs and symptoms  Description: Will show no infection signs and symptoms  11/11/2020 0004 by Tiff Braun LPN  Outcome: Ongoing  11/10/2020 1208 by Luís Dsah RN  Outcome: Ongoing  Goal: Absence of new skin breakdown  Description: Absence of new skin breakdown  11/11/2020 0004 by Tiff Braun LPN  Outcome: Ongoing  11/10/2020 1208 by Luís Dash RN  Outcome: Ongoing

## 2020-11-11 NOTE — CONSULTS
Consult Note            Date:11/11/2020        Patient Name:Damian Meredith     YOB: 1950     Age:70 y.o. Inpatient consult to Hospitalist  Consult performed by: Halle Arzate MD  Consult ordered by: Gurmeet Betancur MD  Reason for consult: Anemia work up         Chief Complaint   \" Chronic anemia\"      History Obtained From   patient, electronic medical record    History of Present Illness   Patient is a 51-year-old  male with a known past medical history of chronic systolic congestive heart failure, coronary artery disease, paroxysmal atrial fibrillation routinely anticoagulated with Eliquis modalities however this is been held status post noted preoperative anemia after undergoing cephalic medullary nailing of the right hip on 10/30/2020. Required 2 units packed red blood cells on 10/31 due to hemoglobin less than 7, received 1/3 unit 11/2/2020 due to hemoglobin less than 7. Ultimately was clinically stable transition to inpatient eighth floor rehabilitation service on 11/2/2020. Most recently received 1 unit packed red blood cells 11/7/2020. Patient has tolerated physical therapy efforts well, hospital medicine service was consulted due to ongoing anemia 11/11/2020. Conversation had with patient at the bedside states he has never seen a hematologist, denies any bloody stools or bloody sputum. Denies any recent trauma besides the recent orthopedic intervention. Does not feel any weaker than usual.  Review of electronic medical records reveals iron/TIBC/ferritin/vitamin B12/folate within normal limits 10/30/2020. Patient denies any headache, loss of consciousness, syncopal events, change in vision, chest pain, abdominal pain, nausea vomiting, fevers or chills.     Past Medical History     Past Medical History:   Diagnosis Date    CAD (coronary artery disease)     Gout     Hypertension     Non-ST elevation MI (NSTEMI) ([facility] [address] 12/28/14    Palliative care patient 06/29/2020 Past Surgical History     Past Surgical History:   Procedure Laterality Date    CARDIAC CATHETERIZATION  12/29/14  [facility]    angioplasty, stent to LAD. EF 45%    CHOLECYSTECTOMY      FEMUR FRACTURE SURGERY Right 10/30/2020    JEANINE BLACK performed by Rebekah De MD at [address] [place] [address]        Medications     Prior to Admission medications    Medication Sig Start Date End Date Taking? Authorizing Provider   lisinopril (PRINIVIL;ZESTRIL) 5 MG tablet Take 1 tablet by mouth daily 10/22/20   Cristobal Kaufman DO   vitamin D (ERGOCALCIFEROL) 1.25 MG (17696 UT) CAPS capsule Take 1 capsule by mouth once a week 10/4/20   Ced Santacruz MD   acetaminophen (TYLENOL) 325 MG tablet Take 650 mg by mouth every 6 hours as needed for Pain    Historical Provider, MD   levothyroxine (SYNTHROID) 125 MCG tablet Take 125 mcg by mouth Daily    Historical Provider, MD   metoprolol succinate (TOPROL XL) 25 MG extended release tablet Take 12.5 mg by mouth daily 1/2 tab    Historical Provider, MD   amiodarone (CORDARONE) 200 MG tablet Take 1 tablet by mouth daily 7/28/20   ADY Watts   aspirin 81 MG chewable tablet Take 81 mg by mouth daily    Historical Provider, MD   atorvastatin (LIPITOR) 40 MG tablet Take 40 mg by mouth daily. 12/30/14   DELANEY Darden MD        oxyCODONE-acetaminophen (PERCOCET) 7.5-325 MG per tablet 1 tablet, 4x Daily  lidocaine 4 % external patch 1 patch, Daily  iron polysaccharides (NIFEREX) capsule 150 mg, Daily  aspirin tablet 325 mg, Daily  influenza quadrivalent subunit vaccine (FLUCELVAX) injection 0.5 mL, Once  lisinopril (PRINIVIL;ZESTRIL) tablet 5 mg, Daily  metoprolol succinate (TOPROL XL) extended release tablet 12.5 mg, Daily  vitamin D (ERGOCALCIFEROL) capsule 50,000 Units, Weekly  promethazine (PHENERGAN) tablet 12.5 mg, Q6H PRN    Or  ondansetron (ZOFRAN) injection 4 mg, Q6H PRN  sodium chloride flush 0.9 % injection 10 mL, 2 times per day  sodium chloride flush 0.9 % injection 10 mL, PRN  amiodarone (CORDARONE) tablet 200 mg, Daily  atorvastatin (LIPITOR) tablet 40 mg, Daily  levothyroxine (SYNTHROID) tablet 125 mcg, Daily  acetaminophen (TYLENOL) suppository 650 mg, Q6H PRN  polyethylene glycol (GLYCOLAX) packet 17 g, Daily PRN  LORazepam (ATIVAN) injection 1 mg, Q6H PRN  pantoprazole (PROTONIX) tablet 40 mg, QAM AC  acetaminophen (TYLENOL) tablet 650 mg, Q4H PRN  fleet rectal enema 1 enema, Daily PRN  senna (SENOKOT) tablet 8.6 mg, BID  polyethylene glycol (GLYCOLAX) packet 17 g, Daily        Allergies   Patient has no known allergies. Social History     Social History     Tobacco History     Smoking Status  Former Smoker Smoking Start Date  1/1/1976 Quit date  1/1/1979 Smoking Frequency  1 pack/day for 3 years (3 pk yrs)    Smoking Tobacco Type  Cigars    Smokeless Tobacco Use  Never Used          Alcohol History     Alcohol Use Status  Yes Drinks/Week  3 Shots of liquor per week Amount  3.0 standard drinks of alcohol/wk Comment  daily          Drug Use     Drug Use Status  No          Sexual Activity     Sexually Active  Yes Partners  Female                Family History     Family History   Problem Relation Age of Onset    No Known Problems Mother     Heart Disease Father        Review of Systems   Review of Systems   Constitutional: Negative for activity change and fever. Fatigue: intermittent    HENT: Negative for sore throat and trouble swallowing. Eyes: Negative for redness and visual disturbance. Respiratory: Negative for cough, shortness of breath and wheezing. Gastrointestinal: Negative for blood in stool. Genitourinary: Negative for hematuria. Musculoskeletal: Positive for arthralgias. Skin: Negative for color change, pallor and rash. Neurological: Negative for dizziness, light-headedness and headaches. Psychiatric/Behavioral: Negative for agitation and confusion.         Physical Exam   /71   Pulse 55   Temp 97.5 °F (36.4 °C) (Temporal)   Resp 16 Ht 6' (1.829 m)   Wt 178 lb 4.8 oz (80.9 kg)   SpO2 90%   BMI 24.18 kg/m²      Physical Exam  Vitals signs and nursing note reviewed. Constitutional:       General: He is not in acute distress. Appearance: He is not toxic-appearing. HENT:      Head: Normocephalic and atraumatic. Nose: Nose normal.   Eyes:      General:         Right eye: No discharge. Left eye: No discharge. Conjunctiva/sclera: Conjunctivae normal.   Cardiovascular:      Rate and Rhythm: Normal rate. Rhythm regularly irregular. Pulmonary:      Effort: Pulmonary effort is normal.      Breath sounds: No wheezing or rales. Abdominal:      General: Bowel sounds are normal.      Tenderness: There is no abdominal tenderness. There is no guarding or rebound. Musculoskeletal:      Comments: Range of motion bilaterally stable, slight weakness noted right hip   Skin:     General: Skin is warm. Capillary Refill: Capillary refill takes less than 2 seconds. Findings: No erythema. Neurological:      General: No focal deficit present. Mental Status: He is alert. Mental status is at baseline. Cranial Nerves: No cranial nerve deficit. Psychiatric:         Mood and Affect: Mood normal.         Labs    CBC:  Recent Labs     11/09/20  0337 11/10/20  0423 11/11/20  0422 11/11/20  1253   WBC 6.0 4.8 5.0  --    RBC 2.72* 2.72* 2.69*  --    HGB 8.0* 8.0* 7.8*  --    HCT 25.6* 25.8* 25.0* 26.5*   MCV 94.1* 94.9* 92.9  --    RDW 15.2* 15.2* 15.1*  --     392 439*  --      CHEMISTRIES:  Recent Labs     11/09/20  0337 11/10/20  0423    137   K 4.1 3.9    102   CO2 26 28   BUN 17 18   CREATININE 1.1 1.1   GLUCOSE 78 80     PT/INR:No results for input(s): PROTIME, INR in the last 72 hours. APTT:No results for input(s): APTT in the last 72 hours. LIVER PROFILE:No results for input(s): AST, ALT, BILIDIR, BILITOT, ALKPHOS in the last 72 hours. Imaging/Diagnostics   No results found.     Assessment Hospital Problems           Last Modified POA    Closed nondisplaced fracture of lesser trochanter of right femur with routine healing 11/3/2020 Yes          Plan     Postoperative acute blood loss anemia/normocytic anemia  - iron/TIBC/ferritin/vitamin B12/folate within normal limits 10/30/2020.  -Lactic dehydrogenase/absolute reticulocyte count within normal limits, haptoglobin in process  -Patient denies alcoholic history, currently holding Eliquis modality  -Agree with transfusion for levels less than 7, in the setting of no acute bleeding continue with current modalities.  -Would recommend referral to hematology services in outpatient setting    Status post cephalomedullary nailing of right hip fracture  -Continue with PT/OT, pain control modalities     Chronic systolic congestive heart failure  -Continue with patient's home diabetes  -Continue to monitor I/os     Paroxysmal atrial fibrillation  -Currently rate controlled, Eliquis modalities currently held    Hypothyroidism-chronic condition, continue with Synthroid    Coronary artery disease/hypertension-patient currently receiving aspirin therapy, continue with antihypertensive regimen     Chronic issues:   Paroxysmal A. fib-continue amiodarone, hold Eliquis  Hypothyroidism-levothyroxine  CAD/hypertension-currently holding regimen      EMR Dragon/Transcription disclaimer:   Much of this encounter note is an electronic transcription/translation of spoken language to printed text.  The electronic translation of spoken language may permit erroneous, or at times, nonsensical words or phrases to be inadvertently transcribed; although attempts have made to review the note for such errors, some may still exist.    Electronically signed by   Echo Wood   Internal Medicine Hospitalist  On 11/11/2020  At 3:13 PM

## 2020-11-12 LAB
ANION GAP SERPL CALCULATED.3IONS-SCNC: 10 MMOL/L (ref 7–19)
BASOPHILS ABSOLUTE: 0.1 K/UL (ref 0–0.2)
BASOPHILS RELATIVE PERCENT: 1.9 % (ref 0–1)
BUN BLDV-MCNC: 16 MG/DL (ref 8–23)
CALCIUM SERPL-MCNC: 8.3 MG/DL (ref 8.8–10.2)
CHLORIDE BLD-SCNC: 105 MMOL/L (ref 98–111)
CO2: 25 MMOL/L (ref 22–29)
CREAT SERPL-MCNC: 0.9 MG/DL (ref 0.5–1.2)
EOSINOPHILS ABSOLUTE: 0.2 K/UL (ref 0–0.6)
EOSINOPHILS RELATIVE PERCENT: 3.5 % (ref 0–5)
GFR AFRICAN AMERICAN: >59
GFR NON-AFRICAN AMERICAN: >60
GLUCOSE BLD-MCNC: 79 MG/DL (ref 74–109)
HCT VFR BLD CALC: 24.8 % (ref 42–52)
HEMOGLOBIN: 7.8 G/DL (ref 14–18)
IMMATURE GRANULOCYTES #: 0 K/UL
LYMPHOCYTES ABSOLUTE: 1.1 K/UL (ref 1.1–4.5)
LYMPHOCYTES RELATIVE PERCENT: 23.4 % (ref 20–40)
MCH RBC QN AUTO: 28.9 PG (ref 27–31)
MCHC RBC AUTO-ENTMCNC: 31.5 G/DL (ref 33–37)
MCV RBC AUTO: 91.9 FL (ref 80–94)
MONOCYTES ABSOLUTE: 0.4 K/UL (ref 0–0.9)
MONOCYTES RELATIVE PERCENT: 8.9 % (ref 0–10)
NEUTROPHILS ABSOLUTE: 3 K/UL (ref 1.5–7.5)
NEUTROPHILS RELATIVE PERCENT: 61.9 % (ref 50–65)
PDW BLD-RTO: 15 % (ref 11.5–14.5)
PLATELET # BLD: 473 K/UL (ref 130–400)
PMV BLD AUTO: 8.8 FL (ref 9.4–12.4)
POTASSIUM REFLEX MAGNESIUM: 4.5 MMOL/L (ref 3.5–5)
RBC # BLD: 2.7 M/UL (ref 4.7–6.1)
SODIUM BLD-SCNC: 140 MMOL/L (ref 136–145)
WBC # BLD: 4.8 K/UL (ref 4.8–10.8)

## 2020-11-12 PROCEDURE — 6370000000 HC RX 637 (ALT 250 FOR IP): Performed by: PSYCHIATRY & NEUROLOGY

## 2020-11-12 PROCEDURE — 99232 SBSQ HOSP IP/OBS MODERATE 35: CPT | Performed by: PSYCHIATRY & NEUROLOGY

## 2020-11-12 PROCEDURE — 97116 GAIT TRAINING THERAPY: CPT

## 2020-11-12 PROCEDURE — 6370000000 HC RX 637 (ALT 250 FOR IP): Performed by: FAMILY MEDICINE

## 2020-11-12 PROCEDURE — 80048 BASIC METABOLIC PNL TOTAL CA: CPT

## 2020-11-12 PROCEDURE — 1180000000 HC REHAB R&B

## 2020-11-12 PROCEDURE — 97110 THERAPEUTIC EXERCISES: CPT

## 2020-11-12 PROCEDURE — 6370000000 HC RX 637 (ALT 250 FOR IP): Performed by: STUDENT IN AN ORGANIZED HEALTH CARE EDUCATION/TRAINING PROGRAM

## 2020-11-12 PROCEDURE — 97530 THERAPEUTIC ACTIVITIES: CPT

## 2020-11-12 PROCEDURE — 85025 COMPLETE CBC W/AUTO DIFF WBC: CPT

## 2020-11-12 PROCEDURE — 2580000003 HC RX 258: Performed by: STUDENT IN AN ORGANIZED HEALTH CARE EDUCATION/TRAINING PROGRAM

## 2020-11-12 PROCEDURE — 36415 COLL VENOUS BLD VENIPUNCTURE: CPT

## 2020-11-12 RX ADMIN — ASPIRIN 325 MG ORAL TABLET 325 MG: 325 PILL ORAL at 09:07

## 2020-11-12 RX ADMIN — OXYCODONE HYDROCHLORIDE AND ACETAMINOPHEN 1 TABLET: 7.5; 325 TABLET ORAL at 17:14

## 2020-11-12 RX ADMIN — ATORVASTATIN CALCIUM 40 MG: 40 TABLET, FILM COATED ORAL at 09:08

## 2020-11-12 RX ADMIN — SODIUM CHLORIDE, PRESERVATIVE FREE 10 ML: 5 INJECTION INTRAVENOUS at 21:54

## 2020-11-12 RX ADMIN — SODIUM CHLORIDE, PRESERVATIVE FREE 10 ML: 5 INJECTION INTRAVENOUS at 10:04

## 2020-11-12 RX ADMIN — OXYCODONE HYDROCHLORIDE AND ACETAMINOPHEN 1 TABLET: 7.5; 325 TABLET ORAL at 21:42

## 2020-11-12 RX ADMIN — OXYCODONE HYDROCHLORIDE AND ACETAMINOPHEN 1 TABLET: 7.5; 325 TABLET ORAL at 12:54

## 2020-11-12 RX ADMIN — OXYCODONE HYDROCHLORIDE AND ACETAMINOPHEN 1 TABLET: 7.5; 325 TABLET ORAL at 09:08

## 2020-11-12 RX ADMIN — AMIODARONE HYDROCHLORIDE 200 MG: 200 TABLET ORAL at 09:07

## 2020-11-12 RX ADMIN — METOPROLOL SUCCINATE 12.5 MG: 25 TABLET, EXTENDED RELEASE ORAL at 09:07

## 2020-11-12 RX ADMIN — Medication 150 MG: at 09:07

## 2020-11-12 RX ADMIN — LISINOPRIL 5 MG: 5 TABLET ORAL at 09:08

## 2020-11-12 RX ADMIN — PANTOPRAZOLE SODIUM 40 MG: 40 TABLET, DELAYED RELEASE ORAL at 06:00

## 2020-11-12 RX ADMIN — APIXABAN 5 MG: 5 TABLET, FILM COATED ORAL at 22:20

## 2020-11-12 RX ADMIN — LEVOTHYROXINE SODIUM 125 MCG: 25 TABLET ORAL at 06:00

## 2020-11-12 ASSESSMENT — PAIN SCALES - GENERAL
PAINLEVEL_OUTOF10: 4
PAINLEVEL_OUTOF10: 6
PAINLEVEL_OUTOF10: 0
PAINLEVEL_OUTOF10: 7
PAINLEVEL_OUTOF10: 6
PAINLEVEL_OUTOF10: 8
PAINLEVEL_OUTOF10: 5

## 2020-11-12 ASSESSMENT — PAIN DESCRIPTION - DESCRIPTORS
DESCRIPTORS: ACHING;DISCOMFORT
DESCRIPTORS: ACHING;SORE
DESCRIPTORS: ACHING;DISCOMFORT

## 2020-11-12 ASSESSMENT — PAIN DESCRIPTION - LOCATION
LOCATION: HIP
LOCATION: HIP;BACK
LOCATION: HIP

## 2020-11-12 ASSESSMENT — PAIN DESCRIPTION - PROGRESSION
CLINICAL_PROGRESSION: NOT CHANGED
CLINICAL_PROGRESSION: GRADUALLY IMPROVING

## 2020-11-12 ASSESSMENT — PAIN DESCRIPTION - ONSET
ONSET: ON-GOING
ONSET: ON-GOING

## 2020-11-12 ASSESSMENT — PAIN DESCRIPTION - PAIN TYPE
TYPE: SURGICAL PAIN;CHRONIC PAIN
TYPE: ACUTE PAIN;SURGICAL PAIN
TYPE: CHRONIC PAIN;SURGICAL PAIN

## 2020-11-12 ASSESSMENT — PAIN DESCRIPTION - ORIENTATION
ORIENTATION: RIGHT
ORIENTATION: RIGHT

## 2020-11-12 ASSESSMENT — PAIN DESCRIPTION - FREQUENCY
FREQUENCY: CONTINUOUS
FREQUENCY: CONTINUOUS

## 2020-11-12 ASSESSMENT — PAIN - FUNCTIONAL ASSESSMENT: PAIN_FUNCTIONAL_ASSESSMENT: ACTIVITIES ARE NOT PREVENTED

## 2020-11-12 NOTE — PLAN OF CARE
Problem: Falls - Risk of:  Goal: Will remain free from falls  Description: Will remain free from falls  11/12/2020 1131 by Katalina Mosquera RN  Outcome: Ongoing  11/11/2020 2329 by Xiao Coulter RN  Outcome: Ongoing  Goal: Absence of physical injury  Description: Absence of physical injury  11/12/2020 1131 by Katalina Mosquera RN  Outcome: Ongoing  11/11/2020 2329 by Xiao Coulter RN  Outcome: Ongoing

## 2020-11-12 NOTE — PROGRESS NOTES
Cathie Angela  [id number]     11/12/20 1414 11/12/20 1415 11/12/20 1420   Restrictions/Precautions   Restrictions/Precautions Weight Bearing; Fall Risk  --   --    Lower Extremity Weight Bearing Restrictions   Right Lower Extremity Weight Bearing Weight Bearing As Tolerated  --   --    Subjective   Subjective Pt stated he is feeling much better this afternoon and agreed to therapy. --   --    Pain Screening   Patient Currently in Pain No  --   --    Pain Assessment   Pain Assessment 0-10  --   --    Pain Level 0  --   --    Bed Mobility   Rolling  --  Stand by assistance  --    Sit to Supine  --  Stand by assistance  --    Transfers   Sit to Stand  --  Contact guard assistance  --    Stand to sit  --  Contact guard assistance  --    Ambulation   Ambulation?  --   --  Yes   WB Status  --   --  WBAT   Ambulation 1   Surface  --   --  level tile   Device  --   --  Rolling Walker   Assistance  --   --  Contact guard assistance   Quality of Gait  --   --  decreased weight through RLE, increased knee flexion in RLE, LLE step to RLE, decreased stance time on RLE, antalgic gait, slight ER of RLE while amb noted this afternoon. Gait Deviations  --   --  Slow Hyacinth;Decreased step length   Distance  --   --  61' x2   Comments  --   --  Pt stated he is not having pain this afternoon and was able to tolerate amb better.    Exercises   Comments  --   --   --    Conditions Requiring Skilled Therapeutic Intervention   Body structures, Functions, Activity limitations  --   --   --    Assessment  --   --   --       11/12/20 1424 11/12/20 1444   Restrictions/Precautions   Restrictions/Precautions  --   --    Lower Extremity Weight Bearing Restrictions   Right Lower Extremity Weight Bearing  --   --    Subjective   Subjective  --   --    Pain Screening   Patient Currently in Pain  --   --    Pain Assessment   Pain Assessment  --   --    Pain Level  --   --    Bed Mobility   Rolling  --   --    Sit to Supine  --   --    Transfers

## 2020-11-12 NOTE — PROGRESS NOTES
Occupational Therapy  Facility/Department: [facility] 8 REHAB UNIT  Daily Treatment Note  NAME: Thais Borja  : 1950  MRN: [id number]    Date of Service: 2020    Discharge Recommendations:  Continue to assess pending progress       Assessment   Performance deficits / Impairments: Decreased endurance;Decreased coordination;Decreased functional mobility ; Decreased ADL status; Decreased balance;Decreased strength;Decreased high-level IADLs  Treatment Diagnosis: s/p R hip nail  Activity Tolerance  Activity Tolerance: Patient Tolerated treatment well  Safety Devices  Safety Devices in place: Yes  Type of devices: Call light within reach; Bed alarm in place         Patient Diagnosis(es): There were no encounter diagnoses. has a past medical history of CAD (coronary artery disease), Gout, Hypertension, Non-ST elevation MI (NSTEMI) ([facility] [address] and Palliative care patient. has a past surgical history that includes Cardiac catheterization (14  [facility]); Cholecystectomy; and Femur fracture surgery (Right, 10/30/2020). Restrictions  Restrictions/Precautions  Restrictions/Precautions: Weight Bearing, Fall Risk  Required Braces or Orthoses?: No  Lower Extremity Weight Bearing Restrictions  Right Lower Extremity Weight Bearing: Weight Bearing As Tolerated           Objective             Balance  Sitting Balance: Supervision  Standing Balance: Contact guard assistance  Standing Balance  Time: 5 minutes  Activity: 2 handed static standing task  Functional Mobility  Functional - Mobility Device: Rolling Walker  Activity: To/from bathroom  Assist Level: Contact guard assistance  Bed mobility  Supine to Sit: Stand by assistance  Sit to Supine: Stand by assistance  Transfers  Stand Step Transfers: Contact guard assistance  Sit to stand: Contact guard assistance  Stand to sit: Stand by assistance        Type of ROM/Therapeutic Exercise  Type of ROM/Therapeutic Exercise: Rafy  Comment: SURESH 12.5# 3 sets x 15 reps. Plan   Plan  Current Treatment Recommendations: Gait Training, Patient/Caregiver Education & Training, Strengthening, Home Management Training, Balance Training, Equipment Evaluation, Education, & procurement, Functional Mobility Training, Endurance Training, Self-Care / ADL, Safety Education & Training  Plan Comment: Continue to work with AE    Goals  Short term goals  Time Frame for Short term goals: 1 week  Short term goal 1: Supervision with toilet hygiene. Short term goal 2: Supervision with toilet transfer. Short term goal 3: Supervision wih showering/bathing with AE as needed. Short term goal 4: MET  Short term goal 5: Supervision with UB dressing. Short term goal 6: Supervision with putting on/taking off footwear with AE as needed. Long term goals  Time Frame for Long term goals : 2 weeks  Long term goal 1: Modified Independent with toilet hygiene. Long term goal 2: Modified Independent with toilet transfer. Long term goal 3: Modified Independent wih showering/bathing with AE as needed. Long term goal 4: Modified Independent with LB dressing with AE as needed. Long term goal 5: Independent with UB dressing. Long term goal 7: Modified Independent with putting on/taking off footwear with AE as needed. Long term goal 8: Verbalize DME needs. Long term goal 9: Complete IADL/homemaking task with Modified [place].        Therapy Time   Individual Concurrent Group Co-treatment   Time In 3176         Time Out 1200         Minutes 45         Timed Code Treatment Minutes: [address] [facility], OT

## 2020-11-12 NOTE — PROGRESS NOTES
Progress Note  Date:2020       Room:0823/823-01  Patient Name:Damian Montoya     YOB: 1950     Age:70 y.o. Subjective    Subjective   Patient seen and examined this a.m. resting comfortably in bed. No acute distress. Denies any pain. Informed patient of stability in hemoglobin with review of CBC. Will resume Eliquis dosing this evening. Review of Systems   ROS: 14 point review of systems is negative except as specifically addressed above. Objective         Vitals Last 24 Hours:  TEMPERATURE:  Temp  Av.1 °F (36.2 °C)  Min: 96.6 °F (35.9 °C)  Max: 97.6 °F (36.4 °C)  RESPIRATIONS RANGE: Resp  Av  Min: 18  Max: 18  PULSE OXIMETRY RANGE: SpO2  Av.5 %  Min: 93 %  Max: 94 %  PULSE RANGE: Pulse  Av.5  Min: 50  Max: 53  BLOOD PRESSURE RANGE: Systolic (41AWC), KVT:970 , Min:131 , ROBERT:822   ; Diastolic (17JGZ), SXP:99, Min:66, Max:76    I/O (24Hr): Intake/Output Summary (Last 24 hours) at 2020 1206  Last data filed at 2020 0857  Gross per 24 hour   Intake 1040 ml   Output 1950 ml   Net -910 ml     Physical Exam  Vitals signs and nursing note reviewed. Constitutional:       General: He is not in acute distress. Appearance: He is not toxic-appearing. HENT:      Head: Normocephalic and atraumatic. Nose: Nose normal.   Eyes:      General:         Right eye: No discharge. Left eye: No discharge. Conjunctiva/sclera: Conjunctivae normal.   Cardiovascular:      Rate and Rhythm: Normal rate. Rhythm regularly irregular. Pulmonary:      Effort: Pulmonary effort is normal.      Breath sounds: No wheezing or rales. Abdominal:      General: Bowel sounds are normal.      Tenderness: There is no abdominal tenderness. There is no guarding or rebound. Musculoskeletal:      Comments: Range of motion bilaterally stable, slight weakness noted right hip   Skin:     General: Skin is warm.       Capillary Refill: Capillary refill takes less than 2 seconds. Findings: No erythema. Neurological:      General: No focal deficit present. Mental Status: He is alert. Mental status is at baseline. Cranial Nerves: No cranial nerve deficit. Psychiatric:         Mood and Affect: Mood normal.        Labs/Imaging/Diagnostics    Labs:  CBC:  Recent Labs     11/10/20  0423 11/11/20  0422 11/11/20  1253 11/11/20  1705 11/12/20  0503   WBC 4.8 5.0  --   --  4.8   RBC 2.72* 2.69*  --   --  2.70*   HGB 8.0* 7.8*  --  8.2* 7.8*   HCT 25.8* 25.0* 26.5* 25.6* 24.8*   MCV 94.9* 92.9  --   --  91.9   RDW 15.2* 15.1*  --   --  15.0*    439*  --   --  473*     CHEMISTRIES:  Recent Labs     11/10/20  0423 11/12/20  0503    140   K 3.9 4.5    105   CO2 28 25   BUN 18 16   CREATININE 1.1 0.9   GLUCOSE 80 79     PT/INR:No results for input(s): PROTIME, INR in the last 72 hours. APTT:No results for input(s): APTT in the last 72 hours. LIVER PROFILE:No results for input(s): AST, ALT, BILIDIR, BILITOT, ALKPHOS in the last 72 hours. Imaging Last 24 Hours:  No results found. Assessment//Plan           Hospital Problems           Last Modified POA    Closed nondisplaced fracture of lesser trochanter of right femur with routine healing 11/3/2020 Yes        Postoperative acute blood loss anemia/normocytic anemia  - iron/TIBC/ferritin/vitamin B12/folate within normal limits 10/30/2020.  -Lactic dehydrogenase/absolute reticulocyte count within normal limits, haptoglobin in process  -Patient denies alcoholic history  -Agree with transfusion for levels less than 7, in the setting of no acute bleeding continue with current modalities.  -Would recommend referral to hematology services in outpatient setting, note of slight elevation in haptoglobin  -Review of CBC revealed stability in hemoglobin since 11/8/2020, can safely resume Eliquis modality, this evening.   Will follow CBC in the a.m.     Status post cephalomedullary nailing of right hip fracture  -Continue with PT/OT, pain control modalities     Chronic systolic congestive heart failure  -Continue with patient's home diabetes  -Continue to monitor I/os     Paroxysmal atrial fibrillation  -Currently rate controlled, resume Eliquis this evening     Hypothyroidism-chronic condition, continue with Synthroid     Coronary artery disease/hypertension-patient currently receiving aspirin therapy, continue with antihypertensive regimen     Chronic issues:   Paroxysmal A. fib-continue amiodarone, Eliquis ordered for evening  Hypothyroidism-levothyroxine  CAD/hypertension-currently holding regimen       EMR Dragon/Transcription disclaimer:   Much of this encounter note is an electronic transcription/translation of spoken language to printed text.  The electronic translation of spoken language may permit erroneous, or at times, nonsensical words or phrases to be inadvertently transcribed; although attempts have made to review the note for such errors, some may still exist.    Electronically signed by   Michelle Black   Internal Medicine Hospitalist  On 11/12/2020  At 12:06 PM

## 2020-11-12 NOTE — PLAN OF CARE
Problem: Falls - Risk of:  Goal: Will remain free from falls  Description: Will remain free from falls  11/11/2020 2329 by Miriam Mccormick RN  Outcome: Ongoing  11/11/2020 1032 by Yessica Rodney RN  Outcome: Ongoing  11/11/2020 1031 by Yessica Rodney RN  Outcome: Ongoing  11/11/2020 1029 by Yessica Rodney RN  Outcome: Ongoing  Goal: Absence of physical injury  Description: Absence of physical injury  11/11/2020 2329 by Miriam Mccormick RN  Outcome: Ongoing  11/11/2020 1032 by Yessica Rodney RN  Outcome: Ongoing  11/11/2020 1031 by Yessica Rodney RN  Outcome: Ongoing  11/11/2020 1029 by Yessica Rodney RN  Outcome: Ongoing     Problem: SAFETY  Goal: Free from accidental physical injury  11/11/2020 2329 by Miriam Mccormick RN  Outcome: Ongoing  11/11/2020 1032 by Yessica Rodney RN  Outcome: Ongoing  11/11/2020 1031 by Yessica Rodney RN  Outcome: Ongoing  11/11/2020 1029 by Yessica Rodney RN  Outcome: Ongoing     Problem: PAIN  Goal: Patient's pain/discomfort is manageable  11/11/2020 2329 by Miriam Mccormick RN  Outcome: Ongoing  11/11/2020 1032 by Yessica Rodney RN  Outcome: Ongoing  11/11/2020 1031 by Yessica Rodney RN  Outcome: Ongoing  11/11/2020 1029 by Yessica Rodney RN  Outcome: Ongoing     Problem: SKIN INTEGRITY  Goal: Skin integrity is maintained or improved  11/11/2020 2329 by Miriam Mccormick RN  Outcome: Ongoing  11/11/2020 1032 by Yessica Rodney RN  Outcome: Ongoing  11/11/2020 1031 by Yessica Rodney RN  Outcome: Ongoing  11/11/2020 1029 by Yessica Rodney RN  Outcome: Ongoing

## 2020-11-12 NOTE — PROGRESS NOTES
Patient:   Petra Alcazar  MR#:    [id number]   Room:    0404/039-43   YOB: 1950  Date of Progress Note: 11/12/2020  Time of Note                           9:01 AM  Consulting Physician:   Rommel More M.D. Attending Physician:  Rommel More MD     CHIEF COMPLAINT: Right hip pain     Subjective  This 78 y.o. male  with chronic systolic heart failure, CAD, paroxysmal A. fib on Eliquis admitted to [facility] on  10/30/2020 following a fall at his home. Pt has PMH of alcoholism. Alcohol level on presentation was 176. Banana bag was hung and withdrawal protocol followed. Pt has not shown any signs of withdrawal. X-ray revealed a right hip fracture. Dr Loyde Cockayne was consulted and pt underwent a cephalomedullary nailing of his R hip on 10/30. He will be WBAT in his RLE. Post op, pt had an GER and did develop some postoperative acute blood loss anemia (on Eliquis, which has been on hold) with some hypotension, which improved with crystalloid resuscitation and blood products. He was  noted to have hemoglobin persistently below 7 requiring transfusion with 2 units PRBCs on 10/31, then required 3rd unit pRBCs on 11/2 with Hgb <7. Hemodynamics and GER improved with resuscitation. H/H remained stable and renal function improved. He is now medically stable and is participating with therapy. No complaints today. Looks good. REVIEW OF SYSTEMS:  Constitutional: No fevers No chills  Neck:No stiffness  Respiratory: No shortness of breath  Cardiovascular: No chest pain No palpitations  Gastrointestinal: No abdominal pain    Genitourinary: No Dysuria  Neurological: No headache, no confusion      PHYSICAL EXAM:  /76   Pulse 53   Temp 97.6 °F (36.4 °C)   Resp 18   Ht 6' (1.829 m)   Wt 176 lb 11.2 oz (80.2 kg)   SpO2 94%   BMI 23.96 kg/m²     Constitutional: he appears well-developed and well-nourished. Eyes - conjunctiva normal.  Pupils react to light  Ear, nose, throat -hearing intact to voice.  No scars, masses, or lesions over external nose or ears, no atrophy of tongue  Neck-symmetric, no masses noted, no jugular vein distension  Respiration- chest wall appears symmetric, good expansion,   normal effort without use of accessory muscles  Cardiovascular- RRR  Musculoskeletal - no significant wasting of muscles noted, no bony deformities, gait no gross ataxia  Extremities-no clubbing, cyanosis or edema  Skin - warm, dry, and intact. No rash, erythema, or pallor. Psychiatric - mood, affect, and behavior appear normal.      Neurology  NEUROLOGICAL EXAM:      Mental status   Awake, alert, fluent oriented x 3 appropriate affect  Attention and concentration appear appropriate  Recent and remote memory appears unremarkable  Speech normal without dysarthria  No clear issues with language       Cranial Nerves   CN II- Visual fields grossly unremarkable  CN III, IV,VI-EOMI, No nystagmus, conjugate eye movements, no ptosis  CN VII-no facial asymmetry  CN VIII-Hearing intact   CN IX and X- Palate elevates in midline  CN XI-good shoulder shrug  CN XII-Tongue midline with no fasciculations or fibrillations          Motor function  Antigravity x 4     Sensory function Intact to light touch     Cerebellar F-N intact     Tremor None present     Gait                  Not tested           Nursing/pcp notes, imaging,labs and vitals reviewed.      PT,OT and/or speech notes reviewed    Lab Results   Component Value Date    WBC 4.8 11/12/2020    HGB 7.8 (L) 11/12/2020    HCT 24.8 (L) 11/12/2020    MCV 91.9 11/12/2020     (H) 11/12/2020     Lab Results   Component Value Date     11/12/2020    K 4.5 11/12/2020     11/12/2020    CO2 25 11/12/2020    BUN 16 11/12/2020    CREATININE 0.9 11/12/2020    GLUCOSE 79 11/12/2020    CALCIUM 8.3 (L) 11/12/2020    PROT 5.0 (L) 10/31/2020    LABALBU 2.8 (L) 10/31/2020    BILITOT 1.2 10/31/2020    ALKPHOS 104 10/31/2020     (H) 10/31/2020    ALT 74 (H) 10/31/2020 LABGLOM >60 11/12/2020    GFRAA >59 11/12/2020     Lab Results   Component Value Date    INR 2.27 (H) 10/31/2020    INR 2.03 (H) 10/30/2020    INR 1.59 (H) 06/27/2020   No results found for: PHENYTOIN, ESR, CRP  PHYSICAL THERAPY  STRENGTH  Strength RLE  Strength RLE: Exception  Comment: hip flexion/knee extension 3/5, ankle DF 4-/5 w/pain  Strength LLE  Strength LLE: Exception  Comment: Hip flexion 3/5, all other grossly 4-/5  ROM  AROM RLE (degrees)  RLE AROM: Exceptions  RLE General AROM: Unable to actively extend knee d/t pain, ankles WFL unable to actively flex hip  AROM LLE (degrees)  LLE AROM : Exceptions  LLE General AROM: Decreased active knee extension  BED MOBILITY  Bed Mobility  Rolling: Stand by assistance  Supine to Sit: Contact guard assistance  Sit to Supine: Moderate assistance, Minimal assistance  Scooting: Contact guard assistance  TRANSFERS  Transfers  Sit to Stand: Contact guard assistance  Stand to sit: Contact guard assistance  Bed to Chair: Minimal assistance  Car Transfer:  Moderate Assistance, Contact guard assistance(to help LE into car)  Comment: Pt transferred from bed to  using RW  WHEELCHAIR PROPULSION  Propulsion 1  Propulsion: Manual  Level: Level Tile  Method: JAGDISH GARCIA  Level of Assistance: Contact guard assistance  Description/ Details: Pt's room to therapy gym  Distance: 225'  AMBULATION  Ambulation 1  Surface: level tile  Device: Rolling Walker  Other Apparatus: O2  Assistance: Contact guard assistance  Quality of Gait: Good three point gait sequence w/RW, began to step through w/LLE, fwd flexed posture, increased knee flexion on R  Gait Deviations: Slow Hyacinth  Distance: 20'  Comments: Incorporated turns, pt showed signs of SOA, Stepping through w/ left foot  STAIRS  GOALS:  Short term goals  Time Frame for Short term goals: 1 week  Short term goal 1: CGA all bed mobility  Short term goal 2: Min assist transfers  Short term goal 3: CGA WC propulsion 150'  Short term goal 4: CGA amb 48' w/RW     Long term goals  Time Frame for Long term goals : 2 weeks  Long term goal 1: IND bed mobility  Long term goal 2: IND transfers  Long term goal 3: IND WC propulsion 150'  Long term goal 4: IND amb 150'+ w/RW  Long term goal 5: IND HEP     ASSESSMENT:  Decreased RLE knee extension noted w/ex, improved strength noted in LLE     SPEECH THERAPY        OCCUPATIONAL THERAPY     CURRENT IRF-DEQUAN SCORES  Eating: CARE Score: 5  Oral Hygiene: CARE Score: 5  Toileting: CARE Score: 2  Shower/Bathe: CARE Score: 3  Upper Body Dressing: CARE Score: 3  Lower Body Dressing: CARE Score: 2  Footwear: CARE Score: 4  Toilet Transfers: CARE Score: 3  Picking Up Object:  CARE Score: 1        UE Functioning:  BUE AROM WFL      Pain Assessment:  Pain Level: 4  Pain Location: Hip     STGs:  Short term goals  Time Frame for Short term goals: 1 week  Short term goal 1: Supervision with toilet hygiene. Short term goal 2: Supervision with toilet transfer. Short term goal 3: Supervision wih showering/bathing with AE as needed. Short term goal 4: Supervision with LB dressing with AE as needed. Short term goal 5: Supervision with UB dressing. Short term goal 6: Supervision with putting on/taking off footwear with AE as needed.     LTGs:  Long term goals  Time Frame for Long term goals : 2 weeks  Long term goal 1: Modified Independent with toilet hygiene. Long term goal 2: Modified Independent with toilet transfer. Long term goal 3: Modified Independent wih showering/bathing with AE as needed. Long term goal 4: Modified Independent with LB dressing with AE as needed. Long term goal 5: Independent with UB dressing. Long term goal 7: Modified Independent with putting on/taking off footwear with AE as needed. Long term goal 8: Verbalize DME needs. Long term goal 9: Complete IADL/homemaking task with Modified [place].     Assessment:  Decreased endurance; Decreased coordination; Decreased functional mobility ;  Decreased ADL status; Decreased balance; Decreased strength; Decreased high-level IADLs                    NUTRITION  Current Wt: Weight: 178 lb 4.8 oz (80.9 kg) / Body mass index is 24.18 kg/m². Admission Wt: Admission Body Weight: 175 lb (79.4 kg)  Oral Diet Orders:   CARDIAC  Oral Nutrition Supplement (ONS) Orders:  None  Pt remains nutritionally stable with PO intake >75% most meals. Wt gain 3 lbs documented since admit. No issues. Please see nutrition note for details.          RECORD REVIEW: Previous medical records, medications were reviewed at today's visit    IMPRESSION:   1. Right hip fracture status post cephalomedullary nailing-pain control/PT/OT  2. DVT prophylaxis-SCDs and aspirin for now. Due to severe anemia will continue to hold Eliquis for now. 3.  History of A. fib-Eliquis currently on hold. Hemoglobin remains very low. On amiodarone. Hopefully can resume Eliquis later this week if hemoglobin remains stable/improving  4. Acute blood loss anemia with recent transfusions. Status post 1 unit packed red blood cells on 11/7. Hemoglobin improved. On Niferex. Stable but still low. Hospitalist consulted to assist with this as well as the timing on when to resume Eliquis. 5.  Essential hypertension-continue current medications and monitoring  6. Chronic low back pain.  scheduled hydrocodone and increase the dose slightly. Lidoderm patch.   Improved    Doing much better since last transfusion    ELOS:11/18

## 2020-11-12 NOTE — PROGRESS NOTES
Trish Alamo  [id number]     11/12/20 3750 11/12/20 0838 11/12/20 0839   Restrictions/Precautions   Restrictions/Precautions Weight Bearing; Fall Risk  --   --    Lower Extremity Weight Bearing Restrictions   Right Lower Extremity Weight Bearing Weight Bearing As Tolerated  --   --    Subjective   Subjective Pt agreed to therapy this morning. --   --    Pain Screening   Patient Currently in Pain Yes  --   --    Pain Assessment   Pain Assessment 0-10  --   --    Pain Level  --   --   --    Pain Type Acute pain;Surgical pain  --   --    Pain Location Hip  --   --    Pain Orientation Right  --   --    Pain Descriptors Aching; Sore  --   --    Bed Mobility   Rolling Stand by assistance  --   --    Supine to Sit Stand by assistance  --   --    Transfers   Sit to Stand  --  Contact guard assistance  --    Stand to sit  --  Contact guard assistance  --    Ambulation   Ambulation?  --   --  Yes   WB Status  --   --  WBAT   Ambulation 1   Surface  --   --  level tile   Device  --   --  Rolling Walker   Assistance  --   --  Contact guard assistance   Quality of Gait  --   --  decreased weight through RLE, increased knee flexion in RLE, LLE step to RLE, decreased stance time on RLE, antalgic gait   Gait Deviations  --   --  Slow Hyacinth;Decreased step length   Distance  --   --  50', 60'   Comments  --   --  Pt stated he is having increased pain in the R hip this am   Exercises   Comments  --   --   --    Conditions Requiring Skilled Therapeutic Intervention   Body structures, Functions, Activity limitations  --   --   --    Assessment  --   --   --    Activity Tolerance   Activity Tolerance  --   --   --    Safety Devices   Type of devices  --   --   --       11/12/20 0849 11/12/20 0850 11/12/20 0907   Restrictions/Precautions   Restrictions/Precautions  --   --   --    Lower Extremity Weight Bearing Restrictions   Right Lower Extremity Weight Bearing  --   --   --    Subjective   Subjective  --   --   --    Pain Screening Patient Currently in Pain  --   --   --    Pain Assessment   Pain Assessment  --   --   --    Pain Level  --   --  7   Pain Type  --   --   --    Pain Location  --   --   --    Pain Orientation  --   --   --    Pain Descriptors  --   --   --    Bed Mobility   Rolling  --   --   --    Supine to Sit  --   --   --    Transfers   Sit to Stand  --   --   --    Stand to sit  --   --   --    Ambulation   Ambulation?  --   --   --    WB Status  --   --   --    Ambulation 1   Surface  --   --   --    Device  --   --   --    Assistance  --   --   --    Quality of Gait  --   --   --    Gait Deviations  --   --   --    Distance  --   --   --    Comments  --   --   --    Exercises   Comments Seated BLE ther ex x 10-15 reps. AROM LLE, AAROM RLE and ROM limited due to pain. --   --    Conditions Requiring Skilled Therapeutic Intervention   Body structures, Functions, Activity limitations  --  Decreased functional mobility ; Decreased ADL status; Decreased ROM; Decreased strength;Decreased endurance;Decreased balance;Decreased high-level IADLs;Decreased coordination; Increased pain  --    Assessment  --  Pt tolerated session within limits of pain. Pt stated he was having increased pain in the R hip this morning. Amb distance limited to R hip discomfort. Tolerated seated BLE with frequent complaints discomfort in R hip frequent breaks needed for rest. AROM LLE, AAROM RLE and ROM limited due to pain.   --    Activity Tolerance   Activity Tolerance  --  Patient limited by pain  --    Safety Devices   Type of devices  --   --   --       11/12/20 6983   Restrictions/Precautions   Restrictions/Precautions  --    Lower Extremity Weight Bearing Restrictions   Right Lower Extremity Weight Bearing  --    Subjective   Subjective  --    Pain Screening   Patient Currently in Pain  --    Pain Assessment   Pain Assessment  --    Pain Level  --    Pain Type  --    Pain Location  --    Pain Orientation  --    Pain Descriptors  --    Bed Mobility Rolling  --    Supine to Sit  --    Transfers   Sit to Stand  --    Stand to sit  --    Ambulation   Ambulation?  --    WB Status  --    Ambulation 1   Surface  --    Device  --    Assistance  --    Quality of Gait  --    Gait Deviations  --    Distance  --    Comments  --    Exercises   Comments  --    Conditions Requiring Skilled Therapeutic Intervention   Body structures, Functions, Activity limitations  --    Assessment  --    Activity Tolerance   Activity Tolerance  --    Safety Devices   Type of devices Bed alarm in place;Call light within reach   Electronically signed by Sony Arzate PTA on 11/12/2020 at 9:32 AM

## 2020-11-12 NOTE — PROGRESS NOTES
Occupational Therapy     11/12/20 1300   General   Diagnosis s/p R hip nail   Pain Assessment   Patient Currently in Pain Yes   Pain Assessment 0-10   Pain Level 6   Pain Type Surgical pain;Chronic pain   Pain Location Hip;Back   Pain Descriptors Aching;Discomfort   Pain Frequency Continuous   Clinical Progression Gradually improving   Response to Pain Intervention Patient Satisfied   Balance   Sitting Balance Supervision   Standing Balance Contact guard assistance   Functional Mobility   Functional - Mobility Device Rolling Walker   Activity Other   Assist Level Contact guard assistance   Bed mobility   Supine to Sit Stand by assistance   Transfers   Sit to stand Contact guard assistance   Stand to sit Contact guard assistance   Type of ROM/Therapeutic Exercise   Type of ROM/Therapeutic Exercise Free weights   Comment 3# BUE 1 set x 20 reps, all planes. Assessment   Performance deficits / Impairments Decreased endurance;Decreased coordination;Decreased functional mobility ; Decreased ADL status; Decreased balance;Decreased strength;Decreased high-level IADLs   Treatment Diagnosis s/p R hip nail   Prognosis Good   Timed Code Treatment Minutes 45 Minutes   Activity Tolerance   Activity Tolerance Patient Tolerated treatment well   Safety Devices   Safety Devices in place Yes   Type of devices Call light within reach; Bed alarm in place   Plan   Current Treatment Recommendations Gait Training;Patient/Caregiver Education & Training;Strengthening;Home Management Training;Balance Training;Equipment Evaluation, Education, & procurement; Functional Mobility Training; Endurance Training;Self-Care / ADL; Safety Education & Training

## 2020-11-13 LAB
BILIRUBIN URINE: NEGATIVE
BLOOD, URINE: NEGATIVE
CLARITY: CLEAR
COLOR: YELLOW
GLUCOSE URINE: NEGATIVE MG/DL
HCT VFR BLD CALC: 27.1 % (ref 42–52)
HEMOGLOBIN: 8.4 G/DL (ref 14–18)
KETONES, URINE: NEGATIVE MG/DL
LEUKOCYTE ESTERASE, URINE: NEGATIVE
MCH RBC QN AUTO: 28.7 PG (ref 27–31)
MCHC RBC AUTO-ENTMCNC: 31 G/DL (ref 33–37)
MCV RBC AUTO: 92.5 FL (ref 80–94)
NITRITE, URINE: NEGATIVE
PDW BLD-RTO: 15 % (ref 11.5–14.5)
PH UA: 7.5 (ref 5–8)
PLATELET # BLD: 515 K/UL (ref 130–400)
PMV BLD AUTO: 8.8 FL (ref 9.4–12.4)
PROTEIN UA: NEGATIVE MG/DL
RBC # BLD: 2.93 M/UL (ref 4.7–6.1)
SPECIFIC GRAVITY UA: 1.01 (ref 1–1.03)
UROBILINOGEN, URINE: >=8 E.U./DL
WBC # BLD: 5.5 K/UL (ref 4.8–10.8)

## 2020-11-13 PROCEDURE — 2580000003 HC RX 258: Performed by: STUDENT IN AN ORGANIZED HEALTH CARE EDUCATION/TRAINING PROGRAM

## 2020-11-13 PROCEDURE — 6370000000 HC RX 637 (ALT 250 FOR IP): Performed by: PSYCHIATRY & NEUROLOGY

## 2020-11-13 PROCEDURE — 36415 COLL VENOUS BLD VENIPUNCTURE: CPT

## 2020-11-13 PROCEDURE — 6370000000 HC RX 637 (ALT 250 FOR IP): Performed by: FAMILY MEDICINE

## 2020-11-13 PROCEDURE — 85027 COMPLETE CBC AUTOMATED: CPT

## 2020-11-13 PROCEDURE — 97530 THERAPEUTIC ACTIVITIES: CPT

## 2020-11-13 PROCEDURE — 97110 THERAPEUTIC EXERCISES: CPT

## 2020-11-13 PROCEDURE — 81003 URINALYSIS AUTO W/O SCOPE: CPT

## 2020-11-13 PROCEDURE — 97116 GAIT TRAINING THERAPY: CPT

## 2020-11-13 PROCEDURE — 1180000000 HC REHAB R&B

## 2020-11-13 PROCEDURE — 6370000000 HC RX 637 (ALT 250 FOR IP): Performed by: STUDENT IN AN ORGANIZED HEALTH CARE EDUCATION/TRAINING PROGRAM

## 2020-11-13 PROCEDURE — 99232 SBSQ HOSP IP/OBS MODERATE 35: CPT | Performed by: PSYCHIATRY & NEUROLOGY

## 2020-11-13 RX ADMIN — ASPIRIN 325 MG ORAL TABLET 325 MG: 325 PILL ORAL at 08:20

## 2020-11-13 RX ADMIN — PANTOPRAZOLE SODIUM 40 MG: 40 TABLET, DELAYED RELEASE ORAL at 06:01

## 2020-11-13 RX ADMIN — AMIODARONE HYDROCHLORIDE 200 MG: 200 TABLET ORAL at 08:21

## 2020-11-13 RX ADMIN — Medication 150 MG: at 08:21

## 2020-11-13 RX ADMIN — ATORVASTATIN CALCIUM 40 MG: 40 TABLET, FILM COATED ORAL at 08:27

## 2020-11-13 RX ADMIN — SODIUM CHLORIDE, PRESERVATIVE FREE 10 ML: 5 INJECTION INTRAVENOUS at 21:13

## 2020-11-13 RX ADMIN — SODIUM CHLORIDE, PRESERVATIVE FREE 10 ML: 5 INJECTION INTRAVENOUS at 08:29

## 2020-11-13 RX ADMIN — LISINOPRIL 5 MG: 5 TABLET ORAL at 08:21

## 2020-11-13 RX ADMIN — APIXABAN 5 MG: 5 TABLET, FILM COATED ORAL at 08:27

## 2020-11-13 RX ADMIN — LEVOTHYROXINE SODIUM 125 MCG: 25 TABLET ORAL at 06:01

## 2020-11-13 RX ADMIN — STANDARDIZED SENNA CONCENTRATE 8.6 MG: 8.6 TABLET ORAL at 08:20

## 2020-11-13 RX ADMIN — OXYCODONE HYDROCHLORIDE AND ACETAMINOPHEN 1 TABLET: 7.5; 325 TABLET ORAL at 08:20

## 2020-11-13 RX ADMIN — OXYCODONE HYDROCHLORIDE AND ACETAMINOPHEN 1 TABLET: 7.5; 325 TABLET ORAL at 21:13

## 2020-11-13 RX ADMIN — METOPROLOL SUCCINATE 12.5 MG: 25 TABLET, EXTENDED RELEASE ORAL at 08:21

## 2020-11-13 RX ADMIN — OXYCODONE HYDROCHLORIDE AND ACETAMINOPHEN 1 TABLET: 7.5; 325 TABLET ORAL at 13:27

## 2020-11-13 RX ADMIN — APIXABAN 5 MG: 5 TABLET, FILM COATED ORAL at 21:14

## 2020-11-13 RX ADMIN — OXYCODONE HYDROCHLORIDE AND ACETAMINOPHEN 1 TABLET: 7.5; 325 TABLET ORAL at 17:14

## 2020-11-13 ASSESSMENT — PAIN DESCRIPTION - PROGRESSION
CLINICAL_PROGRESSION: GRADUALLY IMPROVING

## 2020-11-13 ASSESSMENT — PAIN DESCRIPTION - LOCATION
LOCATION: BACK
LOCATION: BACK
LOCATION: HIP;LEG
LOCATION: HIP;LEG
LOCATION: BACK

## 2020-11-13 ASSESSMENT — PAIN DESCRIPTION - ORIENTATION
ORIENTATION: RIGHT
ORIENTATION: RIGHT
ORIENTATION: LOWER

## 2020-11-13 ASSESSMENT — PAIN - FUNCTIONAL ASSESSMENT
PAIN_FUNCTIONAL_ASSESSMENT: ACTIVITIES ARE NOT PREVENTED

## 2020-11-13 ASSESSMENT — PAIN DESCRIPTION - PAIN TYPE
TYPE: ACUTE PAIN

## 2020-11-13 ASSESSMENT — PAIN DESCRIPTION - DESCRIPTORS
DESCRIPTORS: ACHING

## 2020-11-13 ASSESSMENT — PAIN SCALES - GENERAL
PAINLEVEL_OUTOF10: 4
PAINLEVEL_OUTOF10: 5
PAINLEVEL_OUTOF10: 1
PAINLEVEL_OUTOF10: 5
PAINLEVEL_OUTOF10: 0
PAINLEVEL_OUTOF10: 4
PAINLEVEL_OUTOF10: 0
PAINLEVEL_OUTOF10: 5

## 2020-11-13 ASSESSMENT — PAIN DESCRIPTION - FREQUENCY
FREQUENCY: CONTINUOUS

## 2020-11-13 ASSESSMENT — PAIN DESCRIPTION - ONSET
ONSET: ON-GOING

## 2020-11-13 NOTE — PROGRESS NOTES
Megan Traylor  [id number]     11/13/20 0820 11/13/20 0834 11/13/20 0836   Restrictions/Precautions   Restrictions/Precautions  --  Weight Bearing; Fall Risk  --    Lower Extremity Weight Bearing Restrictions   Right Lower Extremity Weight Bearing  --  Weight Bearing As Tolerated  --    Subjective   Subjective  --  Pt agreed to therapy this morning.  --    Pain Assessment   Pain Level 4  --   --    Vital Signs   Level of Consciousness  --   --   --    Bed Mobility   Rolling  --  Stand by assistance  --    Supine to Sit  --  Stand by assistance  --    Sit to Supine  --  Stand by assistance  --    Transfers   Sit to Stand  --  Contact guard assistance  --    Stand to sit  --  Contact guard assistance  --    Lateral Transfers  --  Contact guard assistance  (stand step with RW from bed to w/c, w/c to bed)  --    Ambulation   Ambulation?  --   --  Yes   Ambulation 1   Surface  --   --  level tile   Device  --   --  Rolling Walker   Assistance  --   --  Contact guard assistance   Quality of Gait  --   --  decreased weight through RLE, increased knee flexion in RLE, LLE step to RLE, decreased stance time on RLE, antalgic gait, slight ER of RLE while amb. Gait Deviations  --   --  Slow Hyacinth;Decreased step length   Distance  --   --  61'   Comments  --   --  Pt stated he was not in as much pain this morning. Pt is using a toe touch gait on RLE. VCs to put weight through RLE heel.    Exercises   Comments  --   --   --    Conditions Requiring Skilled Therapeutic Intervention   Body structures, Functions, Activity limitations  --   --   --    Assessment  --   --   --    Safety Devices   Type of devices  --   --   --       11/13/20 0900 11/13/20 1007 11/13/20 1010   Restrictions/Precautions   Restrictions/Precautions  --   --   --    Lower Extremity Weight Bearing Restrictions   Right Lower Extremity Weight Bearing  --   --   --    Subjective   Subjective  --   --   --    Pain Assessment   Pain Level  --   --   --    Vital

## 2020-11-13 NOTE — PLAN OF CARE
signs and symptoms  11/13/2020 0050 by Rhiannon Cardoza LPN  Outcome: Ongoing  11/12/2020 1131 by Shaylee Angel RN  Outcome: Ongoing     Problem: Skin Integrity:  Goal: Will show no infection signs and symptoms  Description: Will show no infection signs and symptoms  11/13/2020 0050 by Rhiannon Cardoza LPN  Outcome: Ongoing  11/12/2020 1131 by Shaylee Angel RN  Outcome: Ongoing  Goal: Absence of new skin breakdown  Description: Absence of new skin breakdown  11/13/2020 0050 by Rhiannon Cardoza LPN  Outcome: Ongoing  11/12/2020 1131 by Shaylee Angel RN  Outcome: Ongoing

## 2020-11-13 NOTE — PROGRESS NOTES
Progress Note  Date:2020       Room:0823/823-01  Patient Name:Damian Good     YOB: 1950     Age:70 y.o. Subjective    Subjective   Patient seen and examined this a.m., nurse present at the bedside. Explained to patient increase in hemoglobin values with restart of Eliquis modality last evening. Recommending on DC patient be referred to hematology for ongoing surveillance. Patient currently denying any headache, change in vision, bloody bowel movements or sputum, nausea vomiting, fevers or chills. Review of Systems   ROS: 14 point review of systems is negative except as specifically addressed above. Objective         Vitals Last 24 Hours:  TEMPERATURE:  Temp  Av °F (36.1 °C)  Min: 97 °F (36.1 °C)  Max: 97 °F (36.1 °C)  RESPIRATIONS RANGE: Resp  Av.3  Min: 16  Max: 18  PULSE OXIMETRY RANGE: SpO2  Av.3 %  Min: 93 %  Max: 94 %  PULSE RANGE: Pulse  Av  Min: 50  Max: 58  BLOOD PRESSURE RANGE: Systolic (03WGH), ROU:780 , Min:127 , BMS:102   ; Diastolic (51PJH), UEV:85, Min:70, Max:78    I/O (24Hr): Intake/Output Summary (Last 24 hours) at 2020 0937  Last data filed at 2020 0935  Gross per 24 hour   Intake 720 ml   Output 200 ml   Net 520 ml     Physical Exam  Vitals signs and nursing note reviewed. Constitutional:       General: He is not in acute distress. Appearance: He is not toxic-appearing. HENT:      Head: Normocephalic and atraumatic. Nose: Nose normal.   Eyes:      General:         Right eye: No discharge. Left eye: No discharge. Conjunctiva/sclera: Conjunctivae normal.   Cardiovascular:      Rate and Rhythm: Normal rate. Rhythm regularly irregular. Pulmonary:      Effort: Pulmonary effort is normal.      Breath sounds: No wheezing or rales. Abdominal:      General: Bowel sounds are normal.      Tenderness: There is no abdominal tenderness. There is no guarding or rebound.    Musculoskeletal:      Comments: Improving range of motion right hip. Skin:     General: Skin is warm. Capillary Refill: Capillary refill takes less than 2 seconds. Findings: No erythema. Neurological:      General: No focal deficit present. Mental Status: He is alert. Mental status is at baseline. Cranial Nerves: No cranial nerve deficit. Psychiatric:         Mood and Affect: Mood normal.        Labs/Imaging/Diagnostics    Labs:  CBC:  Recent Labs     11/11/20  0422  11/11/20  1705 11/12/20  0503 11/13/20  0431   WBC 5.0  --   --  4.8 5.5   RBC 2.69*  --   --  2.70* 2.93*   HGB 7.8*  --  8.2* 7.8* 8.4*   HCT 25.0*   < > 25.6* 24.8* 27.1*   MCV 92.9  --   --  91.9 92.5   RDW 15.1*  --   --  15.0* 15.0*   *  --   --  473* 515*    < > = values in this interval not displayed. CHEMISTRIES:  Recent Labs     11/12/20  0503      K 4.5      CO2 25   BUN 16   CREATININE 0.9   GLUCOSE 79     PT/INR:No results for input(s): PROTIME, INR in the last 72 hours. APTT:No results for input(s): APTT in the last 72 hours. LIVER PROFILE:No results for input(s): AST, ALT, BILIDIR, BILITOT, ALKPHOS in the last 72 hours. Imaging Last 24 Hours:  No results found. Assessment//Plan           Hospital Problems           Last Modified POA    Closed nondisplaced fracture of lesser trochanter of right femur with routine healing 11/3/2020 Yes        Postoperative acute blood loss anemia/normocytic anemia  -Eliquis was resumed 11/12, noted increase in H/H 8.4/27.1  -Continue with Eliquis modalities, recommending hematology follow-up in outpatient setting evidence of elevated haptoglobin  - iron/TIBC/ferritin/vitamin B12/folate within normal limits 10/30/2020.  -Lactic dehydrogenase/absolute reticulocyte count within normal limits, haptoglobin in process  -Patient denies alcoholic history  -Agree with transfusion for levels less than 7, in the setting of no acute bleeding continue with current modalities.     Thank you for the privilege in consultation with your patient, at this time hospital medicine service will sign off. Status post cephalomedullary nailing of right hip fracture  -Continue with PT/OT, pain control modalities     Chronic systolic congestive heart failure  -Continue with patient's home diabetes  -Continue to monitor I/os     Paroxysmal atrial fibrillation  -Currently rate controlled, resume Eliquis this evening     Hypothyroidism-chronic condition, continue with Synthroid     Coronary artery disease/hypertension-patient currently receiving aspirin therapy, continue with antihypertensive regimen     Chronic issues:   Paroxysmal A. fib-continue amiodarone, Eliquis ordered for evening  Hypothyroidism-levothyroxine  CAD/hypertension-currently holding regimen       EMR Dragon/Transcription disclaimer:   Much of this encounter note is an electronic transcription/translation of spoken language to printed text.  The electronic translation of spoken language may permit erroneous, or at times, nonsensical words or phrases to be inadvertently transcribed; although attempts have made to review the note for such errors, some may still exist.    Electronically signed by   Michelle Black   Internal Medicine Hospitalist  On 11/13/2020  At 9:37 AM

## 2020-11-13 NOTE — PROGRESS NOTES
Occupational Therapy  Facility/Department: [facility] 8 REHAB UNIT  Daily Treatment Note  NAME: Damir Stein  : 1950  MRN: [id number]    Date of Service: 2020    Discharge Recommendations:  Continue to assess pending progress       Assessment   Performance deficits / Impairments: Decreased endurance;Decreased coordination;Decreased functional mobility ; Decreased ADL status; Decreased balance;Decreased strength;Decreased high-level IADLs  Treatment Diagnosis: s/p R hip nail  Activity Tolerance  Activity Tolerance: Patient Tolerated treatment well  Safety Devices  Safety Devices in place: Yes  Type of devices: Call light within reach; Chair alarm in place         Patient Diagnosis(es): There were no encounter diagnoses. has a past medical history of CAD (coronary artery disease), Gout, Hypertension, Non-ST elevation MI (NSTEMI) ([facility] [address] and Palliative care patient. has a past surgical history that includes Cardiac catheterization (14  [facility]); Cholecystectomy; and Femur fracture surgery (Right, 10/30/2020).     Restrictions  Restrictions/Precautions  Restrictions/Precautions: Weight Bearing, Fall Risk  Required Braces or Orthoses?: No  Lower Extremity Weight Bearing Restrictions  Right Lower Extremity Weight Bearing: Weight Bearing As Tolerated  Subjective   General  Patient assessed for rehabilitation services?: Yes  Diagnosis: s/p R hip nail           Objective    Instrumental ADL's  Instrumental ADLs: Yes  Meal Prep  Meal Prep Level: Walker(RW)  Meal Prep Level of Assistance: Stand by assistance     Balance  Sitting Balance: Supervision  Standing Balance: Stand by assistance  Functional Mobility  Functional - Mobility Device: Rolling Walker  Activity: Other;Retrieve items;Transport items  Assist Level: Stand by assistance  Functional Mobility Comments: short distance and in kitchen, x3 occurrences     Transfers  Stand Step Transfers: Stand by assistance  Sit to stand: Stand by assistance  Stand to sit: Stand by assistance     Type of ROM/Therapeutic Exercise  Type of ROM/Therapeutic Exercise: Free weights;Cane/Wand  Comment: BUE 2#, 2 sets of 10 in all planes, 3# Cane/wand: 2 sets of 10 in all planes        Plan   Plan  Current Treatment Recommendations: Gait Training, Patient/Caregiver Education & Training, Strengthening, Home Management Training, Balance Training, Equipment Evaluation, Education, & procurement, Functional Mobility Training, Endurance Training, Self-Care / ADL, Safety Education & Training  Plan Comment: Continue to work with AE      Goals  Short term goals  Time Frame for Short term goals: 1 week  Short term goal 1: Supervision with toilet hygiene. Short term goal 2: Supervision with toilet transfer. Short term goal 3: Supervision wih showering/bathing with AE as needed. Short term goal 4: MET  Short term goal 5: Supervision with UB dressing. Short term goal 6: Supervision with putting on/taking off footwear with AE as needed. Long term goals  Time Frame for Long term goals : 2 weeks  Long term goal 1: Modified Independent with toilet hygiene. Long term goal 2: Modified Independent with toilet transfer. Long term goal 3: Modified Independent wih showering/bathing with AE as needed. Long term goal 4: Modified Independent with LB dressing with AE as needed. Long term goal 5: Independent with UB dressing. Long term goal 7: Modified Independent with putting on/taking off footwear with AE as needed. Long term goal 8: Verbalize DME needs. Long term goal 9: Complete IADL/homemaking task with Modified [place].        Therapy Time   Individual Concurrent Group Co-treatment   Time In 1000         Time Out 1100         Minutes 60         Timed Code Treatment Minutes: 75 Yasmany Evans OT

## 2020-11-13 NOTE — PROGRESS NOTES
Canelo Brooks  [id number]     11/13/20 1317 11/13/20 1318 11/13/20 1319   Restrictions/Precautions   Restrictions/Precautions Weight Bearing; Fall Risk  --   --    Lower Extremity Weight Bearing Restrictions   Right Lower Extremity Weight Bearing Weight Bearing As Tolerated  --   --    Subjective   Subjective Pt agreed to therapy this afternoon. --   --    Pain Screening   Patient Currently in Pain Yes  --   --    Pain Assessment   Pain Assessment  --   --   --    Pain Level  --   --   --    Patient's Stated Pain Goal  --   --   --    Pain Type  --   --   --    Pain Location  --   --   --    Pain Orientation  --   --   --    Pain Descriptors  --   --   --    Pain Frequency  --   --   --    Pain Onset  --   --   --    Clinical Progression  --   --   --    Functional Pain Assessment  --   --   --    Non-Pharmaceutical Pain Intervention(s)  --   --   --    Response to Pain Intervention  --   --   --    Vital Signs   Resp  --   --   --    Oxygen Therapy   SpO2  --   --   --    O2 Device  --   --   --    Bed Mobility   Rolling Stand by assistance  --   --    Supine to Sit Stand by assistance  --   --    Sit to Supine Stand by assistance  --   --    Transfers   Sit to Stand  --  Contact guard assistance  --    Stand to sit  --  Contact guard assistance  --    Bed to Chair  --  Contact guard assistance  --    Ambulation   Ambulation?  --   --  Yes   WB Status  --   --  WBAT   Ambulation 1   Surface  --   --  level tile   Device  --   --  Rolling Walker   Assistance  --   --  Contact guard assistance   Quality of Gait  --   --  decreased weight through RLE, increased knee flexion in RLE, LLE step to RLE, decreased stance time on RLE, antalgic gait, slight ER of RLE while amb. Gait Deviations  --   --  Slow Hyacinth;Decreased step length   Distance  --   --  60', 76'   Comments  --   --  Incorporated turns. Pt continues to use a toe touch gait patteren on RLE. VCs to put weight through RLE heel.    Wheelchair Activities Propulsion  --   --  Yes   Propulsion 1   Propulsion  --   --  Manual   Level  --   --  Level Tile   Method  --   --  AMIE   Level of Assistance  --   --  Supervision   Description/ Details  --   --  Maintained straight path.    Distance  --   --  250'   Exercises   Comments  --   --   --    Conditions Requiring Skilled Therapeutic Intervention   Body structures, Functions, Activity limitations  --   --   --    Assessment  --   --   --    Activity Tolerance   Activity Tolerance  --   --   --    Safety Devices   Type of devices  --   --   --       11/13/20 1327 11/13/20 1330 11/13/20 1420   Restrictions/Precautions   Restrictions/Precautions  --   --   --    Lower Extremity Weight Bearing Restrictions   Right Lower Extremity Weight Bearing  --   --   --    Subjective   Subjective  --   --   --    Pain Screening   Patient Currently in Pain  --   --   --    Pain Assessment   Pain Assessment  --   --  0-10   Pain Level  --   --  0   Patient's Stated Pain Goal No pain  --   --    Pain Type Acute pain  --   --    Pain Location Back  --   --    Pain Orientation Lower  --   --    Pain Descriptors Aching  --   --    Pain Frequency Continuous  --   --    Pain Onset On-going  --   --    Clinical Progression Gradually improving  --   --    Functional Pain Assessment Activities are not prevented  --   --    Non-Pharmaceutical Pain Intervention(s) Rest  --   --    Response to Pain Intervention  --   --  Asleep with RR greater than 10   Vital Signs   Resp  --   --   --    Oxygen Therapy   SpO2  --   --   --    O2 Device  --   --   --    Bed Mobility   Rolling  --   --   --    Supine to Sit  --   --   --    Sit to Supine  --   --   --    Transfers   Sit to Stand  --   --   --    Stand to sit  --   --   --    Bed to Chair  --   --   --    Ambulation   Ambulation?  --   --   --    WB Status  --   --   --    Ambulation 1   Surface  --   --   --    Device  --   --   --    Assistance  --   --   --    Quality of Gait  --   --   -- Gait Deviations  --   --   --    Distance  --   --   --    Comments  --   --   --    Wheelchair Activities   Propulsion  --   --   --    Propulsion 1   Propulsion  --   --   --    Level  --   --   --    Method  --   --   --    Level of Assistance  --   --   --    Description/ Details  --   --   --    Distance  --   --   --    Exercises   Comments  --  Sitting BLE ther ex x 10-15 reps. AROM on LLE, AAROM on RLE due to pain and discomfort. --    Conditions Requiring Skilled Therapeutic Intervention   Body structures, Functions, Activity limitations  --  Decreased functional mobility ; Decreased ADL status; Decreased ROM; Decreased strength;Decreased endurance;Decreased balance;Decreased high-level IADLs;Decreased coordination; Increased pain  --    Assessment  --  Pt continues to amb with a toe touch pattern on RLE. States it hurts his R hip to bear weight through his heel. VCs given to try and bear some weight. Tolerated sitting ther ex with minimal fatigue. AAROM to RLE due to pain and discomfort.   --    Activity Tolerance   Activity Tolerance  --  Patient limited by pain  --    Safety Devices   Type of devices  --   --   --       11/13/20 1450 11/13/20 8779   Restrictions/Precautions   Restrictions/Precautions  --   --    Lower Extremity Weight Bearing Restrictions   Right Lower Extremity Weight Bearing  --   --    Subjective   Subjective  --   --    Pain Screening   Patient Currently in Pain  --   --    Pain Assessment   Pain Assessment  --   --    Pain Level  --   --    Patient's Stated Pain Goal  --   --    Pain Type  --   --    Pain Location  --   --    Pain Orientation  --   --    Pain Descriptors  --   --    Pain Frequency  --   --    Pain Onset  --   --    Clinical Progression  --   --    Functional Pain Assessment  --   --    Non-Pharmaceutical Pain Intervention(s)  --   --    Response to Pain Intervention  --   --    Vital Signs   Resp 18  --    Oxygen Therapy   SpO2 93 %  --    O2 Device None (Room air) --    Bed Mobility   Rolling  --   --    Supine to Sit  --   --    Sit to Supine  --   --    Transfers   Sit to Stand  --   --    Stand to sit  --   --    Bed to Chair  --   --    Ambulation   Ambulation?  --   --    WB Status  --   --    Ambulation 1   Surface  --   --    Device  --   --    Assistance  --   --    Quality of Gait  --   --    Gait Deviations  --   --    Distance  --   --    Comments  --   --    Wheelchair Activities   Propulsion  --   --    Propulsion 1   Propulsion  --   --    Level  --   --    Method  --   --    Level of Assistance  --   --    Description/ Details  --   --    Distance  --   --    Exercises   Comments  --   --    Conditions Requiring Skilled Therapeutic Intervention   Body structures, Functions, Activity limitations  --   --    Assessment  --   --    Activity Tolerance   Activity Tolerance  --   --    Safety Devices   Type of devices  --  Bed alarm in place;Call light within reach   Electronically signed by Franc Alfaro PTA on 11/13/2020 at 4:03 PM

## 2020-11-13 NOTE — PROGRESS NOTES
Patient:   Cathie Angela  MR#:    [id number]   Room:    [address] [place] [address]   YOB: 1950  Date of Progress Note: 11/13/2020  Time of Note                           11:40 AM  Consulting Physician:   Brayden Nix M.D. Attending Physician:  Brayden Nix MD     CHIEF COMPLAINT: Right hip pain     Subjective  This 78 y.o. male  with chronic systolic heart failure, CAD, paroxysmal A. fib on Eliquis admitted to [facility] on  10/30/2020 following a fall at his home. Pt has PMH of alcoholism. Alcohol level on presentation was 176. Banana bag was hung and withdrawal protocol followed. Pt has not shown any signs of withdrawal. X-ray revealed a right hip fracture. Dr Tangela Perdue was consulted and pt underwent a cephalomedullary nailing of his R hip on 10/30. He will be WBAT in his RLE. Post op, pt had an GER and did develop some postoperative acute blood loss anemia (on Eliquis, which has been on hold) with some hypotension, which improved with crystalloid resuscitation and blood products. He was  noted to have hemoglobin persistently below 7 requiring transfusion with 2 units PRBCs on 10/31, then required 3rd unit pRBCs on 11/2 with Hgb <7. Hemodynamics and GER improved with resuscitation. H/H remained stable and renal function improved. He is now medically stable and is participating with therapy. No complaints this morning  REVIEW OF SYSTEMS:  Constitutional: No fevers No chills  Neck:No stiffness  Respiratory: No shortness of breath  Cardiovascular: No chest pain No palpitations  Gastrointestinal: No abdominal pain    Genitourinary: No Dysuria  Neurological: No headache, no confusion      PHYSICAL EXAM:  BP (!) 140/78   Pulse 50   Temp 97 °F (36.1 °C) (Temporal)   Resp 16   Ht 6' (1.829 m)   Wt 176 lb 11.2 oz (80.2 kg)   SpO2 93%   BMI 23.96 kg/m²     Constitutional: he appears well-developed and well-nourished. Eyes - conjunctiva normal.  Pupils react to light  Ear, nose, throat -hearing intact to voice. LABGLOM >60 11/12/2020    GFRAA >59 11/12/2020     Lab Results   Component Value Date    INR 2.27 (H) 10/31/2020    INR 2.03 (H) 10/30/2020    INR 1.59 (H) 06/27/2020   No results found for: PHENYTOIN, ESR, CRP  Objective    Instrumental ADL's  Instrumental ADLs: Yes  Meal Prep  Meal Prep Level: Walker(RW)  Meal Prep Level of Assistance: Stand by assistance  Balance  Sitting Balance: Supervision  Standing Balance: Stand by assistance  Functional Mobility  Functional - Mobility Device: Rolling Walker  Activity: Other;Retrieve items;Transport items  Assist Level: Stand by assistance  Functional Mobility Comments: short distance and in kitchen, x3 occurrences  Transfers  Stand Step Transfers: Stand by assistance  Sit to stand: Stand by assistance  Stand to sit: Stand by assistance  Type of ROM/Therapeutic Exercise  Type of ROM/Therapeutic Exercise: Free weights;Cane/Wand  Comment: BUE 2#, 2 sets of 10 in all planes, 3# Cane/wand: 2 sets of 10 in all planes       11/13/20 0820 11/13/20 0834 11/13/20 0836   Restrictions/Precautions   Restrictions/Precautions  --  Weight Bearing; Fall Risk  --    Lower Extremity Weight Bearing Restrictions   Right Lower Extremity Weight Bearing  --  Weight Bearing As Tolerated  --    Subjective   Subjective  --  Pt agreed to therapy this morning.  --    Pain Assessment   Pain Level 4  --   --    Vital Signs   Level of Consciousness  --   --   --    Bed Mobility   Rolling  --  Stand by assistance  --    Supine to Sit  --  Stand by assistance  --    Sit to Supine  --  Stand by assistance  --    Transfers   Sit to Stand  --  Contact guard assistance  --    Stand to sit  --  Contact guard assistance  --    Lateral Transfers  --  Contact guard assistance  (stand step with RW from bed to w/c, w/c to bed)  --    Ambulation   Ambulation?  --   --  Yes   Ambulation 1   Surface  --   --  level tile   Device  --   --  Rolling Walker   Assistance  --   --  Contact guard assistance   Quality of Gait much pain this morning. Pt is using a toe touch gait on RLE. VCs to put weight through RLE heel. Pt became soiled during amb and had to go back to room. Assisted pt with toileting and ADLs in BR mid session to end. Pt stood with in RW while ADLs done and no LOB. Safety Devices   Type of devices  --   --   --           RECORD REVIEW: Previous medical records, medications were reviewed at today's visit    IMPRESSION:   1. Right hip fracture status post cephalomedullary nailing-pain control/PT/OT  2. DVT prophylaxis-SCDs and aspirin for now. Due to severe anemia will continue to hold Eliquis for now. 3.  History of A. fib-Eliquis currently on hold. Hemoglobin remains very low. On amiodarone. Hopefully can resume Eliquis later this week if hemoglobin remains stable/improving  4. Acute blood loss anemia with recent transfusions. Status post 1 unit packed red blood cells on 11/7. Hemoglobin improved. On Niferex. Stable but still low. Hospitalist consulted to assist with this as well as the timing on when to resume Eliquis. 5.  Essential hypertension-continue current medications and monitoring  6. Chronic low back pain.  scheduled hydrocodone and increase the dose slightly. Lidoderm patch.   Improved    Doing much better since last transfusion    ELOS:11/18

## 2020-11-14 LAB
HCT VFR BLD CALC: 25.9 % (ref 42–52)
HEMOGLOBIN: 8 G/DL (ref 14–18)
MCH RBC QN AUTO: 28.9 PG (ref 27–31)
MCHC RBC AUTO-ENTMCNC: 30.9 G/DL (ref 33–37)
MCV RBC AUTO: 93.5 FL (ref 80–94)
PDW BLD-RTO: 15.1 % (ref 11.5–14.5)
PLATELET # BLD: 482 K/UL (ref 130–400)
PMV BLD AUTO: 8.6 FL (ref 9.4–12.4)
RBC # BLD: 2.77 M/UL (ref 4.7–6.1)
WBC # BLD: 5 K/UL (ref 4.8–10.8)

## 2020-11-14 PROCEDURE — 85027 COMPLETE CBC AUTOMATED: CPT

## 2020-11-14 PROCEDURE — 1180000000 HC REHAB R&B

## 2020-11-14 PROCEDURE — 6370000000 HC RX 637 (ALT 250 FOR IP): Performed by: PSYCHIATRY & NEUROLOGY

## 2020-11-14 PROCEDURE — 36415 COLL VENOUS BLD VENIPUNCTURE: CPT

## 2020-11-14 PROCEDURE — 6370000000 HC RX 637 (ALT 250 FOR IP): Performed by: STUDENT IN AN ORGANIZED HEALTH CARE EDUCATION/TRAINING PROGRAM

## 2020-11-14 PROCEDURE — 6370000000 HC RX 637 (ALT 250 FOR IP): Performed by: FAMILY MEDICINE

## 2020-11-14 PROCEDURE — 99232 SBSQ HOSP IP/OBS MODERATE 35: CPT | Performed by: PSYCHIATRY & NEUROLOGY

## 2020-11-14 PROCEDURE — 2580000003 HC RX 258: Performed by: STUDENT IN AN ORGANIZED HEALTH CARE EDUCATION/TRAINING PROGRAM

## 2020-11-14 RX ORDER — SENNA PLUS 8.6 MG/1
1 TABLET ORAL 2 TIMES DAILY PRN
Status: DISCONTINUED | OUTPATIENT
Start: 2020-11-14 | End: 2020-11-18 | Stop reason: HOSPADM

## 2020-11-14 RX ADMIN — SODIUM CHLORIDE, PRESERVATIVE FREE 10 ML: 5 INJECTION INTRAVENOUS at 21:14

## 2020-11-14 RX ADMIN — Medication 150 MG: at 08:12

## 2020-11-14 RX ADMIN — APIXABAN 5 MG: 5 TABLET, FILM COATED ORAL at 21:13

## 2020-11-14 RX ADMIN — AMIODARONE HYDROCHLORIDE 200 MG: 200 TABLET ORAL at 08:11

## 2020-11-14 RX ADMIN — OXYCODONE HYDROCHLORIDE AND ACETAMINOPHEN 1 TABLET: 7.5; 325 TABLET ORAL at 16:58

## 2020-11-14 RX ADMIN — OXYCODONE HYDROCHLORIDE AND ACETAMINOPHEN 1 TABLET: 7.5; 325 TABLET ORAL at 08:12

## 2020-11-14 RX ADMIN — LEVOTHYROXINE SODIUM 125 MCG: 25 TABLET ORAL at 05:53

## 2020-11-14 RX ADMIN — OXYCODONE HYDROCHLORIDE AND ACETAMINOPHEN 1 TABLET: 7.5; 325 TABLET ORAL at 13:02

## 2020-11-14 RX ADMIN — PANTOPRAZOLE SODIUM 40 MG: 40 TABLET, DELAYED RELEASE ORAL at 05:53

## 2020-11-14 RX ADMIN — SODIUM CHLORIDE, PRESERVATIVE FREE 10 ML: 5 INJECTION INTRAVENOUS at 08:14

## 2020-11-14 RX ADMIN — LISINOPRIL 5 MG: 5 TABLET ORAL at 08:11

## 2020-11-14 RX ADMIN — METOPROLOL SUCCINATE 12.5 MG: 25 TABLET, EXTENDED RELEASE ORAL at 08:11

## 2020-11-14 RX ADMIN — ASPIRIN 325 MG ORAL TABLET 325 MG: 325 PILL ORAL at 08:10

## 2020-11-14 RX ADMIN — ATORVASTATIN CALCIUM 40 MG: 40 TABLET, FILM COATED ORAL at 08:11

## 2020-11-14 RX ADMIN — OXYCODONE HYDROCHLORIDE AND ACETAMINOPHEN 1 TABLET: 7.5; 325 TABLET ORAL at 21:13

## 2020-11-14 RX ADMIN — APIXABAN 5 MG: 5 TABLET, FILM COATED ORAL at 08:12

## 2020-11-14 ASSESSMENT — PAIN DESCRIPTION - PROGRESSION: CLINICAL_PROGRESSION: GRADUALLY IMPROVING

## 2020-11-14 ASSESSMENT — PAIN SCALES - GENERAL
PAINLEVEL_OUTOF10: 3
PAINLEVEL_OUTOF10: 4
PAINLEVEL_OUTOF10: 5
PAINLEVEL_OUTOF10: 3
PAINLEVEL_OUTOF10: 4
PAINLEVEL_OUTOF10: 3

## 2020-11-14 ASSESSMENT — PAIN DESCRIPTION - LOCATION: LOCATION: HIP

## 2020-11-14 ASSESSMENT — PAIN DESCRIPTION - DESCRIPTORS: DESCRIPTORS: ACHING

## 2020-11-14 ASSESSMENT — PAIN - FUNCTIONAL ASSESSMENT: PAIN_FUNCTIONAL_ASSESSMENT: ACTIVITIES ARE NOT PREVENTED

## 2020-11-14 ASSESSMENT — PAIN DESCRIPTION - FREQUENCY: FREQUENCY: CONTINUOUS

## 2020-11-14 ASSESSMENT — PAIN DESCRIPTION - ORIENTATION: ORIENTATION: RIGHT

## 2020-11-14 ASSESSMENT — PAIN DESCRIPTION - PAIN TYPE: TYPE: ACUTE PAIN

## 2020-11-14 NOTE — PROGRESS NOTES
Patient:   Nathaniel Yu  MR#:    [id number]   Room:    5465/997-33   YOB: 1950  Date of Progress Note: 11/14/2020  Time of Note                           10:09 AM  Consulting Physician:   Chelsea Green M.D. Attending Physician:  Chelsea Green MD     CHIEF COMPLAINT: Right hip pain     Subjective  This 78 y.o. male  with chronic systolic heart failure, CAD, paroxysmal A. fib on Eliquis admitted to [facility] on  10/30/2020 following a fall at his home. Pt has PMH of alcoholism. Alcohol level on presentation was 176. Banana bag was hung and withdrawal protocol followed. Pt has not shown any signs of withdrawal. X-ray revealed a right hip fracture. Dr Alexandria Bob was consulted and pt underwent a cephalomedullary nailing of his R hip on 10/30. He will be WBAT in his RLE. Post op, pt had an GER and did develop some postoperative acute blood loss anemia (on Eliquis, which has been on hold) with some hypotension, which improved with crystalloid resuscitation and blood products. He was  noted to have hemoglobin persistently below 7 requiring transfusion with 2 units PRBCs on 10/31, then required 3rd unit pRBCs on 11/2 with Hgb <7. Hemodynamics and GER improved with resuscitation. H/H remained stable and renal function improved. He is now medically stable and is participating with therapy. No complaints today  REVIEW OF SYSTEMS:  Constitutional: No fevers No chills  Neck:No stiffness  Respiratory: No shortness of breath  Cardiovascular: No chest pain No palpitations  Gastrointestinal: No abdominal pain    Genitourinary: No Dysuria  Neurological: No headache, no confusion      PHYSICAL EXAM:  /76   Pulse 56   Temp 97.2 °F (36.2 °C) (Temporal)   Resp 16   Ht 6' (1.829 m)   Wt 175 lb 14.4 oz (79.8 kg)   SpO2 90%   BMI 23.86 kg/m²     Constitutional: he appears well-developed and well-nourished. Eyes - conjunctiva normal.  Pupils react to light  Ear, nose, throat -hearing intact to voice.  No scars, masses, or lesions over external nose or ears, no atrophy of tongue  Neck-symmetric, no masses noted, no jugular vein distension  Respiration- chest wall appears symmetric, good expansion,   normal effort without use of accessory muscles  Cardiovascular- RRR  Musculoskeletal - no significant wasting of muscles noted, no bony deformities, gait no gross ataxia  Extremities-no clubbing, cyanosis or edema  Skin - warm, dry, and intact. No rash, erythema, or pallor. Psychiatric - mood, affect, and behavior appear normal.      Neurology  NEUROLOGICAL EXAM:      Mental status   Awake, alert, fluent oriented x 3 appropriate affect  Attention and concentration appear appropriate  Recent and remote memory appears unremarkable  Speech normal without dysarthria  No clear issues with language       Cranial Nerves   CN II- Visual fields grossly unremarkable  CN III, IV,VI-EOMI, No nystagmus, conjugate eye movements, no ptosis  CN VII-no facial asymmetry  CN VIII-Hearing intact   CN IX and X- Palate elevates in midline  CN XI-good shoulder shrug  CN XII-Tongue midline with no fasciculations or fibrillations          Motor function  Antigravity x 4     Sensory function Intact to light touch     Cerebellar F-N intact     Tremor None present     Gait                  Not tested           Nursing/pcp notes, imaging,labs and vitals reviewed.      PT,OT and/or speech notes reviewed    Lab Results   Component Value Date    WBC 5.0 11/14/2020    HGB 8.0 (L) 11/14/2020    HCT 25.9 (L) 11/14/2020    MCV 93.5 11/14/2020     (H) 11/14/2020     Lab Results   Component Value Date     11/12/2020    K 4.5 11/12/2020     11/12/2020    CO2 25 11/12/2020    BUN 16 11/12/2020    CREATININE 0.9 11/12/2020    GLUCOSE 79 11/12/2020    CALCIUM 8.3 (L) 11/12/2020    PROT 5.0 (L) 10/31/2020    LABALBU 2.8 (L) 10/31/2020    BILITOT 1.2 10/31/2020    ALKPHOS 104 10/31/2020     (H) 10/31/2020    ALT 74 (H) 10/31/2020    LABGLOM >60 11/12/2020    GFRAA >59 11/12/2020     Lab Results   Component Value Date    INR 2.27 (H) 10/31/2020    INR 2.03 (H) 10/30/2020    INR 1.59 (H) 06/27/2020   No results found for: PHENYTOIN, ESR, CRP  Objective    Instrumental ADL's  Instrumental ADLs: Yes  Meal Prep  Meal Prep Level: Walker(RW)  Meal Prep Level of Assistance: Stand by assistance  Balance  Sitting Balance: Supervision  Standing Balance: Stand by assistance  Functional Mobility  Functional - Mobility Device: Rolling Walker  Activity: Other;Retrieve items;Transport items  Assist Level: Stand by assistance  Functional Mobility Comments: short distance and in kitchen, x3 occurrences  Transfers  Stand Step Transfers: Stand by assistance  Sit to stand: Stand by assistance  Stand to sit: Stand by assistance  Type of ROM/Therapeutic Exercise  Type of ROM/Therapeutic Exercise: Free weights;Cane/Wand  Comment: BUE 2#, 2 sets of 10 in all planes, 3# Cane/wand: 2 sets of 10 in all planes       11/13/20 0820 11/13/20 0834 11/13/20 0836   Restrictions/Precautions   Restrictions/Precautions  --  Weight Bearing; Fall Risk  --    Lower Extremity Weight Bearing Restrictions   Right Lower Extremity Weight Bearing  --  Weight Bearing As Tolerated  --    Subjective   Subjective  --  Pt agreed to therapy this morning.  --    Pain Assessment   Pain Level 4  --   --    Vital Signs   Level of Consciousness  --   --   --    Bed Mobility   Rolling  --  Stand by assistance  --    Supine to Sit  --  Stand by assistance  --    Sit to Supine  --  Stand by assistance  --    Transfers   Sit to Stand  --  Contact guard assistance  --    Stand to sit  --  Contact guard assistance  --    Lateral Transfers  --  Contact guard assistance  (stand step with RW from bed to w/c, w/c to bed)  --    Ambulation   Ambulation?  --   --  Yes   Ambulation 1   Surface  --   --  level tile   Device  --   --  Rolling Walker   Assistance  --   --  Contact guard assistance   Quality of Gait  --   -- this morning. Pt is using a toe touch gait on RLE. VCs to put weight through RLE heel. Pt became soiled during amb and had to go back to room. Assisted pt with toileting and ADLs in BR mid session to end. Pt stood with in RW while ADLs done and no LOB. Safety Devices   Type of devices  --   --   --           RECORD REVIEW: Previous medical records, medications were reviewed at today's visit    IMPRESSION:   1. Right hip fracture status post cephalomedullary nailing-pain control/PT/OT  2. DVT prophylaxis-SCDs and aspirin for now. Due to severe anemia will continue to hold Eliquis for now. 3.  History of A. fib-Eliquis currently on hold. Hemoglobin remains very low. On amiodarone. Hopefully can resume Eliquis later this week if hemoglobin remains stable/improving  4. Acute blood loss anemia with recent transfusions. Status post 1 unit packed red blood cells on 11/7. Hemoglobin low but stable. On Niferex. Stable but still low. Hospitalist consulted to assist with this as well as the timing on when to resume Eliquis. 5.  Essential hypertension-continue current medications and monitoring  6. Chronic low back pain.  scheduled hydrocodone and increase the dose slightly. Lidoderm patch.   Improved  Continue current treatment    ELOS:11/18

## 2020-11-14 NOTE — PROGRESS NOTES
11/13/20 1500   Restrictions/Precautions   Restrictions/Precautions Fall Risk   General   Diagnosis s/p R hip nail   Type of ROM/Therapeutic Exercise   Type of ROM/Therapeutic Exercise Resistive Bands   Comment Medium resist Tband   Assessment   Performance deficits / Impairments Decreased endurance;Decreased coordination;Decreased functional mobility ; Decreased ADL status; Decreased balance;Decreased strength;Decreased high-level IADLs   Treatment Diagnosis s/p R hip nail   Timed Code Treatment Minutes 30 Minutes   Activity Tolerance   Activity Tolerance Patient Tolerated treatment well

## 2020-11-15 LAB
HCT VFR BLD CALC: 25.7 % (ref 42–52)
HEMOGLOBIN: 8.2 G/DL (ref 14–18)
MCH RBC QN AUTO: 29.6 PG (ref 27–31)
MCHC RBC AUTO-ENTMCNC: 31.9 G/DL (ref 33–37)
MCV RBC AUTO: 92.8 FL (ref 80–94)
PDW BLD-RTO: 15.4 % (ref 11.5–14.5)
PLATELET # BLD: 482 K/UL (ref 130–400)
PMV BLD AUTO: 8.6 FL (ref 9.4–12.4)
RBC # BLD: 2.77 M/UL (ref 4.7–6.1)
WBC # BLD: 5 K/UL (ref 4.8–10.8)

## 2020-11-15 PROCEDURE — 85027 COMPLETE CBC AUTOMATED: CPT

## 2020-11-15 PROCEDURE — 6370000000 HC RX 637 (ALT 250 FOR IP): Performed by: STUDENT IN AN ORGANIZED HEALTH CARE EDUCATION/TRAINING PROGRAM

## 2020-11-15 PROCEDURE — 1180000000 HC REHAB R&B

## 2020-11-15 PROCEDURE — 6370000000 HC RX 637 (ALT 250 FOR IP): Performed by: PSYCHIATRY & NEUROLOGY

## 2020-11-15 PROCEDURE — 2580000003 HC RX 258: Performed by: STUDENT IN AN ORGANIZED HEALTH CARE EDUCATION/TRAINING PROGRAM

## 2020-11-15 PROCEDURE — 36415 COLL VENOUS BLD VENIPUNCTURE: CPT

## 2020-11-15 PROCEDURE — 6370000000 HC RX 637 (ALT 250 FOR IP): Performed by: FAMILY MEDICINE

## 2020-11-15 RX ADMIN — ASPIRIN 325 MG ORAL TABLET 325 MG: 325 PILL ORAL at 08:07

## 2020-11-15 RX ADMIN — SODIUM CHLORIDE, PRESERVATIVE FREE 10 ML: 5 INJECTION INTRAVENOUS at 21:29

## 2020-11-15 RX ADMIN — LISINOPRIL 5 MG: 5 TABLET ORAL at 08:06

## 2020-11-15 RX ADMIN — OXYCODONE HYDROCHLORIDE AND ACETAMINOPHEN 1 TABLET: 7.5; 325 TABLET ORAL at 12:40

## 2020-11-15 RX ADMIN — AMIODARONE HYDROCHLORIDE 200 MG: 200 TABLET ORAL at 08:06

## 2020-11-15 RX ADMIN — SODIUM CHLORIDE, PRESERVATIVE FREE 10 ML: 5 INJECTION INTRAVENOUS at 08:07

## 2020-11-15 RX ADMIN — Medication 150 MG: at 08:06

## 2020-11-15 RX ADMIN — OXYCODONE HYDROCHLORIDE AND ACETAMINOPHEN 1 TABLET: 7.5; 325 TABLET ORAL at 16:47

## 2020-11-15 RX ADMIN — OXYCODONE HYDROCHLORIDE AND ACETAMINOPHEN 1 TABLET: 7.5; 325 TABLET ORAL at 21:27

## 2020-11-15 RX ADMIN — APIXABAN 5 MG: 5 TABLET, FILM COATED ORAL at 21:27

## 2020-11-15 RX ADMIN — METOPROLOL SUCCINATE 12.5 MG: 25 TABLET, EXTENDED RELEASE ORAL at 08:06

## 2020-11-15 RX ADMIN — LEVOTHYROXINE SODIUM 125 MCG: 25 TABLET ORAL at 05:53

## 2020-11-15 RX ADMIN — PANTOPRAZOLE SODIUM 40 MG: 40 TABLET, DELAYED RELEASE ORAL at 05:53

## 2020-11-15 RX ADMIN — ATORVASTATIN CALCIUM 40 MG: 40 TABLET, FILM COATED ORAL at 08:06

## 2020-11-15 RX ADMIN — APIXABAN 5 MG: 5 TABLET, FILM COATED ORAL at 08:05

## 2020-11-15 RX ADMIN — OXYCODONE HYDROCHLORIDE AND ACETAMINOPHEN 1 TABLET: 7.5; 325 TABLET ORAL at 08:06

## 2020-11-15 ASSESSMENT — PAIN DESCRIPTION - LOCATION: LOCATION: HIP

## 2020-11-15 ASSESSMENT — PAIN DESCRIPTION - PROGRESSION: CLINICAL_PROGRESSION: GRADUALLY IMPROVING

## 2020-11-15 ASSESSMENT — PAIN SCALES - GENERAL
PAINLEVEL_OUTOF10: 4
PAINLEVEL_OUTOF10: 2
PAINLEVEL_OUTOF10: 2
PAINLEVEL_OUTOF10: 4

## 2020-11-15 ASSESSMENT — PAIN DESCRIPTION - PAIN TYPE
TYPE: ACUTE PAIN
TYPE: ACUTE PAIN

## 2020-11-15 ASSESSMENT — PAIN - FUNCTIONAL ASSESSMENT: PAIN_FUNCTIONAL_ASSESSMENT: ACTIVITIES ARE NOT PREVENTED

## 2020-11-15 ASSESSMENT — PAIN DESCRIPTION - ONSET: ONSET: ON-GOING

## 2020-11-15 ASSESSMENT — PAIN DESCRIPTION - DESCRIPTORS: DESCRIPTORS: ACHING

## 2020-11-15 ASSESSMENT — PAIN DESCRIPTION - ORIENTATION: ORIENTATION: RIGHT

## 2020-11-15 ASSESSMENT — PAIN DESCRIPTION - FREQUENCY: FREQUENCY: CONTINUOUS

## 2020-11-15 NOTE — PLAN OF CARE
Problem: Falls - Risk of:  Goal: Will remain free from falls  Description: Will remain free from falls  11/15/2020 0007 by Ratna Cantor LPN  Outcome: Ongoing  11/14/2020 1453 by Kush Brito RN  Outcome: Ongoing  Goal: Absence of physical injury  Description: Absence of physical injury  11/15/2020 0007 by Ratna Cantor LPN  Outcome: Ongoing  11/14/2020 1453 by Kush Brito RN  Outcome: Ongoing     Problem: SAFETY  Goal: Free from accidental physical injury  11/15/2020 0007 by Ratna Cantor LPN  Outcome: Ongoing  11/14/2020 1453 by Kush Brito RN  Outcome: Ongoing     Problem: DAILY CARE  Goal: Daily care needs are met  11/15/2020 0007 by Ratna Cantor LPN  Outcome: Ongoing  11/14/2020 1453 by Kush Brito RN  Outcome: Ongoing     Problem: PAIN  Goal: Patient's pain/discomfort is manageable  11/15/2020 0007 by Ratna Cantor LPN  Outcome: Ongoing  11/14/2020 1453 by Kush Brito RN  Outcome: Ongoing     Problem: SKIN INTEGRITY  Goal: Skin integrity is maintained or improved  11/15/2020 0007 by Ratna Cantor LPN  Outcome: Ongoing  11/14/2020 1453 by Kush Brito RN  Outcome: Ongoing     Problem: KNOWLEDGE DEFICIT  Goal: Patient/S.O. demonstrates understanding of disease process, treatment plan, medications, and discharge instructions.   11/15/2020 0007 by Ratna Cantor LPN  Outcome: Ongoing  11/14/2020 1453 by Kush Brito RN  Outcome: Ongoing     Problem: DISCHARGE BARRIERS  Goal: Patient's continuum of care needs are met  11/15/2020 0007 by Ratna Cantor LPN  Outcome: Ongoing  11/14/2020 1453 by Kush Brito RN  Outcome: Ongoing     Problem: Pain:  Goal: Pain level will decrease  Description: Pain level will decrease  11/15/2020 0007 by Ratna Cantor LPN  Outcome: Ongoing  11/14/2020 1453 by Kush Brito RN  Outcome: Ongoing     Problem: Infection - Surgical Site:  Goal: Will show no infection signs and symptoms  Description: Will show no infection signs and symptoms  11/15/2020 0007 by Ratna Cantor LPN  Outcome: Ongoing  11/14/2020 1453 by Kush Brito RN  Outcome: Ongoing     Problem: Skin Integrity:  Goal: Will show no infection signs and symptoms  Description: Will show no infection signs and symptoms  11/15/2020 0007 by Ratna Cantor LPN  Outcome: Ongoing  11/14/2020 1453 by Kush Brito RN  Outcome: Ongoing  Goal: Absence of new skin breakdown  Description: Absence of new skin breakdown  11/15/2020 0007 by Ratna Cantor LPN  Outcome: Ongoing  11/14/2020 1453 by Kush Brito RN  Outcome: Ongoing

## 2020-11-16 ENCOUNTER — TELEPHONE (OUTPATIENT)
Dept: ADMINISTRATIVE | Age: 70
End: 2020-11-16

## 2020-11-16 LAB
ANION GAP SERPL CALCULATED.3IONS-SCNC: 7 MMOL/L (ref 7–19)
BASOPHILS ABSOLUTE: 0 K/UL (ref 0–0.2)
BASOPHILS RELATIVE PERCENT: 0 % (ref 0–1)
BUN BLDV-MCNC: 16 MG/DL (ref 8–23)
CALCIUM SERPL-MCNC: 8.3 MG/DL (ref 8.8–10.2)
CHLORIDE BLD-SCNC: 104 MMOL/L (ref 98–111)
CO2: 26 MMOL/L (ref 22–29)
CREAT SERPL-MCNC: 1.1 MG/DL (ref 0.5–1.2)
EOSINOPHILS ABSOLUTE: 0.22 K/UL (ref 0–0.6)
EOSINOPHILS RELATIVE PERCENT: 5 % (ref 0–5)
GFR AFRICAN AMERICAN: >59
GFR NON-AFRICAN AMERICAN: >60
GLUCOSE BLD-MCNC: 78 MG/DL (ref 74–109)
HCT VFR BLD CALC: 26.4 % (ref 42–52)
HEMOGLOBIN: 8.3 G/DL (ref 14–18)
IMMATURE GRANULOCYTES #: 0 K/UL
LYMPHOCYTES ABSOLUTE: 1.4 K/UL (ref 1.1–4.5)
LYMPHOCYTES RELATIVE PERCENT: 33 % (ref 20–40)
MCH RBC QN AUTO: 29.2 PG (ref 27–31)
MCHC RBC AUTO-ENTMCNC: 31.4 G/DL (ref 33–37)
MCV RBC AUTO: 93 FL (ref 80–94)
MONOCYTES ABSOLUTE: 0.1 K/UL (ref 0–0.9)
MONOCYTES RELATIVE PERCENT: 2 % (ref 0–10)
NEUTROPHILS ABSOLUTE: 2.6 K/UL (ref 1.5–7.5)
NEUTROPHILS RELATIVE PERCENT: 60 % (ref 50–65)
PDW BLD-RTO: 15.5 % (ref 11.5–14.5)
PLATELET # BLD: 429 K/UL (ref 130–400)
PLATELET SLIDE REVIEW: ABNORMAL
PMV BLD AUTO: 8.4 FL (ref 9.4–12.4)
POTASSIUM REFLEX MAGNESIUM: 4.3 MMOL/L (ref 3.5–5)
RBC # BLD: 2.84 M/UL (ref 4.7–6.1)
SODIUM BLD-SCNC: 137 MMOL/L (ref 136–145)
WBC # BLD: 4.3 K/UL (ref 4.8–10.8)

## 2020-11-16 PROCEDURE — 97530 THERAPEUTIC ACTIVITIES: CPT

## 2020-11-16 PROCEDURE — 6370000000 HC RX 637 (ALT 250 FOR IP): Performed by: STUDENT IN AN ORGANIZED HEALTH CARE EDUCATION/TRAINING PROGRAM

## 2020-11-16 PROCEDURE — 1180000000 HC REHAB R&B

## 2020-11-16 PROCEDURE — 80048 BASIC METABOLIC PNL TOTAL CA: CPT

## 2020-11-16 PROCEDURE — 6370000000 HC RX 637 (ALT 250 FOR IP): Performed by: FAMILY MEDICINE

## 2020-11-16 PROCEDURE — 99232 SBSQ HOSP IP/OBS MODERATE 35: CPT | Performed by: PSYCHIATRY & NEUROLOGY

## 2020-11-16 PROCEDURE — 97116 GAIT TRAINING THERAPY: CPT

## 2020-11-16 PROCEDURE — 36415 COLL VENOUS BLD VENIPUNCTURE: CPT

## 2020-11-16 PROCEDURE — 85025 COMPLETE CBC W/AUTO DIFF WBC: CPT

## 2020-11-16 PROCEDURE — 2580000003 HC RX 258: Performed by: STUDENT IN AN ORGANIZED HEALTH CARE EDUCATION/TRAINING PROGRAM

## 2020-11-16 PROCEDURE — 6370000000 HC RX 637 (ALT 250 FOR IP): Performed by: PSYCHIATRY & NEUROLOGY

## 2020-11-16 PROCEDURE — 97535 SELF CARE MNGMENT TRAINING: CPT

## 2020-11-16 PROCEDURE — 97110 THERAPEUTIC EXERCISES: CPT

## 2020-11-16 RX ADMIN — METOPROLOL SUCCINATE 12.5 MG: 25 TABLET, EXTENDED RELEASE ORAL at 07:56

## 2020-11-16 RX ADMIN — APIXABAN 5 MG: 5 TABLET, FILM COATED ORAL at 07:55

## 2020-11-16 RX ADMIN — LEVOTHYROXINE SODIUM 125 MCG: 25 TABLET ORAL at 05:58

## 2020-11-16 RX ADMIN — OXYCODONE HYDROCHLORIDE AND ACETAMINOPHEN 1 TABLET: 7.5; 325 TABLET ORAL at 17:04

## 2020-11-16 RX ADMIN — OXYCODONE HYDROCHLORIDE AND ACETAMINOPHEN 1 TABLET: 7.5; 325 TABLET ORAL at 07:55

## 2020-11-16 RX ADMIN — SODIUM CHLORIDE, PRESERVATIVE FREE 10 ML: 5 INJECTION INTRAVENOUS at 21:27

## 2020-11-16 RX ADMIN — Medication 150 MG: at 07:55

## 2020-11-16 RX ADMIN — APIXABAN 5 MG: 5 TABLET, FILM COATED ORAL at 21:26

## 2020-11-16 RX ADMIN — OXYCODONE HYDROCHLORIDE AND ACETAMINOPHEN 1 TABLET: 7.5; 325 TABLET ORAL at 21:26

## 2020-11-16 RX ADMIN — ATORVASTATIN CALCIUM 40 MG: 40 TABLET, FILM COATED ORAL at 07:55

## 2020-11-16 RX ADMIN — LISINOPRIL 5 MG: 5 TABLET ORAL at 07:55

## 2020-11-16 RX ADMIN — PANTOPRAZOLE SODIUM 40 MG: 40 TABLET, DELAYED RELEASE ORAL at 05:58

## 2020-11-16 RX ADMIN — SODIUM CHLORIDE, PRESERVATIVE FREE 10 ML: 5 INJECTION INTRAVENOUS at 07:57

## 2020-11-16 RX ADMIN — OXYCODONE HYDROCHLORIDE AND ACETAMINOPHEN 1 TABLET: 7.5; 325 TABLET ORAL at 12:26

## 2020-11-16 RX ADMIN — AMIODARONE HYDROCHLORIDE 200 MG: 200 TABLET ORAL at 07:55

## 2020-11-16 RX ADMIN — ASPIRIN 325 MG ORAL TABLET 325 MG: 325 PILL ORAL at 07:55

## 2020-11-16 ASSESSMENT — PAIN - FUNCTIONAL ASSESSMENT
PAIN_FUNCTIONAL_ASSESSMENT: ACTIVITIES ARE NOT PREVENTED

## 2020-11-16 ASSESSMENT — PAIN DESCRIPTION - ONSET
ONSET: ON-GOING

## 2020-11-16 ASSESSMENT — PAIN DESCRIPTION - LOCATION
LOCATION: HIP

## 2020-11-16 ASSESSMENT — PAIN DESCRIPTION - PROGRESSION
CLINICAL_PROGRESSION: NOT CHANGED

## 2020-11-16 ASSESSMENT — PAIN SCALES - GENERAL
PAINLEVEL_OUTOF10: 2
PAINLEVEL_OUTOF10: 5
PAINLEVEL_OUTOF10: 4
PAINLEVEL_OUTOF10: 0
PAINLEVEL_OUTOF10: 0
PAINLEVEL_OUTOF10: 3
PAINLEVEL_OUTOF10: 0

## 2020-11-16 ASSESSMENT — PAIN DESCRIPTION - ORIENTATION
ORIENTATION: RIGHT

## 2020-11-16 ASSESSMENT — PAIN DESCRIPTION - FREQUENCY
FREQUENCY: CONTINUOUS

## 2020-11-16 ASSESSMENT — PAIN DESCRIPTION - PAIN TYPE
TYPE: ACUTE PAIN
TYPE: ACUTE PAIN

## 2020-11-16 ASSESSMENT — PAIN DESCRIPTION - DESCRIPTORS
DESCRIPTORS: ACHING

## 2020-11-16 NOTE — PROGRESS NOTES
Afshan Miles  [id number]     11/16/20 2936 11/16/20 0820 11/16/20 0849   Restrictions/Precautions   Restrictions/Precautions Fall Risk  --   --    Lower Extremity Weight Bearing Restrictions   Right Lower Extremity Weight Bearing Weight Bearing As Tolerated  --   --    Subjective   Subjective  --  Pt agreed to therapy this morning.  --    Pain Screening   Patient Currently in Pain  --  No  --    Pain Assessment   Pain Assessment  --  0-10  --    Pain Level  --  0  --    Bed Mobility   Rolling Stand by assistance  --   --    Supine to Sit Stand by assistance  --   --    Transfers   Sit to Stand  --   --  Stand by assistance;Contact guard assistance   Stand to sit  --   --  Contact guard assistance;Stand by assistance   Bed to Chair  --   --  Contact guard assistance   Ambulation   Ambulation?  --   --   --    WB Status  --   --   --    Ambulation 1   Surface  --   --   --    Device  --   --   --    Assistance  --   --   --    Quality of Gait  --   --   --    Gait Deviations  --   --   --    Distance  --   --   --    Comments  --   --   --    Exercises   Comments  --   --   --    Conditions Requiring Skilled Therapeutic Intervention   Body structures, Functions, Activity limitations  --   --   --    Assessment  --   --   --    Activity Tolerance   Activity Tolerance  --   --   --    Safety Devices   Type of devices  --   --   --       11/16/20 0854 11/16/20 0912 11/16/20 0915   Restrictions/Precautions   Restrictions/Precautions  --   --   --    Lower Extremity Weight Bearing Restrictions   Right Lower Extremity Weight Bearing  --   --   --    Subjective   Subjective  --   --   --    Pain Screening   Patient Currently in Pain  --   --   --    Pain Assessment   Pain Assessment  --   --   --    Pain Level  --   --   --    Bed Mobility   Rolling  --   --   --    Supine to Sit  --   --   --    Transfers   Sit to Stand  --   --   --    Stand to sit  --   --   --    Bed to Chair  --   --   --    Ambulation   Ambulation?  Yes --    Activity Tolerance   Activity Tolerance  --    Safety Devices   Type of devices   (With OT)   Electronically signed by Silvia Alejandro PTA on 11/16/2020 at 9:18 AM

## 2020-11-16 NOTE — PROGRESS NOTES
Sharon Mcbride  [id number]     11/16/20 1321 11/16/20 1322 11/16/20 1332   Lower Extremity Weight Bearing Restrictions   Right Lower Extremity Weight Bearing Weight Bearing As Tolerated  --   --    Bed Mobility   Rolling Independent  --   --    Supine to Sit Independent  --   --    Sit to Supine Independent  --   --    Transfers   Sit to Stand  --  Independent  --    Stand to sit  --  Independent  --    Bed to Chair  --  Independent  --    Ambulation   Ambulation?  --   --  Yes   WB Status  --   --  WBAT   More Ambulation?  --   --  Yes   Ambulation 1   Surface  --   --  level tile   Device  --   --  Rolling Walker   Assistance  --   --  Stand by assistance   Quality of Gait  --   --  3 point gait, able to put weight through R heel. slight ER of RLE while amb. Gait Deviations  --   --  Slow Hyacinth;Decreased step length   Distance  --   --  150'   Comments  --   --  Incorporated turns   Ambulation 2   Surface - 2  --   --  level tile   Device 2  --   --  Rolling Walker   Assistance 2  --   --  Stand by assistance   Quality of Gait 2  --   --  same as above   Gait Deviations  --   --  Slow Hyacinth;Decreased step length   Distance  --   --  20'   Comments  --   --  from bed to BR to w/c   Stairs/Curb   Stairs?  --   --  No   Wheelchair Activities   Propulsion  --   --  Yes   Propulsion 1   Propulsion  --   --  Manual   Level  --   --  Level Tile   Method  --   --  RUE;LUE   Level of Assistance  --   --  Supervision   Description/ Details  --   --  Maintained straight path.    Distance  --   --  250'   Exercises   Comments  --   --   --    Conditions Requiring Skilled Therapeutic Intervention   Body structures, Functions, Activity limitations  --   --   --    Assessment  --   --   --    Activity Tolerance   Activity Tolerance  --   --   --    Safety Devices   Type of devices  --   --   --       11/16/20 1334 11/16/20 1336 11/16/20 1406   Lower Extremity Weight Bearing Restrictions   Right Lower Extremity Weight Bearing --   --   --    Bed Mobility   Rolling  --   --   --    Supine to Sit  --   --   --    Sit to Supine  --   --   --    Transfers   Sit to Stand  --   --   --    Stand to sit  --   --   --    Bed to Chair  --   --   --    Ambulation   Ambulation?  --   --   --    WB Status  --   --   --    More Ambulation?  --   --   --    Ambulation 1   Surface  --   --   --    Device  --   --   --    Assistance  --   --   --    Quality of Gait  --   --   --    Gait Deviations  --   --   --    Distance  --   --   --    Comments  --   --   --    Ambulation 2   Surface - 2  --   --   --    Device 2  --   --   --    Assistance 2  --   --   --    Quality of Gait 2  --   --   --    Gait Deviations  --   --   --    Distance  --   --   --    Comments  --   --   --    Stairs/Curb   Stairs?  --   --   --    Wheelchair Activities   Propulsion  --   --   --    Propulsion 1   Propulsion  --   --   --    Level  --   --   --    Method  --   --   --    Level of Assistance  --   --   --    Description/ Details  --   --   --    Distance  --   --   --    Exercises   Comments Checked pt for up Ad Jordyn in room. --   --    Conditions Requiring Skilled Therapeutic Intervention   Body structures, Functions, Activity limitations  --  Decreased functional mobility ; Decreased ADL status; Decreased ROM; Decreased strength;Decreased endurance;Decreased balance;Decreased high-level IADLs;Decreased coordination; Increased pain  --    Assessment  --  Checked pt in room for up Ad Jordyn in. Pt was able to perform bed mobility and TFs independently and safely. Pt tolerated amb well with increased distance with minimal fatigue towards end of amb. Pt stated he did not want to practice stairs. Pt still needs to review Car TF.  --    Activity Tolerance   Activity Tolerance  --  Patient Tolerated treatment well  --    Safety Devices   Type of devices  --   --  Bed alarm in place;Call light within reach; Left in bed   Electronically signed by Hayden Santiago PTA on 11/16/2020 at 2:08 PM

## 2020-11-16 NOTE — PROGRESS NOTES
[facility]  Test for Patient [place] in the Areas of Transfers/Ambulation    Ambulation/Transfers   · Independent ambulation in room with assistive device:  YES     -Device Type:  Rolling Walker  · Independent transfers from wheelchair to surface in room:  YES    Bathroom Transfers  · Independent transfers in patient bathroom:  Yes         Inpatient Rehabilitation Nursing and Therapies feel as though the patient qualifies for an acute rehabilitation test for independence in the areas of transfers and ambulation prior to discharging from Inpatient Rehabilitation Unit at [facility].  This test for independence in the areas of transfers and ambulation must be agreed upon by the patient's physician, nurse, and therapists.         Nurse Approval:  Electronically signed by Teetee Pritchard RN on 11/16/2020 at 3:02 PM  Physical Therapist Approval:  Electronically signed by Melissa Farley PTA on 11/16/2020 at 1:59 PM      Occupational Therapist Approval: Electronically signed by Colt Desir OT on 11/16/2020 at 2:47 PM

## 2020-11-16 NOTE — PLAN OF CARE
Problem: Falls - Risk of:  Goal: Will remain free from falls  Description: Will remain free from falls  11/15/2020 2349 by Flip Rascon LPN  Outcome: Ongoing  11/15/2020 1518 by Cinthya Chow RN  Outcome: Ongoing  Goal: Absence of physical injury  Description: Absence of physical injury  11/15/2020 2349 by Flip Rascon LPN  Outcome: Ongoing  11/15/2020 1518 by Cinthya Chow RN  Outcome: Ongoing     Problem: SAFETY  Goal: Free from accidental physical injury  11/15/2020 2349 by Flip Rascon LPN  Outcome: Ongoing  11/15/2020 1518 by Cinthya Chow RN  Outcome: Ongoing     Problem: DAILY CARE  Goal: Daily care needs are met  11/15/2020 2349 by Flip Rascon LPN  Outcome: Ongoing  11/15/2020 1518 by Cinthya Chow RN  Outcome: Ongoing     Problem: PAIN  Goal: Patient's pain/discomfort is manageable  11/15/2020 2349 by Flip Rascon LPN  Outcome: Ongoing  11/15/2020 1518 by Cinthya Chow RN  Outcome: Ongoing     Problem: SKIN INTEGRITY  Goal: Skin integrity is maintained or improved  11/15/2020 2349 by Flip Rascon LPN  Outcome: Ongoing  11/15/2020 1518 by Cinthya Chow RN  Outcome: Ongoing     Problem: KNOWLEDGE DEFICIT  Goal: Patient/S.O. demonstrates understanding of disease process, treatment plan, medications, and discharge instructions.   11/15/2020 2349 by Flip Rascon LPN  Outcome: Ongoing  11/15/2020 1518 by Cinthya Chow RN  Outcome: Ongoing     Problem: DISCHARGE BARRIERS  Goal: Patient's continuum of care needs are met  11/15/2020 2349 by Flip Rascon LPN  Outcome: Ongoing  11/15/2020 1518 by Cinthya Chow RN  Outcome: Ongoing     Problem: Pain:  Goal: Pain level will decrease  Description: Pain level will decrease  11/15/2020 2349 by Flip Rascon LPN  Outcome: Ongoing  11/15/2020 1518 by Cinthya Chow RN  Outcome: Ongoing     Problem: Infection - Surgical Site:  Goal: Will show no infection signs and symptoms  Description: Will show no infection signs and symptoms  11/15/2020 2349 by Jay Pulido LPN  Outcome: Ongoing  11/15/2020 1518 by Paulie Ontiveros RN  Outcome: Ongoing     Problem: Skin Integrity:  Goal: Will show no infection signs and symptoms  Description: Will show no infection signs and symptoms  11/15/2020 2349 by Jay Pulido LPN  Outcome: Ongoing  11/15/2020 1518 by Paulie Ontiveros RN  Outcome: Ongoing  Goal: Absence of new skin breakdown  Description: Absence of new skin breakdown  11/15/2020 2349 by Jay Pulido LPN  Outcome: Ongoing  11/15/2020 1518 by Paulie Ontiveros RN  Outcome: Ongoing

## 2020-11-16 NOTE — PROGRESS NOTES
Occupational Therapy  Facility/Department: [facility] 8 REHAB UNIT  Daily Treatment Note  NAME: Susan Merida  : 1950  MRN: [id number]    Date of Service: 2020    Discharge Recommendations:  Continue to assess pending progress  OT Equipment Recommendations  Equipment Needed: Yes  Mobility Devices: ADL Assistive Devices  ADL Assistive Devices: Toileting - 3-in-1 Commode  Other: Started order for [facility] to be sent to [facility] once signed. Assessment   Performance deficits / Impairments: Decreased endurance;Decreased coordination;Decreased functional mobility ; Decreased ADL status; Decreased balance;Decreased strength;Decreased high-level IADLs  Assessment: Started order for [facility] to be sent to [facility] once signed. Discharging on 2020. Treatment Diagnosis: s/p R hip nail  Activity Tolerance  Activity Tolerance: Patient Tolerated treatment well  Safety Devices  Safety Devices in place: Yes  Type of devices: Bed alarm in place;Call light within reach         Patient Diagnosis(es): There were no encounter diagnoses. has a past medical history of CAD (coronary artery disease), Gout, Hypertension, Non-ST elevation MI (NSTEMI) ([facility] [address] and Palliative care patient. has a past surgical history that includes Cardiac catheterization (14  [facility]); Cholecystectomy; and Femur fracture surgery (Right, 10/30/2020).     Restrictions  Restrictions/Precautions  Restrictions/Precautions: Fall Risk  Required Braces or Orthoses?: No  Lower Extremity Weight Bearing Restrictions  Right Lower Extremity Weight Bearing: Weight Bearing As Tolerated  Subjective   General  Patient assessed for rehabilitation services?: Yes  Diagnosis: s/p R hip nail      Objective       Balance  Sitting Balance: Supervision  Standing Balance: Supervision  Functional Mobility  Functional - Mobility Device: Rolling Walker  Activity: To/from bathroom  Assist Level: Supervision  Bed mobility  Supine to Sit: Supervision  Sit to Supine: Supervision  Transfers  Stand Step Transfers: Supervision  Sit to stand: Supervision  Stand to sit: Supervision              Plan   Plan  Current Treatment Recommendations: Gait Training, Patient/Caregiver Education & Training, Strengthening, Home Management Training, Balance Training, Equipment Evaluation, Education, & procurement, Functional Mobility Training, Endurance Training, Self-Care / ADL, Safety Education & Training  Plan Comment: Continue to work with AE       OutComes Score       11/16/20 0900   [address] Needed Supervision or touching assistance   Comment Supervision   CARE Score 4   Shower/Bathe Self   Assistance Needed Supervision or touching assistance   Comment Supervision with shower   CARE Score 4   Lower Body Dressing   Assistance Needed Supervision or touching assistance   Comment Supervision with AE   CARE Score 4   Putting On/Taking Off [address] or touching assistance   Comment with AE, VC for technique with sock aide. CARE Score 4                        11/16/20 1100   Toilet Transfer   Assistance Needed Supervision or touching assistance   Comment Supervision   CARE Score 4           Goals  Short term goals  Time Frame for Short term goals: 1 week  Short term goal 1: MET  Short term goal 2: MET  Short term goal 3: MET  Short term goal 4: MET  Short term goal 5: MET  Short term goal 6: MET  Long term goals  Time Frame for Long term goals : 2 weeks  Long term goal 1: Modified Independent with toilet hygiene. Long term goal 2: Modified Independent with toilet transfer. Long term goal 3: Modified Independent wih showering/bathing with AE as needed. Long term goal 4: Modified Independent with LB dressing with AE as needed. Long term goal 5: Independent with UB dressing. Long term goal 7: Modified Independent with putting on/taking off footwear with AE as needed. Long term goal 8: Verbalize DME needs.   Long term goal 9: Complete IADL/homemaking task with Modified [place].        Therapy Time   Individual Concurrent Group Co-treatment   Time In 0900         Time Out 1000         Minutes 60         Timed Code Treatment Minutes: 75 Yasmany Evans, OT

## 2020-11-16 NOTE — PATIENT CARE CONFERENCE
[facility] ACUTE INPATIENT REHABILITATION  TEAM CONFERENCE NOTE    Date: 2020  Patient Name: Maciej Paez        MRN: [id number]    : 1950  (78 y.o.)  Gender: male      Diagnosis: R Intertrochanteric hip fracture      PHYSICAL THERAPY  STRENGTH  Strength RLE  Strength RLE: Exception  Comment: hip flexion/knee extension 3/5, ankle DF 4-/5 w/pain  Strength LLE  Strength LLE: Exception  Comment: Hip flexion 3/5, all other grossly 4-/5  ROM  AROM RLE (degrees)  RLE AROM: Exceptions  RLE General AROM: Unable to actively extend knee d/t pain, ankles WFL unable to actively flex hip  AROM LLE (degrees)  LLE AROM : Exceptions  LLE General AROM: Decreased active knee extension  BED MOBILITY  Bed Mobility  Bridging: Stand by assistance  Rolling: Independent  Supine to Sit: Independent  Sit to Supine: Independent  Scooting: Stand by assistance  TRANSFERS  Transfers  Sit to Stand: Independent  Stand to sit: Independent  Bed to Chair: Independent  Lateral Transfers: Contact guard assistance(stand step with RW from bed to w/c, w/c to bed)  Car Transfer: Contact guard assistance  Comment: Pt SBA with transfers requiring min A to lift RLE into bed when performing stand step transfer WC to bed w/ RW.  WHEELCHAIR PROPULSION  Propulsion 1  Propulsion: Manual  Level: Level Tile  Method: JAGDISH GARCIA  Level of Assistance: Supervision  Description/ Details: Maintained straight path. Distance: 250'  AMBULATION  Ambulation 1  Surface: level tile  Device: Rolling Walker  Other Apparatus: Wheelchair follow  Assistance: Stand by assistance  Quality of Gait: 3 point gait, able to put weight through R heel. slight ER of RLE while amb.   Gait Deviations: Slow Hyacinth, Decreased step length  Distance: 150'  Comments: Incorporated turns  STAIRS     GOALS:  Short term goals  Time Frame for Short term goals: 1 week  Short term goal 1: CGA all bed mobility  Short term goal 2: Min assist transfers  Short term goal 3: CGA WC propulsion nutritionally stable with PO intake >75% many meals. Wt increase 17 lbs documented since admit. Suspect some wt gain r/t fluid retention. Please see nutrition note for details. NURSING    Wounds/Incisions/Ulcers: Incision healing well     Christiano Scale Score: 16    Pain: Patient's pain is currently controlled with Schedule Percocet     Consultations/Labs/X-rays:  Hospitalist    Family Education: Need to make contact with family to initiate education    Fall Risk:  Alejo Score: 22    Fall in the last week? none      Other Nursing Issues: Alert and oriented x4, meds whole with water, independent in room, IID LFA, lidocaine patch, refuses SCD's, heel lift boots, cardiac diet, incont at times, Eliquis, ASA,         SOCIAL WORK/CASE MANAGEMENT  Assessment: Pleased with his progress, has hired additional care at home with his usual house keeper. Has support from his sisters. Discharge Plan   Estimated Length of Stay: 11/18/20  Destination: home health    Pass: No    Services at Discharge: [address] and Nursing per evaluations    Equipment at Discharge: has a shower chair, SW, cane, wheelchair    Progress made in the prior week:  1. Improved bed mobility and transfers w/RW requiring less assistance  2. Improved endurance w/amb as well as improved gait mechanics w/an ability to bear more weight through RLE  3. Modified independent with ambulatory ADLs. 4.  5.      Goals for following week:  1. Continue to improve WB tolerance through RLE to facilitate improved gait mechanics as well as endurance w/amb  2. Continue to improve bed mobility and transfers to facilitate inc IND w/in pt's room  3.  Discharge planning   4.   5.     Factors facilitating achievement of predicted outcomes: Friend support, Motivated and Good insight into deficits    Barriers to the achievement of predicted outcomes: Decreased endurance, Upper extremity weakness and Lower extremity weakness    Team Members Present at Conference:  : Kirt Prince   Occupational Therapist: Ignacia Montes OTR/L  Physical Therapist: Ratna Gooden PT,DPT  Speech Therapist: N/A  Nurse: Anup West RN,BSN   Nurse Manager:  Anup West, RN, BSN  Dietitian:  Anabella Sarah, MS, RD  Rehab Director:  Ariadna Yang approve the established interdisciplinary plan of care as documented within the medical record of Rasta Escoto.

## 2020-11-16 NOTE — PROGRESS NOTES
Patient:   Cathie Angela  MR#:    [id number]   Room:    [address] [place] [address]   YOB: 1950  Date of Progress Note: 11/16/2020  Time of Note                           10:43 AM  Consulting Physician:   Brayden Nix M.D. Attending Physician:  Brayden Nix MD     CHIEF COMPLAINT: Right hip pain     Subjective  This 78 y.o. male  with chronic systolic heart failure, CAD, paroxysmal A. fib on Eliquis admitted to [facility] on  10/30/2020 following a fall at his home. Pt has PMH of alcoholism. Alcohol level on presentation was 176. Banana bag was hung and withdrawal protocol followed. Pt has not shown any signs of withdrawal. X-ray revealed a right hip fracture. Dr Tangela Perdue was consulted and pt underwent a cephalomedullary nailing of his R hip on 10/30. He will be WBAT in his RLE. Post op, pt had an GER and did develop some postoperative acute blood loss anemia (on Eliquis, which has been on hold) with some hypotension, which improved with crystalloid resuscitation and blood products. He was  noted to have hemoglobin persistently below 7 requiring transfusion with 2 units PRBCs on 10/31, then required 3rd unit pRBCs on 11/2 with Hgb <7. Hemodynamics and GER improved with resuscitation. H/H remained stable and renal function improved. He is now medically stable and is participating with therapy. No complaints this morning. Had a good weekend. Hemoglobin stable. REVIEW OF SYSTEMS:  Constitutional: No fevers No chills  Neck:No stiffness  Respiratory: No shortness of breath  Cardiovascular: No chest pain No palpitations  Gastrointestinal: No abdominal pain    Genitourinary: No Dysuria  Neurological: No headache, no confusion      PHYSICAL EXAM:  BP (!) 147/74   Pulse 51   Temp 97.2 °F (36.2 °C) (Temporal)   Resp 18   Ht 6' (1.829 m)   Wt 191 lb 14.4 oz (87 kg)   SpO2 95%   BMI 26.03 kg/m²     Constitutional: he appears well-developed and well-nourished.    Eyes - conjunctiva normal.  Pupils react to light  Ear, nose, throat -hearing intact to voice. No scars, masses, or lesions over external nose or ears, no atrophy of tongue  Neck-symmetric, no masses noted, no jugular vein distension  Respiration- chest wall appears symmetric, good expansion,   normal effort without use of accessory muscles  Cardiovascular- RRR  Musculoskeletal - no significant wasting of muscles noted, no bony deformities, gait no gross ataxia  Extremities-no clubbing, cyanosis or edema  Skin - warm, dry, and intact. No rash, erythema, or pallor. Psychiatric - mood, affect, and behavior appear normal.      Neurology  NEUROLOGICAL EXAM:      Mental status   Awake, alert, fluent oriented x 3 appropriate affect  Attention and concentration appear appropriate  Recent and remote memory appears unremarkable  Speech normal without dysarthria  No clear issues with language       Cranial Nerves   CN II- Visual fields grossly unremarkable  CN III, IV,VI-EOMI, No nystagmus, conjugate eye movements, no ptosis  CN VII-no facial asymmetry  CN VIII-Hearing intact   CN IX and X- Palate elevates in midline  CN XI-good shoulder shrug  CN XII-Tongue midline with no fasciculations or fibrillations          Motor function  Antigravity x 4     Sensory function Intact to light touch     Cerebellar F-N intact     Tremor None present     Gait                  Not tested           Nursing/pcp notes, imaging,labs and vitals reviewed.      PT,OT and/or speech notes reviewed    Lab Results   Component Value Date    WBC 4.3 (L) 11/16/2020    HGB 8.3 (L) 11/16/2020    HCT 26.4 (L) 11/16/2020    MCV 93.0 11/16/2020     (H) 11/16/2020     Lab Results   Component Value Date     11/16/2020    K 4.3 11/16/2020     11/16/2020    CO2 26 11/16/2020    BUN 16 11/16/2020    CREATININE 1.1 11/16/2020    GLUCOSE 78 11/16/2020    CALCIUM 8.3 (L) 11/16/2020    PROT 5.0 (L) 10/31/2020    LABALBU 2.8 (L) 10/31/2020    BILITOT 1.2 10/31/2020    ALKPHOS 104 10/31/2020  (H) 10/31/2020    ALT 74 (H) 10/31/2020    LABGLOM >60 11/16/2020    GFRAA >59 11/16/2020     Lab Results   Component Value Date    INR 2.27 (H) 10/31/2020    INR 2.03 (H) 10/30/2020    INR 1.59 (H) 06/27/2020   No results found for: PHENYTOIN, ESR, CRP  Objective    Instrumental ADL's  Instrumental ADLs: Yes  Meal Prep  Meal Prep Level: Walker(RW)  Meal Prep Level of Assistance: Stand by assistance  Balance  Sitting Balance: Supervision  Standing Balance: Stand by assistance  Functional Mobility  Functional - Mobility Device: Rolling Walker  Activity: Other;Retrieve items;Transport items  Assist Level: Stand by assistance  Functional Mobility Comments: short distance and in kitchen, x3 occurrences  Transfers  Stand Step Transfers: Stand by assistance  Sit to stand: Stand by assistance  Stand to sit: Stand by assistance  Type of ROM/Therapeutic Exercise  Type of ROM/Therapeutic Exercise: Free weights;Cane/Wand  Comment: BUE 2#, 2 sets of 10 in all planes, 3# Cane/wand: 2 sets of 10 in all planes       11/13/20 0820 11/13/20 0834 11/13/20 0836   Restrictions/Precautions   Restrictions/Precautions  --  Weight Bearing; Fall Risk  --    Lower Extremity Weight Bearing Restrictions   Right Lower Extremity Weight Bearing  --  Weight Bearing As Tolerated  --    Subjective   Subjective  --  Pt agreed to therapy this morning.  --    Pain Assessment   Pain Level 4  --   --    Vital Signs   Level of Consciousness  --   --   --    Bed Mobility   Rolling  --  Stand by assistance  --    Supine to Sit  --  Stand by assistance  --    Sit to Supine  --  Stand by assistance  --    Transfers   Sit to Stand  --  Contact guard assistance  --    Stand to sit  --  Contact guard assistance  --    Lateral Transfers  --  Contact guard assistance  (stand step with RW from bed to w/c, w/c to bed)  --    Ambulation   Ambulation?  --   --  Yes   Ambulation 1   Surface  --   --  level tile   Device  --   --  [facility] --   --  Contact guard assistance   Quality of Gait  --   --  decreased weight through RLE, increased knee flexion in RLE, LLE step to RLE, decreased stance time on RLE, antalgic gait, slight ER of RLE while amb. Gait Deviations  --   --  Slow Hyacinth;Decreased step length   Distance  --   --  61'   Comments  --   --  Pt stated he was not in as much pain this morning. Pt is using a toe touch gait on RLE. VCs to put weight through RLE heel. Exercises   Comments  --   --   --    Conditions Requiring Skilled Therapeutic Intervention   Body structures, Functions, Activity limitations  --   --   --    Assessment  --   --   --    Safety Devices   Type of devices  --   --   --         11/13/20 0900 11/13/20 1007 11/13/20 1010   Restrictions/Precautions   Restrictions/Precautions  --   --   --    Lower Extremity Weight Bearing Restrictions   Right Lower Extremity Weight Bearing  --   --   --    Subjective   Subjective  --   --   --    Pain Assessment   Pain Level  --   --   --    Vital Signs   Level of Consciousness 0  --   --    Bed Mobility   Rolling  --   --   --    Supine to Sit  --   --   --    Sit to Supine  --   --   --    Transfers   Sit to Stand  --   --   --    Stand to sit  --   --   --    Lateral Transfers  --   --   --    Ambulation   Ambulation?  --   --   --    Ambulation 1   Surface  --   --   --    Device  --   --   --    Assistance  --   --   --    Quality of Gait  --   --   --    Gait Deviations  --   --   --    Distance  --   --   --    Comments  --   --   --    Exercises   Comments  --  Assisted pt with toileting and ADLs in BR mid session to end. Pt stood with in RW while ADLs done and no LOB. --    Conditions Requiring Skilled Therapeutic Intervention   Body structures, Functions, Activity limitations  --   --  Decreased functional mobility ; Decreased ADL status; Decreased ROM; Decreased strength;Decreased endurance;Decreased balance;Decreased high-level IADLs;Decreased coordination; Increased pain Assessment  --   --  Pt stated he was not in as much pain this morning. Pt is using a toe touch gait on RLE. VCs to put weight through RLE heel. Pt became soiled during amb and had to go back to room. Assisted pt with toileting and ADLs in BR mid session to end. Pt stood with in RW while ADLs done and no LOB. Safety Devices   Type of devices  --   --   --           RECORD REVIEW: Previous medical records, medications were reviewed at today's visit    IMPRESSION:   1. Right hip fracture status post cephalomedullary nailing-pain control/PT/OT  2. DVT prophylaxis-SCDs and aspirin for now. Due to severe anemia will continue to hold Eliquis for now. 3.  History of A. fib-Eliquis currently on hold. Hemoglobin remains very low. On amiodarone. Hopefully can resume Eliquis later this week if hemoglobin remains stable/improving  4. Acute blood loss anemia with recent transfusions. Status post 1 unit packed red blood cells on 11/7. Hemoglobin low but stable. On Niferex. Stable but still low. Hospitalist consulted to assist with this as well as the timing on when to resume Eliquis. 5.  Essential hypertension-continue current medications and monitoring  6. Chronic low back pain.  scheduled hydrocodone and increase the dose slightly. Lidoderm patch. Improved  Continue current treatment    ELOS:11/18. Continue current treatment.

## 2020-11-16 NOTE — PROGRESS NOTES
Occupational Therapy  Facility/Department: [facility] 8 REHAB UNIT  Daily Treatment Note  NAME: Bandar Tipton  : 1950  MRN: [id number]    Date of Service: 2020    Discharge Recommendations:  Continue to assess pending progress       Assessment   Performance deficits / Impairments: Decreased endurance;Decreased coordination;Decreased functional mobility ; Decreased ADL status; Decreased balance;Decreased strength;Decreased high-level IADLs  Assessment: Sent order to [facility] to be delivered to patient's home after he discharging. Treatment Diagnosis: s/p R hip nail  Activity Tolerance  Activity Tolerance: Patient Tolerated treatment well  Safety Devices  Safety Devices in place: Yes(Up ad jordyn in room)  Type of devices: Call light within reach         Patient Diagnosis(es): There were no encounter diagnoses. has a past medical history of CAD (coronary artery disease), Gout, Hypertension, Non-ST elevation MI (NSTEMI) ([facility] [address] and Palliative care patient. has a past surgical history that includes Cardiac catheterization (14  [facility]); Cholecystectomy; and Femur fracture surgery (Right, 10/30/2020).     Restrictions  Restrictions/Precautions  Restrictions/Precautions: Up Ad Jordyn  Required Braces or Orthoses?: No  Lower Extremity Weight Bearing Restrictions  Right Lower Extremity Weight Bearing: Weight Bearing As Tolerated           Objective             Balance  Sitting Balance: Independent  Standing Balance: Modified independent   Functional Mobility  Functional - Mobility Device: Rolling Walker  Activity: To/from bathroom  Assist Level: Modified independent   Bed mobility  Supine to Sit: Supervision  Sit to Supine: Supervision  Transfers  Stand Step Transfers: Supervision  Sit to stand: Modified independent  Stand to sit: Modified independent           Type of ROM/Therapeutic Exercise  Type of ROM/Therapeutic Exercise: Free weights  Comment: 2#, 10 reps in 3 planes           Plan   Plan  Current Treatment Recommendations: Home Management Training, Functional Mobility Training, Endurance Training  Plan Comment: Continue to work with AE       OutComes Score    Eating  Assistance Needed: Independent  CARE Score: 6  Discharge Goal: Independent    Oral Hygiene  Assistance Needed: Independent  CARE Score: 6  Discharge Goal: Independent     Toileting Hygiene  Assistance Needed: Independent  Comment: Supervision  CARE Score: 6  Discharge Goal: Independent     Shower/Bathe Self  Assistance Needed: Independent  Comment: Modified independent  CARE Score: 6  Discharge Goal: Independent     Upper Body Dressing  Assistance Needed: Independent  Comment: Modified independent  CARE Score: 6  Discharge Goal: Independent     Lower Body Dressing  Assistance Needed: Independent  Comment: Modified independent with AE  CARE Score: 6  Discharge Goal: Independent     Putting On/Taking Off Footwear  Assistance Needed: Independent  Comment: with AE  CARE Score: 6  Discharge Goal: Independent     Toilet Transfer  Assistance Needed: Independent  Comment: Modified independent with RW  CARE Score: 6  Discharge Goal: Independent     Picking Up Object  Assistance Needed: Independent  Comment: with reacher  CARE Score: 6  Discharge Goal: Supervision or touching assistance         Goals  Short term goals  Time Frame for Short term goals: 1 week  Short term goal 1: MET  Short term goal 2: MET  Short term goal 3: MET  Short term goal 4: MET  Short term goal 5: MET  Short term goal 6: MET  Long term goals  Time Frame for Long term goals : 2 weeks  Long term goal 1: MET  Long term goal 2: MET  Long term goal 3: MET  Long term goal 4: MET  Long term goal 5: MET  Long term goal 7: MET  Long term goal 8: MET  Long term goal 9: Complete IADL/homemaking task with Modified [place].        Therapy Time   Individual Concurrent Group Co-treatment   Time In 1430         Time Out 1515         Minutes 45         Timed Code Treatment Minutes: 1010 [facility]

## 2020-11-17 LAB
HCT VFR BLD CALC: 29 % (ref 42–52)
HEMOGLOBIN: 8.9 G/DL (ref 14–18)
MCH RBC QN AUTO: 29.1 PG (ref 27–31)
MCHC RBC AUTO-ENTMCNC: 30.7 G/DL (ref 33–37)
MCV RBC AUTO: 94.8 FL (ref 80–94)
PDW BLD-RTO: 15.7 % (ref 11.5–14.5)
PLATELET # BLD: 463 K/UL (ref 130–400)
PMV BLD AUTO: 8.6 FL (ref 9.4–12.4)
RBC # BLD: 3.06 M/UL (ref 4.7–6.1)
WBC # BLD: 5.3 K/UL (ref 4.8–10.8)

## 2020-11-17 PROCEDURE — 97110 THERAPEUTIC EXERCISES: CPT

## 2020-11-17 PROCEDURE — 85027 COMPLETE CBC AUTOMATED: CPT

## 2020-11-17 PROCEDURE — 1180000000 HC REHAB R&B

## 2020-11-17 PROCEDURE — 99232 SBSQ HOSP IP/OBS MODERATE 35: CPT | Performed by: PSYCHIATRY & NEUROLOGY

## 2020-11-17 PROCEDURE — 6370000000 HC RX 637 (ALT 250 FOR IP): Performed by: PSYCHIATRY & NEUROLOGY

## 2020-11-17 PROCEDURE — 36415 COLL VENOUS BLD VENIPUNCTURE: CPT

## 2020-11-17 PROCEDURE — 6370000000 HC RX 637 (ALT 250 FOR IP): Performed by: FAMILY MEDICINE

## 2020-11-17 PROCEDURE — 97116 GAIT TRAINING THERAPY: CPT

## 2020-11-17 PROCEDURE — 6370000000 HC RX 637 (ALT 250 FOR IP): Performed by: STUDENT IN AN ORGANIZED HEALTH CARE EDUCATION/TRAINING PROGRAM

## 2020-11-17 PROCEDURE — 2580000003 HC RX 258: Performed by: STUDENT IN AN ORGANIZED HEALTH CARE EDUCATION/TRAINING PROGRAM

## 2020-11-17 PROCEDURE — 97530 THERAPEUTIC ACTIVITIES: CPT

## 2020-11-17 RX ADMIN — OXYCODONE HYDROCHLORIDE AND ACETAMINOPHEN 1 TABLET: 7.5; 325 TABLET ORAL at 20:54

## 2020-11-17 RX ADMIN — APIXABAN 5 MG: 5 TABLET, FILM COATED ORAL at 09:33

## 2020-11-17 RX ADMIN — OXYCODONE HYDROCHLORIDE AND ACETAMINOPHEN 1 TABLET: 7.5; 325 TABLET ORAL at 17:16

## 2020-11-17 RX ADMIN — METOPROLOL SUCCINATE 12.5 MG: 25 TABLET, EXTENDED RELEASE ORAL at 09:32

## 2020-11-17 RX ADMIN — SODIUM CHLORIDE, PRESERVATIVE FREE 10 ML: 5 INJECTION INTRAVENOUS at 09:39

## 2020-11-17 RX ADMIN — PANTOPRAZOLE SODIUM 40 MG: 40 TABLET, DELAYED RELEASE ORAL at 05:45

## 2020-11-17 RX ADMIN — OXYCODONE HYDROCHLORIDE AND ACETAMINOPHEN 1 TABLET: 7.5; 325 TABLET ORAL at 09:33

## 2020-11-17 RX ADMIN — AMIODARONE HYDROCHLORIDE 200 MG: 200 TABLET ORAL at 09:32

## 2020-11-17 RX ADMIN — APIXABAN 5 MG: 5 TABLET, FILM COATED ORAL at 20:54

## 2020-11-17 RX ADMIN — Medication 150 MG: at 09:33

## 2020-11-17 RX ADMIN — ATORVASTATIN CALCIUM 40 MG: 40 TABLET, FILM COATED ORAL at 09:33

## 2020-11-17 RX ADMIN — LISINOPRIL 5 MG: 5 TABLET ORAL at 09:33

## 2020-11-17 RX ADMIN — LEVOTHYROXINE SODIUM 125 MCG: 25 TABLET ORAL at 05:45

## 2020-11-17 RX ADMIN — OXYCODONE HYDROCHLORIDE AND ACETAMINOPHEN 1 TABLET: 7.5; 325 TABLET ORAL at 13:33

## 2020-11-17 RX ADMIN — ERGOCALCIFEROL 50000 UNITS: 1.25 CAPSULE ORAL at 09:32

## 2020-11-17 ASSESSMENT — PAIN DESCRIPTION - PAIN TYPE
TYPE: ACUTE PAIN;SURGICAL PAIN

## 2020-11-17 ASSESSMENT — PAIN DESCRIPTION - ORIENTATION
ORIENTATION: RIGHT

## 2020-11-17 ASSESSMENT — PAIN SCALES - GENERAL
PAINLEVEL_OUTOF10: 3
PAINLEVEL_OUTOF10: 3
PAINLEVEL_OUTOF10: 5
PAINLEVEL_OUTOF10: 4

## 2020-11-17 ASSESSMENT — PAIN DESCRIPTION - FREQUENCY
FREQUENCY: CONTINUOUS

## 2020-11-17 ASSESSMENT — PAIN - FUNCTIONAL ASSESSMENT
PAIN_FUNCTIONAL_ASSESSMENT: ACTIVITIES ARE NOT PREVENTED

## 2020-11-17 ASSESSMENT — PAIN DESCRIPTION - PROGRESSION
CLINICAL_PROGRESSION: GRADUALLY IMPROVING

## 2020-11-17 ASSESSMENT — PAIN DESCRIPTION - DESCRIPTORS
DESCRIPTORS: ACHING;DISCOMFORT

## 2020-11-17 ASSESSMENT — PAIN DESCRIPTION - ONSET
ONSET: ON-GOING

## 2020-11-17 ASSESSMENT — PAIN DESCRIPTION - LOCATION
LOCATION: HIP

## 2020-11-17 NOTE — PROGRESS NOTES
Patient:   Damir Stein  MR#:    [id number]   Room:    8125/895-10   YOB: 1950  Date of Progress Note: 11/17/2020  Time of Note                           8:45 AM  Consulting Physician:   Zainab Carlson M.D. Attending Physician:  Zainab Carlson MD     CHIEF COMPLAINT: Right hip pain     Subjective  This 78 y.o. male  with chronic systolic heart failure, CAD, paroxysmal A. fib on Eliquis admitted to [facility] on  10/30/2020 following a fall at his home. Pt has PMH of alcoholism. Alcohol level on presentation was 176. Banana bag was hung and withdrawal protocol followed. Pt has not shown any signs of withdrawal. X-ray revealed a right hip fracture. Dr Curry Garay was consulted and pt underwent a cephalomedullary nailing of his R hip on 10/30. He will be WBAT in his RLE. Post op, pt had an GER and did develop some postoperative acute blood loss anemia (on Eliquis, which has been on hold) with some hypotension, which improved with crystalloid resuscitation and blood products. He was  noted to have hemoglobin persistently below 7 requiring transfusion with 2 units PRBCs on 10/31, then required 3rd unit pRBCs on 11/2 with Hgb <7. Hemodynamics and GER improved with resuscitation. H/H remained stable and renal function improved. He is now medically stable and is participating with therapy. No complaints today. Anticipating discharge tomorrow. REVIEW OF SYSTEMS:  Constitutional: No fevers No chills  Neck:No stiffness  Respiratory: No shortness of breath  Cardiovascular: No chest pain No palpitations  Gastrointestinal: No abdominal pain    Genitourinary: No Dysuria  Neurological: No headache, no confusion      PHYSICAL EXAM:  BP (!) 145/66   Pulse 53   Temp 97.2 °F (36.2 °C) (Temporal)   Resp 16   Ht 6' (1.829 m)   Wt 192 lb 6 oz (87.3 kg)   SpO2 94%   BMI 26.09 kg/m²     Constitutional: he appears well-developed and well-nourished.    Eyes - conjunctiva normal.  Pupils react to light  Ear, nose, throat ALT 74 (H) 10/31/2020    LABGLOM >60 11/16/2020    GFRAA >59 11/16/2020     Lab Results   Component Value Date    INR 2.27 (H) 10/31/2020    INR 2.03 (H) 10/30/2020    INR 1.59 (H) 06/27/2020   No results found for: PHENYTOIN, ESR, CRP    Objective    Balance  Sitting Balance: Independent  Standing Balance: Modified independent   Functional Mobility  Functional - Mobility Device: Rolling Walker  Activity: To/from bathroom  Assist Level: Modified independent   Bed mobility  Supine to Sit: Supervision  Sit to Supine: Supervision  Transfers  Stand Step Transfers: Supervision  Sit to stand: Modified independent  Stand to sit: Modified independent  Type of ROM/Therapeutic Exercise  Type of ROM/Therapeutic Exercise: Free weights  Comment: 2#, 10 reps in 3 planes       11/16/20 1321 11/16/20 1322 11/16/20 1332   Lower Extremity Weight Bearing Restrictions   Right Lower Extremity Weight Bearing Weight Bearing As Tolerated  --   --    Bed Mobility   Rolling Independent  --   --    Supine to Sit Independent  --   --    Sit to Supine Independent  --   --    Transfers   Sit to Stand  --  Independent  --    Stand to sit  --  Independent  --    Bed to Chair  --  Independent  --    Ambulation   Ambulation?  --   --  Yes   WB Status  --   --  WBAT   More Ambulation?  --   --  Yes   Ambulation 1   Surface  --   --  level tile   Device  --   --  Rolling Walker   Assistance  --   --  Stand by assistance   Quality of Gait  --   --  3 point gait, able to put weight through R heel. slight ER of RLE while amb.    Gait Deviations  --   --  Slow Hyacinth;Decreased step length   Distance  --   --  150'   Comments  --   --  Incorporated turns   Ambulation 2   Surface - 2  --   --  level tile   Device 2  --   --  Rolling Walker   Assistance 2  --   --  Stand by assistance   Quality of Gait 2  --   --  same as above   Gait Deviations  --   --  Slow Hyacinth;Decreased step length   Distance  --   --  20'   Comments  --   --  from bed to BR to w/c   Stairs/Curb   Stairs?  --   --  No   Wheelchair Activities   Propulsion  --   --  Yes   Propulsion 1   Propulsion  --   --  Manual   Level  --   --  Level Tile   Method  --   --  AMIE   Level of Assistance  --   --  Supervision   Description/ Details  --   --  Maintained straight path. Distance  --   --  250'   Exercises   Comments  --   --   --    Conditions Requiring Skilled Therapeutic Intervention   Body structures, Functions, Activity limitations  --   --   --    Assessment  --   --   --    Activity Tolerance   Activity Tolerance  --   --   --    Safety Devices   Type of devices  --   --   --         11/16/20 1334 11/16/20 1336 11/16/20 1406   Lower Extremity Weight Bearing Restrictions   Right Lower Extremity Weight Bearing  --   --   --    Bed Mobility   Rolling  --   --   --    Supine to Sit  --   --   --    Sit to Supine  --   --   --    Transfers   Sit to Stand  --   --   --    Stand to sit  --   --   --    Bed to Chair  --   --   --    Ambulation   Ambulation?  --   --   --    WB Status  --   --   --    More Ambulation?  --   --   --    Ambulation 1   Surface  --   --   --    Device  --   --   --    Assistance  --   --   --    Quality of Gait  --   --   --    Gait Deviations  --   --   --    Distance  --   --   --    Comments  --   --   --    Ambulation 2   Surface - 2  --   --   --    Device 2  --   --   --    Assistance 2  --   --   --    Quality of Gait 2  --   --   --    Gait Deviations  --   --   --    Distance  --   --   --    Comments  --   --   --    Stairs/Curb   Stairs?  --   --   --    Wheelchair Activities   Propulsion  --   --   --    Propulsion 1   Propulsion  --   --   --    Level  --   --   --    Method  --   --   --    Level of Assistance  --   --   --    Description/ Details  --   --   --    Distance  --   --   --    Exercises   Comments Checked pt for up Ad Jordyn in room.   --   --    Conditions Requiring Skilled Therapeutic Intervention   Body structures, Functions, Activity limitations  --  Decreased functional mobility ; Decreased ADL status; Decreased ROM; Decreased strength;Decreased endurance;Decreased balance;Decreased high-level IADLs;Decreased coordination; Increased pain  --    Assessment  --  Checked pt in room for up Ad Jordyn in. Pt was able to perform bed mobility and TFs independently and safely. Pt tolerated amb well with increased distance with minimal fatigue towards end of amb. Pt stated he did not want to practice stairs. Pt still needs to review Car TF.  --    Activity Tolerance   Activity Tolerance  --  Patient Tolerated treatment well  --    Safety Devices   Type of devices  --   --  Bed alarm in place;Call light within reach; Left in bed       RECORD REVIEW: Previous medical records, medications were reviewed at today's visit    IMPRESSION:   1. Right hip fracture status post cephalomedullary nailing-pain control/PT/OT  2. DVT prophylaxis-Eliquis  3. History of A. fib-Eliquis has been restarted On amiodarone. 4.  Acute blood loss anemia with recent transfusions. Status post 1 unit packed red blood cells on 11/7. Hemoglobin low but stable. On Niferex. Stable but still low. Hemoglobin finally trending up. 5.  Essential hypertension-continue current medications and monitoring  6. Chronic low back pain.  scheduled hydrocodone and increase the dose slightly. Lidoderm patch. Improved  Continue current treatment    ELOS:11/18.   Anticipate discharge tomorrow

## 2020-11-17 NOTE — PROGRESS NOTES
Nutrition Assessment     Type and Reason for Visit: Reassess     Nutrition Assessment:  Pt remains nutritionally stable with PO intake >75% many meals. Wt increase of 17# documented since admit. Suspect some wt gain r/t fluid retention AEB LE edema. Plans for pt to DC tomorrow. Malnutrition Assessment:  Malnutrition Status: No malnutrition    Nutrition Related Findings: well-nourished      Current Nutrition Therapies:    DIET CARDIAC;     Anthropometric Measures:  · Height: 6' (182.9 cm)  · Current Body Wt: 192 lb (87.1 kg)   · BMI: 26    Nutrition Diagnosis:   · Increased nutrient needs related to acute injury/trauma as evidenced by wounds      Nutrition Interventions:   Food and/or Nutrient Delivery:  Continue Current Diet  Nutrition Education/Counseling:  No recommendation at this time   Coordination of Nutrition Care:  Continue to monitor while inpatient    Goals:  PO intake >50%, wt stable, incision healing       Nutrition Monitoring and Evaluation:   Behavioral-Environmental Outcomes:  None Identified   Food/Nutrient Intake Outcomes:  Food and Nutrient Intake  Physical Signs/Symptoms Outcomes:  Biochemical Data, Nutrition Focused Physical Findings, Skin, Weight     Discharge Planning:    No discharge needs at this time     Electronically signed by Samuel Estes RD, LD on 11/17/20 at 1:11 PM CST    Contact: [phone]

## 2020-11-17 NOTE — PROGRESS NOTES
Occupational Therapy  Facility/Department: [facility] 8 REHAB UNIT  Daily Treatment Note  NAME: Judi Chaney  : 1950  MRN: [id number]    Date of Service: 2020    Discharge Recommendations:  Continue to assess pending progress       Assessment   Performance deficits / Impairments: Decreased endurance;Decreased functional mobility ; Decreased high-level IADLs  Treatment Diagnosis: s/p R hip nail  Activity Tolerance  Activity Tolerance: Patient Tolerated treatment well  Safety Devices  Safety Devices in place: Yes(Up ad jordyn in room)  Type of devices: Left in bed;Call light within reach         Patient Diagnosis(es): There were no encounter diagnoses. has a past medical history of CAD (coronary artery disease), Gout, Hypertension, Non-ST elevation MI (NSTEMI) ([facility] [address] and Palliative care patient. has a past surgical history that includes Cardiac catheterization (14  [facility]); Cholecystectomy; and Femur fracture surgery (Right, 10/30/2020).     Restrictions  Restrictions/Precautions  Restrictions/Precautions: Up Ad Jordyn  Required Braces or Orthoses?: No  Lower Extremity Weight Bearing Restrictions  Right Lower Extremity Weight Bearing: Weight Bearing As Tolerated  Objective       Instrumental ADL's  Instrumental ADLs: Yes  Meal Prep  Meal Prep Level: Walker(RW)  Meal Prep Level of Assistance: Modified independent  Meal Preparation: Microwave task     Balance  Sitting Balance: Independent  Standing Balance: Modified independent   Functional Mobility  Functional - Mobility Device: Rolling Walker  Activity: Other  Assist Level: Modified independent   Bed mobility  Supine to Sit: Independent  Sit to Supine: Independent  Comment: Independent in standard bed  Transfers  Sit to stand: Modified independent  Stand to sit: Modified independent  Transfer Comments: to/from rocking recliner, couch and standard bed     Type of ROM/Therapeutic Exercise  Type of ROM/Therapeutic Exercise: Free weights  Comment: 2#, 2 sets of 10 in all planes      Plan   Plan  Current Treatment Recommendations: Home Management Training, Functional Mobility Training, Endurance Training  Plan Comment: Continue to work with AE          Goals  Short term goals  Time Frame for Short term goals: 1 week  Short term goal 1: MET  Short term goal 2: MET  Short term goal 3: MET  Short term goal 4: MET  Short term goal 5: MET  Short term goal 6: MET  Long term goals  Time Frame for Long term goals : 2 weeks  Long term goal 1: MET  Long term goal 2: MET  Long term goal 3: MET  Long term goal 4: MET  Long term goal 5: MET  Long term goal 7: MET  Long term goal 8: MET  Long term goal 9: MET       Therapy Time   Individual Concurrent Group Co-treatment   Time In 1430        Time Out 1500        Minutes 30        Timed Code Treatment Minutes: 75 Yasmany Evans, OT

## 2020-11-17 NOTE — CARE COORDINATION
Mr. Gibson Police, prepared for discharge home tomorrow, has hired care with his usual \"\". Referral made to [facility] for continued nurse assess and rehabilitative services. Has follow up appointment with Dr. Jason Cruz.

## 2020-11-17 NOTE — PROGRESS NOTES
Occupational Therapy  Facility/Department: [facility] 8 REHAB UNIT  Daily Treatment Note  NAME: Maciej Paez  : 1950  MRN: [id number]    Date of Service: 2020    Discharge Recommendations:  Continue to assess pending progress       Assessment     Decreased endurance; Decreased functional mobility ; Decreased high-level IADLs            Patient Diagnosis(es): There were no encounter diagnoses. has a past medical history of CAD (coronary artery disease), Gout, Hypertension, Non-ST elevation MI (NSTEMI) ([facility] [address] and Palliative care patient. has a past surgical history that includes Cardiac catheterization (14  [facility]); Cholecystectomy; and Femur fracture surgery (Right, 10/30/2020).     Restrictions  Restrictions/Precautions  Restrictions/Precautions: Up Ad Jordyn  Required Braces or Orthoses?: No  Lower Extremity Weight Bearing Restrictions  Right Lower Extremity Weight Bearing: Weight Bearing As Tolerated       Objective       Instrumental ADL's  Instrumental ADLs: Yes  Meal Prep  Meal Prep Level: Walker(RW)  Meal Prep Level of Assistance: Modified independent  Meal Preparation: Microwave task     Balance  Sitting Balance: Independent  Standing Balance: Modified independent   Functional Mobility  Functional - Mobility Device: Rolling Walker  Activity: To/from bathroom  Assist Level: Modified independent      Transfers  Sit to stand: Modified independent  Stand to sit: Modified independent     Type of ROM/Therapeutic Exercise  Type of ROM/Therapeutic Exercise: Free weights  Comment: 2#, 2 sets of 10 in all planes                    Plan   Plan  Current Treatment Recommendations: Home Management Training, Functional Mobility Training, Endurance Training  Plan Comment: Continue to work with AE            Goals  Short term goals  Time Frame for Short term goals: 1 week  Short term goal 1: MET  Short term goal 2: MET  Short term goal 3: MET  Short term goal 4: MET  Short term goal 5: MET  Short term goal 6: MET  Long term goals  Time Frame for Long term goals : 2 weeks  Long term goal 1: MET  Long term goal 2: MET  Long term goal 3: MET  Long term goal 4: MET  Long term goal 5: MET  Long term goal 7: MET  Long term goal 8: MET  Long term goal 9: MET       Therapy Time   Individual Concurrent Group Co-treatment   Time In [id number]       Time Out 1015 1100       Minutes 15 45       Timed Code Treatment Minutes: 75 [facility] Cristina, OT

## 2020-11-17 NOTE — PLAN OF CARE
Ongoing  11/16/2020 1127 by Chon Sagastume RN  Outcome: Ongoing     Problem: Skin Integrity:  Goal: Will show no infection signs and symptoms  Description: Will show no infection signs and symptoms  11/16/2020 2332 by Cruz Cantrell LPN  Outcome: Ongoing  11/16/2020 1127 by Chon Sagastume RN  Outcome: Ongoing  Goal: Absence of new skin breakdown  Description: Absence of new skin breakdown  11/16/2020 2332 by Cruz Cantrell LPN  Outcome: Ongoing  11/16/2020 1127 by Chon Sagastume RN  Outcome: Ongoing

## 2020-11-17 NOTE — PROGRESS NOTES
Luz Elena Montez  [id number]     11/17/20 1333 11/17/20 1415 11/17/20 1416   Subjective   Subjective  --  Pt agreed to therapy this afternoon.   --    Pain Assessment   Pain Assessment 0-10  --   --    Pain Level 5  --   --    Patient's Stated Pain Goal No pain  --   --    Pain Type Acute pain;Surgical pain  --   --    Pain Location Hip  --   --    Pain Orientation Right  --   --    Pain Descriptors Aching;Discomfort  --   --    Pain Frequency Continuous  --   --    Pain Onset On-going  --   --    Clinical Progression Gradually improving  --   --    Functional Pain Assessment Activities are not prevented  --   --    Non-Pharmaceutical Pain Intervention(s) Rest  --   --    Bed Mobility   Bridging  --   --    (.)   Rolling  --   --  Independent   Supine to Sit  --   --  Independent   Sit to Supine  --   --  Independent   Scooting  --   --    (.)   Transfers   Sit to Stand  --   --  Independent   Stand to sit  --   --  Independent   Bed to Chair  --   --  Independent   Stand Pivot Transfers  --   --    (.)   Squat Pivot Transfers  --   --    (.)   Lateral Transfers  --   --    (.)   Ambulation   Ambulation?  --   --   --    WB Status  --   --   --    Ambulation 1   Surface  --   --   --    Device  --   --   --    Assistance  --   --   --    Quality of Gait  --   --   --    Distance  --   --   --    Wheelchair Activities   Propulsion  --   --   --    Propulsion 1   Propulsion  --   --   --    Level  --   --   --    Method  --   --   --    Level of Assistance  --   --   --    Description/ Details  --   --   --    Distance  --   --   --    Conditions Requiring Skilled Therapeutic Intervention   Assessment  --   --   --    Prognosis  --   --   --    Activity Tolerance   Activity Tolerance  --   --   --       11/17/20 1417 11/17/20 1418   Subjective   Subjective  --   --    Pain Assessment   Pain Assessment  --   --    Pain Level  --   --    Patient's Stated Pain Goal  --   --    Pain Type  --   --    Pain Location  --   -- Pain Orientation  --   --    Pain Descriptors  --   --    Pain Frequency  --   --    Pain Onset  --   --    Clinical Progression  --   --    Functional Pain Assessment  --   --    Non-Pharmaceutical Pain Intervention(s)  --   --    Bed Mobility   Bridging  --   --    Rolling  --   --    Supine to Sit  --   --    Sit to Supine  --   --    Scooting  --   --    Transfers   Sit to Stand  --   --    Stand to sit  --   --    Bed to Chair  --   --    Stand Pivot Transfers  --   --    Squat Pivot Transfers  --   --    Lateral Transfers  --   --    Ambulation   Ambulation? Yes  --    WB Status WBAT  --    Ambulation 1   Surface level tile  --    Device Rolling Walker  --    Assistance Independent  --    Quality of Gait 3 point gait; able to begin heel-toe (rollover) gait mechanics; fwd flexed posture during amb  --    Distance 150'x2  --    Wheelchair Activities   Propulsion Yes  --    Propulsion 1   Propulsion Manual  --    Level Level Tile  --    Method RUE;LUE  --    Level of Assistance Independent  --    Description/ Details Pt propelled W/C well Independent of a therapist.  --    Distance 250'  --    Conditions Requiring Skilled Therapeutic Intervention   Assessment  --  Pt tolerated session well w/ slight increase in pain in R hip.    Prognosis  --  Good   Activity Tolerance   Activity Tolerance  --  Patient Tolerated treatment well   Electronically signed by Milagro Ray PTA on 11/17/2020 at 2:19 PM

## 2020-11-18 VITALS
BODY MASS INDEX: 26.07 KG/M2 | WEIGHT: 192.5 LBS | DIASTOLIC BLOOD PRESSURE: 73 MMHG | TEMPERATURE: 96.8 F | RESPIRATION RATE: 16 BRPM | HEIGHT: 72 IN | SYSTOLIC BLOOD PRESSURE: 149 MMHG | OXYGEN SATURATION: 96 % | HEART RATE: 50 BPM

## 2020-11-18 LAB
HCT VFR BLD CALC: 29.4 % (ref 42–52)
HEMOGLOBIN: 9.1 G/DL (ref 14–18)
MCH RBC QN AUTO: 29.4 PG (ref 27–31)
MCHC RBC AUTO-ENTMCNC: 31 G/DL (ref 33–37)
MCV RBC AUTO: 94.8 FL (ref 80–94)
PDW BLD-RTO: 15.9 % (ref 11.5–14.5)
PLATELET # BLD: 433 K/UL (ref 130–400)
PMV BLD AUTO: 8.8 FL (ref 9.4–12.4)
RBC # BLD: 3.1 M/UL (ref 4.7–6.1)
WBC # BLD: 4.9 K/UL (ref 4.8–10.8)

## 2020-11-18 PROCEDURE — 36415 COLL VENOUS BLD VENIPUNCTURE: CPT

## 2020-11-18 PROCEDURE — 6370000000 HC RX 637 (ALT 250 FOR IP): Performed by: PSYCHIATRY & NEUROLOGY

## 2020-11-18 PROCEDURE — 6370000000 HC RX 637 (ALT 250 FOR IP): Performed by: STUDENT IN AN ORGANIZED HEALTH CARE EDUCATION/TRAINING PROGRAM

## 2020-11-18 PROCEDURE — 2580000003 HC RX 258: Performed by: STUDENT IN AN ORGANIZED HEALTH CARE EDUCATION/TRAINING PROGRAM

## 2020-11-18 PROCEDURE — 99239 HOSP IP/OBS DSCHRG MGMT >30: CPT | Performed by: PSYCHIATRY & NEUROLOGY

## 2020-11-18 PROCEDURE — 6370000000 HC RX 637 (ALT 250 FOR IP): Performed by: FAMILY MEDICINE

## 2020-11-18 PROCEDURE — 85027 COMPLETE CBC AUTOMATED: CPT

## 2020-11-18 RX ORDER — IRON POLYSACCHARIDE COMPLEX 150 MG
150 CAPSULE ORAL DAILY
Qty: 60 CAPSULE | Refills: 3 | Status: SHIPPED | OUTPATIENT
Start: 2020-11-19 | End: 2021-07-25 | Stop reason: ALTCHOICE

## 2020-11-18 RX ORDER — OXYCODONE AND ACETAMINOPHEN 7.5; 325 MG/1; MG/1
1 TABLET ORAL 4 TIMES DAILY
Qty: 30 TABLET | Refills: 0 | Status: SHIPPED | OUTPATIENT
Start: 2020-11-18 | End: 2020-12-02

## 2020-11-18 RX ORDER — METOPROLOL SUCCINATE 25 MG/1
12.5 TABLET, EXTENDED RELEASE ORAL DAILY
Qty: 30 TABLET | Refills: 3 | Status: SHIPPED | OUTPATIENT
Start: 2020-11-19 | End: 2021-07-19

## 2020-11-18 RX ORDER — PANTOPRAZOLE SODIUM 40 MG/1
40 TABLET, DELAYED RELEASE ORAL
Qty: 30 TABLET | Refills: 3 | Status: SHIPPED | OUTPATIENT
Start: 2020-11-19 | End: 2021-03-16

## 2020-11-18 RX ORDER — LEVOTHYROXINE SODIUM 0.12 MG/1
125 TABLET ORAL DAILY
Qty: 30 TABLET | Refills: 3 | Status: SHIPPED | OUTPATIENT
Start: 2020-11-19 | End: 2021-06-07

## 2020-11-18 RX ORDER — ATORVASTATIN CALCIUM 40 MG/1
40 TABLET, FILM COATED ORAL DAILY
Qty: 30 TABLET | Refills: 3 | Status: SHIPPED | OUTPATIENT
Start: 2020-11-18 | End: 2021-08-16

## 2020-11-18 RX ADMIN — SODIUM CHLORIDE, PRESERVATIVE FREE 10 ML: 5 INJECTION INTRAVENOUS at 09:13

## 2020-11-18 RX ADMIN — ACETAMINOPHEN 650 MG: 325 TABLET ORAL at 06:01

## 2020-11-18 RX ADMIN — LEVOTHYROXINE SODIUM 125 MCG: 25 TABLET ORAL at 06:01

## 2020-11-18 RX ADMIN — METOPROLOL SUCCINATE 12.5 MG: 25 TABLET, EXTENDED RELEASE ORAL at 09:09

## 2020-11-18 RX ADMIN — OXYCODONE HYDROCHLORIDE AND ACETAMINOPHEN 1 TABLET: 7.5; 325 TABLET ORAL at 09:10

## 2020-11-18 RX ADMIN — LISINOPRIL 5 MG: 5 TABLET ORAL at 09:10

## 2020-11-18 RX ADMIN — PANTOPRAZOLE SODIUM 40 MG: 40 TABLET, DELAYED RELEASE ORAL at 06:02

## 2020-11-18 RX ADMIN — AMIODARONE HYDROCHLORIDE 200 MG: 200 TABLET ORAL at 09:09

## 2020-11-18 RX ADMIN — ATORVASTATIN CALCIUM 40 MG: 40 TABLET, FILM COATED ORAL at 09:09

## 2020-11-18 RX ADMIN — Medication 150 MG: at 09:09

## 2020-11-18 RX ADMIN — APIXABAN 5 MG: 5 TABLET, FILM COATED ORAL at 09:09

## 2020-11-18 ASSESSMENT — PAIN DESCRIPTION - LOCATION
LOCATION: HIP
LOCATION: HIP

## 2020-11-18 ASSESSMENT — PAIN SCALES - GENERAL
PAINLEVEL_OUTOF10: 4
PAINLEVEL_OUTOF10: 4
PAINLEVEL_OUTOF10: 6

## 2020-11-18 ASSESSMENT — PAIN DESCRIPTION - PAIN TYPE
TYPE: SURGICAL PAIN
TYPE: SURGICAL PAIN

## 2020-11-18 ASSESSMENT — PAIN DESCRIPTION - DESCRIPTORS: DESCRIPTORS: DISCOMFORT

## 2020-11-18 ASSESSMENT — PAIN DESCRIPTION - FREQUENCY: FREQUENCY: CONTINUOUS

## 2020-11-18 NOTE — PROGRESS NOTES
Patient discharged per doctors orders. Discharge instructions, follow up appointments and prescriptions reviewed with patient who verbalized understanding. Patient stable and agreeable to discharge. Patient along with belongings off of floor per wheelchair by. Patient discharged home via private vehicle.

## 2020-11-18 NOTE — PLAN OF CARE
Problem: Falls - Risk of:  Goal: Will remain free from falls  Description: Will remain free from falls  11/18/2020 0015 by Norah Paz LPN  Outcome: Ongoing  11/17/2020 1339 by Kasia Fonseca RN  Outcome: Ongoing  Goal: Absence of physical injury  Description: Absence of physical injury  11/18/2020 0015 by Norah Paz LPN  Outcome: Ongoing  11/17/2020 1339 by Kasia Fonseca RN  Outcome: Ongoing     Problem: SAFETY  Goal: Free from accidental physical injury  11/18/2020 0015 by Norah Paz LPN  Outcome: Ongoing  11/17/2020 1339 by Kasia Fonseca RN  Outcome: Ongoing     Problem: DAILY CARE  Goal: Daily care needs are met  11/18/2020 0015 by Norah Paz LPN  Outcome: Ongoing  11/17/2020 1339 by Kasia Fonseca RN  Outcome: Ongoing     Problem: PAIN  Goal: Patient's pain/discomfort is manageable  11/18/2020 0015 by Norah Paz LPN  Outcome: Ongoing  11/17/2020 1339 by Kasia Fonseca RN  Outcome: Ongoing     Problem: SKIN INTEGRITY  Goal: Skin integrity is maintained or improved  11/18/2020 0015 by Norah Paz LPN  Outcome: Ongoing  11/17/2020 1339 by Kasia Fonseca RN  Outcome: Ongoing     Problem: KNOWLEDGE DEFICIT  Goal: Patient/S.O. demonstrates understanding of disease process, treatment plan, medications, and discharge instructions.   11/18/2020 0015 by Norah Paz LPN  Outcome: Ongoing  11/17/2020 1339 by Kasia Fonseca RN  Outcome: Ongoing     Problem: DISCHARGE BARRIERS  Goal: Patient's continuum of care needs are met  11/18/2020 0015 by Norah Paz LPN  Outcome: Ongoing  11/17/2020 1339 by Kasia Fonseca RN  Outcome: Ongoing     Problem: Pain:  Goal: Pain level will decrease  Description: Pain level will decrease  11/18/2020 0015 by Norah Paz LPN  Outcome: Ongoing  11/17/2020 1339 by aKsia Fonseca RN  Outcome: Ongoing     Problem: Infection - Surgical Site:  Goal: Will show no infection signs and symptoms  Description: Will show no infection signs and symptoms  11/18/2020 0015 by [facility] Abelardo West LPN  Outcome: Ongoing  11/17/2020 1339 by Sravan Hernandez RN  Outcome: Ongoing     Problem: Skin Integrity:  Goal: Will show no infection signs and symptoms  Description: Will show no infection signs and symptoms  11/18/2020 0015 by Yonathan Harrison LPN  Outcome: Ongoing  11/17/2020 1339 by Sravan Hernandez RN  Outcome: Ongoing  Goal: Absence of new skin breakdown  Description: Absence of new skin breakdown  11/18/2020 0015 by Yonathan Harrison LPN  Outcome: Ongoing  11/17/2020 1339 by Sravan Hernandez RN  Outcome: Ongoing

## 2020-11-18 NOTE — DISCHARGE SUMMARY
Physical Therapy DISCHARGE Note  DATE:  2020  NAME:  Felipe Monk  :  1950  (78 y.o.,male)  MRN:  [id number]    HEIGHT:  Height: 6' (182.9 cm)  WEIGHT:  Weight: 192 lb 8 oz (87.3 kg)    PATIENT DIAGNOSIS(ES):    Diagnosis: R Intertrochanteric hip fracture  Additional Pertinent Hx: CAD, Gout, hypertension, NSTEMI, hyperlipidemia, Afib on chronic anticoagulation, former smoker, alcohol abuse, palliative care patient, chronic OA  RESTRICTIONS/PRECAUTIONS:    Restrictions/Precautions  Restrictions/Precautions: Up Ad Jordyn  Required Braces or Orthoses?: No     OVERALL  ORIENTATION STATUS:     PAIN:  Pain Level: 4  Pain Type: Surgical pain    Pain Location: Hip     Pain Orientation: Right      NEUROLOGICAL                          STRENGTH  Strength RLE  Strength RLE: Exception  Comment: hip flexion/knee extension 3/5, ankle DF 4-/5 w/pain  Strength LLE  Strength LLE: Exception  Comment: Hip flexion 3/5, all other grossly 4-/5  ROM  AROM RLE (degrees)  RLE AROM: Exceptions  RLE General AROM: Unable to actively extend knee d/t pain, ankles WFL unable to actively flex hip  AROM LLE (degrees)  LLE AROM : Exceptions  LLE General AROM: Decreased active knee extension  POSTURE/BALANCE  Balance  Posture: Poor  Sitting - Static: Fair, +  Sitting - Dynamic: Fair  Standing - Static: Fair  Standing - Dynamic: Fair       ACTIVITY TOLERANCE  Activity Tolerance  Activity Tolerance: Patient Tolerated treatment well      BED MOBILITY  Bed Mobility  Bridging: (.)  Rolling: Independent  Supine to Sit: Independent  Sit to Supine: Independent  Scooting: (.)  TRANSFERS  Sit to Stand: Independent      Bed to Chair: Independent  Car Transfer: Independent     WHEELCHAIR PROPULSION 1  Propulsion 1  Propulsion: Manual  Level: Level Tile  Method: JAGDISH GARCIA  Level of Assistance: Independent  Description/ Details: Pt propelled W/C well Independent of a therapist.  Distance: 250'  WHEELCHAIR PROPULSION 2     AMBULATION 1  Ambulation 1  Surface: level tile  Device: Rolling Walker  Other Apparatus: Wheelchair follow  Assistance: Independent  Quality of Gait: 3 point gait; able to begin heel-toe (rollover) gait mechanics; fwd flexed posture during amb  Gait Deviations: Slow Hyacinth  Distance: 150'x2  Comments: Incorporated turns  AMBULATION 2  Ambulation 2  Surface - 2: level tile  Device 2: Rolling Walker  Assistance 2: Stand by assistance  Quality of Gait 2: same as above  Gait Deviations: Slow Hyacinth, Decreased step length  Distance: 20'  Comments: from bed to BR to w/c  STAIRS       GOALS:  Short term goals  Time Frame for Short term goals: 1 week  Short term goal 1: CGA all bed mobility  Short term goal 2: Min assist transfers  Short term goal 3: CGA WC propulsion 150'  Short term goal 4: CGA amb 48' w/RW    Long term goals  Time Frame for Long term goals : 2 weeks  Long term goal 1: IND bed mobility  Long term goal 2: IND transfers  Long term goal 3: IND WC propulsion 150'  Long term goal 4: IND amb 150'+ w/RW  Long term goal 5: IND HEP  HOME LIVING:     Type of Home: House  Home Layout: One level  Home Access: Ramped entrance        ASSESSMENT (IMPAIRMENTS/BARRIERS): Body structures, Functions, Activity limitations: Decreased functional mobility , Decreased ADL status, Decreased ROM, Decreased strength, Decreased endurance, Decreased balance, Decreased high-level IADLs, Decreased coordination, Increased pain  Assessment: Pt tolerated session well w/ slight increase in pain in R hip.   Activity Tolerance: Patient Tolerated treatment well     PLAN:  Plan  Times per week: 5-6  Times per day: (1-2)  Plan weeks: 2 weeks  Current Treatment Recommendations: Strengthening, ROM, Balance Training, Functional Mobility Training, Transfer Training, ADL/Self-care Training, IADL Training, Endurance Training, Wheelchair Mobility Training, Gait Training, Stair training, Home Exercise Program, Modalities, Patient/Caregiver Education & Training, Safety Education & Training, Pain Management  Discharge Recommendations: Home with Home health PT  PATIENT REQUIRES AND IS REASONABLY EXPECTED TO ACTIVELY PARTICIPATE IN AT LEAST 3 HOURS OF INTENSIVE THERAPY PER DAY AT LEAST 5 DAYS PER WEEK, AND BE EXPECTED TO MAKE MEASURABLE IMPROVEMENT THAT WILL BE OF PRACTICAL VALUE TO IMPROVE THE PATIENT'S FUNCTIONAL CAPACITY OR ADAPTATION TO IMPAIRMENTS.    PATIENT GOAL FOR REHAB:  RETURN TO PRIOR LEVEL OF FUNCTION       IRF/DEQUAN  Roll Left and Right  Assistance Needed: Independent  CARE Score: 6  Discharge Goal: Independent    Sit to Lying  Assistance Needed: Independent  CARE Score: 6  Discharge Goal: Independent    Lying to Sitting on Side of Bed  Assistance Needed: Independent  CARE Score: 6  Discharge Goal: Independent    Sit to Stand  Assistance Needed: Independent  CARE Score: 6  Discharge Goal: Independent    Chair/Bed-to-Chair Transfer  Assistance Needed: Independent  CARE Score: 6  Discharge Goal: Independent    Car Transfer  Assistance Needed: Independent  Reason if not Attempted: Not attempted due to medical condition or safety concerns  CARE Score: 6  Discharge Goal: Independent    Walk 10 Feet  Assistance Needed: Independent  CARE Score: 6  Discharge Goal: Independent    Walk 50 Feet with Two Turns  Assistance Needed: Independent  Reason if not Attempted: Not attempted due to medical condition or safety concerns  CARE Score: 6  Discharge Goal: Independent    Walk 150 Feet  Assistance Needed: Independent  Reason if not Attempted: Not attempted due to medical condition or safety concerns  CARE Score: 6  Discharge Goal: Independent    Walking 10 Feet on Uneven Surfaces  Reason if not Attempted: Not attempted due to medical condition or safety concerns  CARE Score: 88  Discharge Goal: Not Attempted    1 Step (Curb)  Reason if not Attempted: Not attempted due to medical condition or safety concerns  CARE Score: 88  Discharge Goal: Not Attempted    4 Steps  Reason if not Attempted: Not attempted due to medical condition or safety concerns  CARE Score: 88  Discharge Goal: Not Attempted    12 Steps  Reason if not Attempted: Not attempted due to medical condition or safety concerns  CARE Score: 88  Discharge Goal: Not Attempted    Wheel 50 Feet with Two Turns  Assistance Needed: Independent  Reason if not Attempted: Not attempted due to medical condition or safety concerns  CARE Score: 6  Discharge Goal: Independent    Wheel 150 Feet  Assistance Needed: Independent  Reason if not Attempted: Not attempted due to medical condition or safety concerns  CARE Score: 6  Discharge Goal: Independent      LAST TREATMENT TIME  PT Individual Minutes  Time In: 6521  Time Out: 1430  Minutes: 39

## 2020-11-18 NOTE — DISCHARGE SUMMARY
1.25 MG (11799 UT) CAPS capsule Take 1 capsule by mouth once a week  Qty: 12 capsule, Refills: 1    Associated Diagnoses: Hypovitaminosis D; Compression fracture of T12 vertebra, initial encounter ([facility])      amiodarone (CORDARONE) 200 MG tablet Take 1 tablet by mouth daily  Qty: 30 tablet, Refills: 3    Comments: Dose decrease           Current Discharge Medication List      STOP taking these medications       acetaminophen (TYLENOL) 325 MG tablet Comments:   Reason for Stopping:         aspirin 81 MG chewable tablet Comments:   Reason for Stopping:                 Consults:   Hospitalist    Hospital Course: The patient did well during his stay in the rehab unit. He required transfusion on 11/7 for continued low hemoglobin counts. Hemoglobin has gradually increased on Niferex. He otherwise had no medical complications. Disposition upon discharge-improved. Medical staff discussed the patient at staffing meeting today as well.       Discharge Instructions     Patient Instructions:   Home  Therapy orders: PT and OT   Discharge lab work: none  Code status: Full Code   Activity: activity as tolerated  Diet: DIET CARDIAC;    Wound Care: as directed  Equipment: as per therapy      Diana Angel MD    At least 35 minutes were spent in discharging the patient

## 2020-11-19 ENCOUNTER — TELEPHONE (OUTPATIENT)
Dept: INTERNAL MEDICINE | Age: 70
End: 2020-11-19

## 2020-11-19 NOTE — TELEPHONE ENCOUNTER
[facility] Transitions Initial Follow Up Call    Outreach made within 2 business days of discharge: Yes    Patient: Petra Alcazar   Patient : 1950    MRN: [id number]   Date of Admission: 20  Admission Diagnoses:   Closed right hip fracture ([address] [S72.001A]  Closed nondisplaced fracture of lesser trochanter of right femur with routine healing [S72.124D]  Closed nondisplaced fracture of lesser trochanter of right femur with routine healing [S72.124D]  Discharge Date: 20        Discharge Diagnoses: Active Problems:    Closed nondisplaced fracture of lesser trochanter of right femur with routine healing                Spoke with: 2nd attempt to reach patient, I called both patient and emergency contact, no answer on either.  Voicemail has not been set up, I was unable to leave a message with intent of my call.      Discharge department/facility: [facility]    Scheduled appointment with PCP within 7-14 days    Follow Up  Future Appointments   Date Time Provider Joao Wayne   2020 11:15 AM [facility]   2021  9:15 AM Ana Jauregui DO [facility] [address]

## 2020-11-19 NOTE — DISCHARGE SUMMARY
Occupational Therapy Discharge Summary         Date: 2020  Patient Name: Mejia Murray        MRN: [id number]    : 1950  (78 y.o.)  Gender: male      Diagnosis: R Intertrochanteric hip fracture  Restrictions/Precautions  Restrictions/Precautions: Up Ad Jordyn  Required Braces or Orthoses?: No      Discharge Date: 20      UE Functioning:  WFLs     Home Management:  Functional Mobility  Functional - Mobility Device: Rolling Walker  Activity: Other  Assist Level: Modified independent   Functional Mobility Comments: short distance and in kitchen, x3 occurrences    Adaptive Equipment/DME Status:   [facility]anged [facility] [place] [facility]  Home Equipment: Standard walker, Cane, Wheelchair-manual      Pain Assessment:  Pain Level: 6  Pain Location: Hip, Back    Remaining Problems:  Decreased endurance; Decreased functional mobility ; Decreased high-level IADLs    STGs:  Short term goals  Time Frame for Short term goals: 1 week  Short term goal 1: Supervision with toilet hygiene. Short term goal 2: Supervision with toilet transfer. Short term goal 3: Supervision wih showering/bathing with AE as needed. Short term goal 4: Supervision with LB dressing with AE as needed. Short term goal 5: Supervision with UB dressing. Short term goal 6: Supervision with putting on/taking off footwear with AE as needed. Met all STGs    LTGs:  Long term goals  Time Frame for Long term goals : 2 weeks  Long term goal 1: Modified Independent with toilet hygiene. Long term goal 2: Modified Independent with toilet transfer. Long term goal 3: Modified Independent wih showering/bathing with AE as needed. Long term goal 4: Modified Independent with LB dressing with AE as needed. Long term goal 5: Independent with UB dressing. Long term goal 7: Modified Independent with putting on/taking off footwear with AE as needed. Long term goal 8: Verbalize DME needs. Long term goal 9: Complete IADL/homemaking task with Modified Rankin.   Met all LTGs    Discharge Setting and Recommendations:  Home with Home Health Occupational Therapy to address safety and inde,endence in the home environment.      Discharge Care Scores  Eating: CARE Score: 6  Oral Hygiene: CARE Score: 6  Toileting: CARE Score: 6  Shower/Bathe: CARE Score: 6  Upper Body Dressing: CARE Score: 6  Lower Body Dressing: CARE Score: 6  Footwear: CARE Score: 6  Toilet Transfers: CARE Score: 6  Picking Up Object:  CARE Score: 6    Electronically signed by ANGELO Whitmore on 11/19/20 at 5:53 PM CST

## 2020-11-23 LAB
BASOPHILS ABSOLUTE: 0.1 K/UL (ref 0–0.2)
BASOPHILS RELATIVE PERCENT: 1.1 % (ref 0–1)
EOSINOPHILS ABSOLUTE: 0.3 K/UL (ref 0–0.6)
EOSINOPHILS RELATIVE PERCENT: 3.9 % (ref 0–5)
HCT VFR BLD CALC: 29.6 % (ref 42–52)
HEMOGLOBIN: 9.5 G/DL (ref 14–18)
IMMATURE GRANULOCYTES #: 0 K/UL
LYMPHOCYTES ABSOLUTE: 1.5 K/UL (ref 1.1–4.5)
LYMPHOCYTES RELATIVE PERCENT: 20.6 % (ref 20–40)
MCH RBC QN AUTO: 29.7 PG (ref 27–31)
MCHC RBC AUTO-ENTMCNC: 32.1 G/DL (ref 33–37)
MCV RBC AUTO: 92.5 FL (ref 80–94)
MONOCYTES ABSOLUTE: 0.6 K/UL (ref 0–0.9)
MONOCYTES RELATIVE PERCENT: 8.1 % (ref 0–10)
NEUTROPHILS ABSOLUTE: 4.9 K/UL (ref 1.5–7.5)
NEUTROPHILS RELATIVE PERCENT: 65.9 % (ref 50–65)
PDW BLD-RTO: 16.2 % (ref 11.5–14.5)
PLATELET # BLD: 379 K/UL (ref 130–400)
PMV BLD AUTO: 9 FL (ref 9.4–12.4)
RBC # BLD: 3.2 M/UL (ref 4.7–6.1)
WBC # BLD: 7.4 K/UL (ref 4.8–10.8)

## 2020-11-25 LAB
BASOPHILS ABSOLUTE: 0.1 K/UL (ref 0–0.2)
BASOPHILS RELATIVE PERCENT: 1.2 % (ref 0–1)
EOSINOPHILS ABSOLUTE: 0.4 K/UL (ref 0–0.6)
EOSINOPHILS RELATIVE PERCENT: 6.4 % (ref 0–5)
HCT VFR BLD CALC: 28.4 % (ref 42–52)
HEMOGLOBIN: 9.1 G/DL (ref 14–18)
IMMATURE GRANULOCYTES #: 0 K/UL
LYMPHOCYTES ABSOLUTE: 1.8 K/UL (ref 1.1–4.5)
LYMPHOCYTES RELATIVE PERCENT: 28.2 % (ref 20–40)
MCH RBC QN AUTO: 29.4 PG (ref 27–31)
MCHC RBC AUTO-ENTMCNC: 32 G/DL (ref 33–37)
MCV RBC AUTO: 91.9 FL (ref 80–94)
MONOCYTES ABSOLUTE: 0.6 K/UL (ref 0–0.9)
MONOCYTES RELATIVE PERCENT: 9 % (ref 0–10)
NEUTROPHILS ABSOLUTE: 3.5 K/UL (ref 1.5–7.5)
NEUTROPHILS RELATIVE PERCENT: 54.9 % (ref 50–65)
PDW BLD-RTO: 16.1 % (ref 11.5–14.5)
PLATELET # BLD: 284 K/UL (ref 130–400)
PMV BLD AUTO: 9.3 FL (ref 9.4–12.4)
RBC # BLD: 3.09 M/UL (ref 4.7–6.1)
WBC # BLD: 6.4 K/UL (ref 4.8–10.8)

## 2020-11-25 RX ORDER — OXYCODONE AND ACETAMINOPHEN 7.5; 325 MG/1; MG/1
TABLET ORAL
Qty: 30 TABLET | Refills: 0 | OUTPATIENT
Start: 2020-11-25

## 2020-11-25 NOTE — TELEPHONE ENCOUNTER
Patient called stating he is in pain and requested a refill on Percocet. Explained we filled a script for 30 tablets on 11/18/2020 and that was supposed to last 14 days. Advised patient to go to ED if he is still having pain. Patient did state he went to his ortho doctor yesterday.

## 2020-11-27 ENCOUNTER — HOSPITAL ENCOUNTER (EMERGENCY)
Age: 70
Discharge: HOME | End: 2020-11-27
Payer: MEDICARE

## 2020-11-27 ENCOUNTER — OFFICE VISIT (OUTPATIENT)
Dept: URGENT CARE | Age: 70
End: 2020-11-27

## 2020-11-27 VITALS
HEART RATE: 60 BPM | RESPIRATION RATE: 18 BRPM | HEIGHT: 72 IN | BODY MASS INDEX: 23.7 KG/M2 | SYSTOLIC BLOOD PRESSURE: 125 MMHG | OXYGEN SATURATION: 98 % | DIASTOLIC BLOOD PRESSURE: 68 MMHG | TEMPERATURE: 98.5 F | WEIGHT: 175 LBS

## 2020-11-27 LAB
ALBUMIN SERPL-MCNC: 3.6 G/DL (ref 3.5–5.2)
ALP BLD-CCNC: 136 U/L (ref 40–130)
ALT SERPL-CCNC: 11 U/L (ref 5–41)
ANION GAP SERPL CALCULATED.3IONS-SCNC: 12 MMOL/L (ref 7–19)
APTT: 34.3 SEC (ref 26–36.2)
AST SERPL-CCNC: 20 U/L (ref 5–40)
BASOPHILS ABSOLUTE: 0.1 K/UL (ref 0–0.2)
BASOPHILS RELATIVE PERCENT: 1.2 % (ref 0–1)
BILIRUB SERPL-MCNC: 0.6 MG/DL (ref 0.2–1.2)
BUN BLDV-MCNC: 28 MG/DL (ref 8–23)
CALCIUM SERPL-MCNC: 9.1 MG/DL (ref 8.8–10.2)
CHLORIDE BLD-SCNC: 101 MMOL/L (ref 98–111)
CO2: 26 MMOL/L (ref 22–29)
CREAT SERPL-MCNC: 1.3 MG/DL (ref 0.5–1.2)
EOSINOPHILS ABSOLUTE: 0.3 K/UL (ref 0–0.6)
EOSINOPHILS RELATIVE PERCENT: 3.8 % (ref 0–5)
GFR AFRICAN AMERICAN: >59
GFR NON-AFRICAN AMERICAN: 55
GLUCOSE BLD-MCNC: 91 MG/DL (ref 74–109)
HCT VFR BLD CALC: 32.9 % (ref 42–52)
HEMOGLOBIN: 10.2 G/DL (ref 14–18)
IMMATURE GRANULOCYTES #: 0 K/UL
INR BLD: 1.37 (ref 0.88–1.18)
LYMPHOCYTES ABSOLUTE: 1.7 K/UL (ref 1.1–4.5)
LYMPHOCYTES RELATIVE PERCENT: 20.6 % (ref 20–40)
MCH RBC QN AUTO: 29.4 PG (ref 27–31)
MCHC RBC AUTO-ENTMCNC: 31 G/DL (ref 33–37)
MCV RBC AUTO: 94.8 FL (ref 80–94)
MONOCYTES ABSOLUTE: 0.6 K/UL (ref 0–0.9)
MONOCYTES RELATIVE PERCENT: 7.3 % (ref 0–10)
NEUTROPHILS ABSOLUTE: 5.4 K/UL (ref 1.5–7.5)
NEUTROPHILS RELATIVE PERCENT: 66.6 % (ref 50–65)
PDW BLD-RTO: 16.1 % (ref 11.5–14.5)
PLATELET # BLD: 322 K/UL (ref 130–400)
PMV BLD AUTO: 9 FL (ref 9.4–12.4)
POTASSIUM REFLEX MAGNESIUM: 3.8 MMOL/L (ref 3.5–5)
PROTHROMBIN TIME: 16.9 SEC (ref 12–14.6)
RBC # BLD: 3.47 M/UL (ref 4.7–6.1)
SODIUM BLD-SCNC: 139 MMOL/L (ref 136–145)
TOTAL PROTEIN: 7.1 G/DL (ref 6.6–8.7)
WBC # BLD: 8.1 K/UL (ref 4.8–10.8)

## 2020-11-27 PROCEDURE — 99999 PR OFFICE/OUTPT VISIT,PROCEDURE ONLY: CPT | Performed by: NURSE PRACTITIONER

## 2020-11-27 PROCEDURE — 85025 COMPLETE CBC W/AUTO DIFF WBC: CPT

## 2020-11-27 PROCEDURE — 85610 PROTHROMBIN TIME: CPT

## 2020-11-27 PROCEDURE — 85730 THROMBOPLASTIN TIME PARTIAL: CPT

## 2020-11-27 PROCEDURE — 2580000003 HC RX 258: Performed by: PHYSICIAN ASSISTANT

## 2020-11-27 PROCEDURE — 99282 EMERGENCY DEPT VISIT SF MDM: CPT

## 2020-11-27 PROCEDURE — 80053 COMPREHEN METABOLIC PANEL: CPT

## 2020-11-27 PROCEDURE — 36415 COLL VENOUS BLD VENIPUNCTURE: CPT

## 2020-11-27 PROCEDURE — 93971 EXTREMITY STUDY: CPT

## 2020-11-27 RX ORDER — 0.9 % SODIUM CHLORIDE 0.9 %
500 INTRAVENOUS SOLUTION INTRAVENOUS ONCE
Status: COMPLETED | OUTPATIENT
Start: 2020-11-27 | End: 2020-11-27

## 2020-11-27 RX ADMIN — SODIUM CHLORIDE 500 ML: 9 INJECTION, SOLUTION INTRAVENOUS at 13:37

## 2020-11-27 ASSESSMENT — ENCOUNTER SYMPTOMS
BACK PAIN: 0
COLOR CHANGE: 0
SHORTNESS OF BREATH: 0
COUGH: 0
PHOTOPHOBIA: 0
EYE DISCHARGE: 0
EYE ITCHING: 0
APNEA: 0

## 2020-11-27 NOTE — PROGRESS NOTES
Vascular Preliminary Report    Right Lower Extremity Venous Duplex Completed    No evidence of DVT, SVT, or Reflux noted. Hematoma Right Groin measuring approximately 5 x 1.6cm.     Final Report Pending

## 2020-11-27 NOTE — ED PROVIDER NOTES
[facility] EMERGENCY DEPT  eMERGENCYdEPARTMENT eNCOUnter      Pt Name: Sunny Whittaker  MRN: [id number]  Birthdate 1950  Date of evaluation: 11/27/2020  Provider:KAI George    CHIEF COMPLAINT       Chief Complaint   Patient presents with    Leg Pain     right calf         HISTORY OF PRESENT ILLNESS  (Location/Symptom, Timing/Onset, Context/Setting, Quality, Duration, Modifying Factors, Severity.)   Sunny Whittaker is a 78 y.o. male who presents to the emergency department with right leg swelling started yesterday sent here by doctor for dvt rule out. Noticed swelling yesterday not really experiencing much pain. He takes blood thinners. No prior DVT history. He took an extra Lasix to see if it would help. Is specifically right lower extremity swelling with mild redness has a history of TFN a week ago with Dr. Hailee Pacheco. Just discharged from 8th floor rehab toe touch WB at this time. On eliquis. [facility]    Nursing Notes were reviewed and I agree. REVIEW OF SYSTEMS    (2-9 systems for level 4, 10 or more for level 5)     Review of Systems   Constitutional: Negative for activity change, appetite change, chills and fever. HENT: Negative for congestion and dental problem. Eyes: Negative for photophobia, discharge and itching. Respiratory: Negative for apnea, cough and shortness of breath. Cardiovascular: Negative for chest pain. Musculoskeletal: Positive for joint swelling. Negative for arthralgias, back pain, gait problem, myalgias and neck pain. Skin: Negative for color change, pallor and rash. Neurological: Negative for dizziness, seizures and syncope. Psychiatric/Behavioral: Negative for agitation. The patient is not nervous/anxious. Except as noted above the remainder of the review of systems was reviewed and negative.        PAST MEDICAL HISTORY     Past Medical History:   Diagnosis Date    CAD (coronary artery disease)     Gout     Hypertension     Non-ST elevation [facility] ([facility]STEMI[facility] 12/28/14    Palliative care patient 06/29/2020         SURGICAL HISTORY       Past Surgical History:   Procedure Laterality Date    CARDIAC CATHETERIZATION  12/29/14  [facility]    angioplasty, stent to LAD. EF 45%    CHOLECYSTECTOMY      FEMUR FRACTURE SURGERY Right 10/30/2020    TFN, SHORT performed by Singh Moreira MD at [address] [facility]       Discharge Medication List as of 11/27/2020  2:51 PM      CONTINUE these medications which have NOT CHANGED    Details   oxyCODONE-acetaminophen (PERCOCET) 7.5-325 MG per tablet Take 1 tablet by mouth 4 times daily for 14 days. , Disp-30 tablet,R-0Normal      apixaban (ELIQUIS) 5 MG TABS tablet Take 1 tablet by mouth 2 times daily, Disp-60 tablet,R-0Normal      atorvastatin (LIPITOR) 40 MG tablet Take 1 tablet by mouth daily, Disp-30 tablet,R-3Normal      metoprolol succinate (TOPROL XL) 25 MG extended release tablet Take 0.5 tablets by mouth daily, Disp-30 tablet,R-3Normal      iron polysaccharides (NIFEREX) 150 MG capsule Take 1 capsule by mouth daily, Disp-60 capsule,R-3Normal      levothyroxine (SYNTHROID) 125 MCG tablet Take 1 tablet by mouth Daily, Disp-30 tablet,R-3Normal      pantoprazole (PROTONIX) 40 MG tablet Take 1 tablet by mouth every morning (before breakfast), Disp-30 tablet,R-3Normal      lisinopril (PRINIVIL;ZESTRIL) 5 MG tablet Take 1 tablet by mouth daily, Disp-30 tablet,R-5Normal      vitamin D (ERGOCALCIFEROL) 1.25 MG (25456 UT) CAPS capsule Take 1 capsule by mouth once a week, Disp-12 capsule,R-1Normal      amiodarone (CORDARONE) 200 MG tablet Take 1 tablet by mouth daily, Disp-30 tablet,R-3Dose decreaseNormal             ALLERGIES     Patient has no known allergies.     FAMILY HISTORY       Family History   Problem Relation Age of Onset    No Known Problems Mother     Heart Disease Father           SOCIAL HISTORY       Social History     Socioeconomic History    Marital status:      Spouse name: None    Number of children: None    Years of education: None    Highest education level: None   Occupational History    None   Social Needs    Financial resource strain: None    Food insecurity     Worry: None     Inability: None    Transportation needs     Medical: None     Non-medical: None   Tobacco Use    Smoking status: Former Smoker     Packs/day: 1.00     Years: 3.00     Pack years: 3.00     Types: Cigars     Start date:      Last attempt to quit:      Years since quittin.9    Smokeless tobacco: Never Used   Substance and Sexual Activity    Alcohol use: Yes     Alcohol/week: 3.0 standard drinks     Types: 3 Shots of liquor per week     Frequency: 4 or more times a week     Drinks per session: 1 or 2     Binge frequency: Daily or almost daily     Comment: daily    Drug use: No    Sexual activity: Yes     Partners: Female   Lifestyle    Physical activity     Days per week: None     Minutes per session: None    Stress: None   Relationships    Social connections     Talks on phone: None     Gets together: None     Attends Congregation service: None     Active member of club or organization: None     Attends meetings of clubs or organizations: None     Relationship status: None    Intimate partner violence     Fear of current or ex partner: None     Emotionally abused: None     Physically abused: None     Forced sexual activity: None   Other Topics Concern    None   Social History Narrative    None       SCREENINGS           PHYSICAL EXAM    (up to 7 forlevel 4, 8 or more for level 5)     ED Triage Vitals [20 1210]   BP Temp Temp src Pulse Resp SpO2 Height Weight   131/73 98.5 °F (36.9 °C) -- 55 18 95 % 6' (1.829 m) 175 lb (79.4 kg)       Physical Exam  Vitals signs and nursing note reviewed. Constitutional:       General: He is not in acute distress. Appearance: Normal appearance. He is well-developed. He is not diaphoretic. HENT:      Head: Normocephalic and atraumatic.       Right Ear: Tympanic membrane, ear canal and external ear normal.      Left Ear: Tympanic membrane, ear canal and external ear normal.      Nose: Nose normal.   Eyes:      Pupils: Pupils are equal, round, and reactive to light. Neck:      Musculoskeletal: Normal range of motion and neck supple. Trachea: No tracheal deviation. Cardiovascular:      Rate and Rhythm: Normal rate and regular rhythm. Heart sounds: Normal heart sounds. No murmur. Pulmonary:      Effort: Pulmonary effort is normal.      Breath sounds: Normal breath sounds. No stridor. No wheezing. Chest:      Chest wall: No tenderness. Abdominal:      General: Bowel sounds are normal. There is no distension. Palpations: Abdomen is soft. Tenderness: There is no abdominal tenderness. Musculoskeletal: Normal range of motion. General: Swelling present. No tenderness. Skin:     General: Skin is warm and dry. Capillary Refill: Capillary refill takes less than 2 seconds. Findings: No erythema. Neurological:      General: No focal deficit present. Mental Status: He is alert and oriented to person, place, and time. Mental status is at baseline. Psychiatric:         Mood and Affect: Mood normal.         Behavior: Behavior normal.         Thought Content:  Thought content normal.         Judgment: Judgment normal.           DIAGNOSTIC RESULTS     RADIOLOGY:   Non-plain film images such as CT, Ultrasound and MRI are read by the radiologist. [facility] radiographic images are visualized and preliminarilyinterpreted by No att. providers found with the below findings:      Interpretation per the Radiologist below, if available at the time of this note:    VL Extremity Venous Right   Final Result          LABS:  Labs Reviewed   CBC WITH AUTO DIFFERENTIAL - Abnormal; Notable for the following components:       Result Value    RBC 3.47 (*)     Hemoglobin 10.2 (*)     Hematocrit 32.9 (*)     MCV 94.8 (*)     MCHC 31.0 (*)     RDW 16.1 (*) MPV 9.0 (*)     Neutrophils % 66.6 (*)     Basophils % 1.2 (*)     All other components within normal limits   COMPREHENSIVE METABOLIC PANEL W/ REFLEX TO MG FOR LOW K - Abnormal; Notable for the following components:    BUN 28 (*)     CREATININE 1.3 (*)     GFR Non-African American 55 (*)     Alkaline Phosphatase 136 (*)     All other components within normal limits   PROTIME-INR - Abnormal; Notable for the following components:    Protime 16.9 (*)     INR 1.37 (*)     All other components within normal limits   APTT       All other labs were within normal range or notreturned as of this dictation. RE-ASSESSMENT        EMERGENCY DEPARTMENT COURSE and DIFFERENTIAL DIAGNOSIS/MDM:   Vitals:    Vitals:    11/27/20 1210 11/27/20 1503   BP: 131/73 125/68   Pulse: 55 60   Resp: 18 18   Temp: 98.5 °F (36.9 °C)    SpO2: 95% 98%   Weight: 175 lb (79.4 kg)    Height: 6' (1.829 m)        MDM  No acute findings on the patient's leg aside from hematoma correlating with recent trauma/surgery. Normal blood work his kidney function is slightly bumped not high enough to get admitted have already given him some fluids patient remains medically stable with no symptoms aside from the swelling I strongly advised to elevate when he is at rest which it sounds like he is not been doing I feel that this is more of a gravity effect from recent surgery. Patient will be discharged at this time and keep his postop visit with Dr. Rudy Munoz as scheduled. PROCEDURES:    Procedures      FINAL IMPRESSION      1.  Leg swelling          DISPOSITION/PLAN   DISPOSITION Decision To Discharge 11/27/2020 02:51:19 PM      PATIENT REFERRED TO:  [facility] EMERGENCY DEPT  Yasmany Xiong  [phone]    If symptoms worsen      DISCHARGE MEDICATIONS:  Discharge Medication List as of 11/27/2020  2:51 PM          (Please note that portions of this note were completed with a voice recognition program.  Efforts were made to edit the dictations but occasionallywords are mis-transcribed.)    Mulugeta Mcneal [address], [place]  11/28/20 1031

## 2020-12-03 LAB
BASOPHILS ABSOLUTE: 0.1 K/UL (ref 0–0.2)
BASOPHILS RELATIVE PERCENT: 1 % (ref 0–1)
EOSINOPHILS ABSOLUTE: 0.4 K/UL (ref 0–0.6)
EOSINOPHILS RELATIVE PERCENT: 6.7 % (ref 0–5)
HCT VFR BLD CALC: 30.4 % (ref 42–52)
HEMOGLOBIN: 9.5 G/DL (ref 14–18)
IMMATURE GRANULOCYTES #: 0 K/UL
LYMPHOCYTES ABSOLUTE: 1.5 K/UL (ref 1.1–4.5)
LYMPHOCYTES RELATIVE PERCENT: 25.7 % (ref 20–40)
MCH RBC QN AUTO: 29.8 PG (ref 27–31)
MCHC RBC AUTO-ENTMCNC: 31.3 G/DL (ref 33–37)
MCV RBC AUTO: 95.3 FL (ref 80–94)
MONOCYTES ABSOLUTE: 0.5 K/UL (ref 0–0.9)
MONOCYTES RELATIVE PERCENT: 8.6 % (ref 0–10)
NEUTROPHILS ABSOLUTE: 3.4 K/UL (ref 1.5–7.5)
NEUTROPHILS RELATIVE PERCENT: 57.8 % (ref 50–65)
PDW BLD-RTO: 15.9 % (ref 11.5–14.5)
PLATELET # BLD: 207 K/UL (ref 130–400)
PMV BLD AUTO: 9.2 FL (ref 9.4–12.4)
RBC # BLD: 3.19 M/UL (ref 4.7–6.1)
WBC # BLD: 5.8 K/UL (ref 4.8–10.8)

## 2020-12-16 PROBLEM — J18.9 PNEUMONIA DUE TO INFECTIOUS ORGANISM: Status: RESOLVED | Noted: 2020-06-24 | Resolved: 2020-12-16

## 2020-12-16 PROBLEM — S72.001A HIP FX, RIGHT, CLOSED, INITIAL ENCOUNTER (HCC): Status: RESOLVED | Noted: 2020-10-30 | Resolved: 2020-12-16

## 2020-12-17 ENCOUNTER — TELEPHONE (OUTPATIENT)
Dept: INTERNAL MEDICINE | Age: 70
End: 2020-12-17

## 2020-12-17 DIAGNOSIS — S22.080A COMPRESSION FRACTURE OF T12 VERTEBRA, INITIAL ENCOUNTER (HCC): ICD-10-CM

## 2020-12-17 DIAGNOSIS — M80.80XD LOCALIZED OSTEOPOROSIS WITH CURRENT PATHOLOGICAL FRACTURE WITH ROUTINE HEALING: ICD-10-CM

## 2020-12-17 DIAGNOSIS — I48.0 PAROXYSMAL ATRIAL FIBRILLATION (HCC): ICD-10-CM

## 2020-12-17 DIAGNOSIS — I42.8 NONISCHEMIC CARDIOMYOPATHY (HCC): ICD-10-CM

## 2020-12-17 LAB
HBA1C MFR BLD: 4.6 % (ref 4–6)
PARATHYROID HORMONE INTACT: 31 PG/ML (ref 15–65)

## 2020-12-17 NOTE — TELEPHONE ENCOUNTER
Attempted to reach the pt to see if he wants us to do a referral for a colonoscopy or he can do the at home stool study. Goes straight to a message that he has no VM set up. Letter mailed.

## 2020-12-20 LAB
SEX HORMONE BINDING GLOBULIN: 82 NMOL/L (ref 11–80)
TESTOSTERONE FREE PERCENT: 1 % (ref 1.6–2.9)
TESTOSTERONE FREE, CALC: 48 PG/ML (ref 47–244)
TESTOSTERONE TOTAL-MALE: 473 NG/DL (ref 300–720)
TISSUE TRANSGLUTAMINASE IGA: 2 U/ML (ref 0–3)

## 2020-12-21 LAB
ALBUMIN SERPL-MCNC: 3.8 G/DL (ref 3.75–5.01)
ALPHA-1-GLOBULIN: 0.33 G/DL (ref 0.19–0.46)
ALPHA-2-GLOBULIN: 0.94 G/DL (ref 0.48–1.05)
BETA GLOBULIN: 1.14 G/DL (ref 0.48–1.1)
GAMMA GLOBULIN: 1.49 G/DL (ref 0.62–1.51)
IMMUNOFIXATION REFLEX: ABNORMAL
SPE/IFE INTERPRETATION: ABNORMAL
TOTAL PROTEIN: 7.7 G/DL (ref 6.3–8.2)

## 2020-12-23 LAB
SEX HORMONE BINDING GLOBULIN: 79 NMOL/L (ref 11–80)
TESTOSTERONE FREE-NONMALE: 54.6 PG/ML (ref 47–244)
TESTOSTERONE TOTAL: 491 NG/DL (ref 220–1000)

## 2020-12-30 ENCOUNTER — OFFICE VISIT (OUTPATIENT)
Dept: INTERNAL MEDICINE | Age: 70
End: 2020-12-30
Payer: MEDICARE

## 2020-12-30 VITALS
BODY MASS INDEX: 24.73 KG/M2 | SYSTOLIC BLOOD PRESSURE: 106 MMHG | WEIGHT: 182.6 LBS | HEIGHT: 72 IN | HEART RATE: 100 BPM | DIASTOLIC BLOOD PRESSURE: 60 MMHG | OXYGEN SATURATION: 97 %

## 2020-12-30 PROBLEM — K21.9 GERD (GASTROESOPHAGEAL REFLUX DISEASE): Status: ACTIVE | Noted: 2020-12-30

## 2020-12-30 PROBLEM — N17.9 ACUTE KIDNEY INJURY (HCC): Status: RESOLVED | Noted: 2020-11-03 | Resolved: 2020-12-30

## 2020-12-30 PROBLEM — E55.9 HYPOVITAMINOSIS D: Status: ACTIVE | Noted: 2020-12-30

## 2020-12-30 PROBLEM — R74.01 TRANSAMINITIS: Status: RESOLVED | Noted: 2018-05-07 | Resolved: 2020-12-30

## 2020-12-30 PROBLEM — E78.5 DYSLIPIDEMIA: Status: RESOLVED | Noted: 2020-06-20 | Resolved: 2020-12-30

## 2020-12-30 PROCEDURE — 1036F TOBACCO NON-USER: CPT | Performed by: INTERNAL MEDICINE

## 2020-12-30 PROCEDURE — G8484 FLU IMMUNIZE NO ADMIN: HCPCS | Performed by: INTERNAL MEDICINE

## 2020-12-30 PROCEDURE — 1123F ACP DISCUSS/DSCN MKR DOCD: CPT | Performed by: INTERNAL MEDICINE

## 2020-12-30 PROCEDURE — G8420 CALC BMI NORM PARAMETERS: HCPCS | Performed by: INTERNAL MEDICINE

## 2020-12-30 PROCEDURE — G8427 DOCREV CUR MEDS BY ELIG CLIN: HCPCS | Performed by: INTERNAL MEDICINE

## 2020-12-30 PROCEDURE — 4040F PNEUMOC VAC/ADMIN/RCVD: CPT | Performed by: INTERNAL MEDICINE

## 2020-12-30 PROCEDURE — 3017F COLORECTAL CA SCREEN DOC REV: CPT | Performed by: INTERNAL MEDICINE

## 2020-12-30 PROCEDURE — 99214 OFFICE O/P EST MOD 30 MIN: CPT | Performed by: INTERNAL MEDICINE

## 2020-12-30 ASSESSMENT — ENCOUNTER SYMPTOMS
STRIDOR: 0
WHEEZING: 0
SHORTNESS OF BREATH: 1
BLOOD IN STOOL: 0
ROS SKIN COMMENTS: DRY SKIN
CONSTIPATION: 0
TROUBLE SWALLOWING: 0
ABDOMINAL PAIN: 0
BACK PAIN: 0
COLOR CHANGE: 0
COUGH: 0
DIARRHEA: 0
CHEST TIGHTNESS: 0
SORE THROAT: 0

## 2020-12-30 NOTE — ASSESSMENT & PLAN NOTE
Patient will continue   metoprolol 12.5 mg daily    Avoiding red meat, butter, fried foods, cheese, and other foods that have a lot of saturated fat  ? Losing weight (if you are overweight)  ? Being more active

## 2020-12-30 NOTE — ASSESSMENT & PLAN NOTE
Patient will take Eliquis 5 mg twice a day plus amiodarone 200 mg once daily  Will be managed by cardiology

## 2020-12-30 NOTE — ASSESSMENT & PLAN NOTE
Continue pantoprazole 40 mg daily    Lose weight (if you are overweight)  ? Raise the head of your bed by 6 to 8 inches - You can do this by putting blocks of wood or rubber under 2 legs of the bed or a foam wedge under the mattress. ? Avoid foods that make your symptoms worse - For some people these include coffee, chocolate, alcohol, peppermint, and fatty foods. ?Stop smoking, if you smoke  ? Avoid late meals - Lying down with a full stomach can make reflux worse. Try to plan meals for at least 2 to 3 hours before bedtime. ? Avoid tight clothing - Some people feel better if they wear comfortable clothing that does not squeeze the stomach area.

## 2020-12-30 NOTE — PROGRESS NOTES
Transportation needs     Medical: Not on file     Non-medical: Not on file   Tobacco Use    Smoking status: Former Smoker     Packs/day: 1.00     Years: 3.00     Pack years: 3.00     Types: Cigars     Start date:      Quit date:      Years since quittin.0    Smokeless tobacco: Never Used   Substance and Sexual Activity    Alcohol use:  Yes     Alcohol/week: 3.0 standard drinks     Types: 3 Shots of liquor per week     Frequency: 4 or more times a week     Drinks per session: 1 or 2     Binge frequency: Daily or almost daily     Comment: daily    Drug use: No    Sexual activity: Yes     Partners: Female   Lifestyle    Physical activity     Days per week: Not on file     Minutes per session: Not on file    Stress: Not on file   Relationships    Social connections     Talks on phone: Not on file     Gets together: Not on file     Attends Christianity service: Not on file     Active member of club or organization: Not on file     Attends meetings of clubs or organizations: Not on file     Relationship status: Not on file    Intimate partner violence     Fear of current or ex partner: Not on file     Emotionally abused: Not on file     Physically abused: Not on file     Forced sexual activity: Not on file   Other Topics Concern    Not on file   Social History Narrative    Not on file       Family History   Problem Relation Age of Onset    No Known Problems Mother     Heart Disease Father        No Known Allergies    Current Outpatient Medications   Medication Sig Dispense Refill    apixaban (ELIQUIS) 5 MG TABS tablet Take 1 tablet by mouth 2 times daily 60 tablet 0    atorvastatin (LIPITOR) 40 MG tablet Take 1 tablet by mouth daily 30 tablet 3    metoprolol succinate (TOPROL XL) 25 MG extended release tablet Take 0.5 tablets by mouth daily 30 tablet 3    iron polysaccharides (NIFEREX) 150 MG capsule Take 1 capsule by mouth daily 60 capsule 3    levothyroxine (SYNTHROID) 125 MCG tablet Take 1 tablet by mouth Daily 30 tablet 3    pantoprazole (PROTONIX) 40 MG tablet Take 1 tablet by mouth every morning (before breakfast) 30 tablet 3    lisinopril (PRINIVIL;ZESTRIL) 5 MG tablet Take 1 tablet by mouth daily 30 tablet 5    vitamin D (ERGOCALCIFEROL) 1.25 MG (24500 UT) CAPS capsule Take 1 capsule by mouth once a week 12 capsule 1    amiodarone (CORDARONE) 200 MG tablet Take 1 tablet by mouth daily 30 tablet 3     No current facility-administered medications for this visit. Patient Active Problem List   Diagnosis    CAD (coronary artery disease)    Essential hypertension    Alcohol abuse    Hyponatremia    Hypotension    Palliative care patient    Chronic systolic heart failure (HCC)    Nonischemic cardiomyopathy (HCC)    Mixed hyperlipidemia    Paroxysmal atrial fibrillation (HCC)    Localized osteoporosis with current pathological fracture with routine healing    Postoperative anemia due to acute blood loss    Closed nondisplaced fracture of lesser trochanter of right femur with routine healing    Hypovitaminosis D    GERD (gastroesophageal reflux disease)             Review of Systems:   Review of Systems   Constitutional: Negative for activity change, appetite change, chills, diaphoresis, fatigue and fever. HENT: Negative for congestion, hearing loss, postnasal drip, sore throat, tinnitus and trouble swallowing. Eyes: Negative for visual disturbance. Respiratory: Positive for shortness of breath. Negative for cough, chest tightness, wheezing and stridor. Cardiovascular: Negative for chest pain, palpitations and leg swelling. Gastrointestinal: Negative for abdominal pain, blood in stool, constipation and diarrhea. Endocrine: Negative for cold intolerance. Genitourinary: Negative for frequency. Musculoskeletal: Positive for arthralgias. Negative for back pain and joint swelling. Skin: Negative for color change, rash and wound.         Dry skin pattern.  Alb 12/17/2020 3.80  3.75 - 5.01 g/dL Final    Alpha-1-Globulin 12/17/2020 0.33  0.19 - 0.46 g/dL Final    Alpha-2-Globulin 12/17/2020 0.94  0.48 - 1.05 g/dL Final    Beta Globulin 12/17/2020 1.14* 0.48 - 1.10 g/dL Final    Gamma Globulin 12/17/2020 1.49  0.62 - 1.51 g/dL Final    Immunofixation Reflex 12/17/2020 Not Done   Final    Comment:  Caution: Reflex to Immunofixation electrophoresis (HAN)is not  indicated per  results of protein electrophoresis. Immunofixation electrophoresis is a  more  sensitive technique than serum protein electrophoresis for the  identification  of small M-proteins found in patients with amyloidosis, early or  treated  myeloma and macroglobulinemia, MGUS, solitary plasmacytoma,and  extramedullary  plasmacytoma.  Total Protein 12/17/2020 7.7  6.3 - 8.2 g/dL Final    PTH 12/17/2020 31.0  15.0 - 65.0 pg/mL Final    Testosterone 12/17/2020 491  220 - 1,000 ng/dL Final    Sex Hormone Binding 12/17/2020 79  11 - 80 nmol/L Final    Testosterone, Free 12/17/2020 54.6  47 - 244 pg/mL Final    Comment:  The concentration of free testosterone is derived from a mathematical  expression based on the constant for the binding of testosterone to  albumin and/or sex hormone binding globulin. [facility] [id number] [place] [address] [phone]      Effective March 27, 2018 due to a change in methodology the reference ranges  of this test have changed. The new ranges are included in this report. This  change should be considered when evaluating results.  TISSUE TRANSGLUTAMINASE IGA 12/17/2020 2  0 - 3 U/mL Final    Comment: No further testing to follow.   INTERPRETIVE INFORMATION: Tissue Transglutaminase (tTG) Antibody, IgA  3 U/mL or less: Negative  4-10 U/mL: Weak Positive  11 U/mL or greater: Positive  Presence of the tissue transglutaminase (tTG) IgA antibody is  associated with  glutensensitive enteropathies such as celiac disease and poorly controlled diabetes. Therapeutic action is suggested at levels above 8%. Diabetes patients with HbA1c levels below 7% meet the goal of the American  Diabetes Association. HbA1c levels below the established reference range may indicate recent  episodes of hypoglycemia, the presence of Hb variants, or shortened lifetime  of erythrocytes.          ASSESSMENT:     Problem List        Circulatory    CAD (coronary artery disease)     Patient will continue   metoprolol 12.5 mg daily    Avoiding red meat, butter, fried foods, cheese, and other foods that have a lot of saturated fat  ? Losing weight (if you are overweight)  ? Being more active           Relevant Medications    amiodarone (CORDARONE) 200 MG tablet    lisinopril (PRINIVIL;ZESTRIL) 5 MG tablet    apixaban (ELIQUIS) 5 MG TABS tablet    atorvastatin (LIPITOR) 40 MG tablet    metoprolol succinate (TOPROL XL) 25 MG extended release tablet    Other Relevant Orders    Comprehensive Metabolic Panel    Chronic systolic heart failure (HCC)     Continue lisinopril 5 mg orally daily and will follow up with Dr. Mckenna Whittaker hypertension     Lose weight (if you are overweight)  Choose a diet low in fat and rich in fruits, vegetables, and low-fat dairy products  Reduce the amount of salt you eat  Do something active for at least 30 minutes a day on most days of the week  Cut down on alcohol (if you drink more than 2 alcoholic drinks per day)  It's also a good idea to get a home blood pressure meter. People who check   their own blood pressure at home do better at keeping it low and can sometimes   even reduce the amount of medicine they take.          Relevant Orders    Comprehensive Metabolic Panel    Nonischemic cardiomyopathy ([facility] [address]     Will be managed by cardiology no symptoms of heart failure at this time         Relevant Orders    Comprehensive Metabolic Panel    Paroxysmal atrial fibrillation [facility])     Patient will take Eliquis 5 mg twice a day plus amiodarone 200 mg once daily  Will be managed by cardiology         Relevant Orders    Comprehensive Metabolic Panel       Digestive    GERD (gastroesophageal reflux disease)     Continue pantoprazole 40 mg daily    Lose weight (if you are overweight)  ? Raise the head of your bed by 6 to 8 inches - You can do this by putting blocks of wood or rubber under 2 legs of the bed or a foam wedge under the mattress. ? Avoid foods that make your symptoms worse - For some people these include coffee, chocolate, alcohol, peppermint, and fatty foods. ?Stop smoking, if you smoke  ? Avoid late meals - Lying down with a full stomach can make reflux worse. Try to plan meals for at least 2 to 3 hours before bedtime. ? Avoid tight clothing - Some people feel better if they wear comfortable clothing that does not squeeze the stomach area. Relevant Medications    pantoprazole (PROTONIX) 40 MG tablet       Other    RESOLVED: Dyslipidemia     Continue atorvastatin 40 mg/day    Avoiding red meat, butter, fried foods, cheese, and other foods that have a lot of saturated fat  ? Losing weight (if you are overweight)  ? Being more active           Hypovitaminosis D     Given high-dose vitamin D will recheck levels         Relevant Medications    vitamin D (ERGOCALCIFEROL) 1.25 MG (80921 UT) CAPS capsule    Other Relevant Orders    Vitamin D 25 Hydroxy    Mixed hyperlipidemia     Continue atorvastatin 40 mg/day recheck lipid panel    Avoiding red meat, butter, fried foods, cheese, and other foods that have a lot of saturated fat  ? Losing weight (if you are overweight)  ? Being more active           Relevant Medications    amiodarone (CORDARONE) 200 MG tablet    lisinopril (PRINIVIL;ZESTRIL) 5 MG tablet    apixaban (ELIQUIS) 5 MG TABS tablet    atorvastatin (LIPITOR) 40 MG tablet    metoprolol succinate (TOPROL XL) 25 MG extended release tablet    Other Relevant Orders    Lipid Panel           PLAN:        Medications Ordered:  No orders of the defined types were placed in this encounter. Other Orders Placed:  Orders Placed This Encounter   Procedures    Comprehensive Metabolic Panel     Standing Status:   Future     Standing Expiration Date:   12/30/2021    Lipid Panel     Standing Status:   Future     Standing Expiration Date:   12/30/2021     Order Specific Question:   Is Patient Fasting?/# of Hours     Answer:   fasting    TSH without Reflex     Standing Status:   Future     Standing Expiration Date:   12/30/2021    Vitamin D 25 Hydroxy     Standing Status:   Future     Standing Expiration Date:   12/30/2021       Return in about 6 months (around 6/30/2021).            Electronically signed by Niall Valdez MD on 12/30/2020 at 11:40 AM

## 2020-12-30 NOTE — ASSESSMENT & PLAN NOTE
Continue atorvastatin 40 mg/day recheck lipid panel    Avoiding red meat, butter, fried foods, cheese, and other foods that have a lot of saturated fat  ? Losing weight (if you are overweight)  ? Being more active

## 2020-12-30 NOTE — ASSESSMENT & PLAN NOTE
Continue atorvastatin 40 mg/day    Avoiding red meat, butter, fried foods, cheese, and other foods that have a lot of saturated fat  ? Losing weight (if you are overweight)  ? Being more active

## 2020-12-30 NOTE — PATIENT INSTRUCTIONS
Patient Education        Heart-Healthy Diet: Care Instructions  Your Care Instructions     A heart-healthy diet has lots of vegetables, fruits, nuts, beans, and whole grains, and is low in salt. It limits foods that are high in saturated fat, such as meats, cheeses, and fried foods. It may be hard to change your diet, but even small changes can lower your risk of heart attack and heart disease. Follow-up care is a key part of your treatment and safety. Be sure to make and go to all appointments, and call your doctor if you are having problems. It's also a good idea to know your test results and keep a list of the medicines you take. How can you care for yourself at home? Watch your portions  · Learn what a serving is. A \"serving\" and a \"portion\" are not always the same thing. Make sure that you are not eating larger portions than are recommended. For example, a serving of pasta is ½ cup. A serving size of meat is 2 to 3 ounces. A 3-ounce serving is about the size of a deck of cards. Measure serving sizes until you are good at [place]" them. Keep in mind that restaurants often serve portions that are 2 or 3 times the size of one serving. · To keep your energy level up and keep you from feeling hungry, eat often but in smaller portions. · Eat only the number of calories you need to stay at a healthy weight. If you need to lose weight, eat fewer calories than your body burns (through exercise and other physical activity). Eat more fruits and vegetables  · Eat a variety of fruit and vegetables every day. Dark green, deep orange, red, or yellow fruits and vegetables are especially good for you. Examples include spinach, carrots, peaches, and berries. · Keep carrots, celery, and other veggies handy for snacks. Buy fruit that is in season and store it where you can see it so that you will be tempted to eat it. · Cook dishes that have a lot of veggies in them, such as stir-fries and soups.   Limit saturated and

## 2020-12-30 NOTE — ASSESSMENT & PLAN NOTE
Lose weight (if you are overweight)  Choose a diet low in fat and rich in fruits, vegetables, and low-fat dairy products  Reduce the amount of salt you eat  Do something active for at least 30 minutes a day on most days of the week  Cut down on alcohol (if you drink more than 2 alcoholic drinks per day)  It's also a good idea to get a home blood pressure meter. People who check   their own blood pressure at home do better at keeping it low and can sometimes   even reduce the amount of medicine they take.

## 2021-01-07 ENCOUNTER — TELEPHONE (OUTPATIENT)
Dept: INTERNAL MEDICINE | Age: 71
End: 2021-01-07

## 2021-01-07 DIAGNOSIS — R19.5 GUAIAC POSITIVE STOOLS: Primary | ICD-10-CM

## 2021-01-07 LAB
HEMOCCULT STL QL: ABNORMAL

## 2021-01-07 NOTE — TELEPHONE ENCOUNTER
Pls send a letter, reading his abnormal stool test, to contact us. Pls ask Esther to send it registered, return receipt.  Thanks

## 2021-01-07 NOTE — TELEPHONE ENCOUNTER
I have attempted to call this patient several times today. His number keeps going to a message that he has no VM set up. Called the patients contact it went straight to .  for them to have the pt call us.

## 2021-01-22 ENCOUNTER — OFFICE VISIT (OUTPATIENT)
Dept: CARDIOLOGY CLINIC | Age: 71
End: 2021-01-22
Payer: MEDICARE

## 2021-01-22 VITALS
HEART RATE: 61 BPM | OXYGEN SATURATION: 98 % | HEIGHT: 72 IN | DIASTOLIC BLOOD PRESSURE: 82 MMHG | SYSTOLIC BLOOD PRESSURE: 134 MMHG | WEIGHT: 188 LBS | BODY MASS INDEX: 25.47 KG/M2

## 2021-01-22 DIAGNOSIS — I42.8 NONISCHEMIC CARDIOMYOPATHY (HCC): Primary | ICD-10-CM

## 2021-01-22 DIAGNOSIS — I50.22 CHRONIC SYSTOLIC HEART FAILURE (HCC): ICD-10-CM

## 2021-01-22 DIAGNOSIS — I48.0 PAROXYSMAL ATRIAL FIBRILLATION (HCC): ICD-10-CM

## 2021-01-22 DIAGNOSIS — E78.2 MIXED HYPERLIPIDEMIA: ICD-10-CM

## 2021-01-22 DIAGNOSIS — I10 ESSENTIAL HYPERTENSION: ICD-10-CM

## 2021-01-22 PROCEDURE — 3017F COLORECTAL CA SCREEN DOC REV: CPT | Performed by: INTERNAL MEDICINE

## 2021-01-22 PROCEDURE — G8417 CALC BMI ABV UP PARAM F/U: HCPCS | Performed by: INTERNAL MEDICINE

## 2021-01-22 PROCEDURE — 1036F TOBACCO NON-USER: CPT | Performed by: INTERNAL MEDICINE

## 2021-01-22 PROCEDURE — G8427 DOCREV CUR MEDS BY ELIG CLIN: HCPCS | Performed by: INTERNAL MEDICINE

## 2021-01-22 PROCEDURE — 93000 ELECTROCARDIOGRAM COMPLETE: CPT | Performed by: INTERNAL MEDICINE

## 2021-01-22 PROCEDURE — 99213 OFFICE O/P EST LOW 20 MIN: CPT | Performed by: INTERNAL MEDICINE

## 2021-01-22 PROCEDURE — 4040F PNEUMOC VAC/ADMIN/RCVD: CPT | Performed by: INTERNAL MEDICINE

## 2021-01-22 PROCEDURE — 1123F ACP DISCUSS/DSCN MKR DOCD: CPT | Performed by: INTERNAL MEDICINE

## 2021-01-22 PROCEDURE — G8484 FLU IMMUNIZE NO ADMIN: HCPCS | Performed by: INTERNAL MEDICINE

## 2021-01-22 RX ORDER — LISINOPRIL 10 MG/1
10 TABLET ORAL DAILY
Qty: 30 TABLET | Refills: 3 | Status: SHIPPED | OUTPATIENT
Start: 2021-01-22 | End: 2021-04-22

## 2021-01-22 ASSESSMENT — ENCOUNTER SYMPTOMS
ANAL BLEEDING: 0
COUGH: 0
BLOOD IN STOOL: 0
SHORTNESS OF BREATH: 1

## 2021-01-22 NOTE — PROGRESS NOTES
Office Visit  Faith Arzate is a 78 y.o. male; who present today for 3 Month Follow-Up (No Cardiac Sx)      HPI  I am seeing this 68-year-old white male in routine follow-up regarding nonischemic cardiomyopathy and history of atrial fibrillation. Since his last visit he has been doing well. He needs a walker to ambulate because of hip pain and therefore his activities are limited but he has not had any chest, throat, jaw or arm pain. He gets some dyspnea with activity that is low level and doing quite well. There has been no orthopnea or significant lower extremity edema and he denies any palpitations no presyncope or syncope. As previously noted, a follow-up echocardiogram demonstrated significant improvement in LV systolic function with ejection fraction 40%. Current Outpatient Medications   Medication Sig Dispense Refill    lisinopril (PRINIVIL;ZESTRIL) 10 MG tablet Take 1 tablet by mouth daily 30 tablet 3    apixaban (ELIQUIS) 5 MG TABS tablet Take 1 tablet by mouth 2 times daily 60 tablet 0    atorvastatin (LIPITOR) 40 MG tablet Take 1 tablet by mouth daily 30 tablet 3    metoprolol succinate (TOPROL XL) 25 MG extended release tablet Take 0.5 tablets by mouth daily 30 tablet 3    iron polysaccharides (NIFEREX) 150 MG capsule Take 1 capsule by mouth daily 60 capsule 3    levothyroxine (SYNTHROID) 125 MCG tablet Take 1 tablet by mouth Daily 30 tablet 3    pantoprazole (PROTONIX) 40 MG tablet Take 1 tablet by mouth every morning (before breakfast) 30 tablet 3    vitamin D (ERGOCALCIFEROL) 1.25 MG (93749 UT) CAPS capsule Take 1 capsule by mouth once a week 12 capsule 1    amiodarone (CORDARONE) 200 MG tablet Take 1 tablet by mouth daily 30 tablet 3     No current facility-administered medications for this visit.        Medications Discontinued During This Encounter   Medication Reason    lisinopril (PRINIVIL;ZESTRIL) 5 MG tablet         No Known Allergies    Past Medical History: Diagnosis Date    CAD (coronary artery disease)     Gout     Hypertension     Non-ST elevation MI (NSTEMI) ([facility] [address] 14    Palliative care patient 2020    Pneumonia due to infectious organism 2020     Negative - Past Medical History for  No past medical history pertinent negatives. Past Surgical History:   Procedure Laterality Date    CARDIAC CATHETERIZATION  14  [facility]    angioplasty, stent to LAD. EF 45%    CHOLECYSTECTOMY      FEMUR FRACTURE SURGERY Right 10/30/2020    TFN, SHORT performed by Ena Wasserman MD at [address] Not on file   Tobacco Use    Smoking status: Former Smoker     Packs/day: 1.00     Years: 3.00     Pack years: 3.00     Types: Cigars     Start date:      Quit date:      Years since quittin.0    Smokeless tobacco: Never Used   Substance and Sexual Activity    Alcohol use: Not Currently     Frequency: 4 or more times a week     Drinks per session: 1 or 2     Binge frequency: Daily or almost daily    Drug use: No    Sexual activity: Yes     Partners: Female        Family History   Problem Relation Age of Onset    No Known Problems Mother     Heart Disease Father        Review of Systems  Review of Systems   Constitutional: Negative for fatigue and fever. Respiratory: Positive for shortness of breath. Negative for cough. Cardiovascular: Negative for chest pain, palpitations and leg swelling. Gastrointestinal: Negative for anal bleeding and blood in stool. Neurological: Negative. Physical Exam  /82   Pulse 61   Ht 6' (1.829 m)   Wt 188 lb (85.3 kg)   SpO2 98%   BMI 25.50 kg/m²    Physical Exam  Constitutional:       Appearance: Normal appearance. He is normal weight. Cardiovascular:      Rate and Rhythm: Normal rate and regular rhythm. Heart sounds: Normal heart sounds. No gallop.     Pulmonary:      Effort: Pulmonary effort is normal.  lisinopril (PRINIVIL;ZESTRIL) 10 MG tablet     Sig: Take 1 tablet by mouth daily     Dispense:  30 tablet     Refill:  3     Orders Placed This Encounter   Procedures    EKG 12 lead     Order Specific Question:   Reason for Exam?     Answer:   Irregular heart rate       Return in about 3 months (around 4/22/2021) for me, med titration.

## 2021-02-12 ENCOUNTER — TELEPHONE (OUTPATIENT)
Dept: CARDIOLOGY CLINIC | Age: 71
End: 2021-02-12

## 2021-02-12 NOTE — TELEPHONE ENCOUNTER
Pallavi with [employer] called stated she faxed over an NGS panel fax requested by the patient for genetic testing for cardiomyopathy. She wanted to know if you received this and what the status is.  She can be reached at [phone]

## 2021-02-12 NOTE — TELEPHONE ENCOUNTER
Per Dr. Greg Varghese; did not see any medical reasoning for the testing unless the PT absolutely wanted it. Discussed this with the Pt, Pt stated that he did not want the testing done, that the company called him requesting and that he did not feel like he needed it done either. Spoke with [place] about what the testing was and what needed to be done. Will explain to Dr. Greg Varghese and proceed how he chooses.

## 2021-03-16 RX ORDER — PANTOPRAZOLE SODIUM 40 MG/1
TABLET, DELAYED RELEASE ORAL
Qty: 30 TABLET | Refills: 5 | Status: SHIPPED | OUTPATIENT
Start: 2021-03-16 | End: 2021-12-06

## 2021-04-22 ENCOUNTER — OFFICE VISIT (OUTPATIENT)
Dept: CARDIOLOGY CLINIC | Age: 71
End: 2021-04-22
Payer: MEDICARE

## 2021-04-22 VITALS
DIASTOLIC BLOOD PRESSURE: 78 MMHG | SYSTOLIC BLOOD PRESSURE: 124 MMHG | WEIGHT: 203 LBS | HEIGHT: 72 IN | OXYGEN SATURATION: 99 % | BODY MASS INDEX: 27.5 KG/M2 | HEART RATE: 80 BPM

## 2021-04-22 DIAGNOSIS — I48.0 PAROXYSMAL ATRIAL FIBRILLATION (HCC): ICD-10-CM

## 2021-04-22 DIAGNOSIS — I10 ESSENTIAL HYPERTENSION: ICD-10-CM

## 2021-04-22 DIAGNOSIS — I42.8 NONISCHEMIC CARDIOMYOPATHY (HCC): Primary | ICD-10-CM

## 2021-04-22 DIAGNOSIS — I50.22 CHRONIC SYSTOLIC HEART FAILURE (HCC): ICD-10-CM

## 2021-04-22 DIAGNOSIS — E78.2 MIXED HYPERLIPIDEMIA: ICD-10-CM

## 2021-04-22 PROCEDURE — 99214 OFFICE O/P EST MOD 30 MIN: CPT | Performed by: INTERNAL MEDICINE

## 2021-04-22 PROCEDURE — 4040F PNEUMOC VAC/ADMIN/RCVD: CPT | Performed by: INTERNAL MEDICINE

## 2021-04-22 PROCEDURE — G8417 CALC BMI ABV UP PARAM F/U: HCPCS | Performed by: INTERNAL MEDICINE

## 2021-04-22 PROCEDURE — G8427 DOCREV CUR MEDS BY ELIG CLIN: HCPCS | Performed by: INTERNAL MEDICINE

## 2021-04-22 PROCEDURE — 93000 ELECTROCARDIOGRAM COMPLETE: CPT | Performed by: INTERNAL MEDICINE

## 2021-04-22 PROCEDURE — 1123F ACP DISCUSS/DSCN MKR DOCD: CPT | Performed by: INTERNAL MEDICINE

## 2021-04-22 PROCEDURE — 1036F TOBACCO NON-USER: CPT | Performed by: INTERNAL MEDICINE

## 2021-04-22 PROCEDURE — 3017F COLORECTAL CA SCREEN DOC REV: CPT | Performed by: INTERNAL MEDICINE

## 2021-04-22 RX ORDER — ASPIRIN 81 MG/1
81 TABLET ORAL DAILY
Status: ON HOLD | COMMUNITY
End: 2021-08-23 | Stop reason: HOSPADM

## 2021-04-22 RX ORDER — LISINOPRIL 20 MG/1
20 TABLET ORAL DAILY
Qty: 30 TABLET | Refills: 5 | Status: SHIPPED | OUTPATIENT
Start: 2021-04-22 | End: 2021-07-22

## 2021-04-22 ASSESSMENT — ENCOUNTER SYMPTOMS
SHORTNESS OF BREATH: 1
COUGH: 0
BLOOD IN STOOL: 0
ANAL BLEEDING: 0

## 2021-04-22 NOTE — PROGRESS NOTES
Office Visit  Lexi Lockhart is a 78 y.o. male; who present today for 3 Month Follow-Up      HPI  I am seeing this 72-year-old white male in routine office follow-up primarily regarding nonischemic cardiomyopathy also has a history of hypertension and chronic systolic congestive heart failure. At the time of his last visit I increased lisinopril in an effort of medication titration to 10 mg from 5 mg daily. He is done very well with this transition and although he has not checked his blood pressure regularly at home he states that it has been normal when checked. He has not developed any orthostasis or symptoms of low blood pressure or any other side effects. He uses a walker to ambulate and he has not had any chest discomfort. He gets some exertional dyspnea with heavy activities but this has been comfortable with his low levels of activity. He denies any palpitations no presyncope or syncope.   Current Outpatient Medications   Medication Sig Dispense Refill    aspirin 81 MG EC tablet Take 81 mg by mouth daily      lisinopril (PRINIVIL;ZESTRIL) 20 MG tablet Take 1 tablet by mouth daily 30 tablet 5    pantoprazole (PROTONIX) 40 MG tablet TAKE (1) TABLET DAILY IN THE MORNING (BEFORE BREAKFAST) 30 tablet 5    apixaban (ELIQUIS) 5 MG TABS tablet Take 1 tablet by mouth 2 times daily 60 tablet 5    atorvastatin (LIPITOR) 40 MG tablet Take 1 tablet by mouth daily 30 tablet 3    metoprolol succinate (TOPROL XL) 25 MG extended release tablet Take 0.5 tablets by mouth daily 30 tablet 3    iron polysaccharides (NIFEREX) 150 MG capsule Take 1 capsule by mouth daily 60 capsule 3    levothyroxine (SYNTHROID) 125 MCG tablet Take 1 tablet by mouth Daily 30 tablet 3    amiodarone (CORDARONE) 200 MG tablet Take 1 tablet by mouth daily 30 tablet 3    vitamin D (ERGOCALCIFEROL) 1.25 MG (14673 UT) CAPS capsule Take 1 capsule by mouth once a week (Patient not taking: Reported on 4/22/2021) 12 capsule 1     No current facility-administered medications for this visit. Medications Discontinued During This Encounter   Medication Reason    lisinopril (PRINIVIL;ZESTRIL) 10 MG tablet         No Known Allergies    Past Medical History:   Diagnosis Date    CAD (coronary artery disease)     Gout     Hypertension     Non-ST elevation MI (NSTEMI) ([facility] [address] 14    Palliative care patient 2020    Pneumonia due to infectious organism 2020     Negative - Past Medical History for  No past medical history pertinent negatives. Past Surgical History:   Procedure Laterality Date    CARDIAC CATHETERIZATION  14  [facility]    angioplasty, stent to LAD. EF 45%    CHOLECYSTECTOMY      FEMUR FRACTURE SURGERY Right 10/30/2020    TFN, SHORT performed by Mela Gupta MD at [address] Not on file   Tobacco Use    Smoking status: Former Smoker     Packs/day: 1.00     Years: 3.00     Pack years: 3.00     Types: Cigars     Start date:      Quit date:      Years since quittin.3    Smokeless tobacco: Never Used   Substance and Sexual Activity    Alcohol use: Not Currently     Frequency: 4 or more times a week     Drinks per session: 1 or 2     Binge frequency: Daily or almost daily    Drug use: No    Sexual activity: Yes     Partners: Female        Family History   Problem Relation Age of Onset    No Known Problems Mother     Heart Disease Father        Review of Systems  Review of Systems   Constitutional: Negative for fatigue and fever. Respiratory: Positive for shortness of breath. Negative for cough. Cardiovascular: Negative for chest pain, palpitations and leg swelling. Gastrointestinal: Negative for anal bleeding and blood in stool. Neurological: Negative. Physical Exam  /78   Pulse 80   Ht 6' (1.829 m)   Wt 203 lb (92.1 kg)   SpO2 99%   BMI 27.53 kg/m²    Physical Exam  Constitutional:       Appearance: Normal appearance.  He is normal weight. Cardiovascular:      Rate and Rhythm: Normal rate and regular rhythm. Heart sounds: Normal heart sounds. No gallop. Pulmonary:      Effort: Pulmonary effort is normal.      Breath sounds: Normal breath sounds. Abdominal:      General: Abdomen is flat. Bowel sounds are normal.      Palpations: Abdomen is soft. Musculoskeletal:         General: No swelling. Skin:     General: Skin is warm and dry. Neurological:      Mental Status: He is alert. Assessment/Plan    EKG Findings:  Sinus rhythm, left axis deviation    Problem List Items Addressed This Visit        Cardiology Problems    Essential hypertension    Chronic systolic heart failure (HCC)    Nonischemic cardiomyopathy (HCC) - Primary    Mixed hyperlipidemia    Relevant Medications    aspirin 81 MG EC tablet    lisinopril (PRINIVIL;ZESTRIL) 20 MG tablet    Paroxysmal atrial fibrillation (HCC)    Relevant Orders    EKG 12 lead (Completed)           Diagnosis Orders   1. Nonischemic cardiomyopathy ([address]      EF 5-10% by cath, June 2020, improved to 40% by echo, June 2020, [address]   2. Chronic systolic heart failure (HCC)     3. Paroxysmal atrial fibrillation (HCC)  EKG 12 lead    Presently sinus rhythm on amiodarone   4. Essential hypertension     5. Mixed hyperlipidemia         Recommendations:   Diet: Low-sodium, low-fat diet  Activity: Moderate activities  Medication Changes: Titrate lisinopril further to 20 mg daily, continue other medications as prescribed. As I indicated in my prior notes, I am hesitant to stop amiodarone because he was so sick in June 2020 when he had atrial fibrillation. He has TSH monitored regularly through primary care and is on levothyroxine. I will repeat his echocardiogram at the time of the next visit most likely. He needs to notify me if he is having side effects with the change in lisinopril.     Orders Placed This Encounter   Medications    lisinopril (PRINIVIL;ZESTRIL) 20 MG tablet     Sig: Take 1 tablet by mouth daily     Dispense:  30 tablet     Refill:  5     Orders Placed This Encounter   Procedures    EKG 12 lead     Order Specific Question:   Reason for Exam?     Answer:   Irregular heart rate       Return in about 3 months (around 7/22/2021) for me, routine.

## 2021-06-07 RX ORDER — LEVOTHYROXINE SODIUM 0.12 MG/1
TABLET ORAL
Qty: 30 TABLET | Refills: 0 | Status: SHIPPED | OUTPATIENT
Start: 2021-06-07 | End: 2021-06-28

## 2021-06-28 RX ORDER — LEVOTHYROXINE SODIUM 0.12 MG/1
TABLET ORAL
Qty: 30 TABLET | Refills: 0 | Status: SHIPPED | OUTPATIENT
Start: 2021-06-28 | End: 2021-08-02

## 2021-06-29 PROBLEM — I25.119 CORONARY ARTERY DISEASE INVOLVING NATIVE HEART WITH ANGINA PECTORIS, UNSPECIFIED VESSEL OR LESION TYPE (HCC): Status: ACTIVE | Noted: 2021-06-29

## 2021-06-30 ENCOUNTER — OFFICE VISIT (OUTPATIENT)
Dept: INTERNAL MEDICINE | Age: 71
End: 2021-06-30
Payer: MEDICARE

## 2021-06-30 VITALS
DIASTOLIC BLOOD PRESSURE: 80 MMHG | HEART RATE: 96 BPM | OXYGEN SATURATION: 99 % | SYSTOLIC BLOOD PRESSURE: 138 MMHG | BODY MASS INDEX: 27.77 KG/M2 | HEIGHT: 72 IN | WEIGHT: 205 LBS

## 2021-06-30 DIAGNOSIS — Z00.00 ROUTINE GENERAL MEDICAL EXAMINATION AT A HEALTH CARE FACILITY: ICD-10-CM

## 2021-06-30 DIAGNOSIS — M10.9 ACUTE GOUT OF RIGHT HAND, UNSPECIFIED CAUSE: ICD-10-CM

## 2021-06-30 DIAGNOSIS — E78.2 MIXED HYPERLIPIDEMIA: ICD-10-CM

## 2021-06-30 DIAGNOSIS — I25.119 CORONARY ARTERY DISEASE INVOLVING NATIVE HEART WITH ANGINA PECTORIS, UNSPECIFIED VESSEL OR LESION TYPE (HCC): Primary | ICD-10-CM

## 2021-06-30 DIAGNOSIS — I10 ESSENTIAL HYPERTENSION: ICD-10-CM

## 2021-06-30 DIAGNOSIS — K21.00 GASTROESOPHAGEAL REFLUX DISEASE WITH ESOPHAGITIS WITHOUT HEMORRHAGE: ICD-10-CM

## 2021-06-30 DIAGNOSIS — M10.9 ACUTE GOUT OF HAND, UNSPECIFIED CAUSE, UNSPECIFIED LATERALITY: ICD-10-CM

## 2021-06-30 DIAGNOSIS — I50.22 CHRONIC SYSTOLIC HEART FAILURE (HCC): ICD-10-CM

## 2021-06-30 DIAGNOSIS — R19.5 GUAIAC POSITIVE STOOLS: ICD-10-CM

## 2021-06-30 DIAGNOSIS — I25.10 CORONARY ARTERY DISEASE INVOLVING NATIVE CORONARY ARTERY OF NATIVE HEART WITHOUT ANGINA PECTORIS: ICD-10-CM

## 2021-06-30 DIAGNOSIS — I48.0 PAROXYSMAL ATRIAL FIBRILLATION (HCC): ICD-10-CM

## 2021-06-30 PROBLEM — I95.9 HYPOTENSION: Status: RESOLVED | Noted: 2020-06-23 | Resolved: 2021-06-30

## 2021-06-30 PROCEDURE — 1036F TOBACCO NON-USER: CPT | Performed by: INTERNAL MEDICINE

## 2021-06-30 PROCEDURE — 4040F PNEUMOC VAC/ADMIN/RCVD: CPT | Performed by: INTERNAL MEDICINE

## 2021-06-30 PROCEDURE — G8427 DOCREV CUR MEDS BY ELIG CLIN: HCPCS | Performed by: INTERNAL MEDICINE

## 2021-06-30 PROCEDURE — G8417 CALC BMI ABV UP PARAM F/U: HCPCS | Performed by: INTERNAL MEDICINE

## 2021-06-30 PROCEDURE — 1123F ACP DISCUSS/DSCN MKR DOCD: CPT | Performed by: INTERNAL MEDICINE

## 2021-06-30 PROCEDURE — 99213 OFFICE O/P EST LOW 20 MIN: CPT | Performed by: INTERNAL MEDICINE

## 2021-06-30 PROCEDURE — G0438 PPPS, INITIAL VISIT: HCPCS | Performed by: INTERNAL MEDICINE

## 2021-06-30 PROCEDURE — 3017F COLORECTAL CA SCREEN DOC REV: CPT | Performed by: INTERNAL MEDICINE

## 2021-06-30 RX ORDER — INDOMETHACIN 25 MG/1
25 CAPSULE ORAL EVERY 4 HOURS PRN
Qty: 30 CAPSULE | Refills: 1 | Status: ON HOLD
Start: 2021-06-30 | End: 2021-08-23 | Stop reason: HOSPADM

## 2021-06-30 RX ORDER — INDOMETHACIN 25 MG/1
25 CAPSULE ORAL EVERY 4 HOURS PRN
COMMUNITY
End: 2021-06-30 | Stop reason: SDUPTHER

## 2021-06-30 SDOH — ECONOMIC STABILITY: FOOD INSECURITY: WITHIN THE PAST 12 MONTHS, YOU WORRIED THAT YOUR FOOD WOULD RUN OUT BEFORE YOU GOT MONEY TO BUY MORE.: NEVER TRUE

## 2021-06-30 SDOH — ECONOMIC STABILITY: FOOD INSECURITY: WITHIN THE PAST 12 MONTHS, THE FOOD YOU BOUGHT JUST DIDN'T LAST AND YOU DIDN'T HAVE MONEY TO GET MORE.: NEVER TRUE

## 2021-06-30 ASSESSMENT — ENCOUNTER SYMPTOMS
CHEST TIGHTNESS: 0
WHEEZING: 0
STRIDOR: 0
BACK PAIN: 0
BLOOD IN STOOL: 0
COLOR CHANGE: 0
CONSTIPATION: 0
TROUBLE SWALLOWING: 0
COUGH: 0
ROS SKIN COMMENTS: DRY SKIN
ABDOMINAL PAIN: 0
DIARRHEA: 0
SORE THROAT: 0
SHORTNESS OF BREATH: 1

## 2021-06-30 ASSESSMENT — LIFESTYLE VARIABLES
HOW OFTEN DO YOU HAVE A DRINK CONTAINING ALCOHOL: 2
HOW OFTEN DURING THE LAST YEAR HAVE YOU BEEN UNABLE TO REMEMBER WHAT HAPPENED THE NIGHT BEFORE BECAUSE YOU HAD BEEN DRINKING: 0
HOW OFTEN DURING THE LAST YEAR HAVE YOU FAILED TO DO WHAT WAS NORMALLY EXPECTED FROM YOU BECAUSE OF DRINKING: 0
HOW OFTEN DURING THE LAST YEAR HAVE YOU NEEDED AN ALCOHOLIC DRINK FIRST THING IN THE MORNING TO GET YOURSELF GOING AFTER A NIGHT OF HEAVY DRINKING: 0
HOW MANY STANDARD DRINKS CONTAINING ALCOHOL DO YOU HAVE ON A TYPICAL DAY: 0
HOW OFTEN DURING THE LAST YEAR HAVE YOU FOUND THAT YOU WERE NOT ABLE TO STOP DRINKING ONCE YOU HAD STARTED: 0
HAS A RELATIVE, FRIEND, DOCTOR, OR ANOTHER HEALTH PROFESSIONAL EXPRESSED CONCERN ABOUT YOUR DRINKING OR SUGGESTED YOU CUT DOWN: 0
HAVE YOU OR SOMEONE ELSE BEEN INJURED AS A RESULT OF YOUR DRINKING: 0
HOW OFTEN DO YOU HAVE SIX OR MORE DRINKS ON ONE OCCASION: 0
HOW OFTEN DURING THE LAST YEAR HAVE YOU HAD A FEELING OF GUILT OR REMORSE AFTER DRINKING: 0
AUDIT-C TOTAL SCORE: 2
AUDIT TOTAL SCORE: 2

## 2021-06-30 ASSESSMENT — PATIENT HEALTH QUESTIONNAIRE - PHQ9
SUM OF ALL RESPONSES TO PHQ QUESTIONS 1-9: 0
SUM OF ALL RESPONSES TO PHQ9 QUESTIONS 1 & 2: 0
SUM OF ALL RESPONSES TO PHQ QUESTIONS 1-9: 0
2. FEELING DOWN, DEPRESSED OR HOPELESS: 0
SUM OF ALL RESPONSES TO PHQ QUESTIONS 1-9: 0
1. LITTLE INTEREST OR PLEASURE IN DOING THINGS: 0

## 2021-06-30 ASSESSMENT — SOCIAL DETERMINANTS OF HEALTH (SDOH): HOW HARD IS IT FOR YOU TO PAY FOR THE VERY BASICS LIKE FOOD, HOUSING, MEDICAL CARE, AND HEATING?: NOT HARD AT ALL

## 2021-06-30 NOTE — PATIENT INSTRUCTIONS
Advance Directives: Care Instructions  Overview  An advance directive is a legal way to state your wishes at the end of your life. It tells your family and your doctor what to do if you can't say what you want. There are two main types of advance directives. You can change them any time your wishes change. Living will. This form tells your family and your doctor your wishes about life support and other treatment. The form is also called a declaration. Medical power of . This form lets you name a person to make treatment decisions for you when you can't speak for yourself. This person is called a health care agent (health care proxy, health care surrogate). The form is also called a durable power of  for health care. If you do not have an advance directive, decisions about your medical care may be made by a family member, or by a doctor or a  who doesn't know you. It may help to think of an advance directive as a gift to the people who care for you. If you have one, they won't have to make tough decisions by themselves. Follow-up care is a key part of your treatment and safety. Be sure to make and go to all appointments, and call your doctor if you are having problems. It's also a good idea to know your test results and keep a list of the medicines you take. What should you include in an advance directive? Many states have a unique advance directive form. (It may ask you to address specific issues.) Or you might use a universal form that's approved by many states. If your form doesn't tell you what to address, it may be hard to know what to include in your advance directive. Use the questions below to help you get started. · Who do you want to make decisions about your medical care if you are not able to? · What life-support measures do you want if you have a serious illness that gets worse over time or can't be cured? · What are you most afraid of that might happen? (Maybe you're afraid of having pain, losing your independence, or being kept alive by machines.)  · Where would you prefer to die? (Your home? A hospital? A nursing home?)  · Do you want to donate your organs when you die? · Do you want certain Adventist practices performed before you die? When should you call for help? Be sure to contact your doctor if you have any questions. Where can you learn more? Go to https://[email or web address].[email or web address] org and sign in to your [employer] [place] account. Enter R264 in the [employer] box to learn more about \"Advance Directives: Care Instructions. \"     If you do not have an account, please click on the \"Sign Up Now\" link. Current as of: March 17, 2021               Content Version: 12.9  © 2006-2021 Healthwise, Filecubed. Care instructions adapted under license by [facility] ([facility]). If you have questions about a medical condition or this instruction, always ask your healthcare professional. [place] [address] any warranty or liability for your use of this information. Learning About Medical Power of   What is a medical power of ? A medical power of , also called a durable power of  for health care, is one type of the legal forms called advance directives. It lets you name the person you want to make treatment decisions for you if you can't speak or decide for yourself. The person you choose is called your health care agent. This person is also called a health care proxy or health care surrogate. A medical power of  may be called something else in your state. How do you choose a health care agent? Choose your health care agent carefully. This person may or may not be a family member. Talk to the person before you make your final decision. Make sure he or she is comfortable with this responsibility. It's a good idea to choose someone who:  · Is at least 25years old.   · Knows you well and you learn more? Go to https://[email or web address].[email or web address] org and sign in to your [employer] account. Enter [id number] in the [facility] box to learn more about \"Learning About [address]. \"     If you do not have an account, please click on the \"Sign Up Now\" link. Current as of: March 17, 2021               Content Version: 12.9  © 2006-2021 Healthwise, Dwllr. Care instructions adapted under license by [facility] ([facility]). If you have questions about a medical condition or this instruction, always ask your healthcare professional. [address] any warranty or liability for your use of this information. Learning About Henry Tinsley  What is a living will? A living will, also called a declaration, is a legal form. It tells your family and your doctor your wishes when you can't speak for yourself. It's used by the health professionals who will treat you as you near the end of your life or if you get seriously hurt or ill. If you put your wishes in writing, your loved ones and others will know what kind of care you want. They won't need to guess. This can ease your mind and be helpful to others. And you can change or cancel your living will at any time. A living will is not the same as an estate or property will. An estate will explains what you want to happen with your money and property after you die. How do you use it? A living will is used to describe the kinds of treatment or life support you want as you near the end of your life or if you get seriously hurt or ill. Keep these facts in mind about living crowe. · Your living will is used only if you can't speak or make decisions for yourself. Most often, one or more doctors must certify that you can't speak or decide for yourself before your living will takes effect. · If you get better and can speak for yourself again, you can accept or refuse any treatment.  It doesn't matter what you said in your living will. · Some states may limit your right to refuse treatment in certain cases. For example, you may need to clearly state in your living will that you don't want artificial hydration and nutrition, such as being fed through a tube. Is a living will a legal document? A living will is a legal document. Each state has its own laws about living crowe. And a living will may be called something else in your state. Here are some things to know about living crowe. · You don't need an  to complete a living will. But legal advice can be helpful if your state's laws are unclear. It can also help if your health history is complicated or your family can't agree on what should be in your living will. · You can change your living will at any time. Some people find that their wishes about end-of-life care change as their health changes. If you make big changes to your living will, complete a new form. · If you move to another state, make sure that your living will is legal in the state where you now live. In most cases, doctors will respect your wishes even if you have a form from a different state. · You might use a universal form that has been approved by many states. This kind of form can sometimes be filled out and stored online. Your digital copy will then be available wherever you have a connection to the internet. The doctors and nurses who need to treat you can find it right away. · Your state may offer an online registry. This is another place where you can store your living will online. · It's a good idea to get your living will notarized. This means using a person called a  to watch two people sign, or witness, your living will. What should you know when you create a living will? Here are some questions to ask yourself as you make your living will:  · Do you know enough about life support methods that might be used?  If not, talk to your doctor so you know what might be done if you can't breathe on your own, your heart stops, or you can't swallow. · What things would you still want to be able to do after you receive life-support methods? Would you want to be able to walk? To speak? To eat on your own? To live without the help of machines? · Do you want certain Mandaen practices performed if you become very ill? · If you have a choice, where do you want to be cared for? In your home? At a hospital or nursing home? · If you have a choice at the end of your life, where would you prefer to die? At home? In a hospital or nursing home? Somewhere else? · Would you prefer to be buried or cremated? · Do you want your organs to be donated after you die? What should you do with your living will? · Make sure that your family members and your health care agent have copies of your living will (also called a declaration). · Give your doctor a copy of your living will. Ask him or her to keep it as part of your medical record. If you have more than one doctor, make sure that each one has a copy. · Put a copy of your living will where it can be easily found. For example, some people may put a copy on their refrigerator door. If you are using a digital copy, be sure your doctor, family members, and health care agent know how to find and access it. Where can you learn more? Go to https://[email or web address] [email or web address].[email or web address] org and sign in to your [employer] account. Enter F436 in the [facility] box to learn more about \"Learning About Living Nataly Cabrera. \"     If you do not have an account, please click on the \"Sign Up Now\" link. Current as of: March 17, 2021               Content Version: 12.9  © 6480-1118 Healthwise, Incorporated. Care instructions adapted under license by [facility] ([facility]).  If you have questions about a medical condition or this instruction, always ask your healthcare professional. [address] any warranty or liability for your use of this information. Personalized Preventive Plan for Devyn Garcia - 6/30/2021  Medicare offers a range of preventive health benefits. Some of the tests and screenings are paid in full while other may be subject to a deductible, co-insurance, and/or copay. Some of these benefits include a comprehensive review of your medical history including lifestyle, illnesses that may run in your family, and various assessments and screenings as appropriate. After reviewing your medical record and screening and assessments performed today your provider may have ordered immunizations, labs, imaging, and/or referrals for you. A list of these orders (if applicable) as well as your Preventive Care list are included within your After Visit Summary for your review. Other Preventive Recommendations:    · A preventive eye exam performed by an eye specialist is recommended every 1-2 years to screen for glaucoma; cataracts, macular degeneration, and other eye disorders. · A preventive dental visit is recommended every 6 months. · Try to get at least 150 minutes of exercise per week or 10,000 steps per day on a pedometer . · Order or download the FREE \"Exercise & Physical Activity: Your Everyday Guide\" from [employer]. Call 5-450.851.6930 or search [employer] online. · You need 5900-7668 mg of calcium and 9761-1237 IU of vitamin D per day. It is possible to meet your calcium requirement with diet alone, but a vitamin D supplement is usually necessary to meet this goal.  · When exposed to the sun, use a sunscreen that protects against both UVA and UVB radiation with an SPF of 30 or greater. Reapply every 2 to 3 hours or after sweating, drying off with a towel, or swimming. · Always wear a seat belt when traveling in a car. Always wear a helmet when riding a bicycle or motorcycle.

## 2021-06-30 NOTE — PROGRESS NOTES
Kulwinder Spence ( 1950) is a 78 y.o. male,  Established , here for evaluation of the following chief complaint(s).   Medicare AW        ASSESSMENT/PLAN:  Problem List        Circulatory    CAD (coronary artery disease)      Monitored by specialist- no acute findings meriting change in the plan         Relevant Medications    amiodarone (CORDARONE) 200 MG tablet    atorvastatin (LIPITOR) 40 MG tablet    metoprolol succinate (TOPROL XL) 25 MG extended release tablet    apixaban (ELIQUIS) 5 MG TABS tablet    aspirin 81 MG EC tablet    lisinopril (PRINIVIL;ZESTRIL) 20 MG tablet    Chronic systolic heart failure (HCC)      Monitored by specialist- no acute findings meriting change in the plan         Coronary artery disease involving native heart with angina pectoris, unspecified vessel or lesion type ([facility] [address] - Primary      Monitored by specialist- no acute findings meriting change in the plan         Relevant Medications    amiodarone (CORDARONE) 200 MG tablet    atorvastatin (LIPITOR) 40 MG tablet    metoprolol succinate (TOPROL XL) 25 MG extended release tablet    apixaban (ELIQUIS) 5 MG TABS tablet    aspirin 81 MG EC tablet    lisinopril (PRINIVIL;ZESTRIL) 20 MG tablet    Essential hypertension      Well-controlled, continue current medications, medication adherence emphasized and lifestyle modifications recommended         Paroxysmal atrial fibrillation ([facility] [address]      Monitored by specialist- no acute findings meriting change in the plan            Digestive    GERD (gastroesophageal reflux disease)      Asymptomatic, continue current medications, medication adherence emphasized and lifestyle modifications recommended         Relevant Medications    pantoprazole (PROTONIX) 40 MG tablet       Musculoskeletal and Integument    Acute gout of hand      Uncontrolled, continue current medications and discussed colchicine, he only wants indomethacin         Relevant Medications    aspirin 81 MG EC tablet    indomethacin (INDOCIN) 25 MG capsule    Other Relevant Orders    Uric Acid (Completed)       Other    Guaiac positive stools      Uncontrolled, continue current medications and REFUSES to see GI         Mixed hyperlipidemia      Unclear control, changes made today: repeat lab work and medication adherence emphasized         Relevant Medications    amiodarone (CORDARONE) 200 MG tablet    atorvastatin (LIPITOR) 40 MG tablet    metoprolol succinate (TOPROL XL) 25 MG extended release tablet    apixaban (ELIQUIS) 5 MG TABS tablet    aspirin 81 MG EC tablet    lisinopril (PRINIVIL;ZESTRIL) 20 MG tablet          Results for orders placed or performed in visit on 01/07/21   POCT occult blood stool   Result Value Ref Range    OCCULT BLOOD FECAL post     OCCULT BLOOD FECAL neg     OCCULT BLOOD FECAL neg        Return in about 6 months (around 12/30/2021). HPI  30-year-old male who follow-ups  History of CAD status post PCI 2 vessel disease lost to follow-up with cardiology has an appointment coming up he offers no complaints of chest pain he uses a walker to get around otherwise he has limited ability to walk around he gets short of breath no leg swelling no chest pain no shortness of breath  Patient has a history of GERD compliant with pantoprazole doing well  Hypertension in good control  Paroxysmal atrial fibrillation on anticoagulation  History of a fall with nondisplaced fracture of the right femur much improved at this time  He offers no complaints today no constipation no blood in stool he is due for colonoscopy but wants a stool card does not want to have a colonoscopy      Review of Systems   Constitutional: Negative for activity change, appetite change, chills, diaphoresis, fatigue and fever. HENT: Negative for congestion, hearing loss, postnasal drip, sore throat, tinnitus and trouble swallowing. Eyes: Negative for visual disturbance. Respiratory: Positive for shortness of breath.  Negative for cough, chest tightness, wheezing and stridor. Cardiovascular: Negative for chest pain, palpitations and leg swelling. Gastrointestinal: Negative for abdominal pain, blood in stool, constipation and diarrhea. Endocrine: Negative for cold intolerance. Genitourinary: Negative for frequency. Musculoskeletal: Positive for arthralgias and joint swelling (R hand). Negative for back pain. Skin: Negative for color change, rash and wound. Dry skin   Allergic/Immunologic: Negative for environmental allergies. Neurological: Negative for dizziness, light-headedness and headaches. Hematological: Negative for adenopathy. Does not bruise/bleed easily. Psychiatric/Behavioral: Negative for decreased concentration, dysphoric mood and sleep disturbance. The patient is not nervous/anxious. Physical Exam  Constitutional:       Appearance: He is well-groomed. Comments: Walks with a walker   HENT:      Head: Normocephalic. Eyes:      General: Lids are normal.      Extraocular Movements: Extraocular movements intact. Conjunctiva/sclera: Conjunctivae normal.   Cardiovascular:      Rate and Rhythm: Normal rate. Rhythm irregular. Pulses: Normal pulses. Heart sounds: Normal heart sounds. Pulmonary:      Effort: Pulmonary effort is normal. No respiratory distress. Breath sounds: Normal breath sounds. No rhonchi or rales. Chest:      Chest wall: No tenderness. Abdominal:      General: Abdomen is flat. Bowel sounds are normal.      Palpations: Abdomen is soft. There is no mass. Tenderness: There is no abdominal tenderness. Musculoskeletal:         General: Normal range of motion. Right hand: Swelling and deformity present. Left hand: Deformity present. Cervical back: Normal range of motion. Skin:     General: Skin is warm and dry. Neurological:      General: No focal deficit present. Mental Status: He is alert and oriented to person, place, and time.  Mental status is at baseline. Psychiatric:         Mood and Affect: Mood normal.           (Time Documentation Optional [id number])    An electronic signaturewaas used to authenticate this note  -Maria Guadalupe Suarez MD on 2021 at 11:27 AM   Medicare Annual Wellness Visit  Name: Russ Christie Date: 2021   MRN: [id number] Sex: Male   Age: 78 y.o. Ethnicity: Non-/Non    : 1950 Race: Cris Borrero is here for Medicare AWV    Screenings for behavioral, psychosocial and functional/safety risks, and cognitive dysfunction are all negative except as indicated below. These results, as well as other patient data from the [address] form, are documented in Flowsheets linked to this Encounter. No Known Allergies    Prior to Visit Medications    Medication Sig Taking?  Authorizing Provider   indomethacin (INDOCIN) 25 MG capsule Take 1 capsule by mouth every 4 hours as needed for Pain Yes Ramona Smith MD   levothyroxine (SYNTHROID) 125 MCG tablet TAKE 1 TABLET BY MOUTH ONCE DAILY Yes Ramona Smith MD   aspirin 81 MG EC tablet Take 81 mg by mouth daily Yes Historical Provider, MD   lisinopril (PRINIVIL;ZESTRIL) 20 MG tablet Take 1 tablet by mouth daily Yes Dami pat Kaufman DO   pantoprazole (PROTONIX) 40 MG tablet TAKE (1) TABLET DAILY IN THE MORNING (BEFORE BREAKFAST) Yes ADY Peters   apixaban (ELIQUIS) 5 MG TABS tablet Take 1 tablet by mouth 2 times daily Yes ADY Aquino - NP   atorvastatin (LIPITOR) 40 MG tablet Take 1 tablet by mouth daily Yes Hardy Clark MD   metoprolol succinate (TOPROL XL) 25 MG extended release tablet Take 0.5 tablets by mouth daily Yes Hardy Clark MD   iron polysaccharides (NIFEREX) 150 MG capsule Take 1 capsule by mouth daily Yes Hardy Clark MD   amiodarone (CORDARONE) 200 MG tablet Take 1 tablet by mouth daily Yes ADY Jacinto       Past Medical History:   Diagnosis Date    CAD (coronary artery disease)  Gout     Hypertension     Hypotension 6/23/2020    Non-ST elevation MI (NSTEMI) ([facility] [address] 12/28/14    Palliative care patient 06/29/2020    Pneumonia due to infectious organism 6/24/2020       Past Surgical History:   Procedure Laterality Date    CARDIAC CATHETERIZATION  12/29/14  [facility]    angioplasty, stent to LAD. EF 45%    CHOLECYSTECTOMY      FEMUR FRACTURE SURGERY Right 10/30/2020    TFN, SHORT performed by Judy Vang MD at [facility] OR       Family History   Problem Relation Age of Onset    No Known Problems Mother     Heart Disease Father        CareTeam (Including outside providers/suppliers regularly involved in providing care):   Patient Care Team:  Anna Nova MD as PCP - General (Internal Medicine)  Anna Nova MD as PCP [facility]  Bill Gleason DO as Consulting Physician (Cardiology)    Wt Readings from Last 3 Encounters:   06/30/21 205 lb (93 kg)   04/22/21 203 lb (92.1 kg)   01/22/21 188 lb (85.3 kg)     Vitals:    06/30/21 0958   BP: 138/80   Site: Right Upper Arm   Position: Sitting   Cuff Size: Medium Adult   Pulse: 96   SpO2: 99%   Weight: 205 lb (93 kg)   Height: 6' (1.829 m)     Body mass index is 27.8 kg/m². Based upon direct observation of the patient, evaluation of cognition reveals recent and remote memory intact. Patient's complete Health Risk Assessment and screening values have been reviewed and are found in Flowsheets. The following problems were reviewed today and where indicated follow up appointments were made and/or referrals ordered. Positive Risk Factor Screenings with Interventions:          General Health and ACP:  General  In general, how would you say your health is?: Very Good  In the past 7 days, have you experienced any of the following?  New or Increased Pain, New or Increased Fatigue, Loneliness, Social Isolation, Stress or Anger?: None of These  Do you get the social and emotional support that you need?: Yes  Do you have a Living Will?: (!) No  Advance Directives     Power of Cathy Oleary [place] ACP-Advance Directive ACP-Power of     Not on File Not on File Filed Not on File      General Health Risk Interventions:  · No Living Will: Advance Care Planning addressed with patient today    Health Habits/Nutrition:  Health Habits/Nutrition  Do you exercise for at least 20 minutes 2-3 times per week?: (!) No  Have you lost any weight without trying in the past 3 months?: No  Do you eat only one meal per day?: No  Have you seen the dentist within the past year?: (!) No  Body mass index: (!) 27.8  Health Habits/Nutrition Interventions:  · Inadequate physical activity:  patient is not ready to increase his/her physical activity level at this time    Hearing/Vision:  No exam data present  Hearing/Vision  Do you or your family notice any trouble with your hearing that hasn't been managed with hearing aids?: No  Do you have difficulty driving, watching TV, or doing any of your daily activities because of your eyesight?: No  Have you had an eye exam within the past year?: (!) No  Hearing/Vision Interventions:  · none    Safety:  Safety  Do you have working smoke detectors?: Yes  Have all throw rugs been removed or fastened?: (!) No  Do you have non-slip mats or surfaces in all bathtubs/showers?: Yes  Do all of your stairways have a railing or banister?: (!) No  Are your doorways, halls and stairs free of clutter?: Yes  Do you always fasten your seatbelt when you are in a car?: Yes  Safety Interventions:  · Home safety tips provided     Personalized Preventive Plan   Current Health Maintenance Status  Immunization History   Administered Date(s) Administered    COVID-19, Moderna, PF, 100mcg/0.5mL 03/31/2021, 04/28/2021    Pneumococcal Polysaccharide ([id number]) 10/02/2020    Tdap (Boostrix, Adacel) 10/30/2020        Health Maintenance   Topic Date Due    Annual Wellness Visit (AWV)  Never done    Flu vaccine (Season Ended) 10/02/2021 (Originally 9/1/2021)    Shingles Vaccine (1 of 2) 10/02/2021 (Originally 8/9/2002)    TSH testing  10/31/2021    Colon Cancer Screen FIT/FOBT  01/07/2022    Potassium monitoring  06/30/2022    Creatinine monitoring  06/30/2022    DTaP/Tdap/Td vaccine (2 - Td or Tdap) 10/30/2030    Pneumococcal 65+ years Vaccine  Completed    COVID-19 Vaccine  Completed    Hepatitis A vaccine  Aged Out    Hepatitis B vaccine  Aged Out    Hib vaccine  Aged Out    Meningococcal (ACWY) vaccine  Aged Out     Recommendations for [employer] Due: see orders and patient instructions/AVS.  . Recommended screening schedule for the next 5-10 years is provided to the patient in written form: see Patient Instructions/AVS.    Loren Mania was seen today for medicare awv. Diagnoses and all orders for this visit:    Coronary artery disease involving native heart with angina pectoris, unspecified vessel or lesion type (HCC)    Guaiac positive stools    Acute gout of hand, unspecified cause, unspecified laterality  -     indomethacin (INDOCIN) 25 MG capsule; Take 1 capsule by mouth every 4 hours as needed for Pain  -     Uric Acid;  Future    Coronary artery disease involving native coronary artery of native heart without angina pectoris    Chronic systolic heart failure (HCC)    Essential hypertension    Paroxysmal atrial fibrillation (HCC)    Gastroesophageal reflux disease with esophagitis without hemorrhage    Mixed hyperlipidemia    Acute gout of right hand, unspecified cause

## 2021-07-13 ENCOUNTER — TELEPHONE (OUTPATIENT)
Dept: INTERNAL MEDICINE | Age: 71
End: 2021-07-13

## 2021-07-20 DIAGNOSIS — D62 POSTOPERATIVE ANEMIA DUE TO ACUTE BLOOD LOSS: Primary | ICD-10-CM

## 2021-07-22 ENCOUNTER — OFFICE VISIT (OUTPATIENT)
Dept: CARDIOLOGY CLINIC | Age: 71
End: 2021-07-22
Payer: MEDICARE

## 2021-07-22 VITALS
DIASTOLIC BLOOD PRESSURE: 78 MMHG | WEIGHT: 204 LBS | OXYGEN SATURATION: 97 % | SYSTOLIC BLOOD PRESSURE: 136 MMHG | HEART RATE: 56 BPM | BODY MASS INDEX: 27.63 KG/M2 | HEIGHT: 72 IN

## 2021-07-22 DIAGNOSIS — E78.2 MIXED HYPERLIPIDEMIA: ICD-10-CM

## 2021-07-22 DIAGNOSIS — D62 POSTOPERATIVE ANEMIA DUE TO ACUTE BLOOD LOSS: ICD-10-CM

## 2021-07-22 DIAGNOSIS — I10 ESSENTIAL HYPERTENSION: ICD-10-CM

## 2021-07-22 DIAGNOSIS — I42.8 NONISCHEMIC CARDIOMYOPATHY (HCC): Primary | ICD-10-CM

## 2021-07-22 DIAGNOSIS — I50.22 CHRONIC SYSTOLIC HEART FAILURE (HCC): ICD-10-CM

## 2021-07-22 DIAGNOSIS — I48.0 PAROXYSMAL ATRIAL FIBRILLATION (HCC): ICD-10-CM

## 2021-07-22 LAB
FERRITIN: 78.1 NG/ML (ref 30–400)
FOLATE: 11.7 NG/ML (ref 4.5–32.2)
TOTAL IRON BINDING CAPACITY: 315 UG/DL (ref 250–400)
VITAMIN B-12: 472 PG/ML (ref 211–946)

## 2021-07-22 PROCEDURE — 1036F TOBACCO NON-USER: CPT | Performed by: INTERNAL MEDICINE

## 2021-07-22 PROCEDURE — G8427 DOCREV CUR MEDS BY ELIG CLIN: HCPCS | Performed by: INTERNAL MEDICINE

## 2021-07-22 PROCEDURE — 3017F COLORECTAL CA SCREEN DOC REV: CPT | Performed by: INTERNAL MEDICINE

## 2021-07-22 PROCEDURE — 93000 ELECTROCARDIOGRAM COMPLETE: CPT | Performed by: INTERNAL MEDICINE

## 2021-07-22 PROCEDURE — G8417 CALC BMI ABV UP PARAM F/U: HCPCS | Performed by: INTERNAL MEDICINE

## 2021-07-22 PROCEDURE — 4040F PNEUMOC VAC/ADMIN/RCVD: CPT | Performed by: INTERNAL MEDICINE

## 2021-07-22 PROCEDURE — 99214 OFFICE O/P EST MOD 30 MIN: CPT | Performed by: INTERNAL MEDICINE

## 2021-07-22 PROCEDURE — 1123F ACP DISCUSS/DSCN MKR DOCD: CPT | Performed by: INTERNAL MEDICINE

## 2021-07-22 RX ORDER — LISINOPRIL 40 MG/1
40 TABLET ORAL DAILY
Qty: 30 TABLET | Refills: 5 | Status: SHIPPED | OUTPATIENT
Start: 2021-07-22 | End: 2021-11-29

## 2021-07-22 ASSESSMENT — ENCOUNTER SYMPTOMS
ANAL BLEEDING: 0
COUGH: 0
BLOOD IN STOOL: 0
SHORTNESS OF BREATH: 0

## 2021-07-22 NOTE — PROGRESS NOTES
Office Visit  Kam Coffman is a 78 y.o. male; who present today for 3 Month Follow-Up      HPI  I am seeing this 44-year-old white male in follow-up as part of titration of medication plan for nonischemic cardiomyopathy. At the time of the last visit increase lisinopril from 10 to 20 mg daily and he reports that he has tolerated this well. He is sedentary mostly by choice but he also uses a cane because of hip issues but with his low levels of activity he has not noticed any difference. He has reasonably good stamina, no chest discomfort or dyspnea, no palpitations no presyncope or syncope. His blood pressure has been running normal.  His TSH last month was normal at 2.16.  Current Outpatient Medications   Medication Sig Dispense Refill    lisinopril (PRINIVIL;ZESTRIL) 40 MG tablet Take 1 tablet by mouth daily 30 tablet 5    metoprolol succinate (TOPROL XL) 25 MG extended release tablet TAKE 1/2 TABLET BY MOUTH DAILY 45 tablet 1    indomethacin (INDOCIN) 25 MG capsule Take 1 capsule by mouth every 4 hours as needed for Pain 30 capsule 1    levothyroxine (SYNTHROID) 125 MCG tablet TAKE 1 TABLET BY MOUTH ONCE DAILY 30 tablet 0    aspirin 81 MG EC tablet Take 81 mg by mouth daily      pantoprazole (PROTONIX) 40 MG tablet TAKE (1) TABLET DAILY IN THE MORNING (BEFORE BREAKFAST) 30 tablet 5    apixaban (ELIQUIS) 5 MG TABS tablet Take 1 tablet by mouth 2 times daily 60 tablet 5    atorvastatin (LIPITOR) 40 MG tablet Take 1 tablet by mouth daily 30 tablet 3    iron polysaccharides (NIFEREX) 150 MG capsule Take 1 capsule by mouth daily 60 capsule 3    amiodarone (CORDARONE) 200 MG tablet Take 1 tablet by mouth daily 30 tablet 3     No current facility-administered medications for this visit.       Medications Discontinued During This Encounter   Medication Reason    lisinopril (PRINIVIL;ZESTRIL) 20 MG tablet         No Known Allergies    Past Medical History:   Diagnosis Date    CAD (coronary artery disease)     Gout     Hypertension     Hypotension 2020    Non-ST elevation MI (NSTEMI) ([facility] [address] 14    Palliative care patient 2020    Pneumonia due to infectious organism 2020     Negative - Past Medical History for  No past medical history pertinent negatives. Past Surgical History:   Procedure Laterality Date    CARDIAC CATHETERIZATION  14  [facility]    angioplasty, stent to LAD. EF 45%    CHOLECYSTECTOMY      FEMUR FRACTURE SURGERY Right 10/30/2020    TFN, SHORT performed by Marrianne Blizzard, MD at [address] Not on file   Tobacco Use    Smoking status: Former Smoker     Packs/day: 1.00     Years: 3.00     Pack years: 3.00     Types: Cigars     Start date:      Quit date:      Years since quittin.5    Smokeless tobacco: Never Used   Vaping Use    Vaping Use: Never used   Substance and Sexual Activity    Alcohol use: Not Currently    Drug use: No    Sexual activity: Yes     Partners: Female        Family History   Problem Relation Age of Onset    No Known Problems Mother     Heart Disease Father        Review of Systems  Review of Systems   Constitutional: Negative for fatigue and fever. Respiratory: Negative for cough and shortness of breath. Cardiovascular: Negative for chest pain, palpitations and leg swelling. Gastrointestinal: Negative for anal bleeding and blood in stool. Neurological: Negative for syncope, facial asymmetry and speech difficulty. Physical Exam  /78   Pulse 56   Ht 6' (1.829 m)   Wt 204 lb (92.5 kg)   SpO2 97%   BMI 27.67 kg/m²    Physical Exam  Constitutional:       Appearance: Normal appearance. He is overweight. Cardiovascular:      Rate and Rhythm: Normal rate and regular rhythm. Heart sounds: Normal heart sounds. No gallop. Pulmonary:      Effort: Pulmonary effort is normal.      Breath sounds: Normal breath sounds. Abdominal:      General: Abdomen is flat. Bowel sounds are normal.      Palpations: Abdomen is soft. Musculoskeletal:         General: No swelling. Skin:     General: Skin is warm and dry. Neurological:      Mental Status: He is alert. Assessment/Plan    EKG Findings:  Sinus bradycardia, 56 bpm, slight QRS widening representing IVCD, otherwise unremarkable    Problem List Items Addressed This Visit        Cardiology Problems    Essential hypertension    Chronic systolic heart failure (HCC)    Nonischemic cardiomyopathy (HCC) - Primary    Mixed hyperlipidemia    Relevant Medications    lisinopril (PRINIVIL;ZESTRIL) 40 MG tablet    Paroxysmal atrial fibrillation (HCC)    Relevant Orders    EKG 12 lead (Completed)           Diagnosis Orders   1. Nonischemic cardiomyopathy ([facility] [address]      EF 5-10% by cath, June 2020, improved to 40% by echo, June 2020, [address] [facility]       2. Chronic systolic heart failure (HCC)     3. Paroxysmal atrial fibrillation (HCC)  EKG 12 lead    Sinus rhythm on amiodarone   4. Essential hypertension     5. Mixed hyperlipidemia         Recommendations:   Diet: Low-sodium, low-fat, calorie reduced diet  Activity: Increased aerobic activity encouraged  Medication Changes: Increase lisinopril to 40 mg daily as part of further titration, continue other medications as prescribed. He has tolerated lisinopril well at 20 mg therefore we will go to 40 mg before getting an echocardiogram which I will schedule after his next visit in 3 months. If his blood pressure runs low or he does not tolerate this dose then he will cut it back to 20 mg daily. He will need to have a TSH checked on a regular basis since he is on amiodarone and I would like to get a chest x-ray in this patient periodically.     Orders Placed This Encounter   Medications    lisinopril (PRINIVIL;ZESTRIL) 40 MG tablet     Sig: Take 1 tablet by mouth daily     Dispense:  30 tablet     Refill:  5     Orders Placed This Encounter   Procedures    EKG 12 lead Order Specific Question:   Reason for Exam?     Answer:   Irregular heart rate       Return in about 3 months (around 10/22/2021) for me, routine.

## 2021-08-02 RX ORDER — LEVOTHYROXINE SODIUM 0.12 MG/1
TABLET ORAL
Qty: 30 TABLET | Refills: 0 | Status: SHIPPED | OUTPATIENT
Start: 2021-08-02 | End: 2021-11-29

## 2021-08-02 RX ORDER — APIXABAN 5 MG/1
TABLET, FILM COATED ORAL
Qty: 60 TABLET | Refills: 2 | Status: ON HOLD
Start: 2021-08-02 | End: 2021-08-23 | Stop reason: HOSPADM

## 2021-08-17 ENCOUNTER — HOSPITAL ENCOUNTER (INPATIENT)
Age: 71
LOS: 6 days | Discharge: NURSING, REHAB OR LONG-TERM CARE | DRG: 065 | End: 2021-08-23
Attending: EMERGENCY MEDICINE | Admitting: INTERNAL MEDICINE
Payer: MEDICARE

## 2021-08-17 ENCOUNTER — APPOINTMENT (OUTPATIENT)
Dept: GENERAL RADIOLOGY | Age: 71
DRG: 065 | End: 2021-08-17
Payer: MEDICARE

## 2021-08-17 ENCOUNTER — APPOINTMENT (OUTPATIENT)
Dept: CT IMAGING | Age: 71
DRG: 065 | End: 2021-08-17
Payer: MEDICARE

## 2021-08-17 DIAGNOSIS — S62.602A CLOSED NONDISPLACED FRACTURE OF PHALANX OF RIGHT MIDDLE FINGER, UNSPECIFIED PHALANX, INITIAL ENCOUNTER: ICD-10-CM

## 2021-08-17 DIAGNOSIS — I62.9 INTRACRANIAL HEMORRHAGE (HCC): Primary | ICD-10-CM

## 2021-08-17 PROBLEM — I61.9 INTRACEREBRAL HEMORRHAGE, NONTRAUMATIC (HCC): Status: ACTIVE | Noted: 2021-08-17

## 2021-08-17 LAB
ALBUMIN SERPL-MCNC: 3.9 G/DL (ref 3.5–5.2)
ALP BLD-CCNC: 144 U/L (ref 40–130)
ALT SERPL-CCNC: 28 U/L (ref 5–41)
ANION GAP SERPL CALCULATED.3IONS-SCNC: 19 MMOL/L (ref 7–19)
APTT: 27 SEC (ref 26–36.2)
AST SERPL-CCNC: 42 U/L (ref 5–40)
BACTERIA: NEGATIVE /HPF
BASOPHILS ABSOLUTE: 0.1 K/UL (ref 0–0.2)
BASOPHILS RELATIVE PERCENT: 0.4 % (ref 0–1)
BILIRUB SERPL-MCNC: 0.9 MG/DL (ref 0.2–1.2)
BILIRUBIN URINE: NEGATIVE
BLOOD, URINE: ABNORMAL
BUN BLDV-MCNC: 17 MG/DL (ref 8–23)
CALCIUM SERPL-MCNC: 9.2 MG/DL (ref 8.8–10.2)
CHLORIDE BLD-SCNC: 104 MMOL/L (ref 98–111)
CLARITY: CLEAR
CO2: 19 MMOL/L (ref 22–29)
COLOR: YELLOW
CREAT SERPL-MCNC: 1.5 MG/DL (ref 0.5–1.2)
CRYSTALS, UA: ABNORMAL /HPF
EOSINOPHILS ABSOLUTE: 0 K/UL (ref 0–0.6)
EOSINOPHILS RELATIVE PERCENT: 0 % (ref 0–5)
EPITHELIAL CELLS, UA: 2 /HPF (ref 0–5)
ETHANOL: <10 MG/DL (ref 0–0.08)
GFR AFRICAN AMERICAN: 56
GFR NON-AFRICAN AMERICAN: 46
GLUCOSE BLD-MCNC: 94 MG/DL (ref 74–109)
GLUCOSE URINE: NEGATIVE MG/DL
HCT VFR BLD CALC: 39.3 % (ref 42–52)
HEMOGLOBIN: 12.7 G/DL (ref 14–18)
HYALINE CASTS: 8 /HPF (ref 0–8)
IMMATURE GRANULOCYTES #: 0.1 K/UL
INR BLD: 1.27 (ref 0.88–1.18)
KETONES, URINE: ABNORMAL MG/DL
LEUKOCYTE ESTERASE, URINE: ABNORMAL
LYMPHOCYTES ABSOLUTE: 0.9 K/UL (ref 1.1–4.5)
LYMPHOCYTES RELATIVE PERCENT: 6.5 % (ref 20–40)
MCH RBC QN AUTO: 31.1 PG (ref 27–31)
MCHC RBC AUTO-ENTMCNC: 32.3 G/DL (ref 33–37)
MCV RBC AUTO: 96.1 FL (ref 80–94)
MONOCYTES ABSOLUTE: 0.7 K/UL (ref 0–0.9)
MONOCYTES RELATIVE PERCENT: 5.3 % (ref 0–10)
NEUTROPHILS ABSOLUTE: 11.7 K/UL (ref 1.5–7.5)
NEUTROPHILS RELATIVE PERCENT: 87.4 % (ref 50–65)
NITRITE, URINE: NEGATIVE
PDW BLD-RTO: 14.1 % (ref 11.5–14.5)
PH UA: 5.5 (ref 5–8)
PLATELET # BLD: 219 K/UL (ref 130–400)
PMV BLD AUTO: 8.9 FL (ref 9.4–12.4)
POTASSIUM SERPL-SCNC: 3.9 MMOL/L (ref 3.5–5)
PROTEIN UA: 100 MG/DL
PROTHROMBIN TIME: 16.1 SEC (ref 12–14.6)
RBC # BLD: 4.09 M/UL (ref 4.7–6.1)
RBC UA: 3 /HPF (ref 0–4)
SARS-COV-2, NAAT: NOT DETECTED
SODIUM BLD-SCNC: 142 MMOL/L (ref 136–145)
SPECIFIC GRAVITY UA: 1.02 (ref 1–1.03)
TOTAL CK: 164 U/L (ref 39–308)
TOTAL PROTEIN: 7.4 G/DL (ref 6.6–8.7)
UROBILINOGEN, URINE: 1 E.U./DL
WBC # BLD: 13.4 K/UL (ref 4.8–10.8)
WBC UA: 3 /HPF (ref 0–5)

## 2021-08-17 PROCEDURE — 80053 COMPREHEN METABOLIC PANEL: CPT

## 2021-08-17 PROCEDURE — 85610 PROTHROMBIN TIME: CPT

## 2021-08-17 PROCEDURE — 2580000003 HC RX 258: Performed by: EMERGENCY MEDICINE

## 2021-08-17 PROCEDURE — 1210000000 HC MED SURG R&B

## 2021-08-17 PROCEDURE — 96374 THER/PROPH/DIAG INJ IV PUSH: CPT

## 2021-08-17 PROCEDURE — 6360000002 HC RX W HCPCS: Performed by: EMERGENCY MEDICINE

## 2021-08-17 PROCEDURE — 71045 X-RAY EXAM CHEST 1 VIEW: CPT

## 2021-08-17 PROCEDURE — 70450 CT HEAD/BRAIN W/O DYE: CPT

## 2021-08-17 PROCEDURE — 90471 IMMUNIZATION ADMIN: CPT | Performed by: EMERGENCY MEDICINE

## 2021-08-17 PROCEDURE — 36415 COLL VENOUS BLD VENIPUNCTURE: CPT

## 2021-08-17 PROCEDURE — 72170 X-RAY EXAM OF PELVIS: CPT

## 2021-08-17 PROCEDURE — 82550 ASSAY OF CK (CPK): CPT

## 2021-08-17 PROCEDURE — 87635 SARS-COV-2 COVID-19 AMP PRB: CPT

## 2021-08-17 PROCEDURE — 99222 1ST HOSP IP/OBS MODERATE 55: CPT | Performed by: NEUROLOGICAL SURGERY

## 2021-08-17 PROCEDURE — 93005 ELECTROCARDIOGRAM TRACING: CPT | Performed by: EMERGENCY MEDICINE

## 2021-08-17 PROCEDURE — 2500000003 HC RX 250 WO HCPCS: Performed by: INTERNAL MEDICINE

## 2021-08-17 PROCEDURE — 2000000000 HC ICU R&B

## 2021-08-17 PROCEDURE — 2500000003 HC RX 250 WO HCPCS: Performed by: EMERGENCY MEDICINE

## 2021-08-17 PROCEDURE — 85025 COMPLETE CBC W/AUTO DIFF WBC: CPT

## 2021-08-17 PROCEDURE — 2580000003 HC RX 258: Performed by: INTERNAL MEDICINE

## 2021-08-17 PROCEDURE — 85730 THROMBOPLASTIN TIME PARTIAL: CPT

## 2021-08-17 PROCEDURE — 90715 TDAP VACCINE 7 YRS/> IM: CPT | Performed by: EMERGENCY MEDICINE

## 2021-08-17 PROCEDURE — 81001 URINALYSIS AUTO W/SCOPE: CPT

## 2021-08-17 PROCEDURE — 73560 X-RAY EXAM OF KNEE 1 OR 2: CPT

## 2021-08-17 PROCEDURE — 99283 EMERGENCY DEPT VISIT LOW MDM: CPT

## 2021-08-17 PROCEDURE — 51702 INSERT TEMP BLADDER CATH: CPT

## 2021-08-17 PROCEDURE — 73552 X-RAY EXAM OF FEMUR 2/>: CPT

## 2021-08-17 PROCEDURE — 82077 ASSAY SPEC XCP UR&BREATH IA: CPT

## 2021-08-17 PROCEDURE — 73130 X-RAY EXAM OF HAND: CPT

## 2021-08-17 RX ORDER — METOPROLOL TARTRATE 5 MG/5ML
2.5 INJECTION INTRAVENOUS ONCE
Status: COMPLETED | OUTPATIENT
Start: 2021-08-17 | End: 2021-08-17

## 2021-08-17 RX ORDER — AMIODARONE HYDROCHLORIDE 200 MG/1
TABLET ORAL
Qty: 30 TABLET | Refills: 5 | Status: SHIPPED | OUTPATIENT
Start: 2021-08-17 | End: 2022-04-18

## 2021-08-17 RX ORDER — ACETAMINOPHEN 650 MG/1
650 SUPPOSITORY RECTAL EVERY 6 HOURS PRN
Status: DISCONTINUED | OUTPATIENT
Start: 2021-08-17 | End: 2021-08-23 | Stop reason: HOSPADM

## 2021-08-17 RX ORDER — LEVOTHYROXINE SODIUM 0.12 MG/1
125 TABLET ORAL DAILY
Status: DISCONTINUED | OUTPATIENT
Start: 2021-08-17 | End: 2021-08-23 | Stop reason: HOSPADM

## 2021-08-17 RX ORDER — PANTOPRAZOLE SODIUM 40 MG/1
40 TABLET, DELAYED RELEASE ORAL
Status: DISCONTINUED | OUTPATIENT
Start: 2021-08-18 | End: 2021-08-23 | Stop reason: HOSPADM

## 2021-08-17 RX ORDER — ATORVASTATIN CALCIUM 40 MG/1
TABLET, FILM COATED ORAL
Qty: 30 TABLET | Refills: 5 | Status: SHIPPED | OUTPATIENT
Start: 2021-08-17 | End: 2022-02-14

## 2021-08-17 RX ORDER — SODIUM CHLORIDE 9 MG/ML
50 INJECTION, SOLUTION INTRAVENOUS ONCE
Status: COMPLETED | OUTPATIENT
Start: 2021-08-17 | End: 2021-08-17

## 2021-08-17 RX ORDER — ONDANSETRON 2 MG/ML
4 INJECTION INTRAMUSCULAR; INTRAVENOUS EVERY 6 HOURS PRN
Status: DISCONTINUED | OUTPATIENT
Start: 2021-08-17 | End: 2021-08-23 | Stop reason: HOSPADM

## 2021-08-17 RX ORDER — ATORVASTATIN CALCIUM 40 MG/1
40 TABLET, FILM COATED ORAL DAILY
Status: DISCONTINUED | OUTPATIENT
Start: 2021-08-17 | End: 2021-08-23 | Stop reason: HOSPADM

## 2021-08-17 RX ORDER — METOPROLOL SUCCINATE 25 MG/1
25 TABLET, EXTENDED RELEASE ORAL DAILY
Status: DISCONTINUED | OUTPATIENT
Start: 2021-08-17 | End: 2021-08-23 | Stop reason: HOSPADM

## 2021-08-17 RX ORDER — ONDANSETRON 4 MG/1
4 TABLET, ORALLY DISINTEGRATING ORAL EVERY 8 HOURS PRN
Status: DISCONTINUED | OUTPATIENT
Start: 2021-08-17 | End: 2021-08-23 | Stop reason: HOSPADM

## 2021-08-17 RX ORDER — SODIUM CHLORIDE 0.9 % (FLUSH) 0.9 %
10 SYRINGE (ML) INJECTION EVERY 12 HOURS SCHEDULED
Status: DISCONTINUED | OUTPATIENT
Start: 2021-08-17 | End: 2021-08-18 | Stop reason: SDUPTHER

## 2021-08-17 RX ORDER — SODIUM CHLORIDE 0.9 % (FLUSH) 0.9 %
10 SYRINGE (ML) INJECTION PRN
Status: DISCONTINUED | OUTPATIENT
Start: 2021-08-17 | End: 2021-08-18 | Stop reason: SDUPTHER

## 2021-08-17 RX ORDER — SODIUM CHLORIDE 9 MG/ML
25 INJECTION, SOLUTION INTRAVENOUS PRN
Status: DISCONTINUED | OUTPATIENT
Start: 2021-08-17 | End: 2021-08-18 | Stop reason: SDUPTHER

## 2021-08-17 RX ORDER — 0.9 % SODIUM CHLORIDE 0.9 %
1000 INTRAVENOUS SOLUTION INTRAVENOUS ONCE
Status: COMPLETED | OUTPATIENT
Start: 2021-08-17 | End: 2021-08-17

## 2021-08-17 RX ORDER — ACETAMINOPHEN 325 MG/1
650 TABLET ORAL EVERY 6 HOURS PRN
Status: DISCONTINUED | OUTPATIENT
Start: 2021-08-17 | End: 2021-08-23 | Stop reason: HOSPADM

## 2021-08-17 RX ORDER — AMIODARONE HYDROCHLORIDE 200 MG/1
200 TABLET ORAL DAILY
Status: DISCONTINUED | OUTPATIENT
Start: 2021-08-17 | End: 2021-08-23 | Stop reason: HOSPADM

## 2021-08-17 RX ADMIN — SODIUM CHLORIDE 1000 ML: 9 INJECTION, SOLUTION INTRAVENOUS at 15:26

## 2021-08-17 RX ADMIN — DEXTROSE MONOHYDRATE 5 MG/HR: 50 INJECTION, SOLUTION INTRAVENOUS at 18:45

## 2021-08-17 RX ADMIN — DEXTROSE MONOHYDRATE 7.5 MG/HR: 50 INJECTION, SOLUTION INTRAVENOUS at 22:06

## 2021-08-17 RX ADMIN — TETANUS TOXOID, REDUCED DIPHTHERIA TOXOID AND ACELLULAR PERTUSSIS VACCINE, ADSORBED 0.5 ML: 5; 2.5; 8; 8; 2.5 SUSPENSION INTRAMUSCULAR at 15:26

## 2021-08-17 RX ADMIN — METOPROLOL TARTRATE 2.5 MG: 5 INJECTION INTRAVENOUS at 16:29

## 2021-08-17 RX ADMIN — SODIUM CHLORIDE 50 ML: 9 INJECTION, SOLUTION INTRAVENOUS at 18:08

## 2021-08-17 RX ADMIN — PROTHROMBIN, COAGULATION FACTOR VII HUMAN, COAGULATION FACTOR IX HUMAN, COAGULATION FACTOR X HUMAN, PROTEIN C, PROTEIN S HUMAN, AND WATER 4535 UNITS: KIT at 17:34

## 2021-08-17 NOTE — Clinical Note
Patient Class: Inpatient [101]   REQUIRED: Diagnosis: Intracerebral hemorrhage, nontraumatic [facility] [[id number]]   Estimated Length of Stay: Estimated stay of more than 2 midnights   Admitting Provider: Mariusz Good [[id number]]

## 2021-08-17 NOTE — ED PROVIDER NOTES
[facility] ICU  eMERGENCY dEPARTMENT eNCOUnter      Pt Name: Afshan Miles  MRN: [id number]  [place] 1950  Date of evaluation: 8/17/2021  Provider: Giles Overton MD    CHIEF COMPLAINT       Chief Complaint   Patient presents with    Altered Mental Status     presents with ams         HISTORY OF PRESENT ILLNESS   (Location/Symptom, Timing/Onset,Context/Setting, Quality, Duration, Modifying Factors, Severity)  Note limiting factors. Afshan Miles is a 73 y.o. male who presents to the emergency department for altered mental status. Patient is alert to person and place but tells me today the year is Villanueva and is unable to provide any meaningful history. When asked if he fell he briefly said he did 4 days ago but then said no. When asked questions he does not answer appropriately. HPI    NursingNotes were reviewed. REVIEW OF SYSTEMS    (2-9 systems for level 4, 10 or more for level 5)     Review of Systems   Unable to perform ROS: Mental status change            PAST MEDICALHISTORY     Past Medical History:   Diagnosis Date    CAD (coronary artery disease)     Gout     Hypertension     Hypotension 6/23/2020    Non-ST elevation MI (NSTEMI) ([facility] [address] 12/28/14    Palliative care patient 06/29/2020    Pneumonia due to infectious organism 6/24/2020         SURGICAL HISTORY       Past Surgical History:   Procedure Laterality Date    CARDIAC CATHETERIZATION  12/29/14  [facility]    angioplasty, stent to LAD.  EF 45%    CHOLECYSTECTOMY      FEMUR FRACTURE SURGERY Right 10/30/2020    TFN, SHORT performed by Marcin Keene MD at [facility]     Current Discharge Medication List      CONTINUE these medications which have NOT CHANGED    Details   atorvastatin (LIPITOR) 40 MG tablet TAKE 1 TABLET BY MOUTH ONCE DAILY  Qty: 30 tablet, Refills: 5      amiodarone (CORDARONE) 200 MG tablet TAKE 1 TABLET BY MOUTH ONCE DAILY  Qty: 30 tablet, Refills: 5      ELIQUIS 5 MG TABS tablet TAKE 1 TABLET BY MOUTH TWICE contusion abrasion  Eyes:      Extraocular Movements: Extraocular movements intact. Conjunctiva/sclera: Conjunctivae normal.      Pupils: Pupils are equal, round, and reactive to light. Neck:      Trachea: No tracheal deviation. Cardiovascular:      Rate and Rhythm: Normal rate and regular rhythm. Heart sounds: Normal heart sounds. No murmur heard. Pulmonary:      Breath sounds: Normal breath sounds. No wheezing or rales. Abdominal:      Palpations: Abdomen is soft. There is no mass. Tenderness: There is no abdominal tenderness. Musculoskeletal:         General: Normal range of motion. Cervical back: Normal range of motion and neck supple. Comments: Holding right leg externally rotated at the hip and flexed at the knee has some mild swelling and edema of the right knee somewhat painful range of motion    Has abrasion and contusion over the right distal wrist proximal hand no obvious tenderness and normal range of motion   Skin:     General: Skin is warm and dry. Neurological:      Mental Status: He is alert. GCS: GCS eye subscore is 4. GCS verbal subscore is 4. GCS motor subscore is 6. Cranial Nerves: No dysarthria. Motor: No weakness. DIAGNOSTIC RESULTS     EKG: All EKG's areinterpreted by the Emergency Department Physician who either signs or Co-signs this chart in the absence of a cardiologist.    66 normal sinus rhythm no obvious ST changes nondiagnostic EKG    RADIOLOGY:  Non-plain film images such as CT, Ultrasound and MRI are read by the radiologist. Plain radiographic images are visualized and preliminarily interpreted bythe emergency physician with the below findings:      CT HEAD WO CONTRAST   Final Result   A persistent and unchanged left ophthalmic/basal ganglia   intraparenchymal hemorrhage extending into the third and lateral   ventricle as detailed above. No midline shift. Chronic ischemic and atrophic changes.    Signed by Dr Janet Ayers Sylvester      XR HAND RIGHT (MIN 3 VIEWS)   Final Result   Impression:   Recent mildly displaced fracture of the third proximal phalanx. Signed by Dr Chris Humphries      XR PELVIS (1-2 VIEWS)   Final Result   1. No acute fracture. Signed by Dr George Berman      XR FEMUR RIGHT (MIN 2 VIEWS)   Final Result   Impression:   No acute osseous pathology. Signed by Dr Chris Humphries      XR KNEE RIGHT (1-2 VIEWS)   Final Result   Impression:   No acute displaced fracture or dislocation. Advanced tricompartmental arthropathy. Suprapatellar effusion. Signed by Dr Chris Humphries      XR CHEST PORTABLE   Final Result   Impression:   No acute cardiopulmonary disease. Signed by Dr Rivera Ventura   Final Result   1. 2.7 cm left basal ganglia intraparenchymal hemorrhage with   intraventricular hemorrhage extension, layering in the posterior third   ventricle and occipital horns. Ventricles do not appear dilated at   this time.    The results of this exam were discussed with Dr. Nan Ledesma on 8/17/2021 at   1622 hours   Signed by Dr Lev Quiles    (Results Pending)           LABS:  Labs Reviewed   CBC WITH AUTO DIFFERENTIAL - Abnormal; Notable for the following components:       Result Value    WBC 13.4 (*)     RBC 4.09 (*)     Hemoglobin 12.7 (*)     Hematocrit 39.3 (*)     MCV 96.1 (*)     MCH 31.1 (*)     MCHC 32.3 (*)     MPV 8.9 (*)     Neutrophils % 87.4 (*)     Lymphocytes % 6.5 (*)     Neutrophils Absolute 11.7 (*)     Lymphocytes Absolute 0.9 (*)     All other components within normal limits   COMPREHENSIVE METABOLIC PANEL - Abnormal; Notable for the following components:    CO2 19 (*)     CREATININE 1.5 (*)     GFR Non- 46 (*)     GFR African American 56 (*)     Alkaline Phosphatase 144 (*)     AST 42 (*)     All other components within normal limits   URINE RT REFLEX TO CULTURE - Abnormal; Notable for the following components:    Ketones, Urine TRACE (*)     Blood, Urine TRACE (*)     Protein,  (*)     Leukocyte Esterase, Urine TRACE (*)     All other components within normal limits   MICROSCOPIC URINALYSIS - Abnormal; Notable for the following components:    Bacteria, UA NEGATIVE (*)     Crystals, UA NEG (*)     All other components within normal limits   PROTIME-INR - Abnormal; Notable for the following components:    Protime 16.1 (*)     INR 1.27 (*)     All other components within normal limits   CBC WITH AUTO DIFFERENTIAL - Abnormal; Notable for the following components:    WBC 14.7 (*)     RBC 3.85 (*)     Hemoglobin 12.0 (*)     Hematocrit 35.2 (*)     MCH 31.2 (*)     MPV 8.6 (*)     Neutrophils % 86.4 (*)     Lymphocytes % 6.5 (*)     Neutrophils Absolute 12.7 (*)     Lymphocytes Absolute 1.0 (*)     Monocytes Absolute 1.00 (*)     All other components within normal limits   COMPREHENSIVE METABOLIC PANEL - Abnormal; Notable for the following components:    CO2 19 (*)     Glucose 132 (*)     Total Bilirubin 2.3 (*)     AST 50 (*)     All other components within normal limits   PROTIME-INR - Abnormal; Notable for the following components:    Protime 15.6 (*)     INR 1.22 (*)     All other components within normal limits   COVID-19, RAPID   ETHANOL   CK   APTT   APTT       All other labs were within normal range or not returned as of this dictation.     EMERGENCY DEPARTMENT COURSE and DIFFERENTIAL DIAGNOSIS/MDM:   Vitals:    Vitals:    08/18/21 0700 08/18/21 0800 08/18/21 0900 08/18/21 1000   BP: 134/67 131/68 (!) 159/77 (!) 147/76   Pulse: 89 82 92 84   Resp: 22 16 24 23   Temp:  98.8 °F (37.1 °C)     TempSrc:  Temporal     SpO2: 96% 96% 97% 97%   Weight:       Height:           MDM  Number of Diagnoses or Management Options     Amount and/or Complexity of Data Reviewed  Clinical lab tests: ordered and reviewed  Tests in the radiology section of CPT®: ordered and reviewed  Discuss the patient with other providers: yes  Independent visualization of images, tracings, or specimens: yes    Critical Care  Total time providing critical care: 30-74 minutes      Pt awake and alert but confused and cannot answer any questions appropriately, moving all extrem, has ICH suspicious for HTN related bleed, BP 160s here given lopressor, will cont to monitor closely, pt on eliquis, d/w Dr. Sabina morton, recommends Gilford Alexanders for reversal, have d/w Dr. Jim Lott for admission to ICU    CRITICAL CARE TIME   Total Critical Care time was 40 minutes, excluding separately reportable procedures. There was a high probability of clinically significant/life threatening deterioration in the patient's condition which required my urgent intervention. CONSULTS:  IP CONSULT TO NEUROSURGERY  IP CONSULT TO PALLIATIVE CARE  IP CONSULT TO SOCIAL WORK  IP CONSULT TO NEUROSURGERY  IP CONSULT TO SOCIAL WORK    PROCEDURES:  Unless otherwise noted below, none     Procedures    FINAL IMPRESSION      1. Intracranial hemorrhage (HCC)    2. Closed nondisplaced fracture of phalanx of right middle finger, unspecified phalanx, initial encounter          DISPOSITION/PLAN   DISPOSITION        PATIENT REFERRED TO:  No follow-up provider specified.     DISCHARGE MEDICATIONS:  Current Discharge Medication List             (Please note that portions of this note were completed with a voice recognition program.  Efforts were made to edit thedictations but occasionally words are mis-transcribed.)    Reynaldo Adams MD (electronically signed)  Attending Emergency Physician        Conner Bran MD  08/18/21 3204

## 2021-08-17 NOTE — PROGRESS NOTES
4 Eyes Skin Assessment    Misty Arnold is being assessed upon: Admission    I agree that [place], RN, along with Ashley RN (either 2 RN's or 1 LPN and 1 RN) have performed a thorough Head to Toe Skin Assessment on the patient. ALL assessment sites listed below have been assessed. Areas assessed by both nurses:     [x]   Head, Face, and Ears   [x]   Shoulders, Back, and Chest  [x]   Arms, Elbows, and Hands   [x]   Coccyx, Sacrum, and Ischium  [x]   Legs, Feet, and Heels    Does the Patient have Skin Breakdown?  No, flaky skin on bilateral feet    Christiano Prevention initiated: Yes  Wound Care Orders initiated: NA    [facility] nurse consulted for Pressure Injury (Stage 3,4, Unstageable, DTI, NWPT, and Complex wounds) and New or Established Ostomies: NA        Primary Nurse eSignature: Flavia Prado RN on 8/17/2021 at 6:23 PM      Co-Signer eSignature: {Esignature:[id number]} alert

## 2021-08-18 ENCOUNTER — APPOINTMENT (OUTPATIENT)
Dept: CT IMAGING | Age: 71
DRG: 065 | End: 2021-08-18
Payer: MEDICARE

## 2021-08-18 LAB
ALBUMIN SERPL-MCNC: 3.9 G/DL (ref 3.5–5.2)
ALP BLD-CCNC: 123 U/L (ref 40–130)
ALT SERPL-CCNC: 28 U/L (ref 5–41)
ANION GAP SERPL CALCULATED.3IONS-SCNC: 17 MMOL/L (ref 7–19)
APTT: 32.6 SEC (ref 26–36.2)
AST SERPL-CCNC: 50 U/L (ref 5–40)
BASOPHILS ABSOLUTE: 0 K/UL (ref 0–0.2)
BASOPHILS RELATIVE PERCENT: 0.2 % (ref 0–1)
BILIRUB SERPL-MCNC: 2.3 MG/DL (ref 0.2–1.2)
BUN BLDV-MCNC: 14 MG/DL (ref 8–23)
CALCIUM SERPL-MCNC: 8.9 MG/DL (ref 8.8–10.2)
CHLORIDE BLD-SCNC: 100 MMOL/L (ref 98–111)
CO2: 19 MMOL/L (ref 22–29)
CREAT SERPL-MCNC: 1.2 MG/DL (ref 0.5–1.2)
EKG P AXIS: 119 DEGREES
EKG P-R INTERVAL: 156 MS
EKG Q-T INTERVAL: 476 MS
EKG QRS DURATION: 126 MS
EKG QTC CALCULATION (BAZETT): 486 MS
EKG T AXIS: 47 DEGREES
EOSINOPHILS ABSOLUTE: 0 K/UL (ref 0–0.6)
EOSINOPHILS RELATIVE PERCENT: 0 % (ref 0–5)
GFR AFRICAN AMERICAN: >59
GFR NON-AFRICAN AMERICAN: >60
GLUCOSE BLD-MCNC: 132 MG/DL (ref 74–109)
HCT VFR BLD CALC: 35.2 % (ref 42–52)
HEMOGLOBIN: 12 G/DL (ref 14–18)
IMMATURE GRANULOCYTES #: 0.1 K/UL
INR BLD: 1.22 (ref 0.88–1.18)
LYMPHOCYTES ABSOLUTE: 1 K/UL (ref 1.1–4.5)
LYMPHOCYTES RELATIVE PERCENT: 6.5 % (ref 20–40)
MCH RBC QN AUTO: 31.2 PG (ref 27–31)
MCHC RBC AUTO-ENTMCNC: 34.1 G/DL (ref 33–37)
MCV RBC AUTO: 91.4 FL (ref 80–94)
MONOCYTES ABSOLUTE: 1 K/UL (ref 0–0.9)
MONOCYTES RELATIVE PERCENT: 6.5 % (ref 0–10)
NEUTROPHILS ABSOLUTE: 12.7 K/UL (ref 1.5–7.5)
NEUTROPHILS RELATIVE PERCENT: 86.4 % (ref 50–65)
PDW BLD-RTO: 13.9 % (ref 11.5–14.5)
PLATELET # BLD: 201 K/UL (ref 130–400)
PMV BLD AUTO: 8.6 FL (ref 9.4–12.4)
POTASSIUM SERPL-SCNC: 3.5 MMOL/L (ref 3.5–5)
PROTHROMBIN TIME: 15.6 SEC (ref 12–14.6)
RBC # BLD: 3.85 M/UL (ref 4.7–6.1)
SODIUM BLD-SCNC: 136 MMOL/L (ref 136–145)
TOTAL PROTEIN: 6.8 G/DL (ref 6.6–8.7)
WBC # BLD: 14.7 K/UL (ref 4.8–10.8)

## 2021-08-18 PROCEDURE — 6370000000 HC RX 637 (ALT 250 FOR IP): Performed by: INTERNAL MEDICINE

## 2021-08-18 PROCEDURE — 2000000000 HC ICU R&B

## 2021-08-18 PROCEDURE — 85610 PROTHROMBIN TIME: CPT

## 2021-08-18 PROCEDURE — 2500000003 HC RX 250 WO HCPCS: Performed by: INTERNAL MEDICINE

## 2021-08-18 PROCEDURE — 99291 CRITICAL CARE FIRST HOUR: CPT | Performed by: NEUROLOGICAL SURGERY

## 2021-08-18 PROCEDURE — 70450 CT HEAD/BRAIN W/O DYE: CPT

## 2021-08-18 PROCEDURE — 85025 COMPLETE CBC W/AUTO DIFF WBC: CPT

## 2021-08-18 PROCEDURE — 36415 COLL VENOUS BLD VENIPUNCTURE: CPT

## 2021-08-18 PROCEDURE — 99223 1ST HOSP IP/OBS HIGH 75: CPT | Performed by: PHYSICIAN ASSISTANT

## 2021-08-18 PROCEDURE — 2580000003 HC RX 258: Performed by: INTERNAL MEDICINE

## 2021-08-18 PROCEDURE — 6360000002 HC RX W HCPCS: Performed by: INTERNAL MEDICINE

## 2021-08-18 PROCEDURE — 80053 COMPREHEN METABOLIC PANEL: CPT

## 2021-08-18 PROCEDURE — 85730 THROMBOPLASTIN TIME PARTIAL: CPT

## 2021-08-18 PROCEDURE — 2700000000 HC OXYGEN THERAPY PER DAY

## 2021-08-18 PROCEDURE — 93010 ELECTROCARDIOGRAM REPORT: CPT | Performed by: INTERNAL MEDICINE

## 2021-08-18 RX ORDER — SODIUM CHLORIDE 9 MG/ML
25 INJECTION, SOLUTION INTRAVENOUS PRN
Status: DISCONTINUED | OUTPATIENT
Start: 2021-08-18 | End: 2021-08-23 | Stop reason: HOSPADM

## 2021-08-18 RX ORDER — FOLIC ACID 1 MG/1
1 TABLET ORAL DAILY
Status: DISCONTINUED | OUTPATIENT
Start: 2021-08-18 | End: 2021-08-23 | Stop reason: HOSPADM

## 2021-08-18 RX ORDER — LORAZEPAM 1 MG/1
4 TABLET ORAL
Status: DISCONTINUED | OUTPATIENT
Start: 2021-08-18 | End: 2021-08-23 | Stop reason: HOSPADM

## 2021-08-18 RX ORDER — LORAZEPAM 1 MG/1
2 TABLET ORAL
Status: DISCONTINUED | OUTPATIENT
Start: 2021-08-18 | End: 2021-08-23 | Stop reason: HOSPADM

## 2021-08-18 RX ORDER — THIAMINE HYDROCHLORIDE 100 MG/ML
100 INJECTION, SOLUTION INTRAMUSCULAR; INTRAVENOUS DAILY
Status: DISCONTINUED | OUTPATIENT
Start: 2021-08-18 | End: 2021-08-23 | Stop reason: HOSPADM

## 2021-08-18 RX ORDER — LORAZEPAM 1 MG/1
1 TABLET ORAL
Status: DISCONTINUED | OUTPATIENT
Start: 2021-08-18 | End: 2021-08-23 | Stop reason: HOSPADM

## 2021-08-18 RX ORDER — LORAZEPAM 2 MG/ML
3 INJECTION INTRAMUSCULAR
Status: DISCONTINUED | OUTPATIENT
Start: 2021-08-18 | End: 2021-08-23 | Stop reason: HOSPADM

## 2021-08-18 RX ORDER — LORAZEPAM 2 MG/ML
2 INJECTION INTRAMUSCULAR
Status: DISCONTINUED | OUTPATIENT
Start: 2021-08-18 | End: 2021-08-23 | Stop reason: HOSPADM

## 2021-08-18 RX ORDER — SODIUM CHLORIDE 0.9 % (FLUSH) 0.9 %
5-40 SYRINGE (ML) INJECTION PRN
Status: DISCONTINUED | OUTPATIENT
Start: 2021-08-18 | End: 2021-08-23 | Stop reason: HOSPADM

## 2021-08-18 RX ORDER — LORAZEPAM 2 MG/ML
4 INJECTION INTRAMUSCULAR
Status: DISCONTINUED | OUTPATIENT
Start: 2021-08-18 | End: 2021-08-23 | Stop reason: HOSPADM

## 2021-08-18 RX ORDER — SODIUM CHLORIDE 0.9 % (FLUSH) 0.9 %
5-40 SYRINGE (ML) INJECTION EVERY 12 HOURS SCHEDULED
Status: DISCONTINUED | OUTPATIENT
Start: 2021-08-18 | End: 2021-08-23 | Stop reason: HOSPADM

## 2021-08-18 RX ORDER — LORAZEPAM 2 MG/ML
1 INJECTION INTRAMUSCULAR
Status: DISCONTINUED | OUTPATIENT
Start: 2021-08-18 | End: 2021-08-23 | Stop reason: HOSPADM

## 2021-08-18 RX ORDER — LORAZEPAM 1 MG/1
3 TABLET ORAL
Status: DISCONTINUED | OUTPATIENT
Start: 2021-08-18 | End: 2021-08-23 | Stop reason: HOSPADM

## 2021-08-18 RX ORDER — MULTIVITAMIN WITH IRON
1 TABLET ORAL DAILY
Status: DISCONTINUED | OUTPATIENT
Start: 2021-08-18 | End: 2021-08-23 | Stop reason: HOSPADM

## 2021-08-18 RX ADMIN — DEXTROSE MONOHYDRATE 7.5 MG/HR: 50 INJECTION, SOLUTION INTRAVENOUS at 05:00

## 2021-08-18 RX ADMIN — DEXTROSE MONOHYDRATE 7.5 MG/HR: 50 INJECTION, SOLUTION INTRAVENOUS at 01:45

## 2021-08-18 RX ADMIN — THIAMINE HYDROCHLORIDE 100 MG: 100 INJECTION, SOLUTION INTRAMUSCULAR; INTRAVENOUS at 18:23

## 2021-08-18 RX ADMIN — DEXTROSE MONOHYDRATE 7.5 MG/HR: 50 INJECTION, SOLUTION INTRAVENOUS at 08:29

## 2021-08-18 RX ADMIN — SODIUM CHLORIDE 5 MG/HR: 9 INJECTION, SOLUTION INTRAVENOUS at 15:41

## 2021-08-18 RX ADMIN — METOPROLOL SUCCINATE 25 MG: 25 TABLET, EXTENDED RELEASE ORAL at 07:49

## 2021-08-18 RX ADMIN — SODIUM CHLORIDE 4 MG/HR: 9 INJECTION, SOLUTION INTRAVENOUS at 20:38

## 2021-08-18 RX ADMIN — LEVOTHYROXINE SODIUM 125 MCG: 125 TABLET ORAL at 07:49

## 2021-08-18 RX ADMIN — SODIUM CHLORIDE, PRESERVATIVE FREE 10 ML: 5 INJECTION INTRAVENOUS at 20:02

## 2021-08-18 RX ADMIN — PANTOPRAZOLE SODIUM 40 MG: 40 TABLET, DELAYED RELEASE ORAL at 05:53

## 2021-08-18 RX ADMIN — LORAZEPAM 2 MG: 2 INJECTION INTRAMUSCULAR; INTRAVENOUS at 10:10

## 2021-08-18 RX ADMIN — LORAZEPAM 2 MG: 2 INJECTION INTRAMUSCULAR; INTRAVENOUS at 12:00

## 2021-08-18 RX ADMIN — DEXTROSE MONOHYDRATE 7.5 MG/HR: 50 INJECTION, SOLUTION INTRAVENOUS at 10:51

## 2021-08-18 RX ADMIN — SODIUM CHLORIDE, PRESERVATIVE FREE 10 ML: 5 INJECTION INTRAVENOUS at 10:10

## 2021-08-18 RX ADMIN — LORAZEPAM 4 MG: 2 INJECTION INTRAMUSCULAR; INTRAVENOUS at 13:45

## 2021-08-18 RX ADMIN — ATORVASTATIN CALCIUM 40 MG: 40 TABLET, FILM COATED ORAL at 07:49

## 2021-08-18 RX ADMIN — AMIODARONE HYDROCHLORIDE 200 MG: 200 TABLET ORAL at 07:49

## 2021-08-18 NOTE — H&P
[facility] Medicine H&P    Patient:  Sunny Whittaker  MRN: [id number]    Consulting Physician: Raven Harvey DO  Reason for Consult: Medical Management  Primacy Care Physician: Magnolia Jara MD    HISTORY OF PRESENT ILLNESS:   The patient is a 73 y.o. male presents after a fall. He has altered mental status and is unable to provide any meaningful history. His work-up shows a 2.7 cm bleed in the left basal ganglion. He will be admitted to the ICU for blood pressure support and neurosurgery evaluation. Past Medical History:        Diagnosis Date    CAD (coronary artery disease)     Gout     Hypertension     Hypotension 6/23/2020    Non-ST elevation MI (NSTEMI) ([facility] [address] 12/28/14    Palliative care patient 06/29/2020    Pneumonia due to infectious organism 6/24/2020       Past Surgical History:        Procedure Laterality Date    CARDIAC [facility]  [facility]    angioplasty, stent to LAD. EF 45%    CHOLECYSTECTOMY      FEMUR FRACTURE SURGERY Right 10/30/2020    TFN, SHORT performed by Maria Isabel Potter MD at [facility] OR       Medications: Scheduled Meds:   amiodarone  200 mg Oral Daily    atorvastatin  40 mg Oral Daily    levothyroxine  125 mcg Oral Daily    metoprolol succinate  25 mg Oral Daily    [START ON 8/18/2021] pantoprazole  40 mg Oral QAM AC    sodium chloride flush  10 mL Intravenous 2 times per day     Continuous Infusions:   sodium chloride      niCARdipine 7.5 mg/hr (08/17/21 1903)     PRN Meds:.sodium chloride flush, sodium chloride, ondansetron **OR** ondansetron, magnesium hydroxide, acetaminophen **OR** acetaminophen    Allergies:  Patient has no known allergies. Social History:   TOBACCO:   reports that he quit smoking about 42 years ago. His smoking use included cigars. He started smoking about 45 years ago. He has a 3.00 pack-year smoking history. He has never used smokeless tobacco.  ETOH:   reports previous alcohol use.     Family History:       Problem Relation Age of Onset    No Known Problems Mother     Heart Disease Father        ROS:  Review of Systems   Unable to perform ROS: Mental status change       Physical Exam:    Vitals: BP (!) 173/79   Pulse 71   Temp 98.6 °F (37 °C) (Temporal)   Resp 15   Ht 5' 8\" (1.727 m)   Wt 200 lb (90.7 kg)   SpO2 95%   BMI 30.41 kg/m²     Physical Exam  Vitals and nursing note reviewed. Constitutional:       Appearance: He is ill-appearing. HENT:      Head: Normocephalic and atraumatic. Right Ear: External ear normal.      Left Ear: External ear normal.      Nose: Nose normal.      Mouth/Throat:      Mouth: Mucous membranes are moist.   Eyes:      Conjunctiva/sclera: Conjunctivae normal.      Pupils: Pupils are equal, round, and reactive to light. Cardiovascular:      Rate and Rhythm: Normal rate and regular rhythm. Heart sounds: Normal heart sounds. Pulmonary:      Effort: Pulmonary effort is normal.      Breath sounds: Normal breath sounds. Abdominal:      General: Abdomen is flat. Palpations: Abdomen is soft. Musculoskeletal:         General: No swelling. Cervical back: Neck supple. No rigidity. Skin:     General: Skin is warm and dry. Neurological:      Mental Status: He is disoriented. CBC:   Recent Labs     08/17/21  1433   WBC 13.4*   HGB 12.7*        BMP:    Recent Labs     08/17/21  1433      K 3.9      CO2 19*   BUN 17   CREATININE 1.5*   GLUCOSE 94     Hepatic:   Recent Labs     08/17/21  1433   AST 42*   ALT 28   BILITOT 0.9   ALKPHOS 144*     Troponin: No results for input(s): TROPONINI in the last 72 hours. BNP: No results for input(s): BNP in the last 72 hours. Lipids: No results for input(s): CHOL, HDL in the last 72 hours.     Invalid input(s): LDLCALCU  ABGs: No results found for: PHART, PO2ART, JNK4UCI  INR:   Recent Labs     08/17/21  1632   INR 1.27*     -----------------------------------------------------------------  XR PELVIS (1-2 KNEE RIGHT (1-2 VIEWS)  Date:  8/17/2021 History:  Male, age  73 years; swelling and abrasion. COMPARISON:  None. Findings : Demineralization of the skeleton limits bony details. Suprapatellar effusion. Advanced arthropathy tricompartmental knee. Probable loose bodies in the joint space. There is no acute displaced fracture. No dislocation. No suspicious lytic or blastic lesion. No radiopaque foreign body. Vascular calcifications. Impression: No acute displaced fracture or dislocation. Advanced tricompartmental arthropathy. Suprapatellar effusion. Signed by Dr Adali Barillas    Result Date: 8/17/2021  CT HEAD WO CONTRAST 8/17/2021 4:11 PM HISTORY: Altered mental status COMPARISON: CT scan dated 8/20/2015 DOSE LENGTH PRODUCT: 1279 mGy cm TECHNIQUE: Helical tomographic images of the brain were obtained without the use of intravenous contrast. Automated exposure control was also utilized to decrease patient radiation dose. FINDINGS: There is a 2.7 x 2.5 x 1.8 cm left basal intraparenchymal hemorrhage. Intraventricular hemorrhage extension is also identified with blood product identified in the posterior third ventricles as well as layering within the occipital horns of both lateral ventricles. Ventricles do not appear appreciably dilated although there is some mass effect on the third ventricle. There is no midline shift. Posterior fossa structures are unremarkable. No subdural collections are identified. Scalp and calvarium are intact. 1. 2.7 cm left basal ganglia intraparenchymal hemorrhage with intraventricular hemorrhage extension, layering in the posterior third ventricle and occipital horns. Ventricles do not appear dilated at this time.  The results of this exam were discussed with Dr. Lisandra Walton on 8/17/2021 at 1622 hours Signed by Dr José Mcclelland    XR CHEST PORTABLE    Result Date: 8/17/2021  Exam:   XR CHEST PORTABLE  Date:  8/17/2021 History:  Male, age  73 years; altered mental status COMPARISON:  Chest x-ray dated October 30, 2020. Findings : Low lung volumes. Chronic elevation of the right hemidiaphragm. The heart and mediastinum are normal in size. Lungs are without focal infiltrate, mass or effusions. The bones show no acute pathology. Remote trauma to the right clavicle. Impression: No acute cardiopulmonary disease. Signed by Dr Bee Record and Plan     Intracerebral hemorrhage, nontraumatic. Admit to ICU. Blood pressure support. Seizure prophylaxis. Neurosurgery evaluation. Further recommendations to follow. Please document 35 minutes of critical care time for patient assessment, chart review, discussion with staff, .       Karine Sylvester DO

## 2021-08-18 NOTE — PROGRESS NOTES
Removed celis catheter. Urethra has bloody drainage. External catheter placed on patient. Patient tolerated procedure well.     Electronically signed by Wallace Perez RN on 8/18/2021 at 6:42 AM

## 2021-08-18 NOTE — PROGRESS NOTES
Hospitalist Progress Note  [facility]     Patient: Kymberly Wu  : 1950  MRN: [id number]  Code Status: Full Code    Hospital Day: 1   Date of Service: 2021    Subjective:   Patient seen and examined. Remains encephalopathic. No acute distress. Past Medical History:   Diagnosis Date    CAD (coronary artery disease)     Gout     Hypertension     Hypotension 2020    Non-ST elevation MI (NSTEMI) ([address] 14    Palliative care patient 2020    Pneumonia due to infectious organism 2020       Past Surgical History:   Procedure Laterality Date    CARDIAC CATHETERIZATION  14  [address]    angioplasty, stent to LAD.  EF 45%    CHOLECYSTECTOMY      FEMUR FRACTURE SURGERY Right 10/30/2020    TFN, SHORT performed by Jennifer Crowley MD at [facility] OR       Family History   Problem Relation Age of Onset    No Known Problems Mother     Heart Disease Father        Social History     Socioeconomic History    Marital status:      Spouse name: Not on file    Number of children: Not on file    Years of education: Not on file    Highest education level: Not on file   Occupational History    Not on file   Tobacco Use    Smoking status: Former Smoker     Packs/day: 1.00     Years: 3.00     Pack years: 3.00     Types: Cigars     Start date:      Quit date:      Years since quittin.6    Smokeless tobacco: Never Used   Vaping Use    Vaping Use: Never used   Substance and Sexual Activity    Alcohol use: Not Currently    Drug use: No    Sexual activity: Yes     Partners: Female   Other Topics Concern    Not on file   Social History Narrative    Not on file     Social Determinants of Health     Financial Resource Strain: Low Risk     Difficulty of Paying Living Expenses: Not hard at all   Food Insecurity: No Food Insecurity    Worried About [address] in the Last Year: Never true    [address] [employer] in the Last Year: Never true   Transportation Needs:     Lack of Transportation (Medical):      Lack of Transportation (Non-Medical):    Physical Activity:     Days of Exercise per Week:     Minutes of Exercise per Session:    Stress:     Feeling of Stress :    Social Connections:     Frequency of Communication with Friends and Family:     Frequency of Social Gatherings with Friends and Family:     Attends [employer] Services:     Active Member of Clubs or Organizations:     Attends Club or Organization Meetings:     Marital Status:    Intimate Partner Violence:     Fear of Current or Ex-Partner:     Emotionally Abused:     Physically Abused:     Sexually Abused:        Current Facility-Administered Medications   Medication Dose Route Frequency Provider Last Rate Last Admin    sodium chloride flush 0.9 % injection 5-40 mL  5-40 mL Intravenous 2 times per day Poncho Quintero MD   10 mL at 08/18/21 1010    sodium chloride flush 0.9 % injection 5-40 mL  5-40 mL Intravenous PRN Poncho Quintero MD        0.9 % sodium chloride infusion  25 mL Intravenous PRN Poncho Quintero MD        LORazepam (ATIVAN) tablet 1 mg  1 mg Oral Q1H PRN Poncho Quintero MD        Or    LORazepam (ATIVAN) injection 1 mg  1 mg Intravenous Q1H PRN Poncho Quintero MD        Or    LORazepam (ATIVAN) tablet 2 mg  2 mg Oral Q1H PRN Poncho Quintero MD        Or    LORazepam (ATIVAN) injection 2 mg  2 mg Intravenous Q1H PRN Poncho Quintero MD   2 mg at 08/18/21 1010    Or    LORazepam (ATIVAN) tablet 3 mg  3 mg Oral Q1H PRN Poncho Quintero MD        Or    LORazepam (ATIVAN) injection 3 mg  3 mg Intravenous Q1H PRN Poncho Quintero MD        Or    LORazepam (ATIVAN) tablet 4 mg  4 mg Oral Q1H PRN Poncho Quintero MD        Or    LORazepam (ATIVAN) injection 4 mg  4 mg Intravenous Q1H PRN Poncho Quintero MD        niCARdipine (CARDENE) 25 mg in sodium chloride 0.9 % 250 mL infusion  3-15 mg/hr Intravenous Continuous Cecy Pastor DO        amiodarone (CORDARONE) tablet 200 mg 200 mg Oral Daily Akira Hikes, DO   200 mg at 08/18/21 0570    atorvastatin (LIPITOR) tablet 40 mg  40 mg Oral Daily Akira Hikes, DO   40 mg at 08/18/21 9367    levothyroxine (SYNTHROID) tablet 125 mcg  125 mcg Oral Daily Akira Hikes, DO   125 mcg at 08/18/21 6408    metoprolol succinate (TOPROL XL) extended release tablet 25 mg  25 mg Oral Daily Akira Hikes, DO   25 mg at 08/18/21 0749    pantoprazole (PROTONIX) tablet 40 mg  40 mg Oral QAM AC Akira Hikes, DO   40 mg at 08/18/21 8783    ondansetron (ZOFRAN-ODT) disintegrating tablet 4 mg  4 mg Oral Q8H PRN Akira Hikes, DO        Or    ondansetron [facility]) injection 4 mg  4 mg Intravenous Q6H PRN Akira Hikes, DO        magnesium hydroxide (MILK OF MAGNESIA) 400 MG/5ML suspension 30 mL  30 mL Oral Daily PRN Akira Hikes, DO        acetaminophen (TYLENOL) tablet 650 mg  650 mg Oral Q6H PRN Akira Hikes, DO        Or    acetaminophen (TYLENOL) suppository 650 mg  650 mg Rectal Q6H PRN Akira Hikes, DO             sodium chloride      niCARdipine          Objective:   /62   Pulse 84   Temp 99 °F (37.2 °C) (Temporal)   Resp (!) 31   Ht 5' 8\" (1.727 m)   Wt 191 lb 8 oz (86.9 kg)   SpO2 95%   BMI 29.12 kg/m²     General: no acute distress  HEENT: normocephalic  Neck: supple, symmetrical, trachea midline   Lungs: clear to auscultation bilaterally   Cardiovascular: s1 and s2 normal  Abdomen: soft, positive bowel sounds  Extremities: no edema or cyanosis   Neuro: encephalopathic, unable to cooperate with exam  Skin: normal color and texture     Recent Labs     08/17/21  1433 08/18/21  0846   WBC 13.4* 14.7*   RBC 4.09* 3.85*   HGB 12.7* 12.0*   HCT 39.3* 35.2*   MCV 96.1* 91.4   MCH 31.1* 31.2*   MCHC 32.3* 34.1    201     Recent Labs     08/17/21  1433 08/18/21  0846    136   K 3.9 3.5   ANIONGAP 19 17    100   CO2 19* 19*   BUN 17 14   CREATININE 1.5* 1.2 GLUCOSE 94 132*   CALCIUM 9.2 8.9     No results for input(s): MG, PHOS in the last 72 hours. Recent Labs     08/17/21  1433 08/18/21  0846   AST 42* 50*   ALT 28 28   BILITOT 0.9 2.3*   ALKPHOS 144* 123     No results for input(s): PH, PO2, PCO2, HCO3, BE, O2SAT in the last 72 hours. No results for input(s): TROPONINI in the last 72 hours. Recent Labs     08/17/21  1632 08/18/21  0846   INR 1.27* 1.22*     No results for input(s): LACTA in the last 72 hours. Intake/Output Summary (Last 24 hours) at 8/18/2021 1252  Last data filed at 8/18/2021 1200  Gross per 24 hour   Intake 1194 ml   Output 2535 ml   Net -1341 ml       XR PELVIS (1-2 VIEWS)    Result Date: 8/17/2021  XR PELVIS (1-2 VIEWS) 8/17/2021 6:02 PM History: Fall injury. Right leg pain. Pelvis, one view. Osteopenia. Intact pelvic ring. Prominent fecal material within the rectum. Diffuse arterial calcification. Moderate degenerative change within the lower lumbar spine. Hardware fixation of the right hip. There is high-grade osteoarthritic change with spurring and complete loss of the superior hip joint space on the right side. No fracture. 1. No acute fracture. Signed by Dr Sarah Smalls    XR HAND RIGHT (MIN 3 VIEWS)    Result Date: 8/17/2021  Exam:   XR HAND RIGHT (MIN 3 VIEWS)  Date:  8/17/2021 History:  Male, age  73 years; fall COMPARISON:  None. Findings : Demineralized skeleton limits bony details. There is an oblique lucency in cortical step-off involving the third proximal phalanx, consistent with a recent (acute to subacute) mildly displaced fracture. Probable intra-articular extension although difficult to evaluate due to positioning of the fingers. Incompletely visualized plate and screw construct in the distal radius. Old ulnar styloid avulsion fracture. Impression: Recent mildly displaced fracture of the third proximal phalanx.  Signed by Dr Tyrone Baker (MIN 2 VIEWS)    Result Date: 8/17/2021  Exam: XR FEMUR RIGHT (MIN 2 VIEWS)  Date:  8/17/2021 History:  Male, age  73 years; fall COMPARISON:  Pelvis radiograph dated October 30, 2020. Findings : Right femur intramedullary dmitriy, without hardware loosening or failure identified. Redemonstration of findings of avascular necrosis with a large defect along the upper outer aspect of the femoral head. Advanced arthropathy of the knee joint. There is no acute displaced fracture. No dislocation. No suspicious lytic or blastic lesion. No radiopaque foreign body. Impression: No acute osseous pathology. Signed by Dr Chelly Wild (1-2 VIEWS)    Result Date: 8/17/2021  Exam:   XR KNEE RIGHT (1-2 VIEWS)  Date:  8/17/2021 History:  Male, age  73 years; swelling and abrasion. COMPARISON:  None. Findings : Demineralization of the skeleton limits bony details. Suprapatellar effusion. Advanced arthropathy tricompartmental knee. Probable loose bodies in the joint space. There is no acute displaced fracture. No dislocation. No suspicious lytic or blastic lesion. No radiopaque foreign body. Vascular calcifications. Impression: No acute displaced fracture or dislocation. Advanced tricompartmental arthropathy. Suprapatellar effusion. Signed by Dr Adali Barillas    Result Date: 8/18/2021  Examination. CT HEAD WO CONTRAST 8/18/2021 5:17 AM History: Intracranial hemorrhage. Follow-up. DLP: 834 mGycm. The CT scan of the head is performed without intravenous contrast enhancement. The images are acquired in axial plane with subsequent reconstruction in coronal and sagittal plane. The comparison is made with the previous study dated 8/17/2021. There is left thalamic hematoma which measures 2.7 x 2.4 x 1.7 cm and have not significantly changed since the previous study. There is extension of hemorrhage into the third ventricle and the posterior aspect of the body of the left lateral ventricle and the left occipital horn. This is unchanged.  There is no midline shift. No further extension of the hemorrhage. Moderate the dilated ventricles are similar to the previous study. Prominent cortical sulci and basal cisterns suggestive chronic volume loss. The scattered areas chronic white matter ischemia in the centrum semiovale bilaterally are similar to the previous study. The images reviewed in bone window show no evidence of a fracture. The visualized paranasal sinuses and mastoid air cells are clear. A persistent and unchanged left ophthalmic/basal ganglia intraparenchymal hemorrhage extending into the third and lateral ventricle as detailed above. No midline shift. Chronic ischemic and atrophic changes. Signed by Dr Elpidio Vail    Result Date: 8/17/2021  CT HEAD WO CONTRAST 8/17/2021 4:11 PM HISTORY: Altered mental status COMPARISON: CT scan dated 8/20/2015 DOSE LENGTH PRODUCT: 1279 mGy cm TECHNIQUE: Helical tomographic images of the brain were obtained without the use of intravenous contrast. Automated exposure control was also utilized to decrease patient radiation dose. FINDINGS: There is a 2.7 x 2.5 x 1.8 cm left basal intraparenchymal hemorrhage. Intraventricular hemorrhage extension is also identified with blood product identified in the posterior third ventricles as well as layering within the occipital horns of both lateral ventricles. Ventricles do not appear appreciably dilated although there is some mass effect on the third ventricle. There is no midline shift. Posterior fossa structures are unremarkable. No subdural collections are identified. Scalp and calvarium are intact. 1. 2.7 cm left basal ganglia intraparenchymal hemorrhage with intraventricular hemorrhage extension, layering in the posterior third ventricle and occipital horns. Ventricles do not appear dilated at this time.  The results of this exam were discussed with Dr. Carmen Muro on 8/17/2021 at 1622 hours Signed by Dr Fanny Grimm

## 2021-08-18 NOTE — PROGRESS NOTES
[facility] Neurosurgery Critical Care Note        CHIEF COMPLAINT:  AMS, intracranial hemorrhage      INTERVAL UPDATE:  No overnight events. He remains expressively-aphasic. CT this AM stable, no expansion of hemorrhage or interval ventricular enlargement. Nurses note he is becoming tremulous and are concerned for alcohol withdrawal.  He remains on nicardipine for BP control. HPI:  The patient is a 73 y.o. male with a history of CHF, HTN, and atrial fibrillation on Eliquis who presented to the [facility] ED with altered mental status and a presumed fall. He was and remains an unreliable historian and is unable to express himself in any meaningful way. When asked, he does endorse a headache and denies pain elsewhere. A CT of his head was done on arrival to the ED and this revealed an ~2.5cm left thalamic hemorrahge with slight intraventricular extension. He has been hypertensive since arrival and has been admitted to the intensive care unit and I have been asked to provide neurosurgical consultation. He has been given K-centra to reverse his Eliquis. By report, he also has a history of heavy alcohol use. PHYSICAL EXAM:    Vitals:    08/18/21 0700   BP: 134/67   Pulse: 89   Resp: 22   Temp:    SpO2: 96%       Constitutional: Appears well-developed and well-nourished. Eyes  conjunctiva normal.  Pupils react to light  Ear, nose,throat -Normal pinna and tragus, No scars, or lesions over external nose or ears, no obvious atrophy of tongue  Neck- symmetric, trachea midline, no jugular vein distension, no thyromegaly  Respiration- chest wall symmetric, normal effort without use of accessory muscles  Musculoskeletal  no significant wasting of muscles noted, no bony deformities, symmetric bulk  Extremities- no clubbing, cyanosis or edema  Skin  warm, dry, and intact. No rash,erythema, or pallor.   Psychiatric  mood, affect, and behavior appear normal.       NEUROLOGIC EXAM:    MENTAL STATUS: Awake, alert, spend >35 minutes engaged in critical care of this patient. This includes review of EMR data, imaging, bedside evaluation, patient/family counseling and coordination of care with nursing, providers and consultants.       Gustavo Betancourt MD

## 2021-08-18 NOTE — CONSULTS
[facility] Neurosurgery Consultation        REASON FOR CONSULTATION:  Altered mental status, ICH      HISTORY OF PRESENT ILLNESS:      The patient is a 73 y.o. male with a history of CHF, HTN, and atrial fibrillation on Eliquis who presented to the [facility] ED with altered mental status and a presumed fall. He was and remains an unreliable historian and is unable to express himself in any meaningful way. When asked, he does endorse a headache and denies pain elsewhere. A CT of his head was done on arrival to the ED and this revealed an ~2.5cm left thalamic hemorrahge with slight intraventricular extension. He has been hypertensive since arrival and has been admitted to the intensive care unit and I have been asked to provide neurosurgical consultation. He has been given K-centra to reverse his Eliquis. By report, he also has a history of heavy alcohol use. MEDICAL HISTORY:             Past Medical History:   Diagnosis Date    CAD (coronary artery disease)     Gout     Hypertension     Hypotension 6/23/2020    Non-ST elevation MI (NSTEMI) ([facility] [address] 12/28/14    Palliative care patient 06/29/2020    Pneumonia due to infectious organism 6/24/2020       Past Surgical History:   Procedure Laterality Date    CARDIAC CATHETERIZATION  12/29/14  [facility]    angioplasty, stent to LAD.  EF 45%    CHOLECYSTECTOMY      FEMUR FRACTURE SURGERY Right 10/30/2020    TFN, SHORT performed by Brien Richardson MD at [address] [place] [address] Facility-Administered Medications:     amiodarone (CORDARONE) tablet 200 mg, 200 mg, Oral, Daily, Jose Grayson DO    atorvastatin (LIPITOR) tablet 40 mg, 40 mg, Oral, Daily, Jose Grayson DO    levothyroxine (SYNTHROID) tablet 125 mcg, 125 mcg, Oral, Daily, Jose Grayson DO    metoprolol succinate (TOPROL XL) extended release tablet 25 mg, 25 mg, Oral, Daily, Jose Grayson DO    [START ON 8/18/2021] pantoprazole (PROTONIX) tablet 40 mg, 40 mg, Oral, QAM AC, Corinda Chin Maximino Krishnamurthy, DO    sodium chloride flush 0.9 % injection 10 mL, 10 mL, Intravenous, 2 times per day, Mateo Marking, DO    sodium chloride flush 0.9 % injection 10 mL, 10 mL, Intravenous, PRN, Mateo Marking, DO    0.9 % sodium chloride infusion, 25 mL, Intravenous, PRN, Mateo Marking, DO    ondansetron (ZOFRAN-ODT) disintegrating tablet 4 mg, 4 mg, Oral, Q8H PRN **OR** ondansetron (ZOFRAN) injection 4 mg, 4 mg, Intravenous, Q6H PRN, Mateo Marking, DO    magnesium hydroxide (MILK OF MAGNESIA) 400 MG/5ML suspension 30 mL, 30 mL, Oral, Daily PRN, Mateo Marking, DO    acetaminophen (TYLENOL) tablet 650 mg, 650 mg, Oral, Q6H PRN **OR** acetaminophen (TYLENOL) suppository 650 mg, 650 mg, Rectal, Q6H PRN, Mateo Marking, DO    niCARdipine (CARDENE) 25 mg in dextrose 5 % 250 mL infusion, 3-15 mg/hr, Intravenous, Continuous, Mateo Marking, DO, Last Rate: 75 mL/hr at 21, 7.5 mg/hr at 21    Allergies:  Patient has no known allergies. Social History:   Social History     Tobacco Use   Smoking Status Former Smoker    Packs/day: 1.00    Years: 3.00    Pack years: 3.00    Types: Cigars    Start date:     Quit date:     Years since quittin.6   Smokeless Tobacco Never Used     Social History     Substance and Sexual Activity   Alcohol Use Not Currently         Family History:   Family History   Problem Relation Age of Onset    No Known Problems Mother     Heart Disease Father        REVIEW OF SYSTEMS:  Review of Systems   Unable to perform ROS: Mental status change       PHYSICAL EXAM:    Vitals:    21   BP: (!) 173/79   Pulse: 71   Resp: 15   Temp:    SpO2: 95%       Constitutional: Appears well-developed and well-nourished.    Eyes  conjunctiva normal.  Pupils react to light  Ear, nose,throat -Normal pinna and tragus, No scars, or lesions over external nose or ears, no obvious atrophy of tongue  Neck- symmetric, trachea midline, no jugular vein distension, no thyromegaly  Respiration- chest wall symmetric, normal effort without use of accessory muscles  Musculoskeletal  no significant wasting of muscles noted, no bony deformities, symmetric bulk  Extremities- no clubbing, cyanosis or edema  Skin  warm, dry, and intact. No rash,erythema, or pallor. Psychiatric  mood, affect, and behavior appear normal.       NEUROLOGIC EXAM:    MENTAL STATUS: Awake, alert, regards, dense expressive aphasia with word salad. Will follow commands.     CRANIAL NERVES: PERRL, EOMI, symmetric facies, tongue midline      MOTOR:     Right Upper Extremity:    Deltoid: 4/5  Biceps: 4/5  Triceps: 4/5  : 4/5    Left Upper Extremity:    Deltoid: 5/5  Biceps: 5/5  Triceps: 5/5  : 5/5    Right Lower Extremity:    Hip Flexion: 4-/5  Knee Extension: 4-/5  Ankle Plantarflexion: 4-/5  Ankle Dorsiflexion: 4-/5      Left Lower Extremity:    Hip Flexion: 5/5  Knee Extension: 5/5  Ankle Plantarflexion: 5/5  Ankle Dorsiflexion: 5/5      SOMATOSENSORY:     Right Upper Extremity: normal light touch sensation  Left Upper Extremity: normal light touch sensation  Right Lower Extremity: normal light touch sensation  Left Lower Extremity: normal light touch sensation      REFLEXES:    Biceps: 1+  Patella: 1+      Kelley's: Negative  Clonus: Negative        DATA:    Height: 5' 8\" (1.727 m), Weight: 200 lb (90.7 kg), BP: (!) 173/79      Lab Results   Component Value Date    WBC 13.4 (H) 08/17/2021    HGB 12.7 (L) 08/17/2021    HCT 39.3 (L) 08/17/2021    MCV 96.1 (H) 08/17/2021     08/17/2021     Lab Results   Component Value Date     08/17/2021    K 3.9 08/17/2021     08/17/2021    CO2 19 (L) 08/17/2021    BUN 17 08/17/2021    CREATININE 1.5 (H) 08/17/2021    GLUCOSE 94 08/17/2021    CALCIUM 9.2 08/17/2021    PROT 7.4 08/17/2021    LABALBU 3.9 08/17/2021    BILITOT 0.9 08/17/2021    ALKPHOS 144 (H) 08/17/2021    AST 42 (H) 08/17/2021    ALT 28 08/17/2021 LABGLOM 46 (A) 08/17/2021    GFRAA 56 (L) 08/17/2021     Lab Results   Component Value Date    INR 1.27 (H) 08/17/2021    INR 1.37 (H) 11/27/2020    INR 2.27 (H) 10/31/2020    PROTIME 16.1 (H) 08/17/2021    PROTIME 16.9 (H) 11/27/2020    PROTIME 25.5 (H) 10/31/2020       XR PELVIS (1-2 VIEWS)    Result Date: 8/17/2021  1. No acute fracture. Signed by Dr Jonatan Bautista    XR HAND RIGHT (MIN 3 VIEWS)    Result Date: 8/17/2021  Impression: Recent mildly displaced fracture of the third proximal phalanx. Signed by Dr Meena King (MIN 2 VIEWS)    Result Date: 8/17/2021  Impression: No acute osseous pathology. Signed by Dr Aliyah Martini (1-2 VIEWS)    Result Date: 8/17/2021  Impression: No acute displaced fracture or dislocation. Advanced tricompartmental arthropathy. Suprapatellar effusion. Signed by Dr Siobhan Jimenez    Result Date: 8/17/2021  1. 2.7 cm left basal ganglia intraparenchymal hemorrhage with intraventricular hemorrhage extension, layering in the posterior third ventricle and occipital horns. Ventricles do not appear dilated at this time. The results of this exam were discussed with Dr. Rose Asher on 8/17/2021 at 1622 hours Signed by Dr Elida Crandall    XR CHEST PORTABLE    Result Date: 8/17/2021  Impression: No acute cardiopulmonary disease. Signed by Dr Justo Kulkarni:    My interpretation of imaging studies:  I agree with the radiologist.  ~2.5 cm left thalamic hemorrhage with a small amount of intraventricular extension. No ventricular enlargement to suggest hydrocephalus. ASSESSMENT AND PLAN:  This is a 73 y.o. male who presents to the [facility] ED with altered mental status, aphasia and right hemiparesis in the setting of an acute left thalamic hemorrhage with intraventricular extension. He is awake and alert but with nonsensical speech.   Prior to admission he was anticoagulated with Eliquis and this has been reversed with K-centra.   The location of his hemorrhage is not amenable to any neurosurgical intervention but he will need close observation in the ICU for blood pressure control and monitoring for development of hydrocephalus.    - Agree with ICU admission  - No plans for neurosurgical intervention  - Continue to hold all anticoagulant and antiplatelet medications  - Strict BP control with nicardipine, SBP < 160 mmHg  - Repeat CT head in AM  - Observe for symptoms of alcohol withdrawal, would avoid Ativan if possible to maintain best possible neurologic exam.  - Will follow along in consultation            Rita Trujillo MD

## 2021-08-18 NOTE — CONSULTS
Palliative Care Consult Note    8/18/2021 2:00 PM  Subjective:  Admit Date: 8/17/2021  PCP: Mila Cuevas MD    Date of Service: 8/18/2021    Reason for Consultation:  Goals of Care, Code Status, Family Support     History Obtained From: EMR/Patient and their Family    History Of Present Illness: The patient is a 73 y.o. male with PMH CAD, HTN, HLD, nonischemic cardiomyopathy, chronic systolic CHF, paroxysmal atrial fibrillation anticoagulated w/ Eliquis, alcohol abuse, who presented to [address] [place] [address] ED on 08/17/2021 for altered mental status. Patient was unable to provide history. A left frontal contusion was noted on exam. CT head concerning for a 2.7 cm left basal ganglia intraparenchymal hemorrhage extending into the third and lateral ventricle. Patient was admitted to Hospitalist service with consult placed to Neurosurgery who recommended close monitoring. Patient did receive Kcentra in the emergency department. Repeat head CT stable this AM.  Patient did start showing signs/symptoms of alcohol withdrawal. WA protocol in place. Palliative care was consulted to assist goals of care. Past Medical History:        Diagnosis Date    CAD (coronary artery disease)     Gout     Hypertension     Hypotension 6/23/2020    Non-ST elevation MI (NSTEMI) ([facility] [address] 12/28/14    Palliative care patient 06/29/2020    Pneumonia due to infectious organism 6/24/2020     Past Surgical History:        Procedure Laterality Date    CARDIAC CATHETERIZATION  12/29/14  [email or web address]    angioplasty, stent to LAD. EF 45%    CHOLECYSTECTOMY      FEMUR FRACTURE SURGERY Right 10/30/2020    TFN, SHORT performed by Sharmin Chaves MD at [facility]  [facility] Medications:  Prior to Admission medications    Medication Sig Start Date End Date Taking?  Authorizing Provider   atorvastatin (LIPITOR) 40 MG tablet TAKE 1 TABLET BY MOUTH ONCE DAILY 8/17/21   ADY Brown NP   amiodarone (CORDARONE) 200 MG tablet TAKE 1 TABLET BY MOUTH ONCE DAILY 8/17/21 Marquise Ellis, APRN - NP   ELIQUIS 5 MG TABS tablet TAKE 1 TABLET BY MOUTH TWICE DAILY. 8/2/21   Jomar Enriquez Helder APRN - CNP   levothyroxine (SYNTHROID) 125 MCG tablet TAKE 1 TABLET BY MOUTH ONCE DAILY 8/2/21   Ramona Smith MD   lisinopril (PRINIVIL;ZESTRIL) 40 MG tablet Take 1 tablet by mouth daily 7/22/21   Feliciano Kaufman DO   metoprolol succinate (TOPROL XL) 25 MG extended release tablet TAKE 1/2 TABLET BY MOUTH DAILY 7/19/21   Ramona Smith MD   indomethacin (INDOCIN) 25 MG capsule Take 1 capsule by mouth every 4 hours as needed for Pain 6/30/21 7/30/21  Ramona Smith MD   aspirin 81 MG EC tablet Take 81 mg by mouth daily    Historical Provider, MD   pantoprazole (PROTONIX) 40 MG tablet TAKE (1) TABLET DAILY IN THE MORNING (BEFORE BREAKFAST) 3/16/21   ADY Trevino     Allergies:    Patient has no known allergies. Social History:    The patient currently lives at home  Tobacco:   reports that he quit smoking about 42 years ago. His smoking use included cigars. He started smoking about 45 years ago. He has a 3.00 pack-year smoking history. He has never used smokeless tobacco.  Alcohol:   reports previous alcohol use. Illicit Drugs: Unknown    Family History:      Problem Relation Age of Onset    No Known Problems Mother     Heart Disease Father      Review of Systems:   Unable to obtain full ROS 2/2 altered mental status     Physical Examination:  /62   Pulse 84   Temp 99 °F (37.2 °C) (Temporal)   Resp (!) 31   Ht 5' 8\" (1.727 m)   Wt 191 lb 8 oz (86.9 kg)   SpO2 95%   BMI 29.12 kg/m²   General appearance: 74 yo male, ill appearing, no acute distress  Head: Normocephalic, without obvious abnormality, atraumatic  Eyes: conjunctivae/corneas clear. PERRL, EOM's intact.    Ears: normal external ears and nose, throat without exudate  Neck: no adenopathy, no carotid bruit, no JVD, supple, symmetrical, trachea midline   Lungs: clear to auscultation bilaterally,no rales or wheezes   Heart: regular rate and rhythm, S1, S2 normal, no murmur  Abdomen: soft, non-tender; non-distended, normal bowel sounds no masses, no organomegaly  Extremities: No lower extremity edema,  No erythema, no tenderness to palpation  Skin: Skin color, texture, turgor normal. No rashes or lesions  Lymphatic: No palpable lymph node enlargment  Neurologic: Somnolent, easily awakened, disoriented, unable to answer questions appropriately or follow commands   Psychiatric: Anxious     Diagnostic Data:  CBC:  Recent Labs     08/17/21  1433 08/18/21  0846   WBC 13.4* 14.7*   HGB 12.7* 12.0*   HCT 39.3* 35.2*    201     BMP:  Recent Labs     08/17/21  1433 08/18/21  0846    136   K 3.9 3.5    100   CO2 19* 19*   BUN 17 14   CREATININE 1.5* 1.2   CALCIUM 9.2 8.9     Recent Labs     08/17/21  1433 08/18/21  0846   AST 42* 50*   ALT 28 28   BILITOT 0.9 2.3*   ALKPHOS 144* 123     Coag Panel:   Recent Labs     08/17/21  1632 08/18/21  0846   INR 1.27* 1.22*   PROTIME 16.1* 15.6*   APTT 27.0 32.6     Cardiac Enzymes:   Recent Labs     08/17/21  1433   CKTOTAL 164     Urinalysis:  Lab Results   Component Value Date    NITRU Negative 08/17/2021    WBCUA 3 08/17/2021    BACTERIA NEGATIVE 08/17/2021    RBCUA 3 08/17/2021    BLOODU TRACE 08/17/2021    SPECGRAV 1.016 08/17/2021    GLUCOSEU Negative 08/17/2021     XR PELVIS (1-2 VIEWS)  1. No acute fracture. Signed by Dr Abdiaziz Lebron    XR HAND RIGHT (MIN 3 VIEWS)  Impression: Recent mildly displaced fracture of the third proximal phalanx. Signed by Dr Albert Covershannan (MIN 2 VIEWS)  Impression: No acute osseous pathology. Signed by Dr Alyssa Hickman    XR KNEE RIGHT (1-2 VIEWS)  Impression: No acute displaced fracture or dislocation. Advanced tricompartmental arthropathy. Suprapatellar effusion.  Signed by Dr Arman Oppenheim  A persistent and unchanged left ophthalmic/basal ganglia intraparenchymal hemorrhage extending into the third and lateral ventricle as detailed above. No midline shift. Chronic ischemic and atrophic changes. Signed by Dr Jazmin Guzman  1. 2.7 cm left basal ganglia intraparenchymal hemorrhage with intraventricular hemorrhage extension, layering in the posterior third ventricle and occipital horns. Ventricles do not appear dilated at this time. The results of this exam were discussed with  [facility] on 8/17/2021 at 1622 hours Signed by Dr Smitha Cortez    XR CHEST PORTABLE  Impression: No acute cardiopulmonary disease. Signed by Dr Ewa Ribeiro    Palliative Performance Score: 60% at baseline    Palliative Review of Advance Directives:     Surrogate Decision Maker: Yes Kelsey Esquivel    Durable Power of : No    Advanced Directives/Living Doming[place]: No    Out of hospital medical orders in place to reflect resuscitation status (MOLST/POLST): No    Information Sharing:  Patient's awareness of illness:  [] Terminal [] Life-Threatening [] Serious [] Non life-threatening [] Not serious   [x] Not discussed    Family awareness of illness:   [] Terminal [] Life-Threatening [x] Serious [] Nonlife-threatening [] Not serious   [] Not discussed    Assessment/Plan:  Principal Problem:    Intracerebral hemorrhage, nontraumatic (HCC)  Active Problems:    CAD (coronary artery disease)    Essential hypertension    Alcohol abuse    Palliative care patient    Chronic systolic heart failure (HCC)    Nonischemic cardiomyopathy (HCC)    Mixed hyperlipidemia    Paroxysmal atrial fibrillation (HCC)    GERD (gastroesophageal reflux disease)    Coronary artery disease involving native heart with angina pectoris, unspecified vessel or lesion type ([facility] [address]  Resolved Problems:    * No resolved hospital problems. *     Visit Summary:  Chart reviewed, patient discussed with consulting service and nursing staff. Reviewed health issues, work up and treatment plan as well as factors that lead to hospitalization. This DEO saw Mr. Wally Glasgow at bedside. No family present. He just received Ativan due to concerns of alcohol withdrawal. He is unable to provide any meaningful history at this point in time. I did call his sister, Michael Powell, who is his primary decision maker. Michael Powell states that at baseline Mr. Wally Glasgow is able to care for himself, however, chooses not to. He has a caregiver that visits a few times a week. Michael Powell states Mr. You drinks about 2L of hard liquor weekly. She plans to visit from [place], [place] on Friday to assist with decision making. She states he has no POA or living will. She understands he will likely need rehab after this hospitalization. Support/comfort offered. Recommendations:     1. Palliative Care-GOC to be determined by hospital course. Code status: FULL CODE. Patient's sister, Michael Powell, is his decision maker. She states patient has no children, is not . Will continue to follow  2. Intraparenchymal hemorrhage -Neurosurgery following, stable, conservative management, Strict BP control, Nicardipine ordered   3. Non-ischemic cardiomyopathy/CAD/CHF/paroxysmal atrial fibrillation-all anticoagulants/antiplatelets held at this time. D/w Michael Powell  4. Alcohol abuse-CIWA protocol, mgmt per Hospitalist     Thank you for consulting palliative care and allowing us to participate in the care of the patient.       CounselingTopics: Goals of care, Code Status, Disease process education, pt/family support    Time Spent Counseling > 50%:  YES                                   Total Time Spent with patient/family counseling, workup/treatment review, counseling and placement of orders/preparation of this note: 75 minutes    Electronically signed by Uri Soto PA-C on 8/18/2021 at 2:00 PM    (Please note that portions of this note were completed with a voice recognition program.  Efforts were made to edit the dictations but occasionally words are mis-transcribed.)

## 2021-08-18 NOTE — PROGRESS NOTES
Spoke with sister Alexis Jara and gave an update. Also spoke with Sergio Francois with permission from patient and gave an update on patient's condition. Was told about patient's alcohol intake and let the neuro surgeon know. He said we will hold off on the ciwa for now so we do not mask the symptoms of the stroke.     Electronically signed by Steven Gutierrez RN on 8/17/2021 at 7:40 PM

## 2021-08-18 NOTE — CONSULTS
**Physician Signature**  This document was electronically signed by: Anival Mullen DO  2021 11:55   AM    **Consult Information**  Member Facility: [address] [place] [address] Drive MRN: [id number]  Visit/Encounter Number: [id number]  Consult ID: [id number]  Facility Time Zone: CT  Date and Time of Request: 2021 08:36 AM  CT  Requesting Clinician: Ashley Elkins MD  Patient Name: Virginia Jimenez  YOB: 1950  Gender: Male  Patient identity was confirmed at the beginning of the consult with the   patient/family/staff using two personal identifiers: Patient name and       **Reason for Consult**  Reason for Consult: [facility]    **Admission**  Admission Date: 2021  Chief reason for ICU admission: Altered Mental Status  Other reason for ICU admission: 2.7 CM Left Basil Ganglia Bleed. **Core Metrics**  General orienting sentence for patient: 74 y/o male with a history of   multiple falls recently. Also has a history of heavy ETOH use. Found to   have a Basal Ganglia bleed. Currently, on a nicardipine gtt. Starting to   have signs of W/D. CIWA scoring started with Ativan.   Chief physiologic deterioration: Change in mental status  Is the patient on DVT prophylaxis?: Yes  Prophylaxis type: Mechanical  Is the patient on GI prophylaxis?: Yes  Gi Prophylaxis Comments: pantoprazole  Has this patient reached their nutritional goal?: No and issues are being   addressed  Are there current issues with pain management in this patient?:   No  Are there issues with skin integrity?: No  Are there issues with delirium?: No  Has the patient been mobilized?: No  Is this patient currently intubated?: No  Are there ethical or care philosophy or family issues?: No  Do you recommend an in depth evaluation?: No  Disposition Recommendations: Continue ICU level of care    **Physician Signature**  This document was electronically signed by: Anival Mullen DO  2021 11:55   AM

## 2021-08-19 ENCOUNTER — APPOINTMENT (OUTPATIENT)
Dept: CT IMAGING | Age: 71
DRG: 065 | End: 2021-08-19
Payer: MEDICARE

## 2021-08-19 LAB
ANION GAP SERPL CALCULATED.3IONS-SCNC: 13 MMOL/L (ref 7–19)
BASOPHILS ABSOLUTE: 0 K/UL (ref 0–0.2)
BASOPHILS RELATIVE PERCENT: 0.3 % (ref 0–1)
BUN BLDV-MCNC: 16 MG/DL (ref 8–23)
CALCIUM SERPL-MCNC: 8.6 MG/DL (ref 8.8–10.2)
CHLORIDE BLD-SCNC: 100 MMOL/L (ref 98–111)
CO2: 22 MMOL/L (ref 22–29)
CREAT SERPL-MCNC: 1.2 MG/DL (ref 0.5–1.2)
EOSINOPHILS ABSOLUTE: 0 K/UL (ref 0–0.6)
EOSINOPHILS RELATIVE PERCENT: 0.2 % (ref 0–5)
GFR AFRICAN AMERICAN: >59
GFR NON-AFRICAN AMERICAN: >60
GLUCOSE BLD-MCNC: 102 MG/DL (ref 74–109)
HCT VFR BLD CALC: 36.4 % (ref 42–52)
HEMOGLOBIN: 12 G/DL (ref 14–18)
IMMATURE GRANULOCYTES #: 0.1 K/UL
LYMPHOCYTES ABSOLUTE: 1.1 K/UL (ref 1.1–4.5)
LYMPHOCYTES RELATIVE PERCENT: 8.3 % (ref 20–40)
MAGNESIUM: 1.7 MG/DL (ref 1.6–2.4)
MCH RBC QN AUTO: 31.3 PG (ref 27–31)
MCHC RBC AUTO-ENTMCNC: 33 G/DL (ref 33–37)
MCV RBC AUTO: 95 FL (ref 80–94)
MONOCYTES ABSOLUTE: 0.7 K/UL (ref 0–0.9)
MONOCYTES RELATIVE PERCENT: 5.5 % (ref 0–10)
NEUTROPHILS ABSOLUTE: 11.3 K/UL (ref 1.5–7.5)
NEUTROPHILS RELATIVE PERCENT: 85.3 % (ref 50–65)
PDW BLD-RTO: 14.2 % (ref 11.5–14.5)
PLATELET # BLD: 193 K/UL (ref 130–400)
PMV BLD AUTO: 8.9 FL (ref 9.4–12.4)
POTASSIUM SERPL-SCNC: 3.6 MMOL/L (ref 3.5–5)
RBC # BLD: 3.83 M/UL (ref 4.7–6.1)
SODIUM BLD-SCNC: 135 MMOL/L (ref 136–145)
WBC # BLD: 13.3 K/UL (ref 4.8–10.8)

## 2021-08-19 PROCEDURE — 2580000003 HC RX 258: Performed by: INTERNAL MEDICINE

## 2021-08-19 PROCEDURE — 99232 SBSQ HOSP IP/OBS MODERATE 35: CPT | Performed by: PHYSICIAN ASSISTANT

## 2021-08-19 PROCEDURE — 83735 ASSAY OF MAGNESIUM: CPT

## 2021-08-19 PROCEDURE — 6370000000 HC RX 637 (ALT 250 FOR IP): Performed by: INTERNAL MEDICINE

## 2021-08-19 PROCEDURE — 85025 COMPLETE CBC W/AUTO DIFF WBC: CPT

## 2021-08-19 PROCEDURE — 2700000000 HC OXYGEN THERAPY PER DAY

## 2021-08-19 PROCEDURE — 6360000002 HC RX W HCPCS: Performed by: INTERNAL MEDICINE

## 2021-08-19 PROCEDURE — 80048 BASIC METABOLIC PNL TOTAL CA: CPT

## 2021-08-19 PROCEDURE — 99291 CRITICAL CARE FIRST HOUR: CPT | Performed by: NEUROLOGICAL SURGERY

## 2021-08-19 PROCEDURE — 36415 COLL VENOUS BLD VENIPUNCTURE: CPT

## 2021-08-19 PROCEDURE — 70450 CT HEAD/BRAIN W/O DYE: CPT

## 2021-08-19 PROCEDURE — 51798 US URINE CAPACITY MEASURE: CPT

## 2021-08-19 PROCEDURE — 2000000000 HC ICU R&B

## 2021-08-19 RX ADMIN — METOPROLOL SUCCINATE 25 MG: 25 TABLET, EXTENDED RELEASE ORAL at 08:35

## 2021-08-19 RX ADMIN — SODIUM CHLORIDE, PRESERVATIVE FREE 10 ML: 5 INJECTION INTRAVENOUS at 21:31

## 2021-08-19 RX ADMIN — THERA TABS 1 TABLET: TAB at 08:35

## 2021-08-19 RX ADMIN — FOLIC ACID 1 MG: 1 TABLET ORAL at 08:35

## 2021-08-19 RX ADMIN — ATORVASTATIN CALCIUM 40 MG: 40 TABLET, FILM COATED ORAL at 08:35

## 2021-08-19 RX ADMIN — THIAMINE HYDROCHLORIDE 100 MG: 100 INJECTION, SOLUTION INTRAMUSCULAR; INTRAVENOUS at 08:35

## 2021-08-19 RX ADMIN — AMIODARONE HYDROCHLORIDE 200 MG: 200 TABLET ORAL at 08:35

## 2021-08-19 RX ADMIN — SODIUM CHLORIDE, PRESERVATIVE FREE 10 ML: 5 INJECTION INTRAVENOUS at 08:34

## 2021-08-19 RX ADMIN — PANTOPRAZOLE SODIUM 40 MG: 40 TABLET, DELAYED RELEASE ORAL at 07:14

## 2021-08-19 RX ADMIN — LEVOTHYROXINE SODIUM 125 MCG: 125 TABLET ORAL at 08:35

## 2021-08-19 NOTE — PROGRESS NOTES
behavior appear normal.       NEUROLOGIC EXAM:    MENTAL STATUS: Somnolent, arouses to stimulation. Oriented to self and day, otherwise aphasic. Follows commands x4. CRANIAL NERVES: PERRL, EOMI, symmetric facies, tongue midline        MOTOR:     Right Upper Extremity:    4/5 with significant apraxia    Left Upper Extremity:    5/5    Right Lower Extremity:    3-4 / 5 with significant apraxia    Left Lower Extremity:    5/5      SOMATOSENSORY:     Right Upper Extremity: normal light touch sensation  Left Upper Extremity: normal light touch sensation  Right Lower Extremity: normal light touch sensation  Left Lower Extremity: normal light touch sensation      REFLEXES:    Kelley's: Negative  Clonus: Negative        DATA:      Lab Results   Component Value Date    WBC 13.3 (H) 08/19/2021    HGB 12.0 (L) 08/19/2021    HCT 36.4 (L) 08/19/2021    MCV 95.0 (H) 08/19/2021     08/19/2021     Lab Results   Component Value Date     (L) 08/19/2021    K 3.6 08/19/2021     08/19/2021    CO2 22 08/19/2021    BUN 16 08/19/2021    CREATININE 1.2 08/19/2021    GLUCOSE 102 08/19/2021    CALCIUM 8.6 (L) 08/19/2021    PROT 6.8 08/18/2021    LABALBU 3.9 08/18/2021    BILITOT 2.3 (H) 08/18/2021    ALKPHOS 123 08/18/2021    AST 50 (H) 08/18/2021    ALT 28 08/18/2021    LABGLOM >60 08/19/2021    GFRAA >59 08/19/2021     Lab Results   Component Value Date    INR 1.22 (H) 08/18/2021    PROTIME 15.6 (H) 08/18/2021         IMAGING:    My interpretation of imaging studies:  I agree with the radiologist.  As per HPI. ASSESSMENT AND PLAN:  This is a 73 y.o. male with a history of CHF, HTN and atrial fibrillation on Eliquis who was admitted to the intensive care unit on 8/17/2021 for treatment of an acute left thalamic IPH with intraventricular extension. His Eliquis was reversed with K-centra in the emergency department. He has a history of heavy EtOH use and has been receiving Ativan per [facility] protocol.   CT this AM shows increasing edema surrounding his thalamic IPH, no hydrocephalus. Neuro: Continue frequent neuro checks, avoid all anticoagulant and antiplatelet medications, repeat CT in AM  CV: Continue strict BP control, SBP < 160mmHg, wean nicardipine as tolerated  Pulmonary: Frequent pulmonary toilet, maintain SpO2 > 90%  GI: No active issues  /Renal: No active issues  Endocrine: No active issues  FEN: SLP eval, advance diet per recommendations, maintain euvolemia and monitor for electrolyte imbalance  Other: Continue ICU care, continue to monitor for development of hydrocephalus, continue to monitor for EtOH withdrawal with CIWA protocol, minimize Ativan if possible to maintain best-possible neurologic exam.  Begin PT/OT. I attest that I spend >35 minutes engaged in critical care of this patient. This includes review of EMR data, imaging, bedside evaluation, patient/family counseling and coordination of care with nursing, providers and consultants.       Zhou Love MD

## 2021-08-19 NOTE — CARE COORDINATION
Date / Time of Evaluation: 8/19/2021 4:25 PM  Assessment Completed by: IVAN Lopez    Patient Admission Status: Inpatient [101]    [address] [phone] (home)   Telephone Information:   Mobile [phone]       (Best Practice:  Have patient / caregiver verify above address and phone number by stating out loud their current address and reachable phone number.)  Is above information correct? yes      Current PCP:  Jeaneen Kussmaul, MD  PCP verified? yes    Initial Assessment Completed at bedside with:  Pt's daughterRobert by phone ; nurses advised talking with family at current status;     Emergency Contacts:  Extended Emergency Contact Information  Primary Emergency Contact: Leeanne Lujan [address] Phone: [phone]  Mobile Phone: [phone]  Relation: Brother/Sister  Secondary Emergency Contact: Gayla Nash  Mobile Phone: [phone]  Relation: Other    Advance Directives: Code Status:  Full Code    Financial:  Payor: Zulay Rangel / Plan: MEDICARE PART A AND B / Product Type: *No Product type* /     Pre-Cert required for SNF:  no    Have Long Term Care Insurance:  no    Pharmacy:   [place], [address] [facility], [address] [place] [address] [address] [place] [address] [phone]  [address] [place]  Phone: [phone] Fax: [phone]      Potential assistance purchasing medications?   no    ADLS:  Support System:   home alone; has sisters that live in [facility] area    [address] [place] [address] Ave.:  Home alone  Steps:  no    Plans to RETURN to current housing: not likely; needs 8th or SNF  Barriers to RETURNING to current housing:  Weakness/cognition    Currently ACTIVE with [address]:  no  [address]:  none    DME Provider:  none    Has a pulse oximetry unit at home: no    Had [address] [facility] prior to admission:  no      Active with HD/PD prior to admission: no  Nephrologist:  no  HD Center:  no    Transition Plan:  possible fall at home; brain bleed; etoh abuse; living environment? Transportation PLAN for Discharge:  Private vehicle vs EMS    Factors facilitating achievement of predicted outcomes: medical stability    Barriers to discharge:  Weakness/cog issues      Additional CM/SW Notes: SW placed call to Pt's sister, Rob Yap; she and her other sister are on their way here from [place]; she noted Pt doesn't have [facility] currently, has a person he pays to clean, pick-up groceries (and other IADLs); but nothing everyday; he has a friend, Therese Knapp that checks on him daily; and per Rob Yap he is also renting out a room to someone; but they aren't a c/g; Rob Yap noted he fx his hip approx 6 months ago and had to go to SNF; but has not been taking care of himself; she advised that even at his best, he didn't take care of himself physically; she noted he also drinks fairly heavily; she noted he uses a walker or a cane to get around; stated he doesn't even like to get dressed to walk to the mailbox; advised to have her contact SW or RN when they arrive tomorrow and we can discuss further (or SW on another floor if he moves rooms); 8th following           Damian and/or his family were provided with choice of provider.         Elena Husain [address] [place] [address]  Care Management Department  Ph:  [phone]   Fax: [phone]  Electronically signed by IVAN Husain on 8/19/2021 at 4:34 PM

## 2021-08-19 NOTE — PROGRESS NOTES
Hospitalist Progress Note  [facility]     Patient: Sharon Mcbride  : 1950  MRN: [id number]  Code Status: Karel Ruiz Day: 2   Date of Service: 2021    Subjective:   Patient seen and examined. Mental status slightly improved. Somnolent overall. No acute distress. Past Medical History:   Diagnosis Date    CAD (coronary artery disease)     Gout     Hypertension     Hypotension 2020    Non-ST elevation MI (NSTEMI) ([address] 14    Palliative care patient 2020    Pneumonia due to infectious organism 2020       Past Surgical History:   Procedure Laterality Date    CARDIAC CATHETERIZATION  14  [facility]    angioplasty, stent to LAD.  EF 45%    CHOLECYSTECTOMY      FEMUR FRACTURE SURGERY Right 10/30/2020    TFN, SHORT performed by Portia Stewart [facility] at [facility]       Family History   Problem Relation Age of Onset    No Known Problems Mother     Heart Disease Father        Social History     Socioeconomic History    Marital status:      Spouse name: Not on file    Number of children: Not on file    Years of education: Not on file    Highest education level: Not on file   Occupational History    Not on file   Tobacco Use    Smoking status: Former Smoker     Packs/day: 1.00     Years: 3.00     Pack years: 3.00     Types: Cigars     Start date:      Quit date:      Years since quittin.6    Smokeless tobacco: Never Used   Vaping Use    Vaping Use: Never used   Substance and Sexual Activity    Alcohol use: Not Currently    Drug use: No    Sexual activity: Yes     Partners: Female   Other Topics Concern    Not on file   Social History Narrative    Not on file     Social Determinants of Health     Financial Resource Strain: Low Risk     Difficulty of Paying Living Expenses: Not hard at all   Food Insecurity: No Food Insecurity    Worried About [address] [employer] [employer] in the Last Year: Never true    [address] in the Last Year: Stopped at 08/19/21 0300    thiamine (B-1) injection 100 mg  100 mg Intravenous Daily Judy Douglas MD   100 mg at 02/13/13 6916    folic acid (FOLVITE) tablet 1 mg  1 mg Oral Daily Judy Douglas MD   1 mg at 08/19/21 0138    multivitamin 1 tablet  1 tablet Oral Daily Judy Douglas MD   1 tablet at 08/19/21 2054    amiodarone (CORDARONE) tablet 200 mg  200 mg Oral Daily Stacey Chafe, DO   200 mg at 08/19/21 1301    atorvastatin (LIPITOR) tablet 40 mg  40 mg Oral Daily Stacey Chafe, DO   40 mg at 08/19/21 4652    levothyroxine (SYNTHROID) tablet 125 mcg  125 mcg Oral Daily Stacey Chafe, DO   125 mcg at 08/19/21 2957    metoprolol succinate (TOPROL XL) extended release tablet 25 mg  25 mg Oral Daily Stacey Chafe, DO   25 mg at 08/19/21 9767    pantoprazole (PROTONIX) tablet 40 mg  40 mg Oral QAM AC Stacey Chafe, DO   40 mg at 08/19/21 5840    ondansetron (ZOFRAN-ODT) disintegrating tablet 4 mg  4 mg Oral Q8H PRN Stacey Chafe, DO        Or    ondansetron [facility]) injection 4 mg  4 mg Intravenous Q6H PRN Stacey Chafe, DO        magnesium hydroxide (MILK OF MAGNESIA) 400 MG/5ML suspension 30 mL  30 mL Oral Daily PRN Stacey Chafe, DO        acetaminophen (TYLENOL) tablet 650 mg  650 mg Oral Q6H PRN Stacey Chafe, DO        Or    acetaminophen (TYLENOL) suppository 650 mg  650 mg Rectal Q6H PRN Stacey Chafe, DO             sodium chloride      niCARdipine Stopped (08/19/21 0300)        Objective:   BP (!) 113/54   Pulse 68   Temp 98.9 °F (37.2 °C) (Temporal)   Resp 22   Ht 5' 8\" (1.727 m)   Wt 193 lb 6.4 oz (87.7 kg)   SpO2 95%   BMI 29.41 kg/m²     General: no acute distress  HEENT: normocephalic  Neck: supple, symmetrical, trachea midline   Lungs: clear to auscultation bilaterally   Cardiovascular: s1 and s2 normal  Abdomen: soft, positive bowel sounds  Extremities: no edema or cyanosis   Neuro: somnolent  Skin: normal color and texture     Recent Labs     08/17/21  1433 08/18/21  0846 08/19/21  0139   WBC 13.4* 14.7* 13.3*   RBC 4.09* 3.85* 3.83*   HGB 12.7* 12.0* 12.0*   HCT 39.3* 35.2* 36.4*   MCV 96.1* 91.4 95.0*   MCH 31.1* 31.2* 31.3*   MCHC 32.3* 34.1 33.0    201 193     Recent Labs     08/17/21  1433 08/18/21  0846 08/19/21  0139    136 135*   K 3.9 3.5 3.6   ANIONGAP 19 17 13    100 100   CO2 19* 19* 22   BUN 17 14 16   CREATININE 1.5* 1.2 1.2   GLUCOSE 94 132* 102   CALCIUM 9.2 8.9 8.6*     Recent Labs     08/19/21  0139   MG 1.7     Recent Labs     08/17/21  1433 08/18/21  0846   AST 42* 50*   ALT 28 28   BILITOT 0.9 2.3*   ALKPHOS 144* 123     No results for input(s): PH, PO2, PCO2, HCO3, BE, O2SAT in the last 72 hours. No results for input(s): TROPONINI in the last 72 hours. Recent Labs     08/17/21  1632 08/18/21  0846   INR 1.27* 1.22*     No results for input(s): LACTA in the last 72 hours. Intake/Output Summary (Last 24 hours) at 8/19/2021 1133  Last data filed at 8/19/2021 1000  Gross per 24 hour   Intake 1162.08 ml   Output 880 ml   Net 282.08 ml       XR PELVIS (1-2 VIEWS)    Result Date: 8/17/2021  XR PELVIS (1-2 VIEWS) 8/17/2021 6:02 PM History: Fall injury. Right leg pain. Pelvis, one view. Osteopenia. Intact pelvic ring. Prominent fecal material within the rectum. Diffuse arterial calcification. Moderate degenerative change within the lower lumbar spine. Hardware fixation of the right hip. There is high-grade osteoarthritic change with spurring and complete loss of the superior hip joint space on the right side. No fracture. 1. No acute fracture. Signed by Dr Sarah Smalls    XR HAND RIGHT (MIN 3 VIEWS)    Result Date: 8/17/2021  Exam:   XR HAND RIGHT (MIN 3 VIEWS)  Date:  8/17/2021 History:  Male, age  73 years; fall COMPARISON:  None. Findings : Demineralized skeleton limits bony details.  There is an oblique lucency in cortical step-off involving the third proximal phalanx, consistent with a recent (acute to subacute) mildly displaced fracture. Probable intra-articular extension although difficult to evaluate due to positioning of the fingers. Incompletely visualized plate and screw construct in the distal radius. Old ulnar styloid avulsion fracture. Impression: Recent mildly displaced fracture of the third proximal phalanx. Signed by Dr Dov Fleming (MIN 2 VIEWS)    Result Date: 8/17/2021  Exam:   XR FEMUR RIGHT (MIN 2 VIEWS)  Date:  8/17/2021 History:  Male, age  73 years; fall COMPARISON:  Pelvis radiograph dated October 30, 2020. Findings : Right femur intramedullary dmitriy, without hardware loosening or failure identified. Redemonstration of findings of avascular necrosis with a large defect along the upper outer aspect of the femoral head. Advanced arthropathy of the knee joint. There is no acute displaced fracture. No dislocation. No suspicious lytic or blastic lesion. No radiopaque foreign body. Impression: No acute osseous pathology. Signed by Dr Amira Hernandez (1-2 VIEWS)    Result Date: 8/17/2021  Exam:   XR KNEE RIGHT (1-2 VIEWS)  Date:  8/17/2021 History:  Male, age  73 years; swelling and abrasion. COMPARISON:  None. Findings : Demineralization of the skeleton limits bony details. Suprapatellar effusion. Advanced arthropathy tricompartmental knee. Probable loose bodies in the joint space. There is no acute displaced fracture. No dislocation. No suspicious lytic or blastic lesion. No radiopaque foreign body. Vascular calcifications. Impression: No acute displaced fracture or dislocation. Advanced tricompartmental arthropathy. Suprapatellar effusion. Signed by Dr Berenice Izquierdo    Result Date: 8/19/2021  Examination. CT HEAD WO CONTRAST 8/19/2021 5:34 AM History: Intracranial hemorrhage. Follow-up. DLP: 860 mGycm. The CT scan of the head is performed without intravenous contrast enhancement.  The images are cortical sulci and basal cisterns suggestive chronic volume loss. The scattered areas chronic white matter ischemia in the centrum semiovale bilaterally are similar to the previous study. The images reviewed in bone window show no evidence of a fracture. The visualized paranasal sinuses and mastoid air cells are clear. A persistent and unchanged left ophthalmic/basal ganglia intraparenchymal hemorrhage extending into the third and lateral ventricle as detailed above. No midline shift. Chronic ischemic and atrophic changes. Signed by Dr Valorie Bowen    Result Date: 8/17/2021  CT HEAD WO CONTRAST 8/17/2021 4:11 PM HISTORY: Altered mental status COMPARISON: CT scan dated 8/20/2015 DOSE LENGTH PRODUCT: 1279 mGy cm TECHNIQUE: Helical tomographic images of the brain were obtained without the use of intravenous contrast. Automated exposure control was also utilized to decrease patient radiation dose. FINDINGS: There is a 2.7 x 2.5 x 1.8 cm left basal intraparenchymal hemorrhage. Intraventricular hemorrhage extension is also identified with blood product identified in the posterior third ventricles as well as layering within the occipital horns of both lateral ventricles. Ventricles do not appear appreciably dilated although there is some mass effect on the third ventricle. There is no midline shift. Posterior fossa structures are unremarkable. No subdural collections are identified. Scalp and calvarium are intact. 1. 2.7 cm left basal ganglia intraparenchymal hemorrhage with intraventricular hemorrhage extension, layering in the posterior third ventricle and occipital horns. Ventricles do not appear dilated at this time.  The results of this exam were discussed with Dr. Fariba Noyola on 8/17/2021 at 1622 hours Signed by Dr Aaron Ghotra    XR CHEST PORTABLE    Result Date: 8/17/2021  Exam:   XR CHEST PORTABLE  Date:  8/17/2021 History:  Male, age  73 years; altered mental status COMPARISON:  Chest x-ray dated October 30, 2020. Findings : Low lung volumes. Chronic elevation of the right hemidiaphragm. The heart and mediastinum are normal in size. Lungs are without focal infiltrate, mass or effusions. The bones show no acute pathology. Remote trauma to the right clavicle. Impression: No acute cardiopulmonary disease.  Signed by Dr Juvenal Bellamy and Plan:   Acute left thalamic hemorrhage  S/p fall  Neurosurgery following  Home Eliquis (A. Fib) held  Kcentra administered  Avoid offending agents  Cardene gtt since weaned off  Repeat imaging per neurosurgery  Frequent neuro checks  PT/OT/SLP  No plans for neurosurgical intervention     History of alcohol abuse  Monitor for withdrawal  CIWA protocol  Thiamine/folic acid/MVI  Social work     Acute encephalopathy  Suspect secondary to above processes     DVT prophylaxis  SCDs    Total critical care time: 43 minutes    Amy Rodrigez MD   8/19/2021 11:33 AM

## 2021-08-19 NOTE — PROGRESS NOTES
Physician Progress Note      Arelis Wilson  JALEN #:                  [id number]  :                       1950  ADMIT DATE:       2021 2:09 PM  [address] [place] DATE:  RESPONDING  PROVIDER #:        Otilio DUNHAM        QUERY TEXT:    Type of Encephalopathy: Please provide further specificity, if known.     Clinical indicators include: infectious, alcohol use, intracranial hemorrhage,   altered mental status, history of alcohol abuse, acute, encephalopathy  Options provided:  -- Anoxic/hypoxic encephalopathy  -- Metabolic encephalopathy  -- Toxic encephalopathy  -- Hepatic encephalopathy  -- Hypertensive encephalopathy  -- Other - I will add my own diagnosis  -- Disagree - Not applicable / Not valid  -- Disagree - Clinically Unable to determine / Unknown        PROVIDER RESPONSE TEXT:    Encephalopathy secondary to acute left thalamic hemorrhage versus   alcohol-related versus other etiologies      Electronically signed by:  Zainab Connors 2021 5:57 PM

## 2021-08-19 NOTE — PROGRESS NOTES
Palliative Care Progress Note  8/19/2021 10:59 AM    Patient:  Trish Alamo  YOB: 1950  Primary Care Physician: Angelica Herrera MD  Advance Directive: Full Code  Admit Date: 8/17/2021       Hospital Day: 2  Portions of this note have been copied forward, however, changed to reflect the most current clinical status of this patient. CHIEF COMPLAINT/REASON FOR CONSULTATION Goals of care, code status, family support    SUBJECTIVE:  Mr. Audelia Shannon states he is feeling better. Much more alert today. Eating breakfast. Denies pain. Interval History: Patient more alert. CT head with \"A probable mild increase in size of the left basal ganglia hemorrhage\". Review of Systems:   14 point review of systems is negative except as specifically addressed above. Objective:   VITALS:  BP (!) 107/58   Pulse 79   Temp 98.9 °F (37.2 °C) (Temporal)   Resp 12   Ht 5' 8\" (1.727 m)   Wt 193 lb 6.4 oz (87.7 kg)   SpO2 96%   BMI 29.41 kg/m²   24HR INTAKE/OUTPUT:      Intake/Output Summary (Last 24 hours) at 8/19/2021 1059  Last data filed at 8/19/2021 1000  Gross per 24 hour   Intake 1162.08 ml   Output 880 ml   Net 282.08 ml     General appearance: 72 yo male, no acute distress, chronically ill appearing, sitting up in bed  Head: Normocephalic, without obvious abnormality, atraumatic  Eyes: conjunctivae/corneas clear. PERRL, EOM's intact.    Ears: normal external ears and nose, throat without exudate  Neck: no adenopathy, no carotid bruit, no JVD, supple, symmetrical, trachea midline   Lungs: clear to auscultation bilaterally,no rales or wheezes   Heart: regular rate and rhythm, S1, S2 normal, no murmur  Abdomen:soft, non-tender; non-distended, bowel sounds present    Extremities:No lower extremity edema,  No erythema, no tenderness to palpation  Skin: Skin color, texture, turgor normal. No rashes or lesions  Lymphatic: No palpable lymph node enlargment  Neurologic: Awake, answers questions appropriately, follows simple commands, oriented to self and place  Psychiatric: Calm, flat affect    Medications:      sodium chloride      niCARdipine Stopped (08/19/21 0300)      sodium chloride flush  5-40 mL Intravenous 2 times per day    thiamine  100 mg Intravenous Daily    folic acid  1 mg Oral Daily    multivitamin  1 tablet Oral Daily    amiodarone  200 mg Oral Daily    atorvastatin  40 mg Oral Daily    levothyroxine  125 mcg Oral Daily    metoprolol succinate  25 mg Oral Daily    pantoprazole  40 mg Oral QAM AC     sodium chloride flush, sodium chloride, LORazepam **OR** LORazepam **OR** LORazepam **OR** LORazepam **OR** LORazepam **OR** LORazepam **OR** LORazepam **OR** LORazepam, ondansetron **OR** ondansetron, magnesium hydroxide, acetaminophen **OR** acetaminophen  ADULT DIET; Regular     Lab and other Data:     Recent Labs     08/17/21  1433 08/18/21  0846 08/19/21  0139   WBC 13.4* 14.7* 13.3*   HGB 12.7* 12.0* 12.0*    201 193     Recent Labs     08/17/21  1433 08/18/21  0846 08/19/21  0139    136 135*   K 3.9 3.5 3.6    100 100   CO2 19* 19* 22   BUN 17 14 16   CREATININE 1.5* 1.2 1.2   GLUCOSE 94 132* 102     Recent Labs     08/17/21  1433 08/18/21  0846   AST 42* 50*   ALT 28 28   BILITOT 0.9 2.3*   ALKPHOS 144* 123     INR:   Recent Labs     08/17/21  1632 08/18/21  0846   INR 1.27* 1.22*     UA:  Recent Labs     08/17/21  1450   COLORU YELLOW   PHUR 5.5   WBCUA 3   RBCUA 3   BACTERIA NEGATIVE*   CLARITYU Clear   SPECGRAV 1.016   LEUKOCYTESUR TRACE*   UROBILINOGEN 1.0   BILIRUBINUR Negative   BLOODU TRACE*   GLUCOSEU Negative     CT head 08/19/2021  A probable mild increase in size of the left basal ganglia  hemorrhage as detailed above. A minimal extension into the third  ventricle and occipital horn of the lateral ventricles persists  without any significant progression. No midline shift.     Assessment/Plan   Principal Problem:    Intracerebral hemorrhage, nontraumatic (HCC)  Active Problems:    CAD (coronary artery disease)    Essential hypertension    Alcohol abuse    Palliative care patient    Chronic systolic heart failure (HCC)    Nonischemic cardiomyopathy (HCC)    Mixed hyperlipidemia    Paroxysmal atrial fibrillation (HCC)    GERD (gastroesophageal reflux disease)    Coronary artery disease involving native heart with angina pectoris, unspecified vessel or lesion type ([facility] [address]  Resolved Problems:    * No resolved hospital problems. *    Visit Summary:  Chart reviewed, patient discussed with consulting service and nursing staff. Reviewed health issues, work up and treatment plan as well as factors that lead to hospitalization. I saw Mr. Lu Guzman at his bedside with RN present in the room. He is more alert today. Able to speak in short sentences and follow simple commands. Support/comfort offered. His sister Henok Mac will be in to visit tomorrow and assist with decision making. Recommendations:   1. Palliative care: GOC to be determined by hospital course. Code status: FULL CODE. Ongoing conversation. Will continue to follow    2. Intraparenchymal hemorrhage-d/w Neurosurgery, continue conservative management, close monitoring, strict BP control    3. Non-ischemic cardiomyopathy/CAD/CHF/Paroxysmal atrial fibrillation-all anticoagulants/antiplatelets on hold    4. Alcohol abuse-CIWA protocol, mgmt per Hospitalist    Thank you for consulting Palliative Care and allowing us to participate in the care of this patient.    Time Spent Counseling > 50%:  YES                                   Total Time Spent with patient/family counseling, workup/treatment review, counseling and placement of orders/preparation of this note: 28 minutes    Electronically signed by Sarita Barillas PA-C on 8/19/2021 at 10:59 AM    (Please note that portions of this note were completed with a voice recognition program.  [place] [employer] made to edit the dictations but occasionally words are mis-transcribed.)

## 2021-08-20 ENCOUNTER — APPOINTMENT (OUTPATIENT)
Dept: CT IMAGING | Age: 71
DRG: 065 | End: 2021-08-20
Payer: MEDICARE

## 2021-08-20 LAB
ALBUMIN SERPL-MCNC: 3.3 G/DL (ref 3.5–5.2)
ALP BLD-CCNC: 99 U/L (ref 40–130)
ALT SERPL-CCNC: 21 U/L (ref 5–41)
ANION GAP SERPL CALCULATED.3IONS-SCNC: 12 MMOL/L (ref 7–19)
AST SERPL-CCNC: 32 U/L (ref 5–40)
BASOPHILS ABSOLUTE: 0.1 K/UL (ref 0–0.2)
BASOPHILS RELATIVE PERCENT: 0.5 % (ref 0–1)
BILIRUB SERPL-MCNC: 1.4 MG/DL (ref 0.2–1.2)
BUN BLDV-MCNC: 21 MG/DL (ref 8–23)
CALCIUM SERPL-MCNC: 8.6 MG/DL (ref 8.8–10.2)
CHLORIDE BLD-SCNC: 104 MMOL/L (ref 98–111)
CO2: 24 MMOL/L (ref 22–29)
CREAT SERPL-MCNC: 1.4 MG/DL (ref 0.5–1.2)
EOSINOPHILS ABSOLUTE: 0.2 K/UL (ref 0–0.6)
EOSINOPHILS RELATIVE PERCENT: 1.6 % (ref 0–5)
GFR AFRICAN AMERICAN: >59
GFR NON-AFRICAN AMERICAN: 50
GLUCOSE BLD-MCNC: 80 MG/DL (ref 74–109)
HCT VFR BLD CALC: 34.9 % (ref 42–52)
HEMOGLOBIN: 11.3 G/DL (ref 14–18)
IMMATURE GRANULOCYTES #: 0.1 K/UL
LYMPHOCYTES ABSOLUTE: 1.8 K/UL (ref 1.1–4.5)
LYMPHOCYTES RELATIVE PERCENT: 15.9 % (ref 20–40)
MAGNESIUM: 2.1 MG/DL (ref 1.6–2.4)
MCH RBC QN AUTO: 31.3 PG (ref 27–31)
MCHC RBC AUTO-ENTMCNC: 32.4 G/DL (ref 33–37)
MCV RBC AUTO: 96.7 FL (ref 80–94)
MONOCYTES ABSOLUTE: 0.9 K/UL (ref 0–0.9)
MONOCYTES RELATIVE PERCENT: 8 % (ref 0–10)
NEUTROPHILS ABSOLUTE: 8.1 K/UL (ref 1.5–7.5)
NEUTROPHILS RELATIVE PERCENT: 73.5 % (ref 50–65)
PDW BLD-RTO: 14.2 % (ref 11.5–14.5)
PLATELET # BLD: 181 K/UL (ref 130–400)
PMV BLD AUTO: 9.5 FL (ref 9.4–12.4)
POTASSIUM SERPL-SCNC: 3.9 MMOL/L (ref 3.5–5)
RBC # BLD: 3.61 M/UL (ref 4.7–6.1)
SODIUM BLD-SCNC: 140 MMOL/L (ref 136–145)
TOTAL PROTEIN: 6.1 G/DL (ref 6.6–8.7)
WBC # BLD: 11.1 K/UL (ref 4.8–10.8)

## 2021-08-20 PROCEDURE — 2700000000 HC OXYGEN THERAPY PER DAY

## 2021-08-20 PROCEDURE — 2580000003 HC RX 258: Performed by: INTERNAL MEDICINE

## 2021-08-20 PROCEDURE — 94640 AIRWAY INHALATION TREATMENT: CPT

## 2021-08-20 PROCEDURE — 97165 OT EVAL LOW COMPLEX 30 MIN: CPT

## 2021-08-20 PROCEDURE — 36415 COLL VENOUS BLD VENIPUNCTURE: CPT

## 2021-08-20 PROCEDURE — 97530 THERAPEUTIC ACTIVITIES: CPT

## 2021-08-20 PROCEDURE — 80053 COMPREHEN METABOLIC PANEL: CPT

## 2021-08-20 PROCEDURE — 1210000000 HC MED SURG R&B

## 2021-08-20 PROCEDURE — 6370000000 HC RX 637 (ALT 250 FOR IP): Performed by: INTERNAL MEDICINE

## 2021-08-20 PROCEDURE — 83735 ASSAY OF MAGNESIUM: CPT

## 2021-08-20 PROCEDURE — 85025 COMPLETE CBC W/AUTO DIFF WBC: CPT

## 2021-08-20 PROCEDURE — 92523 SPEECH SOUND LANG COMPREHEN: CPT

## 2021-08-20 PROCEDURE — 99231 SBSQ HOSP IP/OBS SF/LOW 25: CPT | Performed by: PHYSICIAN ASSISTANT

## 2021-08-20 PROCEDURE — 99291 CRITICAL CARE FIRST HOUR: CPT | Performed by: NEUROLOGICAL SURGERY

## 2021-08-20 PROCEDURE — 70450 CT HEAD/BRAIN W/O DYE: CPT

## 2021-08-20 PROCEDURE — 6360000002 HC RX W HCPCS: Performed by: INTERNAL MEDICINE

## 2021-08-20 PROCEDURE — 97162 PT EVAL MOD COMPLEX 30 MIN: CPT

## 2021-08-20 RX ADMIN — FOLIC ACID 1 MG: 1 TABLET ORAL at 08:10

## 2021-08-20 RX ADMIN — AMIODARONE HYDROCHLORIDE 200 MG: 200 TABLET ORAL at 08:10

## 2021-08-20 RX ADMIN — SODIUM CHLORIDE, PRESERVATIVE FREE 10 ML: 5 INJECTION INTRAVENOUS at 22:33

## 2021-08-20 RX ADMIN — ATORVASTATIN CALCIUM 40 MG: 40 TABLET, FILM COATED ORAL at 08:10

## 2021-08-20 RX ADMIN — THERA TABS 1 TABLET: TAB at 08:10

## 2021-08-20 RX ADMIN — PANTOPRAZOLE SODIUM 40 MG: 40 TABLET, DELAYED RELEASE ORAL at 06:14

## 2021-08-20 RX ADMIN — ACETAMINOPHEN 650 MG: 325 TABLET ORAL at 08:10

## 2021-08-20 RX ADMIN — METOPROLOL SUCCINATE 25 MG: 25 TABLET, EXTENDED RELEASE ORAL at 08:10

## 2021-08-20 RX ADMIN — THIAMINE HYDROCHLORIDE 100 MG: 100 INJECTION, SOLUTION INTRAMUSCULAR; INTRAVENOUS at 08:10

## 2021-08-20 RX ADMIN — LEVOTHYROXINE SODIUM 125 MCG: 125 TABLET ORAL at 08:10

## 2021-08-20 ASSESSMENT — PAIN DESCRIPTION - DESCRIPTORS: DESCRIPTORS: ACHING

## 2021-08-20 ASSESSMENT — PAIN DESCRIPTION - LOCATION: LOCATION: KNEE

## 2021-08-20 ASSESSMENT — PAIN DESCRIPTION - ONSET: ONSET: GRADUAL

## 2021-08-20 ASSESSMENT — PAIN SCALES - GENERAL
PAINLEVEL_OUTOF10: 1
PAINLEVEL_OUTOF10: 2
PAINLEVEL_OUTOF10: 5

## 2021-08-20 ASSESSMENT — PAIN DESCRIPTION - FREQUENCY: FREQUENCY: INTERMITTENT

## 2021-08-20 ASSESSMENT — PAIN DESCRIPTION - PAIN TYPE: TYPE: ACUTE PAIN

## 2021-08-20 ASSESSMENT — PAIN DESCRIPTION - PROGRESSION: CLINICAL_PROGRESSION: GRADUALLY WORSENING

## 2021-08-20 ASSESSMENT — PAIN DESCRIPTION - ORIENTATION: ORIENTATION: RIGHT

## 2021-08-20 NOTE — PROGRESS NOTES
Physical Therapy    Facility/Department: [facility] ICU  Initial Assessment    NAME: Millie Buchanan  : 1950  MRN: [id number]    Date of Service: 2021    Discharge Recommendations:  Continue to assess pending progress, 24 hour supervision or assist, Patient would benefit from continued therapy after discharge        Assessment   Body structures, Functions, Activity limitations: Decreased functional mobility ; Decreased ROM; Decreased strength;Decreased sensation;Decreased endurance;Decreased balance;Decreased posture;Decreased coordination  Assessment: Pt. will benefit from cont. PT during acute care stay for mobility training, strengthening and balance training. Pt. a fall risk due to weakness and stiffness in RLE with inability to WB in standing. Pt. would be a good candidate for use of SS to transfer to chair. Anticipate pt will need additional therapy after d/c from [facility], as he is in a deconditioned state at this time and would be unable to live on his own. Treatment Diagnosis: impaired mobility and gait  Prognosis: Fair  Decision Making: Medium Complexity  PT Education: Goals;PT Role;Plan of Care;Gait Training; Adaptive Device Training;Functional Mobility Training;Transfer Training;General Safety  Patient Education: use of call light for staff A  Barriers to Learning: none noted  REQUIRES PT FOLLOW UP: Yes  Activity Tolerance  Activity Tolerance: Patient limited by fatigue; Other  Activity Tolerance: inability to accept weight on RLE       Patient Diagnosis(es): The primary encounter diagnosis was Intracranial hemorrhage ([address] A diagnosis of Closed nondisplaced fracture of phalanx of right middle finger, unspecified phalanx, initial encounter was also pertinent to this visit. has a past medical history of CAD (coronary artery disease), Gout, Hypertension, Hypotension, Non-ST elevation MI (NSTEMI) [facility]), Palliative care patient, and Pneumonia due to infectious organism.    has a past surgical history that includes Cardiac catheterization (12/29/14  [facility]); Cholecystectomy; and Femur fracture surgery (Right, 10/30/2020). Restrictions  Restrictions/Precautions  Restrictions/Precautions: Fall Risk, Seizure  Required Braces or Orthoses?: No  Vision/Hearing  Vision: Within Functional Limits  Hearing: Within functional limits     Subjective  General  Chart Reviewed: Yes  Patient assessed for rehabilitation services?: Yes  Response To Previous Treatment: Not applicable  Family / Caregiver Present: No  Referring Practitioner: Amy Rodrigez MD  Referral Date : 08/19/21  Diagnosis: L thalamic hemorrhage,closed non-displaced fx phalanx R middle finger  Follows Commands: Within Functional Limits  General Comment  Comments: RN, April, okayed PT. Subjective  Subjective: Pt. willing to try standing.   Pain Screening  Patient Currently in Pain: Denies  Vital Signs  Patient Currently in Pain: Denies  Pre Treatment Pain Screening  Intervention List: Patient able to continue with treatment    Orientation  Orientation  Overall Orientation Status: Within Functional Limits  Social/Functional History  Social/Functional History  Lives With: Alone  Home Equipment: Cane  ADL Assistance: Independent  Ambulation Assistance: Independent  Transfer Assistance: Independent  Occupation: Retired  Cognition   Cognition  Overall Cognitive Status: WFL    Objective     Observation/Palpation  Posture: Poor  Observation: R knee swollen and with multiple abrasions, celis, telemetry, BP cuff, O2 sat monitor    AROM RLE (degrees)  RLE AROM: Exceptions  RLE General AROM: R knee and hip flex to 90 deg, but do not fully extend, ankle WFLs; RLE position of comfort is ER at hip  AROM LLE (degrees)  LLE AROM : WFL  Strength RLE  Strength RLE: Exception  Comment: grossly 2/5 knee and hip, ankle 3/5  Strength LLE  Strength LLE: WFL  Comment: grossly 3+/5     Sensation  Overall Sensation Status: Impaired (reports burning in B feet)  Bed mobility  Supine to Sit: Moderate assistance;2 Person assistance  Sit to Supine: 2 Person assistance;Maximum assistance  Comment: pt sat on EOB x 5 mins CGA  Transfers  Sit to Stand: Moderate Assistance;2 Person Assistance  Stand to sit: Moderate Assistance;2 Person Assistance  Comment: pt stood twice, but unable to accept much weight onto R foot due to pain and stiffness in knee with WB, v. cues to widen SCOOTER prior to standing attempts. Ambulation  Ambulation?: Yes  Ambulation 1  Device: Rolling Walker  Assistance: Moderate assistance;2 Person assistance  Quality of Gait: unsteady, decreased ability to accept weight onto R foot, but pt denies pain  Gait Deviations: Slow Hyacinth;Decreased step length;Decreased step height  Distance: 1-2 small steps to [facility]  Comments: pt stood twice, long enough for sheets to be changed out due to spill on bed     Balance  Posture: Good  Sitting - Static: Fair;+  Sitting - Dynamic: Fair;-  Standing - Static: Poor  Standing - Dynamic: Poor        Plan   Plan  Times per week: 3-7  Times per day: Daily  Plan weeks: 2  Current Treatment Recommendations: Strengthening, ROM, Balance Training, Functional Mobility Training, Transfer Training, Endurance Training, Gait Training, Positioning, Patient/Caregiver Education & Training, Equipment Evaluation, Education, & procurement  Plan Comment: cont. PT per POC.   Safety Devices  Type of devices: Gait belt, Patient at risk for falls, Nurse notified, Left in bed, Call light within reach, Bed alarm in place    G-Code       OutComes Score                                                  AM-PAC Score             Goals  Short term goals  Time Frame for Short term goals: 2 wks  Short term goal 1: supine to sit indep  Short term goal 2: sit to stand indep  Short term goal 3: amb. 48' with RW SBA  Short term goal 4: bed to chair SBA with RW  Patient Goals   Patient goals : get better       Therapy Time   Individual Concurrent Group Co-treatment   Time In           Time Out Minutes                   Bharat Campbell PT    Electronically signed by Bharat Campbell PT on 8/20/2021 at 1:37 PM

## 2021-08-20 NOTE — PROGRESS NOTES
Hospitalist Progress Note  [facility]     Patient: Concetta Nance  : 1950  MRN: [id number]  Code Status: Full Code    Hospital Day: 3   Date of Service: 2021    Subjective:   Patient seen and examined. Conversing. Improved from yesterday. Past Medical History:   Diagnosis Date    CAD (coronary artery disease)     Gout     Hypertension     Hypotension 2020    Non-ST elevation MI (NSTEMI) ([address] 14    Palliative care patient 2020    Pneumonia due to infectious organism 2020       Past Surgical History:   Procedure Laterality Date    CARDIAC CATHETERIZATION  14  [facility]    angioplasty, stent to LAD.  EF 45%    CHOLECYSTECTOMY      FEMUR FRACTURE SURGERY Right 10/30/2020    TFN, SHORT performed by Jo Oliver MD at [facility] OR       Family History   Problem Relation Age of Onset    No Known Problems Mother     Heart Disease Father        Social History     Socioeconomic History    Marital status:      Spouse name: Not on file    Number of children: Not on file    Years of education: Not on file    Highest education level: Not on file   Occupational History    Not on file   Tobacco Use    Smoking status: Former Smoker     Packs/day: 1.00     Years: 3.00     Pack years: 3.00     Types: Cigars     Start date:      Quit date:      Years since quittin.6    Smokeless tobacco: Never Used   Vaping Use    Vaping Use: Never used   Substance and Sexual Activity    Alcohol use: Not Currently    Drug use: No    Sexual activity: Yes     Partners: Female   Other Topics Concern    Not on file   Social History Narrative    Not on file     Social Determinants of Health     Financial Resource Strain: Low Risk     Difficulty of Paying Living Expenses: Not hard at all   Food Insecurity: No Food Insecurity    Worried About [address] in the Last Year: Never true    Joyce of Food in the Last Year: Never true   Transportation Needs:  Lack of Transportation (Medical):      Lack of Transportation (Non-Medical):    Physical Activity:     Days of Exercise per Week:     Minutes of Exercise per Session:    Stress:     Feeling of Stress :    Social Connections:     Frequency of Communication with Friends and Family:     Frequency of Social Gatherings with Friends and Family:     Attends Alevism Services:     Active Member of Clubs or Organizations:     Attends Club or Organization Meetings:     Marital Status:    Intimate Partner Violence:     Fear of Current or Ex-Partner:     Emotionally Abused:     Physically Abused:     Sexually Abused:        Current Facility-Administered Medications   Medication Dose Route Frequency Provider Last Rate Last Admin    sodium chloride flush 0.9 % injection 5-40 mL  5-40 mL Intravenous 2 times per day Phoebe Sousa MD   10 mL at 08/19/21 2131    sodium chloride flush 0.9 % injection 5-40 mL  5-40 mL Intravenous PRN Phoebe Sousa MD        0.9 % sodium chloride infusion  25 mL Intravenous PRN Phoebe Sousa MD        LORazepam (ATIVAN) tablet 1 mg  1 mg Oral Q1H PRN Phoebe Sousa MD        Or    LORazepam (ATIVAN) injection 1 mg  1 mg Intravenous Q1H PRN Phoebe Sousa MD        Or    LORazepam (ATIVAN) tablet 2 mg  2 mg Oral Q1H PRN Phoebe Sousa MD        Or    LORazepam (ATIVAN) injection 2 mg  2 mg Intravenous Q1H PRN Phoebe Sousa MD   2 mg at 08/18/21 1200    Or    LORazepam (ATIVAN) tablet 3 mg  3 mg Oral Q1H PRN Phoebe Sousa MD        Or    LORazepam (ATIVAN) injection 3 mg  3 mg Intravenous Q1H PRJENNIFER Sousa MD        Or    LORazepam (ATIVAN) tablet 4 mg  4 mg Oral Q1H PRJENNIFER Sousa MD        Or    LORazepam (ATIVAN) injection 4 mg  4 mg Intravenous Q1H PRN Phoebe Sousa MD   4 mg at 08/18/21 1345    niCARdipine (CARDENE) 25 mg in sodium chloride 0.9 % 250 mL infusion  3-15 mg/hr Intravenous Continuous Kenzie Harrison DO   Stopped at 08/19/21 0300    thiamine (B-1) injection 100 mg  100 mg Intravenous Daily Hollie Boeck, MD   100 mg at 27/15/81 0729    folic acid (FOLVITE) tablet 1 mg  1 mg Oral Daily Hollie Boeck, MD   1 mg at 08/20/21 0751    multivitamin 1 tablet  1 tablet Oral Daily Hollie Boeck, MD   1 tablet at 08/20/21 1526    amiodarone (CORDARONE) tablet 200 mg  200 mg Oral Daily Jose Grayson, DO   200 mg at 08/20/21 0810    atorvastatin (LIPITOR) tablet 40 mg  40 mg Oral Daily Joes Grayson, DO   40 mg at 08/20/21 1924    levothyroxine (SYNTHROID) tablet 125 mcg  125 mcg Oral Daily Jose Grayson, DO   125 mcg at 08/20/21 6636    metoprolol succinate (TOPROL XL) extended release tablet 25 mg  25 mg Oral Daily Jose Grayson, DO   25 mg at 08/20/21 0810    pantoprazole (PROTONIX) tablet 40 mg  40 mg Oral QAM AC Jose Grayson, DO   40 mg at 08/20/21 7016    ondansetron (ZOFRAN-ODT) disintegrating tablet 4 mg  4 mg Oral Q8H PRN Jose Grayson,         Or    ondansetron [facility]) injection 4 mg  4 mg Intravenous Q6H PRN Jose Grayson, DO        magnesium hydroxide (MILK OF MAGNESIA) 400 MG/5ML suspension 30 mL  30 mL Oral Daily PRN Jose Grayson, DO        acetaminophen (TYLENOL) tablet 650 mg  650 mg Oral Q6H PRN Jose Grayson, DO   650 mg at 08/20/21 1514    Or    acetaminophen (TYLENOL) suppository 650 mg  650 mg Rectal Q6H PRN Jose Grayson, DO             sodium chloride      niCARdipine Stopped (08/19/21 0300)        Objective:   BP (!) 172/84   Pulse 71   Temp 96.9 °F (36.1 °C) (Temporal)   Resp 15   Ht 6' (1.829 m)   Wt 184 lb 5 oz (83.6 kg)   SpO2 99%   BMI 25.00 kg/m²     General: no acute distress  HEENT: normocephalic  Neck: supple, symmetrical, trachea midline   Lungs: clear to auscultation bilaterally   Cardiovascular: s1 and s2 normal  Abdomen: soft, positive bowel sounds  Extremities: no edema or cyanosis   Neuro: alert, answering questions  Skin: normal color and texture     Recent Labs     08/18/21  0846 08/19/21  0139 08/20/21  0117   WBC 14.7* 13.3* 11.1*   RBC 3.85* 3.83* 3.61*   HGB 12.0* 12.0* 11.3*   HCT 35.2* 36.4* 34.9*   MCV 91.4 95.0* 96.7*   MCH 31.2* 31.3* 31.3*   MCHC 34.1 33.0 32.4*    193 181     Recent Labs     08/18/21  0846 08/19/21  0139 08/20/21  0117    135* 140   K 3.5 3.6 3.9   ANIONGAP 17 13 12    100 104   CO2 19* 22 24   BUN 14 16 21   CREATININE 1.2 1.2 1.4*   GLUCOSE 132* 102 80   CALCIUM 8.9 8.6* 8.6*     Recent Labs     08/19/21  0139 08/20/21  0117   MG 1.7 2.1     Recent Labs     08/18/21  0846 08/20/21  0117   AST 50* 32   ALT 28 21   BILITOT 2.3* 1.4*   ALKPHOS 123 99     No results for input(s): PH, PO2, PCO2, HCO3, BE, O2SAT in the last 72 hours. No results for input(s): TROPONINI in the last 72 hours. Recent Labs     08/18/21  0846   INR 1.22*     No results for input(s): LACTA in the last 72 hours. Intake/Output Summary (Last 24 hours) at 8/20/2021 1638  Last data filed at 8/20/2021 1200  Gross per 24 hour   Intake 145 ml   Output 1500 ml   Net -1355 ml       CT HEAD WO CONTRAST    Result Date: 8/20/2021  Examination. CT HEAD WO CONTRAST 8/20/2021 6:35 AM History: Follow-up intracranial hemorrhage. DLP: 803 mGycm. The CT scan of the head is performed without intravenous contrast enhancement. The images are acquired in axial plane with subsequent reconstruction in coronal and sagittal planes. The comparison is made with the previous study dated 8/19/2021. Left basal ganglia hemorrhage is reidentified. The hematoma measures 3 cm x 2.3 cm and has not significantly changed in the previous study. Extension into the third ventricle and a small layering hemorrhage in the occipital horn of the lateral ventricles bilaterally persists. No further extension since the previous study. No midline shift. No transtentorial herniation. Chronic ischemic and atrophic changes are similar to the previous study.  The gray-white matter differentiation the remaining brain is maintained. No significant change as detailed above. Signed by Dr Lawson Cole    Result Date: 8/19/2021  Examination. CT HEAD WO CONTRAST 8/19/2021 5:34 AM History: Intracranial hemorrhage. Follow-up. DLP: 860 mGycm. The CT scan of the head is performed without intravenous contrast enhancement. The images are acquired in axial plane and subsequent reconstruction in coronal and sagittal plane. The comparison is made with the previous study dated 8/18/2021. The left basal ganglia intraparenchymal hemorrhage is reidentified and now measures 3 cm x 2.4 x 1.8 cm. This may have minimally increased in size since the previous study (2.7 x 2.4 x 1.7 cm). A mild extension into the third ventricle and into the occipital horn of the lateral ventricles bilaterally persist. There is no midline shift. No change in size of the ventricles which are moderately dilated, similar to the previous study. Moderately dilated basal cistern and the cortical sulci are stable. No subarachnoid or subdural hematoma. The images reviewed in bone window show no acute bony abnormality. The visualized paranasal sinuses and mastoid air cells are clear. A probable mild increase in size of the left basal ganglia hemorrhage as detailed above. A minimal extension into the third ventricle and occipital horn of the lateral ventricles persists without any significant progression. No midline shift.  Signed by Dr Wendy Coburn and Plan:   Acute left thalamic hemorrhage  S/p fall  Neurosurgery following  Home Eliquis (A. Fib) held  Kcentra administered  Avoid offending agents  Cardene gtt since weaned off  Repeat imaging per neurosurgery  Frequent neuro checks  PT/OT/SLP  No plans for neurosurgical intervention     History of alcohol abuse  Monitor for withdrawal  CIWA protocol  Thiamine/folic acid/MVI  Social work     Acute encephalopathy  Improving  Suspect secondary to above processes     DVT prophylaxis  SCDs    Total critical care time: 40 minutes    Sekou Mace MD   8/20/2021 4:38 PM

## 2021-08-20 NOTE — CONSULTS
**Physician Signature**  This document was electronically signed by: Janeth Pearson MD  2021   10:14 PM    **Consult Information**  Member Facility: [address] [place] [address] Drive MRN: [id number]  Visit/Encounter Number: [id number]  Consult ID: [id number]  Facility Time Zone: CT  Date and Time of Request: 2021 07:58 PM  CT  Requesting Clinician: Earna Cushing, MD  Patient Name: Dominguez Reyes  YOB: 1950  Gender: Male  Patient identity was confirmed at the beginning of the consult with the   patient/family/staff using two personal identifiers: Patient name and       **Reason for Consult**  Reason for Consult: [facility]    **Admission**  Admission Date: 2021  Chief reason for ICU admission: Altered Mental Status  Other reason for ICU admission: 2.7 CM Left Basil Ganglia Bleed. **Core Metrics**  General orienting sentence for patient: 74 y/o male with a history of   multiple falls recently. Also has a history of heavy ETOH use. Found to   have a Basal Ganglia bleed. Currently, on a nicardipine gtt. Starting to   have signs of W/D. CIWA scoring started with Ativan. gtt, repeat head CT   stable PM: CT little better, still some expressive   aphasia, not intubated , MS still poor.   Chief physiologic deterioration: None - Stable patient  Is the patient on DVT prophylaxis?: Yes  Prophylaxis type: Mechanical  Is the patient on GI prophylaxis?: Yes  Gi Prophylaxis Comments: pantoprazole  Has this patient reached their nutritional goal?: Yes  Are there current issues with pain management in this patient?:   No  Are there issues with skin integrity?: No  Are there issues with delirium?: No  Has the patient been mobilized?: No  Is this patient currently intubated?: No  Are there ethical or care philosophy or family issues?: No  Do you recommend an in depth evaluation?: No  Disposition Recommendations: Continue ICU level of care    **Inserted/Removed Devices**  Device Name: Linda Catheter  Site of insertion: Urethra  Date of insertion: 08-    **Physician Signature**  This document was electronically signed by: Phani Shah MD  08/19/2021   10:14 PM

## 2021-08-20 NOTE — PROGRESS NOTES
Palliative Care Progress Note  8/20/2021 2:54 PM    Patient:  Odilon Quezada  YOB: 1950  Primary Care Physician: Pricila Kaba MD  Advance Directive: Full Code  Admit Date: 8/17/2021       Hospital Day: 3  Portions of this note have been copied forward, however, changed to reflect the most current clinical status of this patient. CHIEF COMPLAINT/REASON FOR CONSULTATION Goals of care, code status, family support    SUBJECTIVE:  Mr. Peter Emery has no new complaints. No acute overnight events. Interval History: Repeat CT head stable. Patient OK for transfer to medical floor     Review of Systems:   14 point review of systems is negative except as specifically addressed above. Objective:   VITALS:  BP (!) 152/69   Pulse 68   Temp 96.3 °F (35.7 °C)   Resp 17   Ht 6' (1.829 m)   Wt 184 lb 5 oz (83.6 kg)   SpO2 95%   BMI 25.00 kg/m²   24HR INTAKE/OUTPUT:      Intake/Output Summary (Last 24 hours) at 8/20/2021 1454  Last data filed at 8/20/2021 1200  Gross per 24 hour   Intake 145 ml   Output 1500 ml   Net -1355 ml     General appearance: 74 yo male, no acute distress, resting comfortably in bed   Head: Normocephalic, without obvious abnormality, atraumatic  Eyes: conjunctivae/corneas clear. PERRL, EOM's intact.    Ears: normal external ears and nose, throat without exudate  Neck: no adenopathy, no carotid bruit, no JVD, supple, symmetrical, trachea midline   Lungs: diminished at bases otherwise no rales, wheezing or rhonchi   Heart: RRR, S1, S2 normal, no murmur  Abdomen:soft, non-tender; non-distended, bowel sounds present    Extremities:No lower extremity edema,  No erythema, no tenderness to palpation  Skin: Skin color, texture, turgor normal. No rashes or lesions  Lymphatic: No palpable lymph node enlargment  Neurologic: Awake, answers questions appropriately, follows simple commands, oriented to self and place, states it's 1966  Psychiatric: Calm, flat affect    Medications:      sodium chloride      niCARdipine Stopped (08/19/21 0300)      sodium chloride flush  5-40 mL Intravenous 2 times per day    thiamine  100 mg Intravenous Daily    folic acid  1 mg Oral Daily    multivitamin  1 tablet Oral Daily    amiodarone  200 mg Oral Daily    atorvastatin  40 mg Oral Daily    levothyroxine  125 mcg Oral Daily    metoprolol succinate  25 mg Oral Daily    pantoprazole  40 mg Oral QAM AC     sodium chloride flush, sodium chloride, LORazepam **OR** LORazepam **OR** LORazepam **OR** LORazepam **OR** LORazepam **OR** LORazepam **OR** LORazepam **OR** LORazepam, ondansetron **OR** ondansetron, magnesium hydroxide, acetaminophen **OR** acetaminophen  ADULT DIET; Regular     Lab and other Data:     Recent Labs     08/18/21  0846 08/19/21  0139 08/20/21  0117   WBC 14.7* 13.3* 11.1*   HGB 12.0* 12.0* 11.3*    193 181     Recent Labs     08/18/21  0846 08/19/21  0139 08/20/21  0117    135* 140   K 3.5 3.6 3.9    100 104   CO2 19* 22 24   BUN 14 16 21   CREATININE 1.2 1.2 1.4*   GLUCOSE 132* 102 80     Recent Labs     08/18/21  0846 08/20/21  0117   AST 50* 32   ALT 28 21   BILITOT 2.3* 1.4*   ALKPHOS 123 99     INR:   Recent Labs     08/17/21  1632 08/18/21  0846   INR 1.27* 1.22*     UA:  No results for input(s): NITRITE, COLORU, PHUR, LABCAST, WBCUA, RBCUA, MUCUS, TRICHOMONAS, YEAST, BACTERIA, CLARITYU, SPECGRAV, LEUKOCYTESUR, UROBILINOGEN, BILIRUBINUR, BLOODU, GLUCOSEU, AMORPHOUS in the last 72 hours. Invalid input(s): Rea Keenan  CT head 08/19/2021  A probable mild increase in size of the left basal ganglia  hemorrhage as detailed above. A minimal extension into the third  ventricle and occipital horn of the lateral ventricles persists  without any significant progression. No midline shift.     Assessment/Plan   Principal Problem:    Intracerebral hemorrhage, nontraumatic (HCC)  Active Problems:    CAD (coronary artery disease)    Essential hypertension    Alcohol abuse Palliative care patient    Chronic systolic heart failure (HCC)    Nonischemic cardiomyopathy (HCC)    Mixed hyperlipidemia    Paroxysmal atrial fibrillation (HCC)    GERD (gastroesophageal reflux disease)    Coronary artery disease involving native heart with angina pectoris, unspecified vessel or lesion type [facility])  Resolved Problems:    * No resolved hospital problems. *    Visit Summary:  Chart reviewed, patient seen at bedside. Mr. Hilda Degroot has no new complaints or concerns. His sister is driving here from [facility] to assist with decision making regarding SNF placement. Recommendations:   1. Palliative care: GOC likely to pursue SNF placement. Code status: FULL CODE. 2. Intraparenchymal hemorrhage-d/w Neurosurgery, continue conservative management, close monitoring, strict BP control, stable    3. Non-ischemic cardiomyopathy/CAD/CHF/Paroxysmal atrial fibrillation-all anticoagulants/antiplatelets on hold, stable    4. Alcohol abuse-CIWA protocol, mgmt per Hospitalist, no s/s of withdrawal at this time    Thank you for consulting Palliative Care and allowing us to participate in the care of this patient.    Time Spent Counseling > 50%:  YES                                   Total Time Spent with patient/family counseling, workup/treatment review, counseling and placement of orders/preparation of this note: 18 minutes    Electronically signed by Belem Berg PA-C on 8/20/2021 at 2:54 PM    (Please note that portions of this note were completed with a voice recognition program.  Alaina Pathak made to edit the dictations but occasionally words are mis-transcribed.)

## 2021-08-20 NOTE — PROGRESS NOTES
Occupational Therapy   Occupational Therapy Initial Assessment  Date: 2021   Patient Name: Merlinda Mantel  MRN: [id number]     : 1950    Date of Service: 2021    Discharge Recommendations:          Assessment   Assessment: Good functional use of RUE. Will progress as tolerated  Treatment Diagnosis: Brain bleed , R linette  Prognosis: Good  Decision Making: Low Complexity  REQUIRES OT FOLLOW UP: Yes  Activity Tolerance  Activity Tolerance: Patient Tolerated treatment well           Patient Diagnosis(es): The primary encounter diagnosis was Intracranial hemorrhage ([facility] [address] A diagnosis of Closed nondisplaced fracture of phalanx of right middle finger, unspecified phalanx, initial encounter was also pertinent to this visit. has a past medical history of CAD (coronary artery disease), Gout, Hypertension, Hypotension, Non-ST elevation MI (NSTEMI) [facility]), Palliative care patient, and Pneumonia due to infectious organism. has a past surgical history that includes Cardiac catheterization (14  [facility]); Cholecystectomy; and Femur fracture surgery (Right, 10/30/2020). Treatment Diagnosis: Brain bleed , R linette      Restrictions  Restrictions/Precautions  Restrictions/Precautions: Fall Risk    Subjective   General  Chart Reviewed: Yes  Patient assessed for rehabilitation services?: Yes  Family / Caregiver Present: No  Diagnosis: Brain bleed, R linette  Patient Currently in Pain: Denies  Vital Signs  BP: 128/64  MAP (mmHg): 80  Patient Currently in Pain: Denies  Social/Functional History  Social/Functional History  Lives With: Alone  Home Equipment: Cane  ADL Assistance: Independent  Ambulation Assistance: Independent  Transfer Assistance: Independent  Occupation: Retired       Objective   Vision: Within Functional Limits  Hearing: Within functional limits    Orientation  Overall Orientation Status: Within Functional Limits     Balance  Sitting Balance: Stand by assistance  Standing Balance:  Moderate assistance  ADL  Feeding: Setup  Grooming: Supervision  UE Bathing: Supervision  LE Bathing: Moderate assistance  UE Dressing: Supervision  LE Dressing: Maximum assistance  Toileting: Maximum assistance  Tone RUE  RUE Tone: Normotonic  Tone LUE  LUE Tone: Normotonic     Bed mobility  Supine to Sit: Moderate assistance;2 Person assistance  Sit to Supine: 2 Person assistance;Maximum assistance  Transfers  Stand Step Transfers: Unable to assess  Sit to stand: Moderate assistance;2 Person assistance  Transfer Comments: Pt. with limited ability to put weight on RLE. Knee was swollen from fall at home.      Cognition  Overall Cognitive Status: WFL        Sensation  Overall Sensation Status: WFL        LUE AROM (degrees)  LUE AROM : WNL  RUE AROM (degrees)  RUE AROM : WNL  LUE Strength  Gross LUE Strength: WFL  RUE Strength  Gross RUE Strength: WFL                   Plan   Plan  Times per week: 3-5x/week  Times per day: Daily    G-Code     OutComes Score                                                  AM-PAC Score             Goals  Short term goals  Time Frame for Short term goals: 1 week  Short term goal 1: [place]ina with sit-stand and tfers to [facility]  Short term goal 2: Regina with clothing mgmt in standing  Short term goal 3: Regina with seated LB dsg  Long term goals  Long term goal 1: Return to PLOF       Therapy Time   Individual Concurrent Group Co-treatment   Time In           Time Out           Minutes                   YVON Guillen/KEYONA

## 2021-08-20 NOTE — PROGRESS NOTES
Speech Language Pathology  Facility/Department: [facility] ICU  Initial Speech/Language/Cognitive Assessment    NAME: Damir Stein  : 1950   MRN: [id number]  ADMISSION DATE: 2021  ADMITTING DIAGNOSIS: has CAD (coronary artery disease); Essential hypertension; Alcohol abuse; Hyponatremia; Palliative care patient; Chronic systolic heart failure ([address] Nonischemic cardiomyopathy ([address] Mixed hyperlipidemia; Paroxysmal atrial fibrillation ([address] Localized osteoporosis with current pathological fracture with routine healing; Postoperative anemia due to acute blood loss; Closed nondisplaced fracture of lesser trochanter of right femur with routine healing; Hypovitaminosis D; GERD (gastroesophageal reflux disease); Coronary artery disease involving native heart with angina pectoris, unspecified vessel or lesion type ([address] Guaiac positive stools; Acute gout of hand; and Intracerebral hemorrhage, nontraumatic (HCC) on their problem list.    Date of Eval: 2021   Evaluating Therapist: Angie Russell SLP    Assessment:  Completed Andrew ProcDeyvi Whitman 1 where patient obtained 17 out of 30 possible points, indicative of moderate cognitive-linguistic impairment. Subsequently transitioned to full cognitive-linguistic evaluation where patient exhibited decreased sustained attention, slow processing, delayed and decreased auditory comprehension (particularly as length and complexity of information increases), frequently delayed verbalizations with moderate instances where patient stated, \"I don't know,\" decreased immediate/short-term memory, and decreased reasoning/problem solving. It is noted that during assessment, patient demonstrated ability to verbalize current PCP and pharmacy at independent level. Patient demonstrated difficulty verbalizing conditions in which he takes medications and difficulty verbalizing medication schedule independently.  Patient demonstrated difficulty verbalizing solutions to situations that could occur during activities of daily living at independent level (ie. 911, acid reflux, burns, cuts, falls, fever, fire, headache). Unaware of baseline cognitive-linguistic functioning. Will continue to follow. Goals:  Short-term Goals  Timeframe for Short-term Goals: 1-2x/day for 3 days  Goal 1: Monitor speech/language/cognitive functioning. Subjective:  Chart Reviewed: Yes  Patient assessed for rehabilitation services?: Yes  Family / Caregiver Present: No     Objective: Auditory Comprehension  Comprehension:  (While delayed, patient demonstrated ability to answer simple yes/no questions regarding immediate environment and current state of being at independent level. While delayed, patient demonstrated ability to follow simple 1 step commands independently. Delays were noted and break down was observed during yes/no questions of increased complexity and during simple 2 and simple 3 step commands.)     Expression  Primary Mode of Expression:  (Confrontation naming of items in room was considered to be delayed. Structured responsive speech and responses in natural conversation were all considered to be frequently delayed with moderate instances where patient stated, \" I don't know. \")     Motor Speech:  (SLP ranked functional intelligibility of speech for unfamiliar listeners at 100% in utterances with background noise present.)     Overall Orientation Status:  (Patient demonstrated ability to verbalize name, birthday, and county at independent level. Patient did not verbalize age, address, phone number, year, season, date, day of week, month, state, city, hospital, or unit of hospital independently.)     Attention:  (Patient demonstrated decreased sustained attention during assessment.  No adverse behaviors were noted with provision of redirections.)     Memory:  (Patient demonstrated decreased immediate memory with sequences of unrelated numbers/words set up to 5 items with repetitions provided. Patient exhibited 2/3 short-term memory with less than 2 minute delay+distractions present.)     Problem Solving:  (It is noted that during assessment, patient demonstrated ability to verbalize current PCP and pharmacy at independent level. Patient demonstrated difficulty verbalizing conditions in which he takes medications and difficulty verbalizing medication schedule independently. Patient demonstrated difficulty verbalizing solutions to situations that could occur during activities of daily living at independent level (ie. 911, acid reflux, burns, cuts, falls, fever, fire, headache). Unaware of baseline cognitive-linguistic functioning. Will continue to follow.     Electronically signed by ARY Gilbert on 8/20/2021 at 1:58 PM

## 2021-08-20 NOTE — PROGRESS NOTES
[facility] Neurosurgery Critical Care Note        CHIEF COMPLAINT:  AMS, intracranial hemorrhage      INTERVAL UPDATE:  No overnight events. Patient more awake and talkative this AM.  Still disoriented. He has not required nicardipine or ativan in over 24h. CT this AM stable without any worsening of hemorrhage, cerebral edema. No hydrocephalus. HPI:  The patient is a 73 y.o. male with a history of CHF, HTN, and atrial fibrillation on Eliquis who presented to the [facility] ED with altered mental status and a presumed fall. He was and remains an unreliable historian and is unable to express himself in any meaningful way. When asked, he does endorse a headache and denies pain elsewhere. A CT of his head was done on arrival to the ED and this revealed an ~2.5cm left thalamic hemorrahge with slight intraventricular extension. He has been hypertensive since arrival and has been admitted to the intensive care unit and I have been asked to provide neurosurgical consultation. He has been given K-centra to reverse his Eliquis. By report, he also has a history of heavy alcohol use. PHYSICAL EXAM:    Vitals:    08/20/21 0900   BP: (!) 142/72   Pulse: 92   Resp: 13   Temp:    SpO2: 92%       Constitutional: Appears well-developed and well-nourished. Eyes  conjunctiva normal.  Pupils react to light  Ear, nose,throat -Normal pinna and tragus, No scars, or lesions over external nose or ears, no obvious atrophy of tongue  Neck- symmetric, trachea midline, no jugular vein distension, no thyromegaly  Respiration- chest wall symmetric, normal effort without use of accessory muscles  Musculoskeletal  no significant wasting of muscles noted, no bony deformities, symmetric bulk  Extremities- no clubbing, cyanosis or edema  Skin  warm, dry, and intact. No rash,erythema, or pallor.   Psychiatric  mood, affect, and behavior appear normal.       NEUROLOGIC EXAM:    MENTAL STATUS: Awake and alert, oriented to self and hospital, not month or day. Aphasia improving. CRANIAL NERVES: PERRL, EOMI, symmetric facies, tongue midline        MOTOR:     Right Upper Extremity:    4/5 with significant apraxia    Left Upper Extremity:    5/5    Right Lower Extremity:    3-4 / 5 with significant apraxia    Left Lower Extremity:    5/5      SOMATOSENSORY:     Right Upper Extremity: normal light touch sensation  Left Upper Extremity: normal light touch sensation  Right Lower Extremity: normal light touch sensation  Left Lower Extremity: normal light touch sensation      REFLEXES:    Kelley's: Negative  Clonus: Negative        DATA:      Lab Results   Component Value Date    WBC 11.1 (H) 08/20/2021    HGB 11.3 (L) 08/20/2021    HCT 34.9 (L) 08/20/2021    MCV 96.7 (H) 08/20/2021     08/20/2021     Lab Results   Component Value Date     08/20/2021    K 3.9 08/20/2021     08/20/2021    CO2 24 08/20/2021    BUN 21 08/20/2021    CREATININE 1.4 (H) 08/20/2021    GLUCOSE 80 08/20/2021    CALCIUM 8.6 (L) 08/20/2021    PROT 6.1 (L) 08/20/2021    LABALBU 3.3 (L) 08/20/2021    BILITOT 1.4 (H) 08/20/2021    ALKPHOS 99 08/20/2021    AST 32 08/20/2021    ALT 21 08/20/2021    LABGLOM 50 (A) 08/20/2021    GFRAA >59 08/20/2021     Lab Results   Component Value Date    INR 1.22 (H) 08/18/2021    PROTIME 15.6 (H) 08/18/2021         IMAGING:    My interpretation of imaging studies:  I agree with the radiologist.  As per HPI. ASSESSMENT AND PLAN:  This is a 73 y.o. male with a history of CHF, HTN and atrial fibrillation on Eliquis who was admitted to the intensive care unit on 8/17/2021 for treatment of an acute left thalamic IPH with intraventricular extension. His Eliquis was reversed with K-centra in the emergency department. He has a history of heavy EtOH use and has been receiving Ativan per CIWA protocol. CT this AM is stable. Neuro: Stable for transfer to floor. PT/OT. Repeat CT for any neurologic change.   Hold all anticoagulant and antiplatelet medications. CV: BP at goal, continue SBP < 160mmHg,  Pulmonary: Frequent pulmonary toilet, maintain SpO2 > 90%  GI: No active issues  /Renal: No active issues  Endocrine: No active issues  FEN: Encourage PO intake  Other: Dispo planning per case management, will likely need placement    I attest that I spend >35 minutes engaged in critical care of this patient. This includes review of EMR data, imaging, bedside evaluation, patient/family counseling and coordination of care with nursing, providers and consultants.       Padmini Pritchard MD

## 2021-08-20 NOTE — PLAN OF CARE
Problem: Skin Integrity:  Goal: Will show no infection signs and symptoms  Description: Will show no infection signs and symptoms  8/20/2021 0955 by Aylin Huerta RN  Outcome: Ongoing  8/20/2021 0221 by Mary Griggs RN  Outcome: Ongoing  Goal: Absence of new skin breakdown  Description: Absence of new skin breakdown  8/20/2021 0955 by Aylin Huerta RN  Outcome: Ongoing  8/20/2021 0221 by Mary Griggs RN  Outcome: Ongoing     Problem: Falls - Risk of:  Goal: Will remain free from falls  Description: Will remain free from falls  8/20/2021 0955 by Aylin Huerta RN  Outcome: Ongoing  8/20/2021 0221 by Mary Griggs RN  Outcome: Ongoing  Goal: Absence of physical injury  Description: Absence of physical injury  8/20/2021 0955 by Aylin Huerta RN  Outcome: Ongoing  8/20/2021 0221 by Mary Griggs RN  Outcome: Ongoing     Problem: Pain:  Goal: Pain level will decrease  Description: Pain level will decrease  Outcome: Ongoing  Goal: Control of acute pain  Description: Control of acute pain  Outcome: Ongoing  Goal: Control of chronic pain  Description: Control of chronic pain  Outcome: Ongoing

## 2021-08-21 LAB
ANION GAP SERPL CALCULATED.3IONS-SCNC: 10 MMOL/L (ref 7–19)
BASOPHILS ABSOLUTE: 0.1 K/UL (ref 0–0.2)
BASOPHILS RELATIVE PERCENT: 0.7 % (ref 0–1)
BUN BLDV-MCNC: 22 MG/DL (ref 8–23)
CALCIUM SERPL-MCNC: 8.8 MG/DL (ref 8.8–10.2)
CHLORIDE BLD-SCNC: 107 MMOL/L (ref 98–111)
CO2: 25 MMOL/L (ref 22–29)
CREAT SERPL-MCNC: 1.4 MG/DL (ref 0.5–1.2)
EOSINOPHILS ABSOLUTE: 0.2 K/UL (ref 0–0.6)
EOSINOPHILS RELATIVE PERCENT: 2.3 % (ref 0–5)
GFR AFRICAN AMERICAN: >59
GFR NON-AFRICAN AMERICAN: 50
GLUCOSE BLD-MCNC: 102 MG/DL (ref 74–109)
HCT VFR BLD CALC: 34.6 % (ref 42–52)
HEMOGLOBIN: 11.1 G/DL (ref 14–18)
IMMATURE GRANULOCYTES #: 0 K/UL
LYMPHOCYTES ABSOLUTE: 1.3 K/UL (ref 1.1–4.5)
LYMPHOCYTES RELATIVE PERCENT: 15.3 % (ref 20–40)
MAGNESIUM: 2.1 MG/DL (ref 1.6–2.4)
MCH RBC QN AUTO: 31.3 PG (ref 27–31)
MCHC RBC AUTO-ENTMCNC: 32.1 G/DL (ref 33–37)
MCV RBC AUTO: 97.5 FL (ref 80–94)
MONOCYTES ABSOLUTE: 0.6 K/UL (ref 0–0.9)
MONOCYTES RELATIVE PERCENT: 7.3 % (ref 0–10)
NEUTROPHILS ABSOLUTE: 6.1 K/UL (ref 1.5–7.5)
NEUTROPHILS RELATIVE PERCENT: 73.9 % (ref 50–65)
PDW BLD-RTO: 14.1 % (ref 11.5–14.5)
PLATELET # BLD: 179 K/UL (ref 130–400)
PMV BLD AUTO: 9.3 FL (ref 9.4–12.4)
POTASSIUM SERPL-SCNC: 4.3 MMOL/L (ref 3.5–5)
RBC # BLD: 3.55 M/UL (ref 4.7–6.1)
SODIUM BLD-SCNC: 142 MMOL/L (ref 136–145)
WBC # BLD: 8.3 K/UL (ref 4.8–10.8)

## 2021-08-21 PROCEDURE — 6370000000 HC RX 637 (ALT 250 FOR IP): Performed by: INTERNAL MEDICINE

## 2021-08-21 PROCEDURE — 80048 BASIC METABOLIC PNL TOTAL CA: CPT

## 2021-08-21 PROCEDURE — 36415 COLL VENOUS BLD VENIPUNCTURE: CPT

## 2021-08-21 PROCEDURE — 85025 COMPLETE CBC W/AUTO DIFF WBC: CPT

## 2021-08-21 PROCEDURE — 99232 SBSQ HOSP IP/OBS MODERATE 35: CPT | Performed by: NEUROLOGICAL SURGERY

## 2021-08-21 PROCEDURE — 1210000000 HC MED SURG R&B

## 2021-08-21 PROCEDURE — 6360000002 HC RX W HCPCS: Performed by: INTERNAL MEDICINE

## 2021-08-21 PROCEDURE — 83735 ASSAY OF MAGNESIUM: CPT

## 2021-08-21 PROCEDURE — 2580000003 HC RX 258: Performed by: INTERNAL MEDICINE

## 2021-08-21 RX ADMIN — AMIODARONE HYDROCHLORIDE 200 MG: 200 TABLET ORAL at 10:27

## 2021-08-21 RX ADMIN — ATORVASTATIN CALCIUM 40 MG: 40 TABLET, FILM COATED ORAL at 10:27

## 2021-08-21 RX ADMIN — FOLIC ACID 1 MG: 1 TABLET ORAL at 10:27

## 2021-08-21 RX ADMIN — SODIUM CHLORIDE, PRESERVATIVE FREE 10 ML: 5 INJECTION INTRAVENOUS at 10:33

## 2021-08-21 RX ADMIN — SODIUM CHLORIDE, PRESERVATIVE FREE 10 ML: 5 INJECTION INTRAVENOUS at 20:31

## 2021-08-21 RX ADMIN — LEVOTHYROXINE SODIUM 125 MCG: 125 TABLET ORAL at 10:27

## 2021-08-21 RX ADMIN — THERA TABS 1 TABLET: TAB at 10:27

## 2021-08-21 RX ADMIN — METOPROLOL SUCCINATE 25 MG: 25 TABLET, EXTENDED RELEASE ORAL at 10:27

## 2021-08-21 RX ADMIN — THIAMINE HYDROCHLORIDE 100 MG: 100 INJECTION, SOLUTION INTRAMUSCULAR; INTRAVENOUS at 10:27

## 2021-08-21 RX ADMIN — PANTOPRAZOLE SODIUM 40 MG: 40 TABLET, DELAYED RELEASE ORAL at 05:56

## 2021-08-21 NOTE — CARE COORDINATION
Received phone call from pt sister who stated she wanted to speak to SW. Met with pt sisters who informed SW that pt needs SNF placement. When asked if pt would be agreeable to this, they stated \"he wants to go home, but he is agreeable\" They stated pt is able to pay someone to come check on him (can be as often as needed) and he also had HH. They have all agreed that pt will need placement before resuming these services. Referrals will be sent to [address] [place] [address] (1st choice) and [facility] (2nd choice) as requested.    [address] [place] [address] (formerly [address] [place] [address])   [phone] P  [phone] F  [phone] Referral fax number for   Gustavo   C  [phone] F  Electronically signed by Christiana Florez on 8/21/2021 at 9:57 AM

## 2021-08-21 NOTE — PROGRESS NOTES
AM is stable, though he is intermittently hypertensive. Neuro: PT/OT/Speech therapy. Repeat CT for any neurologic change. Hold all anticoagulant and antiplatelet medications.   CV: Adjust oral antihypertensive regimen per medical team, goal SBP < 160mmHg,  Pulmonary: maintain SpO2 > 90%  GI: No active issues  /Renal: No active issues  Endocrine: No active issues  FEN: Encourage PO intake  Other: Dispo planning per case management, will likely need placement          Norberto Hargrove MD

## 2021-08-21 NOTE — PROGRESS NOTES
Hospitalist Progress Note  [facility]     Patient: Eleni Maciel  : 1950  MRN: [id number]  Code Status: Full Code    Hospital Day: 4   Date of Service: 2021    Subjective:   Patient seen and examined. AAOx3. Mental status continues to improve. 2 sisters at bedside. All questions sought and answered. Past Medical History:   Diagnosis Date    CAD (coronary artery disease)     Gout     Hypertension     Hypotension 2020    Non-ST elevation MI (NSTEMI) ([address] 14    Palliative care patient 2020    Pneumonia due to infectious organism 2020       Past Surgical History:   Procedure Laterality Date    CARDIAC CATHETERIZATION  14  [facility]    angioplasty, stent to LAD.  EF 45%    CHOLECYSTECTOMY      FEMUR FRACTURE SURGERY Right 10/30/2020    WAYNE, SHORT performed by Penelope Stephenson MD at [facility]       Family History   Problem Relation Age of Onset    No Known Problems Mother     Heart Disease Father        Social History     Socioeconomic History    Marital status:      Spouse name: Not on file    Number of children: Not on file    Years of education: Not on file    Highest education level: Not on file   Occupational History    Not on file   Tobacco Use    Smoking status: Former Smoker     Packs/day: 1.00     Years: 3.00     Pack years: 3.00     Types: Cigars     Start date:      Quit date:      Years since quittin.6    Smokeless tobacco: Never Used   Vaping Use    Vaping Use: Never used   Substance and Sexual Activity    Alcohol use: Not Currently    Drug use: No    Sexual activity: Yes     Partners: Female   Other Topics Concern    Not on file   Social History Narrative    Not on file     Social Determinants of Health     Financial Resource Strain: Low Risk     Difficulty of Paying Living Expenses: Not hard at all   Food Insecurity: No Food Insecurity    Worried About [address] in the Last Year: Never true    Ran Out of Food in the Last Year: Never true   Transportation Needs:     Lack of Transportation (Medical):      Lack of Transportation (Non-Medical):    Physical Activity:     Days of Exercise per Week:     Minutes of Exercise per Session:    Stress:     Feeling of Stress :    Social Connections:     Frequency of Communication with Friends and Family:     Frequency of Social Gatherings with Friends and Family:     Attends Hinduism Services:     Active Member of Clubs or Organizations:     Attends Club or Organization Meetings:     Marital Status:    Intimate Partner Violence:     Fear of Current or Ex-Partner:     Emotionally Abused:     Physically Abused:     Sexually Abused:        Current Facility-Administered Medications   Medication Dose Route Frequency Provider Last Rate Last Admin    sodium chloride flush 0.9 % injection 5-40 mL  5-40 mL Intravenous 2 times per day Savanna Mcdonough MD   10 mL at 08/21/21 1033    sodium chloride flush 0.9 % injection 5-40 mL  5-40 mL Intravenous PRN Savanna Mcdonough MD        0.9 % sodium chloride infusion  25 mL Intravenous PRN Savanna Mcdonough MD        LORazepam (ATIVAN) tablet 1 mg  1 mg Oral Q1H PRN Savanna Mcdonough MD        Or    LORazepam (ATIVAN) injection 1 mg  1 mg Intravenous Q1H PRN Savanna Mcdonough MD        Or    LORazepam (ATIVAN) tablet 2 mg  2 mg Oral Q1H PRN Savanna Mcdonough MD        Or    LORazepam (ATIVAN) injection 2 mg  2 mg Intravenous Q1H PRJENNIFER Mcdonough MD   2 mg at 08/18/21 1200    Or    LORazepam (ATIVAN) tablet 3 mg  3 mg Oral Q1H PRN Savanna Mcdonough MD        Or    LORazepam (ATIVAN) injection 3 mg  3 mg Intravenous Q1H PRN Savanna Mcdonough MD        Or    LORazepam (ATIVAN) tablet 4 mg  4 mg Oral Q1H PRN Savanna Mcdonough MD        Or    LORazepam (ATIVAN) injection 4 mg  4 mg Intravenous Q1H PRN Savanna Mcdonough MD   4 mg at 08/18/21 1345    thiamine (B-1) injection 100 mg  100 mg Intravenous Daily Savanna Mcdonough MD   100 mg at 08/21/21 3965    folic acid (FOLVITE) tablet 1 mg  1 mg Oral Daily Shanda Jack MD   1 mg at 08/21/21 1027    multivitamin 1 tablet  1 tablet Oral Daily Shanda Jack MD   1 tablet at 08/21/21 1027    amiodarone (CORDARONE) tablet 200 mg  200 mg Oral Daily Victorine Nanas, DO   200 mg at 08/21/21 1027    atorvastatin (LIPITOR) tablet 40 mg  40 mg Oral Daily Victorine Nanas, DO   40 mg at 08/21/21 1027    levothyroxine (SYNTHROID) tablet 125 mcg  125 mcg Oral Daily Victorine Nanas, DO   125 mcg at 08/21/21 1027    metoprolol succinate (TOPROL XL) extended release tablet 25 mg  25 mg Oral Daily Daine Nanas, DO   25 mg at 08/21/21 1027    pantoprazole (PROTONIX) tablet 40 mg  40 mg Oral QAM AC Daine Nanas, DO   40 mg at 08/21/21 6751    ondansetron (ZOFRAN-ODT) disintegrating tablet 4 mg  4 mg Oral Q8H PRN Daine Nanas, DO        Or    ondansetron [facility]) injection 4 mg  4 mg Intravenous Q6H PRN Victorine Nanas, DO        magnesium hydroxide (MILK OF MAGNESIA) 400 MG/5ML suspension 30 mL  30 mL Oral Daily PRN Victorine Nanas, DO        acetaminophen (TYLENOL) tablet 650 mg  650 mg Oral Q6H PRN Daine Nanas, DO   650 mg at 08/20/21 6481    Or    acetaminophen (TYLENOL) suppository 650 mg  650 mg Rectal Q6H PRN Daine Nanas, DO             sodium chloride          Objective:   BP (!) 165/79   Pulse 73   Temp 97.3 °F (36.3 °C) (Temporal)   Resp 18   Ht 6' (1.829 m)   Wt 184 lb 5 oz (83.6 kg)   SpO2 96%   BMI 25.00 kg/m²     General: no acute distress  HEENT: normocephalic  Neck: supple, symmetrical, trachea midline   Lungs: clear to auscultation bilaterally   Cardiovascular: s1 and s2 normal  Abdomen: soft, positive bowel sounds  Extremities: no edema or cyanosis   Neuro: aaox3, answering questions appropriately  Skin: normal color and texture     Recent Labs     08/19/21  0139 08/20/21  0117 08/21/21  0227   WBC 13.3* 11.1* 8.3   RBC 3.83* 3.61* 3.55*   HGB 12.0* 11.3* 11.1*   HCT 36.4* 34.9* 34.6*   MCV 95.0* 96.7* 97.5*   MCH 31.3* 31.3* 31.3*   MCHC 33.0 32.4* 32.1*    181 179     Recent Labs     08/19/21  0139 08/20/21  0117 08/21/21  0227   * 140 142   K 3.6 3.9 4.3   ANIONGAP 13 12 10    104 107   CO2 22 24 25   BUN 16 21 22   CREATININE 1.2 1.4* 1.4*   GLUCOSE 102 80 102   CALCIUM 8.6* 8.6* 8.8     Recent Labs     08/19/21  0139 08/20/21 0117 08/21/21  0227   MG 1.7 2.1 2.1     Recent Labs     08/20/21 0117   AST 32   ALT 21   BILITOT 1.4*   ALKPHOS 99     No results for input(s): PH, PO2, PCO2, HCO3, BE, O2SAT in the last 72 hours. No results for input(s): TROPONINI in the last 72 hours. No results for input(s): INR in the last 72 hours. No results for input(s): LACTA in the last 72 hours. Intake/Output Summary (Last 24 hours) at 8/21/2021 1049  Last data filed at 8/21/2021 0700  Gross per 24 hour   Intake 235 ml   Output 825 ml   Net -590 ml       CT HEAD WO CONTRAST    Result Date: 8/20/2021  Examination. CT HEAD WO CONTRAST 8/20/2021 6:35 AM History: Follow-up intracranial hemorrhage. DLP: 803 mGycm. The CT scan of the head is performed without intravenous contrast enhancement. The images are acquired in axial plane with subsequent reconstruction in coronal and sagittal planes. The comparison is made with the previous study dated 8/19/2021. Left basal ganglia hemorrhage is reidentified. The hematoma measures 3 cm x 2.3 cm and has not significantly changed in the previous study. Extension into the third ventricle and a small layering hemorrhage in the occipital horn of the lateral ventricles bilaterally persists. No further extension since the previous study. No midline shift. No transtentorial herniation. Chronic ischemic and atrophic changes are similar to the previous study. The gray-white matter differentiation the remaining brain is maintained. No significant change as detailed above.  Signed by Dr Mary Jo Hollingsworth Anwer       Assessment and Plan:   Acute left thalamic hemorrhage  S/p fall  Neurosurgery following  Home Eliquis (CLAYTON Bragg) held  Kcentra administered  Avoid offending agents  Cardene gtt since weaned off  Repeat imaging per neurosurgery  Frequent neuro checks  PT/OT/SLP  No plans for neurosurgical intervention  Social work following for potential placement options     History of alcohol abuse  Monitor for withdrawal  CIWA protocol  Thiamine/folic acid/MVI  Social work     Acute encephalopathy  Continues to improve  Secondary to above processes     DVT prophylaxis  SCDs    Melida Campbell MD   8/21/2021 10:49 AM

## 2021-08-22 LAB
ANION GAP SERPL CALCULATED.3IONS-SCNC: 13 MMOL/L (ref 7–19)
BASOPHILS ABSOLUTE: 0.1 K/UL (ref 0–0.2)
BASOPHILS RELATIVE PERCENT: 0.7 % (ref 0–1)
BUN BLDV-MCNC: 18 MG/DL (ref 8–23)
CALCIUM SERPL-MCNC: 8.4 MG/DL (ref 8.8–10.2)
CHLORIDE BLD-SCNC: 101 MMOL/L (ref 98–111)
CO2: 24 MMOL/L (ref 22–29)
CREAT SERPL-MCNC: 1.4 MG/DL (ref 0.5–1.2)
EOSINOPHILS ABSOLUTE: 0.2 K/UL (ref 0–0.6)
EOSINOPHILS RELATIVE PERCENT: 2 % (ref 0–5)
GFR AFRICAN AMERICAN: >59
GFR NON-AFRICAN AMERICAN: 50
GLUCOSE BLD-MCNC: 90 MG/DL (ref 74–109)
HCT VFR BLD CALC: 32.3 % (ref 42–52)
HEMOGLOBIN: 10.4 G/DL (ref 14–18)
IMMATURE GRANULOCYTES #: 0.1 K/UL
LYMPHOCYTES ABSOLUTE: 1.7 K/UL (ref 1.1–4.5)
LYMPHOCYTES RELATIVE PERCENT: 20.2 % (ref 20–40)
MAGNESIUM: 1.9 MG/DL (ref 1.6–2.4)
MCH RBC QN AUTO: 30.9 PG (ref 27–31)
MCHC RBC AUTO-ENTMCNC: 32.2 G/DL (ref 33–37)
MCV RBC AUTO: 95.8 FL (ref 80–94)
MONOCYTES ABSOLUTE: 0.8 K/UL (ref 0–0.9)
MONOCYTES RELATIVE PERCENT: 10 % (ref 0–10)
NEUTROPHILS ABSOLUTE: 5.6 K/UL (ref 1.5–7.5)
NEUTROPHILS RELATIVE PERCENT: 66.4 % (ref 50–65)
PDW BLD-RTO: 13.7 % (ref 11.5–14.5)
PLATELET # BLD: 197 K/UL (ref 130–400)
PMV BLD AUTO: 9.1 FL (ref 9.4–12.4)
POTASSIUM SERPL-SCNC: 3.2 MMOL/L (ref 3.5–5)
RBC # BLD: 3.37 M/UL (ref 4.7–6.1)
SODIUM BLD-SCNC: 138 MMOL/L (ref 136–145)
WBC # BLD: 8.4 K/UL (ref 4.8–10.8)

## 2021-08-22 PROCEDURE — 99232 SBSQ HOSP IP/OBS MODERATE 35: CPT | Performed by: NEUROLOGICAL SURGERY

## 2021-08-22 PROCEDURE — 6360000002 HC RX W HCPCS: Performed by: INTERNAL MEDICINE

## 2021-08-22 PROCEDURE — 83735 ASSAY OF MAGNESIUM: CPT

## 2021-08-22 PROCEDURE — 6370000000 HC RX 637 (ALT 250 FOR IP): Performed by: INTERNAL MEDICINE

## 2021-08-22 PROCEDURE — 36415 COLL VENOUS BLD VENIPUNCTURE: CPT

## 2021-08-22 PROCEDURE — 1210000000 HC MED SURG R&B

## 2021-08-22 PROCEDURE — 85025 COMPLETE CBC W/AUTO DIFF WBC: CPT

## 2021-08-22 PROCEDURE — 80048 BASIC METABOLIC PNL TOTAL CA: CPT

## 2021-08-22 PROCEDURE — 2580000003 HC RX 258: Performed by: INTERNAL MEDICINE

## 2021-08-22 PROCEDURE — 6370000000 HC RX 637 (ALT 250 FOR IP): Performed by: NEUROLOGICAL SURGERY

## 2021-08-22 PROCEDURE — 97530 THERAPEUTIC ACTIVITIES: CPT

## 2021-08-22 RX ORDER — AMLODIPINE BESYLATE 10 MG/1
10 TABLET ORAL DAILY
Status: DISCONTINUED | OUTPATIENT
Start: 2021-08-22 | End: 2021-08-23 | Stop reason: HOSPADM

## 2021-08-22 RX ORDER — POTASSIUM CHLORIDE 7.45 MG/ML
10 INJECTION INTRAVENOUS PRN
Status: DISCONTINUED | OUTPATIENT
Start: 2021-08-22 | End: 2021-08-23 | Stop reason: HOSPADM

## 2021-08-22 RX ORDER — POTASSIUM CHLORIDE 20 MEQ/1
40 TABLET, EXTENDED RELEASE ORAL PRN
Status: DISCONTINUED | OUTPATIENT
Start: 2021-08-22 | End: 2021-08-23 | Stop reason: HOSPADM

## 2021-08-22 RX ADMIN — METOPROLOL SUCCINATE 25 MG: 25 TABLET, EXTENDED RELEASE ORAL at 08:43

## 2021-08-22 RX ADMIN — THERA TABS 1 TABLET: TAB at 08:43

## 2021-08-22 RX ADMIN — LEVOTHYROXINE SODIUM 125 MCG: 125 TABLET ORAL at 08:42

## 2021-08-22 RX ADMIN — AMIODARONE HYDROCHLORIDE 200 MG: 200 TABLET ORAL at 08:43

## 2021-08-22 RX ADMIN — POTASSIUM CHLORIDE 40 MEQ: 1500 TABLET, EXTENDED RELEASE ORAL at 05:37

## 2021-08-22 RX ADMIN — THIAMINE HYDROCHLORIDE 100 MG: 100 INJECTION, SOLUTION INTRAMUSCULAR; INTRAVENOUS at 08:43

## 2021-08-22 RX ADMIN — SODIUM CHLORIDE, PRESERVATIVE FREE 10 ML: 5 INJECTION INTRAVENOUS at 19:54

## 2021-08-22 RX ADMIN — PANTOPRAZOLE SODIUM 40 MG: 40 TABLET, DELAYED RELEASE ORAL at 05:27

## 2021-08-22 RX ADMIN — FOLIC ACID 1 MG: 1 TABLET ORAL at 08:43

## 2021-08-22 RX ADMIN — AMLODIPINE BESYLATE 10 MG: 10 TABLET ORAL at 08:42

## 2021-08-22 RX ADMIN — SODIUM CHLORIDE, PRESERVATIVE FREE 10 ML: 5 INJECTION INTRAVENOUS at 08:43

## 2021-08-22 RX ADMIN — ONDANSETRON HYDROCHLORIDE 4 MG: 2 SOLUTION INTRAMUSCULAR; INTRAVENOUS at 19:54

## 2021-08-22 RX ADMIN — ATORVASTATIN CALCIUM 40 MG: 40 TABLET, FILM COATED ORAL at 08:43

## 2021-08-22 NOTE — PROGRESS NOTES
[facility] Neurosurgery ProgressNote        CHIEF COMPLAINT:  AMS, intracranial hemorrhage      INTERVAL UPDATE:  No overnight events. Patient seen and examined on the floor. He denies any headaches, nausea or vomiting. Still with expressive speech difficulty. He attempted to get out of bed yesterday with therapy with a Juniorarlizz Lacer Steady but was unable to walk or get to a chair, mostly due to right leg weakness. HPI:  The patient is a 73 y.o. male with a history of CHF, HTN, and atrial fibrillation on Eliquis who presented to the [facility] ED with altered mental status and a presumed fall. He was and remains an unreliable historian and is unable to express himself in any meaningful way. When asked, he does endorse a headache and denies pain elsewhere. A CT of his head was done on arrival to the ED and this revealed an ~2.5cm left thalamic hemorrahge with slight intraventricular extension. He has been hypertensive since arrival and has been admitted to the intensive care unit and I have been asked to provide neurosurgical consultation. He has been given K-centra to reverse his Eliquis. By report, he also has a history of heavy alcohol use. PHYSICAL EXAM:    Vitals:    08/22/21 0710   BP: (!) 168/90   Pulse: 64   Resp: 16   Temp: 97.7 °F (36.5 °C)   SpO2: 100%       Constitutional: Appears well-developed and well-nourished. Eyes  conjunctiva normal.  Pupils react to light  Ear, nose,throat -Normal pinna and tragus, No scars, or lesions over external nose or ears, no obvious atrophy of tongue  Neck- symmetric, trachea midline, no jugular vein distension, no thyromegaly  Respiration- chest wall symmetric, normal effort without use of accessory muscles  Musculoskeletal  no significant wasting of muscles noted, no bony deformities, symmetric bulk  Extremities- no clubbing, cyanosis or edema  Skin  warm, dry, and intact. No rash,erythema, or pallor.   Psychiatric  mood, affect, and behavior appear normal. NEUROLOGIC EXAM:    MENTAL STATUS: Awake and alert, conversant. Requires frequent cueing for orientation questions due to aphasia. CRANIAL NERVES: PERRL, EOMI, symmetric facies, tongue midline        MOTOR:     Right Upper Extremity:    4/5 with significant apraxia    Left Upper Extremity:    5/5    Right Lower Extremity:    3-4 / 5 with significant apraxia    Left Lower Extremity:    5/5      SOMATOSENSORY:     Right Upper Extremity: normal light touch sensation  Left Upper Extremity: normal light touch sensation  Right Lower Extremity: normal light touch sensation  Left Lower Extremity: normal light touch sensation      REFLEXES:    Kelley's: Negative  Clonus: Negative        DATA:      Lab Results   Component Value Date    WBC 8.4 08/22/2021    HGB 10.4 (L) 08/22/2021    HCT 32.3 (L) 08/22/2021    MCV 95.8 (H) 08/22/2021     08/22/2021     Lab Results   Component Value Date     08/22/2021    K 3.2 (L) 08/22/2021     08/22/2021    CO2 24 08/22/2021    BUN 18 08/22/2021    CREATININE 1.4 (H) 08/22/2021    GLUCOSE 90 08/22/2021    CALCIUM 8.4 (L) 08/22/2021    PROT 6.1 (L) 08/20/2021    LABALBU 3.3 (L) 08/20/2021    BILITOT 1.4 (H) 08/20/2021    ALKPHOS 99 08/20/2021    AST 32 08/20/2021    ALT 21 08/20/2021    LABGLOM 50 (A) 08/22/2021    GFRAA >59 08/22/2021     Lab Results   Component Value Date    INR 1.22 (H) 08/18/2021    PROTIME 15.6 (H) 08/18/2021         IMAGING:    My interpretation of imaging studies:  I agree with the radiologist.  \        ASSESSMENT AND PLAN:  This is a 73 y.o. male with a history of CHF, HTN and atrial fibrillation on Eliquis who was admitted to [facility] on 8/17/2021 for treatment of an acute left thalamic IPH with intraventricular extension. His Eliquis was reversed with K-centra in the emergency department. He has a history of heavy EtOH however he has not required any Ativan in >48 hrs. CT this AM is stable. He remains hypertensive.     Neuro: PT/OT/Speech therapy. Repeat CT for any neurologic change. Hold all anticoagulant and antiplatelet medications.   CV: Add norvasc to medication regimen, goal SBP < 160mmHg,  Pulmonary: maintain SpO2 > 90%  GI: No active issues  /Renal: No active issues  Endocrine: No active issues  FEN: Encourage PO intake  Other: Inpatient rehab evaluation, otherwise he will likely need placement          Rivas Donahue MD

## 2021-08-22 NOTE — PROGRESS NOTES
Hospitalist Progress Note  [facility]     Patient: Afshan Miles  : 1950  MRN: [id number]  Code Status: Full Code    Hospital Day: 5   Date of Service: 2021    Subjective:   Patient seen and examined. No current complaints. No acute distress. Past Medical History:   Diagnosis Date    CAD (coronary artery disease)     Gout     Hypertension     Hypotension 2020    Non-ST elevation MI (NSTEMI) ([address] 14    Palliative care patient 2020    Pneumonia due to infectious organism 2020       Past Surgical History:   Procedure Laterality Date    CARDIAC CATHETERIZATION  14  [facility]    angioplasty, stent to LAD.  EF 45%    CHOLECYSTECTOMY      FEMUR FRACTURE SURGERY Right 10/30/2020    TFN, SHORT performed by Marcin Keene MD [facility] OR       Family History   Problem Relation Age of Onset    No Known Problems Mother     Heart Disease Father        Social History     Socioeconomic History    Marital status:      Spouse name: Not on file    Number of children: Not on file    Years of education: Not on file    Highest education level: Not on file   Occupational History    Not on file   Tobacco Use    Smoking status: Former Smoker     Packs/day: 1.00     Years: 3.00     Pack years: 3.00     Types: Cigars     Start date:      Quit date:      Years since quittin.6    Smokeless tobacco: Never Used   Vaping Use    Vaping Use: Never used   Substance and Sexual Activity    Alcohol use: Not Currently    Drug use: No    Sexual activity: Yes     Partners: Female   Other Topics Concern    Not on file   Social History Narrative    Not on file     Social Determinants of Health     Financial Resource Strain: Low Risk     Difficulty of Paying Living Expenses: Not hard at all   Food Insecurity: No Food Insecurity    Worried About [address] in the Last Year: Never true    [address] in the Last Year: Never true   Transportation Needs:     Lack of Transportation (Medical):      Lack of Transportation (Non-Medical):    Physical Activity:     Days of Exercise per Week:     Minutes of Exercise per Session:    Stress:     Feeling of Stress :    Social Connections:     Frequency of Communication with Friends and Family:     Frequency of Social Gatherings with Friends and Family:     Attends Restoration Services:     Active Member of Clubs or Organizations:     Attends Club or Organization Meetings:     Marital Status:    Intimate Partner Violence:     Fear of Current or Ex-Partner:     Emotionally Abused:     Physically Abused:     Sexually Abused:        Current Facility-Administered Medications   Medication Dose Route Frequency Provider Last Rate Last Admin    potassium chloride (KLOR-CON M) extended release tablet 40 mEq  40 mEq Oral PRN Robert Vitale MD   40 mEq at 08/22/21 0537    Or    potassium bicarb-citric acid (EFFER-K) effervescent tablet 40 mEq  40 mEq Oral PRN Robert Vitale MD        Or    potassium chloride 10 mEq/100 mL IVPB (Peripheral Line)  10 mEq Intravenous PRN Rboert Vitale MD        amLODIPine (NORVASC) tablet 10 mg  10 mg Oral Daily Barb Griffin MD   10 mg at 08/22/21 0842    sodium chloride flush 0.9 % injection 5-40 mL  5-40 mL Intravenous 2 times per day Katelyn Webber MD   10 mL at 08/22/21 0843    sodium chloride flush 0.9 % injection 5-40 mL  5-40 mL Intravenous PRN Katelyn Webber MD        0.9 % sodium chloride infusion  25 mL Intravenous PRN Katelyn Webber MD        LORazepam (ATIVAN) tablet 1 mg  1 mg Oral Q1H PRN Katelyn Webber MD        Or    LORazepam (ATIVAN) injection 1 mg  1 mg Intravenous Q1H PRN Katelyn Webber MD        Or    LORazepam (ATIVAN) tablet 2 mg  2 mg Oral Q1H PRN Katelyn Webber MD        Or    LORazepam (ATIVAN) injection 2 mg  2 mg Intravenous Q1H PRN Katelyn Webber MD   2 mg at 08/18/21 1200    Or    LORazepam (ATIVAN) tablet 3 mg  3 mg Oral Q1H PRN Oz Doe MD        Or    LORazepam (ATIVAN) injection 3 mg  3 mg Intravenous Q1H PRN Oz Doe MD        Or    LORazepam (ATIVAN) tablet 4 mg  4 mg Oral Q1H PRN Oz Doe MD        Or    LORazepam (ATIVAN) injection 4 mg  4 mg Intravenous Q1H PRN Oz Doe MD   4 mg at 08/18/21 1345    thiamine (B-1) injection 100 mg  100 mg Intravenous Daily Oz Doe MD   100 mg at 13/22/95 1208    folic acid (FOLVITE) tablet 1 mg  1 mg Oral Daily Oz Doe MD   1 mg at 08/22/21 0274    multivitamin 1 tablet  1 tablet Oral Daily Oz Doe MD   1 tablet at 08/22/21 1192    amiodarone (CORDARONE) tablet 200 mg  200 mg Oral Daily Lorice Harleen, DO   200 mg at 08/22/21 6361    atorvastatin (LIPITOR) tablet 40 mg  40 mg Oral Daily Lorice Harleen, DO   40 mg at 08/22/21 1061    levothyroxine (SYNTHROID) tablet 125 mcg  125 mcg Oral Daily Lorice Harleen, DO   125 mcg at 08/22/21 9649    metoprolol succinate (TOPROL XL) extended release tablet 25 mg  25 mg Oral Daily Lorice Harleen, DO   25 mg at 08/22/21 9026    pantoprazole (PROTONIX) tablet 40 mg  40 mg Oral QAM AC Lorice Harleen, DO   40 mg at 08/22/21 5525    ondansetron (ZOFRAN-ODT) disintegrating tablet 4 mg  4 mg Oral Q8H PRN Lorice Harleen, DO        Or    ondansetron [facility]) injection 4 mg  4 mg Intravenous Q6H PRN Lorice Harleen, DO        magnesium hydroxide (MILK OF MAGNESIA) 400 MG/5ML suspension 30 mL  30 mL Oral Daily PRN Lorice Harleen, DO        acetaminophen (TYLENOL) tablet 650 mg  650 mg Oral Q6H PRN Lorice Harleen, DO   650 mg at 08/20/21 5646    Or    acetaminophen (TYLENOL) suppository 650 mg  650 mg Rectal Q6H PRN Lorice Harleen, DO             sodium chloride          Objective:   BP (!) 160/83   Pulse 79   Temp 97.5 °F (36.4 °C) (Temporal)   Resp 16   Ht 6' (1.829 m)   Wt 184 lb 5 oz (83.6 kg)   SpO2 97%   BMI 25.00 kg/m²     General: no acute distress  HEENT: normocephalic  Neck: supple, symmetrical, trachea midline   Lungs: clear to auscultation bilaterally   Cardiovascular: s1 and s2 normal  Abdomen: soft, positive bowel sounds  Extremities: no edema or cyanosis   Neuro: aaox3, answering questions appropriately  Skin: normal color and texture     Recent Labs     08/20/21 0117 08/21/21 0227 08/22/21  0412   WBC 11.1* 8.3 8.4   RBC 3.61* 3.55* 3.37*   HGB 11.3* 11.1* 10.4*   HCT 34.9* 34.6* 32.3*   MCV 96.7* 97.5* 95.8*   MCH 31.3* 31.3* 30.9   MCHC 32.4* 32.1* 32.2*    179 197     Recent Labs     08/20/21 0117 08/21/21 0227 08/22/21  0412    142 138   K 3.9 4.3 3.2*   ANIONGAP 12 10 13    107 101   CO2 24 25 24   BUN 21 22 18   CREATININE 1.4* 1.4* 1.4*   GLUCOSE 80 102 90   CALCIUM 8.6* 8.8 8.4*     Recent Labs     08/20/21 0117 08/21/21 0227 08/22/21  0412   MG 2.1 2.1 1.9     Recent Labs     08/20/21 0117   AST 32   ALT 21   BILITOT 1.4*   ALKPHOS 99     No results for input(s): PH, PO2, PCO2, HCO3, BE, O2SAT in the last 72 hours. No results for input(s): TROPONINI in the last 72 hours. No results for input(s): INR in the last 72 hours. No results for input(s): LACTA in the last 72 hours. Intake/Output Summary (Last 24 hours) at 8/22/2021 1307  Last data filed at 8/22/2021 0948  Gross per 24 hour   Intake 245 ml   Output 1700 ml   Net -1455 ml       No results found.      Assessment and Plan:   Acute left thalamic hemorrhage  S/p fall  Neurosurgery following  Home Eliquis (A. Fib) held  Kcentra administered  Avoid offending agents  Cardene gtt since weaned off  Repeat imaging per neurosurgery  Frequent neuro checks  PT/OT/SLP  No plans for neurosurgical intervention  Social work following for potential placement options     History of alcohol abuse  Monitor for withdrawal  CIWA protocol  Thiamine/folic acid/MVI  Social work     Acute encephalopathy  Continues to improve  Secondary to above processes     DVT prophylaxis  SCDs    Amy Rodrigez MD   8/22/2021 1:07 PM

## 2021-08-22 NOTE — PROGRESS NOTES
Physical Therapy  Name: Maciej Paez  MRN:  [id number]  Date of service:  8/22/2021 08/22/21 0856   Restrictions/Precautions   Restrictions/Precautions Fall Risk;Seizure   Required Braces or Orthoses? No   General   Chart Reviewed Yes   Subjective   Subjective Pt in bed, agrees to work with therapy. Requires much encouragement to attempt standing in SS. Pain Screening   Patient Currently in Pain No   Bed Mobility   Supine to Sit Minimal assistance   Sit to Supine Minimal assistance   Transfers   Sit to Stand Moderate Assistance  (in SS with bed elevated)   Stand to sit Minimal Assistance   Comment Pt stood x3 in SS, once from bedside and twice from SS paddles, declined TF to recliner   Short term goals   Time Frame for Short term goals 2 wks   Short term goal 1 supine to sit indep   Short term goal 2 sit to stand indep   Short term goal 3 amb. 48' with RW SBA   Short term goal 4 bed to chair SBA with RW   Conditions Requiring Skilled Therapeutic Intervention   Body structures, Functions, Activity limitations Decreased functional mobility ; Decreased ROM; Decreased strength;Decreased sensation;Decreased endurance;Decreased balance;Decreased posture;Decreased coordination   Assessment Worked with pt on STS from elevated surface, pt very anxious and hesistant to use SS but able to stand x3. Pt able to stand for ~30 seconds while in SS before showing sxs of fatigue and sitting down. Pt declined TF to recliner, scooted up in bed and returned to supine with all needs in reach. Activity Tolerance   Activity Tolerance Patient limited by fatigue; Other   Safety Devices   Type of devices Call light within reach; Chair alarm in place;Gait belt;Left in bed  (bed alarm failing, chair alarm engaged)         Electronically signed by Lea Curtis PTA on 8/22/2021 at 9:00 AM

## 2021-08-23 VITALS
SYSTOLIC BLOOD PRESSURE: 141 MMHG | HEART RATE: 80 BPM | HEIGHT: 72 IN | WEIGHT: 184.31 LBS | RESPIRATION RATE: 18 BRPM | DIASTOLIC BLOOD PRESSURE: 86 MMHG | TEMPERATURE: 99.8 F | BODY MASS INDEX: 24.96 KG/M2 | OXYGEN SATURATION: 95 %

## 2021-08-23 LAB
ANION GAP SERPL CALCULATED.3IONS-SCNC: 14 MMOL/L (ref 7–19)
BASOPHILS ABSOLUTE: 0 K/UL (ref 0–0.2)
BASOPHILS RELATIVE PERCENT: 0.3 % (ref 0–1)
BUN BLDV-MCNC: 18 MG/DL (ref 8–23)
CALCIUM SERPL-MCNC: 8.6 MG/DL (ref 8.8–10.2)
CHLORIDE BLD-SCNC: 102 MMOL/L (ref 98–111)
CO2: 21 MMOL/L (ref 22–29)
CREAT SERPL-MCNC: 1.3 MG/DL (ref 0.5–1.2)
EOSINOPHILS ABSOLUTE: 0.1 K/UL (ref 0–0.6)
EOSINOPHILS RELATIVE PERCENT: 0.5 % (ref 0–5)
GFR AFRICAN AMERICAN: >59
GFR NON-AFRICAN AMERICAN: 55
GLUCOSE BLD-MCNC: 94 MG/DL (ref 74–109)
HCT VFR BLD CALC: 33.2 % (ref 42–52)
HEMOGLOBIN: 10.7 G/DL (ref 14–18)
IMMATURE GRANULOCYTES #: 0.1 K/UL
LYMPHOCYTES ABSOLUTE: 1.5 K/UL (ref 1.1–4.5)
LYMPHOCYTES RELATIVE PERCENT: 12.8 % (ref 20–40)
MAGNESIUM: 1.8 MG/DL (ref 1.6–2.4)
MCH RBC QN AUTO: 30.9 PG (ref 27–31)
MCHC RBC AUTO-ENTMCNC: 32.2 G/DL (ref 33–37)
MCV RBC AUTO: 96 FL (ref 80–94)
MONOCYTES ABSOLUTE: 1.4 K/UL (ref 0–0.9)
MONOCYTES RELATIVE PERCENT: 11.6 % (ref 0–10)
NEUTROPHILS ABSOLUTE: 8.6 K/UL (ref 1.5–7.5)
NEUTROPHILS RELATIVE PERCENT: 74 % (ref 50–65)
PDW BLD-RTO: 13.6 % (ref 11.5–14.5)
PLATELET # BLD: 217 K/UL (ref 130–400)
PMV BLD AUTO: 9.1 FL (ref 9.4–12.4)
POTASSIUM SERPL-SCNC: 4.1 MMOL/L (ref 3.5–5)
RBC # BLD: 3.46 M/UL (ref 4.7–6.1)
SARS-COV-2, NAAT: NOT DETECTED
SODIUM BLD-SCNC: 137 MMOL/L (ref 136–145)
WBC # BLD: 11.7 K/UL (ref 4.8–10.8)

## 2021-08-23 PROCEDURE — 36415 COLL VENOUS BLD VENIPUNCTURE: CPT

## 2021-08-23 PROCEDURE — 99232 SBSQ HOSP IP/OBS MODERATE 35: CPT | Performed by: NEUROLOGICAL SURGERY

## 2021-08-23 PROCEDURE — 85025 COMPLETE CBC W/AUTO DIFF WBC: CPT

## 2021-08-23 PROCEDURE — 97535 SELF CARE MNGMENT TRAINING: CPT

## 2021-08-23 PROCEDURE — 6370000000 HC RX 637 (ALT 250 FOR IP): Performed by: NEUROLOGICAL SURGERY

## 2021-08-23 PROCEDURE — 99231 SBSQ HOSP IP/OBS SF/LOW 25: CPT | Performed by: PHYSICIAN ASSISTANT

## 2021-08-23 PROCEDURE — 2580000003 HC RX 258: Performed by: INTERNAL MEDICINE

## 2021-08-23 PROCEDURE — 80048 BASIC METABOLIC PNL TOTAL CA: CPT

## 2021-08-23 PROCEDURE — 97530 THERAPEUTIC ACTIVITIES: CPT

## 2021-08-23 PROCEDURE — 6370000000 HC RX 637 (ALT 250 FOR IP): Performed by: INTERNAL MEDICINE

## 2021-08-23 PROCEDURE — 83735 ASSAY OF MAGNESIUM: CPT

## 2021-08-23 PROCEDURE — 6360000002 HC RX W HCPCS: Performed by: INTERNAL MEDICINE

## 2021-08-23 PROCEDURE — 87635 SARS-COV-2 COVID-19 AMP PRB: CPT

## 2021-08-23 RX ORDER — AMLODIPINE BESYLATE 10 MG/1
10 TABLET ORAL DAILY
Qty: 30 TABLET | Refills: 0 | Status: SHIPPED | OUTPATIENT
Start: 2021-08-24 | End: 2021-12-06

## 2021-08-23 RX ORDER — FOLIC ACID 1 MG/1
1 TABLET ORAL DAILY
Qty: 30 TABLET | Refills: 0 | Status: SHIPPED | OUTPATIENT
Start: 2021-08-24 | End: 2021-12-06

## 2021-08-23 RX ORDER — MULTIVITAMIN WITH IRON
1 TABLET ORAL DAILY
Qty: 30 TABLET | Refills: 0 | Status: SHIPPED | OUTPATIENT
Start: 2021-08-24 | End: 2022-08-23

## 2021-08-23 RX ORDER — LISINOPRIL 20 MG/1
40 TABLET ORAL DAILY
Status: DISCONTINUED | OUTPATIENT
Start: 2021-08-23 | End: 2021-08-23 | Stop reason: HOSPADM

## 2021-08-23 RX ORDER — THIAMINE MONONITRATE (VIT B1) 100 MG
100 TABLET ORAL DAILY
Qty: 30 TABLET | Refills: 0 | Status: SHIPPED | OUTPATIENT
Start: 2021-08-23 | End: 2022-08-23

## 2021-08-23 RX ADMIN — SODIUM CHLORIDE, PRESERVATIVE FREE 10 ML: 5 INJECTION INTRAVENOUS at 09:04

## 2021-08-23 RX ADMIN — PANTOPRAZOLE SODIUM 40 MG: 40 TABLET, DELAYED RELEASE ORAL at 06:17

## 2021-08-23 RX ADMIN — LEVOTHYROXINE SODIUM 125 MCG: 125 TABLET ORAL at 08:59

## 2021-08-23 RX ADMIN — ATORVASTATIN CALCIUM 40 MG: 40 TABLET, FILM COATED ORAL at 08:59

## 2021-08-23 RX ADMIN — AMIODARONE HYDROCHLORIDE 200 MG: 200 TABLET ORAL at 08:59

## 2021-08-23 RX ADMIN — THIAMINE HYDROCHLORIDE 100 MG: 100 INJECTION, SOLUTION INTRAMUSCULAR; INTRAVENOUS at 08:59

## 2021-08-23 RX ADMIN — THERA TABS 1 TABLET: TAB at 08:59

## 2021-08-23 RX ADMIN — METOPROLOL SUCCINATE 25 MG: 25 TABLET, EXTENDED RELEASE ORAL at 08:59

## 2021-08-23 RX ADMIN — AMLODIPINE BESYLATE 10 MG: 10 TABLET ORAL at 08:59

## 2021-08-23 RX ADMIN — FOLIC ACID 1 MG: 1 TABLET ORAL at 08:59

## 2021-08-23 RX ADMIN — LISINOPRIL 40 MG: 20 TABLET ORAL at 12:46

## 2021-08-23 ASSESSMENT — PAIN - FUNCTIONAL ASSESSMENT: PAIN_FUNCTIONAL_ASSESSMENT: PREVENTS OR INTERFERES SOME ACTIVE ACTIVITIES AND ADLS

## 2021-08-23 ASSESSMENT — PAIN DESCRIPTION - LOCATION: LOCATION: KNEE

## 2021-08-23 ASSESSMENT — PAIN DESCRIPTION - ORIENTATION: ORIENTATION: RIGHT

## 2021-08-23 ASSESSMENT — PAIN DESCRIPTION - FREQUENCY: FREQUENCY: INTERMITTENT

## 2021-08-23 ASSESSMENT — PAIN DESCRIPTION - DESCRIPTORS: DESCRIPTORS: ACHING

## 2021-08-23 ASSESSMENT — PAIN DESCRIPTION - PAIN TYPE: TYPE: ACUTE PAIN

## 2021-08-23 ASSESSMENT — PAIN SCALES - WONG BAKER: WONGBAKER_NUMERICALRESPONSE: 4

## 2021-08-23 NOTE — DISCHARGE INSTR - COC
Continuity of Care Form    Patient Name: Megan Traylor   :  1950  MRN:  [id number]    Admit date:  2021  Discharge date:  ***    Code Status Order: Full Code   Advance Directives:     Admitting Physician:  No admitting provider for patient encounter. PCP: Paulina Holland MD    Discharging Nurse: [facility] Unit/Room#: 0512/512-01  Discharging Unit Phone Number: ***    Emergency Contact:   Extended Emergency Contact Information  [facility] Meredith[address] Phone: [phone]  Mobile Phone: [phone]  Relation: Brother/Sister  Secondary Emergency Contact: Jose J Bello  Mobile Phone: [phone]  Relation: Other    Past Surgical History:  Past Surgical History:   Procedure Laterality Date    CARDIAC CATHETERIZATION  14  [facility]    angioplasty, stent to LAD.  EF 45%    CHOLECYSTECTOMY      FEMUR FRACTURE SURGERY Right 10/30/2020    TFN, SHORT performed by Kelby Su MD at [address]       Immunization History:   Immunization History   Administered Date(s) Administered    COVID-19, Moderna, PF, 100mcg/0.5mL 2021, 2021    Pneumococcal Polysaccharide ([id number]) 10/02/2020    Tdap (Boostrix, Adacel) 10/30/2020, 2021       Active Problems:  Patient Active Problem List   Diagnosis Code    CAD (coronary artery disease) I25.10    Essential hypertension I10    Alcohol abuse F10.10    Hyponatremia E87.1    Palliative care patient Z51.5    Chronic systolic heart failure (HCC) I50.22    Nonischemic cardiomyopathy ([facility] [address] I42.8    Mixed hyperlipidemia E78.2    Paroxysmal atrial fibrillation (HCC) I48.0    Localized osteoporosis with current pathological fracture with routine healing M80.80XD    Postoperative anemia due to acute blood loss D62    Closed nondisplaced fracture of lesser trochanter of right femur with routine healing S72.124D    Hypovitaminosis D E55.9    GERD (gastroesophageal reflux disease) K21.9    Coronary artery disease involving native heart with angina pectoris, unspecified vessel or lesion type ([facility]) I25.119    Guaiac positive stools R19.5    Acute gout of hand M10.9    Intracerebral hemorrhage, nontraumatic ([facility]) I61.9       Isolation/Infection:   Isolation          No Isolation        Patient Infection Status     Infection Onset Added Last Indicated Last Indicated By Review Planned Expiration Resolved Resolved By    None active    Resolved    COVID-19 Rule Out 10/30/20 10/30/20 10/30/20 COVID-19 (Ordered)   10/30/20 Rule-Out Test Resulted          Nurse Assessment:  Last Vital Signs: BP (!) 145/66   Pulse 70   Temp 98 °F (36.7 °C) (Temporal)   Resp 18   Ht 6' (1.829 m)   Wt 184 lb 5 oz (83.6 kg)   SpO2 94%   BMI 25.00 kg/m²     Last documented pain score (0-10 scale): Pain Level: 2  Last Weight:   Wt Readings from Last 1 Encounters:   08/20/21 184 lb 5 oz (83.6 kg)     Mental Status:  oriented    IV Access:  - None       Nursing Mobility/ADLs:  Walking     Transfer  Assisted  Bathing  Assisted  Dressing  Assisted  Toileting  Assisted  Feeding  Assisted  Med Admin  Assisted  Med Delivery   whole    Wound Care Documentation and Therapy:  Wound 11/03/20 Left;Plantar uneven edges, black in center (Active)   Number of days: 292       Wound 11/03/20 Ankle Right; Outer dry and scaly, bruised (Active)   Number of days: 292        Elimination:  Continence:   · Bowel: Yes  · Bladder: No  Urinary Catheter: {Urinary Catheter:505824424}   Colostomy/Ileostomy/Ileal Conduit: {YES / HV:49156}       Date of Last BM: ***    Intake/Output Summary (Last 24 hours) at 8/23/2021 1546  Last data filed at 8/23/2021 1018  Gross per 24 hour   Intake 450 ml   Output 100 ml   Net 350 ml     I/O last 3 completed shifts:   In: 450 [P.O.:450]  Out: 100 [Urine:100]    Safety Concerns:     [address] Safety Concerns:752954919}    Impairments/Disabilities:      [address] Impairments/Disabilities:353606244}    Nutrition Therapy:  Current Nutrition Therapy:   [address] CASSANDRA Diet List:126535935}    Routes of Feeding: {CHP DME Other Feedings:391979362}  Liquids: {Slp liquid thickness:42007}  Daily Fluid Restriction: {CHP DME Yes amt example:137547861}  Last Modified Barium Swallow with Video (Video Swallowing Test): {Done Not Done YFCF:597646846}    Treatments at the Time of Hospital Discharge:   Respiratory Treatments: ***  Oxygen Therapy:  {Therapy; copd oxygen:79087}  Ventilator:    {[facility] Vent CZLD:792343472}    Rehab Therapies: {THERAPEUTIC INTERVENTION:4686454045}  Weight Bearing Status/Restrictions: { CC Weight Bearin}  Other Medical Equipment (for information only, NOT a DME order):  {EQUIPMENT:133004647}  Other Treatments: ***    Patient's personal belongings (please select all that are sent with patient):  {[facility] DME Belongings:929116959}    RN SIGNATURE:  {Esignature:172312549}    CASE MANAGEMENT/SOCIAL WORK SECTION    Inpatient Status Date: ***    Readmission Risk Assessment Score:  Readmission Risk              Risk of Unplanned Readmission:  22           Discharging to Facility/ Agency   · Name:   · Address:  · Phone:  · Fax:    Dialysis Facility (if applicable)   · Name:  · Address:  · Dialysis Schedule:  · Phone:  · Fax:    / signature: {Esignature:342561720}    PHYSICIAN SECTION    Prognosis: {Prognosis:0663788114}    Condition at Discharge: [address] Patient Condition:048659028}    Rehab Potential (if transferring to Rehab): {Prognosis:6064340362}    Recommended Labs or Other Treatments After Discharge: ***    Physician Certification: I certify the above information and transfer of Damián Lai  is necessary for the continuing treatment of the diagnosis listed and that he requires {Admit to Appropriate Level of Care:73955} for {GREATER/LESS:707178138} 30 days.      Update Admission H&P: {CHP DME Changes in KINNL:214097366}    PHYSICIAN SIGNATURE:  {Esignature:399391334}

## 2021-08-23 NOTE — PROGRESS NOTES
[address] [facility] Neurosurgery ProgressNote        CHIEF COMPLAINT:  AMS, intracranial hemorrhage      INTERVAL UPDATE:  No overnight events. Patient seen and examined on the floor. He denies any headaches, nausea or vomiting. Still with expressive speech difficulty. He was able to get out of bed with a Michaelana maría Anna yesterday but declined getting up to the chair. He has been declined for inpatient rehab in favor of SNF.      HPI:  The patient is a 73 y.o. male with a history of CHF, HTN, and atrial fibrillation on Eliquis who presented to the [facility] ED with altered mental status and a presumed fall. He was and remains an unreliable historian and is unable to express himself in any meaningful way. When asked, he does endorse a headache and denies pain elsewhere. A CT of his head was done on arrival to the ED and this revealed an ~2.5cm left thalamic hemorrahge with slight intraventricular extension. He has been hypertensive since arrival and has been admitted to the intensive care unit and I have been asked to provide neurosurgical consultation. He has been given K-centra to reverse his Eliquis. By report, he also has a history of heavy alcohol use. PHYSICAL EXAM:    Vitals:    08/23/21 0638   BP: (!) 147/72   Pulse: 66   Resp: 16   Temp: 98 °F (36.7 °C)   SpO2: 93%       Constitutional: Appears well-developed and well-nourished. Eyes  conjunctiva normal.  Pupils react to light  Ear, nose,throat -Normal pinna and tragus, No scars, or lesions over external nose or ears, no obvious atrophy of tongue  Neck- symmetric, trachea midline, no jugular vein distension, no thyromegaly  Respiration- chest wall symmetric, normal effort without use of accessory muscles  Musculoskeletal  no significant wasting of muscles noted, no bony deformities, symmetric bulk  Extremities- no clubbing, cyanosis or edema  Skin  warm, dry, and intact. No rash,erythema, or pallor.   Psychiatric  mood, affect, and behavior appear normal.       NEUROLOGIC EXAM:    MENTAL STATUS: Awake and alert, conversant. Persistent expressive aphasia. CRANIAL NERVES: PERRL, EOMI, symmetric facies, tongue midline        MOTOR:     Right Upper Extremity:    4/5 with significant apraxia    Left Upper Extremity:    5/5    Right Lower Extremity:    3-4 / 5 with significant apraxia    Left Lower Extremity:    5/5      SOMATOSENSORY:     Right Upper Extremity: normal light touch sensation  Left Upper Extremity: normal light touch sensation  Right Lower Extremity: normal light touch sensation  Left Lower Extremity: normal light touch sensation      REFLEXES:    Kelley's: Negative  Clonus: Negative        DATA:      Lab Results   Component Value Date    WBC 11.7 (H) 08/23/2021    HGB 10.7 (L) 08/23/2021    HCT 33.2 (L) 08/23/2021    MCV 96.0 (H) 08/23/2021     08/23/2021     Lab Results   Component Value Date     08/23/2021    K 4.1 08/23/2021     08/23/2021    CO2 21 (L) 08/23/2021    BUN 18 08/23/2021    CREATININE 1.3 (H) 08/23/2021    GLUCOSE 94 08/23/2021    CALCIUM 8.6 (L) 08/23/2021    PROT 6.1 (L) 08/20/2021    LABALBU 3.3 (L) 08/20/2021    BILITOT 1.4 (H) 08/20/2021    ALKPHOS 99 08/20/2021    AST 32 08/20/2021    ALT 21 08/20/2021    LABGLOM 55 (A) 08/23/2021    GFRAA >59 08/23/2021     Lab Results   Component Value Date    INR 1.22 (H) 08/18/2021    PROTIME 15.6 (H) 08/18/2021         IMAGING:    My interpretation of imaging studies:  I agree with the radiologist.        ASSESSMENT AND PLAN:  This is a 73 y.o. male with a history of CHF, HTN and atrial fibrillation on Eliquis who was admitted to [facility] on 8/17/2021 for treatment of an acute left thalamic IPH with intraventricular extension. His Eliquis was reversed with K-centra in the emergency department. CT is stable. Hypertension improved with addition of Norvasc. Neuro: PT/OT/Speech therapy. Repeat CT for any neurologic change.   Hold all anticoagulant and antiplatelet medications. CV: Continue current medications, goal SBP < 160mmHg,  Pulmonary: maintain SpO2 > 90%  GI: No active issues  /Renal: No active issues  Endocrine: No active issues  FEN: Encourage PO intake  Other: Stable for transfer to SNF when approved. Will need follow up visit in 2 weeks with repeat CT head.           Rita Trujillo MD

## 2021-08-23 NOTE — DISCHARGE SUMMARY
[place], [place], [address]  DEPARTMENT OF HOSPITALIST MEDICINE    DISCHARGE SUMMARY:        Damián Lai  :  1950  MRN:  [id number]    Admit date:  2021  Discharge date: 2021      Admitting Physician:  No admitting provider for patient encounter. Advance Directive: Full Code    Consults Made:   IP CONSULT TO NEUROSURGERY  IP CONSULT TO PALLIATIVE CARE  IP CONSULT TO SOCIAL WORK  IP CONSULT TO NEUROSURGERY  IP CONSULT TO SOCIAL WORK  IP CONSULT TO SOCIAL WORK  IP CONSULT TO SOCIAL WORK  PALLIATIVE CARE EVAL  IP CONSULT TO REHAB/TCU ADMISSION COORDINATOR      Primary Care Physician:  Ced Santacruz MD    Discharge Diagnoses:  Principal Problem:    Intracerebral hemorrhage, nontraumatic (HCC)  Active Problems:    CAD (coronary artery disease)    Essential hypertension    Alcohol abuse    Palliative care patient    Chronic systolic heart failure (HCC)    Nonischemic cardiomyopathy (HCC)    Mixed hyperlipidemia    Paroxysmal atrial fibrillation (HCC)    GERD (gastroesophageal reflux disease)    Coronary artery disease involving native heart with angina pectoris, unspecified vessel or lesion type ([facility] [address]  Resolved Problems:    * No resolved hospital problems.  *          Pertinent Labs:  CBC with DIFF:  Recent Labs     21  0227 21  0412 21  0316   WBC 8.3 8.4 11.7*   RBC 3.55* 3.37* 3.46*   HGB 11.1* 10.4* 10.7*   HCT 34.6* 32.3* 33.2*   MCV 97.5* 95.8* 96.0*   MCH 31.3* 30.9 30.9   MCHC 32.1* 32.2* 32.2*   RDW 14.1 13.7 13.6    197 217   MPV 9.3* 9.1* 9.1*   NEUTOPHILPCT 73.9* 66.4* 74.0*   LYMPHOPCT 15.3* 20.2 12.8*   MONOPCT 7.3 10.0 11.6*   EOSRELPCT 2.3 2.0 0.5   BASOPCT 0.7 0.7 0.3   NEUTROABS 6.1 5.6 8.6*   LYMPHSABS 1.3 1.7 1.5   MONOSABS 0.60 0.80 1.40*   EOSABS 0.20 0.20 0.10   BASOSABS 0.10 0.10 0.00       CMP/BMP:  Recent Labs     21  0227 21  0412 21  0316    138 137   K 4.3 3.2* 4.1    101 102   CO2 25 24 21* ANIONGAP 10 13 14   GLUCOSE 102 90 94   BUN 22 18 18   CREATININE 1.4* 1.4* 1.3*   LABGLOM 50* 50* 55*   CALCIUM 8.8 8.4* 8.6*         CRP:  No results for input(s): CRP in the last 72 hours. Sed Rate:  No results for input(s): SEDRATE in the last 72 hours. HgBA1c:  No components found for: HGBA1C  FLP:    Lab Results   Component Value Date    TRIG 158 06/30/2021    HDL 53 06/30/2021    LDLCALC 55 06/30/2021    LDLDIRECT 99 12/28/2014     TSH:    Lab Results   Component Value Date    TSH 2.160 06/30/2021     Troponin T: No results for input(s): TROPONINI in the last 72 hours. Pro-BNP: No results for input(s): BNP in the last 72 hours. INR: No results for input(s): INR in the last 72 hours. ABGs: No results found for: PHART, PO2ART, NEG0OUM  UA:No results for input(s): NITRITE, COLORU, PHUR, LABCAST, WBCUA, RBCUA, MUCUS, TRICHOMONAS, YEAST, BACTERIA, CLARITYU, SPECGRAV, LEUKOCYTESUR, UROBILINOGEN, BILIRUBINUR, BLOODU, GLUCOSEU, AMORPHOUS in the last 72 hours. Invalid input(s): Percilla Radar      Culture Results:    No results for input(s): CXSURG in the last 720 hours. Blood Culture Recent: No results for input(s): BC in the last 720 hours. Cultures:   No results for input(s): CULTURE in the last 72 hours. No results for input(s): BC, Oziel Crumble in the last 72 hours. No results for input(s): CXSURG in the last 72 hours. Recent Labs     08/21/21  0227 08/22/21  0412 08/23/21  0316   MG 2.1 1.9 1.8     No results for input(s): AST, ALT, ALB, BILITOT, ALKPHOS, ALB in the last 72 hours. Significant Diagnostic Studies:   XR PELVIS (1-2 VIEWS)    Result Date: 8/17/2021  XR PELVIS (1-2 VIEWS) 8/17/2021 6:02 PM History: Fall injury. Right leg pain. Pelvis, one view. Osteopenia. Intact pelvic ring. Prominent fecal material within the rectum. Diffuse arterial calcification. Moderate degenerative change within the lower lumbar spine. Hardware fixation of the right hip.  There is high-grade osteoarthritic change with spurring and complete loss of the superior hip joint space on the right side. No fracture. 1. No acute fracture. Signed by Dr Leonel Jones    XR HAND RIGHT (MIN 3 VIEWS)    Result Date: 8/17/2021  Exam:   XR HAND RIGHT (MIN 3 VIEWS)  Date:  8/17/2021 History:  Male, age  73 years; fall COMPARISON:  None. Findings : Demineralized skeleton limits bony details. There is an oblique lucency in cortical step-off involving the third proximal phalanx, consistent with a recent (acute to subacute) mildly displaced fracture. Probable intra-articular extension although difficult to evaluate due to positioning of the fingers. Incompletely visualized plate and screw construct in the distal radius. Old ulnar styloid avulsion fracture. Impression: Recent mildly displaced fracture of the third proximal phalanx. Signed by Dr Tony Curtis (MIN 2 VIEWS)    Result Date: 8/17/2021  Exam:   XR FEMUR RIGHT (MIN 2 VIEWS)  Date:  8/17/2021 History:  Male, age  73 years; fall COMPARISON:  Pelvis radiograph dated October 30, 2020. Findings : Right femur intramedullary dmitriy, without hardware loosening or failure identified. Redemonstration of findings of avascular necrosis with a large defect along the upper outer aspect of the femoral head. Advanced arthropathy of the knee joint. There is no acute displaced fracture. No dislocation. No suspicious lytic or blastic lesion. No radiopaque foreign body. Impression: No acute osseous pathology. Signed by Dr Jaziel Freitas (1-2 VIEWS)    Result Date: 8/17/2021  Exam:   XR KNEE RIGHT (1-2 VIEWS)  Date:  8/17/2021 History:  Male, age  73 years; swelling and abrasion. COMPARISON:  None. Findings : Demineralization of the skeleton limits bony details. Suprapatellar effusion. Advanced arthropathy tricompartmental knee. Probable loose bodies in the joint space. There is no acute displaced fracture. No dislocation.  No suspicious lytic or blastic lesion. No radiopaque foreign body. Vascular calcifications. Impression: No acute displaced fracture or dislocation. Advanced tricompartmental arthropathy. Suprapatellar effusion. Signed by Dr Dane Mckoy    Result Date: 8/18/2021  Examination. CT HEAD WO CONTRAST 8/18/2021 5:17 AM History: Intracranial hemorrhage. Follow-up. DLP: 834 mGycm. The CT scan of the head is performed without intravenous contrast enhancement. The images are acquired in axial plane with subsequent reconstruction in coronal and sagittal plane. The comparison is made with the previous study dated 8/17/2021. There is left thalamic hematoma which measures 2.7 x 2.4 x 1.7 cm and have not significantly changed since the previous study. There is extension of hemorrhage into the third ventricle and the posterior aspect of the body of the left lateral ventricle and the left occipital horn. This is unchanged. There is no midline shift. No further extension of the hemorrhage. Moderate the dilated ventricles are similar to the previous study. Prominent cortical sulci and basal cisterns suggestive chronic volume loss. The scattered areas chronic white matter ischemia in the centrum semiovale bilaterally are similar to the previous study. The images reviewed in bone window show no evidence of a fracture. The visualized paranasal sinuses and mastoid air cells are clear. A persistent and unchanged left ophthalmic/basal ganglia intraparenchymal hemorrhage extending into the third and lateral ventricle as detailed above. No midline shift. Chronic ischemic and atrophic changes.  Signed by Dr Selina Fields    Result Date: 8/17/2021  CT HEAD WO CONTRAST 8/17/2021 4:11 PM HISTORY: Altered mental status COMPARISON: CT scan dated 8/20/2015 DOSE LENGTH PRODUCT: 1279 mGy cm TECHNIQUE: Helical tomographic images of the brain were obtained without the use of intravenous contrast. Automated exposure control was also utilized to decrease patient radiation dose. FINDINGS: There is a 2.7 x 2.5 x 1.8 cm left basal intraparenchymal hemorrhage. Intraventricular hemorrhage extension is also identified with blood product identified in the posterior third ventricles as well as layering within the occipital horns of both lateral ventricles. Ventricles do not appear appreciably dilated although there is some mass effect on the third ventricle. There is no midline shift. Posterior fossa structures are unremarkable. No subdural collections are identified. Scalp and calvarium are intact. 1. 2.7 cm left basal ganglia intraparenchymal hemorrhage with intraventricular hemorrhage extension, layering in the posterior third ventricle and occipital horns. Ventricles do not appear dilated at this time. The results of this exam were discussed with Dr. Denis Holter on 8/17/2021 at 1622 hours Signed by Dr Panda Oviedo    XR CHEST PORTABLE    Result Date: 8/17/2021  Exam:   XR CHEST PORTABLE  Date:  8/17/2021 History:  Male, age  73 years; altered mental status COMPARISON:  Chest x-ray dated October 30, 2020. Findings : Low lung volumes. Chronic elevation of the right hemidiaphragm. The heart and mediastinum are normal in size. Lungs are without focal infiltrate, mass or effusions. The bones show no acute pathology. Remote trauma to the right clavicle. Impression: No acute cardiopulmonary disease. Signed by Dr Thea Vela Course:   Mr. Symone Mendoza, a 82-year-old male, history of HTN, CHF, atrial fibrillation (on apixaban), presented to [address] [place] [address] ED (08/17/2021), on account of altered mental status and reported fall. CT head without contrast on presentation, revealing 2.7 cm left thalamic hemorrhage with slight intraventricular extension. Patient was admitted to the ICU initially, for closer monitoring and work-up.   Writer assumes care of patient (08/23/2021)      Acute encephalopathy  Acute left thalamic hemorrhage  S/p fall  Hypertensive emergency    · CT Head WO Con (08/17/2021): Impression: 2.7 cm left basal ganglia intraparenchymal hemorrhage with intraventricular hemorrhage extension, layering in the posterior third ventricle and occipital horns. Ventricles do not appear dilated at this time. · CT Head WO Con (08/18/2021): Impression: A persistent and unchanged left ophthalmic/basal ganglia intraparenchymal hemorrhage extending into the third and lateral ventricle as detailed above. No midline shift. Chronic ischemic and atrophic changes. · CT Head WO Con (08/19/2021): Impression: A probable mild increase in size of the left basal ganglia hemorrhage as detailed above. A minimal extension into the third ventricle and occipital horn of the lateral ventricles persists without any significant progression. No midline shift. · CT Head WO Con (08/20/2021): Impression: No significant change. · Home Eliquis (A. Fib) held/discontinued  · Status post K-Centra administered on presentation, to reverse apixaban  · Initially started on nifedipine gtt, for strict blood pressure control, with the goal of SBP <160 mmHg. Now weaned off nifedipine drip. · Continue oral BP regimen below;  · Amlodipine 10 mg p.o. dailyadded on this admission  · Lisinopril 40 mg p.o. daily  · Metoprolol succinate 25 mg p.o. daily  · Repeat imaging per neurosurgery  · Frequent neuro checks  · PT/OT/SLP  · Followed by neurosurgery  · No acute neurosurgical intervention recommended. · Okay for discharge from neurosurgery standpoint  · Follow-up with neurosurgery outpatient within 2 weeks, for recommended repeat head CT outpatient. · Patient evaluated by inpatient rehab, and determined to be an early eligible. · Currently accepted for SNF [facility] ([place], [place]       History of alcohol abuse  · Monitored for withdrawal  · CIWA protocol  · Thiamine/folic acid/MVI  · Social work    History of atrial fibrillation  · Currently rate controlled  · Amiodarone 200 mg p.o. daily  · Contraindication to anticoagulation, given intracranial bleed. · Discontinue apixaban  · Follow-up with cardiology for evaluation for possible watchman device if indicated. · Follow-up outpatient     Hypokalemiaresolved  · Replaced as per protocol    History of hypothyroidism  · Levothyroxine 125 mcg p.o. daily    History of GERD  · Pantoprazole 40 mg p.o. daily      Physical Exam:  Vital Signs: BP (!) 147/72   Pulse 66   Temp 98 °F (36.7 °C) (Temporal)   Resp 16   Ht 6' (1.829 m)   Wt 184 lb 5 oz (83.6 kg)   SpO2 93%   BMI 25.00 kg/m²   Physical Exam  Vitals and nursing note reviewed. Constitutional:       General: He is not in acute distress. Appearance: Normal appearance. He is not ill-appearing, toxic-appearing or diaphoretic. HENT:      Head: Normocephalic and atraumatic. Right Ear: External ear normal.      Left Ear: External ear normal.      Nose: Nose normal. No congestion or rhinorrhea. Mouth/Throat:      Mouth: Mucous membranes are moist.      Pharynx: Oropharynx is clear. No oropharyngeal exudate or posterior oropharyngeal erythema. Eyes:      General: No scleral icterus. Right eye: No discharge. Left eye: No discharge. Extraocular Movements: Extraocular movements intact. Conjunctiva/sclera: Conjunctivae normal.      Pupils: Pupils are equal, round, and reactive to light. Neck:      Vascular: No carotid bruit. Cardiovascular:      Rate and Rhythm: Normal rate and regular rhythm. Pulses: Normal pulses. Heart sounds: Normal heart sounds. No murmur heard. No friction rub. No gallop. Pulmonary:      Effort: Pulmonary effort is normal. No respiratory distress. Breath sounds: Normal breath sounds. No stridor. No wheezing, rhonchi or rales. Chest:      Chest wall: No tenderness. Abdominal:      General: Bowel sounds are normal. There is no distension. Palpations: Abdomen is soft. Tenderness: There is no abdominal tenderness. There is no guarding or rebound. Musculoskeletal:         General: No swelling, tenderness, deformity or signs of injury. Normal range of motion. Cervical back: Normal range of motion and neck supple. No rigidity. No muscular tenderness. Right lower leg: No edema. Left lower leg: No edema. Skin:     General: Skin is warm and dry. Capillary Refill: Capillary refill takes less than 2 seconds. Coloration: Skin is not jaundiced or pale. Findings: No bruising, erythema, lesion or rash. Neurological:      General: No focal deficit present. Mental Status: He is alert and oriented to person, place, and time. Cranial Nerves: No cranial nerve deficit. Sensory: No sensory deficit. Motor: No weakness. Coordination: Coordination normal.   Psychiatric:         Mood and Affect: Mood normal.         Behavior: Behavior normal.         Thought Content:  Thought content normal.         Judgment: Judgment normal.          Discharge Medications:       Katelyn Garvey   Home Medication Instructions GPC:[id number]    Printed on:08/23/21 1200   Medication Information                      amiodarone (CORDARONE) 200 MG tablet  TAKE 1 TABLET BY MOUTH ONCE DAILY             amLODIPine (NORVASC) 10 MG tablet  Take 1 tablet by mouth daily             atorvastatin (LIPITOR) 40 MG tablet  TAKE 1 TABLET BY MOUTH ONCE DAILY             folic acid (FOLVITE) 1 MG tablet  Take 1 tablet by mouth daily             levothyroxine (SYNTHROID) 125 MCG tablet  TAKE 1 TABLET BY MOUTH ONCE DAILY             lisinopril (PRINIVIL;ZESTRIL) 40 MG tablet  Take 1 tablet by mouth daily             metoprolol succinate (TOPROL XL) 25 MG extended release tablet  TAKE 1/2 TABLET BY MOUTH DAILY             Multiple Vitamin (MULTIVITAMIN) TABS tablet  Take 1 tablet by mouth daily             pantoprazole (PROTONIX) 40 MG tablet  TAKE (1) TABLET DAILY IN THE MORNING (BEFORE BREAKFAST)             vitamin B-1 (THIAMINE) 100 MG tablet  Take 1 tablet by mouth daily                 Discharge Instructions: Follow up with Susan Okeefe MD in 7 days. Take medications as directed. Resume activity as tolerated. Diet: ADULT DIET; Regular        DISCHARGE STATUS:    Condition: Fair  Disposition: Patient is medically stable and will be discharged [facility])    Extended Emergency Contact Information  Primary Emergency Contact: Leeanne Lujan of [address]  Phone: [phone]  Mobile Phone: [phone]  Relation: Brother/Sister  Secondary Emergency Contact: Mayco Morton  Mobile Phone: [phone]  Relation: Other       Time Spent on discharge is  36 mins in the examination, evaluation, counseling and review of medications and discharge plan. Electronically signed by   Genet Mcqueen MD, MPH, MD,   Internal Medicine Hospitalist   8/23/2021 12:00 PM      Thank you Susan Okeefe MD for the opportunity to be involved in this patient's care. If you have any questions or concerns please feel free to contact me at [phone]        EMR Dragon/Transcription disclaimer:   Much of this encounter note is an electronic transcription/translation of spoken language to printed text.  The electronic translation of spoken language may permit erroneous, or at times, nonsensical words or phrases to be inadvertently transcribed; although attempts have made to review the note for such errors, some may still exist.

## 2021-08-23 NOTE — PROGRESS NOTES
Palliative Care Progress Note  8/23/2021 2:09 PM    Patient:  Millie Buchanan  YOB: 1950  Primary Care Physician: Peterson Spencer MD  Advance Directive: Full Code  Admit Date: 8/17/2021       Hospital Day: 6  Portions of this note have been copied forward, however, changed to reflect the most current clinical status of this patient. CHIEF COMPLAINT/REASON FOR CONSULTATION Goals of care, code status, family support    SUBJECTIVE:  Mr. Rory Waller has no new complaints. Hopeful for discharge to SNF soon. Interval History: CT head stable. Plans for discharge to SNF soon. Review of Systems:   14 point review of systems is negative except as specifically addressed above. Objective:   VITALS:  BP (!) 147/72   Pulse 66   Temp 98 °F (36.7 °C) (Temporal)   Resp 16   Ht 6' (1.829 m)   Wt 184 lb 5 oz (83.6 kg)   SpO2 93%   BMI 25.00 kg/m²   24HR INTAKE/OUTPUT:      Intake/Output Summary (Last 24 hours) at 8/23/2021 1409  Last data filed at 8/23/2021 1018  Gross per 24 hour   Intake 450 ml   Output 100 ml   Net 350 ml     General appearance: 74 yo male, no acute distress, resting comfortably in bed   Head: Normocephalic, without obvious abnormality, atraumatic  Eyes: conjunctivae/corneas clear. PERRL, EOM's intact.    Ears: normal external ears and nose, throat without exudate  Neck: no adenopathy, no carotid bruit, no JVD, supple, symmetrical, trachea midline   Lungs: decreased throughout, no wheezing, rales or rhonchi   Heart: regular rate and rhythm, S1, S2 normal, no murmur  Abdomen:soft, non-tender; non-distended, bowel sounds present    Extremities:No lower extremity edema,  No erythema, no tenderness to palpation  Skin: Skin color, texture, turgor normal. No rashes or lesions  Lymphatic: No palpable lymph node enlargment  Neurologic: Somnolent, easily awakened, oriented to self and place, right sided weakness noted   Psychiatric: Withdrawn, flat affect     Medications:      sodium chloride        lisinopril  40 mg Oral Daily    amLODIPine  10 mg Oral Daily    sodium chloride flush  5-40 mL Intravenous 2 times per day    thiamine  100 mg Intravenous Daily    folic acid  1 mg Oral Daily    multivitamin  1 tablet Oral Daily    amiodarone  200 mg Oral Daily    atorvastatin  40 mg Oral Daily    levothyroxine  125 mcg Oral Daily    metoprolol succinate  25 mg Oral Daily    pantoprazole  40 mg Oral QAM AC     potassium chloride **OR** potassium alternative oral replacement **OR** potassium chloride, sodium chloride flush, sodium chloride, LORazepam **OR** LORazepam **OR** LORazepam **OR** LORazepam **OR** LORazepam **OR** LORazepam **OR** LORazepam **OR** LORazepam, ondansetron **OR** ondansetron, magnesium hydroxide, acetaminophen **OR** acetaminophen  ADULT DIET; Regular     Lab and other Data:     Recent Labs     08/21/21 0227 08/22/21 0412 08/23/21  0316   WBC 8.3 8.4 11.7*   HGB 11.1* 10.4* 10.7*    197 217     Recent Labs     08/21/21 0227 08/22/21 0412 08/23/21  0316    138 137   K 4.3 3.2* 4.1    101 102   CO2 25 24 21*   BUN 22 18 18   CREATININE 1.4* 1.4* 1.3*   GLUCOSE 102 90 94   CT head 08/19/2021  A probable mild increase in size of the left basal ganglia  hemorrhage as detailed above. A minimal extension into the third  ventricle and occipital horn of the lateral ventricles persists  without any significant progression. No midline shift.     Assessment/Plan   Principal Problem:    Intracerebral hemorrhage, nontraumatic (HCC)  Active Problems:    CAD (coronary artery disease)    Essential hypertension    Alcohol abuse    Palliative care patient    Chronic systolic heart failure (HCC)    Nonischemic cardiomyopathy (HCC)    Mixed hyperlipidemia    Paroxysmal atrial fibrillation (HCC)    GERD (gastroesophageal reflux disease)    Coronary artery disease involving native heart with angina pectoris, unspecified vessel or lesion type ([facility] [address]  Resolved Problems:    * No resolved hospital problems. *    Visit Summary:  Patient seen at bedside no family present in room. Discussed patient's pending discharge to SNF and he remains agreeable. States his sister is his decision maker. Recommended completion of POA to aid with decision making in the future. Recommendations:   1. Palliative care: [address] to SNF when arrangements complete. Code status FULL CODE    2. Intraparenchymal hemorrhage-d/w Neurosurgery, continue conservative management, close monitoring, strict BP control, stable    3. Non-ischemic cardiomyopathy/CAD/CHF/Paroxysmal atrial fibrillation-all anticoagulants/antiplatelets on hold, stable    4. Alcohol abuse-WA protocol, mgmt per Hospitalist, no s/s of withdrawal at this time, stable, counseled on cessation    Thank you for consulting Palliative Care and allowing us to participate in the care of this patient.    Time Spent Counseling > 50%:  YES                                   Total Time Spent with patient/family counseling, workup/treatment review, counseling and placement of orders/preparation of this note: 17 minutes    Electronically signed by Terri Travis PA-C on 8/23/2021 at 2:09 PM    (Please note that portions of this note were completed with a voice recognition program.  Genet Dietrich made to edit the dictations but occasionally words are mis-transcribed.)

## 2021-08-23 NOTE — PROGRESS NOTES
Occupational Therapy  Facility/Department: [facility] SURG SERVICES  Daily Treatment Note  NAME: Trish Alamo  : 1950  MRN: [id number]    Date of Service: 2021    Discharge Recommendations:  Patient would benefit from continued therapy after discharge       Assessment   Assessment: Significantly impaired for standing and transfers for ADL will continue to need further therapy . Treatment Diagnosis: Brain bleed , R linette  REQUIRES OT FOLLOW UP: Yes  Activity Tolerance  Activity Tolerance: Patient limited by fatigue  Safety Devices  Safety Devices in place: Yes  Type of devices: Call light within reach; Bed alarm in place         Patient Diagnosis(es): The primary encounter diagnosis was Intracranial hemorrhage ([facility] [address] A diagnosis of Closed nondisplaced fracture of phalanx of right middle finger, unspecified phalanx, initial encounter was also pertinent to this visit. has a past medical history of CAD (coronary artery disease), Gout, Hypertension, Hypotension, Non-ST elevation MI (NSTEMI) [facility]), Palliative care patient, and Pneumonia due to infectious organism. has a past surgical history that includes Cardiac catheterization (14  [address]); Cholecystectomy; and Femur fracture surgery (Right, 10/30/2020).     Restrictions  Restrictions/Precautions  Restrictions/Precautions: Fall Risk, Seizure  Required Braces or Orthoses?: No  Subjective   General  Chart Reviewed: Yes  Patient assessed for rehabilitation services?: Yes  Family / Caregiver Present: No  Diagnosis: Brain bleed, R linette  Vital Signs  Patient Currently in Pain: No   Orientation     Objective    ADL  Toileting: Maximum assistance (of two using sit/stand methods)        Balance  Sitting Balance: Contact guard assistance (Static sitting EOB with no loss of balance or leaning to right)  Standing Balance: Maximum assistance (Required boost x2 to get up to standing.)  Bed mobility  Supine to Sit: Moderate assistance;2 Person assistance  Sit to Supine: 2 Person assistance;Maximum assistance  Transfers  Transfer Comments: With the [address] was able to get up to standing and also to sitting on the paddles but was requiring max A for trunk control due to falling to the right side. For that reason we did not proceed with SS transfer to recliner. Recommend Maxi move.  Patientis limited by right side weakness and right knee issues                                            Type of ROM/Therapeutic Exercise  Comment: BUE exercise performed                    Plan   Plan  Times per week: 3-5  Times per day: Daily  G-Code     OutComes Score                                                  AM-PAC Score             Goals  Short term goals  Time Frame for Short term goals: 1 week  Short term goal 1: Regina with sit-stand and tfers to [address] term goal 2: Regina with clothing mgmt in standing  Short term goal 3: Regina with seated LB dsg  Long term goals  Long term goal 1: Return to PLOF       Therapy Time   Individual Concurrent Group Co-treatment   Time In           Time Out           Minutes  [id number] [facility] 281, OT Electronically signed by Yuri Wilkerson OT on 8/23/2021 at 4:40 PM

## 2021-08-23 NOTE — PROGRESS NOTES
Physical Therapy  Name: Jimbo Barrios  MRN:  [id number]  Date of service:  8/23/2021 08/23/21 1447   Restrictions/Precautions   Restrictions/Precautions Fall Risk;Seizure   Required Braces or Orthoses? No   Subjective   Subjective Pt agreed to therapy. Pain Screening   Patient Currently in Pain Yes   Pain Assessment   Pain Assessment Faces   Pain Type Acute pain   Pain Location Knee   Pain Orientation Right   Pain Descriptors Aching   Pain Frequency Intermittent   Response to Pain Intervention Patient Satisfied   Stark-Baker Pain Rating 4   Functional Pain Assessment Prevents or interferes some active activities and ADLs   Non-Pharmaceutical Pain Intervention(s) Repositioned; Rest   Bed Mobility   Supine to Sit Maximal assistance   Sit to Supine Maximal assistance   Transfers   Sit to Stand Moderate Assistance;2 Person Assistance   Stand to sit Moderate Assistance   Comment Attempted standing, used ss to stand x3    Exercises   Ankle Pumps x10   Short term goals   Time Frame for Short term goals 2 wks   Short term goal 1 supine to sit indep   Short term goal 2 sit to stand indep   Short term goal 3 amb. 48' with RW SBA   Short term goal 4 bed to chair SBA with RW   Conditions Requiring Skilled Therapeutic Intervention   Body structures, Functions, Activity limitations Decreased functional mobility ; Decreased ROM; Decreased strength;Decreased sensation;Decreased endurance;Decreased balance;Decreased posture;Decreased coordination   Assessment Worked with pt to attempt standing with RW, pt was unable to maintain standing. Pt used richi steady to stand and had significant right lateral lean, able to maintain for 30 sec at a time for clean up. Pt back in bed and theraputically positioned BLE's. Activity Tolerance   Activity Tolerance Patient limited by pain; Patient limited by endurance; Patient limited by fatigue   Safety Devices   Type of devices Call light within reach; Left in bed  (Used chair alarm, bed alarm wouldn't engage)         Electronically signed by Loretta Lucas, EMELY on 8/23/2021 at 3:05 PM

## 2021-08-23 NOTE — CARE COORDINATION
The [address] at [facility]  Notification of Admission Decision      [] Patient has been accepted for admit to [facility] on :       Please write discharge orders and summary prior to discharge. [] Patient acceptance to Rehab pending the following :    [] Eval in progress       [x] Patient determined to be ineligible for services at [facility] because : Per  note on 8/21/21 family/patient are requesting SNF placement. We recommend you consider: as above       Thank you for your referral, we appreciate you. If you have any questions, please feel   free to contact me at [phone].     Electronically Signed by Rea Cesar Admissions Coordinator 8/23/2021 7:50 AM

## 2021-08-24 ENCOUNTER — TELEPHONE (OUTPATIENT)
Dept: INTERNAL MEDICINE | Age: 71
End: 2021-08-24

## 2021-08-24 NOTE — TELEPHONE ENCOUNTER
SKILLED NURSING FACILITY:   INITIAL CALL POST-HOSPITAL DISCHARGE    SNF: [facility]     PHONE NUMBER: [phone]     THERAPY: PT/OT/ST     ANTICIPATED LENGTH OF STAY: unknown    Per [facility], nursing staff at [facility], Mr. Lupe Khan was admitted for short term rehab yesterday. She state he is confused. He has right sided weakness. He is incontinent of stool. He cannot feed himself. Physical, occupational, and speech therapies will evaluated him for services. Estimated length of stay is unknown at this time.

## 2021-09-03 ENCOUNTER — TELEPHONE (OUTPATIENT)
Dept: INTERNAL MEDICINE | Age: 71
End: 2021-09-03

## 2021-09-03 NOTE — TELEPHONE ENCOUNTER
[address] [place] [address] [phone]      Per Diana Singh, nursing staff at [facility] and Rehab, patient is doing well. Patient continues to participate in therapy. No known discharge plans at this time.

## 2021-09-09 ENCOUNTER — TELEPHONE (OUTPATIENT)
Dept: INTERNAL MEDICINE | Age: 71
End: 2021-09-09

## 2021-09-09 NOTE — TELEPHONE ENCOUNTER
[address] [place] [address] [phone]      Per Ct Craft, nursing staff at [facility] and Rehab, patient is doing well. Patient continues to participate in therapy. No known discharge plans at this time.

## 2021-09-16 ENCOUNTER — TELEPHONE (OUTPATIENT)
Dept: INTERNAL MEDICINE | Age: 71
End: 2021-09-16

## 2021-09-16 NOTE — TELEPHONE ENCOUNTER
[facility] [phone]      Per Jarocho Ayers, nursing staff at [facility] and Rehab, patient is doing well. Patient continues to participate in therapy. No known discharge plans at this time.

## 2021-09-23 ENCOUNTER — TELEPHONE (OUTPATIENT)
Dept: INTERNAL MEDICINE | Age: 71
End: 2021-09-23

## 2021-09-28 ENCOUNTER — TELEPHONE (OUTPATIENT)
Dept: CARDIOLOGY CLINIC | Age: 71
End: 2021-09-28

## 2021-09-28 NOTE — TELEPHONE ENCOUNTER
Damian's sister winsome requests that someone return their call. Would like to know if appt is needed with Dr. Marcella Addison on 10/22. Per Cathleen Merritt pt is at [address] [facility] due to having stroke. The best time to reach Cathleen Merritt is Anytime. Thank you.

## 2021-09-29 NOTE — TELEPHONE ENCOUNTER
Called and spoke with Bri Singh, Pt's EC, and stated that per Mae Sahu they can rsd the Pt's appt, preferably before January of 2022. Bri Singh stated that she was at work currently and could not rsd right now, but she will call our office back to rsd this appt.

## 2021-10-05 ENCOUNTER — HOME HEALTH ADMISSION (OUTPATIENT)
Dept: HOME HEALTH SERVICES | Facility: HOME HEALTHCARE | Age: 71
End: 2021-10-05

## 2021-10-06 ENCOUNTER — TELEPHONE (OUTPATIENT)
Dept: INTERNAL MEDICINE | Age: 71
End: 2021-10-06

## 2021-10-06 NOTE — TELEPHONE ENCOUNTER
Received a call from home health they wanted to make sure that Dr Mery Nava will sign for him to be in home health. I let them know that yes we will sign for him.

## 2021-10-08 ENCOUNTER — TELEPHONE (OUTPATIENT)
Dept: INTERNAL MEDICINE | Age: 71
End: 2021-10-08

## 2021-10-08 NOTE — TELEPHONE ENCOUNTER
[facility] Transitions Initial Follow Up Call    Outreach made within 2 business days of discharge: Yes    Patient: Shen Luther   Patient : 1950  MRN: [id number]   Reason for Admission: Admitted 21 for intracerebral hemorrhage   Discharge Date: 21 from [facility] then admitted to [facility] for short term rehab. Discharged 10/06/21   Discharge Diagnoses:  Principal Problem:    Intracerebral hemorrhage, nontraumatic (HCC)  Active Problems:    CAD (coronary artery disease)    Essential hypertension    Alcohol abuse    Palliative care patient    Chronic systolic heart failure (HCC)    Nonischemic cardiomyopathy (HCC)    Mixed hyperlipidemia    Paroxysmal atrial fibrillation (HCC)    GERD (gastroesophageal reflux disease)    Coronary artery disease involving native heart with angina pectoris, unspecified vessel or lesion type ([facility] [address]     Spoke with: Attempted to make contact with patient/caregiver for an initial transitions of care follow up call post discharge without success. I will reach out again at a later time. Any previously scheduled hospital follow up appointments noted below.       Discharge department/facility: [address] [place] [address] and Rehab     Scheduled appointment with PCP within 7-14 days    Follow Up  Future Appointments   Date Time Provider Joao Wayne   10/14/2021 10:15 AM Geetha Padilla MD [facility]   10/22/2021  8:15 AM DO JENNIFER Silver [facility]   2021  9:00 AM [facility]       [place], [place]

## 2021-10-08 NOTE — TELEPHONE ENCOUNTER
[facility] Transitions Initial Follow Up Call    Outreach made within 2 business days of discharge: Yes    Patient: Kristal Florez     Patient : 1950          MRN: [id number]   Reason for Admission: Admitted 21 for intracerebral hemorrhage   Discharge Date: 21 from [facility] then admitted to [facility] for short term rehab. Discharged 10/06/21   Discharge Diagnoses:  Principal Problem:    Intracerebral hemorrhage, nontraumatic ([facility] [address]  Active Problems:    CAD (coronary artery disease)    Essential hypertension    Alcohol abuse    Palliative care patient    Chronic systolic heart failure (HCC)    Nonischemic cardiomyopathy (HCC)    Mixed hyperlipidemia    Paroxysmal atrial fibrillation ([facility] [address]    GERD (gastroesophageal reflux disease)    Coronary artery disease involving native heart with angina pectoris, unspecified vessel or lesion type ([facility]                Spoke with:  2nd attempt to reach patient for TCM follow up call, no answer.  Message left with intent of my call and details of upcoming hospital follow up visit.      Discharge department/facility: [address] [place] [address] and Rehab     Scheduled appointment with PCP within 7-14 days    Follow Up  Future Appointments   Date Time Provider Joao Wayne   10/14/2021 10:15 AM Ingrid Araujo MD [facility]   10/22/2021  8:15 AM DO JENNIFER Gamboa [facility]   2021  9:00 AM Ingrid Araujo MD [facility]       [place], [place]

## 2021-10-13 RX ORDER — FUROSEMIDE 40 MG/1
TABLET ORAL
Qty: 90 TABLET | Refills: 3 | Status: SHIPPED | OUTPATIENT
Start: 2021-10-13 | End: 2021-10-14 | Stop reason: ALTCHOICE

## 2021-10-13 NOTE — PROGRESS NOTES
furosemide (LASIX) 40 MG tablet  TAKE 1 TABLET BY MOUTH AS DIRECTED ON ADMIN INSTRUCTIONS. MAY TAKE EXTRA TABLET FOR WEIGHT GAIN.              levothyroxine (SYNTHROID) 125 MCG tablet  TAKE 1 TABLET BY MOUTH ONCE DAILY             lisinopril (PRINIVIL;ZESTRIL) 40 MG tablet  Take 1 tablet by mouth daily             metoprolol succinate (TOPROL XL) 25 MG extended release tablet  TAKE 1/2 TABLET BY MOUTH DAILY             Multiple Vitamin (MULTIVITAMIN) TABS tablet  Take 1 tablet by mouth daily             pantoprazole (PROTONIX) 40 MG tablet  TAKE (1) TABLET DAILY IN THE MORNING (BEFORE BREAKFAST)             vitamin B-1 (THIAMINE) 100 MG tablet  Take 1 tablet by mouth daily                   Medications marked \"taking\" at this time  No outpatient medications have been marked as taking for the 10/14/21 encounter (Appointment) with Lee Martinez MD.        Medications patient taking as of now reconciled against medications ordered at time of hospital discharge: Yes    No chief complaint on file. HPI  57-year-old male who follow-ups  History of CAD status post PCI 2 vessel disease lost to follow-up with cardiology has an appointment coming up he offers no complaints of chest pain he uses a walker to get around otherwise he has limited ability to walk around he gets short of breath no leg swelling no chest pain no shortness of breath  Patient has a history of GERD compliant with pantoprazole doing well  Hypertension in good control  Paroxysmal atrial fibrillation off anticoagulation due to multiple falls  History of a fall with nondisplaced fracture of the right femur much improved at this time( past), now he has an ICB  He offers no complaints today no constipation no blood in stool he is due for colonoscopy but wants a stool card does not want to have a colonoscopy   Inpatient course: Discharge summary reviewed- see chart.     Interval history/Current status:   Mr. aIn Singleton, a 79-year-old male, history of HTN, CHF, atrial fibrillation (on apixaban), presented to [facility] ED (08/17/2021), on account of altered mental status and reported fall. CT head without contrast on presentation, revealing 2.7 cm left thalamic hemorrhage with slight intraventricular extension. Patient presents during the visit with his daughter who has helped him he is living with 2 other people who have been able to help him with his ADLs and IADLs at home he did stay in the nursing home for physical therapy slight improvement in upper extremity use slight weakness of the right leg. Patient was placed on nonsteroidal for as needed gout advised to discontinue will switch to Colcrys        Acute encephalopathy  Acute left thalamic hemorrhage  S/p fall  Hypertensive emergency     · CT Head WO Con (08/17/2021): Impression: 2.7 cm left basal ganglia intraparenchymal hemorrhage with intraventricular hemorrhage extension, layering in the posterior third ventricle and occipital horns. Ventricles do not appear dilated at this time.     · CT Head WO Con (08/18/2021): Impression: A persistent and unchanged left ophthalmic/basal ganglia intraparenchymal hemorrhage extending into the third and lateral ventricle as detailed above. No midline shift. Chronic ischemic and atrophic changes.     · CT Head WO Con (08/19/2021): Impression: A probable mild increase in size of the left basal ganglia hemorrhage as detailed above. A minimal extension into the third ventricle and occipital horn of the lateral ventricles persists without any significant progression. No midline shift. · CT Head WO Con (08/20/2021): Impression: No significant change.     Review of Systems   Constitutional: Negative for activity change, appetite change, chills, diaphoresis, fatigue and fever. HENT: Negative for congestion, hearing loss, postnasal drip, sore throat, tinnitus and trouble swallowing. Eyes: Negative for visual disturbance. Respiratory: Positive for shortness of breath. Negative for cough, chest tightness, wheezing and stridor. Cardiovascular: Negative for chest pain, palpitations and leg swelling. Gastrointestinal: Negative for abdominal pain, blood in stool, constipation and diarrhea. Endocrine: Negative for cold intolerance. Genitourinary: Negative for frequency. Musculoskeletal: Positive for arthralgias and joint swelling (R hand). Negative for back pain. Skin: Negative for color change, rash and wound. Dry skin   Allergic/Immunologic: Negative for environmental allergies. Neurological: Positive for weakness. Negative for dizziness, light-headedness and headaches. Hematological: Negative for adenopathy. Does not bruise/bleed easily. Psychiatric/Behavioral: Negative for decreased concentration, dysphoric mood and sleep disturbance. The patient is not nervous/anxious. There were no vitals filed for this visit. There is no height or weight on file to calculate BMI. Wt Readings from Last 3 Encounters:   08/20/21 184 lb 5 oz (83.6 kg)   07/22/21 204 lb (92.5 kg)   06/30/21 205 lb (93 kg)     BP Readings from Last 3 Encounters:   08/23/21 (!) 141/86   07/22/21 136/78   06/30/21 138/80       Physical Exam  Vitals and nursing note reviewed. Constitutional:       General: He is not in acute distress. Appearance: He is well-developed and well-groomed. Comments: On a wheelchair   HENT:      Head: Normocephalic and atraumatic. Right Ear: External ear normal.      Left Ear: External ear normal.      Nose: Nose normal.   Eyes:      General: Lids are normal. No scleral icterus. Extraocular Movements: Extraocular movements intact. Conjunctiva/sclera: Conjunctivae normal.      Pupils: Pupils are equal, round, and reactive to light. Neck:      Thyroid: No thyromegaly. Cardiovascular:      Rate and Rhythm: Normal rate. Rhythm irregular. Pulses: Normal pulses. Heart sounds: Normal heart sounds. No murmur heard. Pulmonary:      Effort: Pulmonary effort is normal. No respiratory distress. Breath sounds: Normal breath sounds and air entry. No wheezing, rhonchi or rales. Chest:      Chest wall: No tenderness. Abdominal:      General: Abdomen is flat. Bowel sounds are normal. There is no distension. Palpations: Abdomen is soft. There is no mass. Tenderness: There is no abdominal tenderness. There is no rebound. Musculoskeletal:         General: No tenderness. Normal range of motion. Right hand: Swelling and deformity present. Left hand: Deformity present. Cervical back: Normal range of motion and neck supple. Right lower leg: No edema. Left lower leg: No edema. Comments: Long fingernails   Lymphadenopathy:      Cervical: No cervical adenopathy. Skin:     General: Skin is warm and dry. Findings: No rash. Neurological:      General: No focal deficit present. Mental Status: He is alert and oriented to person, place, and time. Mental status is at baseline. Cranial Nerves: Cranial nerves are intact. No cranial nerve deficit. Sensory: Sensation is intact. Motor: Weakness present. Coordination: Coordination abnormal.      Gait: Gait abnormal.      Deep Tendon Reflexes: Reflexes normal.   Psychiatric:         Attention and Perception: Attention normal.         Mood and Affect: Mood normal.         Behavior: Behavior normal.         Thought Content: Thought content normal.         Judgment: Judgment normal.             Assessment/Plan:  There are no diagnoses linked to this encounter.       Medical Decision Making: moderate complexity

## 2021-10-14 ENCOUNTER — OFFICE VISIT (OUTPATIENT)
Dept: INTERNAL MEDICINE | Age: 71
End: 2021-10-14
Payer: MEDICARE

## 2021-10-14 VITALS
OXYGEN SATURATION: 78 % | SYSTOLIC BLOOD PRESSURE: 102 MMHG | DIASTOLIC BLOOD PRESSURE: 70 MMHG | HEIGHT: 72 IN | WEIGHT: 186.2 LBS | HEART RATE: 122 BPM | BODY MASS INDEX: 25.22 KG/M2

## 2021-10-14 DIAGNOSIS — I61.0 NONTRAUMATIC SUBCORTICAL HEMORRHAGE OF LEFT CEREBRAL HEMISPHERE (HCC): Primary | ICD-10-CM

## 2021-10-14 DIAGNOSIS — K21.00 GASTROESOPHAGEAL REFLUX DISEASE WITH ESOPHAGITIS WITHOUT HEMORRHAGE: ICD-10-CM

## 2021-10-14 DIAGNOSIS — I42.8 NONISCHEMIC CARDIOMYOPATHY (HCC): ICD-10-CM

## 2021-10-14 DIAGNOSIS — I48.0 PAROXYSMAL ATRIAL FIBRILLATION (HCC): ICD-10-CM

## 2021-10-14 DIAGNOSIS — R19.5 GUAIAC POSITIVE STOOLS: ICD-10-CM

## 2021-10-14 DIAGNOSIS — F10.10 ALCOHOL ABUSE: Chronic | ICD-10-CM

## 2021-10-14 DIAGNOSIS — R29.6 MULTIPLE FALLS: ICD-10-CM

## 2021-10-14 DIAGNOSIS — M10.9 ACUTE GOUT OF RIGHT HAND, UNSPECIFIED CAUSE: ICD-10-CM

## 2021-10-14 DIAGNOSIS — I10 ESSENTIAL HYPERTENSION: ICD-10-CM

## 2021-10-14 DIAGNOSIS — M1A.9XX1 CHRONIC GOUT INVOLVING TOE WITH TOPHUS, UNSPECIFIED CAUSE, UNSPECIFIED LATERALITY: ICD-10-CM

## 2021-10-14 DIAGNOSIS — I25.119 CORONARY ARTERY DISEASE INVOLVING NATIVE HEART WITH ANGINA PECTORIS, UNSPECIFIED VESSEL OR LESION TYPE (HCC): ICD-10-CM

## 2021-10-14 DIAGNOSIS — Z23 NEEDS FLU SHOT: ICD-10-CM

## 2021-10-14 PROCEDURE — 90694 VACC AIIV4 NO PRSRV 0.5ML IM: CPT | Performed by: INTERNAL MEDICINE

## 2021-10-14 PROCEDURE — 99495 TRANSJ CARE MGMT MOD F2F 14D: CPT | Performed by: INTERNAL MEDICINE

## 2021-10-14 PROCEDURE — G0008 ADMIN INFLUENZA VIRUS VAC: HCPCS | Performed by: INTERNAL MEDICINE

## 2021-10-14 PROCEDURE — 1111F DSCHRG MED/CURRENT MED MERGE: CPT | Performed by: INTERNAL MEDICINE

## 2021-10-14 RX ORDER — INDOMETHACIN 25 MG/1
25 CAPSULE ORAL 2 TIMES DAILY PRN
COMMUNITY
End: 2021-10-14 | Stop reason: ALTCHOICE

## 2021-10-14 RX ORDER — COLCHICINE 0.6 MG/1
0.6 TABLET ORAL DAILY
Qty: 90 TABLET | Refills: 1 | Status: SHIPPED | OUTPATIENT
Start: 2021-10-14 | End: 2022-04-18

## 2021-10-14 ASSESSMENT — ENCOUNTER SYMPTOMS
SHORTNESS OF BREATH: 1
BACK PAIN: 0
ROS SKIN COMMENTS: DRY SKIN
DIARRHEA: 0
TROUBLE SWALLOWING: 0
STRIDOR: 0
CONSTIPATION: 0
COUGH: 0
BLOOD IN STOOL: 0
SORE THROAT: 0
CHEST TIGHTNESS: 0
ABDOMINAL PAIN: 0
WHEEZING: 0
COLOR CHANGE: 0

## 2021-10-14 NOTE — PROGRESS NOTES
After obtaining consent, and per orders of Dr. Junior Rosas, injection of flu given in Left deltoid by Antonio Dickey MA. Patient instructed to remain in clinic for 20 minutes afterwards, and to report any adverse reaction to me immediately.

## 2021-10-15 ENCOUNTER — TELEPHONE (OUTPATIENT)
Dept: INTERNAL MEDICINE | Age: 71
End: 2021-10-15

## 2021-10-15 NOTE — TELEPHONE ENCOUNTER
S/w pt, states pt had a stroke and is suffering short-term memory impairment and is using a walker full time.

## 2021-10-18 ENCOUNTER — TELEPHONE (OUTPATIENT)
Dept: NEUROLOGY | Age: 71
End: 2021-10-18

## 2021-10-18 NOTE — TELEPHONE ENCOUNTER
Received a referral for this patient. Called patient sister phone # and left a VM regarding when I have patient scheduled an appointment with Dr. Tiana Emery. Left on VM the appointment time/date/location/phone #.

## 2021-10-18 NOTE — TELEPHONE ENCOUNTER
Spoke to the sister who has been helping take care of him and offered and apt for him to follow up. She said that she is going back to [facility] so she will have her other sister set up an apt with us.

## 2021-10-22 ENCOUNTER — OFFICE VISIT (OUTPATIENT)
Dept: CARDIOLOGY CLINIC | Age: 71
End: 2021-10-22
Payer: MEDICARE

## 2021-10-22 VITALS
HEART RATE: 76 BPM | SYSTOLIC BLOOD PRESSURE: 100 MMHG | WEIGHT: 186 LBS | HEIGHT: 72 IN | DIASTOLIC BLOOD PRESSURE: 76 MMHG | BODY MASS INDEX: 25.19 KG/M2

## 2021-10-22 DIAGNOSIS — I48.0 PAROXYSMAL ATRIAL FIBRILLATION (HCC): ICD-10-CM

## 2021-10-22 DIAGNOSIS — I42.8 NON-ISCHEMIC CARDIOMYOPATHY (HCC): Primary | ICD-10-CM

## 2021-10-22 DIAGNOSIS — I10 ESSENTIAL HYPERTENSION: ICD-10-CM

## 2021-10-22 DIAGNOSIS — E78.2 MIXED HYPERLIPIDEMIA: ICD-10-CM

## 2021-10-22 PROCEDURE — 4040F PNEUMOC VAC/ADMIN/RCVD: CPT | Performed by: INTERNAL MEDICINE

## 2021-10-22 PROCEDURE — G8427 DOCREV CUR MEDS BY ELIG CLIN: HCPCS | Performed by: INTERNAL MEDICINE

## 2021-10-22 PROCEDURE — 1036F TOBACCO NON-USER: CPT | Performed by: INTERNAL MEDICINE

## 2021-10-22 PROCEDURE — 3017F COLORECTAL CA SCREEN DOC REV: CPT | Performed by: INTERNAL MEDICINE

## 2021-10-22 PROCEDURE — 99213 OFFICE O/P EST LOW 20 MIN: CPT | Performed by: INTERNAL MEDICINE

## 2021-10-22 PROCEDURE — G8484 FLU IMMUNIZE NO ADMIN: HCPCS | Performed by: INTERNAL MEDICINE

## 2021-10-22 PROCEDURE — G8417 CALC BMI ABV UP PARAM F/U: HCPCS | Performed by: INTERNAL MEDICINE

## 2021-10-22 PROCEDURE — 93000 ELECTROCARDIOGRAM COMPLETE: CPT | Performed by: INTERNAL MEDICINE

## 2021-10-22 PROCEDURE — 1123F ACP DISCUSS/DSCN MKR DOCD: CPT | Performed by: INTERNAL MEDICINE

## 2021-10-22 ASSESSMENT — ENCOUNTER SYMPTOMS: SHORTNESS OF BREATH: 0

## 2021-10-22 NOTE — PROGRESS NOTES
Office Visit  Marisa Grider is a 73 y.o. male; who present today for 3 Month Follow-Up      HPI  I am seeing this 71-year-old white male in follow-up who was seen on a regular basis with nonischemic cardiomyopathy and paroxysmal atrial fibrillation. As I reported in my prior office note, he had a hemorrhagic stroke probably related to the Eliquis and he had demonstrated improvement in LV function from more remotely. I increased his dose of lisinopril and he has tolerated it well. He mostly uses a wheelchair but also uses a walker to some extent. He has not been experiencing chest pain and his breathing is comfortable at low levels of activity. He denies palpitations and there has been no presyncope or syncope. He remains on amiodarone and had normal TSH in June and he is on thyroid supplement and this is monitored and managed by primary care. His chest x-ray in August was unremarkable.   Current Outpatient Medications   Medication Sig Dispense Refill    colchicine (COLCRYS) 0.6 MG tablet Take 1 tablet by mouth daily 90 tablet 1    folic acid (FOLVITE) 1 MG tablet Take 1 tablet by mouth daily 30 tablet 0    atorvastatin (LIPITOR) 40 MG tablet TAKE 1 TABLET BY MOUTH ONCE DAILY 30 tablet 5    amiodarone (CORDARONE) 200 MG tablet TAKE 1 TABLET BY MOUTH ONCE DAILY 30 tablet 5    levothyroxine (SYNTHROID) 125 MCG tablet TAKE 1 TABLET BY MOUTH ONCE DAILY 30 tablet 0    lisinopril (PRINIVIL;ZESTRIL) 40 MG tablet Take 1 tablet by mouth daily 30 tablet 5    metoprolol succinate (TOPROL XL) 25 MG extended release tablet TAKE 1/2 TABLET BY MOUTH DAILY 45 tablet 1    pantoprazole (PROTONIX) 40 MG tablet TAKE (1) TABLET DAILY IN THE MORNING (BEFORE BREAKFAST) 30 tablet 5    amLODIPine (NORVASC) 10 MG tablet Take 1 tablet by mouth daily 30 tablet 0    Multiple Vitamin (MULTIVITAMIN) TABS tablet Take 1 tablet by mouth daily 30 tablet 0    vitamin B-1 (THIAMINE) 100 MG tablet Take 1 tablet by mouth daily 30 tablet 0     No current facility-administered medications for this visit. There are no discontinued medications. No Known Allergies    Past Medical History:   Diagnosis Date    CAD (coronary artery disease)     Gout     Hypertension     Hypotension 2020    Non-ST elevation MI (NSTEMI) ([facility] [address] 14    Palliative care patient 2020    Pneumonia due to infectious organism 2020     Negative - Past Medical History for  No past medical history pertinent negatives. Past Surgical History:   Procedure Laterality Date    CARDIAC CATHETERIZATION  14  [facility]    angioplasty, stent to LAD. EF 45%    CHOLECYSTECTOMY      FEMUR FRACTURE SURGERY Right 10/30/2020    TFN, SHORT performed by Yakov Carvalho MD at [address] Not on file   Tobacco Use    Smoking status: Former Smoker     Packs/day: 1.00     Years: 3.00     Pack years: 3.00     Types: Cigars     Start date:      Quit date:      Years since quittin.8    Smokeless tobacco: Never Used   Vaping Use    Vaping Use: Never used   Substance and Sexual Activity    Alcohol use: Not Currently    Drug use: No    Sexual activity: Yes     Partners: Female        Family History   Problem Relation Age of Onset    No Known Problems Mother     Heart Disease Father        Review of Systems  Review of Systems   Constitutional: Negative for fatigue and fever. Respiratory: Negative for shortness of breath. Cardiovascular: Negative for chest pain, palpitations and leg swelling. Neurological: Negative for syncope, facial asymmetry and speech difficulty. Physical Exam  /76   Pulse 76   Ht 6' (1.829 m)   Wt 186 lb (84.4 kg)   BMI 25.23 kg/m²    Physical Exam  Constitutional:       Appearance: Normal appearance. He is normal weight. Cardiovascular:      Rate and Rhythm: Normal rate and regular rhythm. Heart sounds: Normal heart sounds. No gallop.     Pulmonary: Effort: Pulmonary effort is normal.      Breath sounds: Normal breath sounds. Abdominal:      General: Abdomen is flat. Bowel sounds are normal.      Palpations: Abdomen is soft. Musculoskeletal:         General: No swelling. Skin:     General: Skin is warm and dry. Neurological:      Mental Status: He is alert. Assessment/Plan    EKG Findings:  Sinus rhythm, 76 bpm, minor QRS widening    Problem List Items Addressed This Visit        Cardiology Problems    Essential hypertension    Non-ischemic cardiomyopathy ([address] - Primary    Relevant Orders    ECHO Complete 2D W Doppler W Color    Mixed hyperlipidemia    Paroxysmal atrial fibrillation ([address]    Relevant Orders    EKG 12 lead (Completed)           Diagnosis Orders   1. Non-ischemic cardiomyopathy ([address]  ECHO Complete 2D W Doppler W Color     EF 5-10% by cath, June 2020, improved to 40% by echo, June 2020, [address]       2. Paroxysmal atrial fibrillation (HCC)  EKG 12 lead    Presently sinus rhythm on amiodarone. History of hemorrhagic cerebral bleed on previous Eliquis   3. Essential hypertension     4. Mixed hyperlipidemia         Recommendations:   Diet: Low-sodium, low-fat diet  Activity: Moderate activities  Medication Changes: Continue same medications    This patient's cardiac status seems stable. I will proceed and get the echocardiogram update with the last one being about 16 months ago especially since I titrate medications to see if his LV function remains the same, has gotten better or perhaps worse. No orders of the defined types were placed in this encounter. Orders Placed This Encounter   Procedures    EKG 12 lead     Order Specific Question:   Reason for Exam?     Answer:    Other    ECHO Complete 2D W Doppler W Color     Standing Status:   Future     Standing Expiration Date:   10/22/2022     Order Specific Question:   Reason for exam:     Answer:   CMO       Return in about 6 months (around 4/22/2022) for me, routine.

## 2021-10-26 ENCOUNTER — HOSPITAL ENCOUNTER (OUTPATIENT)
Dept: NON INVASIVE DIAGNOSTICS | Age: 71
Discharge: HOME | End: 2021-10-26
Payer: MEDICARE

## 2021-10-26 DIAGNOSIS — I42.8 NON-ISCHEMIC CARDIOMYOPATHY (HCC): ICD-10-CM

## 2021-10-26 LAB
LV EF: 58 %
LVEF MODALITY: NORMAL

## 2021-10-26 PROCEDURE — C8929 TTE W OR WO FOL WCON,DOPPLER: HCPCS

## 2021-10-26 PROCEDURE — 6360000004 HC RX CONTRAST MEDICATION: Performed by: INTERNAL MEDICINE

## 2021-10-26 RX ADMIN — PERFLUTREN 2.2 MG: 6.52 INJECTION, SUSPENSION INTRAVENOUS at 14:46

## 2021-10-28 ENCOUNTER — TELEPHONE (OUTPATIENT)
Dept: CARDIOLOGY CLINIC | Age: 71
End: 2021-10-28

## 2021-10-28 NOTE — TELEPHONE ENCOUNTER
Cecile Hilliard, DO  [address] [facility], [address] [place]  Please notify this patient that his heart contractility now appears normal therefore it has continued to improve, no other significant abnormalities of concern at this time.  No new changes recommended. Spoke with patient and went over results. He voiced understanding.

## 2021-11-29 RX ORDER — LEVOTHYROXINE SODIUM 0.12 MG/1
TABLET ORAL
Qty: 90 TABLET | Refills: 1 | Status: SHIPPED | OUTPATIENT
Start: 2021-11-29 | End: 2022-03-07

## 2021-11-29 RX ORDER — LISINOPRIL 40 MG/1
40 TABLET ORAL DAILY
Qty: 90 TABLET | Refills: 1 | Status: SHIPPED | OUTPATIENT
Start: 2021-11-29 | End: 2022-05-31

## 2021-11-29 NOTE — TELEPHONE ENCOUNTER
Peg Brito called to request a refill on his medication.       Last office visit : 10/14/2021   Next office visit : 2/15/2022     Requested Prescriptions     Pending Prescriptions Disp Refills    levothyroxine (SYNTHROID) 125 MCG tablet [Pharmacy Med Name: LEVOTHYROXINE SODIUM 125MCG TABLET] 90 tablet 1     Sig: TAKE 1 TABLET BY MOUTH DAILY    lisinopril (PRINIVIL;ZESTRIL) 40 MG tablet [Pharmacy Med Name: LISINOPRIL 40MG TABLET] 90 tablet 1     Sig: TAKE 1 TABLET BY MOUTH DAILY            Ruben Hare MA

## 2021-12-03 ENCOUNTER — TELEPHONE (OUTPATIENT)
Dept: NEUROLOGY | Age: 71
End: 2021-12-03

## 2021-12-03 NOTE — TELEPHONE ENCOUNTER
Called patient to let them know we had to reschedule appointment. Dr. Dejon Bowman will not be in the office. Tim Trujillo is aware of new appointment time and date.

## 2021-12-06 ENCOUNTER — TELEPHONE (OUTPATIENT)
Dept: INTERNAL MEDICINE | Age: 71
End: 2021-12-06

## 2021-12-06 DIAGNOSIS — K21.00 GASTROESOPHAGEAL REFLUX DISEASE WITH ESOPHAGITIS WITHOUT HEMORRHAGE: ICD-10-CM

## 2021-12-06 DIAGNOSIS — E87.1 HYPONATREMIA: ICD-10-CM

## 2021-12-06 DIAGNOSIS — I10 ESSENTIAL HYPERTENSION: Primary | ICD-10-CM

## 2021-12-06 RX ORDER — FOLIC ACID 1 MG/1
1 TABLET ORAL DAILY
Qty: 90 TABLET | Refills: 1 | Status: SHIPPED | OUTPATIENT
Start: 2021-12-06 | End: 2022-06-06

## 2021-12-06 RX ORDER — AMLODIPINE BESYLATE 10 MG/1
10 TABLET ORAL DAILY
Qty: 90 TABLET | Refills: 1 | Status: SHIPPED | OUTPATIENT
Start: 2021-12-06 | End: 2022-06-20

## 2021-12-06 RX ORDER — PANTOPRAZOLE SODIUM 40 MG/1
TABLET, DELAYED RELEASE ORAL
Qty: 90 TABLET | Refills: 1 | Status: SHIPPED | OUTPATIENT
Start: 2021-12-06 | End: 2022-06-20

## 2021-12-06 NOTE — TELEPHONE ENCOUNTER
Ijeoma Shaikh called to request a refill on his medication.       Last office visit : 10/14/2021   Next office visit : 2/15/2022     Requested Prescriptions     Pending Prescriptions Disp Refills    amLODIPine (NORVASC) 10 MG tablet [Pharmacy Med Name: AMLODIPINE BESYLATE 10MG TABLET] 90 tablet 1     Sig: TAKE 1 TABLET BY MOUTH DAILY    folic acid (FOLVITE) 1 MG tablet [Pharmacy Med Name: FOLIC ACID 1MG TABLET] 90 tablet 1     Sig: TAKE 1 TABLET BY MOUTH DAILY    pantoprazole (PROTONIX) 40 MG tablet [Pharmacy Med Name: PANTOPRAZOLE SODIUM 40MG TABLET DELAYED RELEASE] 90 tablet 1     Sig: TAKE 1 TABLET BY MOUTH DAILY            Cheyanne Raya MA

## 2021-12-06 NOTE — TELEPHONE ENCOUNTER
S/w pt, he needs refills on Amlodipine, Pantoprazole, and Folic Acid sent to [address]

## 2021-12-29 ENCOUNTER — TELEPHONE (OUTPATIENT)
Dept: INTERNAL MEDICINE | Age: 71
End: 2021-12-29

## 2022-01-10 DIAGNOSIS — I10 ESSENTIAL HYPERTENSION: ICD-10-CM

## 2022-01-10 DIAGNOSIS — I42.8 NON-ISCHEMIC CARDIOMYOPATHY (HCC): Primary | ICD-10-CM

## 2022-01-10 RX ORDER — METOPROLOL SUCCINATE 25 MG/1
TABLET, EXTENDED RELEASE ORAL
Qty: 45 TABLET | Refills: 1 | Status: SHIPPED | OUTPATIENT
Start: 2022-01-10 | End: 2022-07-12

## 2022-01-10 NOTE — TELEPHONE ENCOUNTER
Francie Jefferson called to request a refill on his medication.       Last office visit : 10/14/2021   Next office visit : 2/15/2022     Requested Prescriptions     Pending Prescriptions Disp Refills    metoprolol succinate (TOPROL XL) 25 MG extended release tablet 45 tablet 1     Sig: TAKE 1/2 TABLET BY MOUTH DAILY            Stephane Soliz MA

## 2022-01-21 ENCOUNTER — OFFICE VISIT (OUTPATIENT)
Dept: NEUROLOGY | Age: 72
End: 2022-01-21
Payer: MEDICARE

## 2022-01-21 VITALS
BODY MASS INDEX: 23.3 KG/M2 | SYSTOLIC BLOOD PRESSURE: 109 MMHG | DIASTOLIC BLOOD PRESSURE: 73 MMHG | HEART RATE: 61 BPM | WEIGHT: 172 LBS | HEIGHT: 72 IN

## 2022-01-21 DIAGNOSIS — Z86.39 HISTORY OF HYPERLIPIDEMIA: ICD-10-CM

## 2022-01-21 DIAGNOSIS — Z86.79 HISTORY OF HYPERTENSION: ICD-10-CM

## 2022-01-21 DIAGNOSIS — G62.9 NEUROPATHY: ICD-10-CM

## 2022-01-21 DIAGNOSIS — Z86.73 REMOTE HISTORY OF STROKE: Primary | ICD-10-CM

## 2022-01-21 PROCEDURE — G8427 DOCREV CUR MEDS BY ELIG CLIN: HCPCS | Performed by: PSYCHIATRY & NEUROLOGY

## 2022-01-21 PROCEDURE — G8420 CALC BMI NORM PARAMETERS: HCPCS | Performed by: PSYCHIATRY & NEUROLOGY

## 2022-01-21 PROCEDURE — 1123F ACP DISCUSS/DSCN MKR DOCD: CPT | Performed by: PSYCHIATRY & NEUROLOGY

## 2022-01-21 PROCEDURE — 4040F PNEUMOC VAC/ADMIN/RCVD: CPT | Performed by: PSYCHIATRY & NEUROLOGY

## 2022-01-21 PROCEDURE — G8484 FLU IMMUNIZE NO ADMIN: HCPCS | Performed by: PSYCHIATRY & NEUROLOGY

## 2022-01-21 PROCEDURE — 3017F COLORECTAL CA SCREEN DOC REV: CPT | Performed by: PSYCHIATRY & NEUROLOGY

## 2022-01-21 PROCEDURE — 99213 OFFICE O/P EST LOW 20 MIN: CPT | Performed by: PSYCHIATRY & NEUROLOGY

## 2022-01-21 PROCEDURE — 1036F TOBACCO NON-USER: CPT | Performed by: PSYCHIATRY & NEUROLOGY

## 2022-01-21 NOTE — PROGRESS NOTES
Chief Complaint   Patient presents with    New Patient     nontraumatic subcortical hem. of left        Kristi Hack is a 73y.o. year old male who is seen for evaluation of his previous intracerebral hemorrhage as best I can tell. In August of last year he had a left thalamic intracerebral hemorrhage while on Eliquis. He was seen by neurosurgery at that time. He complained of some chronic trouble with his right leg since that time as well as before that time due to an old hip fracture. He says at one point he was using a wheelchair but now he is using a walker. Less than 6 months ago he had a normal B12 level, TSH and blood sugar. Old records are reviewed in detail. He otherwise denies any complaints. He does have hypertension and apparently has had atrial fibrillation in the past.  No longer anticoagulated.     Active Ambulatory Problems     Diagnosis Date Noted    CAD (coronary artery disease)     Essential hypertension     Alcohol abuse 05/07/2018    Hyponatremia     Palliative care patient 06/29/2020    Chronic systolic heart failure ([address] 07/28/2020    Non-ischemic cardiomyopathy ([address] 07/28/2020    Mixed hyperlipidemia 09/01/2020    Paroxysmal atrial fibrillation ([address] 09/01/2020    Localized osteoporosis with current pathological fracture with routine healing 10/08/2020    Postoperative anemia due to acute blood loss 10/31/2020    Closed nondisplaced fracture of lesser trochanter of right femur with routine healing 11/03/2020    Hypovitaminosis D 12/30/2020    GERD (gastroesophageal reflux disease) 12/30/2020    Coronary artery disease involving native heart with angina pectoris, unspecified vessel or lesion type ([address] 06/29/2021    Guaiac positive stools 06/30/2021    Acute gout of hand 06/30/2021    Intracerebral hemorrhage, nontraumatic ([address] 08/17/2021     Resolved Ambulatory Problems     Diagnosis Date Noted    Chest pain 05/06/2018    Transaminitis 05/07/2018    Thrombocytopenia ([facility]     New onset of congestive heart failure ([facility] 2020    Atrial fibrillation with RVR ([facility] 2020    Dyslipidemia     Acute systolic heart failure ([facility] 2020    Hypotension 2020    Hyperkalemia 2020    Acute kidney injury ([facility] 2020    Hypocalcemia 2020    Pneumonia due to infectious organism 2020    Cardiogenic shock ([facility] 2020    Hip fx, right, closed, initial encounter ([facility] 10/30/2020    Acute kidney injury ([facility] 2020     Past Medical History:   Diagnosis Date    Gout     Hypertension     Non-ST elevation MI (NSTEMI) ([facility] 14       Past Surgical History:   Procedure Laterality Date    CARDIAC CATHETERIZATION  14  [address]    angioplasty, stent to LAD.  EF 45%    CHOLECYSTECTOMY      FEMUR FRACTURE SURGERY Right 10/30/2020    TFN, SHORT performed by [facility]ley [facility] at [facility] OR       Family History   Problem Relation Age of Onset    No Known Problems Mother     Heart Disease Father        No Known Allergies    Social History     Socioeconomic History    Marital status:      Spouse name: Not on file    Number of children: Not on file    Years of education: Not on file    Highest education level: Not on file   Occupational History    Not on file   Tobacco Use    Smoking status: Former Smoker     Packs/day: 1.00     Years: 3.00     Pack years: 3.00     Types: Cigars     Start date:      Quit date:      Years since quittin.0    Smokeless tobacco: Never Used   Vaping Use    Vaping Use: Never used   Substance and Sexual Activity    Alcohol use: Not Currently    Drug use: No    Sexual activity: Yes     Partners: Female   Other Topics Concern    Not on file   Social History Narrative    Not on file     Social Determinants of Health     Financial Resource Strain: Low Risk     Difficulty of Paying Living Expenses: Not hard at all   Food Insecurity: No Food Insecurity    Worried About [facility] in the Last Year: Never true    Joyce of Food in the Last Year: Never true   Transportation Needs:     Lack of Transportation (Medical): Not on file    Lack of Transportation (Non-Medical): Not on file   Physical Activity:     Days of Exercise per Week: Not on file    Minutes of Exercise per Session: Not on file   Stress:     Feeling of Stress : Not on file   Social Connections:     Frequency of Communication with Friends and Family: Not on file    Frequency of Social Gatherings with Friends and Family: Not on file    Attends Sabianist Services: Not on file    Active Member of Clubs or Organizations: Not on file    Attends Club or Organization Meetings: Not on file    Marital Status: Not on file   Intimate Partner Violence:     Fear of Current or Ex-Partner: Not on file    Emotionally Abused: Not on file    Physically Abused: Not on file    Sexually Abused: Not on file   Housing Stability:     Unable to Pay for Housing in the Last Year: Not on file    Number of [place] in the Last Year: Not on file    Unstable Housing in the Last Year: Not on file       Review of Systems      Constitutional - No fever or chills. No diaphoresis or significant fatigue. HENT -  No tinnitus or significant hearing loss. Eyes - no sudden vision change or eye pain  Respiratory - no significant shortness of breath or cough  Cardiovascular - no chest pain No palpitations or significant leg swelling  Gastrointestinal - no abdominal swelling or pain. Genitourinary - No difficulty urinating, dysuria  Musculoskeletal - no back pain or myalgia. Skin - no color change or rash  Neurologic - No seizures. No lateralizing weakness. Hematologic - no easy bruising or excessive bleeding. Psychiatric - no severe anxiety or nervousness.    All other review of systems are negative.            Current Outpatient Medications   Medication Sig Dispense Refill    metoprolol succinate (TOPROL XL) 25 MG extended release tablet TAKE 1/2 TABLET BY MOUTH DAILY 45 tablet 1    folic acid (FOLVITE) 1 MG tablet TAKE 1 TABLET BY MOUTH DAILY 90 tablet 1    pantoprazole (PROTONIX) 40 MG tablet TAKE 1 TABLET BY MOUTH DAILY 90 tablet 1    levothyroxine (SYNTHROID) 125 MCG tablet TAKE 1 TABLET BY MOUTH DAILY 90 tablet 1    lisinopril (PRINIVIL;ZESTRIL) 40 MG tablet TAKE 1 TABLET BY MOUTH DAILY 90 tablet 1    atorvastatin (LIPITOR) 40 MG tablet TAKE 1 TABLET BY MOUTH ONCE DAILY 30 tablet 5    amiodarone (CORDARONE) 200 MG tablet TAKE 1 TABLET BY MOUTH ONCE DAILY 30 tablet 5    amLODIPine (NORVASC) 10 MG tablet TAKE 1 TABLET BY MOUTH DAILY 90 tablet 1    colchicine (COLCRYS) 0.6 MG tablet Take 1 tablet by mouth daily 90 tablet 1    Multiple Vitamin (MULTIVITAMIN) TABS tablet Take 1 tablet by mouth daily 30 tablet 0    vitamin B-1 (THIAMINE) 100 MG tablet Take 1 tablet by mouth daily 30 tablet 0     No current facility-administered medications for this visit. Outpatient Medications Marked as Taking for the 1/21/22 encounter (Office Visit) with Torri Pfeiffer MD   Medication Sig Dispense Refill    metoprolol succinate (TOPROL XL) 25 MG extended release tablet TAKE 1/2 TABLET BY MOUTH DAILY 45 tablet 1    folic acid (FOLVITE) 1 MG tablet TAKE 1 TABLET BY MOUTH DAILY 90 tablet 1    pantoprazole (PROTONIX) 40 MG tablet TAKE 1 TABLET BY MOUTH DAILY 90 tablet 1    levothyroxine (SYNTHROID) 125 MCG tablet TAKE 1 TABLET BY MOUTH DAILY 90 tablet 1    lisinopril (PRINIVIL;ZESTRIL) 40 MG tablet TAKE 1 TABLET BY MOUTH DAILY 90 tablet 1    atorvastatin (LIPITOR) 40 MG tablet TAKE 1 TABLET BY MOUTH ONCE DAILY 30 tablet 5    amiodarone (CORDARONE) 200 MG tablet TAKE 1 TABLET BY MOUTH ONCE DAILY 30 tablet 5       /73   Pulse 61   Ht 6' (1.829 m)   Wt 172 lb (78 kg)   BMI 23.33 kg/m²       Constitutional - well developed, well nourished.     Eyes - conjunctiva normal.  Pupils react to light  Ear, nose, throat -hearing intact to finger rub No scars, masses, or lesions over external nose or ears, no atrophy of tongue  Neck-symmetric, no masses noted, no jugular vein distension. No bruits noted. Respiration- chest wall appears symmetric, good expansion,   normal effort without use of accessory muscles  Cardiovascular- RRR  Musculoskeletal - no significant wasting of muscles noted, no bony deformities, gait no gross ataxia  Extremities-no clubbing, cyanosis or edema. Osteoarthritic deformities in the hands. Hammertoes. Skin - warm, dry, and intact. No rash, erythema, or pallor. Psychiatric - mood, affect, and behavior appear normal.      Neurological exam  Awake, alert, fluent oriented x 3 appropriate affect  Attention and concentration appear appropriate  Recent and remote memory appears unremarkable  Speech normal without dysarthria  No clear issues with language of fund of knowledge    Cranial Nerve Exam   CN II- Visual fields grossly unremarkable. VA adequate. Discs sharp  CN III, IV,VI- PERRLA, EOMI, No nystagmus, conjugate eye movements, no ptosis  CN V-sensation intact to LT over face  CN VII-no facial asymmetry  CN VIII-Hearing intact   CN IX and X- Palate elevates in midline  CN XI-good shoulder shrug  CN XII-Tongue midline with no fasciculations or fibrillations    Motor Exam  Relatively normal except for 4/5 right hip weakness    Sensory Exam  Decreased pinprick to the right knee and left shin. Decreased vibration to the bilateral knees    Reflexes   2+ biceps bilaterally  2+ brachioradialis  2+ triceps  2+patella  0 ankle jerks  No clonus ankles  No Kelley's sign bilateral hands. No Babinski sign.     Tremors- no tremors in hands or head noted    Gait  Uses a walker    Coordination  Finger to nose and RAJI-unremarkable    Lab Results   Component Value Date    [id number] 472 07/22/2021     Lab Results   Component Value Date    WBC 11.7 (H) 08/23/2021    HGB 10.7 (L) 08/23/2021    HCT 33.2 (L) 08/23/2021    MCV 96.0 (H) 08/23/2021     08/23/2021     Lab Results   Component Value Date     08/23/2021    K 4.1 08/23/2021     08/23/2021    CO2 21 (L) 08/23/2021    BUN 18 08/23/2021    CREATININE 1.3 (H) 08/23/2021    GLUCOSE 94 08/23/2021    CALCIUM 8.6 (L) 08/23/2021    PROT 6.1 (L) 08/20/2021    LABALBU 3.3 (L) 08/20/2021    BILITOT 1.4 (H) 08/20/2021    ALKPHOS 99 08/20/2021    AST 32 08/20/2021    ALT 21 08/20/2021    LABGLOM 55 (A) 08/23/2021    GFRAA >59 08/23/2021           Assessment    ICD-10-CM    1. Remote history of stroke  Z86.73    2. History of hypertension  Z86.79    3. History of hyperlipidemia  Z86.39    4. Neuropathy  G62.9      Previous left thalamic intracerebral hemorrhage on Eliquis. He is no longer anticoagulated. Hypertension and hyperlipidemia are controlled. Has a rather severe neuropathy for unclear reasons. Plan  I would be more than happy to see him back again as needed. Return if symptoms worsen or fail to improve.     (Please note that portions of this note were completed with a voice recognition program. Efforts were made to edit the dictations but occasionally words are mis-transcribed.)

## 2022-02-01 ENCOUNTER — TELEPHONE (OUTPATIENT)
Dept: INTERNAL MEDICINE | Age: 72
End: 2022-02-01

## 2022-02-01 DIAGNOSIS — Z00.00 ROUTINE ADULT HEALTH MAINTENANCE: ICD-10-CM

## 2022-02-01 DIAGNOSIS — Z13.29 SCREENING FOR THYROID DISORDER: Primary | ICD-10-CM

## 2022-02-01 DIAGNOSIS — E78.2 MIXED HYPERLIPIDEMIA: ICD-10-CM

## 2022-02-10 DIAGNOSIS — E78.2 MIXED HYPERLIPIDEMIA: ICD-10-CM

## 2022-02-10 DIAGNOSIS — Z00.00 ROUTINE ADULT HEALTH MAINTENANCE: ICD-10-CM

## 2022-02-10 DIAGNOSIS — Z13.29 SCREENING FOR THYROID DISORDER: ICD-10-CM

## 2022-02-10 LAB
ALBUMIN SERPL-MCNC: 4.1 G/DL (ref 3.5–5.2)
ALP BLD-CCNC: 159 U/L (ref 40–130)
ALT SERPL-CCNC: 27 U/L (ref 5–41)
ANION GAP SERPL CALCULATED.3IONS-SCNC: 17 MMOL/L (ref 7–19)
AST SERPL-CCNC: 27 U/L (ref 5–40)
BILIRUB SERPL-MCNC: 0.6 MG/DL (ref 0.2–1.2)
BUN BLDV-MCNC: 18 MG/DL (ref 8–23)
CALCIUM SERPL-MCNC: 9.9 MG/DL (ref 8.8–10.2)
CHLORIDE BLD-SCNC: 104 MMOL/L (ref 98–111)
CHOLESTEROL, TOTAL: 97 MG/DL (ref 160–199)
CO2: 22 MMOL/L (ref 22–29)
CREAT SERPL-MCNC: 1.4 MG/DL (ref 0.5–1.2)
GFR AFRICAN AMERICAN: >59
GFR NON-AFRICAN AMERICAN: 50
GLUCOSE BLD-MCNC: 87 MG/DL (ref 74–109)
HBA1C MFR BLD: 4.9 % (ref 4–6)
HCT VFR BLD CALC: 44 % (ref 42–52)
HDLC SERPL-MCNC: 35 MG/DL (ref 55–121)
HEMOGLOBIN: 13.3 G/DL (ref 14–18)
LDL CHOLESTEROL CALCULATED: 20 MG/DL
MCH RBC QN AUTO: 27.7 PG (ref 27–31)
MCHC RBC AUTO-ENTMCNC: 30.2 G/DL (ref 33–37)
MCV RBC AUTO: 91.5 FL (ref 80–94)
PDW BLD-RTO: 14.9 % (ref 11.5–14.5)
PLATELET # BLD: 252 K/UL (ref 130–400)
PMV BLD AUTO: 10.2 FL (ref 9.4–12.4)
POTASSIUM SERPL-SCNC: 4.3 MMOL/L (ref 3.5–5)
RBC # BLD: 4.81 M/UL (ref 4.7–6.1)
SODIUM BLD-SCNC: 143 MMOL/L (ref 136–145)
TOTAL PROTEIN: 7.4 G/DL (ref 6.6–8.7)
TRIGL SERPL-MCNC: 212 MG/DL (ref 0–149)
TSH SERPL DL<=0.05 MIU/L-ACNC: 0.03 UIU/ML (ref 0.27–4.2)
WBC # BLD: 7.6 K/UL (ref 4.8–10.8)

## 2022-02-14 PROBLEM — N18.30 STAGE 3 CHRONIC KIDNEY DISEASE, UNSPECIFIED WHETHER STAGE 3A OR 3B CKD (HCC): Status: ACTIVE | Noted: 2022-02-14

## 2022-02-14 PROBLEM — D72.829 ELEVATED WBC COUNT: Status: ACTIVE | Noted: 2022-02-14

## 2022-02-15 ENCOUNTER — OFFICE VISIT (OUTPATIENT)
Dept: INTERNAL MEDICINE | Age: 72
End: 2022-02-15
Payer: MEDICARE

## 2022-02-15 VITALS
SYSTOLIC BLOOD PRESSURE: 110 MMHG | WEIGHT: 174.6 LBS | DIASTOLIC BLOOD PRESSURE: 64 MMHG | HEART RATE: 80 BPM | HEIGHT: 72 IN | BODY MASS INDEX: 23.65 KG/M2 | OXYGEN SATURATION: 92 %

## 2022-02-15 DIAGNOSIS — K21.9 GASTROESOPHAGEAL REFLUX DISEASE WITHOUT ESOPHAGITIS: ICD-10-CM

## 2022-02-15 DIAGNOSIS — I61.9 RIGHT-SIDED NONTRAUMATIC INTRACEREBRAL HEMORRHAGE, UNSPECIFIED CEREBRAL LOCATION (HCC): ICD-10-CM

## 2022-02-15 DIAGNOSIS — E87.1 HYPONATREMIA: ICD-10-CM

## 2022-02-15 DIAGNOSIS — I25.119 CORONARY ARTERY DISEASE INVOLVING NATIVE HEART WITH ANGINA PECTORIS, UNSPECIFIED VESSEL OR LESION TYPE (HCC): ICD-10-CM

## 2022-02-15 DIAGNOSIS — Z12.11 SCREEN FOR COLON CANCER: ICD-10-CM

## 2022-02-15 DIAGNOSIS — Z87.891 FORMER SMOKER: ICD-10-CM

## 2022-02-15 DIAGNOSIS — I42.8 NON-ISCHEMIC CARDIOMYOPATHY (HCC): ICD-10-CM

## 2022-02-15 DIAGNOSIS — I48.0 PAROXYSMAL ATRIAL FIBRILLATION (HCC): ICD-10-CM

## 2022-02-15 DIAGNOSIS — E78.2 MIXED HYPERLIPIDEMIA: Primary | ICD-10-CM

## 2022-02-15 DIAGNOSIS — N18.30 STAGE 3 CHRONIC KIDNEY DISEASE, UNSPECIFIED WHETHER STAGE 3A OR 3B CKD (HCC): ICD-10-CM

## 2022-02-15 DIAGNOSIS — D72.828 OTHER ELEVATED WHITE BLOOD CELL (WBC) COUNT: ICD-10-CM

## 2022-02-15 PROCEDURE — 3017F COLORECTAL CA SCREEN DOC REV: CPT | Performed by: INTERNAL MEDICINE

## 2022-02-15 PROCEDURE — 99214 OFFICE O/P EST MOD 30 MIN: CPT | Performed by: INTERNAL MEDICINE

## 2022-02-15 PROCEDURE — G8484 FLU IMMUNIZE NO ADMIN: HCPCS | Performed by: INTERNAL MEDICINE

## 2022-02-15 PROCEDURE — G8420 CALC BMI NORM PARAMETERS: HCPCS | Performed by: INTERNAL MEDICINE

## 2022-02-15 PROCEDURE — G8427 DOCREV CUR MEDS BY ELIG CLIN: HCPCS | Performed by: INTERNAL MEDICINE

## 2022-02-15 PROCEDURE — 4040F PNEUMOC VAC/ADMIN/RCVD: CPT | Performed by: INTERNAL MEDICINE

## 2022-02-15 PROCEDURE — 1036F TOBACCO NON-USER: CPT | Performed by: INTERNAL MEDICINE

## 2022-02-15 PROCEDURE — 1123F ACP DISCUSS/DSCN MKR DOCD: CPT | Performed by: INTERNAL MEDICINE

## 2022-02-15 RX ORDER — ATORVASTATIN CALCIUM 40 MG/1
20 TABLET, FILM COATED ORAL DAILY
Qty: 45 TABLET | Refills: 1 | Status: SHIPPED | OUTPATIENT
Start: 2022-02-15 | End: 2022-07-12

## 2022-02-15 RX ORDER — MULTIVIT WITH MINERALS/LUTEIN
250 TABLET ORAL DAILY
COMMUNITY

## 2022-02-15 ASSESSMENT — PATIENT HEALTH QUESTIONNAIRE - PHQ9
SUM OF ALL RESPONSES TO PHQ QUESTIONS 1-9: 0
SUM OF ALL RESPONSES TO PHQ QUESTIONS 1-9: 0
1. LITTLE INTEREST OR PLEASURE IN DOING THINGS: 0
SUM OF ALL RESPONSES TO PHQ QUESTIONS 1-9: 0
SUM OF ALL RESPONSES TO PHQ QUESTIONS 1-9: 0
SUM OF ALL RESPONSES TO PHQ9 QUESTIONS 1 & 2: 0
2. FEELING DOWN, DEPRESSED OR HOPELESS: 0

## 2022-02-15 ASSESSMENT — ENCOUNTER SYMPTOMS
WHEEZING: 0
CHEST TIGHTNESS: 0
STRIDOR: 0
BLOOD IN STOOL: 0
ROS SKIN COMMENTS: DRY SKIN
TROUBLE SWALLOWING: 0
SORE THROAT: 0
SHORTNESS OF BREATH: 1
COUGH: 0
DIARRHEA: 0
ABDOMINAL PAIN: 0
COLOR CHANGE: 0
CONSTIPATION: 0
BACK PAIN: 0

## 2022-02-15 NOTE — PROGRESS NOTES
Phuong Lord ( 1950) is a 73 y.o. male,  Established aaaa, here for evaluation of the following chief complaint(s).   Other (4 month follow up)        ASSESSMENT/PLAN:  Problem List        Circulatory    Paroxysmal atrial fibrillation ([facility]      Monitored by specialist- no acute findings meriting change in the plan         Relevant Orders    Basic Metabolic Panel    TSH WITH REFLEX TO FT4    Non-ischemic cardiomyopathy ([facility]      Monitored by specialist- no acute findings meriting change in the plan         Relevant Medications    metoprolol succinate (TOPROL XL) 25 MG extended release tablet    Coronary artery disease involving native heart with angina pectoris, unspecified vessel or lesion type ([facility]      Monitored by specialist- no acute findings meriting change in the plan         Relevant Medications    amiodarone (CORDARONE) 200 MG tablet    lisinopril (PRINIVIL;ZESTRIL) 40 MG tablet    amLODIPine (NORVASC) 10 MG tablet    metoprolol succinate (TOPROL XL) 25 MG extended release tablet    atorvastatin (LIPITOR) 40 MG tablet       Digestive    GERD (gastroesophageal reflux disease)      Well-controlled, continue current medications, medication adherence emphasized and lifestyle modifications recommended         Relevant Medications    pantoprazole (PROTONIX) 40 MG tablet    Other Relevant Orders    Hepatic Function Panel       Nervous and Auditory    Intracerebral hemorrhage, nontraumatic (HCC)      off an anticoagulation due to fall            Genitourinary    Stage 3 chronic kidney disease, unspecified whether stage 3a or 3b CKD ([facility]      Borderline controlled, continue current medications, medication adherence emphasized and lifestyle modifications recommended         Relevant Orders    Basic Metabolic Panel       Other    Mixed hyperlipidemia - Primary    Relevant Medications    amiodarone (CORDARONE) 200 MG tablet    lisinopril (PRINIVIL;ZESTRIL) 40 MG tablet    amLODIPine (NORVASC) 10 MG tablet metoprolol succinate (TOPROL XL) 25 MG extended release tablet    atorvastatin (LIPITOR) 40 MG tablet    Other Relevant Orders    Hepatic Function Panel    Lipid Panel    Hyponatremia    Relevant Medications    folic acid (FOLVITE) 1 MG tablet    atorvastatin (LIPITOR) 40 MG tablet    Other Relevant Orders    CBC    Elevated WBC count      no recent lab work               Results for orders placed or performed in visit on 02/10/22   CBC   Result Value Ref Range    WBC 7.6 4.8 - 10.8 K/uL    RBC 4.81 4.70 - 6.10 M/uL    Hemoglobin 13.3 (L) 14.0 - 18.0 g/dL    Hematocrit 44.0 42.0 - 52.0 %    MCV 91.5 80.0 - 94.0 fL    MCH 27.7 27.0 - 31.0 pg    MCHC 30.2 (L) 33.0 - 37.0 g/dL    RDW 14.9 (H) 11.5 - 14.5 %    Platelets 175 775 - 090 K/uL    MPV 10.2 9.4 - 12.4 fL   Comprehensive Metabolic Panel   Result Value Ref Range    Sodium 143 136 - 145 mmol/L    Potassium 4.3 3.5 - 5.0 mmol/L    Chloride 104 98 - 111 mmol/L    CO2 22 22 - 29 mmol/L    Anion Gap 17 7 - 19 mmol/L    Glucose 87 74 - 109 mg/dL    BUN 18 8 - 23 mg/dL    CREATININE 1.4 (H) 0.5 - 1.2 mg/dL    GFR Non-African American 50 (A) >60    GFR African American >59 >59    Calcium 9.9 8.8 - 10.2 mg/dL    Total Protein 7.4 6.6 - 8.7 g/dL    Albumin 4.1 3.5 - 5.2 g/dL    Total Bilirubin 0.6 0.2 - 1.2 mg/dL    Alkaline Phosphatase 159 (H) 40 - 130 U/L    ALT 27 5 - 41 U/L    AST 27 5 - 40 U/L   Lipid Panel   Result Value Ref Range    Cholesterol, Total 97 (L) 160 - 199 mg/dL    Triglycerides 212 (H) 0 - 149 mg/dL    HDL 35 (L) 55 - 121 mg/dL    LDL Calculated 20 <100 mg/dL   TSH without Reflex   Result Value Ref Range    TSH 0.031 (L) 0.270 - 4.200 uIU/mL   Hemoglobin A1C   Result Value Ref Range    Hemoglobin A1C 4.9 4.0 - 6.0 %       Return in about 6 months (around 8/15/2022).     HPI  68-year-old male who follow-ups  History of CAD status post PCI 2 vessel disease lost to follow-up with cardiology has an appointment coming up he offers no complaints of chest pain he uses a walker to get around otherwise he has limited ability to walk around he gets short of breath no leg swelling no chest pain no shortness of breath  Patient has a history of GERD compliant with pantoprazole doing well  Hypertension in good control  Paroxysmal atrial fibrillation off anticoagulation due to multiple falls  History of a fall with nondisplaced fracture of the right femur much improved at this time( past), now he has an ICB  He offers no complaints today no constipation no blood in stool he is due for colonoscopy but wants a stool card does not want to have a colonoscopy      Review of Systems   Constitutional: Negative for activity change, appetite change, chills, diaphoresis, fatigue and fever. HENT: Negative for congestion, hearing loss, postnasal drip, sore throat, tinnitus and trouble swallowing. Eyes: Negative for visual disturbance. Respiratory: Positive for shortness of breath. Negative for cough, chest tightness, wheezing and stridor. Cardiovascular: Negative for chest pain, palpitations and leg swelling. Gastrointestinal: Negative for abdominal pain, blood in stool, constipation and diarrhea. Endocrine: Negative for cold intolerance. Genitourinary: Negative for frequency. Musculoskeletal: Positive for arthralgias and joint swelling (R hand). Negative for back pain. Skin: Negative for color change, rash and wound. Dry skin   Allergic/Immunologic: Negative for environmental allergies. Neurological: Positive for weakness. Negative for dizziness, light-headedness and headaches. Hematological: Negative for adenopathy. Does not bruise/bleed easily. Psychiatric/Behavioral: Negative for decreased concentration, dysphoric mood and sleep disturbance. The patient is not nervous/anxious. Physical Exam  Vitals and nursing note reviewed. Constitutional:       General: He is not in acute distress. Appearance: He is well-developed and well-groomed. Comments: Using walker   HENT:      Head: Normocephalic and atraumatic. Right Ear: External ear normal.      Left Ear: External ear normal.      Nose: Nose normal.   Eyes:      General: Lids are normal. No scleral icterus. Extraocular Movements: Extraocular movements intact. Conjunctiva/sclera: Conjunctivae normal.      Pupils: Pupils are equal, round, and reactive to light. Neck:      Thyroid: No thyromegaly. Cardiovascular:      Rate and Rhythm: Normal rate. Rhythm irregular. Pulses: Normal pulses. Heart sounds: Normal heart sounds. No murmur heard. Pulmonary:      Effort: Pulmonary effort is normal. No respiratory distress. Breath sounds: Normal breath sounds and air entry. No wheezing, rhonchi or rales. Chest:      Chest wall: No tenderness. Abdominal:      General: Abdomen is flat. Bowel sounds are normal. There is no distension. Palpations: Abdomen is soft. There is no mass. Tenderness: There is no abdominal tenderness. There is no rebound. Musculoskeletal:         General: No tenderness. Normal range of motion. Right hand: Swelling and deformity present. Left hand: Deformity present. Cervical back: Normal range of motion and neck supple. Right lower leg: No edema. Left lower leg: No edema. Comments: Long fingernails   Lymphadenopathy:      Cervical: No cervical adenopathy. Skin:     General: Skin is warm and dry. Findings: No rash. Neurological:      General: No focal deficit present. Mental Status: He is alert and oriented to person, place, and time. Mental status is at baseline. Cranial Nerves: Cranial nerves are intact. No cranial nerve deficit. Sensory: Sensation is intact. Motor: Weakness present.       Coordination: Coordination abnormal.      Gait: Gait abnormal.      Deep Tendon Reflexes: Reflexes normal.   Psychiatric:         Attention and Perception: Attention normal.         Mood and Affect: Mood normal.         Behavior: Behavior normal.         Thought Content:  Thought content normal.         Judgment: Judgment normal.           (Time Documentation Optional [id number])    An electronic signaturewaas used to authenticate this note  -Karla Santiago MD on 2/15/2022 at 9:24 AM

## 2022-03-01 ENCOUNTER — HOSPITAL ENCOUNTER (OUTPATIENT)
Dept: ULTRASOUND IMAGING | Age: 72
Discharge: HOME | End: 2022-03-01
Payer: MEDICARE

## 2022-03-01 DIAGNOSIS — Z87.891 FORMER SMOKER: ICD-10-CM

## 2022-03-01 PROCEDURE — 76706 US ABDL AORTA SCREEN AAA: CPT

## 2022-03-07 DIAGNOSIS — E03.9 ACQUIRED HYPOTHYROIDISM: Primary | ICD-10-CM

## 2022-03-07 RX ORDER — LEVOTHYROXINE SODIUM 0.12 MG/1
TABLET ORAL
Qty: 90 TABLET | Refills: 1 | Status: SHIPPED | OUTPATIENT
Start: 2022-03-07

## 2022-03-07 NOTE — TELEPHONE ENCOUNTER
Cuba Taylor called to request a refill on his medication.       Last office visit : 2/15/2022   Next office visit : 8/16/2022     Requested Prescriptions     Pending Prescriptions Disp Refills    levothyroxine (SYNTHROID) 125 MCG tablet [Pharmacy Med Name: LEVOTHYROXINE SODIUM 125MCG TABLET] 90 tablet 1     Sig: TAKE 1 TABLET BY MOUTH DAILY            Veronica Frost, MA

## 2022-04-04 ENCOUNTER — TELEPHONE (OUTPATIENT)
Dept: INTERNAL MEDICINE | Age: 72
End: 2022-04-04

## 2022-04-04 DIAGNOSIS — E78.2 MIXED HYPERLIPIDEMIA: ICD-10-CM

## 2022-04-04 DIAGNOSIS — E87.1 HYPONATREMIA: ICD-10-CM

## 2022-04-04 NOTE — TELEPHONE ENCOUNTER
[facility] called said that on the patients Lipitor the pt had been taking a whole tablet but the last script was sent for a half tablet. Pt is out of his medication and the pharmacy wants to know do you want him on a half tab now or the whole.

## 2022-04-04 NOTE — TELEPHONE ENCOUNTER
Spoke to [facility] and said he has a refill on file they will go ahead and get it ready for him and let him know that he is supposed to only be on half a pill.

## 2022-04-18 DIAGNOSIS — M1A.9XX1 CHRONIC GOUT INVOLVING TOE WITH TOPHUS, UNSPECIFIED CAUSE, UNSPECIFIED LATERALITY: ICD-10-CM

## 2022-04-18 RX ORDER — AMIODARONE HYDROCHLORIDE 200 MG/1
TABLET ORAL
Qty: 30 TABLET | Refills: 5 | Status: SHIPPED | OUTPATIENT
Start: 2022-04-18 | End: 2022-10-31

## 2022-04-18 RX ORDER — COLCHICINE 0.6 MG/1
0.6 TABLET ORAL DAILY
Qty: 30 TABLET | Refills: 5 | Status: SHIPPED | OUTPATIENT
Start: 2022-04-18 | End: 2022-08-23 | Stop reason: SDUPTHER

## 2022-04-18 NOTE — TELEPHONE ENCOUNTER
Negro Carrero,  Please make sure amiodarone workup is up to date.   Thanks so much,  [employer] [facility]

## 2022-04-18 NOTE — TELEPHONE ENCOUNTER
Luiz Gibson called requesting a refill of the below medication which has been pended for you:     Requested Prescriptions     Pending Prescriptions Disp Refills    colchicine (COLCRYS) 0.6 MG tablet [Pharmacy Med Name: COLCHICINE 0.6MG TABLET] 30 tablet 5     Sig: TAKE 1 TABLET BY MOUTH DAILY       Last Appointment Date: 2/15/2022  Next Appointment Date: 8/16/2022    No Known Allergies

## 2022-04-22 ENCOUNTER — TELEPHONE (OUTPATIENT)
Dept: CARDIOLOGY CLINIC | Age: 72
End: 2022-04-22

## 2022-04-26 ENCOUNTER — OFFICE VISIT (OUTPATIENT)
Dept: CARDIOLOGY CLINIC | Age: 72
End: 2022-04-26
Payer: MEDICARE

## 2022-04-26 VITALS
BODY MASS INDEX: 23.03 KG/M2 | HEART RATE: 65 BPM | OXYGEN SATURATION: 98 % | WEIGHT: 170 LBS | SYSTOLIC BLOOD PRESSURE: 110 MMHG | DIASTOLIC BLOOD PRESSURE: 60 MMHG | HEIGHT: 72 IN

## 2022-04-26 DIAGNOSIS — I48.0 PAROXYSMAL ATRIAL FIBRILLATION (HCC): Primary | ICD-10-CM

## 2022-04-26 PROCEDURE — G8427 DOCREV CUR MEDS BY ELIG CLIN: HCPCS | Performed by: INTERNAL MEDICINE

## 2022-04-26 PROCEDURE — 93000 ELECTROCARDIOGRAM COMPLETE: CPT | Performed by: INTERNAL MEDICINE

## 2022-04-26 PROCEDURE — 4040F PNEUMOC VAC/ADMIN/RCVD: CPT | Performed by: INTERNAL MEDICINE

## 2022-04-26 PROCEDURE — 1036F TOBACCO NON-USER: CPT | Performed by: INTERNAL MEDICINE

## 2022-04-26 PROCEDURE — 3017F COLORECTAL CA SCREEN DOC REV: CPT | Performed by: INTERNAL MEDICINE

## 2022-04-26 PROCEDURE — 99214 OFFICE O/P EST MOD 30 MIN: CPT | Performed by: INTERNAL MEDICINE

## 2022-04-26 PROCEDURE — G8420 CALC BMI NORM PARAMETERS: HCPCS | Performed by: INTERNAL MEDICINE

## 2022-04-26 PROCEDURE — 1123F ACP DISCUSS/DSCN MKR DOCD: CPT | Performed by: INTERNAL MEDICINE

## 2022-04-26 ASSESSMENT — ENCOUNTER SYMPTOMS
WHEEZING: 0
EYE REDNESS: 0
EYE DISCHARGE: 0
ABDOMINAL DISTENTION: 0
SHORTNESS OF BREATH: 0
NAUSEA: 0
ABDOMINAL PAIN: 0
BLOOD IN STOOL: 0
APNEA: 0
SORE THROAT: 0
COUGH: 0
DIARRHEA: 0
CHEST TIGHTNESS: 0
FACIAL SWELLING: 0
CONSTIPATION: 0
VOMITING: 0
EYE PAIN: 0

## 2022-04-26 NOTE — PROGRESS NOTES
Cardiology Office Visit Note  Maryjane Sage  [id number]  Phone: [phone]  Fax: [phone]                            Date:  4/26/2022  Patient: Damir Stein  Age:  73 y.o., 1950    Referral: No ref. provider found    REASON FOR VISIT:  Follow-up         PROBLEM LIST:    Patient Active Problem List    Diagnosis Date Noted    Postoperative anemia due to acute blood loss 10/31/2020     Priority: High    Stage 3 chronic kidney disease, unspecified whether stage 3a or 3b CKD ([facility] 02/14/2022     Priority: Low    Elevated WBC count 02/14/2022     Priority: Low    Intracerebral hemorrhage, nontraumatic (HCC) 08/17/2021     Priority: Low    Guaiac positive stools 06/30/2021     Priority: Low    Acute gout of hand 06/30/2021     Priority: Low    Coronary artery disease involving native heart with angina pectoris, unspecified vessel or lesion type ([facility] 06/29/2021     Priority: Low    Hypovitaminosis D 12/30/2020     Priority: Low    GERD (gastroesophageal reflux disease) 12/30/2020     Priority: Low    Closed nondisplaced fracture of lesser trochanter of right femur with routine healing 11/03/2020     Priority: Low    Localized osteoporosis with current pathological fracture with routine healing 10/08/2020     Priority: Low    Mixed hyperlipidemia 09/01/2020     Priority: Low    Paroxysmal atrial fibrillation ([facility] 09/01/2020     Priority: Low    Chronic systolic heart failure ([facility] 07/28/2020     Priority: Low     Overview Note:     Mildly dilated left ventricular size with severely reduced LV function and   an estimated ejection fraction of approximately 27%. Mild concentric left ventricular hypertrophy noted. Unable to assess diastolic function due to abnormal rhythm. No evidence of left ventricular mass or thrombus noted.       Non-ischemic cardiomyopathy ([facility] 07/28/2020     Priority: Low     Overview Note:     Ejection fraction 5-10% by cath, 6/2020, 40% and neck pain. Skin: Negative for pallor and rash. Neurological: Negative for dizziness, syncope, speech difficulty, light-headedness, numbness and headaches. Psychiatric/Behavioral: Negative for confusion, hallucinations and sleep disturbance. Past Medical History:      Diagnosis Date    CAD (coronary artery disease)     Gout     Hypertension     Hypotension 6/23/2020    Non-ST elevation MI (NSTEMI) ([facility] 12/28/14    Palliative care patient 06/29/2020    Pneumonia due to infectious organism 6/24/2020       Past Surgical History:      Procedure Laterality Date    CARDIAC CATHETERIZATION  12/29/14  [facility]    angioplasty, stent to LAD.  EF 45%    CHOLECYSTECTOMY      FEMUR FRACTURE SURGERY Right 10/30/2020    TFN, SHORT performed by Jo Oliver [facility] at [facility] OR       Medications:  Current Outpatient Medications   Medication Sig Dispense Refill    colchicine (COLCRYS) 0.6 MG tablet TAKE 1 TABLET BY MOUTH DAILY 30 tablet 5    amiodarone (CORDARONE) 200 MG tablet TAKE 1 TABLET BY MOUTH ONCE DAILY 30 tablet 5    levothyroxine (SYNTHROID) 125 MCG tablet TAKE 1 TABLET BY MOUTH DAILY 90 tablet 1    atorvastatin (LIPITOR) 40 MG tablet Take 0.5 tablets by mouth daily 45 tablet 1    Ascorbic Acid (VITAMIN C) 250 MG tablet Take 250 mg by mouth daily      metoprolol succinate (TOPROL XL) 25 MG extended release tablet TAKE 1/2 TABLET BY MOUTH DAILY 45 tablet 1    amLODIPine (NORVASC) 10 MG tablet TAKE 1 TABLET BY MOUTH DAILY 90 tablet 1    folic acid (FOLVITE) 1 MG tablet TAKE 1 TABLET BY MOUTH DAILY 90 tablet 1    pantoprazole (PROTONIX) 40 MG tablet TAKE 1 TABLET BY MOUTH DAILY 90 tablet 1    lisinopril (PRINIVIL;ZESTRIL) 40 MG tablet TAKE 1 TABLET BY MOUTH DAILY 90 tablet 1    Multiple Vitamin (MULTIVITAMIN) TABS tablet Take 1 tablet by mouth daily 30 tablet 0    vitamin B-1 (THIAMINE) 100 MG tablet Take 1 tablet by mouth daily 30 tablet 0     No current facility-administered medications for this visit. Allergies:  Patient has no known allergies. Social History:  Social History     Occupational History    Not on file   Tobacco Use    Smoking status: Former Smoker     Packs/day: 1.00     Years: 3.00     Pack years: 3.00     Types: Cigars     Start date: 12     Quit date:      Years since quittin.3    Smokeless tobacco: Never Used   Vaping Use    Vaping Use: Never used   Substance and Sexual Activity    Alcohol use: Not Currently    Drug use: No    Sexual activity: Yes     Partners: Female         Family History:       Problem Relation Age of Onset    No Known Problems Mother     Heart Disease Father          Physical Examination:  /60 (Site: Left Upper Arm)   Pulse 65   Ht 6' (1.829 m)   Wt 170 lb (77.1 kg)   SpO2 98%   BMI 23.06 kg/m²   Physical Exam  Vitals reviewed. Constitutional:       General: He is not in acute distress. Appearance: Normal appearance. He is not ill-appearing, toxic-appearing or diaphoretic. HENT:      Head: Normocephalic and atraumatic. Eyes:      General: No scleral icterus. Right eye: No discharge. Left eye: No discharge. Conjunctiva/sclera: Conjunctivae normal.   Neck:      Vascular: No carotid bruit. Cardiovascular:      Rate and Rhythm: Normal rate and regular rhythm. No extrasystoles are present. Chest Wall: PMI is not displaced. No thrill. Heart sounds: S1 normal and S2 normal. No murmur heard. No friction rub. No gallop. Pulmonary:      Effort: Pulmonary effort is normal. No tachypnea or respiratory distress. Breath sounds: Normal breath sounds. No stridor. No wheezing, rhonchi or rales. Chest:      Chest wall: No tenderness. Abdominal:      General: Bowel sounds are normal. There is no distension. Palpations: Abdomen is soft. There is no mass. Tenderness: There is no abdominal tenderness. There is no guarding. Musculoskeletal:         General: No swelling.       Cervical back: Normal range of motion and neck supple. No rigidity. Right lower leg: No edema. Left lower leg: No edema. Skin:     General: Skin is warm and dry. Coloration: Skin is not jaundiced. Findings: No erythema or rash. Neurological:      Mental Status: He is alert and oriented to person, place, and time. Mental status is at baseline. Gait: Gait abnormal.   Psychiatric:         Mood and Affect: Mood normal.         Behavior: Behavior normal.         Thought Content: Thought content normal.           Labs:   CBC: No results found for: CBC   BMP: No results found for: BMP    BNP: No results found for: BNPINT   PT/INR:   Protime   Date Value Ref Range Status   08/18/2021 15.6 (H) 12.0 - 14.6 sec Final     INR   Date Value Ref Range Status   08/18/2021 1.22 (H) 0.88 - 1.18 Final     Comment:     INR  < or = 1.3  Normal  INR = 2.0 - 3.0  Therapeutic  INR = 2.5 - 3.5  Therapeutic for patients with mechanical  prosthetic heart valve & MI prophylaxis  INR  > or = 3.5  Abnormal/Elevated  INR  > or = 5.0  Critical (requires immediate physician  notification)         APTT:    aPTT   Date Value Ref Range Status   08/18/2021 32.6 26.0 - 36.2 sec Final      CARDIAC ENZYMES:   Total CK   Date Value Ref Range Status   08/17/2021 164 39 - 308 U/L Final     Troponin   Date Value Ref Range Status   06/23/2020 0.01 0.00 - 0.03 ng/mL Final     Comment:     <0.030 ng/ml       No measurable cardiac damage. 0.030-0.099 ng/ml  Values of troponin in this range suggest  possible myocardial damage. Repeat assay  4 to 6 hours after the current specimen.    >= 0.100 ng/ml     Indicative of myocardial damage. Recommend continued monitoring of  patient status and cardiac markers.         LIPID PANEL: No results found for: LIPIDPAN  LIVER PROFILE:   AST   Date Value Ref Range Status   02/10/2022 27 5 - 40 U/L Final     ALT   Date Value Ref Range Status   02/10/2022 27 5 - 41 U/L Final     Albumin   Date Value Ref Range Status   02/10/2022 4.1 3.5 - 5.2 g/dL Final              Imaging:    - EKG : Sinus rhythm 65 bpm.  Right bundle branch block. 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  ms  Not on anticoagulation due to hemorrhagic CVA history  Annual chest x-ray as per protocol  TSH and LFTs every 6 months    Essential hypertension, goal blood pressure less than 130/80  Controlled on combination of metoprolol XL, lisinopril and amlodipine                Return in about 6 months (around 10/26/2022). Electronically signed by Gena Alfaro MD on 4/26/2022 at 9:29 AM    Gena Alfaro MD, AIDAN, [facility]  Noninvasive Cardiology Consultant    This dictation was generated by voice recognition computer software. Although all attempts are made to edit the dictation for accuracy, there may be errors in the transcription that are not intended.

## 2022-05-31 RX ORDER — LISINOPRIL 40 MG/1
40 TABLET ORAL DAILY
Qty: 90 TABLET | Refills: 1 | OUTPATIENT
Start: 2022-05-31

## 2022-05-31 RX ORDER — LISINOPRIL 40 MG/1
40 TABLET ORAL DAILY
Qty: 90 TABLET | Refills: 1 | Status: SHIPPED | OUTPATIENT
Start: 2022-05-31

## 2022-05-31 NOTE — TELEPHONE ENCOUNTER
Requested Prescriptions     Pending Prescriptions Disp Refills    lisinopril (PRINIVIL;ZESTRIL) 40 MG tablet [Pharmacy Med Name: LISINOPRIL 40MG TABLET] 90 tablet 1     Sig: TAKE 1 TABLET BY MOUTH DAILY

## 2022-05-31 NOTE — TELEPHONE ENCOUNTER
Requested Prescriptions     Pending Prescriptions Disp Refills    lisinopril (PRINIVIL;ZESTRIL) 40 MG tablet 90 tablet 1     Sig: Take 1 tablet by mouth daily

## 2022-06-03 NOTE — PROGRESS NOTES
Did not call pt.   Pt presented to clinic for an appointment and went over his results with him.   He verbalized understanding and the only question he had was what is the difference between HS 1 & HS 2.     Pt stated that he will discuss further when Dr. Valenzuela does his visit.    This addendum has been created to correct a medical record clerical error, wherein an erroneous  was selected for your administered flu vaccine. No

## 2022-06-06 DIAGNOSIS — E87.1 HYPONATREMIA: ICD-10-CM

## 2022-06-06 RX ORDER — FOLIC ACID 1 MG/1
1 TABLET ORAL DAILY
Qty: 90 TABLET | Refills: 1 | Status: SHIPPED | OUTPATIENT
Start: 2022-06-06

## 2022-06-06 NOTE — TELEPHONE ENCOUNTER
Requested Prescriptions     Pending Prescriptions Disp Refills    folic acid (FOLVITE) 1 MG tablet [Pharmacy Med Name: FOLIC ACID 1MG TABLET] 90 tablet 1     Sig: TAKE 1 TABLET BY MOUTH DAILY

## 2022-06-14 DIAGNOSIS — K21.00 GASTROESOPHAGEAL REFLUX DISEASE WITH ESOPHAGITIS WITHOUT HEMORRHAGE: ICD-10-CM

## 2022-06-14 DIAGNOSIS — I10 ESSENTIAL HYPERTENSION: ICD-10-CM

## 2022-06-14 NOTE — TELEPHONE ENCOUNTER
Requested Prescriptions     Pending Prescriptions Disp Refills    amLODIPine (NORVASC) 10 MG tablet [Pharmacy Med Name: AMLODIPINE BESYLATE 10MG TABLET] 90 tablet 1     Sig: TAKE 1 TABLET BY MOUTH DAILY    pantoprazole (PROTONIX) 40 MG tablet [Pharmacy Med Name: PANTOPRAZOLE SODIUM 40MG TABLET DELAYED RELEASE] 90 tablet 1     Sig: TAKE 1 TABLET BY MOUTH DAILY

## 2022-06-20 RX ORDER — PANTOPRAZOLE SODIUM 40 MG/1
TABLET, DELAYED RELEASE ORAL
Qty: 90 TABLET | Refills: 1 | Status: SHIPPED | OUTPATIENT
Start: 2022-06-20

## 2022-06-20 RX ORDER — AMLODIPINE BESYLATE 10 MG/1
10 TABLET ORAL DAILY
Qty: 90 TABLET | Refills: 1 | Status: SHIPPED | OUTPATIENT
Start: 2022-06-20 | End: 2022-10-31

## 2022-07-11 DIAGNOSIS — I10 ESSENTIAL HYPERTENSION: ICD-10-CM

## 2022-07-11 DIAGNOSIS — I42.8 NON-ISCHEMIC CARDIOMYOPATHY (HCC): ICD-10-CM

## 2022-07-11 DIAGNOSIS — E78.2 MIXED HYPERLIPIDEMIA: ICD-10-CM

## 2022-07-11 DIAGNOSIS — E87.1 HYPONATREMIA: ICD-10-CM

## 2022-07-11 NOTE — TELEPHONE ENCOUNTER
Requested Prescriptions     Pending Prescriptions Disp Refills    atorvastatin (LIPITOR) 40 MG tablet [Pharmacy Med Name: ATORVASTATIN CALCIUM 40MG TABLET] 45 tablet 1     Sig: TAKE 1/2 TABLET BY MOUTH DAILY    metoprolol succinate (TOPROL XL) 25 MG extended release tablet [Pharmacy Med Name: METOPROLOL SUCCINATE ER 25MG TABLET EXTENDED RELEASE 24 HOUR] 45 tablet 1     Sig: TAKE 1/2 TABLET BY MOUTH DAILY

## 2022-07-12 RX ORDER — ATORVASTATIN CALCIUM 40 MG/1
TABLET, FILM COATED ORAL
Qty: 45 TABLET | Refills: 1 | Status: SHIPPED | OUTPATIENT
Start: 2022-07-12

## 2022-07-12 RX ORDER — METOPROLOL SUCCINATE 25 MG/1
TABLET, EXTENDED RELEASE ORAL
Qty: 45 TABLET | Refills: 1 | Status: SHIPPED | OUTPATIENT
Start: 2022-07-12

## 2022-08-15 DIAGNOSIS — K21.9 GASTROESOPHAGEAL REFLUX DISEASE WITHOUT ESOPHAGITIS: ICD-10-CM

## 2022-08-15 DIAGNOSIS — I48.0 PAROXYSMAL ATRIAL FIBRILLATION (HCC): ICD-10-CM

## 2022-08-15 DIAGNOSIS — E87.1 HYPONATREMIA: ICD-10-CM

## 2022-08-15 DIAGNOSIS — N18.30 STAGE 3 CHRONIC KIDNEY DISEASE, UNSPECIFIED WHETHER STAGE 3A OR 3B CKD (HCC): ICD-10-CM

## 2022-08-15 DIAGNOSIS — E78.2 MIXED HYPERLIPIDEMIA: ICD-10-CM

## 2022-08-15 LAB
ALBUMIN SERPL-MCNC: 4.3 G/DL (ref 3.5–5.2)
ALP BLD-CCNC: 162 U/L (ref 40–130)
ALT SERPL-CCNC: 45 U/L (ref 5–41)
ANION GAP SERPL CALCULATED.3IONS-SCNC: 12 MMOL/L (ref 7–19)
AST SERPL-CCNC: 41 U/L (ref 5–40)
BILIRUB SERPL-MCNC: 0.6 MG/DL (ref 0.2–1.2)
BILIRUBIN DIRECT: 0.2 MG/DL (ref 0–0.3)
BILIRUBIN, INDIRECT: 0.4 MG/DL (ref 0.1–1)
BUN BLDV-MCNC: 21 MG/DL (ref 8–23)
CALCIUM SERPL-MCNC: 9.7 MG/DL (ref 8.8–10.2)
CHLORIDE BLD-SCNC: 104 MMOL/L (ref 98–111)
CHOLESTEROL, TOTAL: 97 MG/DL (ref 160–199)
CO2: 25 MMOL/L (ref 22–29)
CREAT SERPL-MCNC: 1.6 MG/DL (ref 0.5–1.2)
GFR AFRICAN AMERICAN: 52
GFR NON-AFRICAN AMERICAN: 43
GLUCOSE BLD-MCNC: 112 MG/DL (ref 74–109)
HCT VFR BLD CALC: 41.4 % (ref 42–52)
HDLC SERPL-MCNC: 41 MG/DL (ref 55–121)
HEMOGLOBIN: 13.2 G/DL (ref 14–18)
LDL CHOLESTEROL CALCULATED: 35 MG/DL
MCH RBC QN AUTO: 30.1 PG (ref 27–31)
MCHC RBC AUTO-ENTMCNC: 31.9 G/DL (ref 33–37)
MCV RBC AUTO: 94.3 FL (ref 80–94)
PDW BLD-RTO: 14 % (ref 11.5–14.5)
PLATELET # BLD: 235 K/UL (ref 130–400)
PMV BLD AUTO: 11.3 FL (ref 9.4–12.4)
POTASSIUM SERPL-SCNC: 4.5 MMOL/L (ref 3.5–5)
RBC # BLD: 4.39 M/UL (ref 4.7–6.1)
SODIUM BLD-SCNC: 141 MMOL/L (ref 136–145)
TOTAL PROTEIN: 7.4 G/DL (ref 6.6–8.7)
TRIGL SERPL-MCNC: 104 MG/DL (ref 0–149)
TSH REFLEX FT4: 0.86 UIU/ML (ref 0.35–5.5)
WBC # BLD: 10 K/UL (ref 4.8–10.8)

## 2022-08-22 ASSESSMENT — ENCOUNTER SYMPTOMS
STRIDOR: 0
COUGH: 0
BLOOD IN STOOL: 0
DIARRHEA: 0
CHEST TIGHTNESS: 0
WHEEZING: 0
ABDOMINAL PAIN: 0
BACK PAIN: 0
ROS SKIN COMMENTS: DRY SKIN
CONSTIPATION: 0
SHORTNESS OF BREATH: 1
COLOR CHANGE: 0
TROUBLE SWALLOWING: 0
SORE THROAT: 0

## 2022-08-22 NOTE — PROGRESS NOTES
Ofe Catalan ( 1950) is a 81 y.o. male,  Established aaaa, here for evaluation of the following chief complaint(s).   Medicare AW        ASSESSMENT/PLAN:  Problem List          Circulatory    Paroxysmal atrial fibrillation (HCC)      Monitored by specialist- no acute findings meriting change in the plan         Relevant Medications    lisinopril (PRINIVIL;ZESTRIL) 40 MG tablet    amLODIPine (NORVASC) 10 MG tablet    atorvastatin (LIPITOR) 40 MG tablet    metoprolol succinate (TOPROL XL) 25 MG extended release tablet    [facility]-ischemic [facility]      Monitored by specialist- no acute findings meriting change in the plan         Relevant Medications    lisinopril (PRINIVIL;ZESTRIL) 40 MG tablet    amLODIPine (NORVASC) 10 MG tablet    atorvastatin (LIPITOR) 40 MG tablet    metoprolol succinate (TOPROL XL) 25 MG extended release tablet    Essential hypertension      Well-controlled, continue current medications         Relevant Medications    amLODIPine (NORVASC) 10 MG tablet    metoprolol succinate (TOPROL XL) 25 MG extended release tablet    Coronary artery disease involving native heart with angina pectoris, unspecified vessel or lesion type (HCC)      Well-controlled, continue current medications         Relevant Medications    amiodarone (CORDARONE) 200 MG tablet    lisinopril (PRINIVIL;ZESTRIL) 40 MG tablet    amLODIPine (NORVASC) 10 MG tablet    atorvastatin (LIPITOR) 40 MG tablet    metoprolol succinate (TOPROL XL) 25 MG extended release tablet       Digestive    GERD (gastroesophageal reflux disease)      Well-controlled, continue current medications         Relevant Medications    pantoprazole (PROTONIX) 40 MG tablet       Musculoskeletal and Integument    Acute gout of hand      Well-controlled, continue current medications         Relevant Medications    colchicine (COLCRYS) 0.6 MG tablet       Genitourinary    Stage 3 chronic kidney disease, unspecified whether stage 3a or 3b CKD ([facility] Well-controlled, continue current medications, medication adherence emphasized and lifestyle modifications recommended          Results for orders placed or performed in visit on 08/15/22   TSH WITH REFLEX TO FT4   Result Value Ref Range    TSH Reflex FT4 0.86 0.35 - 5.50 uIU/mL   Lipid Panel   Result Value Ref Range    Cholesterol, Total 97 (L) 160 - 199 mg/dL    Triglycerides 104 0 - 149 mg/dL    HDL 41 (L) 55 - 121 mg/dL    LDL Calculated 35 <100 mg/dL   Hepatic Function Panel   Result Value Ref Range    Total Protein 7.4 6.6 - 8.7 g/dL    Albumin 4.3 3.5 - 5.2 g/dL    Alkaline Phosphatase 162 (H) 40 - 130 U/L    ALT 45 (H) 5 - 41 U/L    AST 41 (H) 5 - 40 U/L    Total Bilirubin 0.6 0.2 - 1.2 mg/dL    Bilirubin, Direct 0.2 0.0 - 0.3 mg/dL    Bilirubin, Indirect 0.4 0.1 - 1.0 mg/dL   CBC   Result Value Ref Range    WBC 10.0 4.8 - 10.8 K/uL    RBC 4.39 (L) 4.70 - 6.10 M/uL    Hemoglobin 13.2 (L) 14.0 - 18.0 g/dL    Hematocrit 41.4 (L) 42.0 - 52.0 %    MCV 94.3 (H) 80.0 - 94.0 fL    MCH 30.1 27.0 - 31.0 pg    MCHC 31.9 (L) 33.0 - 37.0 g/dL    RDW 14.0 11.5 - 14.5 %    Platelets 616 587 - 520 K/uL    MPV 11.3 9.4 - 12.4 fL   Basic Metabolic Panel   Result Value Ref Range    Sodium 141 136 - 145 mmol/L    Potassium 4.5 3.5 - 5.0 mmol/L    Chloride 104 98 - 111 mmol/L    CO2 25 22 - 29 mmol/L    Anion Gap 12 7 - 19 mmol/L    Glucose 112 (H) 74 - 109 mg/dL    BUN 21 8 - 23 mg/dL    Creatinine 1.6 (H) 0.5 - 1.2 mg/dL    GFR Non- 43 (A) >60    GFR  52 (L) >59    Calcium 9.7 8.8 - 10.2 mg/dL       No follow-ups on file.     HPI  82-year-old male who follow-ups  History of CAD status post PCI 2 vessel disease lost to follow-up with cardiology has an appointment coming up he offers no complaints of chest pain he uses a walker to get around otherwise he has limited ability to walk around he gets short of breath no leg swelling no chest pain no shortness of breath  Patient has a history of GERD compliant with pantoprazole doing well  Hypertension in good control  Paroxysmal atrial fibrillation off anticoagulation due to multiple falls  History of a fall with nondisplaced fracture of the right femur much improved at this time( past), now he has an ICB  He offers no complaints today no constipation no blood in stool he is due for colonoscopy but wants a stool card does not want to have a colonoscopy      Review of Systems   Constitutional:  Negative for activity change, appetite change, chills, diaphoresis, fatigue and fever. HENT:  Negative for congestion, hearing loss, postnasal drip, sore throat, tinnitus and trouble swallowing. Eyes:  Negative for visual disturbance. Respiratory:  Positive for shortness of breath. Negative for cough, chest tightness, wheezing and stridor. Cardiovascular:  Negative for chest pain, palpitations and leg swelling. Gastrointestinal:  Negative for abdominal pain, blood in stool, constipation and diarrhea. Endocrine: Negative for cold intolerance. Genitourinary:  Negative for frequency. Musculoskeletal:  Positive for arthralgias and joint swelling (R hand). Negative for back pain. Skin:  Negative for color change, rash and wound. Dry skin   Allergic/Immunologic: Negative for environmental allergies. Neurological:  Positive for weakness. Negative for dizziness, light-headedness and headaches. Hematological:  Negative for adenopathy. Does not bruise/bleed easily. Psychiatric/Behavioral:  Negative for decreased concentration, dysphoric mood and sleep disturbance. The patient is not nervous/anxious. Physical Exam  Vitals and nursing note reviewed. Constitutional:       General: He is not in acute distress. Appearance: He is well-developed and well-groomed. Comments: Using walker   HENT:      Head: Normocephalic and atraumatic.       Right Ear: External ear normal.      Left Ear: External ear normal.      Nose: Nose normal.   Eyes: General: Lids are normal. No scleral icterus. Extraocular Movements: Extraocular movements intact. Conjunctiva/sclera: Conjunctivae normal.      Pupils: Pupils are equal, round, and reactive to light. Neck:      Thyroid: No thyromegaly. Cardiovascular:      Rate and Rhythm: Normal rate. Rhythm irregular. Pulses: Normal pulses. Heart sounds: Normal heart sounds. No murmur heard. Pulmonary:      Effort: Pulmonary effort is normal. No respiratory distress. Breath sounds: Normal breath sounds and air entry. No wheezing, rhonchi or rales. Chest:      Chest wall: No tenderness. Abdominal:      General: Abdomen is flat. Bowel sounds are normal. There is no distension. Palpations: Abdomen is soft. There is no mass. Tenderness: There is no abdominal tenderness. There is no rebound. Musculoskeletal:         General: No tenderness. Normal range of motion. Right hand: Swelling and deformity present. Left hand: Deformity present. Cervical back: Normal range of motion and neck supple. Right lower leg: No edema. Left lower leg: No edema. Comments: Long fingernails   Lymphadenopathy:      Cervical: No cervical adenopathy. Skin:     General: Skin is warm and dry. Findings: No rash. Neurological:      General: No focal deficit present. Mental Status: He is alert and oriented to person, place, and time. Mental status is at baseline. Cranial Nerves: Cranial nerves are intact. No cranial nerve deficit. Sensory: Sensation is intact. Motor: Weakness present. Coordination: Coordination abnormal.      Gait: Gait abnormal.      Deep Tendon Reflexes: Reflexes normal.   Psychiatric:         Attention and Perception: Attention normal.         Mood and Affect: Mood normal.         Behavior: Behavior normal.         Thought Content:  Thought content normal.         Judgment: Judgment normal.         (Time Documentation Optional [id number])    An electronic signaturewaas used to authenticate this note  -Geetha Padilla MD on 8/23/2022 at 12:19 [place] Visit    Shen Luther is here for Medicare AW    Assessment & Plan   Prostate cancer screening  -     PSA Screening; Future  Chronic gout involving toe with tophus, unspecified cause, unspecified laterality  -     colchicine (COLCRYS) 0.6 MG tablet; Take 1 tablet by mouth daily, Disp-90 tablet, R-3Normal  Acquired hypothyroidism  Non-ischemic cardiomyopathy ([facility]  Assessment & Plan:   Monitored by specialist- no acute findings meriting change in the plan  Essential hypertension  Assessment & Plan:   Well-controlled, continue current medications  Paroxysmal atrial fibrillation (HCC)  Assessment & Plan:   Monitored by specialist- no acute findings meriting change in the plan  Coronary artery disease involving native heart with angina pectoris, unspecified vessel or lesion type [facility])  Assessment & Plan:   Well-controlled, continue current medications  Gastroesophageal reflux disease with esophagitis without hemorrhage  Assessment & Plan:   Well-controlled, continue current medications  Acute gout of right hand, unspecified cause  Assessment & Plan:   Well-controlled, continue current medications  Stage 3 chronic kidney disease, unspecified whether stage 3a or 3b CKD ([facility]  Assessment & Plan:   Well-controlled, continue current medications, medication adherence emphasized and lifestyle modifications recommended    Recommendations for Preventive Services Due: see orders and patient instructions/AVS.  Recommended screening schedule for the next 5-10 years is provided to the patient in written form: see Patient Instructions/AVS.     No follow-ups on file. Subjective       Patient's complete Health Risk Assessment and screening values have been reviewed and are found in Flowsheets.  The following problems were reviewed today and where indicated follow up appointments Allergies  Prior to Visit Medications    Medication Sig Taking?  Authorizing Provider   colchicine (COLCRYS) 0.6 MG tablet Take 1 tablet by mouth daily Yes Ramona Smith MD   atorvastatin (LIPITOR) 40 MG tablet TAKE 1/2 TABLET BY MOUTH DAILY Yes Ramona Smith MD   metoprolol succinate (TOPROL XL) 25 MG extended release tablet TAKE 1/2 TABLET BY MOUTH DAILY Yes Ramona Smith MD   amLODIPine (NORVASC) 10 MG tablet TAKE 1 TABLET BY MOUTH DAILY Yes Rosie Mehta MD   pantoprazole (PROTONIX) 40 MG tablet TAKE 1 TABLET BY MOUTH DAILY Yes Rosie Mehta MD   folic acid (FOLVITE) 1 MG tablet TAKE 1 TABLET BY MOUTH DAILY Yes Ramona Smith MD   lisinopril (PRINIVIL;ZESTRIL) 40 MG tablet TAKE 1 TABLET BY MOUTH DAILY Yes Ramona Smith MD   amiodarone (CORDARONE) 200 MG tablet TAKE 1 TABLET BY MOUTH ONCE DAILY Yes ADY Oliva   levothyroxine (SYNTHROID) 125 MCG tablet TAKE 1 TABLET BY MOUTH DAILY Yes Ramona Smith MD   Ascorbic Acid (VITAMIN C) 250 MG tablet Take 250 mg by mouth daily Yes Historical Provider, MD   Multiple Vitamin (MULTIVITAMIN) TABS tablet Take 1 tablet by mouth daily Yes Carmel Leger MD   vitamin B-1 (THIAMINE) 100 MG tablet Take 1 tablet by mouth daily Yes Carmel Leger MD       [facility] (Including outside providers/suppliers regularly involved in providing care):   Patient Care Team:  Cal Ramos MD as PCP - General (Internal Medicine)  Cal Ramos MD as PCP - [facility] Empaneled Provider  Ren Castro MD as Consulting Physician (Neurology)  Polo Milian MD as Consulting Physician (Cardiology)     Reviewed and updated this visit:  Tobacco  Allergies  Meds  Problems  Med Hx  Surg Hx  Soc Hx  Fam Hx

## 2022-08-23 ENCOUNTER — OFFICE VISIT (OUTPATIENT)
Dept: PRIMARY CARE CLINIC | Age: 72
End: 2022-08-23

## 2022-08-23 VITALS
DIASTOLIC BLOOD PRESSURE: 64 MMHG | HEART RATE: 90 BPM | OXYGEN SATURATION: 98 % | SYSTOLIC BLOOD PRESSURE: 110 MMHG | BODY MASS INDEX: 21.7 KG/M2 | HEIGHT: 72 IN | WEIGHT: 160.2 LBS

## 2022-08-23 DIAGNOSIS — N18.30 STAGE 3 CHRONIC KIDNEY DISEASE, UNSPECIFIED WHETHER STAGE 3A OR 3B CKD (HCC): ICD-10-CM

## 2022-08-23 DIAGNOSIS — I25.119 CORONARY ARTERY DISEASE INVOLVING NATIVE HEART WITH ANGINA PECTORIS, UNSPECIFIED VESSEL OR LESION TYPE (HCC): ICD-10-CM

## 2022-08-23 DIAGNOSIS — Z00.00 MEDICARE ANNUAL WELLNESS VISIT, SUBSEQUENT: ICD-10-CM

## 2022-08-23 DIAGNOSIS — K21.00 GASTROESOPHAGEAL REFLUX DISEASE WITH ESOPHAGITIS WITHOUT HEMORRHAGE: ICD-10-CM

## 2022-08-23 DIAGNOSIS — M10.9 ACUTE GOUT OF RIGHT HAND, UNSPECIFIED CAUSE: ICD-10-CM

## 2022-08-23 DIAGNOSIS — I10 ESSENTIAL HYPERTENSION: ICD-10-CM

## 2022-08-23 DIAGNOSIS — I48.0 PAROXYSMAL ATRIAL FIBRILLATION (HCC): ICD-10-CM

## 2022-08-23 DIAGNOSIS — M1A.9XX1 CHRONIC GOUT INVOLVING TOE WITH TOPHUS, UNSPECIFIED CAUSE, UNSPECIFIED LATERALITY: ICD-10-CM

## 2022-08-23 DIAGNOSIS — E03.9 ACQUIRED HYPOTHYROIDISM: ICD-10-CM

## 2022-08-23 DIAGNOSIS — Z12.5 PROSTATE CANCER SCREENING: Primary | ICD-10-CM

## 2022-08-23 DIAGNOSIS — I42.8 NON-ISCHEMIC CARDIOMYOPATHY (HCC): ICD-10-CM

## 2022-08-23 PROCEDURE — 1123F ACP DISCUSS/DSCN MKR DOCD: CPT | Performed by: INTERNAL MEDICINE

## 2022-08-23 PROCEDURE — G0439 PPPS, SUBSEQ VISIT: HCPCS | Performed by: INTERNAL MEDICINE

## 2022-08-23 PROCEDURE — 3017F COLORECTAL CA SCREEN DOC REV: CPT | Performed by: INTERNAL MEDICINE

## 2022-08-23 RX ORDER — COLCHICINE 0.6 MG/1
0.6 TABLET ORAL DAILY
Qty: 90 TABLET | Refills: 3 | Status: SHIPPED | OUTPATIENT
Start: 2022-08-23 | End: 2022-11-21

## 2022-08-23 SDOH — ECONOMIC STABILITY: FOOD INSECURITY: WITHIN THE PAST 12 MONTHS, THE FOOD YOU BOUGHT JUST DIDN'T LAST AND YOU DIDN'T HAVE MONEY TO GET MORE.: NEVER TRUE

## 2022-08-23 SDOH — ECONOMIC STABILITY: FOOD INSECURITY: WITHIN THE PAST 12 MONTHS, YOU WORRIED THAT YOUR FOOD WOULD RUN OUT BEFORE YOU GOT MONEY TO BUY MORE.: NEVER TRUE

## 2022-08-23 ASSESSMENT — SOCIAL DETERMINANTS OF HEALTH (SDOH): HOW HARD IS IT FOR YOU TO PAY FOR THE VERY BASICS LIKE FOOD, HOUSING, MEDICAL CARE, AND HEATING?: NOT HARD AT ALL

## 2022-08-23 ASSESSMENT — PATIENT HEALTH QUESTIONNAIRE - PHQ9
SUM OF ALL RESPONSES TO PHQ QUESTIONS 1-9: 0
SUM OF ALL RESPONSES TO PHQ QUESTIONS 1-9: 0
SUM OF ALL RESPONSES TO PHQ9 QUESTIONS 1 & 2: 0
SUM OF ALL RESPONSES TO PHQ QUESTIONS 1-9: 0
1. LITTLE INTEREST OR PLEASURE IN DOING THINGS: 0
SUM OF ALL RESPONSES TO PHQ QUESTIONS 1-9: 0
2. FEELING DOWN, DEPRESSED OR HOPELESS: 0

## 2022-08-23 ASSESSMENT — LIFESTYLE VARIABLES: HOW OFTEN DO YOU HAVE A DRINK CONTAINING ALCOHOL: NEVER

## 2022-08-23 NOTE — PATIENT INSTRUCTIONS
Personalized Preventive Plan for Mariela Kaba - 8/23/2022  Medicare offers a range of preventive health benefits. Some of the tests and screenings are paid in full while other may be subject to a deductible, co-insurance, and/or copay. Some of these benefits include a comprehensive review of your medical history including lifestyle, illnesses that may run in your family, and various assessments and screenings as appropriate. After reviewing your medical record and screening and assessments performed today your provider may have ordered immunizations, labs, imaging, and/or referrals for you. A list of these orders (if applicable) as well as your Preventive Care list are included within your After Visit Summary for your review. Other Preventive Recommendations:    A preventive eye exam performed by an eye specialist is recommended every 1-2 years to screen for glaucoma; cataracts, macular degeneration, and other eye disorders. A preventive dental visit is recommended every 6 months. Try to get at least 150 minutes of exercise per week or 10,000 steps per day on a pedometer . Order or download the FREE \"Exercise & Physical Activity: Your Everyday Guide\" from [employer]. Call 7-700.888.3338 or search [employer] online. You need 0185-1180 mg of calcium and 3434-8513 IU of vitamin D per day. It is possible to meet your calcium requirement with diet alone, but a vitamin D supplement is usually necessary to meet this goal.  When exposed to the sun, use a sunscreen that protects against both UVA and UVB radiation with an SPF of 30 or greater. Reapply every 2 to 3 hours or after sweating, drying off with a towel, or swimming. Always wear a seat belt when traveling in a car. Always wear a helmet when riding a bicycle or motorcycle.
Yes

## 2022-10-27 ENCOUNTER — TELEPHONE (OUTPATIENT)
Dept: CARDIOLOGY CLINIC | Age: 72
End: 2022-10-27

## 2022-10-31 ENCOUNTER — OFFICE VISIT (OUTPATIENT)
Dept: CARDIOLOGY CLINIC | Age: 72
End: 2022-10-31
Payer: MEDICARE

## 2022-10-31 VITALS
DIASTOLIC BLOOD PRESSURE: 62 MMHG | HEIGHT: 72 IN | BODY MASS INDEX: 20.59 KG/M2 | SYSTOLIC BLOOD PRESSURE: 94 MMHG | HEART RATE: 67 BPM | WEIGHT: 152 LBS

## 2022-10-31 DIAGNOSIS — I48.0 PAROXYSMAL ATRIAL FIBRILLATION (HCC): Primary | ICD-10-CM

## 2022-10-31 DIAGNOSIS — I10 ESSENTIAL HYPERTENSION: ICD-10-CM

## 2022-10-31 PROCEDURE — 3078F DIAST BP <80 MM HG: CPT | Performed by: INTERNAL MEDICINE

## 2022-10-31 PROCEDURE — 3074F SYST BP LT 130 MM HG: CPT | Performed by: INTERNAL MEDICINE

## 2022-10-31 PROCEDURE — 3017F COLORECTAL CA SCREEN DOC REV: CPT | Performed by: INTERNAL MEDICINE

## 2022-10-31 PROCEDURE — 1123F ACP DISCUSS/DSCN MKR DOCD: CPT | Performed by: INTERNAL MEDICINE

## 2022-10-31 PROCEDURE — G8427 DOCREV CUR MEDS BY ELIG CLIN: HCPCS | Performed by: INTERNAL MEDICINE

## 2022-10-31 PROCEDURE — G8420 CALC BMI NORM PARAMETERS: HCPCS | Performed by: INTERNAL MEDICINE

## 2022-10-31 PROCEDURE — 1036F TOBACCO NON-USER: CPT | Performed by: INTERNAL MEDICINE

## 2022-10-31 PROCEDURE — G8484 FLU IMMUNIZE NO ADMIN: HCPCS | Performed by: INTERNAL MEDICINE

## 2022-10-31 PROCEDURE — 93000 ELECTROCARDIOGRAM COMPLETE: CPT | Performed by: INTERNAL MEDICINE

## 2022-10-31 PROCEDURE — 99214 OFFICE O/P EST MOD 30 MIN: CPT | Performed by: INTERNAL MEDICINE

## 2022-10-31 RX ORDER — AMLODIPINE BESYLATE 10 MG/1
5 TABLET ORAL DAILY
Qty: 45 TABLET | Refills: 3 | Status: SHIPPED | OUTPATIENT
Start: 2022-10-31 | End: 2023-01-29

## 2022-10-31 RX ORDER — AMIODARONE HYDROCHLORIDE 200 MG/1
100 TABLET ORAL DAILY
Qty: 30 TABLET | Refills: 3 | Status: SHIPPED | OUTPATIENT
Start: 2022-10-31 | End: 2023-01-29

## 2022-10-31 ASSESSMENT — ENCOUNTER SYMPTOMS
DIARRHEA: 0
EYE DISCHARGE: 0
SORE THROAT: 0
NAUSEA: 0
EYE PAIN: 0
COUGH: 0
WHEEZING: 0
EYE REDNESS: 0
APNEA: 0
ABDOMINAL DISTENTION: 0
SHORTNESS OF BREATH: 0
BLOOD IN STOOL: 0
VOMITING: 0
CONSTIPATION: 0
FACIAL SWELLING: 0
CHEST TIGHTNESS: 0
ABDOMINAL PAIN: 0

## 2022-10-31 NOTE — PROGRESS NOTES
Cardiology Office Visit Note  [address]  [id number]  Phone: [phone]  Fax: [phone]                            Date:  10/31/2022  Patient: Sunita Rivera  Age:  81 y.o., 1950    Referral: No ref. provider found    REASON FOR VISIT:  6 Month Follow-Up         PROBLEM LIST:    Patient Active Problem List    Diagnosis Date Noted    Postoperative anemia due to acute blood loss 10/31/2020     Priority: High    Stage 3 chronic kidney disease, unspecified whether stage 3a or 3b CKD ([facility] 02/14/2022     Priority: Low    Elevated WBC count 02/14/2022     Priority: Low    Intracerebral hemorrhage, nontraumatic ([facility] [address] 08/17/2021     Priority: Low    Guaiac positive stools 06/30/2021     Priority: Low    Acute gout of hand 06/30/2021     Priority: Low    Coronary artery disease involving native heart with angina pectoris, unspecified vessel or lesion type ([facility] 06/29/2021     Priority: Low    Hypovitaminosis D 12/30/2020     Priority: Low    GERD (gastroesophageal reflux disease) 12/30/2020     Priority: Low    Closed nondisplaced fracture of lesser trochanter of right femur with routine healing 11/03/2020     Priority: Low    Localized osteoporosis with current pathological fracture with routine healing 10/08/2020     Priority: Low    Mixed hyperlipidemia 09/01/2020     Priority: Low    Paroxysmal atrial fibrillation ([facility] 09/01/2020     Priority: Low    Chronic systolic heart failure ([facility] 07/28/2020     Priority: Low     Overview Note:     Mildly dilated left ventricular size with severely reduced LV function and   an estimated ejection fraction of approximately 27%. Mild concentric left ventricular hypertrophy noted. Unable to assess diastolic function due to abnormal rhythm. No evidence of left ventricular mass or thrombus noted.       Non-ischemic cardiomyopathy ([facility] [address] 07/28/2020     Priority: Low     Overview Note:     Ejection fraction 5-10% by cath, 6/2020, 40% by echo, June/2020, [facility]      Palliative care patient 06/29/2020     Priority: Low    Hyponatremia      Priority: Low    Alcohol abuse 05/07/2018     Priority: Low    CAD (coronary artery disease)      Priority: Low     Overview Note:           Essential hypertension      Priority: Low         PRESENTATION: Ml Teran is a 81y.o. year old male is seen today for routine follow-up visit. His history is notable for atrial fibrillation on chronic amiodarone therapy, nonischemic cardiomyopathy with normalization of left ventricular ejection fraction and chronic coronary artery disease with remote history of coronary artery stenting. He notes he has lost a significant amount of weight since her last visit. His blood pressure is noted to be in the mildly hypotensive range. No complaints of lightheadedness, dizziness or syncope. No chest pain, shortness of breath or diaphoresis. He has not had any hospitalizations or emergency room visits. His medications have been relatively stable. REVIEW OF SYSTEMS:  Review of Systems   Constitutional:  Negative for chills, fatigue and fever. HENT:  Negative for congestion, facial swelling, hearing loss and sore throat. Eyes:  Negative for pain, discharge, redness and visual disturbance. Respiratory:  Negative for apnea, cough, chest tightness, shortness of breath and wheezing. Cardiovascular:  Negative for chest pain, palpitations and leg swelling. Gastrointestinal:  Negative for abdominal distention, abdominal pain, blood in stool, constipation, diarrhea, nausea and vomiting. Endocrine: Negative for polydipsia, polyphagia and polyuria. Genitourinary:  Negative for dysuria, flank pain, frequency and hematuria. Musculoskeletal:  Negative for joint swelling, myalgias and neck pain. Skin:  Negative for pallor and rash. Neurological:  Negative for dizziness, syncope, speech difficulty, light-headedness, numbness and headaches. Psychiatric/Behavioral:  Negative for confusion, hallucinations and sleep disturbance. Past Medical History:      Diagnosis Date    CAD (coronary artery disease)     Gout     Hypertension     Hypotension 6/23/2020    Non-ST elevation MI (NSTEMI) ([facility] [address] 12/28/14    Palliative care patient 06/29/2020    Pneumonia due to infectious organism 6/24/2020       Past Surgical History:      Procedure Laterality Date    CARDIAC CATHETERIZATION  12/29/14  [facility]    angioplasty, stent to LAD. EF 45%    CHOLECYSTECTOMY      FEMUR FRACTURE SURGERY Right 10/30/2020    TFN, SHORT performed by Ranulfo Suresh MD at [facility] OR       Medications:  Current Outpatient Medications   Medication Sig Dispense Refill    colchicine (COLCRYS) 0.6 MG tablet Take 1 tablet by mouth daily 90 tablet 3    atorvastatin (LIPITOR) 40 MG tablet TAKE 1/2 TABLET BY MOUTH DAILY 45 tablet 1    metoprolol succinate (TOPROL XL) 25 MG extended release tablet TAKE 1/2 TABLET BY MOUTH DAILY 45 tablet 1    pantoprazole (PROTONIX) 40 MG tablet TAKE 1 TABLET BY MOUTH DAILY 90 tablet 1    folic acid (FOLVITE) 1 MG tablet TAKE 1 TABLET BY MOUTH DAILY 90 tablet 1    lisinopril (PRINIVIL;ZESTRIL) 40 MG tablet TAKE 1 TABLET BY MOUTH DAILY 90 tablet 1    amiodarone (CORDARONE) 200 MG tablet TAKE 1 TABLET BY MOUTH ONCE DAILY 30 tablet 5    levothyroxine (SYNTHROID) 125 MCG tablet TAKE 1 TABLET BY MOUTH DAILY 90 tablet 1    Ascorbic Acid (VITAMIN C) 250 MG tablet Take 250 mg by mouth daily      amLODIPine (NORVASC) 10 MG tablet TAKE 1 TABLET BY MOUTH DAILY 90 tablet 1    Multiple Vitamin (MULTIVITAMIN) TABS tablet Take 1 tablet by mouth daily 30 tablet 0    vitamin B-1 (THIAMINE) 100 MG tablet Take 1 tablet by mouth daily 30 tablet 0     No current facility-administered medications for this visit. Allergies:  Patient has no known allergies.     Social History:  Social History     Occupational History    Not on file   Tobacco Use    Smoking status: Former     Packs/day: 1.00 Years: 3.00     Pack years: 3.00     Types: Cigars, Cigarettes     Start date: 14     Quit date:      Years since quittin.8    Smokeless tobacco: Never   Vaping Use    Vaping Use: Never used   Substance and Sexual Activity    Alcohol use: Not Currently    Drug use: No    Sexual activity: Yes     Partners: Female         Family History:       Problem Relation Age of Onset    No Known Problems Mother     Heart Disease Father          Physical Examination:  BP 94/62   Pulse 67   Ht 6' (1.829 m)   Wt 152 lb (68.9 kg)   BMI 20.61 kg/m²   Physical Exam  Vitals reviewed. Constitutional:       General: He is not in acute distress. Appearance: Normal appearance. He is not ill-appearing, toxic-appearing or diaphoretic. HENT:      Head: Normocephalic and atraumatic. Eyes:      General: No scleral icterus. Right eye: No discharge. Left eye: No discharge. Conjunctiva/sclera: Conjunctivae normal.   Neck:      Vascular: No carotid bruit. Cardiovascular:      Rate and Rhythm: Normal rate and regular rhythm. No extrasystoles are present. Chest Wall: PMI is not displaced. No thrill. Heart sounds: S1 normal and S2 normal. No murmur heard. No friction rub. No gallop. Pulmonary:      Effort: Pulmonary effort is normal. No tachypnea or respiratory distress. Breath sounds: Normal breath sounds. No stridor. No wheezing, rhonchi or rales. Chest:      Chest wall: No tenderness. Abdominal:      General: Bowel sounds are normal. There is no distension. Palpations: Abdomen is soft. There is no mass. Tenderness: There is no abdominal tenderness. There is no guarding. Musculoskeletal:         General: No swelling. Cervical back: Normal range of motion and neck supple. No rigidity. Right lower leg: No edema. Left lower leg: No edema. Skin:     General: Skin is warm and dry. Coloration: Skin is not jaundiced.       Findings: No erythema or rash.   Neurological:      General: No focal deficit present. Mental Status: He is alert and oriented to person, place, and time. Mental status is at baseline. Psychiatric:         Mood and Affect: Mood normal.         Behavior: Behavior normal.         Thought Content: Thought content normal.         Labs:   CBC: No results found for: CBC   BMP: No results found for: BMP    BNP: No results found for: BNPINT   PT/INR:   Protime   Date Value Ref Range Status   08/18/2021 15.6 (H) 12.0 - 14.6 sec Final     INR   Date Value Ref Range Status   08/18/2021 1.22 (H) 0.88 - 1.18 Final     Comment:     INR  < or = 1.3  Normal  INR = 2.0 - 3.0  Therapeutic  INR = 2.5 - 3.5  Therapeutic for patients with mechanical  prosthetic heart valve & MI prophylaxis  INR  > or = 3.5  Abnormal/Elevated  INR  > or = 5.0  Critical (requires immediate physician  notification)         APTT:    aPTT   Date Value Ref Range Status   08/18/2021 32.6 26.0 - 36.2 sec Final      CARDIAC ENZYMES:   Total CK   Date Value Ref Range Status   08/17/2021 164 39 - 308 U/L Final     Troponin   Date Value Ref Range Status   06/23/2020 0.01 0.00 - 0.03 ng/mL Final     Comment:     <0.030 ng/ml       No measurable cardiac damage. 0.030-0.099 ng/ml  Values of troponin in this range suggest  possible myocardial damage. Repeat assay  4 to 6 hours after the current specimen.    >= 0.100 ng/ml     Indicative of myocardial damage. Recommend continued monitoring of  patient status and cardiac markers.         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  ms  Amiodarone dose decreased to 100 mg p.o. daily. Not on anticoagulation due to hemorrhagic CVA history  Annual chest x-ray as per protocol  TSH and LFTs every 6 months     Essential hypertension, goal blood pressure less than 130/80  On combination of metoprolol XL, lisinopril and amlodipine  Blood pressure in the hypotensive range today, suspected related to recent significant drop in weight. Amlodipine dose decreased to 5 mg p.o. daily down from 10 mg. Electronically signed by Emelina Xie MD on 10/31/2022 at [address] Luba Garnett MD, MBA, [facility]  Noninvasive Cardiology Consultant    This dictation was generated by voice recognition computer software. Although all attempts are made to edit the dictation for accuracy, there may be errors in the transcription that are not intended.

## 2022-11-28 DIAGNOSIS — I10 ESSENTIAL HYPERTENSION: Primary | ICD-10-CM

## 2022-11-28 RX ORDER — LISINOPRIL 40 MG/1
40 TABLET ORAL DAILY
Qty: 90 TABLET | Refills: 3 | Status: SHIPPED | OUTPATIENT
Start: 2022-11-28

## 2022-12-05 DIAGNOSIS — K21.00 GASTROESOPHAGEAL REFLUX DISEASE WITH ESOPHAGITIS WITHOUT HEMORRHAGE: ICD-10-CM

## 2022-12-05 DIAGNOSIS — E03.9 ACQUIRED HYPOTHYROIDISM: ICD-10-CM

## 2022-12-05 RX ORDER — LEVOTHYROXINE SODIUM 0.12 MG/1
TABLET ORAL
Qty: 90 TABLET | Refills: 1 | Status: SHIPPED | OUTPATIENT
Start: 2022-12-05

## 2022-12-05 RX ORDER — PANTOPRAZOLE SODIUM 40 MG/1
TABLET, DELAYED RELEASE ORAL
Qty: 90 TABLET | Refills: 1 | Status: SHIPPED | OUTPATIENT
Start: 2022-12-05

## 2023-01-03 DIAGNOSIS — E87.1 HYPONATREMIA: ICD-10-CM

## 2023-01-03 DIAGNOSIS — E78.2 MIXED HYPERLIPIDEMIA: ICD-10-CM

## 2023-01-03 RX ORDER — ATORVASTATIN CALCIUM 40 MG/1
TABLET, FILM COATED ORAL
Qty: 45 TABLET | Refills: 1 | Status: SHIPPED | OUTPATIENT
Start: 2023-01-03

## 2023-01-09 DIAGNOSIS — I42.8 NON-ISCHEMIC CARDIOMYOPATHY (HCC): ICD-10-CM

## 2023-01-09 DIAGNOSIS — I10 ESSENTIAL HYPERTENSION: ICD-10-CM

## 2023-01-09 RX ORDER — METOPROLOL SUCCINATE 25 MG/1
TABLET, EXTENDED RELEASE ORAL
Qty: 45 TABLET | Refills: 1 | Status: SHIPPED | OUTPATIENT
Start: 2023-01-09

## 2023-02-13 ENCOUNTER — OFFICE VISIT (OUTPATIENT)
Dept: CARDIOLOGY CLINIC | Age: 73
End: 2023-02-13
Payer: MEDICARE

## 2023-02-13 VITALS
SYSTOLIC BLOOD PRESSURE: 98 MMHG | BODY MASS INDEX: 21.54 KG/M2 | OXYGEN SATURATION: 99 % | HEART RATE: 65 BPM | WEIGHT: 159 LBS | DIASTOLIC BLOOD PRESSURE: 66 MMHG | HEIGHT: 72 IN

## 2023-02-13 DIAGNOSIS — I48.0 PAROXYSMAL ATRIAL FIBRILLATION (HCC): Primary | ICD-10-CM

## 2023-02-13 DIAGNOSIS — I25.119 CORONARY ARTERY DISEASE INVOLVING NATIVE HEART WITH ANGINA PECTORIS, UNSPECIFIED VESSEL OR LESION TYPE (HCC): ICD-10-CM

## 2023-02-13 DIAGNOSIS — I42.8 NON-ISCHEMIC CARDIOMYOPATHY (HCC): ICD-10-CM

## 2023-02-13 DIAGNOSIS — I10 ESSENTIAL HYPERTENSION: ICD-10-CM

## 2023-02-13 PROCEDURE — 1036F TOBACCO NON-USER: CPT | Performed by: INTERNAL MEDICINE

## 2023-02-13 PROCEDURE — 3017F COLORECTAL CA SCREEN DOC REV: CPT | Performed by: INTERNAL MEDICINE

## 2023-02-13 PROCEDURE — G8484 FLU IMMUNIZE NO ADMIN: HCPCS | Performed by: INTERNAL MEDICINE

## 2023-02-13 PROCEDURE — 1123F ACP DISCUSS/DSCN MKR DOCD: CPT | Performed by: INTERNAL MEDICINE

## 2023-02-13 PROCEDURE — G8427 DOCREV CUR MEDS BY ELIG CLIN: HCPCS | Performed by: INTERNAL MEDICINE

## 2023-02-13 PROCEDURE — 99214 OFFICE O/P EST MOD 30 MIN: CPT | Performed by: INTERNAL MEDICINE

## 2023-02-13 PROCEDURE — G8420 CALC BMI NORM PARAMETERS: HCPCS | Performed by: INTERNAL MEDICINE

## 2023-02-13 PROCEDURE — 3078F DIAST BP <80 MM HG: CPT | Performed by: INTERNAL MEDICINE

## 2023-02-13 PROCEDURE — 3074F SYST BP LT 130 MM HG: CPT | Performed by: INTERNAL MEDICINE

## 2023-02-13 PROCEDURE — 93000 ELECTROCARDIOGRAM COMPLETE: CPT | Performed by: INTERNAL MEDICINE

## 2023-02-13 RX ORDER — LISINOPRIL 20 MG/1
20 TABLET ORAL DAILY
Qty: 90 TABLET | Refills: 3 | Status: SHIPPED | OUTPATIENT
Start: 2023-02-13 | End: 2023-05-14

## 2023-02-13 ASSESSMENT — ENCOUNTER SYMPTOMS
DIARRHEA: 0
WHEEZING: 0
BLOOD IN STOOL: 0
COUGH: 0
CHEST TIGHTNESS: 0
SHORTNESS OF BREATH: 0
VOMITING: 0
ABDOMINAL PAIN: 0
NAUSEA: 0
ABDOMINAL DISTENTION: 0
APNEA: 0
FACIAL SWELLING: 0
CONSTIPATION: 0
EYE PAIN: 0
EYE DISCHARGE: 0
EYE REDNESS: 0
SORE THROAT: 0

## 2023-02-13 NOTE — PROGRESS NOTES
Cardiology Office Visit Note  [address]  [id number]  Phone: [phone]  Fax: [phone]                            Date:  2/13/2023  Patient: Ivett Hardy  Age:  81 y.o., 1950    Referral: No ref. provider found    REASON FOR VISIT:  3 Month Follow-Up and Atrial Fibrillation         PROBLEM LIST:    Patient Active Problem List    Diagnosis Date Noted    Postoperative anemia due to acute blood loss 10/31/2020     Priority: High    Stage 3 chronic kidney disease, unspecified whether stage 3a or 3b CKD ([facility] 02/14/2022     Priority: Low    Elevated WBC count 02/14/2022     Priority: Low    Intracerebral hemorrhage, nontraumatic ([facility] 08/17/2021     Priority: Low    Guaiac positive stools 06/30/2021     Priority: Low    Acute gout of hand 06/30/2021     Priority: Low    Coronary artery disease involving native heart with angina pectoris, unspecified vessel or lesion type ([facility] 06/29/2021     Priority: Low    Hypovitaminosis D 12/30/2020     Priority: Low    GERD (gastroesophageal reflux disease) 12/30/2020     Priority: Low    Closed nondisplaced fracture of lesser trochanter of right femur with routine healing 11/03/2020     Priority: Low    Localized osteoporosis with current pathological fracture with routine healing 10/08/2020     Priority: Low    Mixed hyperlipidemia 09/01/2020     Priority: Low    Paroxysmal atrial fibrillation ([facility] 09/01/2020     Priority: Low    Chronic systolic heart failure ([facility] 07/28/2020     Priority: Low     Overview Note:     Mildly dilated left ventricular size with severely reduced LV function and   an estimated ejection fraction of approximately 27%. Mild concentric left ventricular hypertrophy noted. Unable to assess diastolic function due to abnormal rhythm. No evidence of left ventricular mass or thrombus noted.       Non-ischemic cardiomyopathy ([facility] 07/28/2020     Priority: Low     Overview Note:     Ejection fraction 5-10% by cath, 6/2020, 40% by echo, June/2020, [facility]      Palliative care patient 06/29/2020     Priority: Low    Hyponatremia      Priority: Low    Alcohol abuse 05/07/2018     Priority: Low    CAD (coronary artery disease)      Priority: Low     Overview Note:           Essential hypertension      Priority: Low         PRESENTATION: Abeba Baltazar is a 81y.o. year old male is seen today for routine cardiology follow-up appointment. His past medical history is significant for atrial fibrillation on chronic amiodarone therapy, nonischemic cardiomyopathy with normalization of left ventricular ejection fraction and chronic coronary artery disease with remote history of coronary artery stent assisted angioplasty. Today he reports that he is feeling well without any new or worsening symptoms. He lives alone and is fully independent of his ADLs. He uses a walker to ambulate, however, remains reasonably physically active. He is compliant with his medications. REVIEW OF SYSTEMS:  Review of Systems   Constitutional:  Negative for chills, fatigue and fever. HENT:  Negative for congestion, facial swelling, hearing loss and sore throat. Eyes:  Negative for pain, discharge, redness and visual disturbance. Respiratory:  Negative for apnea, cough, chest tightness, shortness of breath and wheezing. Cardiovascular:  Negative for chest pain, palpitations and leg swelling. Gastrointestinal:  Negative for abdominal distention, abdominal pain, blood in stool, constipation, diarrhea, nausea and vomiting. Endocrine: Negative for polydipsia, polyphagia and polyuria. Genitourinary:  Negative for dysuria, flank pain, frequency and hematuria. Musculoskeletal:  Negative for joint swelling, myalgias and neck pain. Skin:  Negative for pallor and rash. Neurological:  Negative for dizziness, syncope, speech difficulty, light-headedness, numbness and headaches.    Psychiatric/Behavioral:  Negative for confusion, hallucinations and sleep disturbance. Past Medical History:      Diagnosis Date    CAD (coronary artery disease)     Gout     Hypertension     Hypotension 6/23/2020    Non-ST elevation MI (NSTEMI) ([address] 12/28/14    Palliative care patient 06/29/2020    Pneumonia due to infectious organism 6/24/2020       Past Surgical History:      Procedure Laterality Date    CARDIAC CATHETERIZATION  12/29/14  [facility]    angioplasty, stent to LAD. EF 45%    CHOLECYSTECTOMY      FEMUR FRACTURE SURGERY Right 10/30/2020    TFN, SHORT performed by Jayleen Gaxiola MD at [address] [facility] OR       Medications:  Current Outpatient Medications   Medication Sig Dispense Refill    amiodarone (CORDARONE) 200 MG tablet Take 0.5 tablets by mouth daily 30 tablet 3    metoprolol succinate (TOPROL XL) 25 MG extended release tablet TAKE 1/2 TABLET BY MOUTH DAILY 45 tablet 1    atorvastatin (LIPITOR) 40 MG tablet TAKE 1/2 TABLET BY MOUTH DAILY 45 tablet 1    levothyroxine (SYNTHROID) 125 MCG tablet TAKE 1 TABLET BY MOUTH DAILY 90 tablet 1    pantoprazole (PROTONIX) 40 MG tablet TAKE 1 TABLET BY MOUTH DAILY 90 tablet 1    lisinopril (PRINIVIL;ZESTRIL) 40 MG tablet TAKE 1 TABLET BY MOUTH DAILY 90 tablet 3    amLODIPine (NORVASC) 10 MG tablet Take 0.5 tablets by mouth daily 45 tablet 3    colchicine (COLCRYS) 0.6 MG tablet Take 1 tablet by mouth daily 90 tablet 3    folic acid (FOLVITE) 1 MG tablet TAKE 1 TABLET BY MOUTH DAILY 90 tablet 1    Ascorbic Acid (VITAMIN C) 250 MG tablet Take 250 mg by mouth daily      Multiple Vitamin (MULTIVITAMIN) TABS tablet Take 1 tablet by mouth daily 30 tablet 0    vitamin B-1 (THIAMINE) 100 MG tablet Take 1 tablet by mouth daily 30 tablet 0     No current facility-administered medications for this visit. Allergies:  Patient has no known allergies.     Social History:  Social History     Occupational History    Not on file   Tobacco Use    Smoking status: Former     Packs/day: 1.00     Years: 3.00     Pack years: 3.00 Types: Cigars, Cigarettes     Start date: 12     Quit date:      Years since quittin.1    Smokeless tobacco: Never   Vaping Use    Vaping Use: Never used   Substance and Sexual Activity    Alcohol use: Not Currently    Drug use: No    Sexual activity: Yes     Partners: Female         Family History:       Problem Relation Age of Onset    No Known Problems Mother     Heart Disease Father          Physical Examination:  BP 98/66   Pulse 65   Ht 6' (1.829 m)   Wt 159 lb (72.1 kg)   SpO2 99%   BMI 21.56 kg/m²   Physical Exam  Vitals reviewed. Constitutional:       General: He is not in acute distress. Appearance: He is not ill-appearing, toxic-appearing or diaphoretic. HENT:      Head: Normocephalic and atraumatic. Eyes:      General: No scleral icterus. Right eye: No discharge. Left eye: No discharge. Conjunctiva/sclera: Conjunctivae normal.   Neck:      Vascular: No carotid bruit. Cardiovascular:      Rate and Rhythm: Normal rate and regular rhythm. No extrasystoles are present. Chest Wall: PMI is not displaced. No thrill. Heart sounds: S1 normal and S2 normal. No murmur heard. No friction rub. No gallop. Pulmonary:      Effort: Pulmonary effort is normal. No tachypnea or respiratory distress. Breath sounds: Normal breath sounds. No stridor. No wheezing, rhonchi or rales. Chest:      Chest wall: No tenderness. Abdominal:      General: Bowel sounds are normal. There is no distension. Palpations: Abdomen is soft. There is no mass. Tenderness: There is no abdominal tenderness. There is no guarding. Musculoskeletal:         General: No swelling. Cervical back: Normal range of motion and neck supple. No rigidity. Right lower leg: No edema. Left lower leg: No edema. Skin:     General: Skin is warm and dry. Coloration: Skin is not jaundiced. Findings: No erythema or rash.    Neurological:      Mental Status: He is alert and oriented to person, place, and time. Mental status is at baseline. Psychiatric:         Mood and Affect: Mood normal.         Behavior: Behavior normal.         Thought Content: Thought content normal.         Labs:   CBC: No results found for: CBC   BMP: No results found for: BMP    BNP: No results found for: BNPINT   PT/INR:   Protime   Date Value Ref Range Status   08/18/2021 15.6 (H) 12.0 - 14.6 sec Final     INR   Date Value Ref Range Status   08/18/2021 1.22 (H) 0.88 - 1.18 Final     Comment:     INR  < or = 1.3  Normal  INR = 2.0 - 3.0  Therapeutic  INR = 2.5 - 3.5  Therapeutic for patients with mechanical  prosthetic heart valve & MI prophylaxis  INR  > or = 3.5  Abnormal/Elevated  INR  > or = 5.0  Critical (requires immediate physician  notification)         APTT:    aPTT   Date Value Ref Range Status   08/18/2021 32.6 26.0 - 36.2 sec Final      CARDIAC ENZYMES:   Total CK   Date Value Ref Range Status   08/17/2021 164 39 - 308 U/L Final     Troponin   Date Value Ref Range Status   06/23/2020 0.01 0.00 - 0.03 ng/mL Final     Comment:     <0.030 ng/ml       No measurable cardiac damage. 0.030-0.099 ng/ml  Values of troponin in this range suggest  possible myocardial damage. Repeat assay  4 to 6 hours after the current specimen.    >= 0.100 ng/ml     Indicative of myocardial damage. Recommend continued monitoring of  patient status and cardiac markers.         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  ms  Continue low dose Amiodarone 100 mg p.o. daily. Not on anticoagulation due to hemorrhagic CVA history  CXR, TSH and LFTs before next visit- ordered     Essential hypertension, goal blood pressure less than 130/80  Blood pressure borderline low. Lisinopril dose decreased to 20 mg p.o. daily. On combination of metoprolol XL, lisinopril and amlodipine                Electronically signed by Porsha Michael MD on 2/13/2023 at 10:16 AM    Porsha Michael MD, AIDAN, [facility] [place]  Noninvasive Cardiology Consultant    This dictation was generated by voice recognition computer software. Although all attempts are made to edit the dictation for accuracy, there may be errors in the transcription that are not intended.

## 2023-03-05 DIAGNOSIS — Z11.59 ENCOUNTER FOR HEPATITIS C SCREENING TEST FOR LOW RISK PATIENT: Primary | ICD-10-CM

## 2023-03-06 DIAGNOSIS — Z11.59 ENCOUNTER FOR HEPATITIS C SCREENING TEST FOR LOW RISK PATIENT: ICD-10-CM

## 2023-03-06 DIAGNOSIS — E78.2 MIXED HYPERLIPIDEMIA: ICD-10-CM

## 2023-03-06 DIAGNOSIS — I48.0 PAROXYSMAL ATRIAL FIBRILLATION (HCC): ICD-10-CM

## 2023-03-06 DIAGNOSIS — K21.9 GASTROESOPHAGEAL REFLUX DISEASE WITHOUT ESOPHAGITIS: ICD-10-CM

## 2023-03-06 DIAGNOSIS — N18.30 STAGE 3 CHRONIC KIDNEY DISEASE, UNSPECIFIED WHETHER STAGE 3A OR 3B CKD (HCC): ICD-10-CM

## 2023-03-06 DIAGNOSIS — E87.1 HYPONATREMIA: ICD-10-CM

## 2023-03-06 LAB
ALBUMIN SERPL-MCNC: 4.4 G/DL (ref 3.5–5.2)
ALP BLD-CCNC: 137 U/L (ref 40–130)
ALT SERPL-CCNC: 21 U/L (ref 5–41)
ANION GAP SERPL CALCULATED.3IONS-SCNC: 17 MMOL/L (ref 7–19)
AST SERPL-CCNC: 26 U/L (ref 5–40)
BILIRUB SERPL-MCNC: 0.7 MG/DL (ref 0.2–1.2)
BILIRUBIN DIRECT: 0.2 MG/DL (ref 0–0.3)
BILIRUBIN, INDIRECT: 0.5 MG/DL (ref 0.1–1)
BUN BLDV-MCNC: 23 MG/DL (ref 8–23)
CALCIUM SERPL-MCNC: 9.9 MG/DL (ref 8.8–10.2)
CHLORIDE BLD-SCNC: 99 MMOL/L (ref 98–111)
CHOLESTEROL, TOTAL: 142 MG/DL (ref 160–199)
CO2: 25 MMOL/L (ref 22–29)
CREAT SERPL-MCNC: 1.5 MG/DL (ref 0.5–1.2)
GFR SERPL CREATININE-BSD FRML MDRD: 50 ML/MIN/{1.73_M2}
GLUCOSE BLD-MCNC: 94 MG/DL (ref 74–109)
HCT VFR BLD CALC: 47.1 % (ref 42–52)
HDLC SERPL-MCNC: 48 MG/DL (ref 55–121)
HEMOGLOBIN: 14.7 G/DL (ref 14–18)
HEPATITIS C ANTIBODY INTERPRETATION: NORMAL
LDL CHOLESTEROL CALCULATED: 59 MG/DL
MCH RBC QN AUTO: 29.2 PG (ref 27–31)
MCHC RBC AUTO-ENTMCNC: 31.2 G/DL (ref 33–37)
MCV RBC AUTO: 93.5 FL (ref 80–94)
PDW BLD-RTO: 13.5 % (ref 11.5–14.5)
PLATELET # BLD: 270 K/UL (ref 130–400)
PMV BLD AUTO: 10.2 FL (ref 9.4–12.4)
POTASSIUM SERPL-SCNC: 4.1 MMOL/L (ref 3.5–5)
RBC # BLD: 5.04 M/UL (ref 4.7–6.1)
SODIUM BLD-SCNC: 141 MMOL/L (ref 136–145)
TOTAL PROTEIN: 7.3 G/DL (ref 6.6–8.7)
TRIGL SERPL-MCNC: 177 MG/DL (ref 0–149)
TSH REFLEX FT4: 0.98 UIU/ML (ref 0.35–5.5)
WBC # BLD: 12 K/UL (ref 4.8–10.8)

## 2023-03-08 ASSESSMENT — ENCOUNTER SYMPTOMS
COLOR CHANGE: 0
SORE THROAT: 0
WHEEZING: 0
ABDOMINAL PAIN: 0
ROS SKIN COMMENTS: DRY SKIN
CHEST TIGHTNESS: 0
BLOOD IN STOOL: 0
TROUBLE SWALLOWING: 0
STRIDOR: 0
BACK PAIN: 0
CONSTIPATION: 0
SHORTNESS OF BREATH: 1
COUGH: 0
DIARRHEA: 0

## 2023-03-08 NOTE — PROGRESS NOTES
Jeanne Mohr ( 1950) is a 81 y.o. male,  Established, here for evaluation of the following chief complaint(s).   6 Month Follow-Up        ASSESSMENT/PLAN:  Problem List          Circulatory    Paroxysmal atrial fibrillation (HCC)      Monitored by specialist- no acute findings meriting change in the plan         Relevant Medications    amLODIPine (NORVASC) 10 MG tablet    atorvastatin (LIPITOR) 40 MG tablet    metoprolol succinate (TOPROL XL) 25 MG extended release tablet    lisinopril (PRINIVIL;ZESTRIL) 20 MG tablet    Non-ischemic cardiomyopathy ([facility]      Monitored by specialist- no acute findings meriting change in the plan         Relevant Medications    amLODIPine (NORVASC) 10 MG tablet    atorvastatin (LIPITOR) 40 MG tablet    metoprolol succinate (TOPROL XL) 25 MG extended release tablet    lisinopril (PRINIVIL;ZESTRIL) 20 MG tablet    Essential hypertension      Well-controlled, continue current medications         Relevant Medications    amLODIPine (NORVASC) 10 MG tablet    metoprolol succinate (TOPROL XL) 25 MG extended release tablet    lisinopril (PRINIVIL;ZESTRIL) 20 MG tablet    Coronary artery disease involving native heart with angina pectoris, unspecified vessel or lesion type ([facility]      Monitored by specialist- no acute findings meriting change in the plan         Relevant Medications    amiodarone (CORDARONE) 200 MG tablet    amLODIPine (NORVASC) 10 MG tablet    atorvastatin (LIPITOR) 40 MG tablet    metoprolol succinate (TOPROL XL) 25 MG extended release tablet    lisinopril (PRINIVIL;ZESTRIL) 20 MG tablet    CAD (coronary artery disease)      Well-controlled, continue current medications         Relevant Medications    amiodarone (CORDARONE) 200 MG tablet    amLODIPine (NORVASC) 10 MG tablet    atorvastatin (LIPITOR) 40 MG tablet    metoprolol succinate (TOPROL XL) 25 MG extended release tablet    lisinopril (PRINIVIL;ZESTRIL) 20 MG tablet       Digestive    GERD (gastroesophageal reflux disease)      Well-controlled, continue current medications         Relevant Medications    pantoprazole (PROTONIX) 40 MG tablet       Genitourinary    Stage 3 chronic kidney disease, unspecified whether stage 3a or 3b CKD (HCC)      Well-controlled, continue current medications            Other    Mixed hyperlipidemia      Well-controlled, continue current medications         Relevant Medications    amiodarone (CORDARONE) 200 MG tablet    amLODIPine (NORVASC) 10 MG tablet    atorvastatin (LIPITOR) 40 MG tablet    metoprolol succinate (TOPROL XL) 25 MG extended release tablet    lisinopril (PRINIVIL;ZESTRIL) 20 MG tablet     Results for orders placed or performed in visit on 03/06/23   Hepatitis C Antibody   Result Value Ref Range    Hep C Ab Interp Non-Reactive Non-Reactive   TSH WITH REFLEX TO FT4   Result Value Ref Range    TSH Reflex FT4 0.98 0.35 - 5.50 uIU/mL   Lipid Panel   Result Value Ref Range    Cholesterol, Total 142 (L) 160 - 199 mg/dL    Triglycerides 177 (H) 0 - 149 mg/dL    HDL 48 (L) 55 - 121 mg/dL    LDL Calculated 59 <100 mg/dL   Hepatic Function Panel   Result Value Ref Range    Total Protein 7.3 6.6 - 8.7 g/dL    Albumin 4.4 3.5 - 5.2 g/dL    Alkaline Phosphatase 137 (H) 40 - 130 U/L    ALT 21 5 - 41 U/L    AST 26 5 - 40 U/L    Total Bilirubin 0.7 0.2 - 1.2 mg/dL    Bilirubin, Direct 0.2 0.0 - 0.3 mg/dL    Bilirubin, Indirect 0.5 0.1 - 1.0 mg/dL   CBC   Result Value Ref Range    WBC 12.0 (H) 4.8 - 10.8 K/uL    RBC 5.04 4.70 - 6.10 M/uL    Hemoglobin 14.7 14.0 - 18.0 g/dL    Hematocrit 47.1 42.0 - 52.0 %    MCV 93.5 80.0 - 94.0 fL    MCH 29.2 27.0 - 31.0 pg    MCHC 31.2 (L) 33.0 - 37.0 g/dL    RDW 13.5 11.5 - 14.5 %    Platelets 280 801 - 079 K/uL    MPV 10.2 9.4 - 12.4 fL   Basic Metabolic Panel   Result Value Ref Range    Sodium 141 136 - 145 mmol/L    Potassium 4.1 3.5 - 5.0 mmol/L    Chloride 99 98 - 111 mmol/L    CO2 25 22 - 29 mmol/L    Anion Gap 17 7 - 19 mmol/L    Glucose 94 74 - 109 mg/dL    BUN 23 8 - 23 mg/dL    Creatinine 1.5 (H) 0.5 - 1.2 mg/dL    Est, Glom Filt Rate 50 (A) >60    Calcium 9.9 8.8 - 10.2 mg/dL       Return in about 24 weeks (around 8/24/2023), or awv 8/24/2023. HPI  72-year-old male who follow-ups  History of CAD status post PCI 2 vessel disease / PAF on chronic anticoagulation  lff Cardiology  Patient has a history of GERD compliant with pantoprazole doing well  Hypertension in good control  History of a fall with nondisplaced fracture of the right femur much improved at this time( past), now he has an ICB  He offers no complaints today no constipation no blood in stool he is due for colonoscopy but wants a stool card does not want to have a colonoscopy  No recent falls    Review of Systems   Constitutional:  Negative for activity change, appetite change, chills, diaphoresis, fatigue and fever. HENT:  Negative for congestion, hearing loss, postnasal drip, sore throat, tinnitus and trouble swallowing. Eyes:  Negative for visual disturbance. Respiratory:  Positive for shortness of breath. Negative for cough, chest tightness, wheezing and stridor. Cardiovascular:  Negative for chest pain, palpitations and leg swelling. Gastrointestinal:  Negative for abdominal pain, blood in stool, constipation and diarrhea. Endocrine: Negative for cold intolerance. Genitourinary:  Negative for frequency. Musculoskeletal:  Positive for arthralgias and joint swelling (R hand). Negative for back pain. Skin:  Negative for color change, rash and wound. Dry skin   Allergic/Immunologic: Negative for environmental allergies. Neurological:  Positive for weakness. Negative for dizziness, light-headedness and headaches. Hematological:  Negative for adenopathy. Does not bruise/bleed easily. Psychiatric/Behavioral:  Negative for decreased concentration, dysphoric mood and sleep disturbance. The patient is not nervous/anxious.       Physical Exam  Vitals and nursing note reviewed.   Constitutional:       General: He is not in acute distress.     Appearance: He is well-developed and well-groomed.      Comments: Using walker   HENT:      Head: Normocephalic and atraumatic.      Right Ear: External ear normal.      Left Ear: External ear normal.      Nose: Nose normal.      Mouth/Throat:      Dentition: Abnormal dentition. Gum lesions present.   Eyes:      General: Lids are normal. No scleral icterus.     Extraocular Movements: Extraocular movements intact.      Conjunctiva/sclera: Conjunctivae normal.      Pupils: Pupils are equal, round, and reactive to light.   Neck:      Thyroid: No thyromegaly.   Cardiovascular:      Rate and Rhythm: Normal rate. Rhythm irregular.      Pulses: Normal pulses.      Heart sounds: Normal heart sounds. No murmur heard.  Pulmonary:      Effort: Pulmonary effort is normal. No respiratory distress.      Breath sounds: Normal breath sounds and air entry. No wheezing, rhonchi or rales.   Chest:      Chest wall: No tenderness.   Abdominal:      General: Abdomen is flat. Bowel sounds are normal. There is no distension.      Palpations: Abdomen is soft. There is no mass.      Tenderness: There is no abdominal tenderness. There is no rebound.   Musculoskeletal:         General: No tenderness. Normal range of motion.      Right hand: Swelling and deformity present.      Left hand: Deformity present.      Cervical back: Normal range of motion and neck supple.      Right lower leg: No edema.      Left lower leg: No edema.      Comments: Long fingernails   Lymphadenopathy:      Cervical: No cervical adenopathy.   Skin:     General: Skin is warm and dry.      Findings: No rash.   Neurological:      General: No focal deficit present.      Mental Status: He is alert and oriented to person, place, and time. Mental status is at baseline.      Cranial Nerves: No cranial nerve deficit.      Sensory: Sensation is intact.      Motor: Weakness present.       Coordination: Coordination abnormal.      Gait: Gait abnormal.      Deep Tendon Reflexes: Reflexes normal.   Psychiatric:         Attention and Perception: Attention normal.         Mood and Affect: Mood normal.         Behavior: Behavior normal.         Thought Content: Thought content normal.         Judgment: Judgment normal.         (Time Documentation Optional [id number])    An electronic signaturewaas used to authenticate this note  -Hayley Faust MD on 3/9/2023 at 4:38 [facility] 45 Visit    Afua Mccain is here for 6 Month formerly [facility] for colon cancer  -     POCT Fecal Immunochemical Test (FIT);  Future  Stage 3 chronic kidney disease, unspecified whether stage 3a or 3b CKD ([facility]  Assessment & Plan:   Well-controlled, continue current medications  Paroxysmal atrial fibrillation (HCC)  Assessment & Plan:   Monitored by specialist- no acute findings meriting change in the plan  Non-ischemic cardiomyopathy [facility])  Assessment & Plan:   Monitored by specialist- no acute findings meriting change in the plan  Essential hypertension  Assessment & Plan:   Well-controlled, continue current medications  Coronary artery disease involving native heart with angina pectoris, unspecified vessel or lesion type [facility])  Assessment & Plan:   Monitored by specialist- no acute findings meriting change in the plan  Coronary artery disease involving native coronary artery of native heart without angina pectoris  Assessment & Plan:   Well-controlled, continue current medications  Gastroesophageal reflux disease with esophagitis without hemorrhage  Assessment & Plan:   Well-controlled, continue current medications  Mixed hyperlipidemia  Assessment & Plan:   Well-controlled, continue current medications    Recommendations for Preventive Services Due: see orders and patient instructions/AVS.  Recommended screening schedule for the next 5-10 years is provided to the patient in written form: see Patient Instructions/AVS.     Return in about 24 weeks (around 8/24/2023), or awv 8/24/2023. Subjective       Patient's complete Health Risk Assessment and screening values have been reviewed and are found in Flowsheets. The following problems were reviewed today and where indicated follow up appointments were made and/or referrals ordered. Positive Risk Factor Screenings with Interventions:   Fall Risk Interventions:    Home safety tips provided            General Health and ACP:       Advance Directives       Power of  Living Will ACP-Advance Directive ACP-Power of Delia Rangel on 10/22/21 Not on [address] Risk Interventions:  Poor self-assessment of health status: patient declines any further evaluation/treatment for this issue    Weight and Activity:  Physical Activity: Inactive    Days of Exercise per Week: 0 days    Minutes of Exercise per Session: 0 min           Body mass index: 21.56  Health Habits/Nutrition Interventions:  Inadequate physical activity:  educational materials provided to promote increased physical activity     Hearing/Vision Interventions:  none            Objective   Vitals:    03/09/23 0914   BP: 114/78   Pulse: 80   Resp: 20   Temp: 98.1 °F (36.7 °C)   TempSrc: Temporal   SpO2: 99%   Weight: 159 lb (72.1 kg)   Height: 6' (1.829 m)      Body mass index is 21.56 kg/m². No Known Allergies  Prior to Visit Medications    Medication Sig Taking?  Authorizing Provider   lisinopril (PRINIVIL;ZESTRIL) 20 MG tablet Take 1 tablet by mouth daily Yes Viola Martinez MD   metoprolol succinate (TOPROL XL) 25 MG extended release tablet TAKE 1/2 TABLET BY MOUTH DAILY Yes Ramona Smith MD   atorvastatin (LIPITOR) 40 MG tablet TAKE 1/2 TABLET BY MOUTH DAILY Yes Ramona Smith MD   levothyroxine (SYNTHROID) 125 MCG tablet TAKE 1 TABLET BY MOUTH DAILY Yes Ramona Smith MD   pantoprazole (PROTONIX) 40 MG tablet TAKE 1 TABLET BY MOUTH DAILY Yes Baltazar Moreau MD   amiodarone (CORDARONE) 200 MG tablet Take 0.5 tablets by mouth daily Yes Evelyn Mcmahon MD   amLODIPine (NORVASC) 10 MG tablet Take 0.5 tablets by mouth daily Yes Evelyn Mcmahon MD   colchicine (COLCRYS) 0.6 MG tablet Take 1 tablet by mouth daily Yes Ramona Smith MD   folic acid (FOLVITE) 1 MG tablet TAKE 1 TABLET BY MOUTH DAILY Yes Ramona Smith MD   Ascorbic Acid (VITAMIN C) 250 MG tablet Take 250 mg by mouth daily Yes Historical MD Luis   Multiple Vitamin (MULTIVITAMIN) TABS tablet Take 1 tablet by mouth daily Yes Eliot Rodriguez MD   vitamin B-1 (THIAMINE) 100 MG tablet Take 1 tablet by mouth daily Yes Eliot Rodriguez MD       [facility] (Including outside providers/suppliers regularly involved in providing care):   Patient Care Team:  Chrissy Urbano MD as PCP - General (Internal Medicine)  [facility]  Colin Lainez MD as Consulting Physician (Neurology)  Evelyn Mcmahon MD as Consulting Physician (Cardiology)     Reviewed and updated this visit:  Allergies  Meds  Problems

## 2023-03-09 ENCOUNTER — OFFICE VISIT (OUTPATIENT)
Dept: PRIMARY CARE CLINIC | Age: 73
End: 2023-03-09

## 2023-03-09 VITALS
OXYGEN SATURATION: 99 % | HEART RATE: 80 BPM | DIASTOLIC BLOOD PRESSURE: 78 MMHG | RESPIRATION RATE: 20 BRPM | HEIGHT: 72 IN | TEMPERATURE: 98.1 F | SYSTOLIC BLOOD PRESSURE: 114 MMHG | WEIGHT: 159 LBS | BODY MASS INDEX: 21.54 KG/M2

## 2023-03-09 DIAGNOSIS — I42.8 NON-ISCHEMIC CARDIOMYOPATHY (HCC): ICD-10-CM

## 2023-03-09 DIAGNOSIS — I25.10 CORONARY ARTERY DISEASE INVOLVING NATIVE CORONARY ARTERY OF NATIVE HEART WITHOUT ANGINA PECTORIS: ICD-10-CM

## 2023-03-09 DIAGNOSIS — I25.119 CORONARY ARTERY DISEASE INVOLVING NATIVE HEART WITH ANGINA PECTORIS, UNSPECIFIED VESSEL OR LESION TYPE (HCC): ICD-10-CM

## 2023-03-09 DIAGNOSIS — I10 ESSENTIAL HYPERTENSION: ICD-10-CM

## 2023-03-09 DIAGNOSIS — E78.2 MIXED HYPERLIPIDEMIA: ICD-10-CM

## 2023-03-09 DIAGNOSIS — K21.00 GASTROESOPHAGEAL REFLUX DISEASE WITH ESOPHAGITIS WITHOUT HEMORRHAGE: ICD-10-CM

## 2023-03-09 DIAGNOSIS — Z12.11 SCREENING FOR COLON CANCER: Primary | ICD-10-CM

## 2023-03-09 DIAGNOSIS — N18.30 STAGE 3 CHRONIC KIDNEY DISEASE, UNSPECIFIED WHETHER STAGE 3A OR 3B CKD (HCC): ICD-10-CM

## 2023-03-09 DIAGNOSIS — I48.0 PAROXYSMAL ATRIAL FIBRILLATION (HCC): ICD-10-CM

## 2023-03-09 SDOH — ECONOMIC STABILITY: INCOME INSECURITY: HOW HARD IS IT FOR YOU TO PAY FOR THE VERY BASICS LIKE FOOD, HOUSING, MEDICAL CARE, AND HEATING?: NOT HARD AT ALL

## 2023-03-09 SDOH — ECONOMIC STABILITY: FOOD INSECURITY: WITHIN THE PAST 12 MONTHS, YOU WORRIED THAT YOUR FOOD WOULD RUN OUT BEFORE YOU GOT MONEY TO BUY MORE.: NEVER TRUE

## 2023-03-09 SDOH — ECONOMIC STABILITY: FOOD INSECURITY: WITHIN THE PAST 12 MONTHS, THE FOOD YOU BOUGHT JUST DIDN'T LAST AND YOU DIDN'T HAVE MONEY TO GET MORE.: NEVER TRUE

## 2023-03-09 SDOH — ECONOMIC STABILITY: HOUSING INSECURITY
IN THE LAST 12 MONTHS, WAS THERE A TIME WHEN YOU DID NOT HAVE A STEADY PLACE TO SLEEP OR SLEPT IN A SHELTER (INCLUDING NOW)?: NO

## 2023-03-09 ASSESSMENT — PATIENT HEALTH QUESTIONNAIRE - PHQ9
SUM OF ALL RESPONSES TO PHQ9 QUESTIONS 1 & 2: 0
SUM OF ALL RESPONSES TO PHQ QUESTIONS 1-9: 0
1. LITTLE INTEREST OR PLEASURE IN DOING THINGS: 0
2. FEELING DOWN, DEPRESSED OR HOPELESS: 0
SUM OF ALL RESPONSES TO PHQ QUESTIONS 1-9: 0

## 2023-03-15 ENCOUNTER — TELEPHONE (OUTPATIENT)
Dept: PRIMARY CARE CLINIC | Age: 73
End: 2023-03-15

## 2023-03-15 NOTE — TELEPHONE ENCOUNTER
Ignacio Bowie with [facility] on behalf of [facility] called and left message on voicemail requesting a call back to confirm receipt of a lab order she faxed over for Dr Dov Voss to review, sign and fax back. Called Ignacio Bowie back and spoke with Gwendolyn Smith. Informed her that we have not received a fax and requested it to be faxed again. Fax number confirmed with Gwendolyn Smith.

## 2023-04-01 NOTE — PROGRESS NOTES
training, Home Exercise Program, Modalities, Patient/Caregiver Education & Training, Safety Education & Training, Pain Management  Discharge Recommendations: Home with Home health PT  PATIENT REQUIRES AND IS REASONABLY EXPECTED TO ACTIVELY PARTICIPATE IN AT LEAST 3 HOURS OF INTENSIVE THERAPY PER DAY AT LEAST 5 DAYS PER WEEK, AND BE EXPECTED TO MAKE MEASURABLE IMPROVEMENT THAT WILL BE OF PRACTICAL VALUE TO IMPROVE THE PATIENT'S FUNCTIONAL CAPACITY OR ADAPTATION TO IMPAIRMENTS.    PATIENT GOAL FOR REHAB:  RETURN TO PRIOR LEVEL OF FUNCTION       IRF/DEQUAN  Roll Left and Right  Assistance Needed: Partial/moderate assistance  CARE Score: 3  Discharge Goal: Independent    Sit to Lying  Assistance Needed: Partial/moderate assistance  CARE Score: 3  Discharge Goal: Independent    Lying to Sitting on Side of Bed  Assistance Needed: Partial/moderate assistance  CARE Score: 3  Discharge Goal: Independent    Sit to Stand  Assistance Needed: Partial/moderate assistance  CARE Score: 3  Discharge Goal: Independent    Chair/Bed-to-Chair Transfer  Assistance Needed: Partial/moderate assistance  CARE Score: 3  Discharge Goal: Independent    Car Transfer  Reason if not Attempted: Not attempted due to medical condition or safety concerns  CARE Score: 88  Discharge Goal: Independent    Walk 10 Feet  Assistance Needed: Partial/moderate assistance  CARE Score: 3  Discharge Goal: Independent    Walk 50 Feet with Two Turns  Reason if not Attempted: Not attempted due to medical condition or safety concerns  CARE Score: 88  Discharge Goal: Independent    Walk 150 Feet  Reason if not Attempted: Not attempted due to medical condition or safety concerns  CARE Score: 88  Discharge Goal: Independent    Walking 10 Feet on Uneven Surfaces  Reason if not Attempted: Not attempted due to medical condition or safety concerns  CARE Score: 88  Discharge Goal: Not Attempted    1 Step (Curb)  Reason if not Attempted: Not attempted due to medical condition or safety concerns  CARE Score: 88  Discharge Goal: Not Attempted    4 Steps  Reason if not Attempted: Not attempted due to medical condition or safety concerns  CARE Score: 88  Discharge Goal: Not Attempted    12 Steps  Reason if not Attempted: Not attempted due to medical condition or safety concerns  CARE Score: 88  Discharge Goal: Not Attempted    Wheel 50 Feet with Two Turns  Reason if not Attempted: Not attempted due to medical condition or safety concerns  CARE Score: 88  Discharge Goal: Independent    Wheel 150 Feet  Reason if not Attempted: Not attempted due to medical condition or safety concerns  CARE Score: 88  Discharge Goal: Independent      LAST TREATMENT TIME  PT Individual Minutes  Time In: 1100  Time Out: Steven Byrne 79  Minutes: 105      Electronically signed by RBITANY Costa on 11/4/2020 at 4:17 PM Yes

## 2023-05-27 DIAGNOSIS — E03.9 ACQUIRED HYPOTHYROIDISM: ICD-10-CM

## 2023-05-30 RX ORDER — LEVOTHYROXINE SODIUM 0.12 MG/1
TABLET ORAL
Qty: 90 TABLET | Refills: 1 | Status: SHIPPED | OUTPATIENT
Start: 2023-05-30

## 2023-05-30 NOTE — TELEPHONE ENCOUNTER
Kanufabiola Jud called to request a refill on his medication.       Last office visit : 3/9/2023   Next office visit : 9/21/2023     Requested Prescriptions     Pending Prescriptions Disp Refills    levothyroxine (SYNTHROID) 125 MCG tablet [Pharmacy Med Name: LEVOTHYROXINE SODIUM 125MCG TABLET] 90 tablet 1     Sig: TAKE 1 TABLET BY MOUTH DAILY            Amiel Opitz, LPN

## 2023-07-01 DIAGNOSIS — I10 ESSENTIAL HYPERTENSION: ICD-10-CM

## 2023-07-01 DIAGNOSIS — I42.8 NON-ISCHEMIC CARDIOMYOPATHY (HCC): ICD-10-CM

## 2023-07-01 DIAGNOSIS — E78.2 MIXED HYPERLIPIDEMIA: ICD-10-CM

## 2023-07-01 DIAGNOSIS — E87.1 HYPONATREMIA: ICD-10-CM

## 2023-07-03 RX ORDER — ATORVASTATIN CALCIUM 40 MG/1
TABLET, FILM COATED ORAL
Qty: 45 TABLET | Refills: 1 | Status: SHIPPED | OUTPATIENT
Start: 2023-07-03

## 2023-07-03 RX ORDER — METOPROLOL SUCCINATE 25 MG/1
TABLET, EXTENDED RELEASE ORAL
Qty: 45 TABLET | Refills: 1 | Status: SHIPPED | OUTPATIENT
Start: 2023-07-03

## 2023-07-03 NOTE — TELEPHONE ENCOUNTER
Saintclair Harvard called to request a refill on his medication.       Last office visit : 2/15/2022   Next office visit : Visit date not found     Requested Prescriptions     Pending Prescriptions Disp Refills    atorvastatin (LIPITOR) 40 MG tablet [Pharmacy Med Name: ATORVASTATIN CALCIUM 40MG TABLET] 45 tablet 1     Sig: TAKE 1/2 TABLET BY MOUTH DAILY    metoprolol succinate (TOPROL XL) 25 MG extended release tablet [Pharmacy Med Name: METOPROLOL SUCCINATE ER 25MG TABLET EXTENDED RELEASE 24 HOUR] 45 tablet 1     Sig: TAKE 1/2 TABLET BY MOUTH DAILY            Mikell Nissen, LPN

## 2023-07-24 DIAGNOSIS — M1A.9XX1 CHRONIC GOUT INVOLVING TOE WITH TOPHUS, UNSPECIFIED CAUSE, UNSPECIFIED LATERALITY: ICD-10-CM

## 2023-07-24 RX ORDER — COLCHICINE 0.6 MG/1
0.6 TABLET ORAL DAILY
Qty: 90 TABLET | Refills: 1 | Status: SHIPPED | OUTPATIENT
Start: 2023-07-24 | End: 2023-10-22

## 2023-07-24 NOTE — TELEPHONE ENCOUNTER
Uche Desir called to request a refill on his medication.       Last office visit : 3/9/2023   Next office visit : 9/21/2023     Requested Prescriptions     Pending Prescriptions Disp Refills    colchicine (COLCRYS) 0.6 MG tablet [Pharmacy Med Name: COLCHICINE 0.6MG TABLET] 90 tablet 3     Sig: TAKE 1 TABLET BY MOUTH DAILY            Roney Ulloa LPN

## 2023-08-07 RX ORDER — AMIODARONE HYDROCHLORIDE 200 MG/1
TABLET ORAL
Qty: 30 TABLET | Refills: 5 | Status: SHIPPED | OUTPATIENT
Start: 2023-08-07

## 2023-08-10 ENCOUNTER — HOSPITAL ENCOUNTER (OUTPATIENT)
Dept: GENERAL RADIOLOGY | Age: 73
Discharge: HOME | End: 2023-08-10
Payer: MEDICARE

## 2023-08-10 DIAGNOSIS — I48.0 PAROXYSMAL ATRIAL FIBRILLATION (HCC): ICD-10-CM

## 2023-08-10 LAB
ALBUMIN SERPL-MCNC: 4.5 G/DL (ref 3.5–5.2)
ALP SERPL-CCNC: 140 U/L (ref 40–130)
ALT SERPL-CCNC: 23 U/L (ref 5–41)
AST SERPL-CCNC: 23 U/L (ref 5–40)
BILIRUB DIRECT SERPL-MCNC: 0.2 MG/DL (ref 0–0.3)
BILIRUB INDIRECT SERPL-MCNC: 0.5 MG/DL (ref 0.1–1)
BILIRUB SERPL-MCNC: 0.7 MG/DL (ref 0.2–1.2)
PROT SERPL-MCNC: 7.9 G/DL (ref 6.6–8.7)
TSH SERPL DL<=0.005 MIU/L-ACNC: 0.4 UIU/ML (ref 0.35–5.5)

## 2023-08-10 PROCEDURE — 71046 X-RAY EXAM CHEST 2 VIEWS: CPT

## 2023-08-14 ENCOUNTER — OFFICE VISIT (OUTPATIENT)
Dept: CARDIOLOGY CLINIC | Age: 73
End: 2023-08-14
Payer: MEDICARE

## 2023-08-14 VITALS
SYSTOLIC BLOOD PRESSURE: 116 MMHG | DIASTOLIC BLOOD PRESSURE: 76 MMHG | HEIGHT: 72 IN | BODY MASS INDEX: 24.38 KG/M2 | WEIGHT: 180 LBS | HEART RATE: 69 BPM

## 2023-08-14 DIAGNOSIS — I10 ESSENTIAL HYPERTENSION: ICD-10-CM

## 2023-08-14 DIAGNOSIS — E78.2 MIXED HYPERLIPIDEMIA: ICD-10-CM

## 2023-08-14 DIAGNOSIS — I42.8 NON-ISCHEMIC CARDIOMYOPATHY (HCC): ICD-10-CM

## 2023-08-14 DIAGNOSIS — I48.0 PAROXYSMAL ATRIAL FIBRILLATION (HCC): Primary | ICD-10-CM

## 2023-08-14 DIAGNOSIS — E87.1 HYPONATREMIA: ICD-10-CM

## 2023-08-14 PROCEDURE — 3074F SYST BP LT 130 MM HG: CPT | Performed by: INTERNAL MEDICINE

## 2023-08-14 PROCEDURE — 1123F ACP DISCUSS/DSCN MKR DOCD: CPT | Performed by: INTERNAL MEDICINE

## 2023-08-14 PROCEDURE — G8427 DOCREV CUR MEDS BY ELIG CLIN: HCPCS | Performed by: INTERNAL MEDICINE

## 2023-08-14 PROCEDURE — 93000 ELECTROCARDIOGRAM COMPLETE: CPT | Performed by: INTERNAL MEDICINE

## 2023-08-14 PROCEDURE — 99214 OFFICE O/P EST MOD 30 MIN: CPT | Performed by: INTERNAL MEDICINE

## 2023-08-14 PROCEDURE — 1036F TOBACCO NON-USER: CPT | Performed by: INTERNAL MEDICINE

## 2023-08-14 PROCEDURE — 3017F COLORECTAL CA SCREEN DOC REV: CPT | Performed by: INTERNAL MEDICINE

## 2023-08-14 PROCEDURE — G8420 CALC BMI NORM PARAMETERS: HCPCS | Performed by: INTERNAL MEDICINE

## 2023-08-14 PROCEDURE — 3078F DIAST BP <80 MM HG: CPT | Performed by: INTERNAL MEDICINE

## 2023-08-14 RX ORDER — LISINOPRIL 20 MG/1
20 TABLET ORAL DAILY
Qty: 90 TABLET | Refills: 3 | Status: SHIPPED | OUTPATIENT
Start: 2023-08-14 | End: 2023-11-12

## 2023-08-14 RX ORDER — AMLODIPINE BESYLATE 5 MG/1
5 TABLET ORAL DAILY
Qty: 90 TABLET | Refills: 3 | Status: SHIPPED | OUTPATIENT
Start: 2023-08-14 | End: 2023-11-12

## 2023-08-14 RX ORDER — AMIODARONE HYDROCHLORIDE 100 MG/1
100 TABLET ORAL DAILY
Qty: 90 TABLET | Refills: 3 | Status: SHIPPED | OUTPATIENT
Start: 2023-08-14 | End: 2023-11-12

## 2023-08-14 RX ORDER — ATORVASTATIN CALCIUM 20 MG/1
20 TABLET, FILM COATED ORAL EVERY EVENING
Qty: 90 TABLET | Refills: 3 | Status: SHIPPED | OUTPATIENT
Start: 2023-08-14 | End: 2023-11-12

## 2023-08-14 RX ORDER — METOPROLOL SUCCINATE 25 MG/1
12.5 TABLET, EXTENDED RELEASE ORAL DAILY
Qty: 45 TABLET | Refills: 3 | Status: SHIPPED | OUTPATIENT
Start: 2023-08-14 | End: 2023-11-12

## 2023-08-14 ASSESSMENT — ENCOUNTER SYMPTOMS
ABDOMINAL DISTENTION: 0
EYE DISCHARGE: 0
NAUSEA: 0
CONSTIPATION: 0
CHEST TIGHTNESS: 0
SORE THROAT: 0
BLOOD IN STOOL: 0
FACIAL SWELLING: 0
SHORTNESS OF BREATH: 0
EYE PAIN: 0
DIARRHEA: 0
COUGH: 0
EYE REDNESS: 0
VOMITING: 0
APNEA: 0
WHEEZING: 0
ABDOMINAL PAIN: 0

## 2023-08-15 ENCOUNTER — TELEPHONE (OUTPATIENT)
Dept: CARDIOLOGY CLINIC | Age: 73
End: 2023-08-15

## 2023-08-15 NOTE — TELEPHONE ENCOUNTER
----- Message from Isaias Alvarez MD sent at 8/11/2023  3:12 PM CDT -----  Screening while on amiodarone. Chest x-ray and labs largely unremarkable.

## 2023-08-24 ENCOUNTER — HOSPITAL ENCOUNTER (OUTPATIENT)
Dept: PULMONOLOGY | Age: 73
Discharge: HOME | End: 2023-08-24
Attending: INTERNAL MEDICINE
Payer: MEDICARE

## 2023-08-24 VITALS — OXYGEN SATURATION: 99 % | HEART RATE: 61 BPM | BODY MASS INDEX: 24.38 KG/M2 | HEIGHT: 72 IN | WEIGHT: 180 LBS

## 2023-08-24 DIAGNOSIS — I48.0 PAROXYSMAL ATRIAL FIBRILLATION (HCC): ICD-10-CM

## 2023-08-24 PROCEDURE — 94060 EVALUATION OF WHEEZING: CPT

## 2023-08-24 PROCEDURE — 6360000002 HC RX W HCPCS: Performed by: INTERNAL MEDICINE

## 2023-08-24 PROCEDURE — 94726 PLETHYSMOGRAPHY LUNG VOLUMES: CPT

## 2023-08-24 PROCEDURE — 94729 DIFFUSING CAPACITY: CPT

## 2023-08-24 RX ORDER — ALBUTEROL SULFATE 2.5 MG/3ML
2.5 SOLUTION RESPIRATORY (INHALATION) ONCE
Status: COMPLETED | OUTPATIENT
Start: 2023-08-24 | End: 2023-08-24

## 2023-08-24 RX ADMIN — ALBUTEROL SULFATE 2.5 MG: 2.5 SOLUTION RESPIRATORY (INHALATION) at 08:35

## 2023-08-24 NOTE — PROCEDURES
Media Information      Document Information    Other Order:  Pulmonary Function Result   Pulmonary Function Result   08/24/2023 07:52   Attached To:   Full Pft Study With Bronchodilator [[id number]]   Hospital Encounter on 8/24/23 with [facility] PFT LAB 2 - ANA     Source Information    Shayla Magaña RCP  [facility] Pft

## 2023-09-05 DIAGNOSIS — R94.2 ABNORMAL PFT: Primary | ICD-10-CM

## 2023-09-09 DIAGNOSIS — E87.1 HYPONATREMIA: ICD-10-CM

## 2023-09-11 RX ORDER — FOLIC ACID 1 MG/1
1 TABLET ORAL DAILY
Qty: 90 TABLET | Refills: 1 | Status: SHIPPED | OUTPATIENT
Start: 2023-09-11

## 2023-09-20 ASSESSMENT — ENCOUNTER SYMPTOMS
DIARRHEA: 0
CONSTIPATION: 0
COLOR CHANGE: 0
TROUBLE SWALLOWING: 0
WHEEZING: 0
CHEST TIGHTNESS: 0
STRIDOR: 0
ROS SKIN COMMENTS: DRY SKIN
SHORTNESS OF BREATH: 1
ABDOMINAL PAIN: 0
BLOOD IN STOOL: 0
SORE THROAT: 0
BACK PAIN: 0
COUGH: 0

## 2023-09-20 NOTE — PROGRESS NOTES
Uche Desir ( 1950) is a 72 y.o. male,  Established, here for evaluation of the following chief complaint(s).   Medicare AWV        ASSESSMENT/PLAN:  Problem List          Circulatory    Essential hypertension - Primary    Relevant Medications    amLODIPine (NORVASC) 5 MG tablet    metoprolol succinate (TOPROL XL) 25 MG extended release tablet    lisinopril (PRINIVIL;ZESTRIL) 20 MG tablet    Other Relevant Orders    Comprehensive Metabolic Panel    Chronic systolic heart failure (HCC)      Well-controlled, continue current medications         Relevant Medications    amLODIPine (NORVASC) 5 MG tablet    atorvastatin (LIPITOR) 20 MG tablet    metoprolol succinate (TOPROL XL) 25 MG extended release tablet    lisinopril (PRINIVIL;ZESTRIL) 20 MG tablet    CAD (coronary artery disease)    Relevant Medications    amiodarone (PACERONE) 100 MG tablet    amLODIPine (NORVASC) 5 MG tablet    atorvastatin (LIPITOR) 20 MG tablet    metoprolol succinate (TOPROL XL) 25 MG extended release tablet    lisinopril (PRINIVIL;ZESTRIL) 20 MG tablet    Other Relevant Orders    Comprehensive Metabolic Panel    Lipid Panel       Digestive    GERD (gastroesophageal reflux disease)      Well-controlled, continue current medications         Relevant Medications    pantoprazole (PROTONIX) 40 MG tablet       Genitourinary    Stage 3 chronic kidney disease, unspecified whether stage 3a or 3b CKD (HCC)      Asymptomatic, continue current medications         Relevant Orders    Comprehensive Metabolic Panel       Other    Mixed hyperlipidemia      Well-controlled, continue current medications         Relevant Medications    amiodarone (PACERONE) 100 MG tablet    amLODIPine (NORVASC) 5 MG tablet    atorvastatin (LIPITOR) 20 MG tablet    metoprolol succinate (TOPROL XL) 25 MG extended release tablet    lisinopril (PRINIVIL;ZESTRIL) 20 MG tablet    Other Relevant Orders    Comprehensive Metabolic Panel    Lipid Panel    Hypovitaminosis D

## 2023-09-21 ENCOUNTER — OFFICE VISIT (OUTPATIENT)
Dept: PRIMARY CARE CLINIC | Age: 73
End: 2023-09-21

## 2023-09-21 VITALS
HEIGHT: 72 IN | RESPIRATION RATE: 18 BRPM | TEMPERATURE: 97.9 F | WEIGHT: 180 LBS | DIASTOLIC BLOOD PRESSURE: 68 MMHG | HEART RATE: 70 BPM | OXYGEN SATURATION: 99 % | BODY MASS INDEX: 24.38 KG/M2 | SYSTOLIC BLOOD PRESSURE: 106 MMHG

## 2023-09-21 DIAGNOSIS — E87.1 HYPONATREMIA: ICD-10-CM

## 2023-09-21 DIAGNOSIS — I50.22 CHRONIC SYSTOLIC HEART FAILURE (HCC): ICD-10-CM

## 2023-09-21 DIAGNOSIS — K21.00 GASTROESOPHAGEAL REFLUX DISEASE WITH ESOPHAGITIS WITHOUT HEMORRHAGE: ICD-10-CM

## 2023-09-21 DIAGNOSIS — I10 ESSENTIAL HYPERTENSION: Primary | ICD-10-CM

## 2023-09-21 DIAGNOSIS — E55.9 HYPOVITAMINOSIS D: ICD-10-CM

## 2023-09-21 DIAGNOSIS — N18.30 STAGE 3 CHRONIC KIDNEY DISEASE, UNSPECIFIED WHETHER STAGE 3A OR 3B CKD (HCC): ICD-10-CM

## 2023-09-21 DIAGNOSIS — I25.10 CORONARY ARTERY DISEASE INVOLVING NATIVE CORONARY ARTERY OF NATIVE HEART WITHOUT ANGINA PECTORIS: ICD-10-CM

## 2023-09-21 DIAGNOSIS — E03.9 ACQUIRED HYPOTHYROIDISM: ICD-10-CM

## 2023-09-21 DIAGNOSIS — Z12.11 SCREENING FOR COLON CANCER: ICD-10-CM

## 2023-09-21 DIAGNOSIS — Z00.00 MEDICARE ANNUAL WELLNESS VISIT, SUBSEQUENT: ICD-10-CM

## 2023-09-21 DIAGNOSIS — E78.2 MIXED HYPERLIPIDEMIA: ICD-10-CM

## 2023-09-21 PROCEDURE — 1123F ACP DISCUSS/DSCN MKR DOCD: CPT | Performed by: INTERNAL MEDICINE

## 2023-09-21 PROCEDURE — 3074F SYST BP LT 130 MM HG: CPT | Performed by: INTERNAL MEDICINE

## 2023-09-21 PROCEDURE — 3017F COLORECTAL CA SCREEN DOC REV: CPT | Performed by: INTERNAL MEDICINE

## 2023-09-21 PROCEDURE — 3078F DIAST BP <80 MM HG: CPT | Performed by: INTERNAL MEDICINE

## 2023-09-21 ASSESSMENT — PATIENT HEALTH QUESTIONNAIRE - PHQ9
SUM OF ALL RESPONSES TO PHQ QUESTIONS 1-9: 0
SUM OF ALL RESPONSES TO PHQ QUESTIONS 1-9: 0
2. FEELING DOWN, DEPRESSED OR HOPELESS: 0
SUM OF ALL RESPONSES TO PHQ QUESTIONS 1-9: 0
SUM OF ALL RESPONSES TO PHQ9 QUESTIONS 1 & 2: 0
SUM OF ALL RESPONSES TO PHQ QUESTIONS 1-9: 0
1. LITTLE INTEREST OR PLEASURE IN DOING THINGS: 0

## 2023-09-21 ASSESSMENT — LIFESTYLE VARIABLES
HOW OFTEN DO YOU HAVE A DRINK CONTAINING ALCOHOL: MONTHLY OR LESS
HOW MANY STANDARD DRINKS CONTAINING ALCOHOL DO YOU HAVE ON A TYPICAL DAY: 1 OR 2

## 2023-10-10 LAB
CONTROL: REACTIVE
FECAL BLOOD IMMUNOCHEMICAL TEST: NEGATIVE

## 2023-11-27 DIAGNOSIS — E03.9 ACQUIRED HYPOTHYROIDISM: ICD-10-CM

## 2023-11-27 RX ORDER — LEVOTHYROXINE SODIUM 0.12 MG/1
TABLET ORAL
Qty: 90 TABLET | Refills: 1 | Status: SHIPPED | OUTPATIENT
Start: 2023-11-27

## 2023-11-27 NOTE — TELEPHONE ENCOUNTER
Magda Chante called to request a refill on his medication.       Last office visit : 9/21/2023   Next office visit : 3/25/2024     Requested Prescriptions     Pending Prescriptions Disp Refills    levothyroxine (SYNTHROID) 125 MCG tablet [Pharmacy Med Name: LEVOTHYROXINE SODIUM 125MCG TABLET] 90 tablet 1     Sig: TAKE 1 TABLET BY MOUTH DAILY            Starr Woodward LPN

## 2024-01-08 DIAGNOSIS — I42.8 NON-ISCHEMIC CARDIOMYOPATHY (HCC): ICD-10-CM

## 2024-01-08 DIAGNOSIS — I10 ESSENTIAL HYPERTENSION: ICD-10-CM

## 2024-01-08 RX ORDER — METOPROLOL SUCCINATE 25 MG/1
12.5 TABLET, EXTENDED RELEASE ORAL DAILY
Qty: 45 TABLET | Refills: 0 | Status: SHIPPED | OUTPATIENT
Start: 2024-01-08

## 2024-01-08 NOTE — TELEPHONE ENCOUNTER
Damian You called to request a refill on his medication.      Last office visit : 9/21/2023   Next office visit : 3/25/2024     Requested Prescriptions     Pending Prescriptions Disp Refills    metoprolol succinate (TOPROL XL) 25 MG extended release tablet [Pharmacy Med Name: METOPROLOL SUCCINATE ER 25MG TABLET EXTENDED RELEASE 24 HOUR] 45 tablet 1     Sig: TAKE 1/2 TABLET BY MOUTH DAILY            Dee Farias LPN

## 2024-02-19 ENCOUNTER — OFFICE VISIT (OUTPATIENT)
Dept: CARDIOLOGY CLINIC | Age: 74
End: 2024-02-19
Payer: MEDICARE

## 2024-02-19 VITALS
WEIGHT: 180 LBS | HEART RATE: 81 BPM | HEIGHT: 72 IN | BODY MASS INDEX: 24.38 KG/M2 | DIASTOLIC BLOOD PRESSURE: 68 MMHG | SYSTOLIC BLOOD PRESSURE: 134 MMHG

## 2024-02-19 DIAGNOSIS — I48.0 PAROXYSMAL ATRIAL FIBRILLATION (HCC): Primary | ICD-10-CM

## 2024-02-19 PROCEDURE — G8427 DOCREV CUR MEDS BY ELIG CLIN: HCPCS | Performed by: INTERNAL MEDICINE

## 2024-02-19 PROCEDURE — G8420 CALC BMI NORM PARAMETERS: HCPCS | Performed by: INTERNAL MEDICINE

## 2024-02-19 PROCEDURE — 93000 ELECTROCARDIOGRAM COMPLETE: CPT | Performed by: INTERNAL MEDICINE

## 2024-02-19 PROCEDURE — 3075F SYST BP GE 130 - 139MM HG: CPT | Performed by: INTERNAL MEDICINE

## 2024-02-19 PROCEDURE — 1123F ACP DISCUSS/DSCN MKR DOCD: CPT | Performed by: INTERNAL MEDICINE

## 2024-02-19 PROCEDURE — 99214 OFFICE O/P EST MOD 30 MIN: CPT | Performed by: INTERNAL MEDICINE

## 2024-02-19 PROCEDURE — G8484 FLU IMMUNIZE NO ADMIN: HCPCS | Performed by: INTERNAL MEDICINE

## 2024-02-19 PROCEDURE — 3017F COLORECTAL CA SCREEN DOC REV: CPT | Performed by: INTERNAL MEDICINE

## 2024-02-19 PROCEDURE — 3078F DIAST BP <80 MM HG: CPT | Performed by: INTERNAL MEDICINE

## 2024-02-19 PROCEDURE — 1036F TOBACCO NON-USER: CPT | Performed by: INTERNAL MEDICINE

## 2024-02-19 ASSESSMENT — ENCOUNTER SYMPTOMS
EYE PAIN: 0
ABDOMINAL PAIN: 0
ABDOMINAL DISTENTION: 0
WHEEZING: 0
DIARRHEA: 0
CONSTIPATION: 0
SORE THROAT: 0
FACIAL SWELLING: 0
EYE REDNESS: 0
CHEST TIGHTNESS: 0
VOMITING: 0
SHORTNESS OF BREATH: 0
BLOOD IN STOOL: 0
APNEA: 0
EYE DISCHARGE: 0
COUGH: 0
NAUSEA: 0

## 2024-02-19 NOTE — PROGRESS NOTES
General: Skin is warm and dry.      Coloration: Skin is not jaundiced.      Findings: No erythema or rash.   Neurological:      Mental Status: He is alert and oriented to person, place, and time. Mental status is at baseline.      Gait: Gait abnormal.   Psychiatric:         Mood and Affect: Mood normal.         Behavior: Behavior normal.         Thought Content: Thought content normal.           Labs:   CBC: No results found for: \"CBC\"   BMP: No results found for: \"BMP\"    BNP: No results found for: \"BNPINT\"   PT/INR:   Protime   Date Value Ref Range Status   08/18/2021 15.6 (H) 12.0 - 14.6 sec Final     INR   Date Value Ref Range Status   08/18/2021 1.22 (H) 0.88 - 1.18 Final     Comment:     INR  < or = 1.3  Normal  INR = 2.0 - 3.0  Therapeutic  INR = 2.5 - 3.5  Therapeutic for patients with mechanical  prosthetic heart valve & MI prophylaxis  INR  > or = 3.5  Abnormal/Elevated  INR  > or = 5.0  Critical (requires immediate physician  notification)         APTT:    aPTT   Date Value Ref Range Status   08/18/2021 32.6 26.0 - 36.2 sec Final      CARDIAC ENZYMES:   Total CK   Date Value Ref Range Status   08/17/2021 164 39 - 308 U/L Final     Troponin   Date Value Ref Range Status   06/23/2020 0.01 0.00 - 0.03 ng/mL Final     Comment:     <0.030 ng/ml       No measurable cardiac damage.    0.030-0.099 ng/ml  Values of troponin in this range suggest  possible myocardial damage. Repeat assay  4 to 6 hours after the current specimen.    >= 0.100 ng/ml     Indicative of myocardial damage.  Recommend continued monitoring of  patient status and cardiac markers.        LIPID PANEL: No results found for: \"LIPIDPAN\"  LIVER PROFILE:   AST   Date Value Ref Range Status   08/10/2023 23 5 - 40 U/L Final     ALT   Date Value Ref Range Status   08/10/2023 23 5 - 41 U/L Final     Albumin   Date Value Ref Range Status   08/10/2023 4.5 3.5 - 5.2 g/dL Final              Imaging:    - EKG : Sinus rhythm 81 bpm.  QTc 424 ms (previously

## 2024-02-24 DIAGNOSIS — I10 ESSENTIAL HYPERTENSION: ICD-10-CM

## 2024-02-24 DIAGNOSIS — E03.9 ACQUIRED HYPOTHYROIDISM: ICD-10-CM

## 2024-02-26 RX ORDER — LISINOPRIL 40 MG/1
40 TABLET ORAL DAILY
Qty: 90 TABLET | Refills: 3 | OUTPATIENT
Start: 2024-02-26

## 2024-02-26 RX ORDER — LEVOTHYROXINE SODIUM 0.12 MG/1
TABLET ORAL
Qty: 90 TABLET | Refills: 1 | Status: SHIPPED | OUTPATIENT
Start: 2024-02-26

## 2024-02-26 NOTE — TELEPHONE ENCOUNTER
Damian PAPITO SchneiderYou called to request a refill on his medication.      Last office visit : 9/21/2023   Next office visit : 3/25/2024     Requested Prescriptions     Pending Prescriptions Disp Refills    levothyroxine (SYNTHROID) 125 MCG tablet [Pharmacy Med Name: LEVOTHYROXINE SODIUM 125MCG TABLET] 90 tablet 1     Sig: TAKE 1 TABLET BY MOUTH DAILY    lisinopril (PRINIVIL;ZESTRIL) 40 MG tablet [Pharmacy Med Name: LISINOPRIL 40MG TABLET] 90 tablet 3     Sig: TAKE 1 TABLET BY MOUTH DAILY       Refused lisinopril due to filled in August for 90 days with 3 refills.      Dee Farias LPN

## 2024-02-27 DIAGNOSIS — I10 ESSENTIAL HYPERTENSION: ICD-10-CM

## 2024-02-28 RX ORDER — LISINOPRIL 40 MG/1
40 TABLET ORAL DAILY
Qty: 90 TABLET | Refills: 3 | OUTPATIENT
Start: 2024-02-28

## 2024-02-29 RX ORDER — LISINOPRIL 40 MG/1
40 TABLET ORAL DAILY
Qty: 90 TABLET | Refills: 3 | OUTPATIENT
Start: 2024-02-29

## 2024-03-01 RX ORDER — LISINOPRIL 40 MG/1
40 TABLET ORAL DAILY
Qty: 90 TABLET | Refills: 3 | OUTPATIENT
Start: 2024-03-01

## 2024-03-09 DIAGNOSIS — E87.1 HYPONATREMIA: ICD-10-CM

## 2024-03-09 DIAGNOSIS — K21.00 GASTROESOPHAGEAL REFLUX DISEASE WITH ESOPHAGITIS WITHOUT HEMORRHAGE: ICD-10-CM

## 2024-03-11 RX ORDER — PANTOPRAZOLE SODIUM 40 MG/1
TABLET, DELAYED RELEASE ORAL
Qty: 90 TABLET | Refills: 0 | Status: SHIPPED | OUTPATIENT
Start: 2024-03-11

## 2024-03-11 RX ORDER — FOLIC ACID 1 MG/1
1 TABLET ORAL DAILY
Qty: 90 TABLET | Refills: 1 | Status: SHIPPED | OUTPATIENT
Start: 2024-03-11

## 2024-03-11 NOTE — TELEPHONE ENCOUNTER
Damian You called to request a refill on his medication.      Last office visit : 9/21/2023   Next office visit : 3/25/2024     Requested Prescriptions     Pending Prescriptions Disp Refills    folic acid (FOLVITE) 1 MG tablet [Pharmacy Med Name: FOLIC ACID 1MG TABLET] 90 tablet 1     Sig: TAKE 1 TABLET BY MOUTH DAILY            Dee Farias LPN

## 2024-03-11 NOTE — TELEPHONE ENCOUNTER
Damian You called to request a refill on his medication.      Last office visit : 9/21/2023   Next office visit : 3/9/2024     Requested Prescriptions     Pending Prescriptions Disp Refills    pantoprazole (PROTONIX) 40 MG tablet [Pharmacy Med Name: PANTOPRAZOLE SODIUM 40MG TABLET DELAYED RELEASE] 90 tablet 2     Sig: TAKE 1 TABLET BY MOUTH DAILY            Dee Farias LPN

## 2024-03-24 NOTE — PROGRESS NOTES
Damian You ( 1950) is a 73 y.o. male,  Established, here for evaluation of the following chief complaint(s).  6 Month Follow-Up        ASSESSMENT/PLAN:  Problem List          Circulatory    Paroxysmal atrial fibrillation (HCC) - Primary      Monitored by specialist- no acute findings meriting change in the plan         Relevant Medications    amLODIPine (NORVASC) 5 MG tablet    atorvastatin (LIPITOR) 20 MG tablet    metoprolol succinate (TOPROL XL) 25 MG extended release tablet    lisinopril (PRINIVIL;ZESTRIL) 40 MG tablet    Essential hypertension    Relevant Medications    amiodarone (PACERONE) 100 MG tablet    amLODIPine (NORVASC) 5 MG tablet    atorvastatin (LIPITOR) 20 MG tablet    metoprolol succinate (TOPROL XL) 25 MG extended release tablet    lisinopril (PRINIVIL;ZESTRIL) 40 MG tablet    Coronary artery disease involving native heart with angina pectoris, unspecified vessel or lesion type (HCC)    Relevant Medications    amiodarone (PACERONE) 100 MG tablet    amLODIPine (NORVASC) 5 MG tablet    atorvastatin (LIPITOR) 20 MG tablet    metoprolol succinate (TOPROL XL) 25 MG extended release tablet    lisinopril (PRINIVIL;ZESTRIL) 40 MG tablet    Chronic systolic heart failure (HCC)    Relevant Medications    amLODIPine (NORVASC) 5 MG tablet    atorvastatin (LIPITOR) 20 MG tablet    metoprolol succinate (TOPROL XL) 25 MG extended release tablet    lisinopril (PRINIVIL;ZESTRIL) 40 MG tablet       Genitourinary    Stage 3 chronic kidney disease, unspecified whether stage 3a or 3b CKD (HCC)      Unclear control, continue current medications  Advised to be adherent to medications   Due to CKD stage 3, advise to see Nephrology, assess renal U/S, check PSA           Relevant Orders    Microalbumin / Creatinine Urine Ratio    External Referral To Nephrology    US RENAL COMPLETE       Other    Hypovitaminosis D     Results for orders placed or performed in visit on 23   POCT Fecal Immunochemical Test

## 2024-03-24 NOTE — ASSESSMENT & PLAN NOTE
Unclear control, continue current medications  Advised to be adherent to medications   Due to CKD stage 3, advise to see Nephrology, assess renal U/S, check PSA

## 2024-03-25 ENCOUNTER — OFFICE VISIT (OUTPATIENT)
Dept: PRIMARY CARE CLINIC | Age: 74
End: 2024-03-25
Payer: MEDICARE

## 2024-03-25 VITALS
DIASTOLIC BLOOD PRESSURE: 72 MMHG | RESPIRATION RATE: 18 BRPM | HEIGHT: 72 IN | TEMPERATURE: 97.6 F | HEART RATE: 95 BPM | OXYGEN SATURATION: 99 % | SYSTOLIC BLOOD PRESSURE: 120 MMHG | WEIGHT: 182 LBS | BODY MASS INDEX: 24.65 KG/M2

## 2024-03-25 DIAGNOSIS — I50.22 CHRONIC SYSTOLIC HEART FAILURE (HCC): ICD-10-CM

## 2024-03-25 DIAGNOSIS — Z12.5 PROSTATE CANCER SCREENING: ICD-10-CM

## 2024-03-25 DIAGNOSIS — Z13.0 SCREENING FOR DEFICIENCY ANEMIA: ICD-10-CM

## 2024-03-25 DIAGNOSIS — E78.2 MIXED HYPERLIPIDEMIA: ICD-10-CM

## 2024-03-25 DIAGNOSIS — E55.9 HYPOVITAMINOSIS D: ICD-10-CM

## 2024-03-25 DIAGNOSIS — E03.9 ACQUIRED HYPOTHYROIDISM: ICD-10-CM

## 2024-03-25 DIAGNOSIS — N18.30 STAGE 3 CHRONIC KIDNEY DISEASE, UNSPECIFIED WHETHER STAGE 3A OR 3B CKD (HCC): ICD-10-CM

## 2024-03-25 DIAGNOSIS — I42.8 NON-ISCHEMIC CARDIOMYOPATHY (HCC): ICD-10-CM

## 2024-03-25 DIAGNOSIS — I25.10 CORONARY ARTERY DISEASE INVOLVING NATIVE CORONARY ARTERY OF NATIVE HEART WITHOUT ANGINA PECTORIS: ICD-10-CM

## 2024-03-25 DIAGNOSIS — I61.9 RIGHT-SIDED NONTRAUMATIC INTRACEREBRAL HEMORRHAGE, UNSPECIFIED CEREBRAL LOCATION (HCC): ICD-10-CM

## 2024-03-25 DIAGNOSIS — E87.1 HYPONATREMIA: ICD-10-CM

## 2024-03-25 DIAGNOSIS — I25.119 CORONARY ARTERY DISEASE INVOLVING NATIVE HEART WITH ANGINA PECTORIS, UNSPECIFIED VESSEL OR LESION TYPE (HCC): ICD-10-CM

## 2024-03-25 DIAGNOSIS — I10 ESSENTIAL HYPERTENSION: ICD-10-CM

## 2024-03-25 DIAGNOSIS — Z12.11 SCREEN FOR COLON CANCER: ICD-10-CM

## 2024-03-25 DIAGNOSIS — I48.0 PAROXYSMAL ATRIAL FIBRILLATION (HCC): Primary | ICD-10-CM

## 2024-03-25 LAB
25(OH)D3 SERPL-MCNC: 32.9 NG/ML
ALBUMIN SERPL-MCNC: 4.3 G/DL (ref 3.5–5.2)
ALP SERPL-CCNC: 158 U/L (ref 40–130)
ALT SERPL-CCNC: 26 U/L (ref 5–41)
ANION GAP SERPL CALCULATED.3IONS-SCNC: 15 MMOL/L (ref 7–19)
AST SERPL-CCNC: 25 U/L (ref 5–40)
BASOPHILS # BLD: 0.1 K/UL (ref 0–0.2)
BASOPHILS NFR BLD: 1 % (ref 0–1)
BILIRUB SERPL-MCNC: 0.7 MG/DL (ref 0.2–1.2)
BUN SERPL-MCNC: 16 MG/DL (ref 8–23)
CALCIUM SERPL-MCNC: 9.5 MG/DL (ref 8.8–10.2)
CHLORIDE SERPL-SCNC: 107 MMOL/L (ref 98–111)
CHOLEST SERPL-MCNC: 108 MG/DL (ref 160–199)
CO2 SERPL-SCNC: 24 MMOL/L (ref 22–29)
CREAT SERPL-MCNC: 1.6 MG/DL (ref 0.5–1.2)
EOSINOPHIL # BLD: 0.3 K/UL (ref 0–0.6)
EOSINOPHIL NFR BLD: 3 % (ref 0–5)
ERYTHROCYTE [DISTWIDTH] IN BLOOD BY AUTOMATED COUNT: 13.2 % (ref 11.5–14.5)
GLUCOSE SERPL-MCNC: 89 MG/DL (ref 74–109)
HCT VFR BLD AUTO: 43.8 % (ref 42–52)
HDLC SERPL-MCNC: 36 MG/DL (ref 55–121)
HGB BLD-MCNC: 13.8 G/DL (ref 14–18)
IMM GRANULOCYTES # BLD: 0.1 K/UL
LDLC SERPL CALC-MCNC: 36 MG/DL
LYMPHOCYTES # BLD: 2.3 K/UL (ref 1.1–4.5)
LYMPHOCYTES NFR BLD: 25.6 % (ref 20–40)
MCH RBC QN AUTO: 28.6 PG (ref 27–31)
MCHC RBC AUTO-ENTMCNC: 31.5 G/DL (ref 33–37)
MCV RBC AUTO: 90.9 FL (ref 80–94)
MONOCYTES # BLD: 0.5 K/UL (ref 0–0.9)
MONOCYTES NFR BLD: 6.1 % (ref 0–10)
NEUTROPHILS # BLD: 5.7 K/UL (ref 1.5–7.5)
NEUTS SEG NFR BLD: 63.7 % (ref 50–65)
PLATELET # BLD AUTO: 239 K/UL (ref 130–400)
PMV BLD AUTO: 9.8 FL (ref 9.4–12.4)
POTASSIUM SERPL-SCNC: 3.9 MMOL/L (ref 3.5–5)
PROT SERPL-MCNC: 7.5 G/DL (ref 6.6–8.7)
PSA SERPL-MCNC: 1.26 NG/ML (ref 0–4)
RBC # BLD AUTO: 4.82 M/UL (ref 4.7–6.1)
SODIUM SERPL-SCNC: 146 MMOL/L (ref 136–145)
TRIGL SERPL-MCNC: 181 MG/DL (ref 0–149)
TSH SERPL DL<=0.005 MIU/L-ACNC: 0.15 UIU/ML (ref 0.27–4.2)
WBC # BLD AUTO: 8.9 K/UL (ref 4.8–10.8)

## 2024-03-25 PROCEDURE — G8484 FLU IMMUNIZE NO ADMIN: HCPCS | Performed by: INTERNAL MEDICINE

## 2024-03-25 PROCEDURE — 3078F DIAST BP <80 MM HG: CPT | Performed by: INTERNAL MEDICINE

## 2024-03-25 PROCEDURE — 3074F SYST BP LT 130 MM HG: CPT | Performed by: INTERNAL MEDICINE

## 2024-03-25 PROCEDURE — 1036F TOBACCO NON-USER: CPT | Performed by: INTERNAL MEDICINE

## 2024-03-25 PROCEDURE — G8420 CALC BMI NORM PARAMETERS: HCPCS | Performed by: INTERNAL MEDICINE

## 2024-03-25 PROCEDURE — 3017F COLORECTAL CA SCREEN DOC REV: CPT | Performed by: INTERNAL MEDICINE

## 2024-03-25 PROCEDURE — 1123F ACP DISCUSS/DSCN MKR DOCD: CPT | Performed by: INTERNAL MEDICINE

## 2024-03-25 PROCEDURE — 99214 OFFICE O/P EST MOD 30 MIN: CPT | Performed by: INTERNAL MEDICINE

## 2024-03-25 PROCEDURE — G8427 DOCREV CUR MEDS BY ELIG CLIN: HCPCS | Performed by: INTERNAL MEDICINE

## 2024-03-25 RX ORDER — LISINOPRIL 40 MG/1
40 TABLET ORAL DAILY
Qty: 90 TABLET | Refills: 1 | Status: SHIPPED | OUTPATIENT
Start: 2024-03-25 | End: 2024-09-21

## 2024-03-25 RX ORDER — LISINOPRIL 20 MG/1
20 TABLET ORAL DAILY
Qty: 90 TABLET | Refills: 1 | OUTPATIENT
Start: 2024-03-25

## 2024-03-25 SDOH — ECONOMIC STABILITY: FOOD INSECURITY: WITHIN THE PAST 12 MONTHS, YOU WORRIED THAT YOUR FOOD WOULD RUN OUT BEFORE YOU GOT MONEY TO BUY MORE.: NEVER TRUE

## 2024-03-25 SDOH — ECONOMIC STABILITY: FOOD INSECURITY: WITHIN THE PAST 12 MONTHS, THE FOOD YOU BOUGHT JUST DIDN'T LAST AND YOU DIDN'T HAVE MONEY TO GET MORE.: NEVER TRUE

## 2024-03-25 SDOH — ECONOMIC STABILITY: INCOME INSECURITY: HOW HARD IS IT FOR YOU TO PAY FOR THE VERY BASICS LIKE FOOD, HOUSING, MEDICAL CARE, AND HEATING?: NOT HARD AT ALL

## 2024-03-25 ASSESSMENT — PATIENT HEALTH QUESTIONNAIRE - PHQ9
1. LITTLE INTEREST OR PLEASURE IN DOING THINGS: NOT AT ALL
SUM OF ALL RESPONSES TO PHQ QUESTIONS 1-9: 0
2. FEELING DOWN, DEPRESSED OR HOPELESS: NOT AT ALL
SUM OF ALL RESPONSES TO PHQ QUESTIONS 1-9: 0
SUM OF ALL RESPONSES TO PHQ9 QUESTIONS 1 & 2: 0

## 2024-03-27 ENCOUNTER — TELEPHONE (OUTPATIENT)
Dept: PRIMARY CARE CLINIC | Age: 74
End: 2024-03-27

## 2024-03-27 NOTE — TELEPHONE ENCOUNTER
Attempted to contact patient with results. No answer. Voicemail full. Unable to leave a message. Letter with results mailed to patient.

## 2024-03-27 NOTE — TELEPHONE ENCOUNTER
----- Message from Ramona Smith MD sent at 3/27/2024  7:57 AM CDT -----  Patient's sodium is slightly elevated encourage patient to drink more water  Potassium and other electrolytes normal kidney function stable although elevated liver function test within range  Patient's thyroid function TSH shows slightly suppressed likely to be related to his intake of amiodarone will share this result with his cardiologist  Cholesterol in acceptable range although patient's triglyceride is slightly elevated likely related to fried food excess calories  Vitamin D normal complete blood count normal   prostate cancer screening normal

## 2024-04-06 DIAGNOSIS — M1A.9XX1 CHRONIC GOUT INVOLVING TOE WITH TOPHUS, UNSPECIFIED CAUSE, UNSPECIFIED LATERALITY: ICD-10-CM

## 2024-04-08 RX ORDER — COLCHICINE 0.6 MG/1
0.6 TABLET ORAL DAILY
Qty: 90 TABLET | Refills: 1 | Status: SHIPPED | OUTPATIENT
Start: 2024-04-08 | End: 2024-07-07

## 2024-04-08 NOTE — TELEPHONE ENCOUNTER
Damian You called to request a refill on his medication.      Last office visit : 3/25/2024   Next office visit : 9/23/2024     Requested Prescriptions     Pending Prescriptions Disp Refills    colchicine (COLCRYS) 0.6 MG tablet [Pharmacy Med Name: COLCHICINE 0.6MG TABLET] 90 tablet 1     Sig: TAKE 1 TABLET BY MOUTH DAILY            Dee Farias LPN

## 2024-04-20 DIAGNOSIS — E87.1 HYPONATREMIA: ICD-10-CM

## 2024-04-20 DIAGNOSIS — E78.2 MIXED HYPERLIPIDEMIA: ICD-10-CM

## 2024-04-22 RX ORDER — ATORVASTATIN CALCIUM 20 MG/1
20 TABLET, FILM COATED ORAL EVERY EVENING
Qty: 90 TABLET | Refills: 3 | Status: SHIPPED | OUTPATIENT
Start: 2024-04-22 | End: 2024-07-21

## 2024-05-17 ENCOUNTER — TRANSCRIBE ORDERS (OUTPATIENT)
Dept: ADMINISTRATIVE | Age: 74
End: 2024-05-17

## 2024-05-17 DIAGNOSIS — N18.31 STAGE 3A CHRONIC KIDNEY DISEASE (HCC): Primary | ICD-10-CM

## 2024-05-23 ENCOUNTER — HOSPITAL ENCOUNTER (OUTPATIENT)
Dept: ULTRASOUND IMAGING | Age: 74
Discharge: HOME | End: 2024-05-23
Attending: INTERNAL MEDICINE
Payer: MEDICARE

## 2024-05-23 DIAGNOSIS — N18.31 STAGE 3A CHRONIC KIDNEY DISEASE (HCC): ICD-10-CM

## 2024-05-23 PROCEDURE — 76770 US EXAM ABDO BACK WALL COMP: CPT

## 2024-06-01 DIAGNOSIS — K21.00 GASTROESOPHAGEAL REFLUX DISEASE WITH ESOPHAGITIS WITHOUT HEMORRHAGE: ICD-10-CM

## 2024-06-03 RX ORDER — PANTOPRAZOLE SODIUM 40 MG/1
TABLET, DELAYED RELEASE ORAL
Qty: 90 TABLET | Refills: 0 | Status: SHIPPED | OUTPATIENT
Start: 2024-06-03

## 2024-06-03 NOTE — TELEPHONE ENCOUNTER
Damian You called to request a refill on his medication.      Last office visit : 3/25/2024   Next office visit : 9/23/2024     Requested Prescriptions     Pending Prescriptions Disp Refills    pantoprazole (PROTONIX) 40 MG tablet [Pharmacy Med Name: PANTOPRAZOLE SODIUM 40MG TABLET DELAYED RELEASE] 90 tablet 0     Sig: TAKE 1 TABLET BY MOUTH DAILY            Dee Farias LPN

## 2024-07-05 PROBLEM — N18.31 CKD STAGE 3A, GFR 45-59 ML/MIN (HCC): Status: ACTIVE | Noted: 2022-02-14

## 2024-07-22 DIAGNOSIS — I10 ESSENTIAL HYPERTENSION: ICD-10-CM

## 2024-07-22 RX ORDER — AMLODIPINE BESYLATE 5 MG/1
5 TABLET ORAL DAILY
Qty: 90 TABLET | Refills: 3 | Status: SHIPPED | OUTPATIENT
Start: 2024-07-22 | End: 2024-10-20

## 2024-08-12 RX ORDER — AMIODARONE HYDROCHLORIDE 200 MG/1
TABLET ORAL
Qty: 30 TABLET | Refills: 5 | Status: SHIPPED | OUTPATIENT
Start: 2024-08-12

## 2024-08-19 ENCOUNTER — OFFICE VISIT (OUTPATIENT)
Dept: CARDIOLOGY CLINIC | Age: 74
End: 2024-08-19
Payer: MEDICARE

## 2024-08-19 VITALS
SYSTOLIC BLOOD PRESSURE: 110 MMHG | DIASTOLIC BLOOD PRESSURE: 82 MMHG | WEIGHT: 173 LBS | HEART RATE: 92 BPM | OXYGEN SATURATION: 98 % | BODY MASS INDEX: 23.46 KG/M2

## 2024-08-19 DIAGNOSIS — I25.10 CORONARY ARTERY DISEASE INVOLVING NATIVE CORONARY ARTERY OF NATIVE HEART WITHOUT ANGINA PECTORIS: Primary | ICD-10-CM

## 2024-08-19 PROCEDURE — 3079F DIAST BP 80-89 MM HG: CPT | Performed by: INTERNAL MEDICINE

## 2024-08-19 PROCEDURE — 3074F SYST BP LT 130 MM HG: CPT | Performed by: INTERNAL MEDICINE

## 2024-08-19 PROCEDURE — 1036F TOBACCO NON-USER: CPT | Performed by: INTERNAL MEDICINE

## 2024-08-19 PROCEDURE — G8420 CALC BMI NORM PARAMETERS: HCPCS | Performed by: INTERNAL MEDICINE

## 2024-08-19 PROCEDURE — 3017F COLORECTAL CA SCREEN DOC REV: CPT | Performed by: INTERNAL MEDICINE

## 2024-08-19 PROCEDURE — G8427 DOCREV CUR MEDS BY ELIG CLIN: HCPCS | Performed by: INTERNAL MEDICINE

## 2024-08-19 PROCEDURE — 99214 OFFICE O/P EST MOD 30 MIN: CPT | Performed by: INTERNAL MEDICINE

## 2024-08-19 PROCEDURE — 1123F ACP DISCUSS/DSCN MKR DOCD: CPT | Performed by: INTERNAL MEDICINE

## 2024-08-19 ASSESSMENT — ENCOUNTER SYMPTOMS
SHORTNESS OF BREATH: 0
SORE THROAT: 0
FACIAL SWELLING: 0
ABDOMINAL DISTENTION: 0
CONSTIPATION: 0
WHEEZING: 0
EYE REDNESS: 0
NAUSEA: 0
EYE PAIN: 0
EYE DISCHARGE: 0
CHEST TIGHTNESS: 0
DIARRHEA: 0
APNEA: 0
ABDOMINAL PAIN: 0
BLOOD IN STOOL: 0
VOMITING: 0
COUGH: 0

## 2024-08-19 NOTE — PROGRESS NOTES
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  Continue metoprolol succinate, atorvastatin and amlodipine     Nonischemic cardiomyopathy  At time of initial diagnosis LVEF 5-10% (cardiac angiogram, 6/2020)  Normalization of LVEF on most recent echocardiogram, EF 60% (10/2021)  Continue conservative medical management including chronic lisinopril and metoprolol succinate     Atrial fibrillation, remains in sinus rhythm  History of successful CASTILLO guided DCCV 6/2020  Not on anticoagulation due to hemorrhagic CVA history  Continue metoprolol succinate and low dose amiodarone  Labs including TSH with reflex T4 as per PCP     Chronic essential hypertension, goal blood pressure less than 130/80  Controlled on current medication regimen  On combination of metoprolol XL, lisinopril and amlodipine     Mild restrictive lung disease on PFT 8/2023  Asymptomatic  Chest x-ray 8/2023--lungs clear  Continue to observe, notably on low-dose amiodarone         Electronically signed by Jorge Mccloud MD on 8/19/2024 at 8:42 AM    Jorge Mccloud MD, AIDAN, [facility]  Noninvasive Cardiology Consultant    This dictation was generated by voice recognition computer software.  Although all attempts are made to edit the dictation for accuracy, there may be errors in the transcription that are not intended.

## 2024-08-24 DIAGNOSIS — I10 ESSENTIAL HYPERTENSION: ICD-10-CM

## 2024-08-26 ENCOUNTER — TELEPHONE (OUTPATIENT)
Dept: CARDIOLOGY CLINIC | Age: 74
End: 2024-08-26

## 2024-08-26 RX ORDER — LISINOPRIL 20 MG/1
20 TABLET ORAL DAILY
Qty: 90 TABLET | Refills: 2 | OUTPATIENT
Start: 2024-08-26 | End: 2024-11-24

## 2024-08-26 NOTE — TELEPHONE ENCOUNTER
Tried to call patient to see what his current dose of lisinopril is for medication refill. There are chart discrepancies between what PCP has sent in and what is reported. There was no answer and his voicemail is not set up.

## 2024-08-26 NOTE — TELEPHONE ENCOUNTER
Tried to call patient to see what dose of lisinopril he is taking. There are chart discrepancies between what PCP has sent in and what is reported.

## 2024-09-07 DIAGNOSIS — K21.00 GASTROESOPHAGEAL REFLUX DISEASE WITH ESOPHAGITIS WITHOUT HEMORRHAGE: ICD-10-CM

## 2024-09-07 DIAGNOSIS — E87.1 HYPONATREMIA: ICD-10-CM

## 2024-09-09 RX ORDER — FOLIC ACID 1 MG/1
1 TABLET ORAL DAILY
Qty: 90 TABLET | Refills: 1 | Status: SHIPPED | OUTPATIENT
Start: 2024-09-09

## 2024-09-09 RX ORDER — PANTOPRAZOLE SODIUM 40 MG/1
TABLET, DELAYED RELEASE ORAL
Qty: 90 TABLET | Refills: 1 | Status: SHIPPED | OUTPATIENT
Start: 2024-09-09

## 2024-09-25 ENCOUNTER — OFFICE VISIT (OUTPATIENT)
Dept: PRIMARY CARE CLINIC | Age: 74
End: 2024-09-25

## 2024-09-25 VITALS
BODY MASS INDEX: 23.43 KG/M2 | HEART RATE: 75 BPM | RESPIRATION RATE: 17 BRPM | WEIGHT: 173 LBS | HEIGHT: 72 IN | DIASTOLIC BLOOD PRESSURE: 80 MMHG | OXYGEN SATURATION: 99 % | TEMPERATURE: 97.9 F | SYSTOLIC BLOOD PRESSURE: 122 MMHG

## 2024-09-25 DIAGNOSIS — Z00.00 MEDICARE ANNUAL WELLNESS VISIT, SUBSEQUENT: ICD-10-CM

## 2024-09-25 DIAGNOSIS — E78.2 MIXED HYPERLIPIDEMIA: ICD-10-CM

## 2024-09-25 DIAGNOSIS — I50.22 CHRONIC SYSTOLIC HEART FAILURE (HCC): ICD-10-CM

## 2024-09-25 DIAGNOSIS — N18.31 CKD STAGE 3A, GFR 45-59 ML/MIN (HCC): ICD-10-CM

## 2024-09-25 DIAGNOSIS — E03.9 ACQUIRED HYPOTHYROIDISM: ICD-10-CM

## 2024-09-25 DIAGNOSIS — I10 ESSENTIAL HYPERTENSION: Primary | ICD-10-CM

## 2024-09-25 DIAGNOSIS — K21.00 GASTROESOPHAGEAL REFLUX DISEASE WITH ESOPHAGITIS WITHOUT HEMORRHAGE: ICD-10-CM

## 2024-09-25 PROBLEM — S72.124D CLOSED NONDISPLACED FRACTURE OF LESSER TROCHANTER OF RIGHT FEMUR WITH ROUTINE HEALING: Status: RESOLVED | Noted: 2020-11-03 | Resolved: 2024-09-25

## 2024-09-25 PROCEDURE — 3074F SYST BP LT 130 MM HG: CPT | Performed by: INTERNAL MEDICINE

## 2024-09-25 PROCEDURE — G0439 PPPS, SUBSEQ VISIT: HCPCS | Performed by: INTERNAL MEDICINE

## 2024-09-25 PROCEDURE — 1123F ACP DISCUSS/DSCN MKR DOCD: CPT | Performed by: INTERNAL MEDICINE

## 2024-09-25 PROCEDURE — 3079F DIAST BP 80-89 MM HG: CPT | Performed by: INTERNAL MEDICINE

## 2024-09-25 PROCEDURE — 3017F COLORECTAL CA SCREEN DOC REV: CPT | Performed by: INTERNAL MEDICINE

## 2024-09-25 ASSESSMENT — ENCOUNTER SYMPTOMS
CHEST TIGHTNESS: 0
ABDOMINAL PAIN: 0
COLOR CHANGE: 0
WHEEZING: 0
BLOOD IN STOOL: 0
TROUBLE SWALLOWING: 0
SORE THROAT: 0
ROS SKIN COMMENTS: DRY SKIN
DIARRHEA: 0
COUGH: 0
STRIDOR: 0
BACK PAIN: 0
SHORTNESS OF BREATH: 0
CONSTIPATION: 0

## 2024-09-25 ASSESSMENT — PATIENT HEALTH QUESTIONNAIRE - PHQ9
SUM OF ALL RESPONSES TO PHQ QUESTIONS 1-9: 0
SUM OF ALL RESPONSES TO PHQ QUESTIONS 1-9: 0
SUM OF ALL RESPONSES TO PHQ9 QUESTIONS 1 & 2: 0
2. FEELING DOWN, DEPRESSED OR HOPELESS: NOT AT ALL
SUM OF ALL RESPONSES TO PHQ QUESTIONS 1-9: 0
SUM OF ALL RESPONSES TO PHQ QUESTIONS 1-9: 0
1. LITTLE INTEREST OR PLEASURE IN DOING THINGS: NOT AT ALL

## 2024-09-25 ASSESSMENT — LIFESTYLE VARIABLES
HOW MANY STANDARD DRINKS CONTAINING ALCOHOL DO YOU HAVE ON A TYPICAL DAY: PATIENT DOES NOT DRINK
HOW OFTEN DO YOU HAVE A DRINK CONTAINING ALCOHOL: NEVER

## 2024-10-05 DIAGNOSIS — M1A.9XX1 CHRONIC GOUT INVOLVING TOE WITH TOPHUS, UNSPECIFIED CAUSE, UNSPECIFIED LATERALITY: ICD-10-CM

## 2024-10-07 RX ORDER — COLCHICINE 0.6 MG/1
0.6 TABLET ORAL DAILY
Qty: 90 TABLET | Refills: 1 | Status: SHIPPED | OUTPATIENT
Start: 2024-10-07 | End: 2025-01-05

## 2024-11-23 DIAGNOSIS — I10 ESSENTIAL HYPERTENSION: ICD-10-CM

## 2024-11-23 DIAGNOSIS — E03.9 ACQUIRED HYPOTHYROIDISM: ICD-10-CM

## 2024-11-25 RX ORDER — LEVOTHYROXINE SODIUM 125 UG/1
125 TABLET ORAL DAILY
Qty: 90 TABLET | Refills: 1 | Status: SHIPPED | OUTPATIENT
Start: 2024-11-25

## 2024-11-25 RX ORDER — LISINOPRIL 40 MG/1
20 TABLET ORAL DAILY
Qty: 45 TABLET | Refills: 1 | Status: SHIPPED | OUTPATIENT
Start: 2024-11-25 | End: 2025-05-24

## 2024-11-25 NOTE — TELEPHONE ENCOUNTER
Damian You called to request a refill on his medication.      Last office visit : 9/25/2024   Next office visit : 3/25/2025     Requested Prescriptions     Pending Prescriptions Disp Refills    lisinopril (PRINIVIL;ZESTRIL) 40 MG tablet [Pharmacy Med Name: LISINOPRIL 40MG TABLET] 90 tablet 1     Sig: TAKE 1 TABLET BY MOUTH DAILY    levothyroxine (SYNTHROID) 125 MCG tablet [Pharmacy Med Name: LEVOTHYROXINE SODIUM 125MCG TABLET] 90 tablet 1     Sig: TAKE 1 TABLET BY MOUTH ONCE DAILY            Dee Farias LPN

## 2024-12-07 DIAGNOSIS — E87.1 HYPONATREMIA: ICD-10-CM

## 2024-12-07 DIAGNOSIS — K21.00 GASTROESOPHAGEAL REFLUX DISEASE WITH ESOPHAGITIS WITHOUT HEMORRHAGE: ICD-10-CM

## 2024-12-09 RX ORDER — PANTOPRAZOLE SODIUM 40 MG/1
TABLET, DELAYED RELEASE ORAL
Qty: 90 TABLET | Refills: 1 | Status: SHIPPED | OUTPATIENT
Start: 2024-12-09

## 2024-12-09 RX ORDER — FOLIC ACID 1 MG/1
1 TABLET ORAL DAILY
Qty: 90 TABLET | Refills: 1 | Status: SHIPPED | OUTPATIENT
Start: 2024-12-09

## 2024-12-09 NOTE — TELEPHONE ENCOUNTER
Damian You called to request a refill on his medication.      Last office visit : 9/25/2024   Next office visit : 3/25/2025     Requested Prescriptions     Pending Prescriptions Disp Refills    folic acid (FOLVITE) 1 MG tablet [Pharmacy Med Name: FOLIC ACID 1MG TABLET] 90 tablet 1     Sig: TAKE 1 TABLET BY MOUTH DAILY    pantoprazole (PROTONIX) 40 MG tablet [Pharmacy Med Name: PANTOPRAZOLE SODIUM 40MG TABLET DELAYED RELEASE] 90 tablet 1     Sig: TAKE 1 TABLET BY MOUTH DAILY            Dee Farias LPN

## 2025-01-04 DIAGNOSIS — I42.8 NON-ISCHEMIC CARDIOMYOPATHY (HCC): ICD-10-CM

## 2025-01-04 DIAGNOSIS — I10 ESSENTIAL HYPERTENSION: ICD-10-CM

## 2025-01-06 RX ORDER — METOPROLOL SUCCINATE 25 MG/1
12.5 TABLET, EXTENDED RELEASE ORAL DAILY
Qty: 45 TABLET | Refills: 1 | Status: SHIPPED | OUTPATIENT
Start: 2025-01-06

## 2025-01-06 NOTE — TELEPHONE ENCOUNTER
Damian You called to request a refill on his medication.      Last office visit : 9/25/2024   Next office visit : 3/25/2025     Requested Prescriptions     Pending Prescriptions Disp Refills    metoprolol succinate (TOPROL XL) 25 MG extended release tablet [Pharmacy Med Name: METOPROLOL SUCCINATE ER 25MG TABLET EXTENDED RELEASE 24 HOUR] 45 tablet 0     Sig: TAKE 1/2 TABLET BY MOUTH DAILY            Dee Farias LPN

## 2025-02-05 ENCOUNTER — APPOINTMENT (OUTPATIENT)
Dept: CT IMAGING | Age: 75
End: 2025-02-05
Payer: MEDICARE

## 2025-02-05 ENCOUNTER — HOSPITAL ENCOUNTER (INPATIENT)
Age: 75
LOS: 9 days | Discharge: HOME HEALTH | End: 2025-02-14
Attending: EMERGENCY MEDICINE | Admitting: STUDENT IN AN ORGANIZED HEALTH CARE EDUCATION/TRAINING PROGRAM
Payer: MEDICARE

## 2025-02-05 DIAGNOSIS — K57.32 DIVERTICULITIS OF COLON: ICD-10-CM

## 2025-02-05 DIAGNOSIS — R93.89 ABNORMAL CT SCAN: ICD-10-CM

## 2025-02-05 DIAGNOSIS — K63.89 COLONIC MASS: ICD-10-CM

## 2025-02-05 DIAGNOSIS — I10 ESSENTIAL HYPERTENSION: ICD-10-CM

## 2025-02-05 DIAGNOSIS — E83.39 HYPOPHOSPHATEMIA: ICD-10-CM

## 2025-02-05 DIAGNOSIS — R93.3 ABNORMAL COMPUTED TOMOGRAPHY OF SIGMOID COLON: ICD-10-CM

## 2025-02-05 DIAGNOSIS — E87.6 HYPOKALEMIA: Primary | ICD-10-CM

## 2025-02-05 PROBLEM — R10.33 PERIUMBILICAL ABDOMINAL PAIN: Status: ACTIVE | Noted: 2025-02-05

## 2025-02-05 PROBLEM — R93.5 ABNORMAL ABDOMINAL CT SCAN: Status: ACTIVE | Noted: 2025-02-05

## 2025-02-05 LAB
ALBUMIN SERPL-MCNC: 3.7 G/DL (ref 3.5–5.2)
ALP SERPL-CCNC: 214 U/L (ref 40–129)
ALT SERPL-CCNC: 43 U/L (ref 5–41)
ANION GAP SERPL CALCULATED.3IONS-SCNC: 17 MMOL/L (ref 8–16)
AST SERPL-CCNC: 58 U/L (ref 5–40)
BASOPHILS # BLD: 0.1 K/UL (ref 0–0.2)
BASOPHILS NFR BLD: 0.7 % (ref 0–1)
BILIRUB SERPL-MCNC: 1.1 MG/DL (ref 0.2–1.2)
BUN SERPL-MCNC: 15 MG/DL (ref 8–23)
CALCIUM SERPL-MCNC: 8.7 MG/DL (ref 8.8–10.2)
CEA SERPL-MCNC: 73.3 NG/ML (ref 0–4.7)
CHLORIDE SERPL-SCNC: 99 MMOL/L (ref 98–107)
CO2 SERPL-SCNC: 23 MMOL/L (ref 22–29)
CREAT SERPL-MCNC: 1.2 MG/DL (ref 0.7–1.2)
EOSINOPHIL # BLD: 0.1 K/UL (ref 0–0.6)
EOSINOPHIL NFR BLD: 0.7 % (ref 0–5)
ERYTHROCYTE [DISTWIDTH] IN BLOOD BY AUTOMATED COUNT: 13.2 % (ref 11.5–14.5)
GLUCOSE SERPL-MCNC: 90 MG/DL (ref 70–99)
HCT VFR BLD AUTO: 37.9 % (ref 42–52)
HGB BLD-MCNC: 12.6 G/DL (ref 14–18)
IMM GRANULOCYTES # BLD: 0.1 K/UL
LACTATE BLDV-SCNC: 1.2 MMOL/L (ref 0.5–1.9)
LIPASE SERPL-CCNC: 15 U/L (ref 13–60)
LYMPHOCYTES # BLD: 1.7 K/UL (ref 1.1–4.5)
LYMPHOCYTES NFR BLD: 14.8 % (ref 20–40)
MCH RBC QN AUTO: 27.8 PG (ref 27–31)
MCHC RBC AUTO-ENTMCNC: 33.2 G/DL (ref 33–37)
MCV RBC AUTO: 83.5 FL (ref 80–94)
MONOCYTES # BLD: 0.8 K/UL (ref 0–0.9)
MONOCYTES NFR BLD: 7.3 % (ref 0–10)
NEUTROPHILS # BLD: 8.5 K/UL (ref 1.5–7.5)
NEUTS SEG NFR BLD: 76.1 % (ref 50–65)
PLATELET # BLD AUTO: 313 K/UL (ref 130–400)
PMV BLD AUTO: 9.3 FL (ref 9.4–12.4)
POTASSIUM SERPL-SCNC: 2.8 MMOL/L (ref 3.5–5.1)
PROT SERPL-MCNC: 6.9 G/DL (ref 6.4–8.3)
RBC # BLD AUTO: 4.54 M/UL (ref 4.7–6.1)
SODIUM SERPL-SCNC: 139 MMOL/L (ref 136–145)
WBC # BLD AUTO: 11.2 K/UL (ref 4.8–10.8)

## 2025-02-05 PROCEDURE — 82378 CARCINOEMBRYONIC ANTIGEN: CPT

## 2025-02-05 PROCEDURE — 96365 THER/PROPH/DIAG IV INF INIT: CPT

## 2025-02-05 PROCEDURE — 96375 TX/PRO/DX INJ NEW DRUG ADDON: CPT

## 2025-02-05 PROCEDURE — 96368 THER/DIAG CONCURRENT INF: CPT

## 2025-02-05 PROCEDURE — 1200000000 HC SEMI PRIVATE

## 2025-02-05 PROCEDURE — 6370000000 HC RX 637 (ALT 250 FOR IP): Performed by: NURSE PRACTITIONER

## 2025-02-05 PROCEDURE — 6360000002 HC RX W HCPCS: Performed by: NURSE PRACTITIONER

## 2025-02-05 PROCEDURE — 2580000003 HC RX 258: Performed by: NURSE PRACTITIONER

## 2025-02-05 PROCEDURE — 99285 EMERGENCY DEPT VISIT HI MDM: CPT

## 2025-02-05 PROCEDURE — 2500000003 HC RX 250 WO HCPCS: Performed by: NURSE PRACTITIONER

## 2025-02-05 PROCEDURE — 2500000003 HC RX 250 WO HCPCS: Performed by: EMERGENCY MEDICINE

## 2025-02-05 PROCEDURE — 36415 COLL VENOUS BLD VENIPUNCTURE: CPT

## 2025-02-05 PROCEDURE — 74177 CT ABD & PELVIS W/CONTRAST: CPT

## 2025-02-05 PROCEDURE — 6360000002 HC RX W HCPCS: Performed by: EMERGENCY MEDICINE

## 2025-02-05 PROCEDURE — 80053 COMPREHEN METABOLIC PANEL: CPT

## 2025-02-05 PROCEDURE — 83690 ASSAY OF LIPASE: CPT

## 2025-02-05 PROCEDURE — 85025 COMPLETE CBC W/AUTO DIFF WBC: CPT

## 2025-02-05 PROCEDURE — 6370000000 HC RX 637 (ALT 250 FOR IP): Performed by: EMERGENCY MEDICINE

## 2025-02-05 PROCEDURE — 6360000004 HC RX CONTRAST MEDICATION: Performed by: EMERGENCY MEDICINE

## 2025-02-05 PROCEDURE — 83605 ASSAY OF LACTIC ACID: CPT

## 2025-02-05 RX ORDER — DEXTROSE MONOHYDRATE, SODIUM CHLORIDE, AND POTASSIUM CHLORIDE 50; 1.49; 4.5 G/1000ML; G/1000ML; G/1000ML
INJECTION, SOLUTION INTRAVENOUS CONTINUOUS
Status: DISCONTINUED | OUTPATIENT
Start: 2025-02-05 | End: 2025-02-05

## 2025-02-05 RX ORDER — ACETAMINOPHEN 325 MG/1
650 TABLET ORAL EVERY 6 HOURS PRN
Status: DISCONTINUED | OUTPATIENT
Start: 2025-02-05 | End: 2025-02-14 | Stop reason: HOSPADM

## 2025-02-05 RX ORDER — POTASSIUM CHLORIDE 7.45 MG/ML
10 INJECTION INTRAVENOUS PRN
Status: ACTIVE | OUTPATIENT
Start: 2025-02-05 | End: 2025-02-10

## 2025-02-05 RX ORDER — IOPAMIDOL 755 MG/ML
70 INJECTION, SOLUTION INTRAVASCULAR
Status: COMPLETED | OUTPATIENT
Start: 2025-02-05 | End: 2025-02-05

## 2025-02-05 RX ORDER — PANTOPRAZOLE SODIUM 40 MG/1
40 TABLET, DELAYED RELEASE ORAL DAILY
Status: DISCONTINUED | OUTPATIENT
Start: 2025-02-05 | End: 2025-02-13

## 2025-02-05 RX ORDER — CIPROFLOXACIN 2 MG/ML
400 INJECTION, SOLUTION INTRAVENOUS EVERY 12 HOURS
Status: DISCONTINUED | OUTPATIENT
Start: 2025-02-05 | End: 2025-02-13

## 2025-02-05 RX ORDER — METRONIDAZOLE 500 MG/100ML
500 INJECTION, SOLUTION INTRAVENOUS EVERY 8 HOURS
Status: DISCONTINUED | OUTPATIENT
Start: 2025-02-05 | End: 2025-02-13

## 2025-02-05 RX ORDER — POTASSIUM CHLORIDE 7.45 MG/ML
10 INJECTION INTRAVENOUS ONCE
Status: COMPLETED | OUTPATIENT
Start: 2025-02-05 | End: 2025-02-05

## 2025-02-05 RX ORDER — METRONIDAZOLE 500 MG/100ML
500 INJECTION, SOLUTION INTRAVENOUS ONCE
Status: COMPLETED | OUTPATIENT
Start: 2025-02-05 | End: 2025-02-05

## 2025-02-05 RX ORDER — ONDANSETRON 4 MG/1
4 TABLET, ORALLY DISINTEGRATING ORAL EVERY 8 HOURS PRN
Status: DISCONTINUED | OUTPATIENT
Start: 2025-02-05 | End: 2025-02-14 | Stop reason: HOSPADM

## 2025-02-05 RX ORDER — LEVOTHYROXINE SODIUM 125 UG/1
125 TABLET ORAL DAILY
Status: DISCONTINUED | OUTPATIENT
Start: 2025-02-05 | End: 2025-02-14 | Stop reason: HOSPADM

## 2025-02-05 RX ORDER — AMIODARONE HYDROCHLORIDE 200 MG/1
100 TABLET ORAL DAILY
Status: DISCONTINUED | OUTPATIENT
Start: 2025-02-05 | End: 2025-02-14 | Stop reason: HOSPADM

## 2025-02-05 RX ORDER — ACETAMINOPHEN 650 MG/1
650 SUPPOSITORY RECTAL EVERY 6 HOURS PRN
Status: DISCONTINUED | OUTPATIENT
Start: 2025-02-05 | End: 2025-02-14 | Stop reason: HOSPADM

## 2025-02-05 RX ORDER — SODIUM CHLORIDE 0.9 % (FLUSH) 0.9 %
5-40 SYRINGE (ML) INJECTION EVERY 12 HOURS SCHEDULED
Status: DISCONTINUED | OUTPATIENT
Start: 2025-02-05 | End: 2025-02-08 | Stop reason: SDUPTHER

## 2025-02-05 RX ORDER — DEXTROSE, SODIUM CHLORIDE, AND POTASSIUM CHLORIDE 5; .45; .15 G/100ML; G/100ML; G/100ML
2 INJECTION INTRAVENOUS CONTINUOUS
Status: DISCONTINUED | OUTPATIENT
Start: 2025-02-05 | End: 2025-02-05

## 2025-02-05 RX ORDER — POTASSIUM CHLORIDE 1500 MG/1
40 TABLET, EXTENDED RELEASE ORAL PRN
Status: DISPENSED | OUTPATIENT
Start: 2025-02-05 | End: 2025-02-10

## 2025-02-05 RX ORDER — SODIUM CHLORIDE 0.9 % (FLUSH) 0.9 %
5-40 SYRINGE (ML) INJECTION PRN
Status: DISCONTINUED | OUTPATIENT
Start: 2025-02-05 | End: 2025-02-08 | Stop reason: SDUPTHER

## 2025-02-05 RX ORDER — POTASSIUM CHLORIDE 1500 MG/1
40 TABLET, EXTENDED RELEASE ORAL ONCE
Status: COMPLETED | OUTPATIENT
Start: 2025-02-05 | End: 2025-02-05

## 2025-02-05 RX ORDER — POLYETHYLENE GLYCOL 3350 17 G/17G
17 POWDER, FOR SOLUTION ORAL DAILY PRN
Status: DISCONTINUED | OUTPATIENT
Start: 2025-02-05 | End: 2025-02-14 | Stop reason: HOSPADM

## 2025-02-05 RX ORDER — MAGNESIUM SULFATE IN WATER 40 MG/ML
2000 INJECTION, SOLUTION INTRAVENOUS PRN
Status: DISCONTINUED | OUTPATIENT
Start: 2025-02-05 | End: 2025-02-14 | Stop reason: HOSPADM

## 2025-02-05 RX ORDER — SODIUM CHLORIDE 9 MG/ML
INJECTION, SOLUTION INTRAVENOUS PRN
Status: DISCONTINUED | OUTPATIENT
Start: 2025-02-05 | End: 2025-02-08 | Stop reason: SDUPTHER

## 2025-02-05 RX ORDER — POLYETHYLENE GLYCOL 3350 17 G/17G
17 POWDER, FOR SOLUTION ORAL 2 TIMES DAILY
Status: DISCONTINUED | OUTPATIENT
Start: 2025-02-05 | End: 2025-02-13

## 2025-02-05 RX ORDER — ONDANSETRON 2 MG/ML
4 INJECTION INTRAMUSCULAR; INTRAVENOUS EVERY 6 HOURS PRN
Status: DISCONTINUED | OUTPATIENT
Start: 2025-02-05 | End: 2025-02-14 | Stop reason: HOSPADM

## 2025-02-05 RX ADMIN — CIPROFLOXACIN 400 MG: 2 INJECTION, SOLUTION INTRAVENOUS at 17:29

## 2025-02-05 RX ADMIN — IOPAMIDOL 70 ML: 755 INJECTION, SOLUTION INTRAVENOUS at 13:42

## 2025-02-05 RX ADMIN — METRONIDAZOLE 500 MG: 5 INJECTION, SOLUTION INTRAVENOUS at 23:37

## 2025-02-05 RX ADMIN — METRONIDAZOLE 500 MG: 5 INJECTION, SOLUTION INTRAVENOUS at 14:41

## 2025-02-05 RX ADMIN — WATER 1000 MG: 1 INJECTION INTRAMUSCULAR; INTRAVENOUS; SUBCUTANEOUS at 14:38

## 2025-02-05 RX ADMIN — POTASSIUM CHLORIDE: 2 INJECTION, SOLUTION, CONCENTRATE INTRAVENOUS at 22:19

## 2025-02-05 RX ADMIN — ACETAMINOPHEN 650 MG: 325 TABLET ORAL at 22:23

## 2025-02-05 RX ADMIN — SODIUM CHLORIDE, PRESERVATIVE FREE 10 ML: 5 INJECTION INTRAVENOUS at 22:24

## 2025-02-05 RX ADMIN — POTASSIUM CHLORIDE 40 MEQ: 1500 TABLET, EXTENDED RELEASE ORAL at 14:28

## 2025-02-05 RX ADMIN — POTASSIUM CHLORIDE 10 MEQ: 7.46 INJECTION, SOLUTION INTRAVENOUS at 14:55

## 2025-02-05 ASSESSMENT — PAIN SCALES - GENERAL: PAINLEVEL_OUTOF10: 5

## 2025-02-05 ASSESSMENT — PAIN DESCRIPTION - LOCATION: LOCATION: ABDOMEN

## 2025-02-05 NOTE — H&P
[facility]      Hospitalist - History & Physical      PCP: Ramona Smith MD    Date of Admission: 2/5/2025    Date of Service: 2/5/2025    Chief Complaint:  Abdominal pain     History Of Present Illness:   The patient is a 74 y.o. male with a past MH of CAD, HTN, NICM, GERD and HLD, and CKD 3A who presented to [facility] ED on 2/5/2025 complaining of abdominal pain.  He states that abdominal pain began on the evening prior to admission.  He describes it as periumbilical, intermittent, sharp shooting pain.  Nothing has relieved his pain or made it worse.  He denies nausea, vomiting or diarrhea.  He denies melena or hematochezia.  He denies rectal bleeding.  He denies fever or chills.  He has not had any suspicious foods or recent ill contacts.  No recent travel.  He has never had a colonoscopy.  Further ED workup [id number], K-2.8, BUN 15 creatinine 1.2.  Lactic acid 1.2.  Alk phos 214, ALT 43 and AST 58.  Bilirubin 1.1.  WBC 11.2, H&H 12.6/37.9 with a platelet count of 313.  CT of the abdomen pelvis with contrast revealed focal bowel wall thickening involving the sigmoid colon favoring mass versus diverticular change versus early acute diverticulitis.  An ileus versus partial colonic obstruction's extending to the level of the sigmoid colon.  Liver shows several poorly defined low-density lesions concerning for metastasis.  Upper limits of normal retroperitoneal lymph nodes.  The patient will be admitted to hospital medicine for abdominal pain and abnormal abdominal CT with a GI consult.    Past Medical History:        Diagnosis Date    CAD (coronary artery disease)     Gout     Hypertension     Hypotension 6/23/2020    Non-ST elevation MI (NSTEMI) (HCC) 12/28/14    Palliative care patient 06/29/2020    Pneumonia due to infectious organism 6/24/2020       Past Surgical History:        Procedure Laterality Date    CARDIAC CATHETERIZATION  12/29/14  [facility]    angioplasty, stent to LAD. EF 45%

## 2025-02-05 NOTE — ED PROVIDER NOTES
[facility] EMERGENCY DEPARTMENT  eMERGENCY dEPARTMENT eNCOUnter      Pt Name: Damian You  MRN: [id number]  Birthdate 1950  Date of evaluation: 2/5/2025  Provider: Alberto Beltran MD    CHIEF COMPLAINT       Chief Complaint   Patient presents with    Abdominal Pain     Patient c/o abdominal pain in bilateral upper abdominal quadrants. Patient denies N/V/D          HISTORY OF PRESENT ILLNESS   (Location/Symptom, Timing/Onset,Context/Setting, Quality, Duration, Modifying Factors, Severity)  Note limiting factors.   Damian You is a 74 y.o. male who presents to the emergency department due to abdominal pain.  Patient has been having intermittent mid abdominal pain since last night.  Slightly decreased appetite.  No nausea vomiting diarrhea.  No urinary complaints.  Pain seems to be in the mid abdomen and radiates into the right lower abdomen.  No exacerbating alleviating factors.  Has not had similar symptoms before.  No fevers.  No blood in his stool or urine.  Has a history of CAD and hypertension.    HPI    NursingNotes were reviewed.    REVIEW OF SYSTEMS    (2-9 systems for level 4, 10 or more for level 5)     Review of Systems   Constitutional:  Positive for appetite change. Negative for fever.   Eyes:  Negative for pain.   Respiratory:  Negative for shortness of breath.    Cardiovascular:  Negative for chest pain and palpitations.   Gastrointestinal:  Positive for abdominal pain. Negative for blood in stool, diarrhea and vomiting.   Genitourinary:  Negative for difficulty urinating, dysuria, flank pain and hematuria.   Skin:  Negative for rash.   Neurological:  Negative for weakness and headaches.   All other systems reviewed and are negative.      A complete review of systems was performed and is negative except as noted above in the HPI.       PAST MEDICAL HISTORY     Past Medical History:   Diagnosis Date    CAD (coronary artery disease)     Gout     Hypertension     Hypotension 6/23/2020

## 2025-02-06 ENCOUNTER — APPOINTMENT (OUTPATIENT)
Dept: GENERAL RADIOLOGY | Age: 75
End: 2025-02-06
Payer: MEDICARE

## 2025-02-06 PROBLEM — E87.6 HYPOKALEMIA: Status: ACTIVE | Noted: 2025-02-06

## 2025-02-06 PROBLEM — K56.600 PARTIAL BOWEL OBSTRUCTION (HCC): Status: ACTIVE | Noted: 2025-02-06

## 2025-02-06 LAB
ALBUMIN SERPL-MCNC: 3.2 G/DL (ref 3.5–5.2)
ALP SERPL-CCNC: 186 U/L (ref 40–129)
ALT SERPL-CCNC: 50 U/L (ref 5–41)
ANION GAP SERPL CALCULATED.3IONS-SCNC: 12 MMOL/L (ref 8–16)
AST SERPL-CCNC: 64 U/L (ref 5–40)
BACTERIA URNS QL MICRO: NEGATIVE /HPF
BASOPHILS # BLD: 0.1 K/UL (ref 0–0.2)
BASOPHILS NFR BLD: 0.5 % (ref 0–1)
BILIRUB SERPL-MCNC: 0.9 MG/DL (ref 0.2–1.2)
BILIRUB UR QL STRIP: NEGATIVE
BUN SERPL-MCNC: 15 MG/DL (ref 8–23)
CALCIUM SERPL-MCNC: 8.3 MG/DL (ref 8.8–10.2)
CHLORIDE SERPL-SCNC: 101 MMOL/L (ref 98–107)
CLARITY UR: CLEAR
CO2 SERPL-SCNC: 24 MMOL/L (ref 22–29)
COLOR UR: ABNORMAL
CREAT SERPL-MCNC: 1.4 MG/DL (ref 0.7–1.2)
CRYSTALS URNS MICRO: NORMAL /HPF
EOSINOPHIL # BLD: 0.1 K/UL (ref 0–0.6)
EOSINOPHIL NFR BLD: 0.8 % (ref 0–5)
EPI CELLS #/AREA URNS AUTO: 1 /HPF (ref 0–5)
ERYTHROCYTE [DISTWIDTH] IN BLOOD BY AUTOMATED COUNT: 13.3 % (ref 11.5–14.5)
FERRITIN SERPL-MCNC: 93.9 NG/ML (ref 30–400)
FOLATE SERPL-MCNC: >20 NG/ML (ref 4.5–32.2)
GLUCOSE SERPL-MCNC: 87 MG/DL (ref 70–99)
GLUCOSE UR STRIP.AUTO-MCNC: NEGATIVE MG/DL
HCT VFR BLD AUTO: 35.6 % (ref 42–52)
HGB BLD-MCNC: 11.5 G/DL (ref 14–18)
HGB UR STRIP.AUTO-MCNC: NEGATIVE MG/L
HYALINE CASTS #/AREA URNS AUTO: 2 /HPF (ref 0–8)
IMM GRANULOCYTES # BLD: 0.1 K/UL
IRON SATN MFR SERPL: 12 % (ref 20–50)
IRON SERPL-MCNC: 25 UG/DL (ref 59–158)
KETONES UR STRIP.AUTO-MCNC: NEGATIVE MG/DL
LEUKOCYTE ESTERASE UR QL STRIP.AUTO: ABNORMAL
LYMPHOCYTES # BLD: 1.7 K/UL (ref 1.1–4.5)
LYMPHOCYTES NFR BLD: 15 % (ref 20–40)
MAGNESIUM SERPL-MCNC: 1.3 MG/DL (ref 1.6–2.4)
MCH RBC QN AUTO: 27.6 PG (ref 27–31)
MCHC RBC AUTO-ENTMCNC: 32.3 G/DL (ref 33–37)
MCV RBC AUTO: 85.6 FL (ref 80–94)
MONOCYTES # BLD: 0.9 K/UL (ref 0–0.9)
MONOCYTES NFR BLD: 8.3 % (ref 0–10)
NEUTROPHILS # BLD: 8.3 K/UL (ref 1.5–7.5)
NEUTS SEG NFR BLD: 74.9 % (ref 50–65)
NITRITE UR QL STRIP.AUTO: NEGATIVE
PH UR STRIP.AUTO: 5.5 [PH] (ref 5–8)
PLATELET # BLD AUTO: 264 K/UL (ref 130–400)
PMV BLD AUTO: 9.6 FL (ref 9.4–12.4)
POTASSIUM SERPL-SCNC: 3.1 MMOL/L (ref 3.5–5)
PROT SERPL-MCNC: 5.9 G/DL (ref 6.4–8.3)
PROT UR STRIP.AUTO-MCNC: 30 MG/DL
RBC # BLD AUTO: 4.16 M/UL (ref 4.7–6.1)
RBC #/AREA URNS AUTO: 1 /HPF (ref 0–4)
RETICS # AUTO: 0.07 M/UL (ref 0.03–0.12)
RETICS/RBC NFR: 1.63 % (ref 0.5–1.5)
SODIUM SERPL-SCNC: 137 MMOL/L (ref 136–145)
SP GR UR STRIP.AUTO: 1.03 (ref 1–1.03)
TIBC SERPL-MCNC: 213 UG/DL (ref 250–400)
UROBILINOGEN UR STRIP.AUTO-MCNC: 1 E.U./DL
VIT B12 SERPL-MCNC: 596 PG/ML (ref 232–1245)
WBC # BLD AUTO: 11 K/UL (ref 4.8–10.8)
WBC #/AREA URNS AUTO: 3 /HPF (ref 0–5)

## 2025-02-06 PROCEDURE — 6360000002 HC RX W HCPCS: Performed by: INTERNAL MEDICINE

## 2025-02-06 PROCEDURE — 94760 N-INVAS EAR/PLS OXIMETRY 1: CPT

## 2025-02-06 PROCEDURE — 6360000002 HC RX W HCPCS: Performed by: NURSE PRACTITIONER

## 2025-02-06 PROCEDURE — 71045 X-RAY EXAM CHEST 1 VIEW: CPT

## 2025-02-06 PROCEDURE — 2500000003 HC RX 250 WO HCPCS: Performed by: NURSE PRACTITIONER

## 2025-02-06 PROCEDURE — 83550 IRON BINDING TEST: CPT

## 2025-02-06 PROCEDURE — 99223 1ST HOSP IP/OBS HIGH 75: CPT | Performed by: INTERNAL MEDICINE

## 2025-02-06 PROCEDURE — 85025 COMPLETE CBC W/AUTO DIFF WBC: CPT

## 2025-02-06 PROCEDURE — 1200000000 HC SEMI PRIVATE

## 2025-02-06 PROCEDURE — 85045 AUTOMATED RETICULOCYTE COUNT: CPT

## 2025-02-06 PROCEDURE — 6370000000 HC RX 637 (ALT 250 FOR IP): Performed by: NURSE PRACTITIONER

## 2025-02-06 PROCEDURE — 36415 COLL VENOUS BLD VENIPUNCTURE: CPT

## 2025-02-06 PROCEDURE — 2580000003 HC RX 258: Performed by: NURSE PRACTITIONER

## 2025-02-06 PROCEDURE — 80053 COMPREHEN METABOLIC PANEL: CPT

## 2025-02-06 PROCEDURE — 82746 ASSAY OF FOLIC ACID SERUM: CPT

## 2025-02-06 PROCEDURE — 83735 ASSAY OF MAGNESIUM: CPT

## 2025-02-06 PROCEDURE — 82728 ASSAY OF FERRITIN: CPT

## 2025-02-06 PROCEDURE — 82607 VITAMIN B-12: CPT

## 2025-02-06 PROCEDURE — 81001 URINALYSIS AUTO W/SCOPE: CPT

## 2025-02-06 PROCEDURE — 2580000003 HC RX 258: Performed by: INTERNAL MEDICINE

## 2025-02-06 PROCEDURE — 83540 ASSAY OF IRON: CPT

## 2025-02-06 PROCEDURE — 6360000002 HC RX W HCPCS: Performed by: STUDENT IN AN ORGANIZED HEALTH CARE EDUCATION/TRAINING PROGRAM

## 2025-02-06 RX ORDER — MAGNESIUM SULFATE IN WATER 40 MG/ML
4000 INJECTION, SOLUTION INTRAVENOUS ONCE
Status: COMPLETED | OUTPATIENT
Start: 2025-02-06 | End: 2025-02-06

## 2025-02-06 RX ORDER — THIAMINE HYDROCHLORIDE 100 MG/ML
100 INJECTION, SOLUTION INTRAMUSCULAR; INTRAVENOUS DAILY
Status: DISCONTINUED | OUTPATIENT
Start: 2025-02-06 | End: 2025-02-13

## 2025-02-06 RX ADMIN — METRONIDAZOLE 500 MG: 5 INJECTION, SOLUTION INTRAVENOUS at 08:41

## 2025-02-06 RX ADMIN — CIPROFLOXACIN 400 MG: 2 INJECTION, SOLUTION INTRAVENOUS at 05:21

## 2025-02-06 RX ADMIN — CIPROFLOXACIN 400 MG: 2 INJECTION, SOLUTION INTRAVENOUS at 18:00

## 2025-02-06 RX ADMIN — METRONIDAZOLE 500 MG: 5 INJECTION, SOLUTION INTRAVENOUS at 22:40

## 2025-02-06 RX ADMIN — SODIUM CHLORIDE 300 MG: 0.9 INJECTION, SOLUTION INTRAVENOUS at 17:58

## 2025-02-06 RX ADMIN — SODIUM CHLORIDE, PRESERVATIVE FREE 10 ML: 5 INJECTION INTRAVENOUS at 08:41

## 2025-02-06 RX ADMIN — METRONIDAZOLE 500 MG: 5 INJECTION, SOLUTION INTRAVENOUS at 18:00

## 2025-02-06 RX ADMIN — THIAMINE HYDROCHLORIDE 100 MG: 100 INJECTION, SOLUTION INTRAMUSCULAR; INTRAVENOUS at 20:30

## 2025-02-06 RX ADMIN — SODIUM CHLORIDE, PRESERVATIVE FREE 40 MG: 5 INJECTION INTRAVENOUS at 20:30

## 2025-02-06 RX ADMIN — POTASSIUM CHLORIDE 40 MEQ: 1500 TABLET, EXTENDED RELEASE ORAL at 06:27

## 2025-02-06 RX ADMIN — POTASSIUM CHLORIDE: 2 INJECTION, SOLUTION, CONCENTRATE INTRAVENOUS at 19:33

## 2025-02-06 RX ADMIN — SODIUM CHLORIDE, PRESERVATIVE FREE 10 ML: 5 INJECTION INTRAVENOUS at 20:33

## 2025-02-06 RX ADMIN — MAGNESIUM SULFATE HEPTAHYDRATE 4000 MG: 40 INJECTION, SOLUTION INTRAVENOUS at 13:11

## 2025-02-06 NOTE — CONSULTS
MEDICAL ONCOLOGY CONSULTATION    Pt Name: Damian You  MRN: [id number]  YOB: 1950  Date of evaluation: 2/6/2025    REASON FOR CONSULTATION: Liver lesions concerning for metastatic disease, colonic thickening  REQUESTING PHYSICIAN: Hospitalist    History Obtained From:    patient, electronic medical record    HISTORY OF PRESENT ILLNESS:  Damian You was first seen by me on 2/6/2025 during inpatient hospitalization at [facility].  I was consulted for abnormal imaging findings suspicious for hepatic metastasis.  Patient presented to the ER department with complaints of abdominal pain.  The patient denies any nausea vomit diarrhea.  Patient reported decreased appetite and weight loss.  Laboratory studies at admission showed normocytic anemia with hemoglobin 11.5/MCV 85.  Iron profile consistent with iron deficiency anemia.  Chemistry remarkable for hypokalemia 3.1, creatinine 1.4.  LFTs abnormal.  The patient has multiple other comorbidities to include a history of hypertension, GERD, hyperlipidemia, CKD stage IIIa, coronary artery disease, gout and a prior history of non-STEMI.  Patient reports that he never had a colonoscopy.  2/5/2025-CEA 73 (H)  2/5/2025-CT abdomen/pelvis, MHL: The liver shows several poorly defined low density lesions measuring up to 1.7 cm.   The gallbladder is surgically absent.  The spleen, pancreas and adrenal glands are within normal limits. Scattered periaortic lymph nodes measure up to 0.9 cm.  No mesenteric or retroperitoneal lymphadenopathy is seen.  Scattered colonic diverticula with a short segment of sigmoid wall thickening.  No adjacent mesenteric stranding.  Gas distended colon is present to the level of the sigmoid colon.  There is no free air or fluid seen in the abdomen or pelvis.  The urinary bladder is unremarkable.  Healed right rib and pelvic fractures.  Postoperative changes involving the proximal right femur.  T12 wedge compression and L2 and L3 superior

## 2025-02-06 NOTE — CONSULTS
Consult Note            Date:2/6/2025        Patient Name:Damian You     YOB: 1950     Age:74 y.o.    Reason for consult: Abdominal pain and distention.    History of Present Illness   This is a 74-year-old male with a history of CKD 3, coronary artery disease, hypertension, nonischemic cardiomyopathy, and GERD who presented overnight with abdominal pain.  CT scan with IV contrast performed in the ED shows focal bowel wall thickening involving the sigmoid colon concerning for mass versus early diverticulitis.  Additionally there is concern for partial colonic obstruction with upstream dilation and ileus.  There is also concern for hepatic lesions, largest is 1.7 cm.  We have been asked to further evaluate.    He has never had a colonoscopy.  Hemoglobin on admission is 11.5.  His last bowel movement was earlier this week, he denies blood.  He has been having worsening abdominal distention and bloating symptoms.  He denies passing flatus over the last 24 hours.  He was seen by hematology who started IV iron replacement.    He denies a personal or family history of cancer.  Other than a hip fracture repair, he denies any other past surgical history.  He is not known to be on any blood thinning or antiplatelet medications.    Past Medical History     Past Medical History:   Diagnosis Date    CAD (coronary artery disease)     Gout     Hypertension     Hypotension 6/23/2020    Non-ST elevation MI (NSTEMI) ([facility]) 12/28/14    Palliative care patient 06/29/2020    Pneumonia due to infectious organism 6/24/2020        Past Surgical History     Past Surgical History:   Procedure Laterality Date    CARDIAC CATHETERIZATION  12/29/14  [facility]    angioplasty, stent to LAD. EF 45%    CHOLECYSTECTOMY      FEMUR FRACTURE SURGERY Right 10/30/2020    TFN, SHORT performed by Manuelito Biggs MD at [facility] OR        Medications     Prior to Admission medications    Medication Sig Start Date End Date Taking? Authorizing Provider  170.18

## 2025-02-06 NOTE — H&P (VIEW-ONLY)
No edema. Non-tender.    - NEUROLOGIC: No focal neurological deficits. CN II-XII grossly intact, but not individually tested.    - PSYCHIATRIC: Cooperative. Appropriate mood and affect.    - SKIN: No rashes, sores, or lesions.     - RECTAL: Deferred.     Labs    CBC:  Recent Labs     02/05/25  1252 02/06/25  0425   WBC 11.2* 11.0*   RBC 4.54* 4.16*   HGB 12.6* 11.5*   HCT 37.9* 35.6*   MCV 83.5 85.6   RDW 13.2 13.3    264     CHEMISTRIES:  Recent Labs     02/05/25  1252 02/06/25  0425    137   K 2.8* 3.1*   CL 99 101   CO2 23 24   BUN 15 15   CREATININE 1.2 1.4*   GLUCOSE 90 87   MG  --  1.3*     PT/INR:No results for input(s): \"PROTIME\", \"INR\" in the last 72 hours.  APTT:No results for input(s): \"APTT\" in the last 72 hours.  LIVER PROFILE:  Recent Labs     02/05/25 1252 02/06/25 0425   AST 58* 64*   ALT 43* 50*   BILITOT 1.1 0.9   ALKPHOS 214* 186*       Imaging/Diagnostics   Pertinent studies mentioned above.    Assessment & Plan:   This is a 74-year-old male with a history of CKD 3, coronary artery disease, hypertension, nonischemic cardiomyopathy, and GERD who presented overnight with abdominal pain.  CT scan with IV contrast performed in the ED shows focal bowel wall thickening involving the sigmoid colon concerning for mass versus early diverticulitis.  Additionally there is concern for partial colonic obstruction with upstream dilation and ileus.  There is also concern for hepatic lesions, largest is 1.7 cm.  We have been asked to further evaluate.    Abdominal pain, distention with CT scan concerning for partial bowel obstruction at the level of the sigmoid colon with thickening, cannot fully exclude mass.  There are also additional lesions in the liver concerning for possible metastasis.  He has never had a colonoscopy.  He does have evidence of iron deficiency anemia and is being placed on IV iron.  He is symptomatic from a bowel distention standpoint.  There is questionable ileus and small  bowel obstruction.  Placed NG tube to intermittent suction for decompression.  We will need to attempt diagnostic colonoscopy for tissue diagnosis of the sigmoid area of question.  We will assess how he does today and see if we can start a bowel regimen to have him scoped tomorrow.  Clear liquid diet okay for now.  Will determine timing of procedure based on his response to NG tube today.  Monitor and replace electrolytes (Mg and K).     Addendum: We will continue NG tube to intermittent suction over the next 24 to 48 hours.  The goal is for colonoscopy after decompression.  Plan discussed with him at bedside today.  He agrees with plan.

## 2025-02-06 NOTE — PROGRESS NOTES
Pt does not know home medications. Fills with [facility].       Electronically signed by Kelsey Flores RN on 2/5/2025 at 7:49 PM

## 2025-02-06 NOTE — PROGRESS NOTES
Damian You arrived to room # 429.   Presented with: abdominal pain  Mental Status: Patient is oriented, alert, coherent, logical, thought processes intact, and able to concentrate and follow conversation.   Vitals:    02/05/25 1813   BP: (!) 144/75   Pulse: 62   Resp: 18   Temp: 97.8 °F (36.6 °C)   SpO2: 97%     Patient safety contract and falls prevention contract reviewed with patient Yes.  Oriented Patient to room.  Call light within reach. Yes.  Needs, issues or concerns expressed at this time: no.      Electronically signed by Kelsey Flores RN on 2/5/2025 at 7:36 PM

## 2025-02-06 NOTE — CONSULTS
Advance Care Planning     Palliative Team Advance Care Planning (ACP) Conversation    Date of Conversation: 02/06/25    Individuals present for the conversation: Patient with decision making capacity     ACP documents on file prior to discussion:  -Power of  for Healthcare    Previously completed document/s not on file: Not discussed.confirms files in chart accurate     Healthcare Decision Maker:    Primary Decision Maker (Active): Ngozi Tapia - Brother/Sister - [phone]    Secondary Decision Maker: Cassie Pope - Brother/Sister - [phone]    Secondary Decision Maker: Cynthia Vasquez - Other - [phone]    Secondary Decision Maker: Darien Soto - Other - [phone]     Conversation Summary:      Resuscitation Status: FULL CODE     Documentation Completed:  -No new documents completed.    I spent 15 minutes with the patient and/or surrogate decision maker discussing the patient's wishes and goals.      Jennifer Yoo RN     Electronically signed by Jennifer Yoo RN on 2/6/2025 at 3:05 PM

## 2025-02-06 NOTE — PROGRESS NOTES
4 Eyes Skin Assessment     NAME:  Damian You  YOB: 1950  MEDICAL RECORD NUMBER:  [id number]    The patient is being assessed for  Admission    I agree that at least one RN has performed a thorough Head to Toe Skin Assessment on the patient. ALL assessment sites listed below have been assessed.      Areas assessed by both nurses:    Head, Face, Ears, Shoulders, Back, Chest, and Arms, Elbows, Hands        Does the Patient have a Wound? No noted wound(s) pt denies wounds at this time       Christiano Prevention initiated by RN: No  Wound Care Orders initiated by RN: No    Pressure Injury (Stage 3,4, Unstageable, DTI, NWPT, and Complex wounds) if present, place Wound referral order by RN under : No    New Ostomies, if present place, Ostomy referral order under : No     Nurse 1 eSignature: Electronically signed by Kelsey Flores RN on 2/5/25 at 7:37 PM CST    **SHARE this note so that the co-signing nurse can place an eSignature**    Nurse 2 eSignature: Electronically signed by Daisy Roberto RN on 2/5/25 at 8:35 PM CST

## 2025-02-06 NOTE — CONSULTS
Palliative Care:     Pt is a 73 yo male who arrived to the ED for abd pain, Pt was admitted  2.5.2025  for Abnormal CT SCAN.  Pt is sitting up in bed, positive demeanor, talkative. Pts care helper Cynthia is in room.  Pt and Cynthia state they are aware of the possibility of cancer and know he has a colonoscopy scheduled for evaluation of bowel pain. Pt recently had NG tube placed for decompression.          Past Medical History:        Past Medical History:   Diagnosis Date    CAD (coronary artery disease)     Gout     Hypertension     Hypotension 6/23/2020    Non-ST elevation MI (NSTEMI) ([facility]) 12/28/14    Palliative care patient 06/29/2020    Pneumonia due to infectious organism 6/24/2020       Advance Directives:  FULL CODE. Confirmed with pt.              Pain/Other Symptoms:   pain in abd. NG tube placed to decompress. RN aware of pain and monitoring for improvement.             Psychological/Spiritual:  Pt does not have  Restoration/ Spiritual support. States he does have large family support.                      Plan:            Patient/family discussion r/t goals:       Pt lives at home alone with one dog. Pt states he has reqular visitors and Cynthia comes in to clean and precook his meals. Pt is able to warm up meals for himself throughout the week. Pt uses cane. Pt hopes to return home at same level he left. Cynthia states she will stay with him 24/7 upon first returning home to ensure he is safe.             Palliative Performance Scale:   Current:  50%  Baseline 60%      Palliative Care team will continue to follow and support, with ongoing review of pt's goals of care.                Electronically signed by Jennifer Yoo RN on 2/6/2025 at 12:43 PM

## 2025-02-06 NOTE — PROGRESS NOTES
edema.   Skin:     General: Skin is warm and dry.   Neurological:      General: No focal deficit present.      Mental Status: He is alert and oriented to person, place, and time.             Medications:      sodium chloride      potassium chloride 20 mEq in dextrose 5 % and 0.45 % NaCl 1,000 mL IVPB 100 mL/hr at 02/06/25 0926      iron sucrose  300 mg IntraVENous Q24H    [START ON 2/8/2025] iron sucrose  400 mg IntraVENous Once    magnesium sulfate  4,000 mg IntraVENous Once    amiodarone  100 mg Oral Daily    levothyroxine  125 mcg Oral Daily    sodium chloride flush  5-40 mL IntraVENous 2 times per day    pantoprazole  40 mg Oral Daily    ciprofloxacin  400 mg IntraVENous Q12H    metroNIDAZOLE  500 mg IntraVENous Q8H    polyethylene glycol  17 g Oral BID     sodium chloride flush, sodium chloride, potassium chloride **OR** potassium alternative oral replacement **OR** potassium chloride, magnesium sulfate, ondansetron **OR** ondansetron, polyethylene glycol, acetaminophen **OR** acetaminophen  Diet NPO Exceptions are: Sips of Water with Meds     Lab and other Data:     Recent Labs     02/05/25  1252 02/06/25  0425   WBC 11.2* 11.0*   HGB 12.6* 11.5*    264     Recent Labs     02/05/25  1252 02/06/25  0425    137   K 2.8* 3.1*   CL 99 101   CO2 23 24   BUN 15 15   CREATININE 1.2 1.4*   GLUCOSE 90 87     Recent Labs     02/05/25  1252 02/06/25  0425   AST 58* 64*   ALT 43* 50*   BILITOT 1.1 0.9   ALKPHOS 214* 186*     Troponin T: No results for input(s): \"TROPONINI\" in the last 72 hours.  Pro-BNP: No results for input(s): \"BNP\" in the last 72 hours.  INR: No results for input(s): \"INR\" in the last 72 hours.  UA:  Recent Labs     02/06/25  0346   COLORU DARK YELLOW*   PHUR 5.5   WBCUA 3   RBCUA 1   BACTERIA Negative   CLARITYU Clear   LEUKOCYTESUR SMALL*   UROBILINOGEN 1.0   BILIRUBINUR Negative   BLOODU Negative   GLUCOSEU Negative     A1C: No results for input(s): \"LABA1C\" in the last 72     -Mag-1.3   -4g Mag IV ordered   -Electrolyte replacement protocol    Iron deficiency anemia   -Venofer IV x 3 days for a total of 1 g per hematology consult   -Monitor CBC daily     Resolved Problems:    * No resolved hospital problems. *    Antibiotic: Cipro/Flagyl     DVT Prophylaxis: SCD's    GI prophylaxis:  Protonix    Disposition: TBD    ADY Pereyra, 2/6/2025 10:21 AM

## 2025-02-07 ENCOUNTER — ANESTHESIA EVENT (OUTPATIENT)
Dept: OPERATING ROOM | Age: 75
End: 2025-02-07
Payer: MEDICARE

## 2025-02-07 ENCOUNTER — ANESTHESIA (OUTPATIENT)
Dept: OPERATING ROOM | Age: 75
End: 2025-02-07
Payer: MEDICARE

## 2025-02-07 ENCOUNTER — APPOINTMENT (OUTPATIENT)
Dept: GENERAL RADIOLOGY | Age: 75
End: 2025-02-07
Attending: INTERNAL MEDICINE
Payer: MEDICARE

## 2025-02-07 ENCOUNTER — APPOINTMENT (OUTPATIENT)
Dept: GENERAL RADIOLOGY | Age: 75
End: 2025-02-07
Payer: MEDICARE

## 2025-02-07 LAB
ALBUMIN SERPL-MCNC: 3 G/DL (ref 3.5–5.2)
ALP SERPL-CCNC: 178 U/L (ref 40–129)
ALT SERPL-CCNC: 44 U/L (ref 5–41)
ANION GAP SERPL CALCULATED.3IONS-SCNC: 14 MMOL/L (ref 8–16)
AST SERPL-CCNC: 50 U/L (ref 5–40)
BASOPHILS # BLD: 0.1 K/UL (ref 0–0.2)
BASOPHILS NFR BLD: 0.5 % (ref 0–1)
BILIRUB SERPL-MCNC: 0.8 MG/DL (ref 0.2–1.2)
BUN SERPL-MCNC: 14 MG/DL (ref 8–23)
CALCIUM SERPL-MCNC: 8 MG/DL (ref 8.8–10.2)
CHLORIDE SERPL-SCNC: 100 MMOL/L (ref 98–107)
CO2 SERPL-SCNC: 22 MMOL/L (ref 22–29)
CREAT SERPL-MCNC: 1.3 MG/DL (ref 0.7–1.2)
EOSINOPHIL # BLD: 0.1 K/UL (ref 0–0.6)
EOSINOPHIL NFR BLD: 0.5 % (ref 0–5)
ERYTHROCYTE [DISTWIDTH] IN BLOOD BY AUTOMATED COUNT: 13.4 % (ref 11.5–14.5)
GLUCOSE SERPL-MCNC: 100 MG/DL (ref 70–99)
HCT VFR BLD AUTO: 37.8 % (ref 42–52)
HGB BLD-MCNC: 12.2 G/DL (ref 14–18)
IMM GRANULOCYTES # BLD: 0.1 K/UL
LYMPHOCYTES # BLD: 1.3 K/UL (ref 1.1–4.5)
LYMPHOCYTES NFR BLD: 9.1 % (ref 20–40)
MAGNESIUM SERPL-MCNC: 2.2 MG/DL (ref 1.6–2.4)
MCH RBC QN AUTO: 27.7 PG (ref 27–31)
MCHC RBC AUTO-ENTMCNC: 32.3 G/DL (ref 33–37)
MCV RBC AUTO: 85.9 FL (ref 80–94)
MONOCYTES # BLD: 0.9 K/UL (ref 0–0.9)
MONOCYTES NFR BLD: 6.3 % (ref 0–10)
NEUTROPHILS # BLD: 11.8 K/UL (ref 1.5–7.5)
NEUTS SEG NFR BLD: 83.2 % (ref 50–65)
PLATELET # BLD AUTO: 281 K/UL (ref 130–400)
PMV BLD AUTO: 9.4 FL (ref 9.4–12.4)
POTASSIUM SERPL-SCNC: 3.3 MMOL/L (ref 3.5–5)
PROT SERPL-MCNC: 5.2 G/DL (ref 6.4–8.3)
RBC # BLD AUTO: 4.4 M/UL (ref 4.7–6.1)
SODIUM SERPL-SCNC: 136 MMOL/L (ref 136–145)
WBC # BLD AUTO: 14.1 K/UL (ref 4.8–10.8)

## 2025-02-07 PROCEDURE — 2580000003 HC RX 258: Performed by: NURSE ANESTHETIST, CERTIFIED REGISTERED

## 2025-02-07 PROCEDURE — 99232 SBSQ HOSP IP/OBS MODERATE 35: CPT | Performed by: INTERNAL MEDICINE

## 2025-02-07 PROCEDURE — 3700000001 HC ADD 15 MINUTES (ANESTHESIA): Performed by: SPECIALIST

## 2025-02-07 PROCEDURE — 2500000003 HC RX 250 WO HCPCS: Performed by: NURSE ANESTHETIST, CERTIFIED REGISTERED

## 2025-02-07 PROCEDURE — 6370000000 HC RX 637 (ALT 250 FOR IP): Performed by: NURSE PRACTITIONER

## 2025-02-07 PROCEDURE — 3600000014 HC SURGERY LEVEL 4 ADDTL 15MIN: Performed by: SPECIALIST

## 2025-02-07 PROCEDURE — 0DN80ZZ RELEASE SMALL INTESTINE, OPEN APPROACH: ICD-10-PCS | Performed by: SPECIALIST

## 2025-02-07 PROCEDURE — 7100000001 HC PACU RECOVERY - ADDTL 15 MIN: Performed by: SPECIALIST

## 2025-02-07 PROCEDURE — 3600000004 HC SURGERY LEVEL 4 BASE: Performed by: SPECIALIST

## 2025-02-07 PROCEDURE — 80053 COMPREHEN METABOLIC PANEL: CPT

## 2025-02-07 PROCEDURE — 2580000003 HC RX 258: Performed by: INTERNAL MEDICINE

## 2025-02-07 PROCEDURE — 83735 ASSAY OF MAGNESIUM: CPT

## 2025-02-07 PROCEDURE — 2580000003 HC RX 258: Performed by: NURSE PRACTITIONER

## 2025-02-07 PROCEDURE — 6360000002 HC RX W HCPCS: Performed by: INTERNAL MEDICINE

## 2025-02-07 PROCEDURE — 94760 N-INVAS EAR/PLS OXIMETRY 1: CPT

## 2025-02-07 PROCEDURE — 6360000002 HC RX W HCPCS: Performed by: ANESTHESIOLOGY

## 2025-02-07 PROCEDURE — 0D1M0Z4 BYPASS DESCENDING COLON TO CUTANEOUS, OPEN APPROACH: ICD-10-PCS | Performed by: SPECIALIST

## 2025-02-07 PROCEDURE — 2720000010 HC SURG SUPPLY STERILE: Performed by: SPECIALIST

## 2025-02-07 PROCEDURE — 3700000000 HC ANESTHESIA ATTENDED CARE: Performed by: SPECIALIST

## 2025-02-07 PROCEDURE — 2700000000 HC OXYGEN THERAPY PER DAY

## 2025-02-07 PROCEDURE — 7100000000 HC PACU RECOVERY - FIRST 15 MIN: Performed by: SPECIALIST

## 2025-02-07 PROCEDURE — 6360000002 HC RX W HCPCS: Performed by: NURSE ANESTHETIST, CERTIFIED REGISTERED

## 2025-02-07 PROCEDURE — 2709999900 HC NON-CHARGEABLE SUPPLY: Performed by: SPECIALIST

## 2025-02-07 PROCEDURE — 2580000003 HC RX 258: Performed by: ANESTHESIOLOGY

## 2025-02-07 PROCEDURE — 74018 RADEX ABDOMEN 1 VIEW: CPT

## 2025-02-07 PROCEDURE — 85025 COMPLETE CBC W/AUTO DIFF WBC: CPT

## 2025-02-07 PROCEDURE — 94150 VITAL CAPACITY TEST: CPT

## 2025-02-07 PROCEDURE — 1200000000 HC SEMI PRIVATE

## 2025-02-07 PROCEDURE — 6360000002 HC RX W HCPCS: Performed by: NURSE PRACTITIONER

## 2025-02-07 PROCEDURE — 6360000002 HC RX W HCPCS: Performed by: SPECIALIST

## 2025-02-07 PROCEDURE — 36415 COLL VENOUS BLD VENIPUNCTURE: CPT

## 2025-02-07 PROCEDURE — 2500000003 HC RX 250 WO HCPCS: Performed by: NURSE PRACTITIONER

## 2025-02-07 PROCEDURE — 6370000000 HC RX 637 (ALT 250 FOR IP): Performed by: INTERNAL MEDICINE

## 2025-02-07 RX ORDER — ROCURONIUM BROMIDE 10 MG/ML
INJECTION, SOLUTION INTRAVENOUS
Status: DISCONTINUED | OUTPATIENT
Start: 2025-02-07 | End: 2025-02-07 | Stop reason: SDUPTHER

## 2025-02-07 RX ORDER — ATORVASTATIN CALCIUM 20 MG/1
20 TABLET, FILM COATED ORAL EVERY EVENING
Status: DISCONTINUED | OUTPATIENT
Start: 2025-02-07 | End: 2025-02-07

## 2025-02-07 RX ORDER — SODIUM CHLORIDE 0.9 % (FLUSH) 0.9 %
5-40 SYRINGE (ML) INJECTION PRN
Status: DISCONTINUED | OUTPATIENT
Start: 2025-02-07 | End: 2025-02-07 | Stop reason: HOSPADM

## 2025-02-07 RX ORDER — METOPROLOL SUCCINATE 25 MG/1
12.5 TABLET, EXTENDED RELEASE ORAL DAILY
Status: DISCONTINUED | OUTPATIENT
Start: 2025-02-07 | End: 2025-02-14 | Stop reason: HOSPADM

## 2025-02-07 RX ORDER — SODIUM CHLORIDE 0.9 % (FLUSH) 0.9 %
5-40 SYRINGE (ML) INJECTION PRN
Status: DISCONTINUED | OUTPATIENT
Start: 2025-02-07 | End: 2025-02-10

## 2025-02-07 RX ORDER — SODIUM CHLORIDE, SODIUM LACTATE, POTASSIUM CHLORIDE, CALCIUM CHLORIDE 600; 310; 30; 20 MG/100ML; MG/100ML; MG/100ML; MG/100ML
INJECTION, SOLUTION INTRAVENOUS CONTINUOUS
Status: DISCONTINUED | OUTPATIENT
Start: 2025-02-07 | End: 2025-02-10

## 2025-02-07 RX ORDER — ONDANSETRON 2 MG/ML
INJECTION INTRAMUSCULAR; INTRAVENOUS
Status: DISCONTINUED | OUTPATIENT
Start: 2025-02-07 | End: 2025-02-07 | Stop reason: SDUPTHER

## 2025-02-07 RX ORDER — ONDANSETRON 2 MG/ML
4 INJECTION INTRAMUSCULAR; INTRAVENOUS
Status: DISCONTINUED | OUTPATIENT
Start: 2025-02-07 | End: 2025-02-07 | Stop reason: HOSPADM

## 2025-02-07 RX ORDER — FENTANYL CITRATE 50 UG/ML
INJECTION, SOLUTION INTRAMUSCULAR; INTRAVENOUS
Status: DISCONTINUED | OUTPATIENT
Start: 2025-02-07 | End: 2025-02-07 | Stop reason: SDUPTHER

## 2025-02-07 RX ORDER — HYDROMORPHONE HYDROCHLORIDE 1 MG/ML
0.25 INJECTION, SOLUTION INTRAMUSCULAR; INTRAVENOUS; SUBCUTANEOUS EVERY 5 MIN PRN
Status: DISCONTINUED | OUTPATIENT
Start: 2025-02-07 | End: 2025-02-07 | Stop reason: HOSPADM

## 2025-02-07 RX ORDER — SODIUM CHLORIDE, SODIUM LACTATE, POTASSIUM CHLORIDE, CALCIUM CHLORIDE 600; 310; 30; 20 MG/100ML; MG/100ML; MG/100ML; MG/100ML
INJECTION, SOLUTION INTRAVENOUS
Status: DISCONTINUED | OUTPATIENT
Start: 2025-02-07 | End: 2025-02-07 | Stop reason: SDUPTHER

## 2025-02-07 RX ORDER — MORPHINE SULFATE 4 MG/ML
2 INJECTION, SOLUTION INTRAMUSCULAR; INTRAVENOUS
Status: DISCONTINUED | OUTPATIENT
Start: 2025-02-07 | End: 2025-02-14 | Stop reason: HOSPADM

## 2025-02-07 RX ORDER — SODIUM CHLORIDE 9 MG/ML
INJECTION, SOLUTION INTRAVENOUS PRN
Status: DISCONTINUED | OUTPATIENT
Start: 2025-02-07 | End: 2025-02-07 | Stop reason: HOSPADM

## 2025-02-07 RX ORDER — SODIUM CHLORIDE 0.9 % (FLUSH) 0.9 %
5-40 SYRINGE (ML) INJECTION EVERY 12 HOURS SCHEDULED
Status: DISCONTINUED | OUTPATIENT
Start: 2025-02-07 | End: 2025-02-10

## 2025-02-07 RX ORDER — SODIUM CHLORIDE 0.9 % (FLUSH) 0.9 %
5-40 SYRINGE (ML) INJECTION EVERY 12 HOURS SCHEDULED
Status: DISCONTINUED | OUTPATIENT
Start: 2025-02-07 | End: 2025-02-07 | Stop reason: HOSPADM

## 2025-02-07 RX ORDER — MORPHINE SULFATE 4 MG/ML
4 INJECTION, SOLUTION INTRAMUSCULAR; INTRAVENOUS
Status: DISCONTINUED | OUTPATIENT
Start: 2025-02-07 | End: 2025-02-14 | Stop reason: HOSPADM

## 2025-02-07 RX ORDER — DEXAMETHASONE SODIUM PHOSPHATE 10 MG/ML
INJECTION, SOLUTION INTRAMUSCULAR; INTRAVENOUS
Status: DISCONTINUED | OUTPATIENT
Start: 2025-02-07 | End: 2025-02-07 | Stop reason: SDUPTHER

## 2025-02-07 RX ORDER — SODIUM CHLORIDE 9 MG/ML
INJECTION, SOLUTION INTRAVENOUS PRN
Status: DISCONTINUED | OUTPATIENT
Start: 2025-02-07 | End: 2025-02-10

## 2025-02-07 RX ORDER — EPHEDRINE SULFATE/0.9% NACL/PF 25 MG/5 ML
SYRINGE (ML) INTRAVENOUS
Status: DISCONTINUED | OUTPATIENT
Start: 2025-02-07 | End: 2025-02-07 | Stop reason: SDUPTHER

## 2025-02-07 RX ORDER — PROPOFOL 10 MG/ML
INJECTION, EMULSION INTRAVENOUS
Status: DISCONTINUED | OUTPATIENT
Start: 2025-02-07 | End: 2025-02-07 | Stop reason: SDUPTHER

## 2025-02-07 RX ORDER — LIDOCAINE HYDROCHLORIDE 10 MG/ML
INJECTION, SOLUTION EPIDURAL; INFILTRATION; INTRACAUDAL; PERINEURAL
Status: DISCONTINUED | OUTPATIENT
Start: 2025-02-07 | End: 2025-02-07 | Stop reason: SDUPTHER

## 2025-02-07 RX ORDER — HYDROMORPHONE HYDROCHLORIDE 1 MG/ML
0.5 INJECTION, SOLUTION INTRAMUSCULAR; INTRAVENOUS; SUBCUTANEOUS EVERY 5 MIN PRN
Status: DISCONTINUED | OUTPATIENT
Start: 2025-02-07 | End: 2025-02-07 | Stop reason: HOSPADM

## 2025-02-07 RX ORDER — NALOXONE HYDROCHLORIDE 0.4 MG/ML
INJECTION, SOLUTION INTRAMUSCULAR; INTRAVENOUS; SUBCUTANEOUS PRN
Status: DISCONTINUED | OUTPATIENT
Start: 2025-02-07 | End: 2025-02-07 | Stop reason: HOSPADM

## 2025-02-07 RX ORDER — METOPROLOL TARTRATE 1 MG/ML
5 INJECTION, SOLUTION INTRAVENOUS EVERY 12 HOURS
Status: DISCONTINUED | OUTPATIENT
Start: 2025-02-07 | End: 2025-02-10

## 2025-02-07 RX ADMIN — FENTANYL CITRATE 100 MCG: 0.05 INJECTION, SOLUTION INTRAMUSCULAR; INTRAVENOUS at 16:36

## 2025-02-07 RX ADMIN — POLYETHYLENE GLYCOL 3350 17 G: 17 POWDER, FOR SOLUTION ORAL at 09:14

## 2025-02-07 RX ADMIN — LIDOCAINE HYDROCHLORIDE 50 MG: 10 INJECTION, SOLUTION EPIDURAL; INFILTRATION; INTRACAUDAL; PERINEURAL at 16:36

## 2025-02-07 RX ADMIN — MORPHINE SULFATE 4 MG: 4 INJECTION, SOLUTION INTRAMUSCULAR; INTRAVENOUS at 19:34

## 2025-02-07 RX ADMIN — METRONIDAZOLE 500 MG: 5 INJECTION, SOLUTION INTRAVENOUS at 22:48

## 2025-02-07 RX ADMIN — THIAMINE HYDROCHLORIDE 100 MG: 100 INJECTION, SOLUTION INTRAMUSCULAR; INTRAVENOUS at 09:14

## 2025-02-07 RX ADMIN — HYDROMORPHONE HYDROCHLORIDE 0.5 MG: 1 INJECTION, SOLUTION INTRAMUSCULAR; INTRAVENOUS; SUBCUTANEOUS at 18:58

## 2025-02-07 RX ADMIN — LEVOTHYROXINE SODIUM 125 MCG: 0.12 TABLET ORAL at 09:17

## 2025-02-07 RX ADMIN — EPHEDRINE SULFATE 15 MG: 5 INJECTION INTRAVENOUS at 16:52

## 2025-02-07 RX ADMIN — SODIUM CHLORIDE, SODIUM LACTATE, POTASSIUM CHLORIDE, AND CALCIUM CHLORIDE: 600; 310; 30; 20 INJECTION, SOLUTION INTRAVENOUS at 16:30

## 2025-02-07 RX ADMIN — EPHEDRINE SULFATE 5 MG: 5 INJECTION INTRAVENOUS at 16:44

## 2025-02-07 RX ADMIN — PROPOFOL 120 MG: 10 INJECTION, EMULSION INTRAVENOUS at 16:36

## 2025-02-07 RX ADMIN — HYDROMORPHONE HYDROCHLORIDE 1 MG: 1 INJECTION, SOLUTION INTRAMUSCULAR; INTRAVENOUS; SUBCUTANEOUS at 18:12

## 2025-02-07 RX ADMIN — ROCURONIUM BROMIDE 50 MG: 10 INJECTION, SOLUTION INTRAVENOUS at 16:36

## 2025-02-07 RX ADMIN — METRONIDAZOLE 500 MG: 5 INJECTION, SOLUTION INTRAVENOUS at 17:14

## 2025-02-07 RX ADMIN — CIPROFLOXACIN 400 MG: 2 INJECTION, SOLUTION INTRAVENOUS at 04:51

## 2025-02-07 RX ADMIN — SUGAMMADEX 300 MG: 100 INJECTION, SOLUTION INTRAVENOUS at 18:12

## 2025-02-07 RX ADMIN — SODIUM CHLORIDE, POTASSIUM CHLORIDE, SODIUM LACTATE AND CALCIUM CHLORIDE: 600; 310; 30; 20 INJECTION, SOLUTION INTRAVENOUS at 19:28

## 2025-02-07 RX ADMIN — ONDANSETRON 4 MG: 2 INJECTION INTRAMUSCULAR; INTRAVENOUS at 16:46

## 2025-02-07 RX ADMIN — SODIUM CHLORIDE 300 MG: 0.9 INJECTION, SOLUTION INTRAVENOUS at 09:13

## 2025-02-07 RX ADMIN — METRONIDAZOLE 500 MG: 5 INJECTION, SOLUTION INTRAVENOUS at 06:33

## 2025-02-07 RX ADMIN — ROCURONIUM BROMIDE 30 MG: 10 INJECTION, SOLUTION INTRAVENOUS at 17:25

## 2025-02-07 RX ADMIN — SODIUM CHLORIDE, PRESERVATIVE FREE 10 ML: 5 INJECTION INTRAVENOUS at 10:02

## 2025-02-07 RX ADMIN — METOPROLOL TARTRATE 5 MG: 5 INJECTION INTRAVENOUS at 15:35

## 2025-02-07 RX ADMIN — EPHEDRINE SULFATE 5 MG: 5 INJECTION INTRAVENOUS at 16:41

## 2025-02-07 RX ADMIN — AMIODARONE HYDROCHLORIDE 100 MG: 200 TABLET ORAL at 09:16

## 2025-02-07 RX ADMIN — SODIUM CHLORIDE, PRESERVATIVE FREE 40 MG: 5 INJECTION INTRAVENOUS at 09:13

## 2025-02-07 RX ADMIN — MORPHINE SULFATE 4 MG: 4 INJECTION, SOLUTION INTRAMUSCULAR; INTRAVENOUS at 22:44

## 2025-02-07 RX ADMIN — CIPROFLOXACIN 400 MG: 2 INJECTION, SOLUTION INTRAVENOUS at 17:24

## 2025-02-07 RX ADMIN — DEXAMETHASONE SODIUM PHOSPHATE 10 MG: 10 INJECTION, SOLUTION INTRAMUSCULAR; INTRAVENOUS at 16:46

## 2025-02-07 ASSESSMENT — PAIN SCALES - GENERAL
PAINLEVEL_OUTOF10: 2
PAINLEVEL_OUTOF10: 3
PAINLEVEL_OUTOF10: 3
PAINLEVEL_OUTOF10: 7
PAINLEVEL_OUTOF10: 7
PAINLEVEL_OUTOF10: 6

## 2025-02-07 ASSESSMENT — PAIN - FUNCTIONAL ASSESSMENT
PAIN_FUNCTIONAL_ASSESSMENT: PREVENTS OR INTERFERES SOME ACTIVE ACTIVITIES AND ADLS
PAIN_FUNCTIONAL_ASSESSMENT: PREVENTS OR INTERFERES SOME ACTIVE ACTIVITIES AND ADLS
PAIN_FUNCTIONAL_ASSESSMENT: NONE - DENIES PAIN

## 2025-02-07 ASSESSMENT — PAIN DESCRIPTION - PAIN TYPE
TYPE: SURGICAL PAIN
TYPE: SURGICAL PAIN

## 2025-02-07 ASSESSMENT — PAIN DESCRIPTION - FREQUENCY
FREQUENCY: INTERMITTENT
FREQUENCY: INTERMITTENT

## 2025-02-07 ASSESSMENT — PAIN DESCRIPTION - ORIENTATION
ORIENTATION: MID

## 2025-02-07 ASSESSMENT — PAIN DESCRIPTION - LOCATION
LOCATION: ABDOMEN

## 2025-02-07 ASSESSMENT — PAIN DESCRIPTION - DESCRIPTORS
DESCRIPTORS: DISCOMFORT;SORE
DESCRIPTORS: DISCOMFORT;SORE
DESCRIPTORS: DISCOMFORT
DESCRIPTORS: SORE

## 2025-02-07 ASSESSMENT — PAIN DESCRIPTION - ONSET
ONSET: ON-GOING
ONSET: ON-GOING

## 2025-02-07 ASSESSMENT — LIFESTYLE VARIABLES: SMOKING_STATUS: 0

## 2025-02-07 NOTE — PROGRESS NOTES
Progress Note            Date:2/7/2025        Patient Name:Damian You     YOB: 1950     Age:74 y.o.    CC: Follow-up partial bowel obstruction.    Some dark soft stools overnight.  NG tube to intermittent suction placed yesterday.  Still with significant bowel distention and air on KUB this morning.      Physical Exam   /76   Pulse 68   Temp 98.7 °F (37.1 °C) (Temporal)   Resp 16   Ht 1.829 m (6')   Wt 74.8 kg (165 lb)   SpO2 93%   BMI 22.38 kg/m²      - GENERAL: Alert and oriented x 3. No acute distress. Well-nourished.    - EYES: EOMI. Anicteric.    - HENT: Moist mucous membranes. No cervical lymphadenopathy.  NG tube in place.    - LUNGS: Clear to auscultation bilaterally. No accessory muscle use.    - CARDIOVASCULAR: Regular rate and rhythm. No murmur. No JVD.    - ABDOMEN: Firm, tender to palpation, non tympanic, no rebound or rigidity.  No palpable masses.    - EXTREMITIES: No edema. Non-tender.?      Labs    CBC:  Recent Labs     02/05/25  1252 02/06/25 0425 02/07/25  0210   WBC 11.2* 11.0* 14.1*   RBC 4.54* 4.16* 4.40*   HGB 12.6* 11.5* 12.2*   HCT 37.9* 35.6* 37.8*   MCV 83.5 85.6 85.9   RDW 13.2 13.3 13.4    264 281     CHEMISTRIES:  Recent Labs     02/05/25  1252 02/06/25  0425 02/07/25  0210    137 136   K 2.8* 3.1* 3.3*   CL 99 101 100   CO2 23 24 22   BUN 15 15 14   CREATININE 1.2 1.4* 1.3*   GLUCOSE 90 87 100*   MG  --  1.3* 2.2     PT/INR:No results for input(s): \"PROTIME\", \"INR\" in the last 72 hours.  APTT:No results for input(s): \"APTT\" in the last 72 hours.  LIVER PROFILE:  Recent Labs     02/05/25  1252 02/06/25  0425 02/07/25  0210   AST 58* 64* 50*   ALT 43* 50* 44*   BILITOT 1.1 0.9 0.8   ALKPHOS 214* 186* 178*         Assessment & Plan:   This is a 74-year-old male with a history of CKD 3, coronary artery disease, hypertension, nonischemic cardiomyopathy, and GERD who presented overnight with abdominal pain.  CT scan with IV contrast performed in

## 2025-02-07 NOTE — ANESTHESIA PRE PROCEDURE
Hrs         Neuro/Psych:   (+) TIA (2 yrs ago)   (-) seizures and CVA           GI/Hepatic/Renal:   (+) GERD:     (-) liver disease and no renal disease       Endo/Other:        (-) diabetes mellitus, hypothyroidism, hyperthyroidism               Abdominal:             Vascular:     - DVT.      Other Findings: NG tube in place        Anesthesia Plan      general     ASA 3     (Preop famotidine  RSI, full-stomach precautions)  Induction: intravenous.    MIPS: Postoperative opioids intended and Prophylactic antiemetics administered.  Anesthetic plan and risks discussed with patient.    Use of blood products discussed with patient whom consented to blood products.                  Too Liu MD   2/7/2025

## 2025-02-07 NOTE — PROGRESS NOTES
MEDICAL ONCOLOGY PROGRESS NOTE     Pt Name: Damian You  MRN: [id number]  YOB: 1950  Date of evaluation: 2/7/2025    Subjective-he denies new complaints 1.  Reviewed interval notes GI.  Plans for eventual colonoscopy.  NG tube placed for decompression    Issues addressed during this visit  Colonic obstruction-suspect underlying malignancy  Liver lesions, likely metastatic disease  Elevated CEA      HISTORY OF PRESENT ILLNESS:  Damian You was first seen by me on 2/6/2025 during inpatient hospitalization at [facility].  I was consulted for abnormal imaging findings suspicious for hepatic metastasis.  Patient presented to the ER department with complaints of abdominal pain.  The patient denies any nausea vomit diarrhea.  Patient reported decreased appetite and weight loss.  Laboratory studies at admission showed normocytic anemia with hemoglobin 11.5/MCV 85.  Iron profile consistent with iron deficiency anemia.  Chemistry remarkable for hypokalemia 3.1, creatinine 1.4.  LFTs abnormal.  The patient has multiple other comorbidities to include a history of hypertension, GERD, hyperlipidemia, CKD stage IIIa, coronary artery disease, gout and a prior history of non-STEMI.  Patient reports that he never had a colonoscopy.  2/5/2025-CEA 73 (H)  2/5/2025-CT abdomen/pelvis, MHL: The liver shows several poorly defined low density lesions measuring up to 1.7 cm.   The gallbladder is surgically absent.  The spleen, pancreas and adrenal glands are within normal limits. Scattered periaortic lymph nodes measure up to 0.9 cm.  No mesenteric or retroperitoneal lymphadenopathy is seen.  Scattered colonic diverticula with a short segment of sigmoid wall thickening.  No adjacent mesenteric stranding.  Gas distended colon is present to the level of the sigmoid colon.  There is no free air or fluid seen in the abdomen or pelvis.  The urinary bladder is unremarkable.  Healed right rib and pelvic fractures.

## 2025-02-07 NOTE — PROGRESS NOTES
[facility]      Patient:  Damian You  YOB: 1950  Date of Service: 2/7/2025  MRN: [id number]   Acct: [id number]   Primary Care Physician: Ramona Smith MD  Advance Directive: Full Code  Admit Date: 2/5/2025       Hospital Day: 2  Portions of this note have been copied forward, however, changed to reflect the most current clinical status of this patient.    CHIEF COMPLAINT Abdominal pain    SUBJECTIVE:  He states that his pain is unchanged and remains intermittent. NGT continues due to concern for bowel obstruction. He reports 1 incontinent stool overnight. Denies nausea.     CUMULATIVE HOSPITAL STAY:  The patient is a 74 y.o. male with a past MH of CAD, HTN, NICM, GERD and HLD, and CKD 3A who presented to [facility] ED on 2/5/2025 complaining of abdominal pain.  He stated that abdominal pain began on the evening prior to admission.  He describes it as periumbilical, intermittent, sharp shooting pain.  Nothing relieved his pain or made it worse.  He denied nausea, vomiting or diarrhea.  He denied melena or hematochezia.  He denied rectal bleeding.  He denied fever or chills.  He had not had any suspicious foods or recent ill contacts.  No recent travel.  He has never had a colonoscopy.  Further ED workup [id number], K-2.8, BUN 15 creatinine 1.2.  Lactic acid 1.2.  Alk phos 214, ALT 43 and AST 58.  Bilirubin 1.1.  WBC 11.2, H&H 12.6/37.9 with a platelet count of 313.  CT of the abdomen pelvis with contrast revealed focal bowel wall thickening involving the sigmoid colon favoring mass versus diverticular change versus early acute diverticulitis.  An ileus versus partial colonic obstruction's extending to the level of the sigmoid colon.  Liver shows several poorly defined low-density lesions concerning for metastasis.  Upper limits of normal retroperitoneal lymph nodes.  The patient will be admitted to hospital medicine for abdominal pain and abnormal abdominal CT with a GI  - Avoid nephrotoxic agents      Hypokalemia              -K-3.3              -K added to IVF's              -Electrolyte replacement protocol              -Repeats in the AM    Hypomagnesemia    -Mag-2.2   -Electrolyte replacement protocol   -Monitor daily    Iron deficiency anemia   -Venofer IV x 3 days for a total of 1 g per hematology consult   -Monitor CBC daily     Resolved Problems:    * No resolved hospital problems. *    Antibiotic: Cipro/Flagyl     DVT Prophylaxis: SCD's    GI prophylaxis:  Protonix    Disposition: ADY Menchaca, 2/7/2025 2:08 PM

## 2025-02-07 NOTE — CONSULTS
Chief complaint is severe crampy abdominal pain with distention    Patient is a 74-year-old male admitted approximately 3 days ago due to crampy abdominal pain decreased appetite and abdominal distention his evaluation in the emergency room was significant for CT scan that showed inflammatory changes in the sigmoid colon along with lesions in the liver consistent with metastatic disease multiple consults have been obtained since his admission at present the oncologist is waiting for a tissue diagnosis gastroenterologist is not sure that she can perform an colonoscopy and obtain tissue meanwhile the patient is in considerable pain has a nasogastric tube which is draining very little he appears quite uncomfortable and his abdomen is severely distended patient states that he had a small bowel movement last night but there is no description and no understanding of what it is meant to have a bowel movement on the part of the patient he continues to be in significant pain distention and x-rays that are consistent with a colonic obstruction    Past medical history  1.  Prior history of myocardial infarction  2.  Coronary artery disease  3.  Hypertension  4.  Gout  5.  History of pneumonia    Past surgical history  1.  Cardiac catheterization with angioplasty and stent placement  2.  Cholecystectomy  3.  Femur fracture repair in 2020    Medications  metoprolol succinate (TOPROL XL) 25 MG extended release tablet TAKE 1/2 TABLET BY MOUTH DAILY Nena Snell APRN Ordered and Held   Ordered as: metoprolol succinate (TOPROL XL) extended release tablet 12.5 mg 12.5 mg, Oral, DAILY, First dose on Fri 2/7/25 at 1430, Until Discontinued Do not crush or chew. Medication held indefinitely      folic acid (FOLVITE) 1 MG tablet TAKE 1 TABLET BY MOUTH DAILY Nena Snell APRN Not Ordered   pantoprazole (PROTONIX) 40 MG tablet TAKE 1 TABLET BY MOUTH DAILY Nena Snell APRN Reordered   Ordered as: pantoprazole (PROTONIX)  amount of distress with a nasogastric tube in place draining what appears to basically be saliva  Vital signs are stable he is afebrile  HEENT normocephalic atraumatic extract muscles intact pupils equal round reactive to light and accommodate oropharynx is clear with dry sticky mucosa neck supple without adenopathy  Chest clear to auscultation percussion  Cardiovascular regular rate and rhythm without murmur gallop no JVD or pedal edema  Abdomen is distended taunt tympanitic with severe tenderness in the right lateral and right upper quadrant with palpable colon he has guarding and rebound no CVA tenderness rectal examination is deferred  Musculoskeletal strength 5/5  Neurologically cranial nerves II through XII intact D10 reflexes are 2+ and symmetric  Laboratories sodium 136 potassium 3.3 BUN 14 creatinine 1.3 glucose of 100 calcium of 8 alk phos LFTs and mildly elevated white count of 14,000 and H&H of 12 and 37 urine analysis is negative except for a very dark urine  Single view of the abdomen shows air and fluid levels in both large and small bowel with significant distention of the cecum all consistent with a colonic obstruction  CT scan done 2-1/2 days ago shows focal wall wall thickening in the sigmoid colon favoring mass he has hepatic metastases significant distention of the entire colon consistent with a colonic obstruction  Assessment and plan this is a unfortunate gentleman who appears to have colonic adenocarcinoma with hepatic metastases who signs and symptoms are consistent with a high-grade colonic obstruction he appears to have peritoneal findings I will bring him to the operating room on an emergent basis for an abdominal exploration potential descending loop colostomy for decompression as far as the issue concerning tissue that can be addressed in his postoperative period

## 2025-02-07 NOTE — CARE COORDINATION
Discharge Plan? (P) Yes    ADRIAN FOWLER  Case Management Department  Electronically signed by ADRIAN FOWLER on 2/7/2025 at 2:29 PM         02/07/25 1423   Service Assessment   Patient Orientation Alert and Oriented;Person;Place;Situation;Self   Cognition Alert   History Provided By Patient   Primary Caregiver Self   Accompanied By/Relationship N/A   Support Systems Family Members   Patient's Healthcare Decision Maker is: Legal Next of Kin   PCP Verified by CM Yes   Last Visit to PCP Within last 6 months  (PATIENT STATES HE HAS AN UPCOMING APT WITH [facility] IN MARCH.)   Prior Functional Level Independent in ADLs/IADLs   Current Functional Level Independent in ADLs/IADLs   Can patient return to prior living arrangement Yes   Ability to make needs known: Good   Family able to assist with home care needs: Yes   Would you like for me to discuss the discharge plan with any other family members/significant others, and if so, who? Yes   Financial Resources Medicare   Community Resources None   CM/SW Referral Emotional support   Social/Functional History   Lives With Family   Type of Home House   Home Layout One level   Home Access Stairs to enter with rails   Entrance Stairs - Number of Steps N/A   Entrance Stairs - Rails Both   Bathroom Shower/Tub Tub/Shower unit   Bathroom Toilet Standard   Bathroom Equipment None   Bathroom Accessibility Accessible   Home Equipment Cane - Quad   Receives Help From Family;Friend(s)   Prior Level of Assist for ADLs Independent   Prior Level of Assist for Homemaking Independent   Homemaking Responsibilities Yes   Ambulation Assistance Independent   Prior Level of Assist for Transfers Independent   Active  Yes   Mode of Transportation Car   Education N/A   Occupation Retired   Type of Occupation N/A   Discharge Planning   Type of Residence House   Living Arrangements Family Members   Current Services Prior To Admission None   Potential Assistance Needed N/A   DME Ordered? No    Potential Assistance Purchasing Medications No   Type of Home Care Services None   Patient expects to be discharged to: House   Follow Up Appointment: Best Day/Time  Thursday AM   One/Two Story Residence One story   History of falls? 0   Services At/After Discharge   Transition of Care Consult (CM Consult) N/A   Services At/After Discharge None   [place] Resource Information Provided? No   Mode of Transport at Discharge Self   Hospital Transport Time of Discharge   (UNKNOWN TIME OF D/C AT THIS TIME.)   Confirm Follow Up Transport Family   Condition of Participation: Discharge Planning   The Plan for Transition of Care is related to the following treatment goals: HOPES TO RETURN HOME ONCE STABLE. TO FOLLOW UP FOR HIS 6 MONTHS APT IN MARCH.   The Patient and/or Patient Representative was provided with a Choice of Provider? Patient   The Patient and/Or Patient Representative agree with the Discharge Plan? Yes   Freedom of Choice list was provided with basic dialogue that supports the patient's individualized plan of care/goals, treatment preferences, and shares the quality data associated with the providers?  No

## 2025-02-08 LAB
ALBUMIN SERPL-MCNC: 2.7 G/DL (ref 3.5–5.2)
ALP SERPL-CCNC: 164 U/L (ref 40–129)
ALT SERPL-CCNC: 33 U/L (ref 5–41)
ANION GAP SERPL CALCULATED.3IONS-SCNC: 18 MMOL/L (ref 8–16)
AST SERPL-CCNC: 36 U/L (ref 5–40)
BASOPHILS # BLD: 0 K/UL (ref 0–0.2)
BASOPHILS NFR BLD: 0.1 % (ref 0–1)
BILIRUB SERPL-MCNC: 0.6 MG/DL (ref 0.2–1.2)
BUN SERPL-MCNC: 18 MG/DL (ref 8–23)
CALCIUM SERPL-MCNC: 8.2 MG/DL (ref 8.8–10.2)
CHLORIDE SERPL-SCNC: 99 MMOL/L (ref 98–107)
CO2 SERPL-SCNC: 18 MMOL/L (ref 22–29)
CREAT SERPL-MCNC: 1.7 MG/DL (ref 0.7–1.2)
EOSINOPHIL # BLD: 0 K/UL (ref 0–0.6)
EOSINOPHIL NFR BLD: 0 % (ref 0–5)
ERYTHROCYTE [DISTWIDTH] IN BLOOD BY AUTOMATED COUNT: 13.8 % (ref 11.5–14.5)
GLUCOSE SERPL-MCNC: 113 MG/DL (ref 70–99)
HCT VFR BLD AUTO: 37.9 % (ref 42–52)
HGB BLD-MCNC: 11.9 G/DL (ref 14–18)
IMM GRANULOCYTES # BLD: 0.1 K/UL
LYMPHOCYTES # BLD: 0.3 K/UL (ref 1.1–4.5)
LYMPHOCYTES NFR BLD: 1.8 % (ref 20–40)
MCH RBC QN AUTO: 27.7 PG (ref 27–31)
MCHC RBC AUTO-ENTMCNC: 31.4 G/DL (ref 33–37)
MCV RBC AUTO: 88.1 FL (ref 80–94)
MONOCYTES # BLD: 0.3 K/UL (ref 0–0.9)
MONOCYTES NFR BLD: 1.5 % (ref 0–10)
NEUTROPHILS # BLD: 18.1 K/UL (ref 1.5–7.5)
NEUTS SEG NFR BLD: 96.1 % (ref 50–65)
PLATELET # BLD AUTO: 311 K/UL (ref 130–400)
PMV BLD AUTO: 9.6 FL (ref 9.4–12.4)
POTASSIUM SERPL-SCNC: 4.2 MMOL/L (ref 3.5–5)
PROT SERPL-MCNC: 5.5 G/DL (ref 6.4–8.3)
RBC # BLD AUTO: 4.3 M/UL (ref 4.7–6.1)
SODIUM SERPL-SCNC: 135 MMOL/L (ref 136–145)
WBC # BLD AUTO: 18.9 K/UL (ref 4.8–10.8)

## 2025-02-08 PROCEDURE — 2580000003 HC RX 258: Performed by: SPECIALIST

## 2025-02-08 PROCEDURE — 2580000003 HC RX 258: Performed by: ANESTHESIOLOGY

## 2025-02-08 PROCEDURE — 87040 BLOOD CULTURE FOR BACTERIA: CPT

## 2025-02-08 PROCEDURE — 99233 SBSQ HOSP IP/OBS HIGH 50: CPT | Performed by: INTERNAL MEDICINE

## 2025-02-08 PROCEDURE — 85025 COMPLETE CBC W/AUTO DIFF WBC: CPT

## 2025-02-08 PROCEDURE — 2700000000 HC OXYGEN THERAPY PER DAY

## 2025-02-08 PROCEDURE — 36415 COLL VENOUS BLD VENIPUNCTURE: CPT

## 2025-02-08 PROCEDURE — 6360000002 HC RX W HCPCS: Performed by: SPECIALIST

## 2025-02-08 PROCEDURE — 80053 COMPREHEN METABOLIC PANEL: CPT

## 2025-02-08 PROCEDURE — 2500000003 HC RX 250 WO HCPCS: Performed by: SPECIALIST

## 2025-02-08 PROCEDURE — 94760 N-INVAS EAR/PLS OXIMETRY 1: CPT

## 2025-02-08 PROCEDURE — 1200000000 HC SEMI PRIVATE

## 2025-02-08 PROCEDURE — 2500000003 HC RX 250 WO HCPCS: Performed by: ANESTHESIOLOGY

## 2025-02-08 RX ORDER — METOCLOPRAMIDE HYDROCHLORIDE 5 MG/ML
5 INJECTION INTRAMUSCULAR; INTRAVENOUS EVERY 8 HOURS
Status: DISCONTINUED | OUTPATIENT
Start: 2025-02-08 | End: 2025-02-12

## 2025-02-08 RX ORDER — METOCLOPRAMIDE HYDROCHLORIDE 5 MG/ML
10 INJECTION INTRAMUSCULAR; INTRAVENOUS EVERY 8 HOURS
Status: DISCONTINUED | OUTPATIENT
Start: 2025-02-08 | End: 2025-02-08 | Stop reason: DRUGHIGH

## 2025-02-08 RX ADMIN — SODIUM CHLORIDE, PRESERVATIVE FREE 40 MG: 5 INJECTION INTRAVENOUS at 08:45

## 2025-02-08 RX ADMIN — CIPROFLOXACIN 400 MG: 2 INJECTION, SOLUTION INTRAVENOUS at 04:34

## 2025-02-08 RX ADMIN — SODIUM CHLORIDE, PRESERVATIVE FREE 10 ML: 5 INJECTION INTRAVENOUS at 21:09

## 2025-02-08 RX ADMIN — METOCLOPRAMIDE HYDROCHLORIDE 5 MG: 5 INJECTION, SOLUTION INTRAMUSCULAR; INTRAVENOUS at 12:38

## 2025-02-08 RX ADMIN — SODIUM CHLORIDE 400 MG: 0.9 INJECTION, SOLUTION INTRAVENOUS at 08:47

## 2025-02-08 RX ADMIN — CIPROFLOXACIN 400 MG: 2 INJECTION, SOLUTION INTRAVENOUS at 16:47

## 2025-02-08 RX ADMIN — SODIUM CHLORIDE, PRESERVATIVE FREE 10 ML: 5 INJECTION INTRAVENOUS at 15:11

## 2025-02-08 RX ADMIN — THIAMINE HYDROCHLORIDE 100 MG: 100 INJECTION, SOLUTION INTRAMUSCULAR; INTRAVENOUS at 08:45

## 2025-02-08 RX ADMIN — METRONIDAZOLE 500 MG: 5 INJECTION, SOLUTION INTRAVENOUS at 15:16

## 2025-02-08 RX ADMIN — METOPROLOL TARTRATE 5 MG: 5 INJECTION INTRAVENOUS at 15:11

## 2025-02-08 RX ADMIN — METOCLOPRAMIDE HYDROCHLORIDE 5 MG: 5 INJECTION, SOLUTION INTRAMUSCULAR; INTRAVENOUS at 21:09

## 2025-02-08 RX ADMIN — SODIUM CHLORIDE, PRESERVATIVE FREE 10 ML: 5 INJECTION INTRAVENOUS at 08:48

## 2025-02-08 RX ADMIN — SODIUM CHLORIDE, POTASSIUM CHLORIDE, SODIUM LACTATE AND CALCIUM CHLORIDE: 600; 310; 30; 20 INJECTION, SOLUTION INTRAVENOUS at 03:29

## 2025-02-08 RX ADMIN — METRONIDAZOLE 500 MG: 5 INJECTION, SOLUTION INTRAVENOUS at 06:26

## 2025-02-08 RX ADMIN — METRONIDAZOLE 500 MG: 5 INJECTION, SOLUTION INTRAVENOUS at 22:56

## 2025-02-08 ASSESSMENT — PAIN SCALES - GENERAL: PAINLEVEL_OUTOF10: 0

## 2025-02-08 NOTE — PROGRESS NOTES
[facility] Hospitalists    Progress Note    Patient:  Damian You  YOB: 1950  Date of Service: 2/8/2025  MRN: [id number]   Acct: [id number]   Primary Care Physician: Ramona Smith MD  Advance Directive: Full Code  Admit Date: 2/5/2025       Hospital Day: 3    Portions of this note have been copied forward, however, updated to reflect the most current clinical status of this patient.     CHIEF COMPLAINT: abd pain    SUBJECTIVE: Mild incisional pain, no other complaints    CUMULATIVE HOSPITAL COURSE:     This patient is a 74-year-old male with PMH CAD, hypertension, GERD, hyperlipidemia, CKD stage IIIa who presented to [facility] ED on 2/5 for evaluation of periumbilical pain.  In ED, found to be hypokalemic with potassium 2.8, creatinine 1.2, and mildly elevated LFTs.  CT AP indicated focal bowel wall thickening involving the sigmoid colon favoring mass versus diverticular change versus early acute diverticulitis in addition to ileus versus partial colonic obstruction extending to the level of the sigmoid colon.  Also indicates several poorly defined low-density lesions in the liver concerning for metastasis with upper limits of normal retroperitoneal lymph nodes.  Patient admitted to hospitalist with consult to GI and oncology.  He was placed on Cipro and Flagyl due to questionable early diverticulitis.  Electrolytes repleted per protocol.  Oncology recommended 3-day course of Venofer for his ANGELINA.  GI initially recommended NGT to LIWS due to partial small bowel obstruction.  General surgery consulted, concerns for high-grade colonic obstruction with peritoneal findings. He is s/p emergent exploratory laparotomy with loop colostomy and lysis of adhesions on 2/7.  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  Per general surgery, okay to discontinue Mckeon catheter and NG tube.  Plan is for sigmoidoscopy with biopsy on Monday, 2/10, to obtain tissue for diagnosis and molecular studies.

## 2025-02-08 NOTE — PROGRESS NOTES
Pharmacy Renal Adjustment    Damian You is a 74 y.o. male. Pharmacy has renally adjusted medications per protocol.    Recent Labs     02/07/25  0210 02/08/25  0233   BUN 14 18       Recent Labs     02/07/25  0210 02/08/25  0233   CREATININE 1.3* 1.7*       Estimated Creatinine Clearance: 47 mL/min (A) (based on SCr of 1.7 mg/dL (H)).    Height:   Ht Readings from Last 1 Encounters:   02/05/25 1.829 m (6')     Weight:  Wt Readings from Last 1 Encounters:   02/08/25 93.9 kg (207 lb)       CKD stage: 3A               Plan: Adjust the following medications based on renal function:           Reglan changed to 5 mg IV q8h    Electronically signed by Hattie Pereira RPh, BCPS, 2/8/2025,10:23 AM

## 2025-02-08 NOTE — OP NOTE
Operative Note      Patient: Damian You  YOB: 1950  MRN: [id number]    Date of Procedure: 2/7/2025    Pre-Op Diagnosis Codes:      * Malignant neoplasm of colon, unspecified part of colon (HCC) [C18.9]    Post-Op Diagnosis:  Colonic obstruction at the level of the sigmoid due to adenocarcinoma extensive intra-abdominal adhesions massive distention of the cecum without ischemic change or serosal tear prior evidence of a cholecystectomy and appendectomy       Procedure(s):  EXPLORATORY LAPAROTOMY, CECOSTOMY, LOOP COLOSTOMY, LYSIS OF ADHESIONS    Surgeon(s):  Toro Gutiérrez MD    Assistant:   * No surgical staff found *    Anesthesia: General    Estimated Blood Loss (mL): 300     Complications: None    Specimens:   * No specimens in log *    Implants:  * No implants in log *      Drains:   NG/OG/NJ/NE Tube Nasogastric 18 fr Left nostril (Active)   Surrounding Skin Clean, dry & intact 02/07/25 1631   Securement device Adhesive based haynes 02/07/25 1046   Status Suction-low intermittent 02/06/25 2020   Placement Verified External Catheter Length 02/06/25 2020   NG/OG/NJ/NE External Measurement (cm) 53 cm 02/07/25 1046   Drainage Appearance Cloudy;Milky;Mucous shreds 02/06/25 2020   Action Taken Retaped 02/07/25 1046       Colostomy LLQ Loop (Active)       Findings:  Infection Present At Time Of Surgery (PATOS) (choose all levels that have infection present):  No infection present  Other Findings: Patient had a hostile abdomen with extensive intra-abdominal adhesions from prior surgery the right upper quadrant especially liver could not be palpated due to the extensiveness of the adhesions patient was also found to have a surgically absent gallbladder and appendix he was found to have a neoplastic lesion in the distal sigmoid colon causing the point of obstruction evidenced by massive dilation of the ascending transverse and descending colon to the level of the mass    Detailed Description of  Procedure:   Patient was prepped and draped through a midline incision entrance the abdomen was gained and extensive adhesiolysis that took more than half of the operative time was performed in order to ascertain the etiology of his apparent colonic obstruction upon mobilization of the small bowel and a cursory examination of the colon it was determined that a descending loop colostomy would decompress the colon I mobilized the descending colon along the line of Toldt Penrose drain was placed circumferentially around the descending colon and then the point at which the ostomy was created having been at determine a source circular circular skin incision and a plug of adipose tissue were taken all the way down to the fascia 8 cruciate incision was made in the anterior rectus and the posterior rectus fascia the rectus muscle was split longitudinally 2 fingers were introduced through the ostomy site then the Penrose drain was passed through the defect and used to bring the loop of descending colon out onto the anterior abdomen the colonic bridgework was placed and then attention was turned to the cecum which was massively dilated well over 12 cm in size a 14-gauge Angiocath was introduced into what appeared to be the prior site of an appendectomy in the contents of the cecum was aspirated 3-0 Vicryl pursing stretch was used to close the cecostomy and then this was imbricated with 3-0 Vicryl simple interrupted's the abdomen was irrigated aspirated and it was closed with a 1 PDS locking stitch and surgical staples were used to reapproximate the skin all needle and sponge counts were reported to be correct at the end of the procedure at this point the colostomy was matured with a longitudinal incision and then 3-0 Vicryl simple interrupted's to create a mucocutaneous anastomosis patient was taken recovery room in stable condition    Electronically signed by Toro Gutiérrez MD on 2/7/2025 at 6:29 PM

## 2025-02-08 NOTE — PROGRESS NOTES
MEDICAL ONCOLOGY PROGRESS NOTE     Pt Name: Damian You  MRN: [id number]  YOB: 1950  Date of evaluation: 2/8/2025    Subjective-    POD #1 Exploratory laparotomy, loop colostomy, lysis of adhesions and cecostomy by Dr. Toro Gutiérrez 2/7/2025    Ostomy is leaking.  Nursing staff changing this out.     I discussed the case with Dr. Gutiérrez this morning, 2/8/2025.  The colon was massively distended.  The only thing that was able to be done during the intervention on 2/7/2025 was to decompress the colon, allow for stool to go via the ostomy, the procedure required even a cecostomy for decompression during surgery.  If surgery was to be done at any point to remove the primary (despite hepatic metastasis), it would be 6 to 8 weeks down the road, not anytime soon.      As discussed with Dr. Gutiérrez, from the surgical standpoint, GI can proceed immediately with a sigmoidoscopy for tissue biopsy to obtain tissue for diagnosis and molecular studies.  A tissue diagnosis will be necessary to proceed with systemic therapy.    I discussed the case this morning with Dr. Joselyn Murray with GI.  She will be making arrangements for sigmoidoscopy and biopsy on Monday, 2/10/2025, to obtain tissue for diagnosis and molecular studies.       HISTORY OF PRESENT ILLNESS:  Damian You was first seen by Dr. Boswell on 2/6/2025 during inpatient hospitalization at [facility].  He was consulted for abnormal imaging findings suspicious for hepatic metastasis.  Patient presented to the ER department with complaints of abdominal pain.  The patient denies any nausea vomit diarrhea.  Patient reported decreased appetite and weight loss.  Laboratory studies at admission showed normocytic anemia with hemoglobin 11.5/MCV 85.  Iron profile consistent with iron deficiency anemia.  Chemistry remarkable for hypokalemia 3.1, creatinine 1.4.  LFTs abnormal.  The patient has multiple other comorbidities to include a history of  hypertension, GERD, hyperlipidemia, CKD stage IIIa, coronary artery disease, gout and a prior history of non-STEMI.  Patient reports that he never had a colonoscopy.  2/5/2025-CEA 73 (H)  2/5/2025-CT abdomen/pelvis, [facility]: The liver shows several poorly defined low density lesions measuring up to 1.7 cm.   The gallbladder is surgically absent.  The spleen, pancreas and adrenal glands are within normal limits. Scattered periaortic lymph nodes measure up to 0.9 cm.  No mesenteric or retroperitoneal lymphadenopathy is seen.  Scattered colonic diverticula with a short segment of sigmoid wall thickening.  No adjacent mesenteric stranding.  Gas distended colon is present to the level of the sigmoid colon.  There is no free air or fluid seen in the abdomen or pelvis.  The urinary bladder is unremarkable.  Healed right rib and pelvic fractures.  Postoperative changes involving the proximal right femur.  T12 wedge compression and L2 and L3 superior compression fractures of indeterminate age.  Impression: 1.  Focal bowel wall thickening involving the sigmoid colon favoring mass versus diverticular change versus early acute diverticulitis.  An ileus versus partial colonic obstruction is extending to the level of the sigmoid colon. 2 .  Findings concerning for hepatic metastases measuring up to 1.7 cm. 3.  Upper limits normal retroperitoneal lymph nodes.     I was consulted for further evaluation and recommendations    Past Medical History:    Past Medical History:   Diagnosis Date    CAD (coronary artery disease)     Gout     Hypertension     Hypotension 6/23/2020    Non-ST elevation MI (NSTEMI) (HCC) 12/28/14    Palliative care patient 06/29/2020    Pneumonia due to infectious organism 6/24/2020       Past Surgical History:    Past Surgical History:   Procedure Laterality Date    CARDIAC CATHETERIZATION  12/29/14  [facility]    angioplasty, stent to LAD. EF 45%    CHOLECYSTECTOMY      FEMUR FRACTURE SURGERY Right 10/30/2020    TFN,

## 2025-02-08 NOTE — ANESTHESIA POSTPROCEDURE EVALUATION
Department of Anesthesiology  Postprocedure Note    Patient: Damian You  MRN: [id number]  YOB: 1950  Date of evaluation: 2/7/2025    Procedure Summary       Date: 02/07/25 Room / Location: [address]    Anesthesia Start: 1630 Anesthesia Stop: 1826    Procedure: EXPLORATORY LAPAROTOMY, CECOSTOMY, LOOP COLOSTOMY, LYSIS OF ADHESIONS Diagnosis:       Malignant neoplasm of colon, unspecified part of colon (HCC)      (Malignant neoplasm of colon, unspecified part of colon (HCC) [C18.9])    Surgeons: Toro Gutiérrez MD Responsible Provider: Terri Jackson APRN - CRNA    Anesthesia Type: general ASA Status: 3            Anesthesia Type: No value filed.    Paola Phase I: Paola Score: 10    Paola Phase II:      Anesthesia Post Evaluation    Patient location during evaluation: PACU  Patient participation: complete - patient participated  Level of consciousness: awake and alert  Pain score: 0  Airway patency: patent  Nausea & Vomiting: no nausea and no vomiting  Cardiovascular status: hemodynamically stable and blood pressure returned to baseline  Respiratory status: acceptable and nasal cannula  Hydration status: stable  Comments: /78   Pulse 81   Temp 98.5 °F (36.9 °C)   Resp 15   Ht 1.829 m (6')   Wt 74.8 kg (165 lb)   SpO2 90%   BMI 22.38 kg/m²     Pain management: adequate    No notable events documented.  
.

## 2025-02-08 NOTE — PLAN OF CARE
Plan for FS for tissue biopsy of suspected malignancy Monday. He will need to be NPO Sunday at midnight in preparation for the procedure.

## 2025-02-08 NOTE — PROGRESS NOTES
Postoperative day #1 patient feels better he has had significant output through the loop colostomy feels less distended less crampy abdominal pain like to make clarification of the intraoperative findings patient had extensive adhesions within the abdomen from prior surgery the right upper quadrant was obscured from view due to these adhesions and I thought to pursue lysis metal some given the overall situation I never did see the liver hence the inability to obtain a biopsy of any potential hepatic metastases there was no peritoneal studding patient does have a rockhard mass deep within the pelvis associated with the rectosigmoid colon while I could feel this given the patient's distention and deep pelvis I could not actually visualize this mass therefore no tissue from this area was obtained as well I would think moving forward patient will require an interventional radiologist to obtain a piece of tissue from the purported hepatic metastases so that oncology can make appropriate recommendations to the patient going forward  Vital signs are stable he is afebrile  Intake and output are adequate  Chest clear to auscultation percussion  Cardiovascular regular rate and rhythm  Abdomen is less distended soft nasogastric tube with a modest amount of drainage Mckeon catheter is intact  Musculoskeletal negative Homans' sign  Neurologically without focal findings  Laboratories sodium 136 potassium 4.2 BUN 18 creatinine 1.7 glucose 113 calcium 8.2 white count of 18,000 and H&H of 11 and 37 platelet count of 311,000  Assessment plan patient status post diverting loop colostomy for rectosigmoid obstruction more than likely due to adenocarcinoma with evidence of metastases to the liver at this point I believe that we can discontinue the patient's Mckeon and nasogastric tube encourage pulmonary toilet and ambulation I am unable to extend the antibiotic coverage given his elevated white count though this may be reactive rather

## 2025-02-09 ENCOUNTER — APPOINTMENT (OUTPATIENT)
Dept: ULTRASOUND IMAGING | Age: 75
End: 2025-02-09
Payer: MEDICARE

## 2025-02-09 PROBLEM — R93.3 ABNORMAL COMPUTED TOMOGRAPHY OF SIGMOID COLON: Status: ACTIVE | Noted: 2025-02-05

## 2025-02-09 LAB
ALBUMIN SERPL-MCNC: 2.6 G/DL (ref 3.5–5.2)
ALP SERPL-CCNC: 135 U/L (ref 40–129)
ALT SERPL-CCNC: 30 U/L (ref 5–41)
ANION GAP SERPL CALCULATED.3IONS-SCNC: 13 MMOL/L (ref 8–16)
AST SERPL-CCNC: 38 U/L (ref 5–40)
BASOPHILS # BLD: 0 K/UL (ref 0–0.2)
BASOPHILS NFR BLD: 0.1 % (ref 0–1)
BILIRUB SERPL-MCNC: 0.4 MG/DL (ref 0.2–1.2)
BUN SERPL-MCNC: 27 MG/DL (ref 8–23)
CALCIUM SERPL-MCNC: 8.1 MG/DL (ref 8.8–10.2)
CANCER AG19-9 SERPL-ACNC: 133 U/ML (ref 0–35)
CHLORIDE SERPL-SCNC: 104 MMOL/L (ref 98–107)
CO2 SERPL-SCNC: 21 MMOL/L (ref 22–29)
CREAT SERPL-MCNC: 1.7 MG/DL (ref 0.7–1.2)
EOSINOPHIL # BLD: 0 K/UL (ref 0–0.6)
EOSINOPHIL NFR BLD: 0 % (ref 0–5)
ERYTHROCYTE [DISTWIDTH] IN BLOOD BY AUTOMATED COUNT: 13.9 % (ref 11.5–14.5)
GLUCOSE SERPL-MCNC: 87 MG/DL (ref 70–99)
HCT VFR BLD AUTO: 33 % (ref 42–52)
HGB BLD-MCNC: 10.5 G/DL (ref 14–18)
IMM GRANULOCYTES # BLD: 0.1 K/UL
LYMPHOCYTES # BLD: 0.8 K/UL (ref 1.1–4.5)
LYMPHOCYTES NFR BLD: 4.7 % (ref 20–40)
MCH RBC QN AUTO: 27.9 PG (ref 27–31)
MCHC RBC AUTO-ENTMCNC: 31.8 G/DL (ref 33–37)
MCV RBC AUTO: 87.5 FL (ref 80–94)
MONOCYTES # BLD: 0.9 K/UL (ref 0–0.9)
MONOCYTES NFR BLD: 5.3 % (ref 0–10)
NEUTROPHILS # BLD: 15.4 K/UL (ref 1.5–7.5)
NEUTS SEG NFR BLD: 89.1 % (ref 50–65)
PLATELET # BLD AUTO: 253 K/UL (ref 130–400)
PMV BLD AUTO: 9.1 FL (ref 9.4–12.4)
POTASSIUM SERPL-SCNC: 4 MMOL/L (ref 3.5–5)
PROCALCITONIN: 0.6 NG/ML (ref 0–0.09)
PROT SERPL-MCNC: 5 G/DL (ref 6.4–8.3)
RBC # BLD AUTO: 3.77 M/UL (ref 4.7–6.1)
SODIUM SERPL-SCNC: 138 MMOL/L (ref 136–145)
WBC # BLD AUTO: 17.3 K/UL (ref 4.8–10.8)

## 2025-02-09 PROCEDURE — 2580000003 HC RX 258: Performed by: ANESTHESIOLOGY

## 2025-02-09 PROCEDURE — 85025 COMPLETE CBC W/AUTO DIFF WBC: CPT

## 2025-02-09 PROCEDURE — 36415 COLL VENOUS BLD VENIPUNCTURE: CPT

## 2025-02-09 PROCEDURE — 86301 IMMUNOASSAY TUMOR CA 19-9: CPT

## 2025-02-09 PROCEDURE — 6360000002 HC RX W HCPCS: Performed by: SPECIALIST

## 2025-02-09 PROCEDURE — 94760 N-INVAS EAR/PLS OXIMETRY 1: CPT

## 2025-02-09 PROCEDURE — 2500000003 HC RX 250 WO HCPCS: Performed by: SPECIALIST

## 2025-02-09 PROCEDURE — 2700000000 HC OXYGEN THERAPY PER DAY

## 2025-02-09 PROCEDURE — 99233 SBSQ HOSP IP/OBS HIGH 50: CPT | Performed by: INTERNAL MEDICINE

## 2025-02-09 PROCEDURE — 76770 US EXAM ABDO BACK WALL COMP: CPT

## 2025-02-09 PROCEDURE — 84145 PROCALCITONIN (PCT): CPT

## 2025-02-09 PROCEDURE — 1200000000 HC SEMI PRIVATE

## 2025-02-09 PROCEDURE — 2580000003 HC RX 258: Performed by: SPECIALIST

## 2025-02-09 PROCEDURE — 2500000003 HC RX 250 WO HCPCS: Performed by: ANESTHESIOLOGY

## 2025-02-09 PROCEDURE — 6370000000 HC RX 637 (ALT 250 FOR IP): Performed by: SPECIALIST

## 2025-02-09 PROCEDURE — 80053 COMPREHEN METABOLIC PANEL: CPT

## 2025-02-09 RX ORDER — SODIUM PHOSPHATE, DIBASIC AND SODIUM PHOSPHATE, MONOBASIC 7; 19 G/230ML; G/230ML
1 ENEMA RECTAL
Status: COMPLETED | OUTPATIENT
Start: 2025-02-10 | End: 2025-02-10

## 2025-02-09 RX ADMIN — CIPROFLOXACIN 400 MG: 2 INJECTION, SOLUTION INTRAVENOUS at 05:07

## 2025-02-09 RX ADMIN — SODIUM CHLORIDE, POTASSIUM CHLORIDE, SODIUM LACTATE AND CALCIUM CHLORIDE: 600; 310; 30; 20 INJECTION, SOLUTION INTRAVENOUS at 22:51

## 2025-02-09 RX ADMIN — METOCLOPRAMIDE HYDROCHLORIDE 5 MG: 5 INJECTION, SOLUTION INTRAMUSCULAR; INTRAVENOUS at 03:30

## 2025-02-09 RX ADMIN — METRONIDAZOLE 500 MG: 5 INJECTION, SOLUTION INTRAVENOUS at 06:32

## 2025-02-09 RX ADMIN — SODIUM CHLORIDE, PRESERVATIVE FREE 10 ML: 5 INJECTION INTRAVENOUS at 08:24

## 2025-02-09 RX ADMIN — METOCLOPRAMIDE HYDROCHLORIDE 5 MG: 5 INJECTION, SOLUTION INTRAMUSCULAR; INTRAVENOUS at 11:53

## 2025-02-09 RX ADMIN — SODIUM CHLORIDE, PRESERVATIVE FREE 40 MG: 5 INJECTION INTRAVENOUS at 08:23

## 2025-02-09 RX ADMIN — SODIUM CHLORIDE, POTASSIUM CHLORIDE, SODIUM LACTATE AND CALCIUM CHLORIDE: 600; 310; 30; 20 INJECTION, SOLUTION INTRAVENOUS at 14:17

## 2025-02-09 RX ADMIN — METRONIDAZOLE 500 MG: 5 INJECTION, SOLUTION INTRAVENOUS at 22:20

## 2025-02-09 RX ADMIN — THIAMINE HYDROCHLORIDE 100 MG: 100 INJECTION, SOLUTION INTRAMUSCULAR; INTRAVENOUS at 08:23

## 2025-02-09 RX ADMIN — METOCLOPRAMIDE HYDROCHLORIDE 5 MG: 5 INJECTION, SOLUTION INTRAMUSCULAR; INTRAVENOUS at 17:41

## 2025-02-09 RX ADMIN — METRONIDAZOLE 500 MG: 5 INJECTION, SOLUTION INTRAVENOUS at 14:18

## 2025-02-09 RX ADMIN — SODIUM CHLORIDE, PRESERVATIVE FREE 10 ML: 5 INJECTION INTRAVENOUS at 22:22

## 2025-02-09 RX ADMIN — CIPROFLOXACIN 400 MG: 2 INJECTION, SOLUTION INTRAVENOUS at 17:01

## 2025-02-09 RX ADMIN — POLYETHYLENE GLYCOL 3350 17 G: 17 POWDER, FOR SOLUTION ORAL at 22:22

## 2025-02-09 RX ADMIN — METOPROLOL TARTRATE 5 MG: 5 INJECTION INTRAVENOUS at 03:36

## 2025-02-09 ASSESSMENT — PAIN SCALES - GENERAL: PAINLEVEL_OUTOF10: 0

## 2025-02-09 NOTE — PLAN OF CARE
Problem: Safety - Adult  Goal: Free from fall injury  2/9/2025 0345 by Cassie Gama LPN  Outcome: Progressing  Flowsheets (Taken 2/8/2025 2314)  Free From Fall Injury: Instruct family/caregiver on patient safety  2/9/2025 0225 by Hilary Son RN  Outcome: Progressing  2/9/2025 0217 by Hilary Son RN  Outcome: Progressing  Flowsheets (Taken 2/8/2025 2314 by Cassie Gama LPN)  Free From Fall Injury: Instruct family/caregiver on patient safety     Problem: Chronic Conditions and Co-morbidities  Goal: Patient's chronic conditions and co-morbidity symptoms are monitored and maintained or improved  2/9/2025 0345 by Cassie Gama LPN  Outcome: Progressing  Flowsheets (Taken 2/8/2025 2257)  Care Plan - Patient's Chronic Conditions and Co-Morbidity Symptoms are Monitored and Maintained or Improved:   Monitor and assess patient's chronic conditions and comorbid symptoms for stability, deterioration, or improvement   Collaborate with multidisciplinary team to address chronic and comorbid conditions and prevent exacerbation or deterioration   Update acute care plan with appropriate goals if chronic or comorbid symptoms are exacerbated and prevent overall improvement and discharge  2/9/2025 0225 by Hilary Son RN  Outcome: Progressing  2/9/2025 0217 by Hilary Son RN  Outcome: Progressing  Flowsheets (Taken 2/8/2025 2257 by Cassie Gama LPN)  Care Plan - Patient's Chronic Conditions and Co-Morbidity Symptoms are Monitored and Maintained or Improved:   Monitor and assess patient's chronic conditions and comorbid symptoms for stability, deterioration, or improvement   Collaborate with multidisciplinary team to address chronic and comorbid conditions and prevent exacerbation or deterioration   Update acute care plan with appropriate goals if chronic or comorbid symptoms are exacerbated and prevent overall improvement and discharge     Problem: Discharge Planning  Goal: Discharge  2/9/2025 0345)  Electrolytes maintained within normal limits:   Monitor labs and assess patient for signs and symptoms of electrolyte imbalances   Monitor response to electrolyte replacements, including repeat lab results as appropriate   Administer electrolyte replacement as ordered  2/9/2025 0225 by Hilary Son, RN  Outcome: Progressing

## 2025-02-09 NOTE — PROGRESS NOTES
[facility] Hospitalists    Progress Note    Patient:  Damian You  YOB: 1950  Date of Service: 2/9/2025  MRN: [id number]   Acct: [id number]   Primary Care Physician: Ramona Smith MD  Advance Directive: Full Code  Admit Date: 2/5/2025       Hospital Day: 4    Portions of this note have been copied forward, however, updated to reflect the most current clinical status of this patient.     CHIEF COMPLAINT: abd pain    SUBJECTIVE: Mild incisional pain, asking for food    CUMULATIVE HOSPITAL COURSE:     This patient is a 74-year-old male with PMH CAD, hypertension, GERD, hyperlipidemia, CKD stage IIIa who presented to [facility] ED on 2/5 for evaluation of periumbilical pain.  In ED, found to be hypokalemic with potassium 2.8, creatinine 1.2, and mildly elevated LFTs.  CT AP indicated focal bowel wall thickening involving the sigmoid colon favoring mass versus diverticular change versus early acute diverticulitis in addition to ileus versus partial colonic obstruction extending to the level of the sigmoid colon.  Also indicates several poorly defined low-density lesions in the liver concerning for metastasis with upper limits of normal retroperitoneal lymph nodes.  Patient admitted to hospitalist with consult to GI and oncology.  He was placed on Cipro and Flagyl due to questionable early diverticulitis.  Electrolytes repleted per protocol.  Oncology recommended 3-day course of Venofer for his ANGELINA.  GI initially recommended NGT to [facility] due to partial small bowel obstruction.  General surgery consulted, concerns for high-grade colonic obstruction with peritoneal findings. He is s/p emergent exploratory laparotomy with loop colostomy and lysis of adhesions on 2/7.  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 NG tube dc'd per general surgery on 2/8. Plan is for sigmoidoscopy with biopsy on Monday, 2/10, to obtain tissue for diagnosis and molecular studies. Noted worsening leukocytosis,

## 2025-02-09 NOTE — PROGRESS NOTES
MEDICAL ONCOLOGY PROGRESS NOTE     Pt Name: Damian You  MRN: [id number]  YOB: 1950  Date of evaluation: 2/9/2025    Subjective-    POD #2 Exploratory laparotomy, loop colostomy, lysis of adhesions and cecostomy by Dr. Toro Gutiérrez 2/7/2025    I discussed the case with Dr. Gutiérrez  2/8/2025.  The colon was massively distended.  The only thing that was able to be done during the intervention on 2/7/2025 was to decompress the colon, allow for stool to go via the ostomy, the procedure required even a cecostomy for decompression during surgery.  If surgery was to be done at any point to remove the primary (despite hepatic metastasis), it would be 6 to 8 weeks down the road, not anytime soon.      As discussed with Dr. Gutiérrez, from the surgical standpoint, GI can proceed immediately with a sigmoidoscopy for tissue biopsy to obtain tissue for diagnosis and molecular studies.  A tissue diagnosis will be necessary to proceed with systemic therapy.    I discussed the case 2/8/2025 with Dr. Joselyn Murray with GI. She will be making arrangements for sigmoidoscopy and biopsy on Monday, 2/10/2025, to obtain tissue for diagnosis and molecular studies.       HISTORY OF PRESENT ILLNESS:  Damian You was first seen by Dr. Boswell on 2/6/2025 during inpatient hospitalization at [facility].  He was consulted for abnormal imaging findings suspicious for hepatic metastasis.  Patient presented to the ER department with complaints of abdominal pain.  The patient denies any nausea vomit diarrhea.  Patient reported decreased appetite and weight loss.  Laboratory studies at admission showed normocytic anemia with hemoglobin 11.5/MCV 85.  Iron profile consistent with iron deficiency anemia.  Chemistry remarkable for hypokalemia 3.1, creatinine 1.4.  LFTs abnormal.  The patient has multiple other comorbidities to include a history of hypertension, GERD, hyperlipidemia, CKD stage IIIa, coronary artery disease, gout  lesions-likely metastatic disease from colonic malignancy.    # Iron deficiency anemia  Recent Labs     02/09/25  0123 02/08/25  0233 02/07/25  0210   WBC 17.3* 18.9* 14.1*   HGB 10.5* 11.9* 12.2*   HCT 33.0* 37.9* 37.8*   MCV 87.5 88.1 85.9    311 281     Lab Results   Component Value Date/Time    IRON 25 02/06/2025 04:25 AM    TIBC 213 02/06/2025 04:25 AM    IRONPERSAT 12 02/06/2025 04:25 AM    FERRITIN 93.9 02/06/2025 04:25 AM    [id number] 596 02/06/2025 04:25 AM    FOLATE >20.0 02/06/2025 04:25 AM    RETICCTPCT 1.63 02/06/2025 04:25 AM    RETICABS 0.0678 02/06/2025 04:25 AM    HAPTOGLOBIN 214.0 11/11/2020 12:53 PM     11/11/2020 12:53 PM     12/30/2014 06:49 AM   Venofer 300 mg, 300 400 mg IV x 3 days, day 2    PLAN:  Venofer 300 mg, 300 mg, 400 mg daily x 3, day 3 delivered on 2/8/2025  Awaiting GI intervention with flex sig or colonoscopy to obtain tissue diagnosis  Awaiting tissue diagnosis for further recommendations  Continue current supportive care      (Please note that portions of this note were completed with a voice recognition program. Efforts were made to edit the dictations but occasionally words are mis-transcribed.)        Odell Gillespie MD    02/09/25  9:13 AM

## 2025-02-09 NOTE — PROGRESS NOTES
Progress Note            Date:2/9/2025        Patient Name:Damian You     YOB: 1950     Age:74 y.o.    CC: Follow-up suspected sigmoid malignancy with possible mets to the liver.    Postoperative day 2, status post loop colostomy for presumed high-grade obstruction of the sigmoid colon due to carcinoma with potential hepatic metastases.  He denies any abdominal pain at this time.    Physical Exam   /71   Pulse 68   Temp 97.5 °F (36.4 °C)   Resp 16   Ht 1.829 m (6')   Wt 93 kg (205 lb 1.6 oz)   SpO2 95%   BMI 27.82 kg/m²      - GENERAL: Alert and oriented x 3. No acute distress. Well-nourished.    - EYES: EOMI. Anicteric.    - HENT: Moist mucous membranes. No cervical lymphadenopathy.    - LUNGS: Clear to auscultation bilaterally. No accessory muscle use.    - CARDIOVASCULAR: Regular rate and rhythm. No murmur. No JVD.    - ABDOMEN: Soft, non-tender and non-distended. No palpable masses.    - EXTREMITIES: No edema. Non-tender.? left quadrant ostomy in place with brown loose stool output.  Nonbloody.      Labs    CBC:  Recent Labs     02/07/25 0210 02/08/25  0233 02/09/25  0123   WBC 14.1* 18.9* 17.3*   RBC 4.40* 4.30* 3.77*   HGB 12.2* 11.9* 10.5*   HCT 37.8* 37.9* 33.0*   MCV 85.9 88.1 87.5   RDW 13.4 13.8 13.9    311 253     CHEMISTRIES:  Recent Labs     02/07/25  0210 02/08/25  0233 02/09/25  0123    135* 138   K 3.3* 4.2 4.0    99 104   CO2 22 18* 21*   BUN 14 18 27*   CREATININE 1.3* 1.7* 1.7*   GLUCOSE 100* 113* 87   MG 2.2  --   --      PT/INR:No results for input(s): \"PROTIME\", \"INR\" in the last 72 hours.  APTT:No results for input(s): \"APTT\" in the last 72 hours.  LIVER PROFILE:  Recent Labs     02/07/25  0210 02/08/25  0233 02/09/25  0123   AST 50* 36 38   ALT 44* 33 30   BILITOT 0.8 0.6 0.4   ALKPHOS 178* 164* 135*         Assessment & Plan:   This is a 74-year-old male with a history of CKD 3, coronary artery disease, hypertension, nonischemic

## 2025-02-09 NOTE — PROGRESS NOTES
Postoperative day #2  Patient's status post loop colostomy for presumed high-grade obstruction of the sigmoid colon due to carcinoma with potential hepatic metastases patient is good spirits he continues to pass stool through the ostomy  Vital signs are stable he is afebrile  Intake and output adequate  Chest clear to auscultation percussion  Cardiovascular regular rate and rhythm  Abdomen is soft without tenderness incision is clean and dry ostomy is pink and functional  Extremities negative Homans  Neurologically without focal findings  Laboratories white count of 17,000  Assessment and plan we will begin the patient on sips of clear liquids he will be made n.p.o. after midnight for his endoscopy procedure scheduled for Monday we will continue the empiric IV antibiotics given his continued elevation of his white count

## 2025-02-09 NOTE — PROGRESS NOTES
Gen Sx at bedside. Nurse asked provider what can be done differently in order to keep incision & colostomy intact & clean as we are having difficulty & provider states ostomy nurse will see pt tomorrow but as for now continue to do the best we can. Also asked provider if he had looked at stoma as it appears darker red in color than yesterday & just wanted to make sure he was aware. Provider voiced no concerns.

## 2025-02-09 NOTE — PLAN OF CARE
Problem: Safety - Adult  Goal: Free from fall injury  2/9/2025 0225 by Hilary Son RN  Outcome: Progressing  2/9/2025 0217 by Hilary Son RN  Outcome: Progressing  Flowsheets (Taken 2/8/2025 2314 by Cassie Gama LPN)  Free From Fall Injury: Instruct family/caregiver on patient safety     Problem: Chronic Conditions and Co-morbidities  Goal: Patient's chronic conditions and co-morbidity symptoms are monitored and maintained or improved  2/9/2025 0225 by Hilary Son RN  Outcome: Progressing  2/9/2025 0217 by Hilary Son RN  Outcome: Progressing  Flowsheets (Taken 2/8/2025 2257 by Cassie Gama LPN)  Care Plan - Patient's Chronic Conditions and Co-Morbidity Symptoms are Monitored and Maintained or Improved:   Monitor and assess patient's chronic conditions and comorbid symptoms for stability, deterioration, or improvement   Collaborate with multidisciplinary team to address chronic and comorbid conditions and prevent exacerbation or deterioration   Update acute care plan with appropriate goals if chronic or comorbid symptoms are exacerbated and prevent overall improvement and discharge     Problem: Discharge Planning  Goal: Discharge to home or other facility with appropriate resources  2/9/2025 0225 by Hilary Son RN  Outcome: Progressing  2/9/2025 0217 by Hilary Son RN  Outcome: Progressing  Flowsheets (Taken 2/8/2025 2257 by Cassie Gama LPN)  Discharge to home or other facility with appropriate resources:   Identify barriers to discharge with patient and caregiver   Arrange for needed discharge resources and transportation as appropriate   Identify discharge learning needs (meds, wound care, etc)   Refer to discharge planning if patient needs post-hospital services based on physician order or complex needs related to functional status, cognitive ability or social support system     Problem: Pain  Goal: Verbalizes/displays adequate comfort level or baseline  comfort level  2/9/2025 0225 by Hilary Son RN  Outcome: Progressing  2/9/2025 0217 by Hilary Son RN  Outcome: Progressing  Flowsheets (Taken 2/8/2025 2301 by Natan, Cassie A, LPN)  Verbalizes/displays adequate comfort level or baseline comfort level:   Encourage patient to monitor pain and request assistance   Assess pain using appropriate pain scale   Administer analgesics based on type and severity of pain and evaluate response   Implement non-pharmacological measures as appropriate and evaluate response   Consider cultural and social influences on pain and pain management   Notify Licensed Independent Practitioner if interventions unsuccessful or patient reports new pain     Problem: Skin/Tissue Integrity - Adult  Goal: Incisions, wounds, or drain sites healing without S/S of infection  Outcome: Progressing     Problem: Infection - Adult  Goal: Absence of infection at discharge  Outcome: Progressing     Problem: Metabolic/Fluid and Electrolytes - Adult  Goal: Electrolytes maintained within normal limits  Outcome: Progressing      I supervised procedure performed by the acp

## 2025-02-10 ENCOUNTER — ANESTHESIA (OUTPATIENT)
Dept: ENDOSCOPY | Age: 75
End: 2025-02-10
Payer: MEDICARE

## 2025-02-10 ENCOUNTER — ANCILLARY PROCEDURE (OUTPATIENT)
Dept: ENDOSCOPY | Age: 75
End: 2025-02-10
Attending: INTERNAL MEDICINE
Payer: MEDICARE

## 2025-02-10 ENCOUNTER — ANESTHESIA EVENT (OUTPATIENT)
Dept: ENDOSCOPY | Age: 75
End: 2025-02-10
Payer: MEDICARE

## 2025-02-10 PROBLEM — C78.7 METASTASIS TO LIVER (HCC): Status: ACTIVE | Noted: 2025-02-10

## 2025-02-10 PROBLEM — Z78.9 MEDICALLY COMPLEX PATIENT: Status: ACTIVE | Noted: 2025-02-10

## 2025-02-10 PROBLEM — R69 LIFE THREATENING MEDICAL ILLNESS: Status: ACTIVE | Noted: 2025-02-10

## 2025-02-10 PROBLEM — K63.9 COLONIC THICKENING: Status: ACTIVE | Noted: 2025-02-10

## 2025-02-10 LAB
ALBUMIN SERPL-MCNC: 2.2 G/DL (ref 3.5–5.2)
ALP SERPL-CCNC: 116 U/L (ref 40–129)
ALT SERPL-CCNC: 30 U/L (ref 5–41)
ANION GAP SERPL CALCULATED.3IONS-SCNC: 11 MMOL/L (ref 8–16)
AST SERPL-CCNC: 44 U/L (ref 5–40)
BASOPHILS # BLD: 0 K/UL (ref 0–0.2)
BASOPHILS NFR BLD: 0.2 % (ref 0–1)
BILIRUB SERPL-MCNC: 0.2 MG/DL (ref 0.2–1.2)
BUN SERPL-MCNC: 22 MG/DL (ref 8–23)
CALCIUM SERPL-MCNC: 7.5 MG/DL (ref 8.8–10.2)
CHLORIDE SERPL-SCNC: 106 MMOL/L (ref 98–107)
CO2 SERPL-SCNC: 22 MMOL/L (ref 22–29)
CREAT SERPL-MCNC: 1.3 MG/DL (ref 0.7–1.2)
EOSINOPHIL # BLD: 0.1 K/UL (ref 0–0.6)
EOSINOPHIL NFR BLD: 0.9 % (ref 0–5)
ERYTHROCYTE [DISTWIDTH] IN BLOOD BY AUTOMATED COUNT: 14.1 % (ref 11.5–14.5)
GLUCOSE SERPL-MCNC: 72 MG/DL (ref 70–99)
HCT VFR BLD AUTO: 30 % (ref 42–52)
HGB BLD-MCNC: 9.5 G/DL (ref 14–18)
IMM GRANULOCYTES # BLD: 0.1 K/UL
LYMPHOCYTES # BLD: 1.3 K/UL (ref 1.1–4.5)
LYMPHOCYTES NFR BLD: 12.4 % (ref 20–40)
MCH RBC QN AUTO: 27.9 PG (ref 27–31)
MCHC RBC AUTO-ENTMCNC: 31.7 G/DL (ref 33–37)
MCV RBC AUTO: 88 FL (ref 80–94)
MONOCYTES # BLD: 0.7 K/UL (ref 0–0.9)
MONOCYTES NFR BLD: 6.5 % (ref 0–10)
NEUTROPHILS # BLD: 8.3 K/UL (ref 1.5–7.5)
NEUTS SEG NFR BLD: 79.2 % (ref 50–65)
PLATELET # BLD AUTO: 220 K/UL (ref 130–400)
PMV BLD AUTO: 8.7 FL (ref 9.4–12.4)
POTASSIUM SERPL-SCNC: 3.7 MMOL/L (ref 3.5–5)
PROT SERPL-MCNC: 4.6 G/DL (ref 6.4–8.3)
RBC # BLD AUTO: 3.41 M/UL (ref 4.7–6.1)
SODIUM SERPL-SCNC: 139 MMOL/L (ref 136–145)
WBC # BLD AUTO: 10.5 K/UL (ref 4.8–10.8)

## 2025-02-10 PROCEDURE — 6360000002 HC RX W HCPCS: Performed by: SPECIALIST

## 2025-02-10 PROCEDURE — 2580000003 HC RX 258

## 2025-02-10 PROCEDURE — 94760 N-INVAS EAR/PLS OXIMETRY 1: CPT

## 2025-02-10 PROCEDURE — 36415 COLL VENOUS BLD VENIPUNCTURE: CPT

## 2025-02-10 PROCEDURE — 3609008400 HC SIGMOIDOSCOPY DIAGNOSTIC: Performed by: INTERNAL MEDICINE

## 2025-02-10 PROCEDURE — 6360000002 HC RX W HCPCS: Performed by: NURSE ANESTHETIST, CERTIFIED REGISTERED

## 2025-02-10 PROCEDURE — 7100000001 HC PACU RECOVERY - ADDTL 15 MIN: Performed by: INTERNAL MEDICINE

## 2025-02-10 PROCEDURE — 45331 SIGMOIDOSCOPY AND BIOPSY: CPT | Performed by: INTERNAL MEDICINE

## 2025-02-10 PROCEDURE — 2500000003 HC RX 250 WO HCPCS: Performed by: SPECIALIST

## 2025-02-10 PROCEDURE — 99232 SBSQ HOSP IP/OBS MODERATE 35: CPT | Performed by: INTERNAL MEDICINE

## 2025-02-10 PROCEDURE — 7100000000 HC PACU RECOVERY - FIRST 15 MIN: Performed by: INTERNAL MEDICINE

## 2025-02-10 PROCEDURE — 2580000003 HC RX 258: Performed by: SPECIALIST

## 2025-02-10 PROCEDURE — 0DBN8ZX EXCISION OF SIGMOID COLON, VIA NATURAL OR ARTIFICIAL OPENING ENDOSCOPIC, DIAGNOSTIC: ICD-10-PCS | Performed by: NURSE PRACTITIONER

## 2025-02-10 PROCEDURE — 6360000002 HC RX W HCPCS: Performed by: INTERNAL MEDICINE

## 2025-02-10 PROCEDURE — 6370000000 HC RX 637 (ALT 250 FOR IP): Performed by: INTERNAL MEDICINE

## 2025-02-10 PROCEDURE — 85025 COMPLETE CBC W/AUTO DIFF WBC: CPT

## 2025-02-10 PROCEDURE — 2500000003 HC RX 250 WO HCPCS: Performed by: NURSE ANESTHETIST, CERTIFIED REGISTERED

## 2025-02-10 PROCEDURE — 2709999900 HC NON-CHARGEABLE SUPPLY: Performed by: INTERNAL MEDICINE

## 2025-02-10 PROCEDURE — 88360 TUMOR IMMUNOHISTOCHEM/MANUAL: CPT

## 2025-02-10 PROCEDURE — 2580000003 HC RX 258: Performed by: ANESTHESIOLOGY

## 2025-02-10 PROCEDURE — 3700000000 HC ANESTHESIA ATTENDED CARE: Performed by: INTERNAL MEDICINE

## 2025-02-10 PROCEDURE — 2500000003 HC RX 250 WO HCPCS: Performed by: ANESTHESIOLOGY

## 2025-02-10 PROCEDURE — 80053 COMPREHEN METABOLIC PANEL: CPT

## 2025-02-10 PROCEDURE — 88305 TISSUE EXAM BY PATHOLOGIST: CPT

## 2025-02-10 PROCEDURE — 99024 POSTOP FOLLOW-UP VISIT: CPT | Performed by: SURGERY

## 2025-02-10 PROCEDURE — 1200000000 HC SEMI PRIVATE

## 2025-02-10 PROCEDURE — 3700000001 HC ADD 15 MINUTES (ANESTHESIA): Performed by: INTERNAL MEDICINE

## 2025-02-10 RX ORDER — HYDROMORPHONE HYDROCHLORIDE 1 MG/ML
0.25 INJECTION, SOLUTION INTRAMUSCULAR; INTRAVENOUS; SUBCUTANEOUS EVERY 5 MIN PRN
Status: DISCONTINUED | OUTPATIENT
Start: 2025-02-10 | End: 2025-02-11 | Stop reason: ALTCHOICE

## 2025-02-10 RX ORDER — NALOXONE HYDROCHLORIDE 0.4 MG/ML
INJECTION, SOLUTION INTRAMUSCULAR; INTRAVENOUS; SUBCUTANEOUS PRN
Status: DISCONTINUED | OUTPATIENT
Start: 2025-02-10 | End: 2025-02-14 | Stop reason: HOSPADM

## 2025-02-10 RX ORDER — PROPOFOL 10 MG/ML
INJECTION, EMULSION INTRAVENOUS
Status: DISCONTINUED | OUTPATIENT
Start: 2025-02-10 | End: 2025-02-10 | Stop reason: SDUPTHER

## 2025-02-10 RX ORDER — METOCLOPRAMIDE HYDROCHLORIDE 5 MG/ML
10 INJECTION INTRAMUSCULAR; INTRAVENOUS
Status: ACTIVE | OUTPATIENT
Start: 2025-02-10 | End: 2025-02-11

## 2025-02-10 RX ORDER — LIDOCAINE HYDROCHLORIDE 10 MG/ML
INJECTION, SOLUTION EPIDURAL; INFILTRATION; INTRACAUDAL; PERINEURAL
Status: DISCONTINUED | OUTPATIENT
Start: 2025-02-10 | End: 2025-02-10 | Stop reason: SDUPTHER

## 2025-02-10 RX ORDER — DIPHENHYDRAMINE HYDROCHLORIDE 50 MG/ML
12.5 INJECTION INTRAMUSCULAR; INTRAVENOUS
Status: ACTIVE | OUTPATIENT
Start: 2025-02-10 | End: 2025-02-11

## 2025-02-10 RX ORDER — SODIUM CHLORIDE, SODIUM LACTATE, POTASSIUM CHLORIDE, CALCIUM CHLORIDE 600; 310; 30; 20 MG/100ML; MG/100ML; MG/100ML; MG/100ML
INJECTION, SOLUTION INTRAVENOUS CONTINUOUS
Status: DISCONTINUED | OUTPATIENT
Start: 2025-02-10 | End: 2025-02-14 | Stop reason: HOSPADM

## 2025-02-10 RX ORDER — EPHEDRINE SULFATE/0.9% NACL/PF 25 MG/5 ML
SYRINGE (ML) INTRAVENOUS
Status: DISCONTINUED | OUTPATIENT
Start: 2025-02-10 | End: 2025-02-10 | Stop reason: SDUPTHER

## 2025-02-10 RX ORDER — SODIUM CHLORIDE 9 MG/ML
INJECTION, SOLUTION INTRAVENOUS PRN
Status: DISCONTINUED | OUTPATIENT
Start: 2025-02-10 | End: 2025-02-14 | Stop reason: SDUPTHER

## 2025-02-10 RX ORDER — SODIUM CHLORIDE 0.9 % (FLUSH) 0.9 %
5-40 SYRINGE (ML) INJECTION PRN
Status: DISCONTINUED | OUTPATIENT
Start: 2025-02-10 | End: 2025-02-14 | Stop reason: HOSPADM

## 2025-02-10 RX ORDER — HYDROMORPHONE HYDROCHLORIDE 1 MG/ML
0.5 INJECTION, SOLUTION INTRAMUSCULAR; INTRAVENOUS; SUBCUTANEOUS EVERY 5 MIN PRN
Status: DISCONTINUED | OUTPATIENT
Start: 2025-02-10 | End: 2025-02-11 | Stop reason: ALTCHOICE

## 2025-02-10 RX ORDER — SODIUM CHLORIDE 0.9 % (FLUSH) 0.9 %
5-40 SYRINGE (ML) INJECTION EVERY 12 HOURS SCHEDULED
Status: DISCONTINUED | OUTPATIENT
Start: 2025-02-10 | End: 2025-02-14 | Stop reason: HOSPADM

## 2025-02-10 RX ORDER — METOPROLOL TARTRATE 1 MG/ML
2.5 INJECTION, SOLUTION INTRAVENOUS EVERY 12 HOURS
Status: DISCONTINUED | OUTPATIENT
Start: 2025-02-11 | End: 2025-02-13

## 2025-02-10 RX ADMIN — EPHEDRINE SULFATE 10 MG: 5 INJECTION INTRAVENOUS at 10:38

## 2025-02-10 RX ADMIN — METRONIDAZOLE 500 MG: 5 INJECTION, SOLUTION INTRAVENOUS at 06:29

## 2025-02-10 RX ADMIN — PROPOFOL 50 MG: 10 INJECTION, EMULSION INTRAVENOUS at 10:21

## 2025-02-10 RX ADMIN — SODIUM CHLORIDE, POTASSIUM CHLORIDE, SODIUM LACTATE AND CALCIUM CHLORIDE: 600; 310; 30; 20 INJECTION, SOLUTION INTRAVENOUS at 15:31

## 2025-02-10 RX ADMIN — PROPOFOL 50 MG: 10 INJECTION, EMULSION INTRAVENOUS at 10:36

## 2025-02-10 RX ADMIN — SODIUM CHLORIDE, PRESERVATIVE FREE 10 ML: 5 INJECTION INTRAVENOUS at 08:07

## 2025-02-10 RX ADMIN — THIAMINE HYDROCHLORIDE 100 MG: 100 INJECTION, SOLUTION INTRAMUSCULAR; INTRAVENOUS at 08:02

## 2025-02-10 RX ADMIN — SODIUM PHOSPHATE, DIBASIC AND SODIUM PHOSPHATE, MONOBASIC 1 ENEMA: 7; 19 ENEMA RECTAL at 08:04

## 2025-02-10 RX ADMIN — METOPROLOL TARTRATE 5 MG: 5 INJECTION INTRAVENOUS at 01:33

## 2025-02-10 RX ADMIN — PROPOFOL 50 MG: 10 INJECTION, EMULSION INTRAVENOUS at 10:29

## 2025-02-10 RX ADMIN — METOCLOPRAMIDE HYDROCHLORIDE 5 MG: 5 INJECTION, SOLUTION INTRAMUSCULAR; INTRAVENOUS at 23:13

## 2025-02-10 RX ADMIN — CIPROFLOXACIN 400 MG: 2 INJECTION, SOLUTION INTRAVENOUS at 05:41

## 2025-02-10 RX ADMIN — CIPROFLOXACIN 400 MG: 2 INJECTION, SOLUTION INTRAVENOUS at 16:43

## 2025-02-10 RX ADMIN — PROPOFOL 50 MG: 10 INJECTION, EMULSION INTRAVENOUS at 10:23

## 2025-02-10 RX ADMIN — METRONIDAZOLE 500 MG: 5 INJECTION, SOLUTION INTRAVENOUS at 14:28

## 2025-02-10 RX ADMIN — LIDOCAINE HYDROCHLORIDE 30 MG: 10 INJECTION, SOLUTION EPIDURAL; INFILTRATION; INTRACAUDAL; PERINEURAL at 10:21

## 2025-02-10 RX ADMIN — METOCLOPRAMIDE HYDROCHLORIDE 5 MG: 5 INJECTION, SOLUTION INTRAMUSCULAR; INTRAVENOUS at 12:01

## 2025-02-10 RX ADMIN — SODIUM CHLORIDE, POTASSIUM CHLORIDE, SODIUM LACTATE AND CALCIUM CHLORIDE: 600; 310; 30; 20 INJECTION, SOLUTION INTRAVENOUS at 11:48

## 2025-02-10 RX ADMIN — PROPOFOL 50 MG: 10 INJECTION, EMULSION INTRAVENOUS at 10:26

## 2025-02-10 RX ADMIN — METRONIDAZOLE 500 MG: 5 INJECTION, SOLUTION INTRAVENOUS at 23:51

## 2025-02-10 RX ADMIN — SODIUM CHLORIDE, POTASSIUM CHLORIDE, SODIUM LACTATE AND CALCIUM CHLORIDE: 600; 310; 30; 20 INJECTION, SOLUTION INTRAVENOUS at 06:38

## 2025-02-10 RX ADMIN — PROPOFOL 50 MG: 10 INJECTION, EMULSION INTRAVENOUS at 10:31

## 2025-02-10 RX ADMIN — PROPOFOL 50 MG: 10 INJECTION, EMULSION INTRAVENOUS at 10:34

## 2025-02-10 RX ADMIN — METOCLOPRAMIDE HYDROCHLORIDE 5 MG: 5 INJECTION, SOLUTION INTRAMUSCULAR; INTRAVENOUS at 01:33

## 2025-02-10 RX ADMIN — POLYETHYLENE GLYCOL 3350 17 G: 17 POWDER, FOR SOLUTION ORAL at 20:11

## 2025-02-10 RX ADMIN — SODIUM CHLORIDE, PRESERVATIVE FREE 40 MG: 5 INJECTION INTRAVENOUS at 08:02

## 2025-02-10 ASSESSMENT — ENCOUNTER SYMPTOMS
NAUSEA: 0
VOMITING: 0
SHORTNESS OF BREATH: 0
ABDOMINAL DISTENTION: 0
ABDOMINAL PAIN: 1

## 2025-02-10 ASSESSMENT — LIFESTYLE VARIABLES: SMOKING_STATUS: 0

## 2025-02-10 NOTE — ANESTHESIA POSTPROCEDURE EVALUATION
Department of Anesthesiology  Postprocedure Note    Patient: Damian You  MRN: [id number]  YOB: 1950  Date of evaluation: 2/10/2025    Procedure Summary       Date: 02/10/25 Room / Location: [place] [address] / [facility]    Anesthesia Start: 1010 Anesthesia Stop: 1053    Procedure: SIGMOIDOSCOPY DIAGNOSTIC FLEXIBLE Diagnosis:       Abnormal computed tomography of sigmoid colon      (Abnormal computed tomography of sigmoid colon [R93.3])    Surgeons: Joselyn Murray MD Responsible Provider: Carrie Fonseca APRN - CRNA    Anesthesia Type: general ASA Status: 3            Anesthesia Type: No value filed.    Paola Phase I: Paola Score: 9    Paola Phase II:      Anesthesia Post Evaluation    Patient location during evaluation: PACU  Patient participation: complete - patient participated  Level of consciousness: sleepy but conscious  Pain score: 0  Airway patency: patent  Nausea & Vomiting: no nausea and no vomiting  Cardiovascular status: hemodynamically stable and blood pressure returned to baseline  Respiratory status: acceptable and room air  Hydration status: stable  Comments: BP (!) 90/50   Pulse 61   Temp 97.7 °F (36.5 °C) (Temporal)   Resp 16   Ht 1.829 m (6')   Wt 93.4 kg (206 lb)   SpO2 97%   BMI 27.94 kg/m²     Pain management: adequate    No notable events documented.

## 2025-02-10 NOTE — PROGRESS NOTES
Patient is to have enema 1 hour prior to sigmoidoscopy per RN from previous day?  She advised that JASE Kim today's nurse to call endo for time of procedure today.  Per Dr Murray's note enema is to be given 2 hours prior to procedure today.

## 2025-02-10 NOTE — PROGRESS NOTES
MEDICAL ONCOLOGY PROGRESS NOTE     Pt Name: Damian You  MRN: [id number]  YOB: 1950  Date of evaluation: 2/10/2025    Subjective-patient denies new complaint this morning.  Afebrile.  Expect postoperative changes    POD #3 Exploratory laparotomy, loop colostomy, lysis of adhesions and cecostomy by Dr. Toro Gutiérrez 2/7/2025    I discussed the case with Dr. Gutiérrez  2/8/2025.  The colon was massively distended.  The only thing that was able to be done during the intervention on 2/7/2025 was to decompress the colon, allow for stool to go via the ostomy, the procedure required even a cecostomy for decompression during surgery.  If surgery was to be done at any point to remove the primary (despite hepatic metastasis), it would be 6 to 8 weeks down the road, not anytime soon.      As discussed with Dr. Gutiérrez, from the surgical standpoint, GI can proceed immediately with a sigmoidoscopy for tissue biopsy to obtain tissue for diagnosis and molecular studies.  A tissue diagnosis will be necessary to proceed with systemic therapy.    I discussed the case 2/8/2025 with Dr. Joselyn Murray with GI. She will be making arrangements for sigmoidoscopy and biopsy on Monday, 2/10/2025, to obtain tissue for diagnosis and molecular studies.       HISTORY OF PRESENT ILLNESS:  Damian You was first seen by Dr. Bsowell on 2/6/2025 during inpatient hospitalization at [facility].  He was consulted for abnormal imaging findings suspicious for hepatic metastasis.  Patient presented to the ER department with complaints of abdominal pain.  The patient denies any nausea vomit diarrhea.  Patient reported decreased appetite and weight loss.  Laboratory studies at admission showed normocytic anemia with hemoglobin 11.5/MCV 85.  Iron profile consistent with iron deficiency anemia.  Chemistry remarkable for hypokalemia 3.1, creatinine 1.4.  LFTs abnormal.  The patient has multiple other comorbidities to include a history  surgically absent.  The spleen, pancreas and adrenal glands are within normal limits. Scattered periaortic lymph nodes measure up to 0.9 cm.  No mesenteric or retroperitoneal lymphadenopathy is seen.  Scattered colonic diverticula with a short segment of sigmoid wall thickening.  No adjacent mesenteric stranding.  Gas distended colon is present to the level of the sigmoid colon.  There is no free air or fluid seen in the abdomen or pelvis.  The urinary bladder is unremarkable.  Healed right rib and pelvic fractures.  Postoperative changes involving the proximal right femur.  T12 wedge compression and L2 and L3 superior compression fractures of indeterminate age.  Impression: 1.  Focal bowel wall thickening involving the sigmoid colon favoring mass versus diverticular change versus early acute diverticulitis.  An ileus versus partial colonic obstruction is extending to the level of the sigmoid colon. 2 .  Findings concerning for hepatic metastases measuring up to 1.7 cm. 3.  Upper limits normal retroperitoneal lymph nodes.     My interpretation: Evidence of multiple hypodense liver lesions concerning for a metastatic process.  Significant colonic distention.  Prior cholecystectomy      ASSESSMENT:  # Sigmoid colonic thickening-GI consulted.  Likely GI malignancy.  Elevated CEA  CEA 73  CA [id number] 2/9/2025    POD #2 Exploratory laparotomy, loop colostomy, lysis of adhesions and cecostomy by Dr. Toro Gutiérrez 2/7/2025    Ostomy is leaking.  Nursing staff changing this out.     I discussed the case with Dr. Gutiérrez 2/8/2025.  The colon was massively distended.  The only thing that was able to be done during the intervention on 2/7/2025 was to decompress the colon, allow for stool to go via the ostomy, the procedure required even a cecostomy for decompression during surgery.  If surgery was to be done at any point to remove the primary (despite hepatic metastasis), it would be 6 to 8 weeks down the road, not

## 2025-02-10 NOTE — INTERVAL H&P NOTE
Update History & Physical    The patient's History and Physical of February 10, 2025 was reviewed with the patient and I examined the patient. There was no change. The surgical site was confirmed by the patient and me.     Plan: The risks, benefits, expected outcome, and alternative to the recommended procedure have been discussed with the patient. Patient understands and wants to proceed with the procedure.     Electronically signed by Joselyn Murray MD on 2/10/2025 at 10:40 AM

## 2025-02-10 NOTE — PROGRESS NOTES
Subjective:  No acute events or changes. Had flex sig this am. Denies any new complaints. Tolerating some clears.    Objective:  BP (!) 118/59   Pulse 53   Temp 97.7 °F (36.5 °C) (Temporal)   Resp 18   Ht 1.829 m (6')   Wt 93.4 kg (206 lb)   SpO2 97%   BMI 27.94 kg/m²   Date 02/10/25 0000 - 02/10/25 2359   Shift 4439-7158 9235-6062 5224-7785 24 Hour Total   INTAKE   P.O.(mL/kg/hr) 0(0)   0   I.V.(mL/kg) 821.6(8.8) 150(1.6)  971.6(10.4)   IV Piggyback(mL/kg) 396(4.2)   396(4.2)   Shift Total(mL/kg) 1217.6(13) 150(1.6)  1367.6(14.6)   OUTPUT   Urine(mL/kg/hr) 450(0.6) 300  750   Shift Total(mL/kg) 450(4.8) 300(3.2)  750(8)   Weight (kg) 93.4 93.4 93.4 93.4     General: NAD, AAO  Chest: regular, non-labored  Abdomen: Soft, ND, ATTP, incision C/d/I, small amount of stool in appliance    CBC:   Lab Results   Component Value Date/Time    WBC 10.5 02/10/2025 01:29 AM    RBC 3.41 02/10/2025 01:29 AM    HGB 9.5 02/10/2025 01:29 AM    HCT 30.0 02/10/2025 01:29 AM    MCV 88.0 02/10/2025 01:29 AM    MCH 27.9 02/10/2025 01:29 AM    MCHC 31.7 02/10/2025 01:29 AM    RDW 14.1 02/10/2025 01:29 AM     02/10/2025 01:29 AM    MPV 8.7 02/10/2025 01:29 AM     BMP:    Lab Results   Component Value Date/Time     02/10/2025 01:29 AM    K 3.7 02/10/2025 01:29 AM     02/10/2025 01:29 AM    CO2 22 02/10/2025 01:29 AM    BUN 22 02/10/2025 01:29 AM    CREATININE 1.3 02/10/2025 01:29 AM    CALCIUM 7.5 02/10/2025 01:29 AM    GFRAA 52 08/15/2022 09:19 AM    LABGLOM 58 02/10/2025 01:29 AM    LABGLOM 46 03/25/2024 10:00 AM    GLUCOSE 72 02/10/2025 01:29 AM     Assessment and plan:  74 year old male s/p diverting loop colostomy for sigmoid mass vs stricture  No mass visualized on scope today, biopsies taken  Having some ostomy output, monitor and gradually increase diet  Continue work up of liver lesions per oncology  Other cares per the hospitalist service

## 2025-02-10 NOTE — PROGRESS NOTES
[facility]      Patient:  Damian You  YOB: 1950  Date of Service: 2/10/2025  MRN: [id number]   Acct: [id number]   Primary Care Physician: Ramona Smith MD  Advance Directive: Full Code  Admit Date: 2/5/2025       Hospital Day: 5  Portions of this note have been copied forward, however, changed to reflect the most current clinical status of this patient.  CHIEF COMPLAINT   Abdominal pain    SUBJECTIVE:  Mild incisional pain, denies nausea or vomiting.      CUMULATIVE HOSPITAL COURSE:  Patient is a 74-year-old male with a past medical history of CAD, GERD, hyperlipidemia, CKD stage IIIa who presented to [facility] ED on 2/5 for evaluation of periumbilical pain.  In ED, patient was found to be hypokalemic with potassium 2.8, creatinine 1.2,  mildly elevated LFTs.  CT of the abdomen and pelvis indicated focal bowel wall thickening involving favoring mass versus diverticular changes versus early acute diverticulitis addition to ileus versus partial colonic obstruction extending to the level of the sigmoid colon.  Also, indicates several poorly defined low-density lesions in the liver concerning for metastasis with upper limits of normal retroperitoneal lymph nodes. Patient was admitted to hospital with GI and oncology consult patient was placed on Cipro and Flagyl due to questionable early diverticulitis.  Electrolytes were replaced per protocol.  Oncology recommended 3-day course of Venofer for ANGELINA.  GI initially recommended NGT to [facility] due to partial small bowel obstruction.  General surgery consulted, concerns for high-grade colonic obstruction with peritoneal findings.  Patient had exploratory laparotomy with a loop colostomy and lysis of adhesions on 2/7.  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  NG tube was discontinued per General Surgery 2/8.    2/10/2025  Leukocytosis improved, WBCs 10.5. Blood cultures, NGTD.  Sigmoidoscopy with biopsy, completed 2/10/2025.

## 2025-02-10 NOTE — OP NOTE
Operative Report    Patient: Damian You MRN: [id number]      YOB: 1950  Age: 74 y.o.  Sex: male            Indications: Imaging concerning for obstructive stricture involving the sigmoid colon.  Patient is status post diverting colostomy.  The surgeon was unable to get a tissue sample see that op report.  They are requesting flexible sigmoidoscopy for tissue diagnosis.    Preoperative Evaluation: The patient was evaluated prior to the procedure in the GI lab admission area, the patient ASA was recorded .  Consent was obtained from the patient with the risk of perforation, bleeding, infection, and aspiration.    Anesthesia: see anesthesia documentation.     Complications: None; patient tolerated the procedure well.    EBL -insignificant      Procedure: The patient was sedated in the left lateral decubitus position.  Scope was advanced from the rectum to the suspected rectosigmoid, 20 cm.  Evaluation was performed to the cecum twice.  The scope was withdrawn to the rectum, retroflexed view was performed.  Preparation was good.     Findings:    There was an obstructive stricture noted at 20 cm, in the suspected rectosigmoid colon.  No associated discrete mass.  The GIF scope could not traverse.  Suspect stricture lumen is less than 5 mm.  There was diffuse erythematous and friable tissue surrounding the stricture site at 20 cm.  This was biopsied with multiple passes with cold for steps for histology.      Postoperative Diagnosis:   5 mm stricture noted at 20 cm in the rectosigmoid colon.  Tissue just proximal to the stricture was friable and erythematous.  This was biopsied for histology.      Recommendations:   Return patient to hospital [facility] for ongoing care.  Okay to resume oral diet per surgery recommendations.  Follow-up pathology.  If pathology is negative on this exam then I would recommend a CT-guided biopsy.  Prophylactic anticoagulation recommendations per surgery given postop status.  From a

## 2025-02-10 NOTE — PLAN OF CARE
Problem: Safety - Adult  Goal: Free from fall injury  Outcome: Progressing     Problem: Chronic Conditions and Co-morbidities  Goal: Patient's chronic conditions and co-morbidity symptoms are monitored and maintained or improved  Outcome: Progressing     Problem: Discharge Planning  Goal: Discharge to home or other facility with appropriate resources  Outcome: Progressing     Problem: Pain  Goal: Verbalizes/displays adequate comfort level or baseline comfort level  Outcome: Progressing     Problem: Skin/Tissue Integrity - Adult  Goal: Incisions, wounds, or drain sites healing without S/S of infection  Outcome: Progressing     Problem: Infection - Adult  Goal: Absence of infection at discharge  Outcome: Progressing     Problem: Metabolic/Fluid and Electrolytes - Adult  Goal: Electrolytes maintained within normal limits  Outcome: Progressing

## 2025-02-10 NOTE — PROGRESS NOTES
[facility]keon [facility] at 1145 AM[facility].    Patient has voided Electronically signed by CATALINA HENRY RN on 2/10/2025 at 3:42 PM

## 2025-02-10 NOTE — ANESTHESIA PRE PROCEDURE
Department of Anesthesiology  Preprocedure Note       Name:  Damian You   Age:  74 y.o.  :  1950                                          MRN:  [id number]         Date:  2/10/2025      Surgeon: Surgeon(s):  Joselyn Murray MD    Procedure: Procedure(s):  SIGMOIDOSCOPY DIAGNOSTIC FLEXIBLE    Medications prior to admission:   Prior to Admission medications    Medication Sig Start Date End Date Taking? Authorizing Provider   metoprolol succinate (TOPROL XL) 25 MG extended release tablet TAKE 1/2 TABLET BY MOUTH DAILY 25   Ramona Smith MD   folic acid (FOLVITE) 1 MG tablet TAKE 1 TABLET BY MOUTH DAILY 24   Ramona Smith MD   pantoprazole (PROTONIX) 40 MG tablet TAKE 1 TABLET BY MOUTH DAILY 24   Ramona Smith MD   lisinopril (PRINIVIL;ZESTRIL) 40 MG tablet Take 0.5 tablets by mouth daily 24  Ramona Smith MD   levothyroxine (SYNTHROID) 125 MCG tablet TAKE 1 TABLET BY MOUTH ONCE DAILY 24   Ramona Smith MD   colchicine (COLCRYS) 0.6 MG tablet TAKE 1 TABLET BY MOUTH DAILY 10/7/24 1/5/25  Ramona Smith MD   amiodarone (CORDARONE) 200 MG tablet TAKE 1/2 TABLET BY MOUTH DAILY 24   Tereza Robles APRN   amLODIPine (NORVASC) 5 MG tablet TAKE 1 TABLET BY MOUTH DAILY 7/22/24 10/20/24  Patricia Diaz APRN   atorvastatin (LIPITOR) 20 MG tablet TAKE 1 TABLET BY MOUTH EVERY EVENING 24  Patricia Diaz APRN   Multiple Vitamin (MULTIVITAMIN) TABS tablet Take 1 tablet by mouth daily 21  Jon Hunter MD       Current medications:    Current Facility-Administered Medications   Medication Dose Route Frequency Provider Last Rate Last Admin    metoclopramide (REGLAN) injection 5 mg  5 mg IntraVENous Q8H Toro Gutiérrez MD   5 mg at 02/10/25 0133    metoprolol (LOPRESSOR) injection 5 mg  5 mg IntraVENous Q12H Toro Gutiérrez MD   5 mg at 02/10/25 0133    [Held by provider] metoprolol succinate (TOPROL

## 2025-02-10 NOTE — PROGRESS NOTES
Ostomy Referral Progress Note      NAME:  Damian You  MEDICAL RECORD NUMBER:  [id number]  AGE: 74 y.o.   GENDER:  male  :  1950  TODAY'S DATE:  2/10/2025    Subjective     Damian You is a 74 y.o. male referred by:   [] Physician  [] Nursing  [] Other:     New    Surgeon Dr. Gutiérrez    PAST MEDICAL HISTORY:        Diagnosis Date    CAD (coronary artery disease)     Gout     Hypertension     Hypotension 2020    Non-ST elevation MI (NSTEMI) ([facility]) 14    Palliative care patient 2020    Pneumonia due to infectious organism 2020       MEDICATIONS:    No current facility-administered medications on file prior to encounter.     Current Outpatient Medications on File Prior to Encounter   Medication Sig Dispense Refill    metoprolol succinate (TOPROL XL) 25 MG extended release tablet TAKE 1/2 TABLET BY MOUTH DAILY 45 tablet 1    folic acid (FOLVITE) 1 MG tablet TAKE 1 TABLET BY MOUTH DAILY 90 tablet 1    pantoprazole (PROTONIX) 40 MG tablet TAKE 1 TABLET BY MOUTH DAILY 90 tablet 1    lisinopril (PRINIVIL;ZESTRIL) 40 MG tablet Take 0.5 tablets by mouth daily 45 tablet 1    levothyroxine (SYNTHROID) 125 MCG tablet TAKE 1 TABLET BY MOUTH ONCE DAILY 90 tablet 1    colchicine (COLCRYS) 0.6 MG tablet TAKE 1 TABLET BY MOUTH DAILY 90 tablet 1    amiodarone (CORDARONE) 200 MG tablet TAKE 1/2 TABLET BY MOUTH DAILY 30 tablet 5    amLODIPine (NORVASC) 5 MG tablet TAKE 1 TABLET BY MOUTH DAILY 90 tablet 3    atorvastatin (LIPITOR) 20 MG tablet TAKE 1 TABLET BY MOUTH EVERY EVENING 90 tablet 3    Multiple Vitamin (MULTIVITAMIN) TABS tablet Take 1 tablet by mouth daily 30 tablet 0       ALLERGIES:    No Known Allergies    PAST SURGICAL HISTORY:    Past Surgical History:   Procedure Laterality Date    CARDIAC CATHETERIZATION  14  [facility]    angioplasty, stent to LAD. EF 45%    CHOLECYSTECTOMY      FEMUR FRACTURE SURGERY Right 10/30/2020    TFN, SHORT

## 2025-02-10 NOTE — PROGRESS NOTES
Nurse attempted to call endo twice this AM- per order prior to procedure to determine time of procedure to know when to give fleet enema. Phone rang with no answer.

## 2025-02-11 PROBLEM — E44.0 MODERATE MALNUTRITION: Status: ACTIVE | Noted: 2025-02-11

## 2025-02-11 LAB
ALBUMIN SERPL-MCNC: 2.4 G/DL (ref 3.5–5.2)
ALP SERPL-CCNC: 116 U/L (ref 40–129)
ALT SERPL-CCNC: 25 U/L (ref 5–41)
ANION GAP SERPL CALCULATED.3IONS-SCNC: 11 MMOL/L (ref 8–16)
AST SERPL-CCNC: 31 U/L (ref 5–40)
BASOPHILS # BLD: 0 K/UL (ref 0–0.2)
BASOPHILS NFR BLD: 0.4 % (ref 0–1)
BILIRUB SERPL-MCNC: 0.3 MG/DL (ref 0.2–1.2)
BUN SERPL-MCNC: 17 MG/DL (ref 8–23)
CALCIUM SERPL-MCNC: 7.6 MG/DL (ref 8.8–10.2)
CHLORIDE SERPL-SCNC: 106 MMOL/L (ref 98–107)
CO2 SERPL-SCNC: 23 MMOL/L (ref 22–29)
CREAT SERPL-MCNC: 1.1 MG/DL (ref 0.7–1.2)
EOSINOPHIL # BLD: 0.2 K/UL (ref 0–0.6)
EOSINOPHIL NFR BLD: 2.2 % (ref 0–5)
ERYTHROCYTE [DISTWIDTH] IN BLOOD BY AUTOMATED COUNT: 14.4 % (ref 11.5–14.5)
GLUCOSE SERPL-MCNC: 74 MG/DL (ref 70–99)
HCT VFR BLD AUTO: 32.2 % (ref 42–52)
HGB BLD-MCNC: 10 G/DL (ref 14–18)
IMM GRANULOCYTES # BLD: 0.1 K/UL
LYMPHOCYTES # BLD: 1.2 K/UL (ref 1.1–4.5)
LYMPHOCYTES NFR BLD: 11.3 % (ref 20–40)
MAGNESIUM SERPL-MCNC: 1.7 MG/DL (ref 1.6–2.4)
MCH RBC QN AUTO: 27.7 PG (ref 27–31)
MCHC RBC AUTO-ENTMCNC: 31.1 G/DL (ref 33–37)
MCV RBC AUTO: 89.2 FL (ref 80–94)
MONOCYTES # BLD: 0.7 K/UL (ref 0–0.9)
MONOCYTES NFR BLD: 6.2 % (ref 0–10)
NEUTROPHILS # BLD: 8.6 K/UL (ref 1.5–7.5)
NEUTS SEG NFR BLD: 79 % (ref 50–65)
PLATELET # BLD AUTO: 240 K/UL (ref 130–400)
PMV BLD AUTO: 9.1 FL (ref 9.4–12.4)
POTASSIUM SERPL-SCNC: 3.2 MMOL/L (ref 3.5–5)
PROT SERPL-MCNC: 4.7 G/DL (ref 6.4–8.3)
RBC # BLD AUTO: 3.61 M/UL (ref 4.7–6.1)
SODIUM SERPL-SCNC: 140 MMOL/L (ref 136–145)
WBC # BLD AUTO: 10.9 K/UL (ref 4.8–10.8)

## 2025-02-11 PROCEDURE — 2580000003 HC RX 258: Performed by: INTERNAL MEDICINE

## 2025-02-11 PROCEDURE — 6360000002 HC RX W HCPCS: Performed by: STUDENT IN AN ORGANIZED HEALTH CARE EDUCATION/TRAINING PROGRAM

## 2025-02-11 PROCEDURE — 2500000003 HC RX 250 WO HCPCS: Performed by: NURSE ANESTHETIST, CERTIFIED REGISTERED

## 2025-02-11 PROCEDURE — 6370000000 HC RX 637 (ALT 250 FOR IP): Performed by: INTERNAL MEDICINE

## 2025-02-11 PROCEDURE — 2500000003 HC RX 250 WO HCPCS

## 2025-02-11 PROCEDURE — 85025 COMPLETE CBC W/AUTO DIFF WBC: CPT

## 2025-02-11 PROCEDURE — 6360000002 HC RX W HCPCS: Performed by: INTERNAL MEDICINE

## 2025-02-11 PROCEDURE — 6370000000 HC RX 637 (ALT 250 FOR IP): Performed by: STUDENT IN AN ORGANIZED HEALTH CARE EDUCATION/TRAINING PROGRAM

## 2025-02-11 PROCEDURE — 36415 COLL VENOUS BLD VENIPUNCTURE: CPT

## 2025-02-11 PROCEDURE — 94760 N-INVAS EAR/PLS OXIMETRY 1: CPT

## 2025-02-11 PROCEDURE — 1200000000 HC SEMI PRIVATE

## 2025-02-11 PROCEDURE — 83735 ASSAY OF MAGNESIUM: CPT

## 2025-02-11 PROCEDURE — 80053 COMPREHEN METABOLIC PANEL: CPT

## 2025-02-11 PROCEDURE — 97161 PT EVAL LOW COMPLEX 20 MIN: CPT

## 2025-02-11 PROCEDURE — 99024 POSTOP FOLLOW-UP VISIT: CPT | Performed by: SURGERY

## 2025-02-11 PROCEDURE — 97116 GAIT TRAINING THERAPY: CPT

## 2025-02-11 RX ORDER — MAGNESIUM SULFATE IN WATER 40 MG/ML
2000 INJECTION, SOLUTION INTRAVENOUS ONCE
Status: COMPLETED | OUTPATIENT
Start: 2025-02-11 | End: 2025-02-11

## 2025-02-11 RX ORDER — POTASSIUM CHLORIDE 1500 MG/1
40 TABLET, EXTENDED RELEASE ORAL ONCE
Status: COMPLETED | OUTPATIENT
Start: 2025-02-11 | End: 2025-02-11

## 2025-02-11 RX ADMIN — METRONIDAZOLE 500 MG: 5 INJECTION, SOLUTION INTRAVENOUS at 15:38

## 2025-02-11 RX ADMIN — METRONIDAZOLE 500 MG: 5 INJECTION, SOLUTION INTRAVENOUS at 23:07

## 2025-02-11 RX ADMIN — AMIODARONE HYDROCHLORIDE 100 MG: 200 TABLET ORAL at 09:00

## 2025-02-11 RX ADMIN — MAGNESIUM SULFATE HEPTAHYDRATE 2000 MG: 40 INJECTION, SOLUTION INTRAVENOUS at 09:55

## 2025-02-11 RX ADMIN — METOCLOPRAMIDE HYDROCHLORIDE 5 MG: 5 INJECTION, SOLUTION INTRAMUSCULAR; INTRAVENOUS at 03:31

## 2025-02-11 RX ADMIN — METOPROLOL TARTRATE 2.5 MG: 5 INJECTION INTRAVENOUS at 17:36

## 2025-02-11 RX ADMIN — METOPROLOL TARTRATE 2.5 MG: 5 INJECTION INTRAVENOUS at 05:32

## 2025-02-11 RX ADMIN — POLYETHYLENE GLYCOL 3350 17 G: 17 POWDER, FOR SOLUTION ORAL at 19:40

## 2025-02-11 RX ADMIN — POLYETHYLENE GLYCOL 3350 17 G: 17 POWDER, FOR SOLUTION ORAL at 09:06

## 2025-02-11 RX ADMIN — CIPROFLOXACIN 400 MG: 2 INJECTION, SOLUTION INTRAVENOUS at 05:30

## 2025-02-11 RX ADMIN — METOCLOPRAMIDE HYDROCHLORIDE 5 MG: 5 INJECTION, SOLUTION INTRAMUSCULAR; INTRAVENOUS at 09:53

## 2025-02-11 RX ADMIN — SODIUM CHLORIDE, PRESERVATIVE FREE 10 ML: 5 INJECTION INTRAVENOUS at 09:07

## 2025-02-11 RX ADMIN — METRONIDAZOLE 500 MG: 5 INJECTION, SOLUTION INTRAVENOUS at 08:49

## 2025-02-11 RX ADMIN — THIAMINE HYDROCHLORIDE 100 MG: 100 INJECTION, SOLUTION INTRAMUSCULAR; INTRAVENOUS at 09:06

## 2025-02-11 RX ADMIN — LEVOTHYROXINE SODIUM 125 MCG: 0.12 TABLET ORAL at 09:00

## 2025-02-11 RX ADMIN — POTASSIUM CHLORIDE 40 MEQ: 1500 TABLET, EXTENDED RELEASE ORAL at 09:07

## 2025-02-11 RX ADMIN — CIPROFLOXACIN 400 MG: 2 INJECTION, SOLUTION INTRAVENOUS at 17:41

## 2025-02-11 RX ADMIN — SODIUM CHLORIDE, PRESERVATIVE FREE 40 MG: 5 INJECTION INTRAVENOUS at 09:06

## 2025-02-11 RX ADMIN — METOCLOPRAMIDE HYDROCHLORIDE 5 MG: 5 INJECTION, SOLUTION INTRAMUSCULAR; INTRAVENOUS at 17:36

## 2025-02-11 ASSESSMENT — ENCOUNTER SYMPTOMS
NAUSEA: 0
ABDOMINAL PAIN: 1
VOMITING: 0
ABDOMINAL DISTENTION: 0
SHORTNESS OF BREATH: 0

## 2025-02-11 NOTE — PROGRESS NOTES
PHYSICAL THERAPY    Pt has an active bedrest order. Please discontinue when appropriate to begin mobility assessment.    Electronically signed by Maureen Gill PT on 2/11/2025 at 7:03 AM

## 2025-02-11 NOTE — PROGRESS NOTES
Physical Therapy  Facility/Department: [facility] ONCOLOGY UNIT  Physical Therapy Initial Assessment    Name: Damain You  : 1950  MRN: [id number]  Date of Service: 2025    Discharge Recommendations:  Continue to assess pending progress          Patient Diagnosis(es): The primary encounter diagnosis was Hypokalemia. Diagnoses of Possible Diverticulitis of colon, Abnormal CT scan, Probable Colonic mass, and Abnormal computed tomography of sigmoid colon were also pertinent to this visit.  Past Medical History:  has a past medical history of CAD (coronary artery disease), Gout, Hypertension, Hypotension, Non-ST elevation MI (NSTEMI) ([facility]), Palliative care patient, and Pneumonia due to infectious organism.  Past Surgical History:  has a past surgical history that includes Cardiac catheterization (14  [facility]); Cholecystectomy; Femur fracture surgery (Right, 10/30/2020); laparotomy (N/A, 2025); and sigmoidoscopy (N/A, 2/10/2025).    Assessment  Body Structures, Functions, Activity Limitations Requiring Skilled Therapeutic Intervention: Decreased functional mobility ;Decreased ADL status;Decreased ROM;Decreased strength;Decreased endurance;Decreased balance;Decreased posture  Assessment: Pt ABLE TO AMB IN ROOM WITH WALKER. WILL CONT WITH PROGRESSIVE MOBILITY. Pt MAY NOT BE ABLE TO RETURN HOME ALONE AT THIS LEVEL.  Requires PT Follow-Up: Yes  Activity Tolerance  Activity Tolerance: Patient limited by fatigue    Plan  Physical Therapy Plan  General Plan: 5-7 times per week  Current Treatment Recommendations: Strengthening, ROM, Balance training, Functional mobility training, Transfer training, Gait training, Safety education & training, Patient/Caregiver education & training, Endurance training  Safety Devices  Type of Devices: Call light within reach, Nurse notified, Heels elevated for pressure relief    Restrictions  Restrictions/Precautions  Restrictions/Precautions: Fall Risk, Surgical Protocols  Position  Activity Restriction  Other Position/Activity Restrictions: COLOSTOMY     Subjective  General  Diagnosis: s/p COLOSTOMY  Subjective  Subjective: Pt WILLING TO GET OOB         Social/Functional History  Social/Functional History  Lives With: Alone  Type of Home: House  Home Layout: One level  Home Access: Ramped entrance  Entrance Stairs - Number of Steps: N/A  Entrance Stairs - Rails: Both  Bathroom Shower/Tub: Walk-in shower  Bathroom Toilet: Standard  Bathroom Equipment: None  Bathroom Accessibility: Accessible  Home Equipment: Cane - Quad  Has the patient had two or more falls in the past year or any fall with injury in the past year?: No  Receives Help From: Family, Friend(s)  Prior Level of Assist for ADLs: Independent  Prior Level of Assist for Homemaking: Needs assistance  Homemaking Responsibilities: Yes  Prior Level of Assist for Ambulation: Independent community ambulator, with or without device  Prior Level of Assist for Transfers: Independent  Active : Yes  Mode of Transportation: Car  Education: N/A  Occupation: Retired  Type of Occupation: N/A  Vision/Hearing  Vision  Vision: Within Functional Limits  Hearing  Hearing: Within functional limits    Cognition   Orientation  Overall Orientation Status: Within Normal Limits  Cognition  Overall Cognitive Status: WNL    Objective  Temp: 98.1 °F (36.7 °C)  Pulse: 63  Heart Rate Source: Monitor  Respirations: 16  SpO2: 95 %  O2 Device: None (Room air)  BP: 124/67  MAP (Calculated): 86  BP Location: Left upper arm     Observation/Palpation  Posture: Fair  Gross Assessment  AROM: Generally decreased, functional  Strength: Generally decreased, functional  Pain  Post-Pain: 0                   Bed mobility  Supine to Sit: Contact guard assistance (USED BEDRAIL)  Transfers  Sit to Stand: Minimal Assistance;Contact guard assistance  Stand to Sit: Contact guard assistance;Minimal Assistance  Bed to Chair: Contact guard assistance;Minimal

## 2025-02-11 NOTE — PROGRESS NOTES
Comprehensive Nutrition Assessment    Type and Reason for Visit:  NPO/clear liquid    Nutrition Recommendations/Plan:   Follow for diet advancement as tolerated or alternate means of nutrition as needed  Ensure Clear w/ meals  Add moderate malnutrition to problem list     Malnutrition Assessment:  Malnutrition Status:  Moderate malnutrition (02/11/25 1250)    Context:  Acute Illness     Findings of the 6 clinical characteristics of malnutrition:  Energy Intake:  75% or less of estimated energy requirements for 7 or more days  Weight Loss:  No weight loss     Body Fat Loss:  Mild body fat loss Orbital   Muscle Mass Loss:  Mild muscle mass loss Temples (temporalis), Clavicles (pectoralis & deltoids)  Fluid Accumulation:  No fluid accumulation     Strength:  Not Performed    Nutrition Assessment:    Pt has bee NPO or CL for 6 days. Pt is s/p colostomy and will monitor for progression of diet as tolerated. Pt reports appetite is good at this time. Consumed most of liquids for lunch today. No signficant weight loss noted. Currently on lactated ringers @ 75 mLs/hr. Will add Ensure Clear to meals and monitor for diet advancement as tolerated or possible need of alternate means of nutrition.    Nutrition Related Findings:    K 3.2, GFR 71 Wound Type: Surgical Incision       Current Nutrition Intake & Therapies:    Average Meal Intake: Unable to assess  Average Supplements Intake: None Ordered  ADULT DIET; Clear Liquid  ADULT ORAL NUTRITION SUPPLEMENT; Breakfast, Lunch, Dinner; Clear Liquid Oral Supplement    Anthropometric Measures:  Height: 182.9 cm (6' 0.01\")  Ideal Body Weight (IBW): 178 lbs (81 kg)    Admission Body Weight: 74.8 kg (164 lb 14.5 oz) (stated)  Current Body Weight: 93.4 kg (205 lb 14.6 oz), 115.7 % IBW. Weight Source: Not specified  Current BMI (kg/m2): 27.9  Usual Body Weight: 78.5 kg (173 lb 1 oz) (Per office visit 9-25-24)   % Weight Change (Calculated): 19  Weight Adjustment For: No Adjustment

## 2025-02-11 NOTE — PLAN OF CARE
Problem: Safety - Adult  Goal: Free from fall injury  Outcome: Progressing     Problem: Chronic Conditions and Co-morbidities  Goal: Patient's chronic conditions and co-morbidity symptoms are monitored and maintained or improved  Outcome: Progressing     Problem: Discharge Planning  Goal: Discharge to home or other facility with appropriate resources  Outcome: Progressing     Problem: Pain  Goal: Verbalizes/displays adequate comfort level or baseline comfort level  Outcome: Progressing     Problem: Skin/Tissue Integrity - Adult  Goal: Incisions, wounds, or drain sites healing without S/S of infection  Outcome: Progressing     Problem: Infection - Adult  Goal: Absence of infection at discharge  Outcome: Progressing     Problem: Metabolic/Fluid and Electrolytes - Adult  Goal: Electrolytes maintained within normal limits  Outcome: Progressing     Problem: Skin/Tissue Integrity  Goal: Skin integrity remains intact  Description: 1.  Monitor for areas of redness and/or skin breakdown  2.  Assess vascular access sites hourly  3.  Every 4-6 hours minimum:  Change oxygen saturation probe site  4.  Every 4-6 hours:  If on nasal continuous positive airway pressure, respiratory therapy assess nares and determine need for appliance change or resting period  Outcome: Progressing

## 2025-02-11 NOTE — PROGRESS NOTES
Subjective:  No acute events or changes. Tolerating PO. Denies any abdominal pain.    Objective:  /67   Pulse 63   Temp 98.1 °F (36.7 °C) (Temporal)   Resp 16   Ht 1.829 m (6' 0.01\")   Wt 93.4 kg (206 lb)   SpO2 95%   BMI 27.93 kg/m²   Date 02/11/25 0000 - 02/11/25 2359   Shift 2153-7686 9071-0753 0497-0945 24 Hour Total   INTAKE   Shift Total(mL/kg)       OUTPUT   Urine(mL/kg/hr) 800(1.1)   800   Stool(mL/kg)  425(4.5)  425(4.5)   Shift Total(mL/kg) 800(8.6) 425(4.5)  1225(13.1)   Weight (kg) 93.4 93.4 93.4 93.4     General: NAD, AAO  Chest: regular, non-labored  Abdomen: Soft, ND, mild TTP  Ostomy with stool in appliance    CBC:   Lab Results   Component Value Date/Time    WBC 10.9 02/11/2025 02:25 AM    RBC 3.61 02/11/2025 02:25 AM    HGB 10.0 02/11/2025 02:25 AM    HCT 32.2 02/11/2025 02:25 AM    MCV 89.2 02/11/2025 02:25 AM    MCH 27.7 02/11/2025 02:25 AM    MCHC 31.1 02/11/2025 02:25 AM    RDW 14.4 02/11/2025 02:25 AM     02/11/2025 02:25 AM    MPV 9.1 02/11/2025 02:25 AM     BMP:    Lab Results   Component Value Date/Time     02/11/2025 02:25 AM    K 3.2 02/11/2025 02:25 AM     02/11/2025 02:25 AM    CO2 23 02/11/2025 02:25 AM    BUN 17 02/11/2025 02:25 AM    CREATININE 1.1 02/11/2025 02:25 AM    CALCIUM 7.6 02/11/2025 02:25 AM    GFRAA 52 08/15/2022 09:19 AM    LABGLOM 71 02/11/2025 02:25 AM    LABGLOM 46 03/25/2024 10:00 AM    GLUCOSE 74 02/11/2025 02:25 AM     Assessment and plan:  74 year old male s/p diverting loop colostomy  Continue gradually increasing diet  Increase activity  Continue ostomy care  Other cares per the hospitalist service.

## 2025-02-11 NOTE — PROGRESS NOTES
[facility]      Patient:  Damian You  YOB: 1950  Date of Service: 2/11/2025  MRN: [id number]   Acct: [id number]   Primary Care Physician: Ramona Smith MD  Advance Directive: Full Code  Admit Date: 2/5/2025       Hospital Day: 6  Portions of this note have been copied forward, however, changed to reflect the most current clinical status of this patient.  CHIEF COMPLAINT   Abdominal pain    SUBJECTIVE:  Mild incisional pain, denies nausea or vomiting.      CUMULATIVE HOSPITAL COURSE:  Patient is a 74-year-old male with a past medical history of CAD, GERD, hyperlipidemia, CKD stage IIIa who presented to [facility] ED on 2/5 for evaluation of periumbilical pain.  In ED, patient was found to be hypokalemic with potassium 2.8, creatinine 1.2,  mildly elevated LFTs.  CT of the abdomen and pelvis indicated focal bowel wall thickening involving favoring mass versus diverticular changes versus early acute diverticulitis addition to ileus versus partial colonic obstruction extending to the level of the sigmoid colon.  Also, indicates several poorly defined low-density lesions in the liver concerning for metastasis with upper limits of normal retroperitoneal lymph nodes. Patient was admitted to hospital with GI and oncology consult patient was placed on Cipro and Flagyl due to questionable early diverticulitis.  Electrolytes were replaced per protocol.  Oncology recommended 3-day course of Venofer for ANGELINA.  GI initially recommended NGT to [facility] due to partial small bowel obstruction.  General surgery consulted, concerns for high-grade colonic obstruction with peritoneal findings.  Patient had exploratory laparotomy with a loop colostomy and lysis of adhesions on 2/7.  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  NG tube was discontinued per General Surgery 2/8.    2/10/2025  Leukocytosis improved, WBCs 10.5. Blood cultures, NGTD.  Sigmoidoscopy with biopsy, completed 2/10/2025.

## 2025-02-12 LAB
ALBUMIN SERPL-MCNC: 2.3 G/DL (ref 3.5–5.2)
ALP SERPL-CCNC: 116 U/L (ref 40–129)
ALT SERPL-CCNC: 20 U/L (ref 5–41)
ANION GAP SERPL CALCULATED.3IONS-SCNC: 11 MMOL/L (ref 8–16)
AST SERPL-CCNC: 24 U/L (ref 5–40)
BASOPHILS # BLD: 0.1 K/UL (ref 0–0.2)
BASOPHILS NFR BLD: 0.5 % (ref 0–1)
BILIRUB SERPL-MCNC: 0.3 MG/DL (ref 0.2–1.2)
BUN SERPL-MCNC: 10 MG/DL (ref 8–23)
CALCIUM SERPL-MCNC: 7.5 MG/DL (ref 8.8–10.2)
CHLORIDE SERPL-SCNC: 105 MMOL/L (ref 98–107)
CO2 SERPL-SCNC: 23 MMOL/L (ref 22–29)
CREAT SERPL-MCNC: 1.1 MG/DL (ref 0.7–1.2)
EOSINOPHIL # BLD: 0.3 K/UL (ref 0–0.6)
EOSINOPHIL NFR BLD: 2.5 % (ref 0–5)
ERYTHROCYTE [DISTWIDTH] IN BLOOD BY AUTOMATED COUNT: 14.4 % (ref 11.5–14.5)
GLUCOSE SERPL-MCNC: 88 MG/DL (ref 70–99)
HCT VFR BLD AUTO: 32.8 % (ref 42–52)
HGB BLD-MCNC: 10.3 G/DL (ref 14–18)
IMM GRANULOCYTES # BLD: 0.1 K/UL
LYMPHOCYTES # BLD: 1.1 K/UL (ref 1.1–4.5)
LYMPHOCYTES NFR BLD: 9.2 % (ref 20–40)
MAGNESIUM SERPL-MCNC: 1.9 MG/DL (ref 1.6–2.4)
MCH RBC QN AUTO: 27.5 PG (ref 27–31)
MCHC RBC AUTO-ENTMCNC: 31.4 G/DL (ref 33–37)
MCV RBC AUTO: 87.7 FL (ref 80–94)
MONOCYTES # BLD: 0.8 K/UL (ref 0–0.9)
MONOCYTES NFR BLD: 6.5 % (ref 0–10)
NEUTROPHILS # BLD: 9.7 K/UL (ref 1.5–7.5)
NEUTS SEG NFR BLD: 80.3 % (ref 50–65)
PHOSPHATE SERPL-MCNC: 2.4 MG/DL (ref 2.5–4.5)
PLATELET # BLD AUTO: 253 K/UL (ref 130–400)
PMV BLD AUTO: 9 FL (ref 9.4–12.4)
POTASSIUM SERPL-SCNC: 2.5 MMOL/L (ref 3.5–5)
POTASSIUM SERPL-SCNC: 2.8 MMOL/L (ref 3.5–5.1)
POTASSIUM SERPL-SCNC: 3.3 MMOL/L (ref 3.5–5.1)
PROT SERPL-MCNC: 4.8 G/DL (ref 6.4–8.3)
RBC # BLD AUTO: 3.74 M/UL (ref 4.7–6.1)
SODIUM SERPL-SCNC: 139 MMOL/L (ref 136–145)
WBC # BLD AUTO: 12 K/UL (ref 4.8–10.8)

## 2025-02-12 PROCEDURE — 6360000002 HC RX W HCPCS: Performed by: INTERNAL MEDICINE

## 2025-02-12 PROCEDURE — 2500000003 HC RX 250 WO HCPCS: Performed by: NURSE ANESTHETIST, CERTIFIED REGISTERED

## 2025-02-12 PROCEDURE — 80053 COMPREHEN METABOLIC PANEL: CPT

## 2025-02-12 PROCEDURE — 97535 SELF CARE MNGMENT TRAINING: CPT

## 2025-02-12 PROCEDURE — 84100 ASSAY OF PHOSPHORUS: CPT

## 2025-02-12 PROCEDURE — 97530 THERAPEUTIC ACTIVITIES: CPT

## 2025-02-12 PROCEDURE — 6370000000 HC RX 637 (ALT 250 FOR IP): Performed by: HOSPITALIST

## 2025-02-12 PROCEDURE — 83735 ASSAY OF MAGNESIUM: CPT

## 2025-02-12 PROCEDURE — 84132 ASSAY OF SERUM POTASSIUM: CPT

## 2025-02-12 PROCEDURE — 85025 COMPLETE CBC W/AUTO DIFF WBC: CPT

## 2025-02-12 PROCEDURE — 6360000002 HC RX W HCPCS: Performed by: STUDENT IN AN ORGANIZED HEALTH CARE EDUCATION/TRAINING PROGRAM

## 2025-02-12 PROCEDURE — 94760 N-INVAS EAR/PLS OXIMETRY 1: CPT

## 2025-02-12 PROCEDURE — 36415 COLL VENOUS BLD VENIPUNCTURE: CPT

## 2025-02-12 PROCEDURE — 1200000000 HC SEMI PRIVATE

## 2025-02-12 PROCEDURE — 2580000003 HC RX 258: Performed by: INTERNAL MEDICINE

## 2025-02-12 PROCEDURE — 97165 OT EVAL LOW COMPLEX 30 MIN: CPT

## 2025-02-12 PROCEDURE — 6370000000 HC RX 637 (ALT 250 FOR IP): Performed by: INTERNAL MEDICINE

## 2025-02-12 PROCEDURE — 99024 POSTOP FOLLOW-UP VISIT: CPT | Performed by: SURGERY

## 2025-02-12 RX ORDER — POTASSIUM CHLORIDE 1500 MG/1
10 TABLET, EXTENDED RELEASE ORAL ONCE
Status: DISCONTINUED | OUTPATIENT
Start: 2025-02-12 | End: 2025-02-12

## 2025-02-12 RX ORDER — POTASSIUM CHLORIDE 1500 MG/1
40 TABLET, EXTENDED RELEASE ORAL PRN
Status: DISCONTINUED | OUTPATIENT
Start: 2025-02-12 | End: 2025-02-14 | Stop reason: HOSPADM

## 2025-02-12 RX ORDER — METOCLOPRAMIDE HYDROCHLORIDE 5 MG/ML
10 INJECTION INTRAMUSCULAR; INTRAVENOUS EVERY 8 HOURS
Status: DISCONTINUED | OUTPATIENT
Start: 2025-02-12 | End: 2025-02-13

## 2025-02-12 RX ORDER — POTASSIUM CHLORIDE 7.45 MG/ML
10 INJECTION INTRAVENOUS PRN
Status: DISCONTINUED | OUTPATIENT
Start: 2025-02-12 | End: 2025-02-14 | Stop reason: HOSPADM

## 2025-02-12 RX ORDER — POTASSIUM CHLORIDE 7.45 MG/ML
10 INJECTION INTRAVENOUS
Status: COMPLETED | OUTPATIENT
Start: 2025-02-12 | End: 2025-02-12

## 2025-02-12 RX ORDER — POTASSIUM CHLORIDE 1500 MG/1
40 TABLET, EXTENDED RELEASE ORAL ONCE
Status: COMPLETED | OUTPATIENT
Start: 2025-02-12 | End: 2025-02-12

## 2025-02-12 RX ORDER — POTASSIUM CHLORIDE 7.45 MG/ML
10 INJECTION INTRAVENOUS ONCE
Status: COMPLETED | OUTPATIENT
Start: 2025-02-12 | End: 2025-02-12

## 2025-02-12 RX ADMIN — POTASSIUM CHLORIDE 40 MEQ: 1500 TABLET, EXTENDED RELEASE ORAL at 07:02

## 2025-02-12 RX ADMIN — METRONIDAZOLE 500 MG: 5 INJECTION, SOLUTION INTRAVENOUS at 06:24

## 2025-02-12 RX ADMIN — AMIODARONE HYDROCHLORIDE 100 MG: 200 TABLET ORAL at 10:11

## 2025-02-12 RX ADMIN — SODIUM CHLORIDE, PRESERVATIVE FREE 10 ML: 5 INJECTION INTRAVENOUS at 10:18

## 2025-02-12 RX ADMIN — CIPROFLOXACIN 400 MG: 2 INJECTION, SOLUTION INTRAVENOUS at 04:38

## 2025-02-12 RX ADMIN — POTASSIUM CHLORIDE 10 MEQ: 7.46 INJECTION, SOLUTION INTRAVENOUS at 11:36

## 2025-02-12 RX ADMIN — THIAMINE HYDROCHLORIDE 100 MG: 100 INJECTION, SOLUTION INTRAMUSCULAR; INTRAVENOUS at 10:17

## 2025-02-12 RX ADMIN — METOCLOPRAMIDE HYDROCHLORIDE 10 MG: 5 INJECTION, SOLUTION INTRAMUSCULAR; INTRAVENOUS at 10:17

## 2025-02-12 RX ADMIN — POTASSIUM CHLORIDE 10 MEQ: 7.46 INJECTION, SOLUTION INTRAVENOUS at 15:32

## 2025-02-12 RX ADMIN — POTASSIUM CHLORIDE 10 MEQ: 7.46 INJECTION, SOLUTION INTRAVENOUS at 10:33

## 2025-02-12 RX ADMIN — POTASSIUM CHLORIDE 10 MEQ: 10 INJECTION, SOLUTION INTRAVENOUS at 16:49

## 2025-02-12 RX ADMIN — CIPROFLOXACIN 400 MG: 2 INJECTION, SOLUTION INTRAVENOUS at 18:44

## 2025-02-12 RX ADMIN — LEVOTHYROXINE SODIUM 125 MCG: 0.12 TABLET ORAL at 10:11

## 2025-02-12 RX ADMIN — POTASSIUM CHLORIDE 40 MEQ: 1500 TABLET, EXTENDED RELEASE ORAL at 22:58

## 2025-02-12 RX ADMIN — METRONIDAZOLE 500 MG: 5 INJECTION, SOLUTION INTRAVENOUS at 18:42

## 2025-02-12 RX ADMIN — METRONIDAZOLE 500 MG: 5 INJECTION, SOLUTION INTRAVENOUS at 23:02

## 2025-02-12 RX ADMIN — POTASSIUM CHLORIDE 40 MEQ: 1500 TABLET, EXTENDED RELEASE ORAL at 10:11

## 2025-02-12 RX ADMIN — METOCLOPRAMIDE HYDROCHLORIDE 10 MG: 5 INJECTION, SOLUTION INTRAMUSCULAR; INTRAVENOUS at 20:02

## 2025-02-12 RX ADMIN — POTASSIUM CHLORIDE 10 MEQ: 7.46 INJECTION, SOLUTION INTRAVENOUS at 12:51

## 2025-02-12 RX ADMIN — SODIUM CHLORIDE, PRESERVATIVE FREE 40 MG: 5 INJECTION INTRAVENOUS at 10:16

## 2025-02-12 ASSESSMENT — ENCOUNTER SYMPTOMS
ABDOMINAL PAIN: 1
SHORTNESS OF BREATH: 0
ABDOMINAL DISTENTION: 0
VOMITING: 0
NAUSEA: 0

## 2025-02-12 ASSESSMENT — PAIN SCALES - GENERAL: PAINLEVEL_OUTOF10: 0

## 2025-02-12 NOTE — PLAN OF CARE
Problem: Safety - Adult  Goal: Free from fall injury  Outcome: Progressing     Problem: Chronic Conditions and Co-morbidities  Goal: Patient's chronic conditions and co-morbidity symptoms are monitored and maintained or improved  Outcome: Progressing     Problem: Discharge Planning  Goal: Discharge to home or other facility with appropriate resources  Outcome: Progressing     Problem: Pain  Goal: Verbalizes/displays adequate comfort level or baseline comfort level  Outcome: Progressing     Problem: Skin/Tissue Integrity - Adult  Goal: Incisions, wounds, or drain sites healing without S/S of infection  Outcome: Progressing     Problem: Infection - Adult  Goal: Absence of infection at discharge  Outcome: Progressing     Problem: Metabolic/Fluid and Electrolytes - Adult  Goal: Electrolytes maintained within normal limits  Outcome: Progressing     Problem: Skin/Tissue Integrity  Goal: Skin integrity remains intact  Description: 1.  Monitor for areas of redness and/or skin breakdown  2.  Assess vascular access sites hourly  3.  Every 4-6 hours minimum:  Change oxygen saturation probe site  4.  Every 4-6 hours:  If on nasal continuous positive airway pressure, respiratory therapy assess nares and determine need for appliance change or resting period  Outcome: Progressing     Problem: Nutrition Deficit:  Goal: Optimize nutritional status  Outcome: Progressing      Positive

## 2025-02-12 NOTE — PROGRESS NOTES
[facility]      Patient:  Damian You  YOB: 1950  Date of Service: 2/12/2025  MRN: [id number]   Acct: [id number]   Primary Care Physician: Ramona Smith MD  Advance Directive: Full Code  Admit Date: 2/5/2025       Hospital Day: 7  Portions of this note have been copied forward, however, changed to reflect the most current clinical status of this patient.  CHIEF COMPLAINT   Abdominal pain    SUBJECTIVE:  Mild incisional pain, denies nausea or vomiting.      CUMULATIVE HOSPITAL COURSE:  Patient is a 74-year-old male with a past medical history of CAD, GERD, hyperlipidemia, CKD stage IIIa who presented to [facility] ED on 2/5 for evaluation of periumbilical pain.  In ED, patient was found to be hypokalemic with potassium 2.8, creatinine 1.2,  mildly elevated LFTs.  CT of the abdomen and pelvis indicated focal bowel wall thickening involving favoring mass versus diverticular changes versus early acute diverticulitis addition to ileus versus partial colonic obstruction extending to the level of the sigmoid colon.  Also, indicates several poorly defined low-density lesions in the liver concerning for metastasis with upper limits of normal retroperitoneal lymph nodes. Patient was admitted to hospital with GI and oncology consult patient was placed on Cipro and Flagyl due to questionable early diverticulitis.  Electrolytes were replaced per protocol.  Oncology recommended 3-day course of Venofer for ANGELINA.  GI initially recommended NGT to [facility] due to partial small bowel obstruction.  General surgery consulted, concerns for high-grade colonic obstruction with peritoneal findings.  Patient had exploratory laparotomy with a loop colostomy and lysis of adhesions on 2/7.  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  NG tube was discontinued per General Surgery 2/8.    2/10/2025  Leukocytosis improved, WBCs 10.5. Blood cultures, NGTD.  Sigmoidoscopy with biopsy, completed 2/10/2025.  Renal US, Right renal cyst measuring 2.2 cm.  Increased echogenicity of the right kidney relative to the liver. Finding suggests chronic medical renal disease.  Kidneys without other significant sonographic abnormality. Bladder decompressed by a Mckeon catheter. Continue Cipro and Flagyl per order.  PT/OT for evaluation and recommendation.      General surgery ongoing monitoring postop, increase diet gradually, continue to monitor ostomy output.  Wound care consult for ostomy care.  PT/OT ongoing, increase activity. CMP/CBC stable.  VSS.      Sigmoidoscopy with biopsy, resulted adenocarcinoma. Oncology consult for evaluation. Case management to assist with discharge planning. Palliative care consult, spiritual/emotional needs.        Review of Systems:   Review of Systems   Respiratory:  Negative for shortness of breath.    Cardiovascular:  Negative for chest pain.   Gastrointestinal:  Positive for abdominal pain. Negative for abdominal distention, nausea and vomiting.   Psychiatric/Behavioral:  Negative for agitation and confusion. The patient is not nervous/anxious.        14 point review of systems is negative except as specifically addressed above.    Objective:   VITALS:  /78   Pulse 72   Temp 98 °F (36.7 °C) (Temporal)   Resp 18   Ht 1.829 m (6' 0.01\")   Wt 93.4 kg (206 lb)   SpO2 95%   BMI 27.93 kg/m²   24HR INTAKE/OUTPUT:    Intake/Output Summary (Last 24 hours) at 2/12/2025 1020  Last data filed at 2/12/2025 0809  Gross per 24 hour   Intake --   Output 2900 ml   Net -2900 ml       Physical Exam  Vitals and nursing note reviewed.     Constitutional:       Appearance: Normal appearance. He is normal weight.   Cardiovascular:      Rate and Rhythm: Normal rate.      Pulses: Normal pulses.   Pulmonary:      Effort: Pulmonary effort is normal. No respiratory distress.      Breath sounds: Normal breath sounds.   Abdominal:      General: Abdomen is flat.      Palpations: Abdomen is soft.

## 2025-02-12 NOTE — PLAN OF CARE
Pathology of rectosigmoid stricture confirms invasive moderately differentiated adenocarcinoma.     I will discuss with Dr. Lewis, general surgery today.

## 2025-02-12 NOTE — PROGRESS NOTES
02/12/25 1347   Encounter Summary   Encounter Overview/Reason Spiritual/Emotional Needs   Encounter Code  Assessment by  services   Service Provided For Patient   Referral/Consult From Palliative Care   Support System Family members   Complexity of Encounter Moderate   Begin Time 1300   End Time  1315   Total Time Calculated 15 min   Spiritual/Emotional needs   Type Spiritual Support   Palliative Care   Type Palliative Care, Follow-up   Assessment/Intervention/Outcome   Assessment Coping;Other (Comment)  (Pt seemed to processing the new cancer diagnoses.)   Intervention Active listening;Sustaining Presence/Ministry of presence;Prayer (assurance of)/[place]   Outcome Acceptance;Expressed Gratitude   Plan and Referrals   Plan/Referrals Continue to visit, (comment);Continue Support (comment)   Does the patient have a [facility] PCP? No         Pt's nurse asked palliative care to visit with pt. Pt received a new cancer diagnosis this morning, per his nurse. Pt says he doesn't really have a emerald and is not spiritual or Episcopal. When asked if he minded if I said a prayer for my emerald foundation, he responded, \"I certainly would not mind if you prayed.\" This  provided spiritual care with sustaining presence, support, a listening ear, and prayer. Pt expressed gratitude for spiritual care.       Spiritual Health History and Assessment/Progress Note  [facility]    (P) Spiritual/Emotional Needs,  ,  ,      Name: Damian You MRN: [id number]    Age: 74 y.o.     Sex: male   Language: English   Moravian: None   Abnormal abdominal CT scan     Date: 2/12/2025            Total Time Calculated: (P) 15 min              Spiritual Assessment began in [facility] 4 ONCOLOGY UNIT        Referral/Consult From: (P) Palliative Care   Encounter Overview/Reason: (P) Spiritual/Emotional Needs  Service Provided For: (P) Patient    Emerald, Belief, Meaning:   Patient Other: Pt says he does not have a emerald and is not

## 2025-02-12 NOTE — PROGRESS NOTES
Physical Therapy  Name: Damian You  MRN:  [id number]  Date of service:  2/12/2025 02/12/25 0924   Restrictions/Precautions   Restrictions/Precautions Fall Risk;Surgical Protocols   Position Activity Restriction   Other Position/Activity Restrictions COLOSTOMY   Subjective   Subjective pt in bed, agrees to therapy   General   General Comments pt soiled, needing A to clean up in bed   Pain Assessment   Pain Assessment None - Denies Pain   Pain Level 0   Oxygen Therapy   O2 Device None (Room air)   Orientation   Overall Orientation Status WNL   Bed Mobility   Rolling Contact guard assistance;Minimal assistance  (pt able to roll left cga, needed min A to roll rt)   Supine to Sit Contact guard assistance   Comment pt rolling severalx for pta to clean pt   Transfers   Sit to Stand Contact guard assistance   Stand to Sit Contact guard assistance   Bed to Chair Minimal assistance;Contact guard assistance   Comment pt needing cues for safety with tfers, hand placement, pause before gt   Ambulation   Device Rolling Walker   Assistance Contact guard assistance;Minimal assistance   Quality of Gait flexed, slightly unsteady   Gait Deviations Slow Hyacinth;Decreased step length;Decreased step height   Distance 20'   Exercises   Hip Flexion 10   Knee Long Arc Quad 10   Ankle Pumps 10   Comments seated sherly le therex x 10 reps in chair   Short Term Goals   Time Frame for Short Term Goals 14 DAYS   Short Term Goal 1 BED MOB MOD IND   Short Term Goal 2 TRANSFERS SUPERVISION   Short Term Goal 3 ' RW SUPERVISION   Conditions Requiring Skilled Therapeutic Intervention   Body Structures, Functions, Activity Limitations Requiring Skilled Therapeutic Intervention Decreased functional mobility ;Decreased ADL status;Decreased ROM;Decreased strength;Decreased endurance;Decreased balance;Decreased posture   Assessment pt performing bed mob rolling for cleaning with A needed to roll rt   pt performing sup sit with cga and amb in

## 2025-02-12 NOTE — PROGRESS NOTES
Pharmacy Renal Adjustment    Damian You is a 74 y.o. male. Pharmacy has renally adjusted medications per protocol.    Recent Labs     02/11/25  0225 02/12/25  0248   BUN 17 10       Recent Labs     02/11/25  0225 02/12/25  0248   CREATININE 1.1 1.1       Estimated Creatinine Clearance: 72 mL/min (based on SCr of 1.1 mg/dL).    Height:   Ht Readings from Last 1 Encounters:   02/11/25 1.829 m (6' 0.01\")     Weight:  Wt Readings from Last 1 Encounters:   02/10/25 93.4 kg (206 lb)     Plan: Adjust the following medications based on renal function:           Change Reglan back to 10 mg Q8H for improved renal function    Electronically signed by Ha Mike RPH on 2/12/2025 at 3:59 AM

## 2025-02-12 NOTE — PROGRESS NOTES
Facility/Department: [facility] ONCOLOGY UNIT  Occupational Therapy Initial Assessment    Name: Damian You  : 1950  MRN: [id number]  Date of Service: 2025    Discharge Recommendations:  Continue to assess pending progress, Home with Home health OT          Patient Diagnosis(es): The primary encounter diagnosis was Hypokalemia. Diagnoses of Possible Diverticulitis of colon, Abnormal CT scan, Probable Colonic mass, and Abnormal computed tomography of sigmoid colon were also pertinent to this visit.  Past Medical History:  has a past medical history of CAD (coronary artery disease), Gout, Hypertension, Hypotension, Non-ST elevation MI (NSTEMI) ([facility]), Palliative care patient, and Pneumonia due to infectious organism.  Past Surgical History:  has a past surgical history that includes Cardiac catheterization (14  [facility]); Cholecystectomy; Femur fracture surgery (Right, 10/30/2020); laparotomy (N/A, 2025); and sigmoidoscopy (N/A, 2/10/2025).    Treatment Diagnosis: Possible Diverticulitis of colon      Assessment   Performance deficits / Impairments: Decreased functional mobility ;Decreased ADL status;Decreased strength;Decreased balance;Decreased endurance;Decreased high-level IADLs  Assessment: Eval initiated and completed. Pt benefits from continued skilled therapy in order to optimize independence with functional mobility, ADLs, and colostomy care prior to discharge.  Treatment Diagnosis: Possible Diverticulitis of colon  Prognosis: Good  Decision Making: Low Complexity  REQUIRES OT FOLLOW-UP: Yes  Activity Tolerance  Activity Tolerance: Patient Tolerated treatment well            Plan   Occupational Therapy Plan  Times Per Week: 3-5  Current Treatment Recommendations: Strengthening, Balance training, Functional mobility training, Endurance training, Equipment evaluation, education, & procurement, Safety education & training, Self-Care / ADL

## 2025-02-12 NOTE — PROGRESS NOTES
Subjective:  No acute events or changes. Tolerating PO. Having ostomy output.    Objective:  /76   Pulse 78   Temp 98.1 °F (36.7 °C) (Temporal)   Resp 16   Ht 1.829 m (6' 0.01\")   Wt 93.4 kg (206 lb)   SpO2 96%   BMI 27.93 kg/m²   Date 02/12/25 0000 - 02/12/25 2359   Shift 1459-3716 6203-8931 9373-0029 24 Hour Total   INTAKE   Shift Total(mL/kg)       OUTPUT   Urine(mL/kg/hr) 800(1.1)   800   Stool(mL/kg) 225(2.4) 325(3.5)  550(5.9)   Shift Total(mL/kg) 1025(11) 325(3.5)  1350(14.4)   Weight (kg) 93.4 93.4 93.4 93.4     General: NAD, AAO  Chest: Regular, non-labored  Abdomen: Soft, ND, mild TTP, stool in ostomy appliance, small amount of serosang drainage on dressing    CBC:   Lab Results   Component Value Date/Time    WBC 12.0 02/12/2025 02:48 AM    RBC 3.74 02/12/2025 02:48 AM    HGB 10.3 02/12/2025 02:48 AM    HCT 32.8 02/12/2025 02:48 AM    MCV 87.7 02/12/2025 02:48 AM    MCH 27.5 02/12/2025 02:48 AM    MCHC 31.4 02/12/2025 02:48 AM    RDW 14.4 02/12/2025 02:48 AM     02/12/2025 02:48 AM    MPV 9.0 02/12/2025 02:48 AM     BMP:    Lab Results   Component Value Date/Time     02/12/2025 02:48 AM    K 2.5 02/12/2025 02:48 AM     02/12/2025 02:48 AM    CO2 23 02/12/2025 02:48 AM    BUN 10 02/12/2025 02:48 AM    CREATININE 1.1 02/12/2025 02:48 AM    CALCIUM 7.5 02/12/2025 02:48 AM    GFRAA 52 08/15/2022 09:19 AM    LABGLOM 71 02/12/2025 02:48 AM    LABGLOM 46 03/25/2024 10:00 AM    GLUCOSE 88 02/12/2025 02:48 AM     FINAL DIAGNOSIS:     Distal rectosigmoid at 20 cm, biopsies:   Invasive moderately differentiated adenocarcinoma.     DNA Mismatch Repair Testing     Specimen Site: Distal rectosigmoid at 20 cm     Testing Performed on Block Number(s): A1     Immunohistochemistry (IHC) Results for Mismatch Repair (MMR) Proteins:   Background                                                    non-neoplastic tissue /   internal                                                    control shows

## 2025-02-13 ENCOUNTER — ANESTHESIA EVENT (OUTPATIENT)
Dept: OPERATING ROOM | Age: 75
End: 2025-02-13
Payer: MEDICARE

## 2025-02-13 DIAGNOSIS — K76.9 LIVER LESION: Primary | ICD-10-CM

## 2025-02-13 DIAGNOSIS — C19 CANCER OF RECTOSIGMOID (COLON) (HCC): Primary | ICD-10-CM

## 2025-02-13 DIAGNOSIS — R93.89 ABNORMAL FINDING ON CT SCAN: ICD-10-CM

## 2025-02-13 PROBLEM — C18.9 COLON CANCER (HCC): Status: ACTIVE | Noted: 2025-02-05

## 2025-02-13 LAB
ANION GAP SERPL CALCULATED.3IONS-SCNC: 9 MMOL/L (ref 8–16)
BACTERIA BLD CULT ORG #2: NORMAL
BACTERIA BLD CULT: NORMAL
BASOPHILS # BLD: 0.1 K/UL (ref 0–0.2)
BASOPHILS NFR BLD: 0.4 % (ref 0–1)
BUN SERPL-MCNC: 8 MG/DL (ref 8–23)
CALCIUM SERPL-MCNC: 7.7 MG/DL (ref 8.8–10.2)
CHLORIDE SERPL-SCNC: 110 MMOL/L (ref 98–107)
CO2 SERPL-SCNC: 23 MMOL/L (ref 22–29)
CREAT SERPL-MCNC: 1.3 MG/DL (ref 0.7–1.2)
EOSINOPHIL # BLD: 0.3 K/UL (ref 0–0.6)
EOSINOPHIL NFR BLD: 2.4 % (ref 0–5)
ERYTHROCYTE [DISTWIDTH] IN BLOOD BY AUTOMATED COUNT: 14.9 % (ref 11.5–14.5)
GLUCOSE SERPL-MCNC: 84 MG/DL (ref 70–99)
HCT VFR BLD AUTO: 33.3 % (ref 42–52)
HGB BLD-MCNC: 10.4 G/DL (ref 14–18)
IMM GRANULOCYTES # BLD: 0.2 K/UL
LYMPHOCYTES # BLD: 1.4 K/UL (ref 1.1–4.5)
LYMPHOCYTES NFR BLD: 10.1 % (ref 20–40)
MAGNESIUM SERPL-MCNC: 1.7 MG/DL (ref 1.6–2.4)
MCH RBC QN AUTO: 27.7 PG (ref 27–31)
MCHC RBC AUTO-ENTMCNC: 31.2 G/DL (ref 33–37)
MCV RBC AUTO: 88.8 FL (ref 80–94)
MONOCYTES # BLD: 0.9 K/UL (ref 0–0.9)
MONOCYTES NFR BLD: 6.5 % (ref 0–10)
NEUTROPHILS # BLD: 10.8 K/UL (ref 1.5–7.5)
NEUTS SEG NFR BLD: 79.5 % (ref 50–65)
PHOSPHATE SERPL-MCNC: 2.3 MG/DL (ref 2.5–4.5)
PLATELET # BLD AUTO: 247 K/UL (ref 130–400)
PMV BLD AUTO: 8.9 FL (ref 9.4–12.4)
POTASSIUM SERPL-SCNC: 2.9 MMOL/L (ref 3.5–5)
POTASSIUM SERPL-SCNC: 3.4 MMOL/L (ref 3.5–5.1)
RBC # BLD AUTO: 3.75 M/UL (ref 4.7–6.1)
SODIUM SERPL-SCNC: 142 MMOL/L (ref 136–145)
WBC # BLD AUTO: 13.6 K/UL (ref 4.8–10.8)

## 2025-02-13 PROCEDURE — 1200000000 HC SEMI PRIVATE

## 2025-02-13 PROCEDURE — 85025 COMPLETE CBC W/AUTO DIFF WBC: CPT

## 2025-02-13 PROCEDURE — 84132 ASSAY OF SERUM POTASSIUM: CPT

## 2025-02-13 PROCEDURE — 6360000002 HC RX W HCPCS: Performed by: HOSPITALIST

## 2025-02-13 PROCEDURE — 97116 GAIT TRAINING THERAPY: CPT

## 2025-02-13 PROCEDURE — 99233 SBSQ HOSP IP/OBS HIGH 50: CPT | Performed by: INTERNAL MEDICINE

## 2025-02-13 PROCEDURE — 94760 N-INVAS EAR/PLS OXIMETRY 1: CPT

## 2025-02-13 PROCEDURE — 6360000002 HC RX W HCPCS: Performed by: STUDENT IN AN ORGANIZED HEALTH CARE EDUCATION/TRAINING PROGRAM

## 2025-02-13 PROCEDURE — 83735 ASSAY OF MAGNESIUM: CPT

## 2025-02-13 PROCEDURE — 36415 COLL VENOUS BLD VENIPUNCTURE: CPT

## 2025-02-13 PROCEDURE — 84100 ASSAY OF PHOSPHORUS: CPT

## 2025-02-13 PROCEDURE — 2580000003 HC RX 258

## 2025-02-13 PROCEDURE — 6370000000 HC RX 637 (ALT 250 FOR IP): Performed by: INTERNAL MEDICINE

## 2025-02-13 PROCEDURE — 80048 BASIC METABOLIC PNL TOTAL CA: CPT

## 2025-02-13 PROCEDURE — 6360000002 HC RX W HCPCS: Performed by: INTERNAL MEDICINE

## 2025-02-13 PROCEDURE — 97530 THERAPEUTIC ACTIVITIES: CPT

## 2025-02-13 PROCEDURE — 2500000003 HC RX 250 WO HCPCS: Performed by: NURSE ANESTHETIST, CERTIFIED REGISTERED

## 2025-02-13 PROCEDURE — 2580000003 HC RX 258: Performed by: INTERNAL MEDICINE

## 2025-02-13 PROCEDURE — 2500000003 HC RX 250 WO HCPCS

## 2025-02-13 RX ORDER — METOCLOPRAMIDE 5 MG/1
10 TABLET ORAL
Status: DISCONTINUED | OUTPATIENT
Start: 2025-02-13 | End: 2025-02-14 | Stop reason: HOSPADM

## 2025-02-13 RX ORDER — GAUZE BANDAGE 2" X 2"
100 BANDAGE TOPICAL DAILY
Status: DISCONTINUED | OUTPATIENT
Start: 2025-02-14 | End: 2025-02-14 | Stop reason: HOSPADM

## 2025-02-13 RX ORDER — PANTOPRAZOLE SODIUM 40 MG/1
40 TABLET, DELAYED RELEASE ORAL
Status: DISCONTINUED | OUTPATIENT
Start: 2025-02-14 | End: 2025-02-14 | Stop reason: HOSPADM

## 2025-02-13 RX ORDER — MAGNESIUM SULFATE IN WATER 40 MG/ML
2000 INJECTION, SOLUTION INTRAVENOUS ONCE
Status: COMPLETED | OUTPATIENT
Start: 2025-02-13 | End: 2025-02-13

## 2025-02-13 RX ADMIN — POTASSIUM CHLORIDE 10 MEQ: 7.46 INJECTION, SOLUTION INTRAVENOUS at 05:30

## 2025-02-13 RX ADMIN — METOCLOPRAMIDE HYDROCHLORIDE 10 MG: 5 INJECTION, SOLUTION INTRAMUSCULAR; INTRAVENOUS at 03:37

## 2025-02-13 RX ADMIN — POTASSIUM CHLORIDE 10 MEQ: 7.46 INJECTION, SOLUTION INTRAVENOUS at 09:41

## 2025-02-13 RX ADMIN — SODIUM CHLORIDE, POTASSIUM CHLORIDE, SODIUM LACTATE AND CALCIUM CHLORIDE: 600; 310; 30; 20 INJECTION, SOLUTION INTRAVENOUS at 03:36

## 2025-02-13 RX ADMIN — METRONIDAZOLE 500 MG: 5 INJECTION, SOLUTION INTRAVENOUS at 06:38

## 2025-02-13 RX ADMIN — SODIUM CHLORIDE, PRESERVATIVE FREE 10 ML: 5 INJECTION INTRAVENOUS at 08:32

## 2025-02-13 RX ADMIN — POTASSIUM CHLORIDE 10 MEQ: 7.46 INJECTION, SOLUTION INTRAVENOUS at 07:39

## 2025-02-13 RX ADMIN — POTASSIUM CHLORIDE 10 MEQ: 7.46 INJECTION, SOLUTION INTRAVENOUS at 04:38

## 2025-02-13 RX ADMIN — AMIODARONE HYDROCHLORIDE 100 MG: 200 TABLET ORAL at 08:30

## 2025-02-13 RX ADMIN — MAGNESIUM SULFATE HEPTAHYDRATE 2000 MG: 40 INJECTION, SOLUTION INTRAVENOUS at 09:59

## 2025-02-13 RX ADMIN — POTASSIUM CHLORIDE 10 MEQ: 7.46 INJECTION, SOLUTION INTRAVENOUS at 14:30

## 2025-02-13 RX ADMIN — POTASSIUM CHLORIDE 10 MEQ: 7.46 INJECTION, SOLUTION INTRAVENOUS at 12:30

## 2025-02-13 RX ADMIN — SODIUM CHLORIDE, PRESERVATIVE FREE 40 MG: 5 INJECTION INTRAVENOUS at 08:31

## 2025-02-13 RX ADMIN — CIPROFLOXACIN 400 MG: 2 INJECTION, SOLUTION INTRAVENOUS at 04:37

## 2025-02-13 RX ADMIN — LEVOTHYROXINE SODIUM 125 MCG: 0.12 TABLET ORAL at 08:31

## 2025-02-13 RX ADMIN — METOPROLOL TARTRATE 2.5 MG: 5 INJECTION INTRAVENOUS at 05:27

## 2025-02-13 RX ADMIN — METOCLOPRAMIDE HYDROCHLORIDE 10 MG: 5 INJECTION, SOLUTION INTRAMUSCULAR; INTRAVENOUS at 13:43

## 2025-02-13 RX ADMIN — THIAMINE HYDROCHLORIDE 100 MG: 100 INJECTION, SOLUTION INTRAMUSCULAR; INTRAVENOUS at 08:33

## 2025-02-13 ASSESSMENT — ENCOUNTER SYMPTOMS
ABDOMINAL DISTENTION: 0
NAUSEA: 0
VOMITING: 0
SHORTNESS OF BREATH: 0
ABDOMINAL PAIN: 1
DIARRHEA: 1

## 2025-02-13 ASSESSMENT — LIFESTYLE VARIABLES: SMOKING_STATUS: 0

## 2025-02-13 NOTE — PROGRESS NOTES
Occupational Therapy     02/13/25 1300   Subjective   Subjective Pt sitting up in recliner upon arrival for therapy. Pt agreeable to participate.   Pain Assessment   Pain Assessment None - Denies Pain   Cognition   Overall Cognitive Status WNL   Orientation   Overall Orientation Status WNL   Bed Mobility Training   Bed Mobility Training No   Transfer Training   Transfer Training Yes   Overall Level of Assistance Minimal assistance;Partial/Moderate assistance   Interventions Verbal cues;Tactile cues;Manual cues   Sit to Stand Minimal assistance;Partial/Moderate assistance   Stand to Sit Minimal assistance   Balance   Sitting Intact   Standing With support  (RW)   ADL   Feeding Setup;Supervision   Grooming Setup;Supervision   Grooming Skilled Clinical Factors CGA at sink in standing   UE Bathing Setup;Stand by assistance   LE Bathing Minimal assistance;Moderate assistance   LE Bathing Skilled Clinical Factors with grab bar for standing aspects   UE Dressing Setup;Stand by assistance   LE Dressing Moderate assistance   Putting On/Taking Off Footwear Moderate assistance   Toileting Maximum assistance   Toileting Skilled Clinical Factors assistance for emptying colostomy bag and clothing mgmt   Functional Mobility Minimal assistance;Moderate assistance   Functional Mobility Skilled Clinical Factors Mod for STS mobility and gaining balance at first   Assessment   Assessment Tx focused on donning socks seated in recliner with Mod assist. Pt instructed on STS mobility with mod assist and functional mobility in room with MIn-CGA. Pt able to perform mobility in room to and from window/ recliner with CGA. Pt states he will need more training and assistance to manage colostomy bag.   Activity Tolerance Patient tolerated treatment well   Discharge Recommendations Patient would benefit from continued therapy after discharge;24 hour supervision or assist   Occupational Therapy Plan   Times Per Week 3-5   Times Per Day Once a day

## 2025-02-13 NOTE — PROGRESS NOTES
Subjective:  No acute events or changes. Tolerating PO. Having ostomy output.     Objective:  /63   Pulse 64   Temp 97.7 °F (36.5 °C) (Temporal)   Resp 18   Ht 1.829 m (6' 0.01\")   Wt 93.4 kg (206 lb)   SpO2 96%   BMI 27.93 kg/m²   Date 02/13/25 0000 - 02/13/25 2359   Shift 9972-0255 9894-3181 7835-1880 24 Hour Total   INTAKE   P.O.(mL/kg/hr) 100(0.1)   100   Shift Total(mL/kg) 100(1.1)   100(1.1)   OUTPUT   Urine(mL/kg/hr) 350(0.5)   350   Stool(mL/kg) 350(3.7) 400(4.3)  750(8)   Shift Total(mL/kg) 700(7.5) 400(4.3)  1100(11.8)   Weight (kg) 93.4 93.4 93.4 93.4     General:NAD, AAO  Chest: Regular, non-labored  Abdomen: soft, ND    Assessment and plan:  74 year old male s/p diverting loop colostomy for obstructing colon cancer  Discussed with Dr. Boswell, will put him on the schedule for port placement tomorrow. Risks of port placement were discussed with the patient, especially bleeding and infection.

## 2025-02-13 NOTE — PROGRESS NOTES
[facility]      Patient:  Damian You  YOB: 1950  Date of Service: 2/13/2025  MRN: [id number]   Acct: [id number]   Primary Care Physician: Ramona Smith MD  Advance Directive: Full Code  Admit Date: 2/5/2025       Hospital Day: 8  Portions of this note have been copied forward, however, changed to reflect the most current clinical status of this patient.  CHIEF COMPLAINT   Abdominal pain    SUBJECTIVE:  Mild incisional pain, denies nausea or vomiting. Patient plan to discharge home with home health.  Oncology on-going to treatment plan, plan for port placement.       CUMULATIVE HOSPITAL COURSE:  Patient is a 74-year-old male with a past medical history of CAD, GERD, hyperlipidemia, CKD stage IIIa who presented to [facility] ED on 2/5 for evaluation of periumbilical pain.  In ED, patient was found to be hypokalemic with potassium 2.8, creatinine 1.2,  mildly elevated LFTs.  CT of the abdomen and pelvis indicated focal bowel wall thickening involving favoring mass versus diverticular changes versus early acute diverticulitis addition to ileus versus partial colonic obstruction extending to the level of the sigmoid colon.  Also, indicates several poorly defined low-density lesions in the liver concerning for metastasis with upper limits of normal retroperitoneal lymph nodes. Patient was admitted to hospital with GI and oncology consult patient was placed on Cipro and Flagyl due to questionable early diverticulitis.  Electrolytes were replaced per protocol.  Oncology recommended 3-day course of Venofer for ANGELINA.  GI initially recommended NGT to [facility] due to partial small bowel obstruction.  General surgery consulted, concerns for high-grade colonic obstruction with peritoneal findings.  Patient had exploratory laparotomy with a loop colostomy and lysis of adhesions on 2/7.  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  NG tube was discontinued per General Surgery  marker/sponge   ______________________________________ Electronically signed by: KIRA AQUINO M.D. Date:     02/07/2025 Time:    19:03     XR ABDOMEN (KUB) (SINGLE AP VIEW)    Result Date: 2/7/2025  Gastric tube present and should be advanced 2 cm.  Partial distal colonic obstruction versus ileus, unchanged.   ______________________________________ Electronically signed by: RICHARD WADE M.D. Date:     02/07/2025 Time:    15:02     XR CHEST PORTABLE    Result Date: 2/6/2025  Impression: Please see above.   ______________________________________ Electronically signed by: NGOC POOLE D.O. Date:     02/06/2025 Time:    12:02     XR CHEST PORTABLE    Result Date: 2/6/2025  The nasogastric tube side opening is positioned at the gastroesophageal junction requiring tube further advancement into the stomach..    ______________________________________ Electronically signed by: JAIRON CHRIS M.D. Date:     02/06/2025 Time:    10:27     CT ABDOMEN PELVIS W IV CONTRAST Additional Contrast? None    Result Date: 2/5/2025  1.  Focal bowel wall thickening involving the sigmoid colon favoring mass versus diverticular change versus early acute diverticulitis.  An ileus versus partial colonic obstruction is extending to the level of the sigmoid colon. 2 .  Findings concerning for hepatic metastases measuring up to 1.7 cm. 3.  Upper limits normal retroperitoneal lymph nodes.  All CT scans are performed using dose optimization techniques as appropriate to the performed exam and include at least one of the following: Automated exposure control, adjustment of the mA and/or kV according to size, and the use of iterative reconstruction technique.  ______________________________________ Electronically signed by: RICHARD WADE M.D. Date:     02/05/2025 Time:    13:46        Assessment/Plan   Principal Problem:    Abnormal abdominal CT scan  Active Problems:    CKD stage 3a, GFR 45-59 ml/min (HCC)    Periumbilical abdominal pain

## 2025-02-13 NOTE — PROGRESS NOTES
Ostomy Follow-up Progress Note      NAME:  Damina You  MEDICAL RECORD NUMBER:  [id number]  AGE: 74 y.o.   GENDER:  male  :  1950  TODAY'S DATE:  2025    Subjective:    Damian You is a 74 y.o. male referred by:   Provider    New    Objective    /67   Pulse 68   Temp 98.5 °F (36.9 °C) (Temporal)   Resp 20   Ht 1.829 m (6' 0.01\")   Wt 93.4 kg (206 lb)   SpO2 93%   BMI 27.93 kg/m²     Christiano Risk Score Christiano Scale Score: 16    Assessment   Surgeon Dr. Gutiérrez         Colostomy LLQ Loop (Active)   Stomal Appliance Clean, dry & intact 25   Stoma  Assessment Red;Bleeding;Protrudes;Moist 25   Peristomal Assessment Clean;Intact;[place] 25 1014   Mucocutaneous Junction Intact 25   Treatment Ostomy belt;Liquid skin barrier;Site care 25   Stool Appearance Loose;Mucous;Watery 25   Stool Color Brown 25   Stool Amount Large 25 1053   Output (mL) 300 ml 25 0834   Number of days: 5              Intake/Output Summary (Last 24 hours) at 2025 1206  Last data filed at 2025 0834  Gross per 24 hour   Intake 300 ml   Output 1300 ml   Net -1000 ml       Plan:   Plan for Ostomy Care:        Ostomy Plan of Care  Instructions given to patient/caregivers and Brand/supplies at bedside [employer]    Current Diet: ADULT DIET; Easy to Chew  ADULT ORAL NUTRITION SUPPLEMENT; Breakfast, Lunch, Dinner; Standard High Calorie/High Protein Oral Supplement  Dietician consult:  Yes    Discharge Plan:  Placement for patient upon discharge: Home with Support and Home Health Care  Outpatient visit plan Yes  Supplies given Yes, will provide at discharge   Samples requested Yes, signed up for [employer] care starter program    Referrals:  Home Health Care and Supplies    Patient/Caregiver Teaching:  Written Instructions given to patient, Written Instructions given to caregiver, Routine care, Reviewed Gastrointestinal system,  Reviewed Anatomy and physiology, Pouch emptying, Pouch maintenance, Supplies , and Manipulate closure            Level of patient/caregiver understanding able to:  Indicates understanding    Electronically signed by Raven Arrington RN on 2/13/2025 at 12:06 PM

## 2025-02-13 NOTE — CARE COORDINATION
Sw spoke to pt about discharge plans and goals. Pt states that he plans to return home once stable. Pt denies IP/OP skilled nursing at this time. Sw left preference list with pt. No other questions or concerns.   Electronically signed by ADRIAN FOWLER on 2/13/2025 at 7:31 AM

## 2025-02-13 NOTE — PROGRESS NOTES
MEDICAL ONCOLOGY PROGRESS NOTE     Pt Name: Damian You  MRN: [id number]  YOB: 1950  Date of evaluation: 2/13/2025    Subjective-no new complaint this morning.  Feeling overall improved.  Discussed results of pathology with the patient.  Discussed about palliative chemotherapy.    S/p Exploratory laparotomy, loop colostomy, lysis of adhesions and cecostomy by Dr. 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 Boswell on 2/6/2025 during inpatient hospitalization at [facility].  He was consulted for abnormal imaging findings suspicious for hepatic metastasis.  Patient presented to the ER department with complaints of abdominal pain.  The patient denies any nausea vomit diarrhea.  Patient reported decreased appetite and weight loss.  Laboratory studies at admission showed normocytic anemia with hemoglobin 11.5/MCV 85.  Iron profile consistent with iron deficiency anemia.  Chemistry remarkable for hypokalemia 3.1, creatinine 1.4.  LFTs abnormal.  The patient has multiple other comorbidities to include a history of hypertension, GERD, hyperlipidemia, CKD stage IIIa, coronary artery disease, gout and a prior history of non-STEMI.  Patient reports that he never had a colonoscopy.  2/5/2025-CEA 73 (H)  2/5/2025-CT abdomen/pelvis, IN contrast MHL: The liver shows several poorly defined low density lesions measuring up to 1.7 cm.   The gallbladder is surgically absent.  The spleen, pancreas and adrenal glands are within normal limits. Scattered periaortic lymph nodes measure up to 0.9 cm.  No mesenteric or retroperitoneal lymphadenopathy is seen.  Scattered colonic diverticula with a short segment of sigmoid wall thickening.  No adjacent mesenteric stranding.  Gas distended colon is present to the level of the sigmoid colon.  There is no free air or fluid seen in

## 2025-02-13 NOTE — PROGRESS NOTES
Physical Therapy     02/13/25 1300   Restrictions/Precautions   Restrictions/Precautions Fall Risk   Position Activity Restriction   Other Position/Activity Restrictions colostomy   General   Diagnosis s/p COLOSTOMY   Subjective   Subjective pt up to chair with staff assist, agreed to therapy   Pain   Post-Pain 0   Transfers   Sit to Stand Contact guard assistance   Stand to Sit Contact guard assistance   Comment pt needing cues for safety with tfers, hand placement, pause before gt   Ambulation   Device Rolling Walker   Assistance Contact guard assistance   Quality of Gait flexed, slightly unsteady   Gait Deviations Slow Hyacinth;Decreased step length;Decreased step height   Distance 20 x 2   Short Term Goals   Time Frame for Short Term Goals 14 DAYS   Short Term Goal 1 BED MOB MOD IND   Short Term Goal 2 TRANSFERS SUPERVISION   Short Term Goal 3 ' RW SUPERVISION   Activity Tolerance   Activity Tolerance Patient tolerated treatment well   Assessment   Assessment pt requires cues for safety with STS and for keeping RW closer to self- is unable to fully extend RLE in stance phase due to old hip injury. encouraged pt to consider SNF due to concerns over limited endurance and need for balance improvement with transfers and home distance AMB. returned to chair with alarm on and all needs in reach.   PT Plan of Care   Thursday X   Safety Devices   Type of Devices Call light within reach;Chair alarm in place;Gait belt;Left in chair     Electronically signed by Stuart Muniz PTA on 2/13/2025 at 1:04 PM

## 2025-02-13 NOTE — PLAN OF CARE
Problem: Safety - Adult  Goal: Free from fall injury  Outcome: Progressing     Problem: Chronic Conditions and Co-morbidities  Goal: Patient's chronic conditions and co-morbidity symptoms are monitored and maintained or improved  Outcome: Progressing     Problem: Discharge Planning  Goal: Discharge to home or other facility with appropriate resources  Outcome: Progressing     Problem: Pain  Goal: Verbalizes/displays adequate comfort level or baseline comfort level  Outcome: Progressing     Problem: Skin/Tissue Integrity - Adult  Goal: Incisions, wounds, or drain sites healing without S/S of infection  Outcome: Progressing     Problem: Infection - Adult  Goal: Absence of infection at discharge  Outcome: Progressing     Problem: Metabolic/Fluid and Electrolytes - Adult  Goal: Electrolytes maintained within normal limits  Outcome: Progressing     Problem: Skin/Tissue Integrity  Goal: Skin integrity remains intact  Description: 1.  Monitor for areas of redness and/or skin breakdown  2.  Assess vascular access sites hourly  3.  Every 4-6 hours minimum:  Change oxygen saturation probe site  4.  Every 4-6 hours:  If on nasal continuous positive airway pressure, respiratory therapy assess nares and determine need for appliance change or resting period  Outcome: Progressing     Problem: Nutrition Deficit:  Goal: Optimize nutritional status  Outcome: Progressing  Flowsheets (Taken 2/12/2025 1210 by Amy Grant, RD)  Nutrient intake appropriate for improving, restoring, or maintaining nutritional needs:   Monitor oral intake, labs, and treatment plans   Recommend appropriate diets, oral nutritional supplements, and vitamin/mineral supplements

## 2025-02-13 NOTE — CARE COORDINATION
[facility] has accepted pt.  Please fax d/c summary and AVS at time of discharge.  [facility]  P:  [phone]  F:  [phone]  Electronically signed by Johanne Villanueva RN on 2/13/2025 at 3:10 PM      Home Health referral sent to [facility].  Awaiting acceptance/denial.  [facility]  P:  [phone]  F:  [phone]  Electronically signed by Johanne Villanueva RN on 2/13/2025 at 2:42 PM

## 2025-02-13 NOTE — ANESTHESIA PRE PROCEDURE
disintegrating tablet 4 mg  4 mg Oral Q8H PRN Joselyn Murray MD        Or   • ondansetron (ZOFRAN) injection 4 mg  4 mg IntraVENous Q6H PRN Joselyn Murray MD       • polyethylene glycol (GLYCOLAX) packet 17 g  17 g Oral Daily PRN Joselyn Murray MD       • acetaminophen (TYLENOL) tablet 650 mg  650 mg Oral Q6H PRN Joselyn Murray MD   650 mg at 02/05/25 2223    Or   • acetaminophen (TYLENOL) suppository 650 mg  650 mg Rectal Q6H PRN Joselyn Murray MD           Allergies:  No Known Allergies    Problem List:    Patient Active Problem List   Diagnosis Code   • CAD (coronary artery disease) I25.10   • Essential hypertension I10   • Alcohol abuse F10.10   • Hyponatremia E87.1   • Palliative care patient Z51.5   • Chronic systolic heart failure (HCC) I50.22   • Non-ischemic cardiomyopathy (HCC) I42.8   • Mixed hyperlipidemia E78.2   • Paroxysmal atrial fibrillation (HCC) I48.0   • Localized osteoporosis with current pathological fracture with routine healing M80.80XD   • Postoperative anemia due to acute blood loss D62   • Hypovitaminosis D E55.9   • GERD (gastroesophageal reflux disease) K21.9   • Coronary artery disease involving native heart with angina pectoris, unspecified vessel or lesion type (HCC) I25.119   • Guaiac positive stools R19.5   • Acute gout of hand M10.9   • Intracerebral hemorrhage, nontraumatic (HCC) I61.9   • CKD stage 3a, GFR 45-59 ml/min (HCC) N18.31   • Elevated WBC count D72.829   • Periumbilical abdominal pain R10.33   • Abnormal abdominal CT scan R93.5   • Hypokalemia E87.6   • Partial bowel obstruction (HCC) K56.600   • Abnormal computed tomography of sigmoid colon R93.3   • Metastasis to liver (HCC) C78.7   • Colonic thickening K63.9   • Medically complex patient Z78.9   • Life threatening medical illness R69   • Moderate malnutrition (HCC) E44.0   • Colon cancer (HCC) C18.9       Past Medical History:        Diagnosis Date   • CAD (coronary artery disease)    • Gout    •

## 2025-02-14 ENCOUNTER — APPOINTMENT (OUTPATIENT)
Dept: GENERAL RADIOLOGY | Age: 75
End: 2025-02-14
Payer: MEDICARE

## 2025-02-14 ENCOUNTER — ANESTHESIA (OUTPATIENT)
Dept: OPERATING ROOM | Age: 75
End: 2025-02-14
Payer: MEDICARE

## 2025-02-14 VITALS
BODY MASS INDEX: 30.4 KG/M2 | OXYGEN SATURATION: 95 % | SYSTOLIC BLOOD PRESSURE: 133 MMHG | HEIGHT: 72 IN | RESPIRATION RATE: 18 BRPM | DIASTOLIC BLOOD PRESSURE: 88 MMHG | WEIGHT: 224.44 LBS | HEART RATE: 109 BPM | TEMPERATURE: 97.3 F

## 2025-02-14 PROBLEM — K56.600 PARTIAL BOWEL OBSTRUCTION (HCC): Status: RESOLVED | Noted: 2025-02-06 | Resolved: 2025-02-14

## 2025-02-14 PROBLEM — R10.33 PERIUMBILICAL ABDOMINAL PAIN: Status: RESOLVED | Noted: 2025-02-05 | Resolved: 2025-02-14

## 2025-02-14 LAB
ANION GAP SERPL CALCULATED.3IONS-SCNC: 9 MMOL/L (ref 8–16)
BASOPHILS # BLD: 0.1 K/UL (ref 0–0.2)
BASOPHILS NFR BLD: 0.5 % (ref 0–1)
BUN SERPL-MCNC: 10 MG/DL (ref 8–23)
CALCIUM SERPL-MCNC: 7.6 MG/DL (ref 8.8–10.2)
CHLORIDE SERPL-SCNC: 109 MMOL/L (ref 98–107)
CO2 SERPL-SCNC: 22 MMOL/L (ref 22–29)
CREAT SERPL-MCNC: 1.2 MG/DL (ref 0.7–1.2)
EOSINOPHIL # BLD: 0.4 K/UL (ref 0–0.6)
EOSINOPHIL NFR BLD: 3.6 % (ref 0–5)
ERYTHROCYTE [DISTWIDTH] IN BLOOD BY AUTOMATED COUNT: 15 % (ref 11.5–14.5)
GLUCOSE SERPL-MCNC: 75 MG/DL (ref 70–99)
HCT VFR BLD AUTO: 32.5 % (ref 42–52)
HGB BLD-MCNC: 10.1 G/DL (ref 14–18)
IMM GRANULOCYTES # BLD: 0.1 K/UL
LYMPHOCYTES # BLD: 1.5 K/UL (ref 1.1–4.5)
LYMPHOCYTES NFR BLD: 14.1 % (ref 20–40)
MAGNESIUM SERPL-MCNC: 1.9 MG/DL (ref 1.6–2.4)
MCH RBC QN AUTO: 27.2 PG (ref 27–31)
MCHC RBC AUTO-ENTMCNC: 31.1 G/DL (ref 33–37)
MCV RBC AUTO: 87.6 FL (ref 80–94)
MONOCYTES # BLD: 0.8 K/UL (ref 0–0.9)
MONOCYTES NFR BLD: 7.4 % (ref 0–10)
NEUTROPHILS # BLD: 7.6 K/UL (ref 1.5–7.5)
NEUTS SEG NFR BLD: 73.1 % (ref 50–65)
PHOSPHATE SERPL-MCNC: 1.9 MG/DL (ref 2.5–4.5)
PLATELET # BLD AUTO: 252 K/UL (ref 130–400)
PMV BLD AUTO: 8.8 FL (ref 9.4–12.4)
POTASSIUM SERPL-SCNC: 2.8 MMOL/L (ref 3.5–5)
POTASSIUM SERPL-SCNC: 3.4 MMOL/L (ref 3.5–5.1)
RBC # BLD AUTO: 3.71 M/UL (ref 4.7–6.1)
SODIUM SERPL-SCNC: 140 MMOL/L (ref 136–145)
WBC # BLD AUTO: 10.3 K/UL (ref 4.8–10.8)

## 2025-02-14 PROCEDURE — 3600000003 HC SURGERY LEVEL 3 BASE: Performed by: SURGERY

## 2025-02-14 PROCEDURE — 6360000002 HC RX W HCPCS: Performed by: HOSPITALIST

## 2025-02-14 PROCEDURE — 97530 THERAPEUTIC ACTIVITIES: CPT

## 2025-02-14 PROCEDURE — 3700000001 HC ADD 15 MINUTES (ANESTHESIA): Performed by: SURGERY

## 2025-02-14 PROCEDURE — 80048 BASIC METABOLIC PNL TOTAL CA: CPT

## 2025-02-14 PROCEDURE — 84100 ASSAY OF PHOSPHORUS: CPT

## 2025-02-14 PROCEDURE — 2580000003 HC RX 258

## 2025-02-14 PROCEDURE — 71045 X-RAY EXAM CHEST 1 VIEW: CPT

## 2025-02-14 PROCEDURE — 84132 ASSAY OF SERUM POTASSIUM: CPT

## 2025-02-14 PROCEDURE — 36561 INSERT TUNNELED CV CATH: CPT | Performed by: SURGERY

## 2025-02-14 PROCEDURE — 7100000001 HC PACU RECOVERY - ADDTL 15 MIN: Performed by: SURGERY

## 2025-02-14 PROCEDURE — 6360000002 HC RX W HCPCS: Performed by: SURGERY

## 2025-02-14 PROCEDURE — 97116 GAIT TRAINING THERAPY: CPT

## 2025-02-14 PROCEDURE — 3600000013 HC SURGERY LEVEL 3 ADDTL 15MIN: Performed by: SURGERY

## 2025-02-14 PROCEDURE — 02HV33Z INSERTION OF INFUSION DEVICE INTO SUPERIOR VENA CAVA, PERCUTANEOUS APPROACH: ICD-10-PCS | Performed by: SURGERY

## 2025-02-14 PROCEDURE — 6360000002 HC RX W HCPCS

## 2025-02-14 PROCEDURE — 77001 FLUOROGUIDE FOR VEIN DEVICE: CPT | Performed by: SURGERY

## 2025-02-14 PROCEDURE — 3700000000 HC ANESTHESIA ATTENDED CARE: Performed by: SURGERY

## 2025-02-14 PROCEDURE — 99232 SBSQ HOSP IP/OBS MODERATE 35: CPT | Performed by: INTERNAL MEDICINE

## 2025-02-14 PROCEDURE — 2580000003 HC RX 258: Performed by: STUDENT IN AN ORGANIZED HEALTH CARE EDUCATION/TRAINING PROGRAM

## 2025-02-14 PROCEDURE — 2500000003 HC RX 250 WO HCPCS: Performed by: SURGERY

## 2025-02-14 PROCEDURE — C1788 PORT, INDWELLING, IMP: HCPCS | Performed by: SURGERY

## 2025-02-14 PROCEDURE — 83735 ASSAY OF MAGNESIUM: CPT

## 2025-02-14 PROCEDURE — 7100000000 HC PACU RECOVERY - FIRST 15 MIN: Performed by: SURGERY

## 2025-02-14 PROCEDURE — 2709999900 HC NON-CHARGEABLE SUPPLY: Performed by: SURGERY

## 2025-02-14 PROCEDURE — 2500000003 HC RX 250 WO HCPCS: Performed by: STUDENT IN AN ORGANIZED HEALTH CARE EDUCATION/TRAINING PROGRAM

## 2025-02-14 PROCEDURE — 0JH60WZ INSERTION OF TOTALLY IMPLANTABLE VASCULAR ACCESS DEVICE INTO CHEST SUBCUTANEOUS TISSUE AND FASCIA, OPEN APPROACH: ICD-10-PCS | Performed by: SURGERY

## 2025-02-14 PROCEDURE — 85025 COMPLETE CBC W/AUTO DIFF WBC: CPT

## 2025-02-14 PROCEDURE — 36415 COLL VENOUS BLD VENIPUNCTURE: CPT

## 2025-02-14 DEVICE — PORT INFUS PLAS SGL LUMN W/ 9.6FR SIL CATH AIRGUARD VLV: Type: IMPLANTABLE DEVICE | Site: CHEST | Status: FUNCTIONAL

## 2025-02-14 RX ORDER — LIDOCAINE HYDROCHLORIDE 10 MG/ML
INJECTION, SOLUTION INFILTRATION; PERINEURAL
Status: DISCONTINUED | OUTPATIENT
Start: 2025-02-14 | End: 2025-02-14 | Stop reason: SDUPTHER

## 2025-02-14 RX ORDER — POLYETHYLENE GLYCOL 3350 17 G/17G
17 POWDER, FOR SOLUTION ORAL DAILY PRN
Qty: 30 PACKET | Refills: 0 | Status: SHIPPED | OUTPATIENT
Start: 2025-02-14 | End: 2025-03-16

## 2025-02-14 RX ORDER — HEPARIN SODIUM,PORCINE/PF 10 UNIT/ML
SYRINGE (ML) INTRAVENOUS PRN
Status: DISCONTINUED | OUTPATIENT
Start: 2025-02-14 | End: 2025-02-14 | Stop reason: ALTCHOICE

## 2025-02-14 RX ORDER — SODIUM CHLORIDE 9 MG/ML
INJECTION, SOLUTION INTRAVENOUS PRN
Status: DISCONTINUED | OUTPATIENT
Start: 2025-02-14 | End: 2025-02-14 | Stop reason: HOSPADM

## 2025-02-14 RX ORDER — SODIUM CHLORIDE 0.9 % (FLUSH) 0.9 %
5-40 SYRINGE (ML) INJECTION PRN
Status: DISCONTINUED | OUTPATIENT
Start: 2025-02-14 | End: 2025-02-14 | Stop reason: HOSPADM

## 2025-02-14 RX ORDER — BUPIVACAINE HYDROCHLORIDE 2.5 MG/ML
INJECTION, SOLUTION INFILTRATION; PERINEURAL PRN
Status: DISCONTINUED | OUTPATIENT
Start: 2025-02-14 | End: 2025-02-14 | Stop reason: ALTCHOICE

## 2025-02-14 RX ORDER — METOCLOPRAMIDE 10 MG/1
10 TABLET ORAL
Qty: 42 TABLET | Refills: 0 | Status: SHIPPED | OUTPATIENT
Start: 2025-02-14 | End: 2025-02-28

## 2025-02-14 RX ORDER — PROPOFOL 10 MG/ML
INJECTION, EMULSION INTRAVENOUS
Status: DISCONTINUED | OUTPATIENT
Start: 2025-02-14 | End: 2025-02-14 | Stop reason: SDUPTHER

## 2025-02-14 RX ORDER — SODIUM CHLORIDE 0.9 % (FLUSH) 0.9 %
5-40 SYRINGE (ML) INJECTION EVERY 12 HOURS SCHEDULED
Status: DISCONTINUED | OUTPATIENT
Start: 2025-02-14 | End: 2025-02-14 | Stop reason: HOSPADM

## 2025-02-14 RX ORDER — ONDANSETRON 2 MG/ML
INJECTION INTRAMUSCULAR; INTRAVENOUS
Status: DISCONTINUED | OUTPATIENT
Start: 2025-02-14 | End: 2025-02-14 | Stop reason: SDUPTHER

## 2025-02-14 RX ORDER — SODIUM CHLORIDE 450 MG/100ML
INJECTION, SOLUTION INTRAVENOUS
Status: DISCONTINUED | OUTPATIENT
Start: 2025-02-14 | End: 2025-02-14 | Stop reason: SDUPTHER

## 2025-02-14 RX ORDER — FENTANYL CITRATE 50 UG/ML
INJECTION, SOLUTION INTRAMUSCULAR; INTRAVENOUS
Status: DISCONTINUED | OUTPATIENT
Start: 2025-02-14 | End: 2025-02-14 | Stop reason: SDUPTHER

## 2025-02-14 RX ORDER — POTASSIUM CHLORIDE 1500 MG/1
40 TABLET, EXTENDED RELEASE ORAL DAILY
Qty: 28 TABLET | Refills: 0 | Status: SHIPPED | OUTPATIENT
Start: 2025-02-14 | End: 2025-02-28

## 2025-02-14 RX ORDER — THIAMINE MONONITRATE (VIT B1) 100 MG
100 TABLET ORAL DAILY
Qty: 30 TABLET | Refills: 0 | Status: SHIPPED | OUTPATIENT
Start: 2025-02-15 | End: 2025-03-17

## 2025-02-14 RX ORDER — METOPROLOL SUCCINATE 25 MG/1
12.5 TABLET, EXTENDED RELEASE ORAL DAILY
Qty: 30 TABLET | Refills: 3 | Status: SHIPPED | OUTPATIENT
Start: 2025-02-15

## 2025-02-14 RX ORDER — POTASSIUM CHLORIDE 1500 MG/1
20 TABLET, EXTENDED RELEASE ORAL DAILY
Qty: 7 TABLET | Refills: 0 | Status: SHIPPED | OUTPATIENT
Start: 2025-02-14 | End: 2025-02-14

## 2025-02-14 RX ADMIN — POTASSIUM CHLORIDE 10 MEQ: 7.46 INJECTION, SOLUTION INTRAVENOUS at 08:14

## 2025-02-14 RX ADMIN — CEFAZOLIN 2000 MG: 2 INJECTION, POWDER, FOR SOLUTION INTRAMUSCULAR; INTRAVENOUS at 13:28

## 2025-02-14 RX ADMIN — POTASSIUM CHLORIDE 10 MEQ: 7.46 INJECTION, SOLUTION INTRAVENOUS at 09:19

## 2025-02-14 RX ADMIN — PROPOFOL 70 MG: 10 INJECTION, EMULSION INTRAVENOUS at 13:13

## 2025-02-14 RX ADMIN — POTASSIUM CHLORIDE 10 MEQ: 7.46 INJECTION, SOLUTION INTRAVENOUS at 07:14

## 2025-02-14 RX ADMIN — ONDANSETRON 4 MG: 2 INJECTION INTRAMUSCULAR; INTRAVENOUS at 13:30

## 2025-02-14 RX ADMIN — FENTANYL CITRATE 100 MCG: 0.05 INJECTION, SOLUTION INTRAMUSCULAR; INTRAVENOUS at 13:06

## 2025-02-14 RX ADMIN — POTASSIUM CHLORIDE 10 MEQ: 7.46 INJECTION, SOLUTION INTRAVENOUS at 05:01

## 2025-02-14 RX ADMIN — SODIUM CHLORIDE: 4.5 INJECTION, SOLUTION INTRAVENOUS at 13:06

## 2025-02-14 RX ADMIN — POTASSIUM PHOSPHATE, MONOBASIC AND POTASSIUM PHOSPHATE, DIBASIC 20 MMOL: 224; 236 INJECTION, SOLUTION, CONCENTRATE INTRAVENOUS at 10:12

## 2025-02-14 RX ADMIN — LIDOCAINE HYDROCHLORIDE 50 MG: 10 INJECTION, SOLUTION INFILTRATION; PERINEURAL at 13:13

## 2025-02-14 RX ADMIN — POTASSIUM CHLORIDE 10 MEQ: 7.46 INJECTION, SOLUTION INTRAVENOUS at 06:09

## 2025-02-14 NOTE — PROGRESS NOTES
MEDICAL ONCOLOGY PROGRESS NOTE     Pt Name: Damian You  MRN: [id number]  YOB: 1950  Date of evaluation: 2/14/2025    Subjective-patient denies any new complaint this morning.  Plans for port placement today.    S/p Exploratory laparotomy, loop colostomy, lysis of adhesions and cecostomy by Dr. 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 Boswell on 2/6/2025 during inpatient hospitalization at [facility].  He was consulted for abnormal imaging findings suspicious for hepatic metastasis.  Patient presented to the ER department with complaints of abdominal pain.  The patient denies any nausea vomit diarrhea.  Patient reported decreased appetite and weight loss.  Laboratory studies at admission showed normocytic anemia with hemoglobin 11.5/MCV 85.  Iron profile consistent with iron deficiency anemia.  Chemistry remarkable for hypokalemia 3.1, creatinine 1.4.  LFTs abnormal.  The patient has multiple other comorbidities to include a history of hypertension, GERD, hyperlipidemia, CKD stage IIIa, coronary artery disease, gout and a prior history of non-STEMI.  Patient reports that he never had a colonoscopy.  2/5/2025-CEA 73 (H)  2/5/2025-CT abdomen/pelvis, IN contrast MHL: The liver shows several poorly defined low density lesions measuring up to 1.7 cm.   The gallbladder is surgically absent.  The spleen, pancreas and adrenal glands are within normal limits. Scattered periaortic lymph nodes measure up to 0.9 cm.  No mesenteric or retroperitoneal lymphadenopathy is seen.  Scattered colonic diverticula with a short segment of sigmoid wall thickening.  No adjacent mesenteric stranding.  Gas distended colon is present to the level of the sigmoid colon.  There is no free air or fluid seen in the abdomen or pelvis.  The urinary bladder is unremarkable.  Healed

## 2025-02-14 NOTE — PROGRESS NOTES
Call and notified [facility] regarding patient being discharged today. AVS and DC summary faxed to office.

## 2025-02-14 NOTE — OP NOTE
Operative Note      Patient: Damian You  YOB: 1950  MRN: [id number]    Date of Procedure: 2/14/2025    Pre-Op Diagnosis Codes:      * Colon cancer (HCC) [C18.9]    Post-Op Diagnosis: Same       Procedure(s):  PORT INSERTION    Surgeon(s):  Amberly Lewis MD    Assistant:   * No surgical staff found *    Anesthesia: General    Estimated Blood Loss (mL): Minimal    Complications: None    Specimens:   * No specimens in log *    Implants:  Implant Name Type Inv. Item Serial No.  Lot No. LRB No. Used Action   PORT INFUS PLAS SGL LUMN W/ 9.6FR KELLY CATH AIRGUARD VLV - [id number]  PORT INFUS PLAS SGL LUMN W/ 9.6FR KELLY CATH AIRGUARD VLV  [id number] 1 Implanted         Drains:   Colostomy LLQ Loop (Active)   Stomal Appliance Clean, dry & intact 02/14/25 0815   Stoma  Assessment Red;Bleeding;Protrudes;Moist 02/14/25 0815   Peristomal Assessment Clean;Intact;[place] 02/14/25 0815   Mucocutaneous Junction Intact 02/14/25 0815   Treatment Ostomy belt;Site care;Liquid skin barrier 02/13/25 0951   Stool Appearance Loose;Mucous;Watery 02/14/25 0815   Stool Color Brown 02/14/25 0815   Stool Amount Medium 02/13/25 1943   Output (mL) 125 ml 02/14/25 0633       External Urinary Catheter (Active)   Site Assessment Clean,dry & intact 02/12/25 0627   Placement Initiated 02/10/25 1149   Perineal Care Yes 02/10/25 1149   Suction 40 mmgHg continuous 02/12/25 0627   Urine Color Yellow 02/12/25 0627   Output (mL) 350 mL 02/13/25 0343       [REMOVED] NG/OG/NJ/NE Tube Nasogastric 18 fr Left nostril (Removed)   Surrounding Skin Clean, dry & intact 02/08/25 0855   Securement device Adhesive based haynes 02/08/25 0855   Status Suction-low intermittent 02/08/25 0855   Placement Verified External Catheter Length 02/08/25 0855   NG/OG/NJ/NE External Measurement (cm) 50 cm 02/07/25 2045   Drainage Appearance Clear;Bright red 02/08/25 0855   Output (mL) 200 ml 02/08/25 0855   Action  guidewire.     A location was then chosen on the chest for creating a pocket. Local anesthetic was injected at this location, as well as along the path for tunneling the catheter. A 3 cm incision was made, and cautery was used to dissect down to the chest wall. A pocket was created inferior to this. A small incision was then made in the skin of the neck at the level of the guidewire. The catheter and port were assembled and flushed. The catheter was then tunneled from the chest incision out the small neck incision. The port was secured to the chest wall using PDS. Fluoro was used to guide trimming the catheter to the appropriate length. The sheath dilator was then placed over the guidewire and advanced. The dilator and guidewire were removed. The catheter was inserted in the sheath, the sheath was broken in half, and the catheter was advanced. Fluoro was used to insure appropriate positioning of the catheter tip. There were no signs of pneumothorax.    The port was then aspirated and flushed with heparin solution. The small neck incision was closed with 4-0 monocryl subcuticular stitch. The chest incision was re approximated with 3-0 vicryl and then the skin was closed using 4-0 monocryl subcuticular stitches. Sterile dressings with dermabond were applied.    The patient tolerated the procedure well, was removed from anesthesia, and taken to the recovery room in stable condition.                  Electronically signed by Amberly Lewis MD on 2/14/2025 at 1:53 PM

## 2025-02-14 NOTE — PROGRESS NOTES
Physical Therapy     02/14/25 1000   Restrictions/Precautions   Restrictions/Precautions Fall Risk   Position Activity Restriction   Other Position/Activity Restrictions colostomy   General   Diagnosis s/p COLOSTOMY   Subjective   Subjective pt agreeable to tx; stated he is going home today after port procedure   Bed mobility   Supine to Sit Stand by assistance   Bed Mobility Comments with HOB elevated and L rail   Transfers   Sit to Stand Contact guard assistance   Stand to Sit Contact guard assistance   Comment pt initially declined use of RW and asked for quad cane. pt very unsteady in standing at quad cane and quickly sat back down to switch to RW for safety.   Ambulation   Device Rolling Walker   Assistance Contact guard assistance   Quality of Gait flexed, slightly unsteady   Gait Deviations Slow Hyacinth;Decreased step length;Decreased step height   Distance 20'   Short Term Goals   Time Frame for Short Term Goals 14 DAYS   Short Term Goal 1 BED MOB MOD IND   Short Term Goal 2 TRANSFERS SUPERVISION   Short Term Goal 3 ' RW SUPERVISION   Activity Tolerance   Activity Tolerance Patient limited by fatigue   Assessment   Assessment pt continues to be limited by fatigue/lack of endurance. decreased awareness to deficits. allowed pt to use quad cane CGA to identify how unsafe/unsteady use of the device would be at this time. pt agreed that the RW would be safer. encouraged pt to reconsider SNF for further strengthening with pt declining. left in chair with call light and all needs in reach.   Patient Education   Education Given To Patient   Education Provided Plan of Care;Role of Therapy   Education Provided Comments educated pt on benefits of continued rehab for improving gait/balance before DC home with pt continuing to decline need.   Education Method Verbal   Barriers to Learning None   Education Outcome Verbalized understanding   PT Plan of Care   Friday X   Safety Devices   Type of Devices Call light

## 2025-02-14 NOTE — PROGRESS NOTES
Patient currently admitted. New orders to build mFOLFOX for new DX Adenocarcinoma colon. Patient will need consent for treatment signed with MD visit. Electronically signed by Gris Spence RN on 2/14/2025 at 8:14 AM

## 2025-02-14 NOTE — ANESTHESIA POSTPROCEDURE EVALUATION
Department of Anesthesiology  Postprocedure Note    Patient: Damian You  MRN: [id number]  YOB: 1950  Date of evaluation: 2/14/2025    Procedure Summary       Date: 02/14/25 Room / Location: [address]    Anesthesia Start: 1306 Anesthesia Stop:     Procedure: PORT INSERTION (Right: Chest) Diagnosis:       Colon cancer (HCC)      (Colon cancer (HCC) [C18.9])    Surgeons: Amberly Lewis MD Responsible Provider: Too Perez APRN - CRNA    Anesthesia Type: General ASA Status: 3            Anesthesia Type: General    Paola Phase I: Paola Score: 9    Paola Phase II:      Anesthesia Post Evaluation    Patient location during evaluation: PACU  Patient participation: complete - patient participated  Level of consciousness: awake and alert  Pain score: 0  Airway patency: patent  Nausea & Vomiting: no vomiting and no nausea  Cardiovascular status: blood pressure returned to baseline and hemodynamically stable  Respiratory status: spontaneous ventilation, room air, nonlabored ventilation and acceptable  Hydration status: euvolemic  Comments: BP (!) 141/66   Pulse 58   Temp 99 °F (37.2 °C) (Temporal)   Resp 14   Ht 1.829 m (6' 0.01\")   Wt 101.8 kg (224 lb 7 oz)   SpO2 96%   BMI 30.43 kg/m²     Pain management: adequate    No notable events documented.

## 2025-02-14 NOTE — DISCHARGE SUMMARY
Damian You  :  1950  MRN:  [id number]    Admit date:  2025 Discharge date:  2025    Discharging Physician:  Dr Salinas    Advance Directive: Full Code    Consults: IP CONSULT TO GI  IP CONSULT TO ONCOLOGY  PALLIATIVE CARE EVAL  IP CONSULT TO GENERAL SURGERY  IP CONSULT TO HOME CARE NEEDS  IP CONSULT TO HOME CARE NEEDS     Primary Care Physician:  Ramona Smith MD    Discharge Diagnoses:  Principal Problem:    Abnormal abdominal CT scan  Active Problems:    CKD stage 3a, GFR 45-59 ml/min (HCC)    Hypokalemia    Abnormal computed tomography of sigmoid colon    Metastasis to liver (HCC)    Colonic thickening    Medically complex patient    Life threatening medical illness    Moderate malnutrition (HCC)  Resolved Problems:    Periumbilical abdominal pain    Partial bowel obstruction (HCC)      Portions of this note have been copied forward, however, changed to reflect the most current clinical status of this patient.  Hospital Course:   Patient is a 74-year-old male with a past medical history of CAD, GERD, hyperlipidemia, CKD stage IIIa who presented to [facility] ED on  for evaluation of periumbilical pain.  In ED, patient was found to be hypokalemic with potassium 2.8, creatinine 1.2,  mildly elevated LFTs.  CT of the abdomen and pelvis indicated focal bowel wall thickening involving favoring mass versus diverticular changes versus early acute diverticulitis addition to ileus versus partial colonic obstruction extending to the level of the sigmoid colon.  Also, indicates several poorly defined low-density lesions in the liver concerning for metastasis with upper limits of normal retroperitoneal lymph nodes. Patient was admitted to hospital with GI and oncology consult patient was placed on Cipro and Flagyl due to questionable early diverticulitis.  Electrolytes were replaced per protocol.  Oncology recommended 3-day course of Venofer for ANGELINA.  GI initially recommended NGT to [facility] due to    Cardiovascular:      Rate and Rhythm: Normal rate.      Pulses: Normal pulses.   Pulmonary:      Effort: Pulmonary effort is normal. No respiratory distress.      Breath sounds: Normal breath sounds.   Abdominal:      General: Abdomen is flat.      Palpations: Abdomen is soft.      Tenderness: There is abdominal tenderness (Incisional), scant amount of serosang drainage noted.        Genitourinary:     Comments: F/c in place  Musculoskeletal:        Feet:    Skin:     General: Skin is warm and dry.      Capillary Refill: Capillary refill takes less than 2 seconds.   Neurological:      Mental Status: He is alert. Mental status is at baseline.            Discharge Medications:         Medication List        START taking these medications      metoclopramide 10 MG tablet  Commonly known as: REGLAN  Take 1 tablet by mouth 3 times daily (before meals) for 14 days     polyethylene glycol 17 g packet  Commonly known as: GLYCOLAX  Take 1 packet by mouth daily as needed for Constipation     potassium chloride 20 MEQ extended release tablet  Commonly known as: KLOR-CON M  Take 2 tablets by mouth daily for 14 days     vitamin B-1 100 MG tablet  Commonly known as: THIAMINE  Take 1 tablet by mouth daily  Start taking on: February 15, 2025            CONTINUE taking these medications      amiodarone 200 MG tablet  Commonly known as: CORDARONE  TAKE 1/2 TABLET BY MOUTH DAILY     atorvastatin 20 MG tablet  Commonly known as: LIPITOR  TAKE 1 TABLET BY MOUTH EVERY EVENING     folic acid 1 MG tablet  Commonly known as: FOLVITE  TAKE 1 TABLET BY MOUTH DAILY     levothyroxine 125 MCG tablet  Commonly known as: SYNTHROID  TAKE 1 TABLET BY MOUTH ONCE DAILY     metoprolol succinate 25 MG extended release tablet  Commonly known as: TOPROL XL  Take 0.5 tablets by mouth daily  Start taking on: February 15, 2025     multivitamin Tabs tablet  Take 1 tablet by mouth daily     pantoprazole 40 MG tablet  Commonly known as: PROTONIX  TAKE 1

## 2025-02-15 NOTE — PROGRESS NOTES
Physician Progress Note      PATIENT:               DERRICK THOMAS  [facility] #:                  [id number]  :                       1950  ADMIT DATE:       2025 12:37 PM  DISCH DATE:        2025 5:35 PM  RESPONDING  PROVIDER #:        GABRIELLA MUNOZ          QUERY TEXT:    Patient admitted with colon malignancy. Noted to have moderate malnutrition   documented by RD in PN  . If possible, please document in progress notes   and discharge summary if you are evaluating and /or treating any of the   following:    The medical record reflects the following:  Risk Factors: Malignancy, CKD  Clinical Indicators: Per RD note  :Malnutrition Assessment:  Malnutrition Status:  Moderate malnutrition (25 1250)  Context:  Acute Illness  Findings of the 6 clinical characteristics of malnutrition:  Energy Intake:  75% or less of estimated energy requirements for 7 or more   days  Weight Loss:  No weight loss  Body Fat Loss:  Mild body fat loss Orbital  Muscle Mass Loss:  Mild muscle mass loss Temples (temporalis), Clavicles   (pectoralis & deltoids)  Fluid Accumulation:  No fluid accumulation   Strength:  Not Performed  Treatment: Follow for diet advancement as tolerated or alternate means of   nutrition as needed  Ensure Clear w/ meals    ASPEN Criteria:    https://aspenjournals.onlinelibrary.yao.com/doi/full/10.1177/285763597115574  5  Options provided:  -- Protein calorie malnutrition moderate  -- Other - I will add my own diagnosis  -- Disagree - Not applicable / Not valid  -- Disagree - Clinically unable to determine / Unknown  -- Refer to Clinical Documentation Reviewer    PROVIDER RESPONSE TEXT:    This patient has moderate protein calorie malnutrition.    Query created by: Mellissa Quintanilla on 2025 10:27 AM      Electronically signed by:  GABRIELLA MUNOZ 2/15/2025 11:17 AM

## 2025-02-17 ENCOUNTER — CARE COORDINATION (OUTPATIENT)
Dept: CASE MANAGEMENT | Age: 75
End: 2025-02-17

## 2025-02-17 LAB
ALBUMIN SERPL-MCNC: 2.6 G/DL (ref 3.5–5.2)
ALP SERPL-CCNC: 116 U/L (ref 40–129)
ALT SERPL-CCNC: 18 U/L (ref 5–41)
ANION GAP SERPL CALCULATED.3IONS-SCNC: 10 MMOL/L (ref 8–16)
AST SERPL-CCNC: 38 U/L (ref 5–40)
BILIRUB SERPL-MCNC: 0.4 MG/DL (ref 0.2–1.2)
BUN SERPL-MCNC: 8 MG/DL (ref 8–23)
CALCIUM SERPL-MCNC: 7.8 MG/DL (ref 8.8–10.2)
CHLORIDE SERPL-SCNC: 106 MMOL/L (ref 98–107)
CO2 SERPL-SCNC: 24 MMOL/L (ref 22–29)
CREAT SERPL-MCNC: 1.4 MG/DL (ref 0.7–1.2)
GLUCOSE SERPL-MCNC: 77 MG/DL (ref 70–99)
MAGNESIUM SERPL-MCNC: 1.8 MG/DL (ref 1.6–2.4)
PHOSPHATE SERPL-MCNC: 2.1 MG/DL (ref 2.5–4.5)
POTASSIUM SERPL-SCNC: 4.2 MMOL/L (ref 3.5–5.1)
PROT SERPL-MCNC: 5.5 G/DL (ref 6.4–8.3)
SODIUM SERPL-SCNC: 140 MMOL/L (ref 136–145)

## 2025-02-17 NOTE — CARE COORDINATION
Care Transitions Note    Initial Call - Call within 2 business days of discharge: Yes    Attempted to reach patient for transitions of care follow up. Unable to reach patient.    Outreach Attempts:   HIPAA compliant voicemail left for patient.     Patient: Damian You    Patient : 1950   MRN: [id number]    Reason for Admission: Hypokalemia, Moderate Malnutrition, Other  Discharge Date: 25  RURS: Readmission Risk Score: 18.9    Last Discharge Facility       Date Complaint Diagnosis Description Type Department Provider    25 Abdominal Pain Hypokalemia ... ED to Hosp-Admission (Discharged) (ADMITTED) [facility] ONC Martin Salinas MD; Rudy Beltran...          Was this an external facility discharge? No    Follow Up Appointment:   Patient does not have a follow up appointment scheduled at time of call.  UTR  Future Appointments         Provider Specialty Dept Phone    2025 9:00 AM (Arrive by 8:30 AM) [facility] NM INJECTION ROOM Radiology [phone]    2025 10:00 AM (Arrive by 9:30 AM) [facility] PET RM 1 Radiology [phone]    2025 3:00 PM Christel Ugalde, APRN - CNP Wound Ostomy [phone]    2025 10:30 AM Jorge Mccloud MD Cardiology [phone]    3/25/2025 10:15 AM Ramona Smith MD Primary Care [phone]    2025 10:00 AM Ramona Smith MD Primary Care [phone]          Plan for follow-up on next business day.      Idania Robert RN

## 2025-02-18 ENCOUNTER — CARE COORDINATION (OUTPATIENT)
Dept: CASE MANAGEMENT | Age: 75
End: 2025-02-18

## 2025-02-18 ENCOUNTER — TELEPHONE (OUTPATIENT)
Dept: PRIMARY CARE CLINIC | Age: 75
End: 2025-02-18

## 2025-02-18 NOTE — TELEPHONE ENCOUNTER
Called [facility] and spike with Laura. Informed her that DR Martinez will not be able to sign orders as the office has been unable to reach patient and he has not returned any calls/messages to get him scheduled for a hospital follow up visit/face to face to meet requirements of signing for home health orders.

## 2025-02-18 NOTE — TELEPHONE ENCOUNTER
Patient has been reached out by phone call from care coordination and patient has not answered the calls for 2 days or 2 attempts  Patient does not have a follow-up visit with me and therefore I will not be able to certify his home care please inform [facility]

## 2025-02-18 NOTE — CARE COORDINATION
Care Transitions Note    Initial Call - Call within 2 business days of discharge: Yes    Attempted to reach patient for transitions of care follow up. Unable to reach patient.    Outreach Attempts:   Multiple attempts to contact patient at phone numbers on file.   HIPAA compliant voicemail left for patient.     Patient: Damian You    Patient : 1950   MRN: [id number]    Reason for Admission: Hypokalemia, Moderate Malnutrition, OTher  Discharge Date: 25  RURS: Readmission Risk Score: 18.9    Last Discharge Facility       Date Complaint Diagnosis Description Type Department Provider    25 Abdominal Pain Hypokalemia ... ED to Hosp-Admission (Discharged) (ADMITTED) [facility] ONC Martin Salinas MD; Rudy Beltran...          Was this an external facility discharge? No    Follow Up Appointment:   Patient does not have a follow up appointment scheduled at time of call.  UTR  Future Appointments         Provider Specialty Dept Phone    2025 9:00 AM (Arrive by 8:30 AM) [facility] NM INJECTION ROOM Radiology [phone]    2025 10:00 AM (Arrive by 9:30 AM) [facility] PET RM 1 Radiology [phone]    2025  3:00 PM Christel Ugalde, APRN - CNP Wound Ostomy [phone]    2025 10:30 AM Jorge Mccloud MD Cardiology [phone]    3/25/2025 10:15 AM Ramona Smith MD Primary Care [phone]    2025 10:00 AM Ramona Smith MD Primary Care [phone]          No further follow-up call indicated     Idania Robert RN

## 2025-02-19 LAB
TEMPUS PD-L1 (22C3) COMBINED POSITIVE SCORE: 3
TEMPUS PD-L1 (22C3) TUMOR PROPORTION SCORE: <1 %
TEMPUS PORTAL: NORMAL

## 2025-02-20 ENCOUNTER — HOSPITAL ENCOUNTER (OUTPATIENT)
Dept: WOUND CARE | Age: 75
Discharge: HOME | End: 2025-02-20
Attending: NURSE PRACTITIONER
Payer: MEDICARE

## 2025-02-20 VITALS
HEIGHT: 72 IN | SYSTOLIC BLOOD PRESSURE: 117 MMHG | TEMPERATURE: 98.2 F | HEART RATE: 92 BPM | DIASTOLIC BLOOD PRESSURE: 73 MMHG | BODY MASS INDEX: 30.34 KG/M2 | WEIGHT: 224 LBS | RESPIRATION RATE: 18 BRPM

## 2025-02-20 DIAGNOSIS — L98.492 ABDOMINAL WALL SKIN ULCER, WITH FAT LAYER EXPOSED (HCC): Primary | ICD-10-CM

## 2025-02-20 PROCEDURE — 99214 OFFICE O/P EST MOD 30 MIN: CPT

## 2025-02-20 PROCEDURE — 99214 OFFICE O/P EST MOD 30 MIN: CPT | Performed by: NURSE PRACTITIONER

## 2025-02-20 ASSESSMENT — PAIN DESCRIPTION - FREQUENCY: FREQUENCY: INTERMITTENT

## 2025-02-20 ASSESSMENT — PAIN SCALES - GENERAL: PAINLEVEL_OUTOF10: 3

## 2025-02-20 ASSESSMENT — PAIN DESCRIPTION - ORIENTATION: ORIENTATION: LEFT;MID

## 2025-02-20 ASSESSMENT — PAIN DESCRIPTION - PAIN TYPE: TYPE: SURGICAL PAIN

## 2025-02-20 ASSESSMENT — PAIN DESCRIPTION - ONSET: ONSET: ON-GOING

## 2025-02-20 ASSESSMENT — PAIN DESCRIPTION - LOCATION: LOCATION: ABDOMEN

## 2025-02-20 ASSESSMENT — PAIN - FUNCTIONAL ASSESSMENT: PAIN_FUNCTIONAL_ASSESSMENT: ACTIVITIES ARE NOT PREVENTED

## 2025-02-20 ASSESSMENT — PAIN DESCRIPTION - DESCRIPTORS: DESCRIPTORS: DISCOMFORT;SORE;TENDER

## 2025-02-20 NOTE — WOUND CARE
[facility] Outpatient   Ostomy Note      NAME:  Damian You  MEDICAL RECORD NUMBER:  [id number]  AGE: 74 y.o.   GENDER:  male  :  1950  TODAY'S DATE:  2025    Subjective       Chief Complaint   Patient presents with    Wound Check     New ostomy patient. States he had surgery 2025.         HISTORY of PRESENT ILLNESS HPI    Damian You is a 74 y.o. male New patient  who presents today for ostomy/stoma evaluation.   History of Ostomy Context: EXPLORATORY LAPAROTOMY, CECOSTOMY, LOOP COLOSTOMY, LYSIS OF ADHESIONS per Dr. Toro Gutiérrez on 2025     Wound/Ulcer Pain Timing/Severity: intermittent  Quality of pain: no pain today  Severity:  0 / 10   Associated Signs/Symptoms: none    PAST MEDICAL HISTORY        Diagnosis Date    CAD (coronary artery disease)     Gout     Hypertension     Hypotension 2020    Non-ST elevation MI (NSTEMI) ([facility]) 14    Palliative care patient 2020    Pneumonia due to infectious organism 2020       PAST SURGICAL HISTORY    Past Surgical History:   Procedure Laterality Date    CARDIAC CATHETERIZATION  14  [facility]    angioplasty, stent to LAD. EF 45%    CHOLECYSTECTOMY      FEMUR FRACTURE SURGERY Right 10/30/2020    TFN, SHORT performed by Maunelito Biggs MD at [facility] OR    LAPAROTOMY N/A 2025    EXPLORATORY LAPAROTOMY, CECOSTOMY, LOOP COLOSTOMY, LYSIS OF ADHESIONS performed by Toro Gutiérrez MD at [facility] OR    PORT SURGERY Right 2025    PORT INSERTION performed by Amberly Lewis MD at [facility] OR    SIGMOIDOSCOPY N/A 02/10/2025    Dr Murray-5 mm stricture noted at 20 cm in the rectosigmoid colon, tissue just proximal to the stricture was friable and erythematous-Invasive moderately differentiated adenocarcinoma       FAMILY HISTORY    Family History   Problem Relation Age of Onset    No Known Problems Mother     Heart Disease Father        SOCIAL HISTORY    Social History     Tobacco Use    Smoking status: Former

## 2025-02-20 NOTE — PATIENT INSTRUCTIONS
[facility]  Coloplast 2-piece Custom          [facility]     Change Pouch and Wafer: Every 3-5 days and as needed for leaking   Supplies:   SenSura John Flex flat #[id number] (3/8-2-11/16\" YELLOW)  Sensura John Flex Drainable Pouch #61733 YELLOW    Brava protective Seal thin #54454    Brava Powder #[id number]  Brava Strip paste #35746  Brava Belt - 40\" long #7527  Brava Elastic Barrier Strips #[id number]    Hytape Ref #[id number]    Hydrofera Blue moisten with saline, cut small piece to fit in the wound at the umbulicus change dressing daily    Gather supplies needed to change pouch and wafer.     Gently remove your old pouch     Look at the back of pouch before throwing away to see how it has worn.    Wash the skin around your stoma with water. Pat completely dry.    Use the measuring guide to measure your stoma size or use the old pattern to trace correct size opening on plastic backing of wafer.     Cut out the opening and remove the plastic backing from the wafer.      Optional - Apply Protective Barrier to skin around the stoma or to the back of the wafer.     Apply your wafer by centering the opening of the wafer around the stoma and press the wafer down firmly.      Be sure to roll the end up 3 times and flip the tabs over to secure.    Lay quietly for 15-20 minutes to allow the adhesives to mold to your skin.    Empty Pouch    Empty the pouch when it’s 1/3 to 1/2 full of gas or stool     Wipe out tail-end of pouch with toilet tissue.      Close the end of the pouch by folding tail-end up 3 x’s and flipping over the tabs   Other:      If the skin around your stoma becomes red, irritated, itchy and/ or your ostomy pouch is leaking before getting 3 days wear time. Please call your Ostomy Care nurse. (# listed below)                                                                                                            Please call Syl COSTELLO, CWS, OMS, CFCN at [phone]

## 2025-02-20 NOTE — PLAN OF CARE
Clinical Level of Care Assessment    Outpatient Ostomy Care      NAME:  Damina You  YOB: 1950  MEDICAL RECORD NUMBER:  [id number]   DATE:  2/20/2025      Patient /Ostomy Assessment- Document in Flowsheet I&O   Points   Review of chart [x]   0   Assess Complete Ostomy tab in Navigator for assessment of; stoma status, peristomal skin, presence of hernia/stool consistency/diet/related medications   Simple adjustments to pouch size/pouch system, new stoma pattern, accessory addition/deletion.   []   1   Assess Complete Ostomy tab in Navigator for assessment of; stoma status, peristomal skin, presence of hernia/stool consistency/diet/related medications   Moderate adjustments to pouch size/pouch system, new stoma pattern, accessory addition/deletion.  Observe patient/caregiver with hands-on care.   1-2 adjustments to pouch size/system/skin care/accessory addition or deletion.    []   2   Assess Complete Ostomy tab in Navigator for assessment of; stoma status, peristomal skin, presence of hernia/stool consistency/diet/related medications   Complex adjustments to pouch size/pouch system, new stoma pattern, accessory addition/deletion.  3 or more complex adjustments to pouch size/system/skin care/accessory addition or deletion.  Observe patient/caregiver with hands-on care.   Assess patient/patient abdomen for optimal pre-marked stoma site.  Assess patient abdomen for type of hernia belt/accessory needed. [x]   3         Ambulation Status Documented in WOCN Clinical Note  Status Definition Points   Independent Independently able to ambulate.  Fully able (without any assistance) to get on/off exam table/chair.    []   0   Minimal Physical Assistance Requires physical assistance of one person to ambulate and/or position patient to be examined. Includes necessary physical assistance to position lower extremities on/off stool.   []   1   Moderate Physical Assistance Requires at least one staff member to

## 2025-02-21 PROBLEM — L98.492 ABDOMINAL WALL SKIN ULCER, WITH FAT LAYER EXPOSED (HCC): Status: ACTIVE | Noted: 2025-02-21

## 2025-02-21 RX ORDER — LIDOCAINE HYDROCHLORIDE 40 MG/ML
SOLUTION TOPICAL PRN
OUTPATIENT
Start: 2025-02-21

## 2025-02-21 RX ORDER — LIDOCAINE HYDROCHLORIDE 20 MG/ML
JELLY TOPICAL PRN
OUTPATIENT
Start: 2025-02-21

## 2025-02-21 RX ORDER — BACITRACIN ZINC AND POLYMYXIN B SULFATE 500; 1000 [USP'U]/G; [USP'U]/G
OINTMENT TOPICAL PRN
OUTPATIENT
Start: 2025-02-21

## 2025-02-21 RX ORDER — GENTAMICIN SULFATE 1 MG/G
OINTMENT TOPICAL PRN
OUTPATIENT
Start: 2025-02-21

## 2025-02-21 RX ORDER — SILVER SULFADIAZINE 10 MG/G
CREAM TOPICAL PRN
OUTPATIENT
Start: 2025-02-21

## 2025-02-21 RX ORDER — MUPIROCIN 20 MG/G
OINTMENT TOPICAL PRN
OUTPATIENT
Start: 2025-02-21

## 2025-02-21 RX ORDER — NEOMYCIN/BACITRACIN/POLYMYXINB 3.5-400-5K
OINTMENT (GRAM) TOPICAL PRN
OUTPATIENT
Start: 2025-02-21

## 2025-02-21 RX ORDER — LIDOCAINE 40 MG/G
CREAM TOPICAL PRN
OUTPATIENT
Start: 2025-02-21

## 2025-02-21 RX ORDER — LIDOCAINE 50 MG/G
OINTMENT TOPICAL PRN
OUTPATIENT
Start: 2025-02-21

## 2025-02-21 RX ORDER — SODIUM CHLOR/HYPOCHLOROUS ACID 0.033 %
SOLUTION, IRRIGATION IRRIGATION PRN
OUTPATIENT
Start: 2025-02-21

## 2025-02-21 RX ORDER — TRIAMCINOLONE ACETONIDE 1 MG/G
OINTMENT TOPICAL PRN
OUTPATIENT
Start: 2025-02-21

## 2025-02-21 RX ORDER — GINSENG 100 MG
CAPSULE ORAL PRN
OUTPATIENT
Start: 2025-02-21

## 2025-02-21 RX ORDER — CLOBETASOL PROPIONATE 0.5 MG/G
OINTMENT TOPICAL PRN
OUTPATIENT
Start: 2025-02-21

## 2025-02-21 RX ORDER — BETAMETHASONE DIPROPIONATE 0.5 MG/G
CREAM TOPICAL PRN
OUTPATIENT
Start: 2025-02-21

## 2025-02-21 ASSESSMENT — ENCOUNTER SYMPTOMS: COLOR CHANGE: 1

## 2025-02-21 ASSESSMENT — VISUAL ACUITY: OU: 1

## 2025-02-21 NOTE — PROGRESS NOTES
Patient Care Team:  Ramona Smith MD as PCP - General (Internal Medicine)  Ramona Smith MD as PCP - Empaneled Provider  Ankush Brothers MD as Consulting Physician (Neurology)  Jorge Mccloud MD as Consulting Physician (Cardiology)    TODAY'S DATE:  2/20/2025 2/7/25-Dr. Gutiérrez  EXPLORATORY LAPAROTOMY, CECOSTOMY, LOOP COLOSTOMY, LYSIS OF ADHESIONS     2/10/25-Dr. Murray  SIGMOIDOSCOPY DIAGNOSTIC FLEXIBLE      HISTORY of PRESENTILLNESS HPI   Damian You is a 74 y.o. male who presents today for wound evaluation.  He reports he developed a wound on abdomen.This started 1 week(s) ago. He believes this is not healing. He has been applying nothing. He has not had  fever or chills. He has a history of cancer and recent surgery.  Wound Type:non-healing surgical  Wound Location: abdomen  Modifying factors:immunosuppression and malnutrition    Patient Active Problem List   Diagnosis Code    CAD (coronary artery disease) I25.10    Essential hypertension I10    Alcohol abuse F10.10    Hyponatremia E87.1    Palliative care patient Z51.5    Chronic systolic heart failure (HCC) I50.22    Non-ischemic cardiomyopathy (HCC) I42.8    Mixed hyperlipidemia E78.2    Paroxysmal atrial fibrillation (HCC) I48.0    Localized osteoporosis with current pathological fracture with routine healing M80.80XD    Postoperative anemia due to acute blood loss D62    Hypovitaminosis D E55.9    GERD (gastroesophageal reflux disease) K21.9    Coronary artery disease involving native heart with angina pectoris, unspecified vessel or lesion type I25.119    Guaiac positive stools R19.5    Acute gout of hand M10.9    Intracerebral hemorrhage, nontraumatic (HCC) I61.9    CKD stage 3a, GFR 45-59 ml/min (HCC) N18.31    Elevated WBC count D72.829    Abnormal abdominal CT scan R93.5    Hypokalemia E87.6    Abnormal computed tomography of sigmoid colon R93.3    Metastasis to liver (HCC) C78.7    Colonic thickening K63.9    Medically

## 2025-02-24 NOTE — PROGRESS NOTES
Post-Discharge Transitional Care  Follow Up      Damian You   YOB: 1950    Date of Office Visit:  2/25/2025  Date of Hospital Admission: 2/5/25  Date of Hospital Discharge: 2/14/25  Risk of hospital readmission (high >=14%. Medium >=10%) :Readmission Risk Score: 18.9      Care management risk score Rising risk (score 2-5) and Complex Care (Scores >=6): No Risk Score On File     Non face to face  following discharge, date last encounter closed (first attempt may have been earlier): 02/18/2025    Call initiated 2 business days of discharge: Yes    ASSESSMENT/PLAN:   Hospital discharge follow-up  -     [place] DISCHARGE MEDS RECONCILED W/ CURRENT OUTPATIENT MED LIST  Adenocarcinoma of colon (HCC)  -     Comprehensive Metabolic Panel; Standing  -     Halle Talley MD, Hematology/Oncology, [place]  Hypokalemia  -     Comprehensive Metabolic Panel; Standing  -     Phosphorus; Future  -     Magnesium; Future  Iron deficiency anemia due to chronic blood loss  -     Comprehensive Metabolic Panel; Standing  -     ferrous sulfate (IRON 325) 325 (65 Fe) MG tablet; Take 1 tablet by mouth 2 times daily, Disp-180 tablet, R-1Normal  -     CBC with Auto Differential; Future  ACP (advance care planning)  Colostomy care (HCC)      CT Result (most recent):  CT ABDOMEN PELVIS W IV CONTRAST 02/05/2025    Narrative  EXAM:  CT ABDOMEN AND PELVIS WITH CONTRAST.    HISTORY:  Abdomen pain.    COMPARISON:  None.    TECHNIQUE: Axial CT imaging of the abdomen and pelvis was performed with IV contrast.  Sagittal and coronal reformations were performed.    FINDINGS:  Scattered linear markings in both lower lungs and bullous formation in the left lower lung.    The liver shows several poorly defined low density lesions measuring up to 1.7 cm.   The gallbladder is surgically absent.  The spleen, pancreas and adrenal glands are within normal limits.  Scattered periaortic lymph nodes measure up to 0.9 cm.  No mesenteric

## 2025-02-25 ENCOUNTER — TELEPHONE (OUTPATIENT)
Dept: PRIMARY CARE CLINIC | Age: 75
End: 2025-02-25

## 2025-02-25 ENCOUNTER — OFFICE VISIT (OUTPATIENT)
Dept: PRIMARY CARE CLINIC | Age: 75
End: 2025-02-25

## 2025-02-25 VITALS
RESPIRATION RATE: 17 BRPM | SYSTOLIC BLOOD PRESSURE: 138 MMHG | BODY MASS INDEX: 22.48 KG/M2 | OXYGEN SATURATION: 96 % | WEIGHT: 166 LBS | DIASTOLIC BLOOD PRESSURE: 80 MMHG | HEIGHT: 72 IN | TEMPERATURE: 98 F | HEART RATE: 62 BPM

## 2025-02-25 DIAGNOSIS — E87.6 HYPOKALEMIA: ICD-10-CM

## 2025-02-25 DIAGNOSIS — Z71.89 ACP (ADVANCE CARE PLANNING): ICD-10-CM

## 2025-02-25 DIAGNOSIS — Z09 HOSPITAL DISCHARGE FOLLOW-UP: Primary | ICD-10-CM

## 2025-02-25 DIAGNOSIS — C18.9 ADENOCARCINOMA OF COLON (HCC): ICD-10-CM

## 2025-02-25 DIAGNOSIS — Z43.3 COLOSTOMY CARE (HCC): ICD-10-CM

## 2025-02-25 DIAGNOSIS — D50.0 IRON DEFICIENCY ANEMIA DUE TO CHRONIC BLOOD LOSS: ICD-10-CM

## 2025-02-25 RX ORDER — FERROUS SULFATE 325(65) MG
325 TABLET ORAL 2 TIMES DAILY
Qty: 180 TABLET | Refills: 1 | Status: SHIPPED | OUTPATIENT
Start: 2025-02-25

## 2025-02-25 ASSESSMENT — PATIENT HEALTH QUESTIONNAIRE - PHQ9
SUM OF ALL RESPONSES TO PHQ QUESTIONS 1-9: 0
SUM OF ALL RESPONSES TO PHQ QUESTIONS 1-9: 0
2. FEELING DOWN, DEPRESSED OR HOPELESS: NOT AT ALL
SUM OF ALL RESPONSES TO PHQ QUESTIONS 1-9: 0
SUM OF ALL RESPONSES TO PHQ9 QUESTIONS 1 & 2: 0
SUM OF ALL RESPONSES TO PHQ QUESTIONS 1-9: 0
1. LITTLE INTEREST OR PLEASURE IN DOING THINGS: NOT AT ALL

## 2025-02-25 NOTE — PROGRESS NOTES
Post-Discharge Transitional Care  Follow Up      Damian You   YOB: 1950    Date of Office Visit:  2/25/2025  Date of Hospital Admission: 2/5/25  Date of Hospital Discharge: 2/14/25  Risk of hospital readmission (high >=14%. Medium >=10%) :Readmission Risk Score: 18.9      Care management risk score Rising risk (score 2-5) and Complex Care (Scores >=6): No Risk Score On File     Non face to face  following discharge, date last encounter closed (first attempt may have been earlier): 02/18/2025    Call initiated 2 business days of discharge: Yes    ASSESSMENT/PLAN:   Hospital discharge follow-up  -     [place] DISCHARGE MEDS RECONCILED W/ CURRENT OUTPATIENT MED LIST  -     [place] DISCHARGE MEDS RECONCILED W/ CURRENT OUTPATIENT MED LIST  Adenocarcinoma of colon (HCC)  -     Comprehensive Metabolic Panel; Standing  -     [place] - Halle Boswell MD, Hematology/Oncology, [place]  Hypokalemia  -     Comprehensive Metabolic Panel; Standing  -     Phosphorus; Future  -     Magnesium; Future  Iron deficiency anemia due to chronic blood loss  -     Comprehensive Metabolic Panel; Standing  -     ferrous sulfate (IRON 325) 325 (65 Fe) MG tablet; Take 1 tablet by mouth 2 times daily, Disp-180 tablet, R-1Normal  -     CBC with Auto Differential; Future  ACP (advance care planning)  Colostomy care (HCC)      CT Result (most recent):  CT ABDOMEN PELVIS W IV CONTRAST 02/05/2025    Narrative  EXAM:  CT ABDOMEN AND PELVIS WITH CONTRAST.    HISTORY:  Abdomen pain.    COMPARISON:  None.    TECHNIQUE: Axial CT imaging of the abdomen and pelvis was performed with IV contrast.  Sagittal and coronal reformations were performed.    FINDINGS:  Scattered linear markings in both lower lungs and bullous formation in the left lower lung.    The liver shows several poorly defined low density lesions measuring up to 1.7 cm.   The gallbladder is surgically absent.  The spleen, pancreas and adrenal glands are within normal

## 2025-02-25 NOTE — PATIENT INSTRUCTIONS

## 2025-02-25 NOTE — TELEPHONE ENCOUNTER
[place] with [facility] called to obtain approval for additional PRN visits for patient as he is needing a lot of additional care with his ostomy. Gave verbal ok for the additional visits. [place] stated they would fax orders for signature as needed.

## 2025-02-27 ENCOUNTER — OFFICE VISIT (OUTPATIENT)
Dept: SURGERY | Age: 75
End: 2025-02-27

## 2025-02-27 VITALS
WEIGHT: 166 LBS | HEART RATE: 70 BPM | OXYGEN SATURATION: 98 % | BODY MASS INDEX: 22.48 KG/M2 | HEIGHT: 72 IN | TEMPERATURE: 97 F

## 2025-02-27 DIAGNOSIS — C18.9 ADENOCARCINOMA OF COLON (HCC): Primary | ICD-10-CM

## 2025-02-27 LAB
DNA RANGE(S) EXAMINED NAR: NORMAL
GENE DIS ANL INTERP-IMP: POSITIVE
GENE DIS ASSESSED: NORMAL
GENE MUT TESTED BLD/T: 7.9 M/MB
HLA-A HIGH RES: NORMAL
HLA-A UNRESLVD ALLELES HIGH RES: YES
HLA-B HIGH RES: NORMAL
HLA-B UNRESLVD ALLELES HIGH RES: YES
HLA-C HIGH RES: NORMAL
HLA-C UNRESLVD ALLELES HIGH RES: YES
MSI CA SPEC-IMP: NORMAL
REASON FOR STUDY: NORMAL
TEMPUS GERMLINE NOTE: NORMAL
TEMPUS HLA-A SAMPLE TYPE: NORMAL
TEMPUS HLA-B SAMPLE TYPE: NORMAL
TEMPUS HLA-C SAMPLE TYPE: NORMAL
TEMPUS LCA: NORMAL
TEMPUS PD-L1 (22C3) COMBINED POSITIVE SCORE: 3
TEMPUS PD-L1 (22C3) TUMOR PROPORTION SCORE: <1 %
TEMPUS PERTINENTNEGATIVES: NORMAL
TEMPUS PORTAL: NORMAL
TEMPUS THERAPY1: NORMAL
TEMPUS THERAPYCOUNT: 1
TEMPUS TRIAL1: NORMAL
TEMPUS TRIAL3: NORMAL
TEMPUS TRIALCOUNT: 3
TEMPUS TRIALMATCHES2: NORMAL
TEMPUS XR RESULT 1: NORMAL

## 2025-02-27 PROCEDURE — 99024 POSTOP FOLLOW-UP VISIT: CPT | Performed by: SURGERY

## 2025-02-27 NOTE — PROGRESS NOTES
Postop Progress Note    Subjective    Damian You presents to the office for postop follow up, 3 weeks s/p loop colostomy creation. He reports that he has  been doing okay. Home health has been coming out to help with his stoma. He is tolerating a diet and having stool output.    Objective    There were no vitals filed for this visit.  General: alert, cooperative and no distress  Abdomen: Soft, NT, ND. Midline incision with area at the umbilicus of fibrinous tissue where there was necrotic skin. Causing weeping and surrounding moisture damage. Staples removed without difficulty. Ostomy bar removed.      Assessment  75 yo male s/p diverting loop colostomy  1) Doing well post operatively. Okay to have diet and activity as tolerated  2) Follow up in general surgery as needed. Continue following with wound care. Will let home health know about the area around his umbilicus needing dressing as well.    Electronically signed by Amberly Lewis MD on 2/27/2025 at 11:07 AM

## 2025-02-28 ENCOUNTER — TELEPHONE (OUTPATIENT)
Dept: PRIMARY CARE CLINIC | Age: 75
End: 2025-02-28

## 2025-02-28 NOTE — TELEPHONE ENCOUNTER
RyanRN with [facility] reported Damian has what looks like to be a pressure ulcer on his Right buttocks it's .6x.3x.1cm. He would like to use soap and water to clean use a foam border dressing change the dressing every 3-4 days. Please call Ryan on Monday 3/3/25 at [phone].

## 2025-03-03 NOTE — TELEPHONE ENCOUNTER
Called and spoke with Ryan. Relayed approval and instructions per Dr Martinez with understanding verbalized.

## 2025-03-05 ENCOUNTER — HOSPITAL ENCOUNTER (OUTPATIENT)
Dept: NUCLEAR MEDICINE | Age: 75
Discharge: HOME | End: 2025-03-07
Attending: INTERNAL MEDICINE
Payer: MEDICARE

## 2025-03-05 DIAGNOSIS — R93.89 ABNORMAL FINDING ON CT SCAN: ICD-10-CM

## 2025-03-05 DIAGNOSIS — K76.9 LIVER LESION: ICD-10-CM

## 2025-03-05 LAB
GLUCOSE BLD-MCNC: 85 MG/DL (ref 70–99)
PERFORMED ON: NORMAL

## 2025-03-05 PROCEDURE — 82962 GLUCOSE BLOOD TEST: CPT

## 2025-03-05 PROCEDURE — 78815 PET IMAGE W/CT SKULL-THIGH: CPT

## 2025-03-05 PROCEDURE — A9609 HC RX DIAGNOSTIC RADIOPHARMACEUTICAL: HCPCS | Performed by: INTERNAL MEDICINE

## 2025-03-05 PROCEDURE — 3430000000 HC RX DIAGNOSTIC RADIOPHARMACEUTICAL: Performed by: INTERNAL MEDICINE

## 2025-03-05 RX ORDER — FLUDEOXYGLUCOSE F 18 200 MCI/ML
15 INJECTION, SOLUTION INTRAVENOUS ONCE
Status: COMPLETED | OUTPATIENT
Start: 2025-03-05 | End: 2025-03-05

## 2025-03-05 RX ADMIN — FLUDEOXYGLUCOSE F 18 15 MILLICURIE: 200 INJECTION, SOLUTION INTRAVENOUS at 14:27

## 2025-03-06 ENCOUNTER — HOSPITAL ENCOUNTER (OUTPATIENT)
Dept: WOUND CARE | Age: 75
Discharge: HOME | End: 2025-03-06
Attending: NURSE PRACTITIONER
Payer: MEDICARE

## 2025-03-06 VITALS
WEIGHT: 166 LBS | BODY MASS INDEX: 22.48 KG/M2 | RESPIRATION RATE: 16 BRPM | DIASTOLIC BLOOD PRESSURE: 88 MMHG | HEART RATE: 83 BPM | SYSTOLIC BLOOD PRESSURE: 146 MMHG | TEMPERATURE: 97 F | HEIGHT: 72 IN

## 2025-03-06 DIAGNOSIS — L98.492 ABDOMINAL WALL SKIN ULCER, WITH FAT LAYER EXPOSED (HCC): Primary | ICD-10-CM

## 2025-03-06 PROCEDURE — 99213 OFFICE O/P EST LOW 20 MIN: CPT

## 2025-03-06 RX ORDER — TRIAMCINOLONE ACETONIDE 1 MG/G
OINTMENT TOPICAL PRN
OUTPATIENT
Start: 2025-03-06

## 2025-03-06 RX ORDER — NEOMYCIN/BACITRACIN/POLYMYXINB 3.5-400-5K
OINTMENT (GRAM) TOPICAL PRN
OUTPATIENT
Start: 2025-03-06

## 2025-03-06 RX ORDER — BETAMETHASONE DIPROPIONATE 0.5 MG/G
CREAM TOPICAL PRN
OUTPATIENT
Start: 2025-03-06

## 2025-03-06 RX ORDER — LIDOCAINE HYDROCHLORIDE 20 MG/ML
JELLY TOPICAL PRN
OUTPATIENT
Start: 2025-03-06

## 2025-03-06 RX ORDER — LIDOCAINE HYDROCHLORIDE 40 MG/ML
SOLUTION TOPICAL PRN
OUTPATIENT
Start: 2025-03-06

## 2025-03-06 RX ORDER — CLOBETASOL PROPIONATE 0.5 MG/G
OINTMENT TOPICAL PRN
OUTPATIENT
Start: 2025-03-06

## 2025-03-06 RX ORDER — SILVER SULFADIAZINE 10 MG/G
CREAM TOPICAL PRN
OUTPATIENT
Start: 2025-03-06

## 2025-03-06 RX ORDER — LIDOCAINE 40 MG/G
CREAM TOPICAL PRN
OUTPATIENT
Start: 2025-03-06

## 2025-03-06 RX ORDER — SODIUM CHLOR/HYPOCHLOROUS ACID 0.033 %
SOLUTION, IRRIGATION IRRIGATION PRN
OUTPATIENT
Start: 2025-03-06

## 2025-03-06 RX ORDER — LIDOCAINE 50 MG/G
OINTMENT TOPICAL PRN
OUTPATIENT
Start: 2025-03-06

## 2025-03-06 RX ORDER — MUPIROCIN 20 MG/G
OINTMENT TOPICAL PRN
OUTPATIENT
Start: 2025-03-06

## 2025-03-06 RX ORDER — BACITRACIN ZINC AND POLYMYXIN B SULFATE 500; 1000 [USP'U]/G; [USP'U]/G
OINTMENT TOPICAL PRN
OUTPATIENT
Start: 2025-03-06

## 2025-03-06 RX ORDER — GINSENG 100 MG
CAPSULE ORAL PRN
OUTPATIENT
Start: 2025-03-06

## 2025-03-06 RX ORDER — GENTAMICIN SULFATE 1 MG/G
OINTMENT TOPICAL PRN
OUTPATIENT
Start: 2025-03-06

## 2025-03-06 ASSESSMENT — PAIN SCALES - GENERAL: PAINLEVEL_OUTOF10: 0

## 2025-03-06 ASSESSMENT — PAIN - FUNCTIONAL ASSESSMENT: PAIN_FUNCTIONAL_ASSESSMENT: PREVENTS OR INTERFERES SOME ACTIVE ACTIVITIES AND ADLS

## 2025-03-06 NOTE — WOUND CARE
Clinical Level of Care Assessment    Outpatient Ostomy Care      NAME:  Damian You  YOB: 1950  MEDICAL RECORD NUMBER:  [id number]   DATE:  3/6/2025      Patient /Ostomy Assessment- Document in Flowsheet I&O   Points   Review of chart []   0   Assess Complete Ostomy tab in Navigator for assessment of; stoma status, peristomal skin, presence of hernia/stool consistency/diet/related medications   Simple adjustments to pouch size/pouch system, new stoma pattern, accessory addition/deletion.   []   1   Assess Complete Ostomy tab in Navigator for assessment of; stoma status, peristomal skin, presence of hernia/stool consistency/diet/related medications   Moderate adjustments to pouch size/pouch system, new stoma pattern, accessory addition/deletion.  Observe patient/caregiver with hands-on care.   1-2 adjustments to pouch size/system/skin care/accessory addition or deletion.    [x]   2   Assess Complete Ostomy tab in Navigator for assessment of; stoma status, peristomal skin, presence of hernia/stool consistency/diet/related medications   Complex adjustments to pouch size/pouch system, new stoma pattern, accessory addition/deletion.  3 or more complex adjustments to pouch size/system/skin care/accessory addition or deletion.  Observe patient/caregiver with hands-on care.   Assess patient/patient abdomen for optimal pre-marked stoma site.  Assess patient abdomen for type of hernia belt/accessory needed. []   3         Ambulation Status Documented in CN Clinical Note  Status Definition Points   Independent Independently able to ambulate.  Fully able (without any assistance) to get on/off exam table/chair.    []   0   Minimal Physical Assistance Requires physical assistance of one person to ambulate and/or position patient to be examined. Includes necessary physical assistance to position lower extremities on/off stool.   [x]   1   Moderate Physical Assistance Requires at least one staff member to 
with secondary dressing/foam    Compression Management needed for edema control, apply multilayer compression or tubular garment or equivalent    Offloading Management needed for pressure relief, apply offloading shoe/boot or equivalent    As instructed by the provider, a Nursing visit for Treatment outside of a Provider Visit can be completed by releasing the Wound Care Protocol and documenting the provided Treatment in the nursing flowsheets or the Progress notes as appropriate     Standing Status:   Standing     Number of Occurrences:   1    MD COMMUNICATION 1     Initiate the Culture Protocol when, after a comprehensive wound assessment, the provider deems a wound culture necessary through medical decision-making. The provider will provide specific instructions on the location and type of culture to be obtained. In this situation, the nurse will enter the culture order based on collection type with the required elements and sign the order using Per Protocol without Cosign order mode.     Standing Status:   Standing     Number of Occurrences:   1    MD COMMUNICATION 2     Initiate the Pathology Protocol when, after a comprehensive wound assessment, the provider deems a surgical pathology necessary through medical decision-making. The provider will obtain the specimen and give specific instructions on the location and type of surgical pathology. In this situation, the nurse will enter the surgical pathology order based on collection type with the required elements and sign the order using Per Protocol without Cosi  gn order mode.     Standing Status:   Standing     Number of Occurrences:   1    Initiate Oxygen Therapy Protocol     Initiate oxygen therapy if the patient has 1) SpO2 is less than 90%, 2) Cyanosis, Chest Pain, Dyspnea, or Altered level of consciousness AND SpO2 checked and it is less than 90% or unobtainable, or 3) patient on Home oxygen.    To initiate oxygen therapy: nurse or RT enters Nasal cannula

## 2025-03-06 NOTE — PATIENT INSTRUCTIONS
Please call your Ostomy Care nurse. (# listed below)                                                                                                            Please call Syl COSTELLO, MAN, ALVIN, LIZZ at [phone] ostomy questions and issues.      Follow up at [facility] on ___3/_________________________________________________________________________________________

## 2025-03-11 NOTE — PROGRESS NOTES
MEDICAL ONCOLOGY PROGRESS NOTE                                                          Damian You   1950  3/12/2025     Chief Complaint   Patient presents with    Colon Cancer        INTERVAL HISTORY/HISTORY OF PRESENT ILLNESS:  Reason for MD visit-disease management, chemotherapy management  The patient is a 74 years old male who has a diagnosis of metastatic colonic adenocarcinoma MSI stable K-gomez, BRAF, NRAS negative.  The patient had a PET scan performed and is here for initiation of palliative treatment.  The patient has purulence discharge from his prior incision.  The patient is scheduled to be seen by wound care tomorrow.  Patient denies any fever or chills.  The patient has home health who is caring for his local wound.      Diagnosis  Sigmoid adenocarcinoma  Suspect liver metastasis  MMR proficient  Tempus xT: FBXW7, TP53, APC, KMT2C, CDKN2A, CDKN2B, MTAP, NFK81A: Positive  KRAS,BRAF,NRAS: Negative  TMB: 7.9m/MB  MSI-Stable   2/27/25 Tempus xT Tumor Origin:  Colorectal adenocarcinoma 99%    Treatment Summary:  2/6/25-2/8/25 Patient received IV Venofer 1000mg  3/12/25 Initiate mFOLFOX every 2 weeks. Treatment consent signed.        Cancer history  Damian You was first seen by Dr. Boswell on 2/6/2025 during inpatient hospitalization at [facility].  He was consulted for abnormal imaging findings suspicious for hepatic metastasis.  Patient presented to the ER department with complaints of abdominal pain.  The patient denies any nausea vomit diarrhea.  Patient reported decreased appetite and weight loss.  Laboratory studies at admission showed normocytic anemia with hemoglobin 11.5/MCV 85.  Iron profile consistent with iron deficiency anemia.  Chemistry remarkable for hypokalemia 3.1, creatinine 1.4.  LFTs abnormal.  The patient has multiple other comorbidities to include a history of hypertension, GERD, hyperlipidemia, CKD stage IIIa, coronary artery disease, gout and a prior

## 2025-03-12 ENCOUNTER — HOSPITAL ENCOUNTER (OUTPATIENT)
Dept: INFUSION THERAPY | Age: 75
Discharge: HOME | End: 2025-03-12
Payer: MEDICARE

## 2025-03-12 ENCOUNTER — OFFICE VISIT (OUTPATIENT)
Dept: HEMATOLOGY | Age: 75
End: 2025-03-12
Payer: MEDICARE

## 2025-03-12 VITALS
SYSTOLIC BLOOD PRESSURE: 157 MMHG | BODY MASS INDEX: 22.76 KG/M2 | HEIGHT: 70 IN | DIASTOLIC BLOOD PRESSURE: 73 MMHG | WEIGHT: 159 LBS | RESPIRATION RATE: 18 BRPM | TEMPERATURE: 97.8 F | OXYGEN SATURATION: 98 % | HEART RATE: 53 BPM

## 2025-03-12 DIAGNOSIS — Z51.11 CHEMOTHERAPY MANAGEMENT, ENCOUNTER FOR: ICD-10-CM

## 2025-03-12 DIAGNOSIS — C18.9 ADENOCARCINOMA OF COLON: ICD-10-CM

## 2025-03-12 DIAGNOSIS — T45.1X5A ADVERSE EFFECT OF CHEMOTHERAPY, INITIAL ENCOUNTER: ICD-10-CM

## 2025-03-12 DIAGNOSIS — Z71.89 CARE PLAN DISCUSSED WITH PATIENT: ICD-10-CM

## 2025-03-12 DIAGNOSIS — R97.8 ABNORMAL TUMOR MARKERS: ICD-10-CM

## 2025-03-12 DIAGNOSIS — Z11.59 ENCOUNTER FOR SCREENING FOR OTHER VIRAL DISEASES: ICD-10-CM

## 2025-03-12 DIAGNOSIS — R53.81 PHYSICAL DECONDITIONING: ICD-10-CM

## 2025-03-12 DIAGNOSIS — C18.9 ADENOCARCINOMA OF COLON (HCC): Primary | ICD-10-CM

## 2025-03-12 DIAGNOSIS — Z11.59 ENCOUNTER FOR SCREENING FOR OTHER VIRAL DISEASES: Primary | ICD-10-CM

## 2025-03-12 DIAGNOSIS — R54 FRAILTY SYNDROME IN GERIATRIC PATIENT: ICD-10-CM

## 2025-03-12 LAB
ALBUMIN SERPL-MCNC: 3.6 G/DL (ref 3.5–5.2)
ALP SERPL-CCNC: 172 U/L (ref 40–129)
ALT SERPL-CCNC: 13 U/L (ref 5–41)
ANION GAP SERPL CALCULATED.3IONS-SCNC: 12 MMOL/L (ref 7–19)
AST SERPL-CCNC: 29 U/L (ref 5–40)
BASOPHILS # BLD: 0.07 K/UL (ref 0–0.2)
BASOPHILS NFR BLD: 1 % (ref 0–1)
BILIRUB SERPL-MCNC: 0.6 MG/DL (ref 0–1.2)
BUN SERPL-MCNC: 20 MG/DL (ref 8–23)
CALCIUM SERPL-MCNC: 9 MG/DL (ref 8.8–10.2)
CANCER AG19-9 SERPL-ACNC: 212 U/ML (ref 0–35)
CEA SERPL-MCNC: 108 NG/ML (ref 0–4.7)
CHLORIDE SERPL-SCNC: 101 MMOL/L (ref 98–107)
CO2 SERPL-SCNC: 25 MMOL/L (ref 22–29)
CREAT SERPL-MCNC: 1.3 MG/DL (ref 0.7–1.2)
EOSINOPHIL # BLD: 0.27 K/UL (ref 0–0.6)
EOSINOPHIL NFR BLD: 3.7 % (ref 0–5)
ERYTHROCYTE [DISTWIDTH] IN BLOOD BY AUTOMATED COUNT: 15.4 % (ref 11.5–14.5)
GLUCOSE SERPL-MCNC: 85 MG/DL (ref 70–99)
HBV SURFACE AB SERPL IA-ACNC: <3.5 M[IU]/ML
HBV SURFACE AG SERPL QL IA: NORMAL
HCT VFR BLD AUTO: 37.1 % (ref 42–52)
HGB BLD-MCNC: 12 G/DL (ref 14–18)
LYMPHOCYTES # BLD: 1.37 K/UL (ref 1.1–4.5)
LYMPHOCYTES NFR BLD: 18.7 % (ref 20–40)
MAGNESIUM SERPL-MCNC: 2 MG/DL (ref 1.6–2.4)
MCH RBC QN AUTO: 28.6 PG (ref 27–31)
MCHC RBC AUTO-ENTMCNC: 32.3 G/DL (ref 33–37)
MCV RBC AUTO: 88.5 FL (ref 80–94)
MONOCYTES # BLD: 0.49 K/UL (ref 0–0.9)
MONOCYTES NFR BLD: 6.7 % (ref 1–10)
NEUTROPHILS # BLD: 5.07 K/UL (ref 1.5–7.5)
NEUTS SEG NFR BLD: 69.4 % (ref 50–65)
PHOSPHATE SERPL-MCNC: 3 MG/DL (ref 2.5–4.5)
PLATELET # BLD AUTO: 233 K/UL (ref 130–400)
PMV BLD AUTO: 8.8 FL (ref 9.4–12.4)
POTASSIUM SERPL-SCNC: 4.1 MMOL/L (ref 3.5–5.1)
PROT SERPL-MCNC: 7 G/DL (ref 6.4–8.3)
RBC # BLD AUTO: 4.19 M/UL (ref 4.7–6.1)
SODIUM SERPL-SCNC: 138 MMOL/L (ref 136–145)
WBC # BLD AUTO: 7.31 K/UL (ref 4.8–10.8)

## 2025-03-12 PROCEDURE — G0498 CHEMO EXTEND IV INFUS W/PUMP: HCPCS

## 2025-03-12 PROCEDURE — 86301 IMMUNOASSAY TUMOR CA 19-9: CPT

## 2025-03-12 PROCEDURE — 2580000003 HC RX 258: Performed by: INTERNAL MEDICINE

## 2025-03-12 PROCEDURE — 84100 ASSAY OF PHOSPHORUS: CPT

## 2025-03-12 PROCEDURE — 86706 HEP B SURFACE ANTIBODY: CPT

## 2025-03-12 PROCEDURE — 86704 HEP B CORE ANTIBODY TOTAL: CPT

## 2025-03-12 PROCEDURE — 96413 CHEMO IV INFUSION 1 HR: CPT

## 2025-03-12 PROCEDURE — 36415 COLL VENOUS BLD VENIPUNCTURE: CPT

## 2025-03-12 PROCEDURE — 6360000002 HC RX W HCPCS: Performed by: INTERNAL MEDICINE

## 2025-03-12 PROCEDURE — 96415 CHEMO IV INFUSION ADDL HR: CPT

## 2025-03-12 PROCEDURE — 96375 TX/PRO/DX INJ NEW DRUG ADDON: CPT

## 2025-03-12 PROCEDURE — 96368 THER/DIAG CONCURRENT INF: CPT

## 2025-03-12 PROCEDURE — 82378 CARCINOEMBRYONIC ANTIGEN: CPT

## 2025-03-12 PROCEDURE — 85025 COMPLETE CBC W/AUTO DIFF WBC: CPT

## 2025-03-12 PROCEDURE — 80053 COMPREHEN METABOLIC PANEL: CPT

## 2025-03-12 PROCEDURE — 83735 ASSAY OF MAGNESIUM: CPT

## 2025-03-12 PROCEDURE — 87340 HEPATITIS B SURFACE AG IA: CPT

## 2025-03-12 RX ORDER — ACETAMINOPHEN 325 MG/1
650 TABLET ORAL
OUTPATIENT
Start: 2025-03-12

## 2025-03-12 RX ORDER — ONDANSETRON 2 MG/ML
8 INJECTION INTRAMUSCULAR; INTRAVENOUS
OUTPATIENT
Start: 2025-03-12

## 2025-03-12 RX ORDER — DIPHENHYDRAMINE HYDROCHLORIDE 50 MG/ML
50 INJECTION INTRAMUSCULAR; INTRAVENOUS
OUTPATIENT
Start: 2025-03-12

## 2025-03-12 RX ORDER — PALONOSETRON 0.05 MG/ML
0.25 INJECTION, SOLUTION INTRAVENOUS ONCE
Status: CANCELLED | OUTPATIENT
Start: 2025-03-12 | End: 2025-03-12

## 2025-03-12 RX ORDER — MEPERIDINE HYDROCHLORIDE 50 MG/ML
12.5 INJECTION INTRAMUSCULAR; INTRAVENOUS; SUBCUTANEOUS PRN
OUTPATIENT
Start: 2025-03-12

## 2025-03-12 RX ORDER — EPINEPHRINE 1 MG/ML
0.3 INJECTION, SOLUTION, CONCENTRATE INTRAVENOUS PRN
OUTPATIENT
Start: 2025-03-12

## 2025-03-12 RX ORDER — ONDANSETRON 4 MG/1
4 TABLET, FILM COATED ORAL EVERY 6 HOURS PRN
Qty: 30 TABLET | Refills: 5 | Status: SHIPPED | OUTPATIENT
Start: 2025-03-12

## 2025-03-12 RX ORDER — FAMOTIDINE 10 MG/ML
20 INJECTION, SOLUTION INTRAVENOUS
OUTPATIENT
Start: 2025-03-12

## 2025-03-12 RX ORDER — SODIUM CHLORIDE 9 MG/ML
5-250 INJECTION, SOLUTION INTRAVENOUS PRN
OUTPATIENT
Start: 2025-03-12

## 2025-03-12 RX ORDER — SODIUM CHLORIDE 9 MG/ML
5-250 INJECTION, SOLUTION INTRAVENOUS PRN
OUTPATIENT
Start: 2025-03-14

## 2025-03-12 RX ORDER — ALBUTEROL SULFATE 90 UG/1
4 INHALANT RESPIRATORY (INHALATION) PRN
OUTPATIENT
Start: 2025-03-12

## 2025-03-12 RX ORDER — HEPARIN SODIUM (PORCINE) LOCK FLUSH IV SOLN 100 UNIT/ML 100 UNIT/ML
500 SOLUTION INTRAVENOUS PRN
Status: CANCELLED | OUTPATIENT
Start: 2025-03-14

## 2025-03-12 RX ORDER — DEXTROSE MONOHYDRATE 50 MG/ML
5-250 INJECTION, SOLUTION INTRAVENOUS PRN
OUTPATIENT
Start: 2025-03-12

## 2025-03-12 RX ORDER — SODIUM CHLORIDE 0.9 % (FLUSH) 0.9 %
5-40 SYRINGE (ML) INJECTION PRN
OUTPATIENT
Start: 2025-03-12

## 2025-03-12 RX ORDER — HEPARIN SODIUM (PORCINE) LOCK FLUSH IV SOLN 100 UNIT/ML 100 UNIT/ML
500 SOLUTION INTRAVENOUS PRN
OUTPATIENT
Start: 2025-03-12

## 2025-03-12 RX ORDER — DEXAMETHASONE SODIUM PHOSPHATE 10 MG/ML
10 INJECTION, SOLUTION INTRAMUSCULAR; INTRAVENOUS ONCE
Status: COMPLETED | OUTPATIENT
Start: 2025-03-12 | End: 2025-03-12

## 2025-03-12 RX ORDER — HYDROCORTISONE SODIUM SUCCINATE 100 MG/2ML
100 INJECTION INTRAMUSCULAR; INTRAVENOUS
OUTPATIENT
Start: 2025-03-12

## 2025-03-12 RX ORDER — PALONOSETRON 0.05 MG/ML
0.25 INJECTION, SOLUTION INTRAVENOUS ONCE
Status: COMPLETED | OUTPATIENT
Start: 2025-03-12 | End: 2025-03-12

## 2025-03-12 RX ORDER — SODIUM CHLORIDE 9 MG/ML
INJECTION, SOLUTION INTRAVENOUS CONTINUOUS
OUTPATIENT
Start: 2025-03-12

## 2025-03-12 RX ORDER — SODIUM CHLORIDE 0.9 % (FLUSH) 0.9 %
5-40 SYRINGE (ML) INJECTION PRN
Status: CANCELLED | OUTPATIENT
Start: 2025-03-14

## 2025-03-12 RX ADMIN — LEUCOVORIN CALCIUM 200 MG: 200 INJECTION, POWDER, LYOPHILIZED, FOR SUSPENSION INTRAMUSCULAR; INTRAVENOUS at 13:25

## 2025-03-12 RX ADMIN — PALONOSETRON 0.25 MG: 0.05 INJECTION, SOLUTION INTRAVENOUS at 12:28

## 2025-03-12 RX ADMIN — DEXAMETHASONE SODIUM PHOSPHATE 10 MG: 10 INJECTION, SOLUTION INTRAMUSCULAR; INTRAVENOUS at 12:28

## 2025-03-12 RX ADMIN — FLUOROURACIL 3800 MG: 50 INJECTION, SOLUTION INTRAVENOUS at 14:59

## 2025-03-12 RX ADMIN — OXALIPLATIN 110 MG: 5 INJECTION, SOLUTION INTRAVENOUS at 12:52

## 2025-03-13 ENCOUNTER — HOSPITAL ENCOUNTER (OUTPATIENT)
Dept: WOUND CARE | Age: 75
Discharge: HOME | End: 2025-03-13
Attending: NURSE PRACTITIONER
Payer: MEDICARE

## 2025-03-13 VITALS
TEMPERATURE: 97.9 F | WEIGHT: 159 LBS | SYSTOLIC BLOOD PRESSURE: 163 MMHG | HEIGHT: 70 IN | RESPIRATION RATE: 18 BRPM | DIASTOLIC BLOOD PRESSURE: 84 MMHG | HEART RATE: 70 BPM | BODY MASS INDEX: 22.76 KG/M2

## 2025-03-13 DIAGNOSIS — L98.492 ABDOMINAL WALL SKIN ULCER, WITH FAT LAYER EXPOSED (HCC): Primary | ICD-10-CM

## 2025-03-13 PROCEDURE — 97597 DBRDMT OPN WND 1ST 20 CM/<: CPT

## 2025-03-13 PROCEDURE — 97597 DBRDMT OPN WND 1ST 20 CM/<: CPT | Performed by: SURGERY

## 2025-03-13 ASSESSMENT — PAIN SCALES - GENERAL
PAINLEVEL_OUTOF10: 0
PAINLEVEL_OUTOF10: 0

## 2025-03-13 ASSESSMENT — PAIN DESCRIPTION - PAIN TYPE: TYPE: OTHER (COMMENT)

## 2025-03-13 ASSESSMENT — PAIN DESCRIPTION - DESCRIPTORS: DESCRIPTORS: OTHER (COMMENT)

## 2025-03-13 ASSESSMENT — PAIN DESCRIPTION - ORIENTATION: ORIENTATION: OTHER (COMMENT)

## 2025-03-13 ASSESSMENT — PAIN - FUNCTIONAL ASSESSMENT: PAIN_FUNCTIONAL_ASSESSMENT: PREVENTS OR INTERFERES SOME ACTIVE ACTIVITIES AND ADLS

## 2025-03-13 ASSESSMENT — PAIN DESCRIPTION - LOCATION: LOCATION: OTHER (COMMENT)

## 2025-03-13 NOTE — PATIENT INSTRUCTIONS
[facility] Oxygen Therapy   Physician Orders and Discharge Instructions  [address] [place] [address]  [address]  [place] [address]  Telephone: [phone]      FAX [phone]    NAME:  Damian You  YOB: 1950  MEDICAL RECORD NUMBER:  [id number]  DATE:  3/13/2025    Discharge condition: Stable    Discharge to: Home    Left via:Private automobile    Accompanied by:  family member    [facility] - Please order the wafer to go with the pouch thank you.      Dressing Orders:Umbulicus:   Soap and water wash, Hydrofera Blue moisten with saline, cut small piece to fit in the wound at the  change dressing daily            Treatment Orders:    Change Pouch and Wafer: Every 3-5 days and as needed for leaking   Supplies:     SenSura [place] Flex flat #[id number] (3/8-2-11/16\" YELLOW)  Sensura John Flex Drainable Pouch #60159 YELLOW  CeraRIng Slim [place] #[id number]    Brava Powder #[id number]  Brava Elastic Barrier Strips #[id number]  Adapt Ostomy Pouch Lubricating Deodorant Ref#[id number]    Hytape Ref #[id number]    Hydrofera Blue moisten with saline, cut small piece to fit in the wound at the umbulicus change dressing daily    Antifungal powder to abdominal skin fold yeasty areas apply twice daily      Gather supplies needed to change pouch and wafer.     Gently remove your old pouch     Look at the back of pouch before throwing away to see how it has worn.    Wash the skin around your stoma with water. Pat completely dry.    Use the measuring guide to measure your stoma size or use the old pattern to trace correct size opening on plastic backing of wafer.     Cut out the opening and remove the plastic backing from the wafer.      Optional - Apply Protective Barrier to skin around the stoma or to the back of the wafer.            Apply Hyrofera Blue ostomy dressing to open area at 9 o'clock by the stoma, cover with CeraRing Slim, then place ostomy wafer as directred       Apply your

## 2025-03-13 NOTE — PLAN OF CARE
Problem: Wound:  Goal: Will show signs of wound healing; wound closure and no evidence of infection  Description: Will show signs of wound healing; wound closure and no evidence of infection  Outcome: Progressing     Problem: Pressure Ulcer:  Goal: Signs of wound healing will improve  Description: Signs of wound healing will improve  Outcome: Progressing  Goal: Absence of new pressure ulcer  Description: Absence of new pressure ulcer  Outcome: Progressing  Goal: Will show no infection signs and symptoms  Description: Will show no infection signs and symptoms  Outcome: Progressing     Problem: Falls - Risk of:  Goal: Will remain free from falls  Description: Will remain free from falls  Outcome: Progressing

## 2025-03-13 NOTE — PROGRESS NOTES
[facility]   Progress Note and Procedure Note      Damian You  MEDICAL RECORD NUMBER:  [id number]  AGE: 74 y.o.   GENDER: male  : 1950  EPISODE DATE:  3/13/2025    Subjective:     Chief Complaint   Patient presents with    Wound Check     Colostomy, wound check         HISTORY of PRESENT ILLNESS HPI     Damian You is a 74 y.o. male who presents today for wound/ulcer evaluation.   Wound Context: Pt with abdominal surgical wound here for eval/treat  Wound/Ulcer Pain Timing/Severity: none  Quality of pain: N/A  Severity:  0 / 10   Modifying Factors: None  Associated Signs/Symptoms: none    Ulcer Identification:  Ulcer Type: non-healing surgical  Contributing Factors: none    Wound:  surgical        PAST MEDICAL HISTORY        Diagnosis Date    CAD (coronary artery disease)     Gout     Hypertension     Hypotension 2020    Non-ST elevation MI (NSTEMI) ([facility]) 14    Palliative care patient 2020    Pneumonia due to infectious organism 2020       PAST SURGICAL HISTORY    Past Surgical History:   Procedure Laterality Date    CARDIAC CATHETERIZATION  14  [facility]    angioplasty, stent to LAD. EF 45%    CHOLECYSTECTOMY      FEMUR FRACTURE SURGERY Right 10/30/2020    TFN, SHORT performed by Manuelito Biggs MD at [facility] OR    LAPAROTOMY N/A 2025    EXPLORATORY LAPAROTOMY, CECOSTOMY, LOOP COLOSTOMY, LYSIS OF ADHESIONS performed by Toro Gutiérrez MD at [facility] OR    PORT SURGERY Right 2025    PORT INSERTION performed by Amberly Lewis MD at [facility] OR    SIGMOIDOSCOPY N/A 02/10/2025    Dr Murray-5 mm stricture noted at 20 cm in the rectosigmoid colon, tissue just proximal to the stricture was friable and erythematous-Invasive moderately differentiated adenocarcinoma       FAMILY HISTORY    Family History   Problem Relation Age of Onset    No Known Problems Mother     Heart Disease Father        SOCIAL HISTORY    Social History     Tobacco Use    Smoking status:

## 2025-03-14 ENCOUNTER — HOSPITAL ENCOUNTER (OUTPATIENT)
Dept: INFUSION THERAPY | Age: 75
Discharge: HOME | End: 2025-03-14
Payer: MEDICARE

## 2025-03-14 DIAGNOSIS — Z11.59 ENCOUNTER FOR SCREENING FOR OTHER VIRAL DISEASES: ICD-10-CM

## 2025-03-14 DIAGNOSIS — C18.9 ADENOCARCINOMA OF COLON: Primary | ICD-10-CM

## 2025-03-14 LAB — HBV CORE AB SERPL QL IA: NEGATIVE

## 2025-03-14 PROCEDURE — 96523 IRRIG DRUG DELIVERY DEVICE: CPT

## 2025-03-14 PROCEDURE — 6360000002 HC RX W HCPCS: Performed by: INTERNAL MEDICINE

## 2025-03-14 PROCEDURE — 2500000003 HC RX 250 WO HCPCS: Performed by: INTERNAL MEDICINE

## 2025-03-14 RX ORDER — HEPARIN 100 UNIT/ML
500 SYRINGE INTRAVENOUS PRN
Status: DISCONTINUED | OUTPATIENT
Start: 2025-03-14 | End: 2025-03-15 | Stop reason: HOSPADM

## 2025-03-14 RX ORDER — SODIUM CHLORIDE 0.9 % (FLUSH) 0.9 %
5-40 SYRINGE (ML) INJECTION PRN
Status: DISCONTINUED | OUTPATIENT
Start: 2025-03-14 | End: 2025-03-15 | Stop reason: HOSPADM

## 2025-03-14 RX ADMIN — SODIUM CHLORIDE, PRESERVATIVE FREE 10 ML: 5 INJECTION INTRAVENOUS at 12:27

## 2025-03-14 RX ADMIN — HEPARIN 500 UNITS: 100 SYRINGE at 12:27

## 2025-03-17 ENCOUNTER — TELEPHONE (OUTPATIENT)
Dept: WOUND CARE | Age: 75
End: 2025-03-17

## 2025-03-17 NOTE — TELEPHONE ENCOUNTER
Have spoken to [employer] this morning, stated they were running out of ostomy supplies as appliance had popped off this weekend several times for various reasons.   Call placed to [facility] and spoke with Amberly [phone] r/t to supplies, stated they did get some supplies and additional supplies will be arriving on Wednesday. Discussed patient needs to be encouraged appliance himself, he has not been changing per self, house keeper or family has been changing. [facility] seeing for wound care as well, but will not be able to continue re: ostomy care. Patient follows up at the [facility] for ostomy care and we will continue to work with patient. Had discussed with [employer] earlier the long term plans for care and Cassie pritchard had stated the family and  would be taking care of patient.  Amberly COSTELLO stated she had ordered Switchback for him and this seemed to work better, [employer] had stated  the Daylin brand seemed to stick better.    Plan to use:  Daylin 94262- 2 3/4\" Flat wafer  978482- 2 3/4\" pouch  71856 Barrier extenders  47728 Lubricating Deodorant    Discussed with Amberly COSTELLO and Cassie pritchard will plan to use soft convexity, 1 piece and precut when acceptable to order this, this may be easier for patient to manage on his own.

## 2025-03-24 NOTE — PROGRESS NOTES
Damian You ( 1950) is a 74 y.o. male, Established , here for evaluation of the following chief complaint(s).  6 Month Follow-Up      Patient was encouraged and advised to be compliant with all  medications leads an active lifestyle and promote maintaining a healthy weight, encouraged not to use cigarettes, laboratory results discussed and reviewed with patient's during this visit       Assessment & Plan  Hammer toes of both feet       Orders:    External Referral To Podiatry    Chronic systolic heart failure (HCC)            Paroxysmal atrial fibrillation (HCC)   Monitored by specialist- no acute findings meriting change in the plan         Coronary artery disease involving native heart with angina pectoris, unspecified vessel or lesion type   Monitored by specialist- no acute findings meriting change in the plan         Essential hypertension   Chronic, at goal (stable), continue current treatment plan         Non-ischemic cardiomyopathy (HCC)   Monitored by specialist- no acute findings meriting change in the plan         Adenocarcinoma of colon   Monitored by specialist- no acute findings meriting change in the plan  Is currently          Coronary artery disease involving native coronary artery of native heart without angina pectoris   Monitored by specialist- no acute findings meriting change in the plan         Gastroesophageal reflux disease with esophagitis without hemorrhage   Chronic, at goal (stable), continue current treatment plan         CKD stage 3a, GFR 45-59 ml/min (HCC)   Chronic, at goal (stable), continue current treatment plan         Mixed hyperlipidemia   Chronic, at goal (stable), continue current treatment plan                  No data to display                    2025     9:29 AM 2024    10:42 AM 3/25/2024     9:08 AM 2023    10:03 AM 3/9/2023     9:15 AM   PHQ Scores   PHQ2 Score 0 0 0 0 0   PHQ9 Score 0 0 0 0 0       Results for orders placed or performed

## 2025-03-25 ENCOUNTER — OFFICE VISIT (OUTPATIENT)
Dept: PRIMARY CARE CLINIC | Age: 75
End: 2025-03-25
Payer: MEDICARE

## 2025-03-25 VITALS
HEART RATE: 87 BPM | TEMPERATURE: 97 F | DIASTOLIC BLOOD PRESSURE: 84 MMHG | SYSTOLIC BLOOD PRESSURE: 136 MMHG | OXYGEN SATURATION: 98 % | BODY MASS INDEX: 22.33 KG/M2 | HEIGHT: 70 IN | RESPIRATION RATE: 17 BRPM | WEIGHT: 156 LBS

## 2025-03-25 DIAGNOSIS — I42.8 NON-ISCHEMIC CARDIOMYOPATHY (HCC): ICD-10-CM

## 2025-03-25 DIAGNOSIS — E78.2 MIXED HYPERLIPIDEMIA: ICD-10-CM

## 2025-03-25 DIAGNOSIS — I48.0 PAROXYSMAL ATRIAL FIBRILLATION (HCC): ICD-10-CM

## 2025-03-25 DIAGNOSIS — I25.119 CORONARY ARTERY DISEASE INVOLVING NATIVE HEART WITH ANGINA PECTORIS, UNSPECIFIED VESSEL OR LESION TYPE: ICD-10-CM

## 2025-03-25 DIAGNOSIS — N18.31 CKD STAGE 3A, GFR 45-59 ML/MIN (HCC): ICD-10-CM

## 2025-03-25 DIAGNOSIS — I25.10 CORONARY ARTERY DISEASE INVOLVING NATIVE CORONARY ARTERY OF NATIVE HEART WITHOUT ANGINA PECTORIS: ICD-10-CM

## 2025-03-25 DIAGNOSIS — I10 ESSENTIAL HYPERTENSION: ICD-10-CM

## 2025-03-25 DIAGNOSIS — I50.22 CHRONIC SYSTOLIC HEART FAILURE (HCC): ICD-10-CM

## 2025-03-25 DIAGNOSIS — M20.41 HAMMER TOES OF BOTH FEET: Primary | ICD-10-CM

## 2025-03-25 DIAGNOSIS — C18.9 ADENOCARCINOMA OF COLON: ICD-10-CM

## 2025-03-25 DIAGNOSIS — K21.00 GASTROESOPHAGEAL REFLUX DISEASE WITH ESOPHAGITIS WITHOUT HEMORRHAGE: ICD-10-CM

## 2025-03-25 DIAGNOSIS — M20.42 HAMMER TOES OF BOTH FEET: Primary | ICD-10-CM

## 2025-03-25 PROCEDURE — 3079F DIAST BP 80-89 MM HG: CPT | Performed by: INTERNAL MEDICINE

## 2025-03-25 PROCEDURE — G8427 DOCREV CUR MEDS BY ELIG CLIN: HCPCS | Performed by: INTERNAL MEDICINE

## 2025-03-25 PROCEDURE — 3075F SYST BP GE 130 - 139MM HG: CPT | Performed by: INTERNAL MEDICINE

## 2025-03-25 PROCEDURE — 1123F ACP DISCUSS/DSCN MKR DOCD: CPT | Performed by: INTERNAL MEDICINE

## 2025-03-25 PROCEDURE — 1036F TOBACCO NON-USER: CPT | Performed by: INTERNAL MEDICINE

## 2025-03-25 PROCEDURE — 1159F MED LIST DOCD IN RCRD: CPT | Performed by: INTERNAL MEDICINE

## 2025-03-25 PROCEDURE — 3017F COLORECTAL CA SCREEN DOC REV: CPT | Performed by: INTERNAL MEDICINE

## 2025-03-25 PROCEDURE — G8420 CALC BMI NORM PARAMETERS: HCPCS | Performed by: INTERNAL MEDICINE

## 2025-03-25 PROCEDURE — 99214 OFFICE O/P EST MOD 30 MIN: CPT | Performed by: INTERNAL MEDICINE

## 2025-03-25 ASSESSMENT — ENCOUNTER SYMPTOMS
CONSTIPATION: 0
DIARRHEA: 0
BLOOD IN STOOL: 0
ABDOMINAL PAIN: 0
STRIDOR: 0
COLOR CHANGE: 0
SHORTNESS OF BREATH: 0
ROS SKIN COMMENTS: DRY SKIN
TROUBLE SWALLOWING: 0
WHEEZING: 0
SORE THROAT: 0
BACK PAIN: 0
COUGH: 0
CHEST TIGHTNESS: 0

## 2025-03-26 ENCOUNTER — HOSPITAL ENCOUNTER (OUTPATIENT)
Dept: WOUND CARE | Age: 75
Discharge: HOME | End: 2025-03-26
Attending: NURSE PRACTITIONER
Payer: MEDICARE

## 2025-03-26 ENCOUNTER — HOSPITAL ENCOUNTER (OUTPATIENT)
Dept: INFUSION THERAPY | Age: 75
Discharge: HOME | End: 2025-03-26
Payer: MEDICARE

## 2025-03-26 VITALS
TEMPERATURE: 97.3 F | BODY MASS INDEX: 21.9 KG/M2 | WEIGHT: 153 LBS | HEIGHT: 70 IN | OXYGEN SATURATION: 100 % | HEART RATE: 56 BPM | SYSTOLIC BLOOD PRESSURE: 131 MMHG | RESPIRATION RATE: 18 BRPM | DIASTOLIC BLOOD PRESSURE: 69 MMHG

## 2025-03-26 VITALS
HEIGHT: 70 IN | SYSTOLIC BLOOD PRESSURE: 152 MMHG | DIASTOLIC BLOOD PRESSURE: 79 MMHG | HEART RATE: 63 BPM | BODY MASS INDEX: 22.33 KG/M2 | TEMPERATURE: 96.6 F | RESPIRATION RATE: 18 BRPM | WEIGHT: 156 LBS

## 2025-03-26 DIAGNOSIS — L98.492 ABDOMINAL WALL SKIN ULCER, WITH FAT LAYER EXPOSED (HCC): Primary | ICD-10-CM

## 2025-03-26 DIAGNOSIS — E83.42 HYPOMAGNESEMIA: Primary | ICD-10-CM

## 2025-03-26 DIAGNOSIS — E87.6 HYPOKALEMIA: ICD-10-CM

## 2025-03-26 DIAGNOSIS — R19.7 DIARRHEA, UNSPECIFIED TYPE: Primary | ICD-10-CM

## 2025-03-26 DIAGNOSIS — C18.9 ADENOCARCINOMA OF COLON: Primary | ICD-10-CM

## 2025-03-26 DIAGNOSIS — S31.819A BUTTOCK WOUND, RIGHT, INITIAL ENCOUNTER: Chronic | ICD-10-CM

## 2025-03-26 DIAGNOSIS — C18.9 ADENOCARCINOMA OF COLON: ICD-10-CM

## 2025-03-26 LAB
ALBUMIN SERPL-MCNC: 3.5 G/DL (ref 3.5–5.2)
ALP SERPL-CCNC: 150 U/L (ref 40–129)
ALT SERPL-CCNC: 14 U/L (ref 5–41)
ANION GAP SERPL CALCULATED.3IONS-SCNC: 16 MMOL/L (ref 7–19)
AST SERPL-CCNC: 22 U/L (ref 5–40)
BASOPHILS # BLD: 0.04 K/UL (ref 0–0.2)
BASOPHILS NFR BLD: 0.4 % (ref 0–1)
BILIRUB SERPL-MCNC: 0.6 MG/DL (ref 0–1.2)
BUN SERPL-MCNC: 42 MG/DL (ref 8–23)
CALCIUM SERPL-MCNC: 8.6 MG/DL (ref 8.8–10.2)
CHLORIDE SERPL-SCNC: 98 MMOL/L (ref 98–107)
CO2 SERPL-SCNC: 19 MMOL/L (ref 22–29)
CREAT SERPL-MCNC: 2.2 MG/DL (ref 0.7–1.2)
EOSINOPHIL # BLD: 0.09 K/UL (ref 0–0.6)
EOSINOPHIL NFR BLD: 1 % (ref 0–5)
ERYTHROCYTE [DISTWIDTH] IN BLOOD BY AUTOMATED COUNT: 15 % (ref 11.5–14.5)
GLUCOSE SERPL-MCNC: 79 MG/DL (ref 70–99)
HCT VFR BLD AUTO: 35.8 % (ref 42–52)
HGB BLD-MCNC: 12.3 G/DL (ref 14–18)
LYMPHOCYTES # BLD: 1.24 K/UL (ref 1.1–4.5)
LYMPHOCYTES NFR BLD: 13.6 % (ref 20–40)
MAGNESIUM SERPL-MCNC: 1.3 MG/DL (ref 1.6–2.4)
MCH RBC QN AUTO: 28.6 PG (ref 27–31)
MCHC RBC AUTO-ENTMCNC: 34.4 G/DL (ref 33–37)
MCV RBC AUTO: 83.3 FL (ref 80–94)
MONOCYTES # BLD: 0.83 K/UL (ref 0–0.9)
MONOCYTES NFR BLD: 9.1 % (ref 1–10)
NEUTROPHILS # BLD: 6.87 K/UL (ref 1.5–7.5)
NEUTS SEG NFR BLD: 75.5 % (ref 50–65)
PHOSPHATE SERPL-MCNC: 1.6 MG/DL (ref 2.5–4.5)
PLATELET # BLD AUTO: 240 K/UL (ref 130–400)
PMV BLD AUTO: 8.7 FL (ref 9.4–12.4)
POTASSIUM SERPL-SCNC: 2.7 MMOL/L (ref 3.5–5.1)
PROT SERPL-MCNC: 7 G/DL (ref 6.4–8.3)
RBC # BLD AUTO: 4.3 M/UL (ref 4.7–6.1)
SODIUM SERPL-SCNC: 133 MMOL/L (ref 136–145)
WBC # BLD AUTO: 9.11 K/UL (ref 4.8–10.8)

## 2025-03-26 PROCEDURE — 2500000003 HC RX 250 WO HCPCS: Performed by: INTERNAL MEDICINE

## 2025-03-26 PROCEDURE — 96366 THER/PROPH/DIAG IV INF ADDON: CPT

## 2025-03-26 PROCEDURE — 83735 ASSAY OF MAGNESIUM: CPT

## 2025-03-26 PROCEDURE — 36415 COLL VENOUS BLD VENIPUNCTURE: CPT

## 2025-03-26 PROCEDURE — 97597 DBRDMT OPN WND 1ST 20 CM/<: CPT | Performed by: SURGERY

## 2025-03-26 PROCEDURE — 84100 ASSAY OF PHOSPHORUS: CPT

## 2025-03-26 PROCEDURE — 96361 HYDRATE IV INFUSION ADD-ON: CPT

## 2025-03-26 PROCEDURE — 80053 COMPREHEN METABOLIC PANEL: CPT

## 2025-03-26 PROCEDURE — 85025 COMPLETE CBC W/AUTO DIFF WBC: CPT

## 2025-03-26 PROCEDURE — 96365 THER/PROPH/DIAG IV INF INIT: CPT

## 2025-03-26 PROCEDURE — 97597 DBRDMT OPN WND 1ST 20 CM/<: CPT

## 2025-03-26 PROCEDURE — 96368 THER/DIAG CONCURRENT INF: CPT

## 2025-03-26 PROCEDURE — 6360000002 HC RX W HCPCS: Performed by: INTERNAL MEDICINE

## 2025-03-26 RX ORDER — LIDOCAINE HYDROCHLORIDE 20 MG/ML
JELLY TOPICAL PRN
OUTPATIENT
Start: 2025-03-26

## 2025-03-26 RX ORDER — NEOMYCIN/BACITRACIN/POLYMYXINB 3.5-400-5K
OINTMENT (GRAM) TOPICAL PRN
OUTPATIENT
Start: 2025-03-26

## 2025-03-26 RX ORDER — SODIUM CHLORIDE 9 MG/ML
5-250 INJECTION, SOLUTION INTRAVENOUS PRN
Status: DISCONTINUED | OUTPATIENT
Start: 2025-03-26 | End: 2025-03-27 | Stop reason: HOSPADM

## 2025-03-26 RX ORDER — MUPIROCIN 20 MG/G
OINTMENT TOPICAL PRN
OUTPATIENT
Start: 2025-03-26

## 2025-03-26 RX ORDER — SODIUM CHLORIDE 0.9 % (FLUSH) 0.9 %
5-40 SYRINGE (ML) INJECTION PRN
Status: CANCELLED | OUTPATIENT
Start: 2025-03-26

## 2025-03-26 RX ORDER — BACITRACIN ZINC AND POLYMYXIN B SULFATE 500; 1000 [USP'U]/G; [USP'U]/G
OINTMENT TOPICAL PRN
OUTPATIENT
Start: 2025-03-26

## 2025-03-26 RX ORDER — DIPHENOXYLATE HYDROCHLORIDE AND ATROPINE SULFATE 2.5; .025 MG/1; MG/1
1 TABLET ORAL SEE ADMIN INSTRUCTIONS
Qty: 30 TABLET | Refills: 3 | Status: SHIPPED | OUTPATIENT
Start: 2025-03-26 | End: 2025-04-05

## 2025-03-26 RX ORDER — LIDOCAINE HYDROCHLORIDE 40 MG/ML
SOLUTION TOPICAL PRN
OUTPATIENT
Start: 2025-03-26

## 2025-03-26 RX ORDER — TRIAMCINOLONE ACETONIDE 1 MG/G
OINTMENT TOPICAL PRN
OUTPATIENT
Start: 2025-03-26

## 2025-03-26 RX ORDER — BETAMETHASONE DIPROPIONATE 0.5 MG/G
CREAM TOPICAL PRN
OUTPATIENT
Start: 2025-03-26

## 2025-03-26 RX ORDER — GENTAMICIN SULFATE 1 MG/G
OINTMENT TOPICAL PRN
OUTPATIENT
Start: 2025-03-26

## 2025-03-26 RX ORDER — SODIUM CHLORIDE 0.9 % (FLUSH) 0.9 %
5-40 SYRINGE (ML) INJECTION PRN
Status: DISCONTINUED | OUTPATIENT
Start: 2025-03-26 | End: 2025-03-27 | Stop reason: HOSPADM

## 2025-03-26 RX ORDER — HEPARIN 100 UNIT/ML
500 SYRINGE INTRAVENOUS PRN
Status: DISCONTINUED | OUTPATIENT
Start: 2025-03-26 | End: 2025-03-27 | Stop reason: HOSPADM

## 2025-03-26 RX ORDER — POTASSIUM CHLORIDE 29.8 MG/ML
20 INJECTION INTRAVENOUS
Status: CANCELLED | OUTPATIENT
Start: 2025-03-26

## 2025-03-26 RX ORDER — SODIUM CHLORIDE 9 MG/ML
5-250 INJECTION, SOLUTION INTRAVENOUS PRN
OUTPATIENT
Start: 2025-03-26

## 2025-03-26 RX ORDER — MAGNESIUM SULFATE IN WATER 40 MG/ML
2000 INJECTION, SOLUTION INTRAVENOUS ONCE
Status: COMPLETED | OUTPATIENT
Start: 2025-03-26 | End: 2025-03-26

## 2025-03-26 RX ORDER — LIDOCAINE 40 MG/G
CREAM TOPICAL PRN
OUTPATIENT
Start: 2025-03-26

## 2025-03-26 RX ORDER — HEPARIN 100 UNIT/ML
500 SYRINGE INTRAVENOUS PRN
Status: CANCELLED | OUTPATIENT
Start: 2025-03-26

## 2025-03-26 RX ORDER — SODIUM CHLOR/HYPOCHLOROUS ACID 0.033 %
SOLUTION, IRRIGATION IRRIGATION PRN
OUTPATIENT
Start: 2025-03-26

## 2025-03-26 RX ORDER — POTASSIUM CHLORIDE 29.8 MG/ML
20 INJECTION INTRAVENOUS
Status: COMPLETED | OUTPATIENT
Start: 2025-03-26 | End: 2025-03-26

## 2025-03-26 RX ORDER — MAGNESIUM SULFATE IN WATER 40 MG/ML
2000 INJECTION, SOLUTION INTRAVENOUS ONCE
Status: CANCELLED | OUTPATIENT
Start: 2025-03-26 | End: 2025-03-26

## 2025-03-26 RX ORDER — LIDOCAINE HYDROCHLORIDE 20 MG/ML
JELLY TOPICAL PRN
Status: DISCONTINUED | OUTPATIENT
Start: 2025-03-26 | End: 2025-03-28 | Stop reason: HOSPADM

## 2025-03-26 RX ORDER — LIDOCAINE 50 MG/G
OINTMENT TOPICAL PRN
OUTPATIENT
Start: 2025-03-26

## 2025-03-26 RX ORDER — GINSENG 100 MG
CAPSULE ORAL PRN
OUTPATIENT
Start: 2025-03-26

## 2025-03-26 RX ORDER — CLOBETASOL PROPIONATE 0.5 MG/G
OINTMENT TOPICAL PRN
OUTPATIENT
Start: 2025-03-26

## 2025-03-26 RX ORDER — SILVER SULFADIAZINE 10 MG/G
CREAM TOPICAL PRN
OUTPATIENT
Start: 2025-03-26

## 2025-03-26 RX ADMIN — LIDOCAINE HYDROCHLORIDE: 20 JELLY TOPICAL at 09:36

## 2025-03-26 RX ADMIN — SODIUM CHLORIDE 250 ML/HR: 9 INJECTION, SOLUTION INTRAVENOUS at 12:39

## 2025-03-26 RX ADMIN — SODIUM CHLORIDE, PRESERVATIVE FREE 10 ML: 5 INJECTION INTRAVENOUS at 14:45

## 2025-03-26 RX ADMIN — MAGNESIUM SULFATE HEPTAHYDRATE 2000 MG: 40 INJECTION, SOLUTION INTRAVENOUS at 12:46

## 2025-03-26 RX ADMIN — HEPARIN 500 UNITS: 100 SYRINGE at 14:45

## 2025-03-26 RX ADMIN — POTASSIUM CHLORIDE 20 MEQ: 29.8 INJECTION, SOLUTION INTRAVENOUS at 12:44

## 2025-03-26 RX ADMIN — POTASSIUM CHLORIDE 20 MEQ: 29.8 INJECTION, SOLUTION INTRAVENOUS at 13:28

## 2025-03-26 ASSESSMENT — PAIN - FUNCTIONAL ASSESSMENT: PAIN_FUNCTIONAL_ASSESSMENT: PREVENTS OR INTERFERES SOME ACTIVE ACTIVITIES AND ADLS

## 2025-03-26 ASSESSMENT — PAIN SCALES - GENERAL: PAINLEVEL_OUTOF10: 0

## 2025-03-26 NOTE — PATIENT INSTRUCTIONS
[facility] Wound Care and Hyperbaric Oxygen Therapy   Physician Orders and Discharge Instructions  [address] [place] [address]  [address]  [place] [address]  Telephone: [phone]      FAX [phone]    NAME:  Damian You  YOB: 1950  MEDICAL RECORD NUMBER:  [id number]  DATE:  3/26/2025    Discharge condition: Stable    Discharge to: Home    Left via:Private automobile    Accompanied by: Family     ECF/HHA: [facility]     Dressing Orders: Buttocks Wounds   Soap and water wash, Cover with Bordered Gauze, Change every other day and as needed   Sit on pillow anytime when sitting     Dressing Orders: Abdomen Wound  Hydrofera Blue moisten with saline, cut small piece to fit in the wound at the  umbulicus change dressing daily  Antifungal powder to abdominal skin fold yeasty areas apply twice daily  High protein diet unless restricted by your Family Practitioner     Treatment Orders:  Change Pouch and Wafer: Every 3-5 days and as needed for leaking  Supplies:  SenSura [place] Flex flat #60420 (3/8-2-11/16\" YELLOW)  Sensura John Flex Drainable Pouch #[id number] YELLOW  CeraRIng Slim Daylin #[id number]  Brava Powder #[id number]  Brava Elastic Barrier Strips #[id number]  Adapt Ostomy Pouch Lubricating Deodorant Ref#[id number]  Hytape Ref #[id number]    Gather supplies needed to change pouch and wafer.  Gently remove your old pouch  Look at the back of pouch before throwing away to see how it has worn.  Wash the skin around your stoma with water. Pat completely dry.  Use the measuring guide to measure your stoma size or use the old pattern to  trace correct size opening on plastic backing of wafer.  Cut out the opening and remove the plastic backing from the wafer.  Optional - Apply Protective Barrier to skin around the stoma or to the back of the wafer.  Apply Hyrofera Blue ostomy dressing to open area at 9 o'clock by the stoma,  cover with CeraRing Slim, then place ostomy wafer as directred    Apply your wafer by

## 2025-03-26 NOTE — PROGRESS NOTES
Labs discussed with Dr. Boswell.  We will give IV KCL and Mag today and tomorrow along with IVF's slowly due to hx of CHFfor creatinine of 2.2.  He will get K-Phos tomorrow IV.  TX will be delayed 1 week.  We will call in Lomotil due to diarrhea.

## 2025-03-26 NOTE — PLAN OF CARE
Problem: Falls - Risk of:  Goal: Will remain free from falls  Description: Will remain free from falls  Outcome: Progressing     Problem: Pressure Ulcer:  Goal: Signs of wound healing will improve  Description: Signs of wound healing will improve  Outcome: Progressing  Goal: Absence of new pressure ulcer  Description: Absence of new pressure ulcer  Outcome: Progressing  Goal: Will show no infection signs and symptoms  Description: Will show no infection signs and symptoms  Outcome: Progressing     Problem: Wound:  Goal: Will show signs of wound healing; wound closure and no evidence of infection  Description: Will show signs of wound healing; wound closure and no evidence of infection  Outcome: Progressing

## 2025-03-26 NOTE — PROGRESS NOTES
Pink/red;Slough 03/26/25 0939   Drainage Amount Small (< 25%) 03/26/25 0939   Drainage Description Serosanguinous 03/26/25 0939   Odor None 03/26/25 0939   Zenobia-wound Assessment Blanchable erythema 03/26/25 0939   Margins Attached edges 03/26/25 0939   Number of days: 0             Estimated Blood Loss:  Minimal    Hemostasis Achieved:  by pressure and by silver nitrate stick    Procedural Pain:  0  / 10     Post Procedural Pain:  0 / 10     Response to treatment:  Well tolerated by patient.         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             Electronically signed by Yuniel Aponte MD on 3/26/2025 at 10:14 AM

## 2025-03-26 NOTE — WOUND CARE
[facility] Outpatient   Ostomy Note      NAME:  Damian You  MEDICAL RECORD NUMBER:  [id number]  AGE: 74 y.o.   GENDER:  male  :  1950  TODAY'S DATE:  3/26/2025    Subjective       Chief Complaint   Patient presents with    Wound Check     Colostomy check too         HISTORY of PRESENT ILLNESS HPI    Damian You is a 74 y.o. male Established patient and caregiver, who presents today for ostomy/stoma evaluation.   History of Ostomy Context:  EXPLORATORY LAPAROTOMY, CECOSTOMY, LOOP COLOSTOMY, LYSIS OF ADHESIONS per Dr. Toro Gutiérrez on 2025   Wound/Ulcer Pain Timing/Severity: none  Quality of pain: N/A  Severity:  0 / 10   Associated Signs/Symptoms: none    PAST MEDICAL HISTORY        Diagnosis Date    CAD (coronary artery disease)     Gout     Hypertension     Hypotension 2020    Non-ST elevation MI (NSTEMI) ([facility]) 14    Palliative care patient 2020    Pneumonia due to infectious organism 2020       PAST SURGICAL HISTORY    Past Surgical History:   Procedure Laterality Date    CARDIAC CATHETERIZATION  14  [facility]    angioplasty, stent to LAD. EF 45%    CHOLECYSTECTOMY      FEMUR FRACTURE SURGERY Right 10/30/2020    TFN, SHORT performed by Manuelito Biggs MD at [facility] OR    LAPAROTOMY N/A 2025    EXPLORATORY LAPAROTOMY, CECOSTOMY, LOOP COLOSTOMY, LYSIS OF ADHESIONS performed by Toro Gutiérrez MD at [facility] OR    PORT SURGERY Right 2025    PORT INSERTION performed by Amberly Lewis MD at [facility] OR    SIGMOIDOSCOPY N/A 02/10/2025    Dr Murray-5 mm stricture noted at 20 cm in the rectosigmoid colon, tissue just proximal to the stricture was friable and erythematous-Invasive moderately differentiated adenocarcinoma       FAMILY HISTORY    Family History   Problem Relation Age of Onset    No Known Problems Mother     Heart Disease Father        SOCIAL HISTORY    Social History     Tobacco Use    Smoking status: Former     Current packs/day: 0.00     Average packs/day:

## 2025-03-27 ENCOUNTER — HOSPITAL ENCOUNTER (OUTPATIENT)
Dept: INFUSION THERAPY | Age: 75
Discharge: HOME | End: 2025-03-27
Payer: MEDICARE

## 2025-03-27 VITALS
HEART RATE: 80 BPM | OXYGEN SATURATION: 100 % | SYSTOLIC BLOOD PRESSURE: 114 MMHG | DIASTOLIC BLOOD PRESSURE: 67 MMHG | RESPIRATION RATE: 18 BRPM | TEMPERATURE: 97 F

## 2025-03-27 DIAGNOSIS — E87.6 HYPOKALEMIA: ICD-10-CM

## 2025-03-27 DIAGNOSIS — C18.9 ADENOCARCINOMA OF COLON: ICD-10-CM

## 2025-03-27 DIAGNOSIS — E83.39 HYPOPHOSPHATEMIA: Primary | ICD-10-CM

## 2025-03-27 DIAGNOSIS — E83.42 HYPOMAGNESEMIA: ICD-10-CM

## 2025-03-27 PROCEDURE — 2500000003 HC RX 250 WO HCPCS: Performed by: INTERNAL MEDICINE

## 2025-03-27 PROCEDURE — 96365 THER/PROPH/DIAG IV INF INIT: CPT

## 2025-03-27 PROCEDURE — 96360 HYDRATION IV INFUSION INIT: CPT

## 2025-03-27 PROCEDURE — 2580000003 HC RX 258: Performed by: INTERNAL MEDICINE

## 2025-03-27 PROCEDURE — 96366 THER/PROPH/DIAG IV INF ADDON: CPT

## 2025-03-27 PROCEDURE — 96361 HYDRATE IV INFUSION ADD-ON: CPT

## 2025-03-27 PROCEDURE — 6360000002 HC RX W HCPCS

## 2025-03-27 PROCEDURE — 96367 TX/PROPH/DG ADDL SEQ IV INF: CPT

## 2025-03-27 RX ORDER — SODIUM CHLORIDE 0.9 % (FLUSH) 0.9 %
5-40 SYRINGE (ML) INJECTION PRN
OUTPATIENT
Start: 2025-03-27

## 2025-03-27 RX ORDER — 0.9 % SODIUM CHLORIDE 0.9 %
500 INTRAVENOUS SOLUTION INTRAVENOUS ONCE
Status: COMPLETED | OUTPATIENT
Start: 2025-03-27 | End: 2025-03-27

## 2025-03-27 RX ORDER — HEPARIN 100 UNIT/ML
SYRINGE INTRAVENOUS
Status: COMPLETED
Start: 2025-03-27 | End: 2025-03-27

## 2025-03-27 RX ORDER — HEPARIN 100 UNIT/ML
500 SYRINGE INTRAVENOUS PRN
OUTPATIENT
Start: 2025-03-27

## 2025-03-27 RX ORDER — SODIUM CHLORIDE 0.9 % (FLUSH) 0.9 %
5-40 SYRINGE (ML) INJECTION PRN
Status: DISCONTINUED | OUTPATIENT
Start: 2025-03-27 | End: 2025-03-28 | Stop reason: HOSPADM

## 2025-03-27 RX ORDER — SODIUM CHLORIDE 9 MG/ML
5-250 INJECTION, SOLUTION INTRAVENOUS PRN
OUTPATIENT
Start: 2025-03-27

## 2025-03-27 RX ORDER — HEPARIN 100 UNIT/ML
500 SYRINGE INTRAVENOUS PRN
Status: DISCONTINUED | OUTPATIENT
Start: 2025-03-27 | End: 2025-03-28 | Stop reason: HOSPADM

## 2025-03-27 RX ADMIN — POTASSIUM PHOSPHATE, MONOBASIC AND POTASSIUM PHOSPHATE, DIBASIC 20 MMOL: 224; 236 INJECTION, SOLUTION, CONCENTRATE INTRAVENOUS at 09:53

## 2025-03-27 RX ADMIN — SODIUM CHLORIDE 500 ML: 0.9 INJECTION, SOLUTION INTRAVENOUS at 10:26

## 2025-03-27 RX ADMIN — HEPARIN 500 UNITS: 100 SYRINGE at 14:01

## 2025-03-27 RX ADMIN — SODIUM CHLORIDE, PRESERVATIVE FREE 10 ML: 5 INJECTION INTRAVENOUS at 14:02

## 2025-04-02 ENCOUNTER — HOSPITAL ENCOUNTER (OUTPATIENT)
Dept: INFUSION THERAPY | Age: 75
Discharge: HOME | End: 2025-04-02
Payer: MEDICARE

## 2025-04-02 ENCOUNTER — HOSPITAL ENCOUNTER (OUTPATIENT)
Dept: WOUND CARE | Age: 75
Discharge: HOME | End: 2025-04-02
Attending: NURSE PRACTITIONER
Payer: MEDICARE

## 2025-04-02 ENCOUNTER — OFFICE VISIT (OUTPATIENT)
Dept: HEMATOLOGY | Age: 75
End: 2025-04-02
Payer: MEDICARE

## 2025-04-02 ENCOUNTER — OFFICE VISIT (OUTPATIENT)
Dept: PALLATIVE CARE | Age: 75
End: 2025-04-02
Payer: MEDICARE

## 2025-04-02 VITALS
TEMPERATURE: 97.7 F | OXYGEN SATURATION: 100 % | DIASTOLIC BLOOD PRESSURE: 62 MMHG | BODY MASS INDEX: 22.76 KG/M2 | HEART RATE: 45 BPM | WEIGHT: 159 LBS | RESPIRATION RATE: 18 BRPM | HEIGHT: 70 IN | SYSTOLIC BLOOD PRESSURE: 174 MMHG

## 2025-04-02 VITALS
DIASTOLIC BLOOD PRESSURE: 65 MMHG | TEMPERATURE: 97.5 F | SYSTOLIC BLOOD PRESSURE: 144 MMHG | HEART RATE: 44 BPM | RESPIRATION RATE: 18 BRPM

## 2025-04-02 VITALS
HEIGHT: 70 IN | RESPIRATION RATE: 18 BRPM | HEART RATE: 45 BPM | DIASTOLIC BLOOD PRESSURE: 62 MMHG | WEIGHT: 159 LBS | SYSTOLIC BLOOD PRESSURE: 174 MMHG | OXYGEN SATURATION: 100 % | BODY MASS INDEX: 22.76 KG/M2 | TEMPERATURE: 97.7 F

## 2025-04-02 DIAGNOSIS — Z11.59 ENCOUNTER FOR SCREENING FOR OTHER VIRAL DISEASES: ICD-10-CM

## 2025-04-02 DIAGNOSIS — E83.39 HYPOPHOSPHATEMIA: ICD-10-CM

## 2025-04-02 DIAGNOSIS — R97.8 ABNORMAL TUMOR MARKERS: ICD-10-CM

## 2025-04-02 DIAGNOSIS — C18.9 COLON CANCER METASTASIZED TO LIVER (HCC): Primary | ICD-10-CM

## 2025-04-02 DIAGNOSIS — C18.9 ADENOCARCINOMA OF COLON: ICD-10-CM

## 2025-04-02 DIAGNOSIS — E83.42 HYPOMAGNESEMIA: ICD-10-CM

## 2025-04-02 DIAGNOSIS — Z71.89 CARE PLAN DISCUSSED WITH PATIENT: ICD-10-CM

## 2025-04-02 DIAGNOSIS — Z74.1 REQUIRES ASSISTANCE WITH ACTIVITIES OF DAILY LIVING (ADL): ICD-10-CM

## 2025-04-02 DIAGNOSIS — E87.6 HYPOKALEMIA: ICD-10-CM

## 2025-04-02 DIAGNOSIS — T45.1X5D ADVERSE EFFECT OF CHEMOTHERAPY, SUBSEQUENT ENCOUNTER: ICD-10-CM

## 2025-04-02 DIAGNOSIS — C78.7 COLON CANCER METASTASIZED TO LIVER (HCC): Primary | ICD-10-CM

## 2025-04-02 DIAGNOSIS — S31.819A BUTTOCK WOUND, RIGHT, INITIAL ENCOUNTER: ICD-10-CM

## 2025-04-02 DIAGNOSIS — Z71.89 GOALS OF CARE, COUNSELING/DISCUSSION: ICD-10-CM

## 2025-04-02 DIAGNOSIS — Z51.5 ENCOUNTER FOR PALLIATIVE CARE: ICD-10-CM

## 2025-04-02 DIAGNOSIS — K52.1 CHEMOTHERAPY INDUCED DIARRHEA: Primary | ICD-10-CM

## 2025-04-02 DIAGNOSIS — L98.492 ABDOMINAL WALL SKIN ULCER, WITH FAT LAYER EXPOSED (HCC): Primary | ICD-10-CM

## 2025-04-02 DIAGNOSIS — R54 FRAILTY SYNDROME IN GERIATRIC PATIENT: ICD-10-CM

## 2025-04-02 DIAGNOSIS — R53.81 PHYSICAL DECONDITIONING: ICD-10-CM

## 2025-04-02 DIAGNOSIS — Z51.11 CHEMOTHERAPY MANAGEMENT, ENCOUNTER FOR: ICD-10-CM

## 2025-04-02 DIAGNOSIS — T45.1X5A CHEMOTHERAPY INDUCED DIARRHEA: Primary | ICD-10-CM

## 2025-04-02 DIAGNOSIS — C18.9 ADENOCARCINOMA OF COLON: Primary | ICD-10-CM

## 2025-04-02 LAB
ALBUMIN SERPL-MCNC: 3.1 G/DL (ref 3.5–5.2)
ALP SERPL-CCNC: 129 U/L (ref 40–129)
ALT SERPL-CCNC: 12 U/L (ref 5–41)
ANION GAP SERPL CALCULATED.3IONS-SCNC: 14 MMOL/L (ref 7–19)
AST SERPL-CCNC: 25 U/L (ref 5–40)
BASOPHILS # BLD: 0.05 K/UL (ref 0–0.2)
BASOPHILS NFR BLD: 0.5 % (ref 0–1)
BILIRUB SERPL-MCNC: 0.3 MG/DL (ref 0–1.2)
BUN SERPL-MCNC: 25 MG/DL (ref 8–23)
CALCIUM SERPL-MCNC: 8.2 MG/DL (ref 8.8–10.2)
CEA SERPL-MCNC: 84.3 NG/ML (ref 0–4.7)
CHLORIDE SERPL-SCNC: 104 MMOL/L (ref 98–107)
CO2 SERPL-SCNC: 21 MMOL/L (ref 22–29)
CREAT SERPL-MCNC: 1.5 MG/DL (ref 0.7–1.2)
EOSINOPHIL # BLD: 0.17 K/UL (ref 0–0.6)
EOSINOPHIL NFR BLD: 1.8 % (ref 0–5)
ERYTHROCYTE [DISTWIDTH] IN BLOOD BY AUTOMATED COUNT: 15.3 % (ref 11.5–14.5)
GLUCOSE SERPL-MCNC: 86 MG/DL (ref 70–99)
HCT VFR BLD AUTO: 32.4 % (ref 42–52)
HGB BLD-MCNC: 10.9 G/DL (ref 14–18)
LYMPHOCYTES # BLD: 1.33 K/UL (ref 1.1–4.5)
LYMPHOCYTES NFR BLD: 14.5 % (ref 20–40)
MAGNESIUM SERPL-MCNC: 1.4 MG/DL (ref 1.6–2.4)
MCH RBC QN AUTO: 28.4 PG (ref 27–31)
MCHC RBC AUTO-ENTMCNC: 33.6 G/DL (ref 33–37)
MCV RBC AUTO: 84.4 FL (ref 80–94)
MONOCYTES # BLD: 0.71 K/UL (ref 0–0.9)
MONOCYTES NFR BLD: 7.7 % (ref 1–10)
NEUTROPHILS # BLD: 6.87 K/UL (ref 1.5–7.5)
NEUTS SEG NFR BLD: 74.7 % (ref 50–65)
PHOSPHATE SERPL-MCNC: 2.7 MG/DL (ref 2.5–4.5)
PLATELET # BLD AUTO: 256 K/UL (ref 130–400)
PMV BLD AUTO: 8.7 FL (ref 9.4–12.4)
POTASSIUM SERPL-SCNC: 3.1 MMOL/L (ref 3.5–5.1)
PROT SERPL-MCNC: 6.2 G/DL (ref 6.4–8.3)
RBC # BLD AUTO: 3.84 M/UL (ref 4.7–6.1)
SODIUM SERPL-SCNC: 139 MMOL/L (ref 136–145)
WBC # BLD AUTO: 9.2 K/UL (ref 4.8–10.8)

## 2025-04-02 PROCEDURE — 1123F ACP DISCUSS/DSCN MKR DOCD: CPT

## 2025-04-02 PROCEDURE — 84100 ASSAY OF PHOSPHORUS: CPT

## 2025-04-02 PROCEDURE — 1126F AMNT PAIN NOTED NONE PRSNT: CPT | Performed by: INTERNAL MEDICINE

## 2025-04-02 PROCEDURE — 3077F SYST BP >= 140 MM HG: CPT | Performed by: INTERNAL MEDICINE

## 2025-04-02 PROCEDURE — G2211 COMPLEX E/M VISIT ADD ON: HCPCS | Performed by: INTERNAL MEDICINE

## 2025-04-02 PROCEDURE — 1036F TOBACCO NON-USER: CPT | Performed by: INTERNAL MEDICINE

## 2025-04-02 PROCEDURE — 1123F ACP DISCUSS/DSCN MKR DOCD: CPT | Performed by: INTERNAL MEDICINE

## 2025-04-02 PROCEDURE — 3017F COLORECTAL CA SCREEN DOC REV: CPT

## 2025-04-02 PROCEDURE — 97597 DBRDMT OPN WND 1ST 20 CM/<: CPT | Performed by: SURGERY

## 2025-04-02 PROCEDURE — 3078F DIAST BP <80 MM HG: CPT | Performed by: INTERNAL MEDICINE

## 2025-04-02 PROCEDURE — 80053 COMPREHEN METABOLIC PANEL: CPT

## 2025-04-02 PROCEDURE — 99215 OFFICE O/P EST HI 40 MIN: CPT

## 2025-04-02 PROCEDURE — 82378 CARCINOEMBRYONIC ANTIGEN: CPT

## 2025-04-02 PROCEDURE — 2580000003 HC RX 258: Performed by: INTERNAL MEDICINE

## 2025-04-02 PROCEDURE — 99214 OFFICE O/P EST MOD 30 MIN: CPT | Performed by: INTERNAL MEDICINE

## 2025-04-02 PROCEDURE — 96366 THER/PROPH/DIAG IV INF ADDON: CPT

## 2025-04-02 PROCEDURE — 1159F MED LIST DOCD IN RCRD: CPT | Performed by: INTERNAL MEDICINE

## 2025-04-02 PROCEDURE — 83735 ASSAY OF MAGNESIUM: CPT

## 2025-04-02 PROCEDURE — 96415 CHEMO IV INFUSION ADDL HR: CPT

## 2025-04-02 PROCEDURE — 96367 TX/PROPH/DG ADDL SEQ IV INF: CPT

## 2025-04-02 PROCEDURE — 1036F TOBACCO NON-USER: CPT

## 2025-04-02 PROCEDURE — 96368 THER/DIAG CONCURRENT INF: CPT

## 2025-04-02 PROCEDURE — 97597 DBRDMT OPN WND 1ST 20 CM/<: CPT

## 2025-04-02 PROCEDURE — 96413 CHEMO IV INFUSION 1 HR: CPT

## 2025-04-02 PROCEDURE — G8427 DOCREV CUR MEDS BY ELIG CLIN: HCPCS | Performed by: INTERNAL MEDICINE

## 2025-04-02 PROCEDURE — G8420 CALC BMI NORM PARAMETERS: HCPCS | Performed by: INTERNAL MEDICINE

## 2025-04-02 PROCEDURE — G0498 CHEMO EXTEND IV INFUS W/PUMP: HCPCS

## 2025-04-02 PROCEDURE — 36415 COLL VENOUS BLD VENIPUNCTURE: CPT

## 2025-04-02 PROCEDURE — G8428 CUR MEDS NOT DOCUMENT: HCPCS

## 2025-04-02 PROCEDURE — 85025 COMPLETE CBC W/AUTO DIFF WBC: CPT

## 2025-04-02 PROCEDURE — 3017F COLORECTAL CA SCREEN DOC REV: CPT | Performed by: INTERNAL MEDICINE

## 2025-04-02 PROCEDURE — G8420 CALC BMI NORM PARAMETERS: HCPCS

## 2025-04-02 PROCEDURE — 96375 TX/PRO/DX INJ NEW DRUG ADDON: CPT

## 2025-04-02 PROCEDURE — 6360000002 HC RX W HCPCS: Performed by: INTERNAL MEDICINE

## 2025-04-02 RX ORDER — SODIUM CHLORIDE 0.9 % (FLUSH) 0.9 %
5-40 SYRINGE (ML) INJECTION PRN
OUTPATIENT
Start: 2025-04-04

## 2025-04-02 RX ORDER — FAMOTIDINE 10 MG/ML
20 INJECTION, SOLUTION INTRAVENOUS
Status: CANCELLED | OUTPATIENT
Start: 2025-04-02

## 2025-04-02 RX ORDER — SODIUM CHLORIDE 9 MG/ML
INJECTION, SOLUTION INTRAVENOUS CONTINUOUS
Status: CANCELLED | OUTPATIENT
Start: 2025-04-02

## 2025-04-02 RX ORDER — HEPARIN SODIUM (PORCINE) LOCK FLUSH IV SOLN 100 UNIT/ML 100 UNIT/ML
500 SOLUTION INTRAVENOUS PRN
Status: CANCELLED | OUTPATIENT
Start: 2025-04-02

## 2025-04-02 RX ORDER — ONDANSETRON 2 MG/ML
8 INJECTION INTRAMUSCULAR; INTRAVENOUS
Status: CANCELLED | OUTPATIENT
Start: 2025-04-02

## 2025-04-02 RX ORDER — TRIAMCINOLONE ACETONIDE 1 MG/G
OINTMENT TOPICAL PRN
OUTPATIENT
Start: 2025-04-02

## 2025-04-02 RX ORDER — LIDOCAINE HYDROCHLORIDE 40 MG/ML
SOLUTION TOPICAL PRN
OUTPATIENT
Start: 2025-04-02

## 2025-04-02 RX ORDER — SODIUM CHLORIDE 9 MG/ML
5-250 INJECTION, SOLUTION INTRAVENOUS PRN
Status: CANCELLED | OUTPATIENT
Start: 2025-04-02

## 2025-04-02 RX ORDER — LIDOCAINE 40 MG/G
CREAM TOPICAL PRN
OUTPATIENT
Start: 2025-04-02

## 2025-04-02 RX ORDER — SODIUM CHLORIDE 0.9 % (FLUSH) 0.9 %
5-40 SYRINGE (ML) INJECTION PRN
Status: CANCELLED | OUTPATIENT
Start: 2025-04-02

## 2025-04-02 RX ORDER — LIDOCAINE 50 MG/G
OINTMENT TOPICAL PRN
OUTPATIENT
Start: 2025-04-02

## 2025-04-02 RX ORDER — EPINEPHRINE 1 MG/ML
0.3 INJECTION, SOLUTION, CONCENTRATE INTRAVENOUS PRN
Status: CANCELLED | OUTPATIENT
Start: 2025-04-02

## 2025-04-02 RX ORDER — NEOMYCIN/BACITRACIN/POLYMYXINB 3.5-400-5K
OINTMENT (GRAM) TOPICAL PRN
OUTPATIENT
Start: 2025-04-02

## 2025-04-02 RX ORDER — MUPIROCIN 20 MG/G
OINTMENT TOPICAL PRN
OUTPATIENT
Start: 2025-04-02

## 2025-04-02 RX ORDER — MAGNESIUM SULFATE 1 G/100ML
1000 INJECTION INTRAVENOUS ONCE
Status: COMPLETED | OUTPATIENT
Start: 2025-04-02 | End: 2025-04-02

## 2025-04-02 RX ORDER — GENTAMICIN SULFATE 1 MG/G
OINTMENT TOPICAL PRN
OUTPATIENT
Start: 2025-04-02

## 2025-04-02 RX ORDER — DIPHENOXYLATE HYDROCHLORIDE AND ATROPINE SULFATE 2.5; .025 MG/1; MG/1
1 TABLET ORAL EVERY 6 HOURS PRN
Qty: 50 TABLET | Refills: 0 | Status: ON HOLD | OUTPATIENT
Start: 2025-04-02 | End: 2025-04-15

## 2025-04-02 RX ORDER — HEPARIN SODIUM (PORCINE) LOCK FLUSH IV SOLN 100 UNIT/ML 100 UNIT/ML
500 SOLUTION INTRAVENOUS PRN
OUTPATIENT
Start: 2025-04-04

## 2025-04-02 RX ORDER — POTASSIUM CHLORIDE 29.8 MG/ML
20 INJECTION INTRAVENOUS ONCE
Status: COMPLETED | OUTPATIENT
Start: 2025-04-02 | End: 2025-04-02

## 2025-04-02 RX ORDER — PALONOSETRON 0.05 MG/ML
0.25 INJECTION, SOLUTION INTRAVENOUS ONCE
Status: CANCELLED | OUTPATIENT
Start: 2025-04-02 | End: 2025-04-02

## 2025-04-02 RX ORDER — ALBUTEROL SULFATE 90 UG/1
4 INHALANT RESPIRATORY (INHALATION) PRN
Status: CANCELLED | OUTPATIENT
Start: 2025-04-02

## 2025-04-02 RX ORDER — DIPHENHYDRAMINE HYDROCHLORIDE 50 MG/ML
50 INJECTION, SOLUTION INTRAMUSCULAR; INTRAVENOUS
Status: CANCELLED | OUTPATIENT
Start: 2025-04-02

## 2025-04-02 RX ORDER — BACITRACIN ZINC AND POLYMYXIN B SULFATE 500; 1000 [USP'U]/G; [USP'U]/G
OINTMENT TOPICAL PRN
OUTPATIENT
Start: 2025-04-02

## 2025-04-02 RX ORDER — MEPERIDINE HYDROCHLORIDE 50 MG/ML
12.5 INJECTION INTRAMUSCULAR; INTRAVENOUS; SUBCUTANEOUS PRN
Status: CANCELLED | OUTPATIENT
Start: 2025-04-02

## 2025-04-02 RX ORDER — PALONOSETRON 0.05 MG/ML
0.25 INJECTION, SOLUTION INTRAVENOUS ONCE
Status: COMPLETED | OUTPATIENT
Start: 2025-04-02 | End: 2025-04-02

## 2025-04-02 RX ORDER — ACETAMINOPHEN 325 MG/1
650 TABLET ORAL
Status: CANCELLED | OUTPATIENT
Start: 2025-04-02

## 2025-04-02 RX ORDER — SODIUM CHLORIDE 9 MG/ML
5-250 INJECTION, SOLUTION INTRAVENOUS PRN
OUTPATIENT
Start: 2025-04-04

## 2025-04-02 RX ORDER — DEXTROSE MONOHYDRATE 50 MG/ML
5-250 INJECTION, SOLUTION INTRAVENOUS PRN
Status: CANCELLED | OUTPATIENT
Start: 2025-04-02

## 2025-04-02 RX ORDER — HYDROCORTISONE SODIUM SUCCINATE 100 MG/2ML
100 INJECTION INTRAMUSCULAR; INTRAVENOUS
Status: CANCELLED | OUTPATIENT
Start: 2025-04-02

## 2025-04-02 RX ORDER — LIDOCAINE HYDROCHLORIDE 20 MG/ML
JELLY TOPICAL PRN
OUTPATIENT
Start: 2025-04-02

## 2025-04-02 RX ORDER — GINSENG 100 MG
CAPSULE ORAL PRN
OUTPATIENT
Start: 2025-04-02

## 2025-04-02 RX ORDER — SODIUM CHLOR/HYPOCHLOROUS ACID 0.033 %
SOLUTION, IRRIGATION IRRIGATION PRN
OUTPATIENT
Start: 2025-04-02

## 2025-04-02 RX ORDER — SILVER SULFADIAZINE 10 MG/G
CREAM TOPICAL PRN
OUTPATIENT
Start: 2025-04-02

## 2025-04-02 RX ORDER — CLOBETASOL PROPIONATE 0.5 MG/G
OINTMENT TOPICAL PRN
OUTPATIENT
Start: 2025-04-02

## 2025-04-02 RX ORDER — DEXAMETHASONE SODIUM PHOSPHATE 10 MG/ML
10 INJECTION, SOLUTION INTRAMUSCULAR; INTRAVENOUS ONCE
Status: COMPLETED | OUTPATIENT
Start: 2025-04-02 | End: 2025-04-02

## 2025-04-02 RX ORDER — BETAMETHASONE DIPROPIONATE 0.5 MG/G
CREAM TOPICAL PRN
OUTPATIENT
Start: 2025-04-02

## 2025-04-02 RX ADMIN — PALONOSETRON 0.25 MG: 0.05 INJECTION, SOLUTION INTRAVENOUS at 12:44

## 2025-04-02 RX ADMIN — OXALIPLATIN 95 MG: 5 INJECTION, SOLUTION INTRAVENOUS at 12:48

## 2025-04-02 RX ADMIN — FLUOROURACIL 3400 MG: 50 INJECTION, SOLUTION INTRAVENOUS at 14:57

## 2025-04-02 RX ADMIN — MAGNESIUM SULFATE HEPTAHYDRATE 1000 MG: 1 INJECTION, SOLUTION INTRAVENOUS at 13:27

## 2025-04-02 RX ADMIN — POTASSIUM CHLORIDE 20 MEQ: 29.8 INJECTION, SOLUTION INTRAVENOUS at 13:28

## 2025-04-02 RX ADMIN — DEXAMETHASONE SODIUM PHOSPHATE 10 MG: 10 INJECTION, SOLUTION INTRAMUSCULAR; INTRAVENOUS at 12:44

## 2025-04-02 RX ADMIN — LEUCOVORIN CALCIUM 200 MG: 200 INJECTION, POWDER, LYOPHILIZED, FOR SUSPENSION INTRAMUSCULAR; INTRAVENOUS at 12:49

## 2025-04-02 NOTE — PATIENT INSTRUCTIONS
the opening of the wafer around the stoma and  press the wafer down firmly.  Be sure to roll the end up 3 times and flip the tabs over to secure.  Lay quietly for 15-20 minutes to allow the adhesives to mold to your skin.    Empty Pouch  Empty the pouch when it’s 1/3 to 1/2 full of gas or stool  Wipe out tail-end of pouch with toilet tissue.  Close the end of the pouch by folding tail-end up 3 x’s and flipping over the tabs    Other:  If the skin around your stoma becomes red, irritated, itchy and/ or your ostomy  pouch is leaking before getting 3 days wear time. Please call your Ostomy Care  nurse. (# listed below)  Please call Syl COSTELLO, CWS, OMS, CFCN at [phone] ostomy questions and issues.      [facility] follow up visit ___________2 weeks__________________  (Please note your next appointment above and if you are unable to keep, kindly give a 24 hour notice. Thank you.)    If you experience any of the following, please call the Wound Care Center during business hours:    * Increase in Pain  * Temperature over 101  * Increase in drainage from your wound  * Drainage with a foul odor  * Bleeding  * Increase in swelling  * Need for compression bandage changes due to slippage, breakthrough drainage.    If you need medical attention outside of the business hours of the Wound Care Centers please contact your PCP or go to the nearest emergency room.

## 2025-04-02 NOTE — PROGRESS NOTES
Alkaline Phosphatase 04/02/2025 129     ALT 04/02/2025 12     AST 04/02/2025 25     WBC 04/02/2025 9.20     RBC 04/02/2025 3.84 (L)     Hemoglobin 04/02/2025 10.9 (L)     Hematocrit 04/02/2025 32.4 (L)     MCV 04/02/2025 84.4     MCH 04/02/2025 28.4     MCHC 04/02/2025 33.6     RDW 04/02/2025 15.3 (H)     Platelets 04/02/2025 256     MPV 04/02/2025 8.7 (L)     Neutrophils % 04/02/2025 74.7 (H)     Lymphocytes % 04/02/2025 14.5 (L)     Monocytes % 04/02/2025 7.7     Eosinophils % 04/02/2025 1.8     Basophils % 04/02/2025 0.5     Neutrophils Absolute 04/02/2025 6.87     Lymphocytes Absolute 04/02/2025 1.33     Monocytes Absolute 04/02/2025 0.71     Eosinophils Absolute 04/02/2025 0.17     Basophils Absolute 04/02/2025 0.05     CEA 04/02/2025 84.3 (H)         Total Visit Time: 50 minutes spent including face-to-face, chart review, and documentation    This dictation was generated by voice recognition computer software.  Although all attempts are made to edit the dictation for accuracy, there may be errors in the transcription that are not intended.    Electronically signed by ADY Dias CNP on 4/7/2025 at 8:17 AM

## 2025-04-02 NOTE — PROGRESS NOTES
[facility]   Progress Note and Procedure Note      Damian You  MEDICAL RECORD NUMBER:  [id number]  AGE: 74 y.o.   GENDER: male  : 1950  EPISODE DATE:  2025    Subjective:     Chief Complaint   Patient presents with    Wound Check     Umbilicus wound, buttock wound and colostomy          HISTORY of PRESENT ILLNESS HPI     Damian You is a 74 y.o. male who presents today for wound/ulcer evaluation.   Wound Context: Pt with abdominal and buttock wound here for avl/treat  Wound/Ulcer Pain Timing/Severity: none  Quality of pain: N/A  Severity:  0 / 10   Modifying Factors: None  Associated Signs/Symptoms: none    Ulcer Identification:  Ulcer Type: pressure and non-healing surgical  Contributing Factors: none    Wound: Surgical incision        PAST MEDICAL HISTORY        Diagnosis Date    CAD (coronary artery disease)     Gout     Hypertension     Hypotension 2020    Non-ST elevation MI (NSTEMI) ([facility]) 14    Palliative care patient 2020    Pneumonia due to infectious organism 2020       PAST SURGICAL HISTORY    Past Surgical History:   Procedure Laterality Date    CARDIAC CATHETERIZATION  14  [facility]    angioplasty, stent to LAD. EF 45%    CHOLECYSTECTOMY      FEMUR FRACTURE SURGERY Right 10/30/2020    TFN, SHORT performed by Manuelito Biggs MD at [facility] OR    LAPAROTOMY N/A 2025    EXPLORATORY LAPAROTOMY, CECOSTOMY, LOOP COLOSTOMY, LYSIS OF ADHESIONS performed by Toro Gutiérrez MD at [facility] OR    PORT SURGERY Right 2025    PORT INSERTION performed by Amberly Lewis MD at [facility] OR    SIGMOIDOSCOPY N/A 02/10/2025    Dr Murray-5 mm stricture noted at 20 cm in the rectosigmoid colon, tissue just proximal to the stricture was friable and erythematous-Invasive moderately differentiated adenocarcinoma       FAMILY HISTORY    Family History   Problem Relation Age of Onset    No Known Problems Mother     Heart Disease Father        SOCIAL HISTORY    Social

## 2025-04-02 NOTE — PROGRESS NOTES
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     Palliative chemotherapy with modified FOLFOX and dose reduction was offered due to his advanced age and frailty. The first cycle of chemotherapy was tolerated with significant fatigue and diarrhea. Lomotil and Imodium were used to manage the diarrhea, which has since subsided. He reports feeling overall better today, with a slight improvement in eating.    A discussion regarding his care plan was held today with the patient, caregiver, and sister over the phone. His sister had questions regarding his stage, prognosis, and treatment plan. He expresses a desire to continue his treatment and is not interested in hospice or best supportive care at this time, hoping to achieve a response and extend his life expectancy.    PAST SURGICAL HISTORY:  Status post exploratory laparotomy, loop colostomy, and thoracostomy performed by Dr. Toro Polo. Pathology was consistent with invasive adenocarcinoma.           Diagnosis  Sigmoid adenocarcinoma stage IV (liver)  MMR proficient  Tempus xT: FBXW7, TP53, APC, KMT2C, CDKN2A, CDKN2B, MTAP, NFK81A: Positive  KRAS,BRAF,NRAS: Negative  TMB: 7.9m/MB  MSI-Stable   2/27/25 Tempus xT Tumor Origin:  Colorectal adenocarcinoma 99%    Treatment Summary:  2/6/25-2/8/25 Patient received IV Venofer 1000mg  3/12/25 Initiate mFOLFOX every 2 weeks. Treatment consent signed.        Cancer history  Damian You was first seen by

## 2025-04-02 NOTE — PROGRESS NOTES
Lab Results   Component Value Date    WBC 9.20 04/02/2025    HGB 10.9 (L) 04/02/2025    HCT 32.4 (L) 04/02/2025    MCV 84.4 04/02/2025     04/02/2025     Lab Results   Component Value Date    NEUTROABS 6.87 04/02/2025     Lab Results   Component Value Date     04/02/2025    K 3.1 (L) 04/02/2025     04/02/2025    CO2 21 (L) 04/02/2025    BUN 25 (H) 04/02/2025    CREATININE 2.2 (HH) 03/26/2025    GLUCOSE 86 04/02/2025    CALCIUM 8.2 (L) 04/02/2025    BILITOT 0.3 04/02/2025    ALKPHOS 129 04/02/2025    AST 25 04/02/2025    ALT 12 04/02/2025    LABGLOM 31 (A) 03/26/2025    GFRAA 52 (L) 08/15/2022         Latest Ref Rng & Units 4/2/2025    11:32 AM 3/26/2025    11:41 AM 3/12/2025    11:42 AM 2/17/2025     4:45 PM 2/14/2025     3:01 PM   Labs Renal   BUN 8 - 23 mg/dL 25  42  20  8     Cr 0.7 - 1.2 mg/dL  2.2  1.3  1.4     K 3.5 - 5.1 mmol/L 3.1  2.7  4.1  4.2  3.4    Na 136 - 145 mmol/L 139  133  138  140

## 2025-04-03 ENCOUNTER — APPOINTMENT (OUTPATIENT)
Dept: GENERAL RADIOLOGY | Age: 75
DRG: 242 | End: 2025-04-03
Payer: MEDICARE

## 2025-04-03 ENCOUNTER — HOSPITAL ENCOUNTER (OUTPATIENT)
Dept: INFUSION THERAPY | Age: 75
End: 2025-04-03

## 2025-04-03 ENCOUNTER — TELEPHONE (OUTPATIENT)
Dept: PRIMARY CARE CLINIC | Age: 75
End: 2025-04-03

## 2025-04-03 ENCOUNTER — HOSPITAL ENCOUNTER (INPATIENT)
Age: 75
LOS: 5 days | Discharge: HOME | DRG: 242 | End: 2025-04-08
Attending: EMERGENCY MEDICINE | Admitting: INTERNAL MEDICINE
Payer: MEDICARE

## 2025-04-03 DIAGNOSIS — E87.6 HYPOKALEMIA: ICD-10-CM

## 2025-04-03 DIAGNOSIS — R00.1 BRADYCARDIA: Primary | ICD-10-CM

## 2025-04-03 DIAGNOSIS — R00.1 BRADYCARDIA, UNSPECIFIED: ICD-10-CM

## 2025-04-03 DIAGNOSIS — N18.9 CHRONIC KIDNEY DISEASE, UNSPECIFIED CKD STAGE: ICD-10-CM

## 2025-04-03 DIAGNOSIS — I25.119 CORONARY ARTERY DISEASE INVOLVING NATIVE HEART WITH ANGINA PECTORIS, UNSPECIFIED VESSEL OR LESION TYPE: ICD-10-CM

## 2025-04-03 DIAGNOSIS — I48.91 ATRIAL FIBRILLATION, UNSPECIFIED TYPE (HCC): ICD-10-CM

## 2025-04-03 PROBLEM — Z51.11 CHEMOTHERAPY MANAGEMENT, ENCOUNTER FOR: Status: ACTIVE | Noted: 2025-04-03

## 2025-04-03 PROBLEM — D64.9 NORMOCYTIC ANEMIA: Status: ACTIVE | Noted: 2025-04-03

## 2025-04-03 PROBLEM — E03.9 HYPOTHYROID: Status: ACTIVE | Noted: 2025-04-03

## 2025-04-03 PROBLEM — D72.828 NEUTROPHILIC LEUKOCYTOSIS: Status: ACTIVE | Noted: 2022-02-14

## 2025-04-03 PROBLEM — C18.9 COLON CANCER METASTASIZED TO LIVER (HCC): Status: ACTIVE | Noted: 2025-02-10

## 2025-04-03 LAB
ALBUMIN SERPL-MCNC: 3.2 G/DL (ref 3.5–5.2)
ALP SERPL-CCNC: 125 U/L (ref 40–129)
ALT SERPL-CCNC: 18 U/L (ref 10–50)
ANION GAP SERPL CALCULATED.3IONS-SCNC: 11 MMOL/L (ref 8–16)
APTT PPP: 25 SEC (ref 26–36.2)
AST SERPL-CCNC: 29 U/L (ref 10–50)
BASOPHILS # BLD: 0 K/UL (ref 0–0.2)
BASOPHILS NFR BLD: 0.1 % (ref 0–1)
BILIRUB SERPL-MCNC: 0.2 MG/DL (ref 0.2–1.2)
BNP BLD-MCNC: 6548 PG/ML (ref 0–124)
BUN SERPL-MCNC: 37 MG/DL (ref 8–23)
CALCIUM SERPL-MCNC: 9 MG/DL (ref 8.8–10.2)
CHLORIDE SERPL-SCNC: 106 MMOL/L (ref 98–107)
CO2 SERPL-SCNC: 23 MMOL/L (ref 22–29)
CREAT SERPL-MCNC: 1.8 MG/DL (ref 0.7–1.2)
EOSINOPHIL # BLD: 0 K/UL (ref 0–0.6)
EOSINOPHIL NFR BLD: 0 % (ref 0–5)
ERYTHROCYTE [DISTWIDTH] IN BLOOD BY AUTOMATED COUNT: 15.5 % (ref 11.5–14.5)
ETHANOLAMINE SERPL-MCNC: <11 MG/DL (ref 0–11)
GLUCOSE SERPL-MCNC: 111 MG/DL (ref 70–99)
HCT VFR BLD AUTO: 34.6 % (ref 42–52)
HGB BLD-MCNC: 11.1 G/DL (ref 14–18)
IMM GRANULOCYTES # BLD: 0.1 K/UL
INR PPP: 1.12 (ref 0.88–1.18)
LYMPHOCYTES # BLD: 1 K/UL (ref 1.1–4.5)
LYMPHOCYTES NFR BLD: 6.3 % (ref 20–40)
MAGNESIUM SERPL-MCNC: 1.9 MG/DL (ref 1.6–2.4)
MCH RBC QN AUTO: 28.2 PG (ref 27–31)
MCHC RBC AUTO-ENTMCNC: 32.1 G/DL (ref 33–37)
MCV RBC AUTO: 87.8 FL (ref 80–94)
MONOCYTES # BLD: 0.6 K/UL (ref 0–0.9)
MONOCYTES NFR BLD: 3.8 % (ref 0–10)
NEUTROPHILS # BLD: 13.6 K/UL (ref 1.5–7.5)
NEUTS SEG NFR BLD: 89.3 % (ref 50–65)
PLATELET # BLD AUTO: 285 K/UL (ref 130–400)
PMV BLD AUTO: 8.9 FL (ref 9.4–12.4)
POTASSIUM SERPL-SCNC: 3.4 MMOL/L (ref 3.5–5.1)
PROT SERPL-MCNC: 6.4 G/DL (ref 6.4–8.3)
PROTHROMBIN TIME: 14.4 SEC (ref 12–14.6)
RBC # BLD AUTO: 3.94 M/UL (ref 4.7–6.1)
SODIUM SERPL-SCNC: 140 MMOL/L (ref 136–145)
TROPONIN, HIGH SENSITIVITY: 32 NG/L (ref 0–22)
TROPONIN, HIGH SENSITIVITY: 33 NG/L (ref 0–22)
TSH SERPL DL<=0.005 MIU/L-ACNC: 0.36 UIU/ML (ref 0.27–4.2)
WBC # BLD AUTO: 15.2 K/UL (ref 4.8–10.8)

## 2025-04-03 PROCEDURE — 84443 ASSAY THYROID STIM HORMONE: CPT

## 2025-04-03 PROCEDURE — 2500000003 HC RX 250 WO HCPCS

## 2025-04-03 PROCEDURE — 36415 COLL VENOUS BLD VENIPUNCTURE: CPT

## 2025-04-03 PROCEDURE — 84484 ASSAY OF TROPONIN QUANT: CPT

## 2025-04-03 PROCEDURE — 85730 THROMBOPLASTIN TIME PARTIAL: CPT

## 2025-04-03 PROCEDURE — 85610 PROTHROMBIN TIME: CPT

## 2025-04-03 PROCEDURE — 71045 X-RAY EXAM CHEST 1 VIEW: CPT

## 2025-04-03 PROCEDURE — 99222 1ST HOSP IP/OBS MODERATE 55: CPT | Performed by: INTERNAL MEDICINE

## 2025-04-03 PROCEDURE — 80053 COMPREHEN METABOLIC PANEL: CPT

## 2025-04-03 PROCEDURE — 82077 ASSAY SPEC XCP UR&BREATH IA: CPT

## 2025-04-03 PROCEDURE — 96374 THER/PROPH/DIAG INJ IV PUSH: CPT

## 2025-04-03 PROCEDURE — 93005 ELECTROCARDIOGRAM TRACING: CPT | Performed by: EMERGENCY MEDICINE

## 2025-04-03 PROCEDURE — 99285 EMERGENCY DEPT VISIT HI MDM: CPT

## 2025-04-03 PROCEDURE — 85025 COMPLETE CBC W/AUTO DIFF WBC: CPT

## 2025-04-03 PROCEDURE — 6370000000 HC RX 637 (ALT 250 FOR IP): Performed by: EMERGENCY MEDICINE

## 2025-04-03 PROCEDURE — 83880 ASSAY OF NATRIURETIC PEPTIDE: CPT

## 2025-04-03 PROCEDURE — 6360000002 HC RX W HCPCS: Performed by: EMERGENCY MEDICINE

## 2025-04-03 PROCEDURE — 2060000000 HC ICU INTERMEDIATE R&B

## 2025-04-03 PROCEDURE — 83735 ASSAY OF MAGNESIUM: CPT

## 2025-04-03 PROCEDURE — 6370000000 HC RX 637 (ALT 250 FOR IP)

## 2025-04-03 PROCEDURE — 93005 ELECTROCARDIOGRAM TRACING: CPT

## 2025-04-03 RX ORDER — ATORVASTATIN CALCIUM 20 MG/1
20 TABLET, FILM COATED ORAL EVERY EVENING
Status: DISCONTINUED | OUTPATIENT
Start: 2025-04-03 | End: 2025-04-08 | Stop reason: HOSPADM

## 2025-04-03 RX ORDER — ACETAMINOPHEN 325 MG/1
650 TABLET ORAL EVERY 6 HOURS PRN
Status: DISCONTINUED | OUTPATIENT
Start: 2025-04-03 | End: 2025-04-08 | Stop reason: HOSPADM

## 2025-04-03 RX ORDER — HEPARIN 100 UNIT/ML
300 SYRINGE INTRAVENOUS ONCE
Status: COMPLETED | OUTPATIENT
Start: 2025-04-03 | End: 2025-04-03

## 2025-04-03 RX ORDER — LEVOTHYROXINE SODIUM 125 UG/1
125 TABLET ORAL DAILY
Status: DISCONTINUED | OUTPATIENT
Start: 2025-04-03 | End: 2025-04-08 | Stop reason: HOSPADM

## 2025-04-03 RX ORDER — SODIUM CHLORIDE 9 MG/ML
INJECTION, SOLUTION INTRAVENOUS PRN
Status: DISCONTINUED | OUTPATIENT
Start: 2025-04-03 | End: 2025-04-08 | Stop reason: HOSPADM

## 2025-04-03 RX ORDER — ONDANSETRON 2 MG/ML
4 INJECTION INTRAMUSCULAR; INTRAVENOUS EVERY 6 HOURS PRN
Status: DISCONTINUED | OUTPATIENT
Start: 2025-04-03 | End: 2025-04-08 | Stop reason: HOSPADM

## 2025-04-03 RX ORDER — MAGNESIUM SULFATE IN WATER 40 MG/ML
2000 INJECTION, SOLUTION INTRAVENOUS PRN
Status: DISCONTINUED | OUTPATIENT
Start: 2025-04-03 | End: 2025-04-08 | Stop reason: HOSPADM

## 2025-04-03 RX ORDER — AMIODARONE HYDROCHLORIDE 100 MG/1
100 TABLET ORAL DAILY
Status: DISCONTINUED | OUTPATIENT
Start: 2025-04-03 | End: 2025-04-07

## 2025-04-03 RX ORDER — SODIUM CHLORIDE 0.9 % (FLUSH) 0.9 %
5-40 SYRINGE (ML) INJECTION PRN
Status: DISCONTINUED | OUTPATIENT
Start: 2025-04-03 | End: 2025-04-08 | Stop reason: HOSPADM

## 2025-04-03 RX ORDER — SODIUM CHLORIDE 0.9 % (FLUSH) 0.9 %
5-40 SYRINGE (ML) INJECTION EVERY 12 HOURS SCHEDULED
Status: DISCONTINUED | OUTPATIENT
Start: 2025-04-03 | End: 2025-04-08 | Stop reason: HOSPADM

## 2025-04-03 RX ORDER — ENOXAPARIN SODIUM 100 MG/ML
40 INJECTION SUBCUTANEOUS DAILY
Status: DISCONTINUED | OUTPATIENT
Start: 2025-04-03 | End: 2025-04-08 | Stop reason: HOSPADM

## 2025-04-03 RX ORDER — POTASSIUM CHLORIDE 7.45 MG/ML
10 INJECTION INTRAVENOUS PRN
Status: DISCONTINUED | OUTPATIENT
Start: 2025-04-03 | End: 2025-04-08 | Stop reason: HOSPADM

## 2025-04-03 RX ORDER — ONDANSETRON 4 MG/1
4 TABLET, ORALLY DISINTEGRATING ORAL EVERY 8 HOURS PRN
Status: DISCONTINUED | OUTPATIENT
Start: 2025-04-03 | End: 2025-04-08 | Stop reason: HOSPADM

## 2025-04-03 RX ORDER — POTASSIUM CHLORIDE 1500 MG/1
40 TABLET, EXTENDED RELEASE ORAL PRN
Status: DISCONTINUED | OUTPATIENT
Start: 2025-04-03 | End: 2025-04-08 | Stop reason: HOSPADM

## 2025-04-03 RX ORDER — POLYETHYLENE GLYCOL 3350 17 G/17G
17 POWDER, FOR SOLUTION ORAL DAILY PRN
Status: DISCONTINUED | OUTPATIENT
Start: 2025-04-03 | End: 2025-04-08 | Stop reason: HOSPADM

## 2025-04-03 RX ORDER — ATROPINE SULFATE 0.1 MG/ML
0.5 INJECTION INTRAVENOUS ONCE
Status: COMPLETED | OUTPATIENT
Start: 2025-04-03 | End: 2025-04-03

## 2025-04-03 RX ORDER — ACETAMINOPHEN 650 MG/1
650 SUPPOSITORY RECTAL EVERY 6 HOURS PRN
Status: DISCONTINUED | OUTPATIENT
Start: 2025-04-03 | End: 2025-04-08 | Stop reason: HOSPADM

## 2025-04-03 RX ORDER — POTASSIUM CHLORIDE 1500 MG/1
20 TABLET, EXTENDED RELEASE ORAL ONCE
Status: COMPLETED | OUTPATIENT
Start: 2025-04-03 | End: 2025-04-03

## 2025-04-03 RX ADMIN — ATROPINE SULFATE 0.5 MG: 0.1 INJECTION, SOLUTION ENDOTRACHEAL; INTRAMUSCULAR; INTRAVENOUS; SUBCUTANEOUS at 13:26

## 2025-04-03 RX ADMIN — POTASSIUM CHLORIDE 20 MEQ: 1500 TABLET, EXTENDED RELEASE ORAL at 14:52

## 2025-04-03 RX ADMIN — HEPARIN 300 UNITS: 100 SYRINGE at 13:35

## 2025-04-03 RX ADMIN — SODIUM CHLORIDE, PRESERVATIVE FREE 10 ML: 5 INJECTION INTRAVENOUS at 20:48

## 2025-04-03 RX ADMIN — ATORVASTATIN CALCIUM 20 MG: 20 TABLET, FILM COATED ORAL at 18:05

## 2025-04-03 ASSESSMENT — PAIN SCALES - GENERAL
PAINLEVEL_OUTOF10: 0
PAINLEVEL_OUTOF10: 0

## 2025-04-03 NOTE — CONSULTS
[facility]  Cardiology Consult      Requesting MD:  Jon Hunter MD   Admit Status:         History obtained from:   [] Patient  [] Other (specify):     PROBLEM LIST:    Patient Active Problem List    Diagnosis Date Noted    Postoperative anemia due to acute blood loss 10/31/2020     Priority: High    Bradycardia 04/03/2025     Priority: Low    Buttock wound, right, initial encounter 03/26/2025     Priority: Low    Hypomagnesemia 03/26/2025     Priority: Low    Encounter for screening for other viral diseases 03/12/2025     Priority: Low    Abdominal wall skin ulcer, with fat layer exposed (HCC) 02/21/2025     Priority: Low    Moderate malnutrition 02/11/2025     Priority: Low    Metastasis to liver (HCC) 02/10/2025     Priority: Low    Colonic thickening 02/10/2025     Priority: Low    Medically complex patient 02/10/2025     Priority: Low    Life threatening medical illness 02/10/2025     Priority: Low    Hypokalemia 02/06/2025     Priority: Low    Abnormal abdominal CT scan 02/05/2025     Priority: Low    Abnormal computed tomography of sigmoid colon 02/05/2025     Priority: Low    Adenocarcinoma of colon 02/05/2025     Priority: Low     Overview Note:     Stage 4 colon cancer with liver mets  2/10/2025-sigmoidoscopy diagnostic flexible-mass at 20 cm with lumen less than 5 mm.  Biopsies taken: MMR proficient invasive moderate differentiated adenocarcinoma  2/13/2025-essentially, MMR proficient stage IV metastatic sigmoid adenocarcinoma.  Recommended modified FOLFOX.  Recommended NGS Tempus.      CKD stage 3a, GFR 45-59 ml/min (HCC) 02/14/2022     Priority: Low    Elevated WBC count 02/14/2022     Priority: Low    Intracerebral hemorrhage, nontraumatic (HCC) 08/17/2021     Priority: Low    Guaiac positive stools 06/30/2021     Priority: Low    Acute gout of hand 06/30/2021     Priority: Low    Coronary artery disease involving native heart with angina pectoris, unspecified  rates noted to be in the 30s to 40s and referred to the ER.  Patient reports no symptoms with no lightheadedness, syncope, fatigue, chest pain or shortness of breath.  Has been doing fairly well by his account.  In the ER EKG shows atrial fibrillation with slow ventricular response at 40 bpm.  Subsequent EKG shows sinus rhythm, incomplete right bundle branch block, heart rates in the 50s.  Patient is on amiodarone 100 mg daily and Toprol-XL 12.5 mg once daily.  TSH 0.3.  proBNP 6548.  CEA 84.  Troponin 33.  Creatinine 1.8.    REVIEW OF SYSTEMS:  Review of Systems   Constitutional:  Negative for activity change, diaphoresis and fatigue.   HENT:  Negative for hearing loss, nosebleeds and tinnitus.    Eyes:  Negative for visual disturbance.   Respiratory:  Negative for cough, shortness of breath and wheezing.    Cardiovascular:  Negative for chest pain, palpitations and leg swelling.   Gastrointestinal:  Negative for abdominal distention, abdominal pain, blood in stool, diarrhea and vomiting.   Endocrine: Negative for cold intolerance, heat intolerance, polydipsia, polyphagia and polyuria.   Genitourinary:  Negative for difficulty urinating, flank pain and hematuria.   Musculoskeletal:  Negative for arthralgias, back pain, joint swelling and myalgias.   Skin:  Negative for pallor and rash.   Neurological:  Negative for dizziness, seizures, syncope and headaches.   Psychiatric/Behavioral:  Negative for behavioral problems and dysphoric mood. The patient is not nervous/anxious.        Past Medical History:      Diagnosis Date    CAD (coronary artery disease)     Gout     Hypertension     Hypotension 6/23/2020    Non-ST elevation MI (NSTEMI) ([facility]) 12/28/14    Palliative care patient 06/29/2020    Pneumonia due to infectious organism 6/24/2020       Past Surgical History:      Procedure Laterality Date    CARDIAC CATHETERIZATION  12/29/14  [facility]    angioplasty, stent to LAD. EF 45%    CHOLECYSTECTOMY      FEMUR FRACTURE

## 2025-04-03 NOTE — PROGRESS NOTES
4 Eyes Skin Assessment     NAME:  Damian You  YOB: 1950  MEDICAL RECORD NUMBER:  [id number]    The patient is being assessed for  Admission    I agree that at least one RN has performed a thorough Head to Toe Skin Assessment on the patient. ALL assessment sites listed below have been assessed.      Areas assessed by both nurses:    Head, Face, Ears, Shoulders, Back, Chest, Arms, Elbows, Hands, Sacrum. Buttock, Coccyx, Ischium, Legs. Feet and Heels, and Under Medical Devices         Does the Patient have a Wound? Yes wound(s) were present on assessment. LDA wound assessment was Initiated and completed by RN       Christiano Prevention initiated by RN: Yes  Wound Care Orders initiated by RN: Yes    Pressure Injury (Stage 3,4, Unstageable, DTI, NWPT, and Complex wounds) if present, place Wound referral order by RN under : Yes    New Ostomies, if present place, Ostomy referral order under : No     Nurse 1 eSignature: Electronically signed by Criselda Tong RN on 4/3/25 at 5:05 PM CDT    **SHARE this note so that the co-signing nurse can place an eSignature**    Nurse 2 eSignature: Electronically signed by Micaela Azevedo RN on 4/3/25 at 5:30 PM CDT

## 2025-04-03 NOTE — ED PROVIDER NOTES
[facility] EMERGENCY DEPARTMENT  eMERGENCY dEPARTMENT eNCOUnter      Pt Name: Damian You  MRN: [id number]  Birthdate 1950  Date of evaluation: 4/3/2025  Provider: WILLIAM SCHULTE MD    CHIEF COMPLAINT       Chief Complaint   Patient presents with    Bradycardia     Sent by home health nurse for HR in 30s and 40s         HISTORY OF PRESENT ILLNESS   (Location/Symptom, Timing/Onset,Context/Setting, Quality, Duration, Modifying Factors, Severity)  Note limiting factors.   Damian You is a 74 y.o. male who presents to the emergency department for concern of bradycardia with heart rate in the 30s and 40s which was noticed by home health this morning.  He states that he has felt well recently and has no complaints denies dizziness or lightheadedness no chest pain or shortness of breath.  Denies any recent medication changes.  He does have known paroxysmal atrial fibrillation and congestive heart failure and currently being treated for colon cancer with mets to the liver followed by Dr. Boswell.  Had chemo infusion started Wednesday that runs until Friday but the needle came out of port earlier.  Most recently has seen Dr. Mccloud cardiology.      HPI    NursingNotes were reviewed.    REVIEW OF SYSTEMS    (2-9 systems for level 4, 10 or more for level 5)     Review of Systems   Constitutional:  Negative for chills and fever.   HENT:  Negative for rhinorrhea and sore throat.    Respiratory:  Negative for cough and shortness of breath.    Cardiovascular:  Negative for chest pain and leg swelling.   Gastrointestinal:  Negative for abdominal pain, diarrhea, nausea and vomiting.   Genitourinary:  Negative for dysuria and frequency.   Musculoskeletal:  Negative for back pain and neck pain.   Neurological:  Negative for dizziness and headaches.          PAST MEDICALHISTORY     Past Medical History:   Diagnosis Date    CAD (coronary artery disease)     Gout     Hypertension     Hypotension 6/23/2020    Non-ST elevation  (2/5/2025)    Housing Stability Vital Sign     Unable to Pay for Housing in the Last Year: No     Number of Times Moved in the Last Year: 0     Homeless in the Last Year: No       SCREENINGS    [place] Coma Scale  Eye Opening: Spontaneous  Best Verbal Response: Oriented  Best Motor Response: Obeys commands  [place] Coma Scale Score: 15        PHYSICAL EXAM    (up to 7 for level 4, 8 or more for level 5)     ED Triage Vitals [04/03/25 1231]   BP Systolic BP Percentile Diastolic BP Percentile Temp Temp src Pulse Respirations SpO2   (!) 142/44 -- -- 97.6 °F (36.4 °C) -- (!) 36 16 100 %      Height Weight - Scale         -- 68.5 kg (151 lb)             Physical Exam  Vitals and nursing note reviewed.   Constitutional:       General: He is not in acute distress.     Appearance: Normal appearance. He is well-developed. He is not diaphoretic.   HENT:      Head: Normocephalic and atraumatic.   Eyes:      Conjunctiva/sclera: Conjunctivae normal.   Neck:      Trachea: No tracheal deviation.   Cardiovascular:      Rate and Rhythm: Bradycardia present. Rhythm irregular.      Heart sounds: Normal heart sounds. No murmur heard.  Pulmonary:      Breath sounds: Normal breath sounds. No wheezing or rales.   Abdominal:      Palpations: Abdomen is soft.      Tenderness: There is no abdominal tenderness.      Comments: Ostomy with brown stool present   Musculoskeletal:         General: Normal range of motion.      Cervical back: Normal range of motion and neck supple.   Skin:     General: Skin is warm and dry.   Neurological:      Mental Status: He is alert and oriented to person, place, and time.           DIAGNOSTIC RESULTS     EKG: All EKG's areinterpreted by the Emergency Department Physician who either signs or Co-signs this chart in the absence of a cardiologist.    Heart rate 41 irregular bradycardia likely afib no obvious ST elevation or depression QTc 477 nondiagnostic EKG    59 sinus rhythm no obvious ST changes qtc 487 non

## 2025-04-03 NOTE — TELEPHONE ENCOUNTER
[facility] Damian has a low heart rate of 30-35 and  blood pressure of 106/64  he is also symptomatic.     I advised PRAVIN Davis,  patient needs to go to ED from the direction of ONEAL Price/Dr. Martinez.

## 2025-04-03 NOTE — ED NOTES
ED TO INPATIENT SBAR HANDOFF    Patient Name: Damian You   : 1950  74 y.o.   Family/Caregiver Present: Yes  Code Status Order: Prior    C-SSRS: Risk of Suicide: No Risk  Sitter No  Restraints:         Situation  Chief Complaint:   Chief Complaint   Patient presents with    Bradycardia     Sent by home health nurse for HR in 30s and 40s     Patient Diagnosis: Bradycardia [R00.1]     Brief Description of Patient's Condition: 74 y.o. male who presents to the emergency department for concern of bradycardia with heart rate in the 30s and 40s which was noticed by home health this morning.  He states that he has felt well recently and has no complaints denies dizziness or lightheadedness no chest pain or shortness of breath.  Denies any recent medication changes.  He does have known paroxysmal atrial fibrillation and congestive heart failure and currently being treated for colon cancer with mets to the liver followed by Dr. Boswell.  Had chemo infusion started Wednesday that runs until Friday but the needle came out of port earlier.  Most recently has seen Dr. Mccloud cardiology.    Mental Status: oriented, alert, coherent, logical, thought processes intact, and able to concentrate and follow conversation  Arrived from: home    Imaging:   XR CHEST PORTABLE   Final Result   1.  Interval removal of previously documented esophagogastric tube with placement of an implanted right chest port with the catheter tip approximating the distal SVC. No pneumothorax.   2. Stable elevation of the right hemidiaphragm without additional acute intrathoracic process.   3. Stable advanced degenerative right shoulder changes.               ______________________________________    Electronically signed by: IDA LAMBERT M.D.   Date:     2025   Time:    13:04         COVID-19 Results:   Internal Administration   First Dose COVID-19, MODERNA BLUE border, Primary or Immunocompromised, (age 12y+), IM, 100

## 2025-04-03 NOTE — H&P
[facility] [place]      Hospitalist - History & Physical      PCP: Ramona Smith MD    Date of Admission: 4/3/2025    Date of Service: 4/3/2025    Chief Complaint:  Bradycardia    History Of Present Illness:   The patient is a 74 y.o. male with CAD, HTN, history of colon cancer with liver metastasis comes to ED for evaluation of bradycardia. Patient does have home health who found him with a heart rate in the 30s and 40s when they checked his vitals this morning.  Patient does have history of CHF and A-fib and is currently undergoing treatment for colon cancer with liver metastasis by Dr. Boswell.  Patient denies any dizziness, lightheadedness, chest pain, shortness of breath.  Patient was given a dose of atropine in the ED with improvement of his heart rate to the 60s.  Denies any recent medication changes. Denies fever, chills, nausea, vomiting, abdominal pain, shortness of breath, and chest pain.     In ED: EKG A-fib with slow ventricular response with PVCs HR 40; CXR with no acute intrathoracic process; WBC 15, H&H 11.1/34.6, BNP 6548, troponin 33, creatinine 1.8, BUN 37, GFR 39, potassium 3.4.  Patient will be admitted inpatient to hospitalist with consult to cardiology.    Past Medical History:        Diagnosis Date    CAD (coronary artery disease)     Gout     Hypertension     Hypotension 6/23/2020    Non-ST elevation MI (NSTEMI) (HCC) 12/28/14    Palliative care patient 06/29/2020    Pneumonia due to infectious organism 6/24/2020       Past Surgical History:        Procedure Laterality Date    CARDIAC CATHETERIZATION  12/29/14  [facility]    angioplasty, stent to LAD. EF 45%    CHOLECYSTECTOMY      FEMUR FRACTURE SURGERY Right 10/30/2020    TFN, SHORT performed by Manuelito Biggs MD at [facility] OR    LAPAROTOMY N/A 02/07/2025    EXPLORATORY LAPAROTOMY, CECOSTOMY, LOOP COLOSTOMY, LYSIS OF ADHESIONS performed by Toro Gutiérrez MD at [facility] OR    PORT SURGERY Right 2/14/2025    PORT INSERTION performed by Joshua  heard.  Pulmonary:      Effort: Pulmonary effort is normal. No respiratory distress.      Breath sounds: Normal breath sounds. No wheezing, rhonchi or rales.   Abdominal:      General: Bowel sounds are normal. There is no distension.      Palpations: Abdomen is soft.      Tenderness: There is no abdominal tenderness. There is no guarding.   Musculoskeletal:         General: Normal range of motion.   Skin:     General: Skin is warm and dry.      Capillary Refill: Capillary refill takes less than 2 seconds.   Neurological:      Mental Status: He is alert and oriented to person, place, and time. Mental status is at baseline.      Motor: No weakness.      Diagnostic Data:  CBC:  Recent Labs     04/02/25  1132 04/03/25  1242   WBC 9.20 15.2*   HGB 10.9* 11.1*   HCT 32.4* 34.6*    285     BMP:  Recent Labs     04/02/25  1132 04/03/25  1242    140   K 3.1* 3.4*    106   CO2 21* 23   BUN 25* 37*   CREATININE 1.5* 1.8*   CALCIUM 8.2* 9.0   PHOS 2.7  --      Recent Labs     04/02/25  1132 04/03/25  1242   AST 25 29   ALT 12 18   BILITOT 0.3 0.2   ALKPHOS 129 125     Coag Panel:   Recent Labs     04/03/25  1242   INR 1.12   PROTIME 14.4   APTT 25.0*     Urinalysis:  Lab Results   Component Value Date/Time    NITRU Negative 02/06/2025 03:46 AM    WBCUA 3 02/06/2025 03:46 AM    BACTERIA Negative 02/06/2025 03:46 AM    RBCUA 1 02/06/2025 03:46 AM    BLOODU Negative 02/06/2025 03:46 AM    GLUCOSEU Negative 02/06/2025 03:46 AM     XR CHEST PORTABLE  Result Date: 4/3/2025  EXAM: CHEST RADIOGRAPH  TECHNIQUE: Single frontal chest radiograph.  HISTORY: Shortness of breath. Bradycardia.  COMPARISON: To 625.  FINDINGS:  Interval removal of previously documented esophagogastric tube. Interval placement of an implanted right chest port. Cardiomediastinal silhouette and central pulmonary vessels appear unremarkable. Stable elevation of the right hemidiaphragm. No new consolidation, significant pleural effusion or evidence

## 2025-04-03 NOTE — CONSULTS
MEDICAL ONCOLOGY CONSULTATION    Pt Name: Damian You  MRN: [id number]  YOB: 1950  Date of evaluation: 4/3/2025    REASON FOR CONSULTATION: Stage IV colon cancer metastatic, complete of care  REQUESTING PHYSICIAN: Hospitalist    History Obtained From:    patient, electronic medical record    HISTORY OF PRESENT ILLNESS:   Mr. You is a pleasant 80 years old male who has a diagnosis of stage IV colonic adenocarcinoma, K-gomez wild-type currently receiving palliative chemotherapy with modified FOLFOX.  The patient is receiving treatment with dose reduction.  Patient has several other multiple comorbidities.  Patient is very frail as well.  Patient receives his second treatment on 4/2/2025 in the clinic.  Patient had a fall at home.  Patient was checked by home health and was found to be bradycardic.  Patient has a history of atrial fibrillation.  Patient was sent to the emergency department admitted to the hospital service.  I was consulted for continuity of care.    Laboratory studies at admission showed WBC 15.2, hemoglobin 11.1, platelet count 285,000.  Chemistry remarkable for potassium 3.4, creatinine 1.8.  Elevated proBNP.  Normal LFTs.    Chest x-ray showed interval removal of previous documented esophageal gastric tube with placement of an implanted right chest port.  Stable elevation of the right hemidiaphragm without additional acute intrathoracic process.        PRIOR ONCOLOGICAL HISTORY    The patient is a very pleasant 74-year-old male who presents today for cycle number 2 of palliative chemotherapy for his newly diagnosed colonic adenocarcinoma K-gomez wild-type with liver metastasis stage IV, MMR proficient.     Palliative chemotherapy with modified FOLFOX and dose reduction was offered due to his advanced age and frailty. The first cycle of chemotherapy was tolerated with significant fatigue and diarrhea. Lomotil and Imodium were used to manage the diarrhea, which has since subsided. He  performed in the interval. There is a 7.8 x 4 .4 cm metabolically active mass in the sigmoid colon with activity present in multiple presacral lymph nodes, as well as within borderline enlarged retroperitoneal nodes and multiple hepatic lesions. The findings are compatible with primary colon cancer with local and distant metastatic spread.     ______________________________________ Electronically signed by: LONDON POLO M.D. Date:     03/06/2025 Time:    07:20       ASSESSMENT:  Stage IV colonic adenocarcinoma K-gomez wild-type, MMR proficient-currently status post 2 cycles modified dose reduced FOLFOX chemotherapy palliative treatment.  Last treatment for 2/2025.  Multifactorial anemia-secondary to chemotherapy/anemia of malignancy  CKD stage III  Geriatric failure syndrome  A-fib with RVR-low-dose amiodarone.  Normal EF.  Patient was admitted with bradycardia.  Cardiology  Hypokalemia-potassium 3.4.  Neutrophilic leukocytosis  DVT prophylaxis-Lovenox low-dose.  Hypothyroidism-continue levothyroxine      PLAN:  Continue current supportive care  Continue current cardiac care  Replace potassium  Reviewed cardiology recommendations    I have seen, examined and reviewed this patient medication list, appropriate labs and imaging studies. I reviewed relevant medical records and others physician’s notes. I discussed the plans of care with the patient. I answered all the questions to the patient’s satisfaction. I have also reviewed the chief complaint (CC) and part of the history (History of Present Illness (HPI), Past Family Social History (PFSH), or Review of Systems (ROS) and made changes when appropriated.       (Please note that portions of this note were completed with a voice recognition program. Efforts were made to edit the dictations but occasionally words are mis-transcribed.)        Halle Boswell MD    04/03/25  5:31 PM

## 2025-04-03 NOTE — PROGRESS NOTES
Damian You arrived to room # 407.   Presented with: bradycardia  Mental Status: Patient is oriented and alert.   Vitals:    04/03/25 1549   BP: (!) 176/70   Pulse: (!) 48   Resp:    Temp: 96.8 °F (36 °C)   SpO2: 100%     Patient safety contract and falls prevention contract reviewed with patient Yes.  Oriented Patient to room.  Call light within reach. Yes.  Needs, issues or concerns expressed at this time: no.      Electronically signed by Criselda Tong RN on 4/3/2025 at 5:04 PM

## 2025-04-03 NOTE — ED NOTES
Accessed pt port using sterile technique with PRAVIN Ojeda assisting. Port flushed, heparin locked and deaccessed.

## 2025-04-04 ENCOUNTER — APPOINTMENT (OUTPATIENT)
Age: 75
DRG: 242 | End: 2025-04-04
Payer: MEDICARE

## 2025-04-04 ENCOUNTER — HOSPITAL ENCOUNTER (OUTPATIENT)
Dept: INFUSION THERAPY | Age: 75
End: 2025-04-04

## 2025-04-04 PROBLEM — E43 SEVERE MALNUTRITION: Status: ACTIVE | Noted: 2025-04-04

## 2025-04-04 PROBLEM — R00.1 BRADYCARDIA, UNSPECIFIED: Status: ACTIVE | Noted: 2025-04-03

## 2025-04-04 LAB
ANION GAP SERPL CALCULATED.3IONS-SCNC: 9 MMOL/L (ref 8–16)
BUN SERPL-MCNC: 37 MG/DL (ref 8–23)
CALCIUM SERPL-MCNC: 8.8 MG/DL (ref 8.8–10.2)
CHLORIDE SERPL-SCNC: 109 MMOL/L (ref 98–107)
CO2 SERPL-SCNC: 24 MMOL/L (ref 22–29)
CREAT SERPL-MCNC: 1.7 MG/DL (ref 0.7–1.2)
ECHO AO ASC DIAM: 3.3 CM
ECHO AO ASCENDING AORTA INDEX: 1.77 CM/M2
ECHO AO ROOT DIAM: 2.8 CM
ECHO AO ROOT INDEX: 1.51 CM/M2
ECHO AO SINUS VALSALVA DIAM: 3 CM
ECHO AO SINUS VALSALVA INDEX: 1.61 CM/M2
ECHO AO ST JNCT DIAM: 3.2 CM
ECHO AV AREA PEAK VELOCITY: 2.5 CM2
ECHO AV AREA VTI: 1.9 CM2
ECHO AV AREA/BSA PEAK VELOCITY: 1.3 CM2/M2
ECHO AV AREA/BSA VTI: 1 CM2/M2
ECHO AV MEAN GRADIENT: 4 MMHG
ECHO AV MEAN VELOCITY: 0.9 M/S
ECHO AV PEAK GRADIENT: 7 MMHG
ECHO AV PEAK VELOCITY: 1.3 M/S
ECHO AV VELOCITY RATIO: 0.69
ECHO AV VTI: 38.9 CM
ECHO BSA: 1.84 M2
ECHO EST RA PRESSURE: 3 MMHG
ECHO LA AREA 2C: 22.7 CM2
ECHO LA AREA 4C: 24 CM2
ECHO LA DIAMETER INDEX: 2.31 CM/M2
ECHO LA DIAMETER: 4.3 CM
ECHO LA MAJOR AXIS: 6.1 CM
ECHO LA MINOR AXIS: 6.7 CM
ECHO LA TO AORTIC ROOT RATIO: 1.54
ECHO LA VOL BP: 72 ML (ref 18–58)
ECHO LA VOL MOD A2C: 63 ML (ref 18–58)
ECHO LA VOL MOD A4C: 75 ML (ref 18–58)
ECHO LA VOL/BSA BIPLANE: 39 ML/M2 (ref 16–34)
ECHO LA VOLUME INDEX MOD A2C: 34 ML/M2 (ref 16–34)
ECHO LA VOLUME INDEX MOD A4C: 40 ML/M2 (ref 16–34)
ECHO LV E' LATERAL VELOCITY: 5.31 CM/S
ECHO LV E' SEPTAL VELOCITY: 4 CM/S
ECHO LV EDV A2C: 157 ML
ECHO LV EDV A4C: 160 ML
ECHO LV EDV INDEX A4C: 86 ML/M2
ECHO LV EDV NDEX A2C: 84 ML/M2
ECHO LV EF PHYSICIAN: 60 %
ECHO LV EJECTION FRACTION A2C: 60 %
ECHO LV EJECTION FRACTION A4C: 62 %
ECHO LV EJECTION FRACTION BIPLANE: 63 % (ref 55–100)
ECHO LV ESV A2C: 62 ML
ECHO LV ESV A4C: 61 ML
ECHO LV ESV INDEX A2C: 33 ML/M2
ECHO LV ESV INDEX A4C: 33 ML/M2
ECHO LV FRACTIONAL SHORTENING: 26 % (ref 28–44)
ECHO LV INTERNAL DIMENSION DIASTOLE INDEX: 3.12 CM/M2
ECHO LV INTERNAL DIMENSION DIASTOLIC: 5.8 CM (ref 4.2–5.9)
ECHO LV INTERNAL DIMENSION SYSTOLIC INDEX: 2.31 CM/M2
ECHO LV INTERNAL DIMENSION SYSTOLIC: 4.3 CM
ECHO LV IVSD: 0.8 CM (ref 0.6–1)
ECHO LV MASS 2D: 161.9 G (ref 88–224)
ECHO LV MASS INDEX 2D: 87.1 G/M2 (ref 49–115)
ECHO LV POSTERIOR WALL DIASTOLIC: 0.7 CM (ref 0.6–1)
ECHO LV RELATIVE WALL THICKNESS RATIO: 0.24
ECHO LVOT AREA: 3.8 CM2
ECHO LVOT AV VTI INDEX: 0.51
ECHO LVOT DIAM: 2.2 CM
ECHO LVOT MEAN GRADIENT: 1 MMHG
ECHO LVOT PEAK GRADIENT: 3 MMHG
ECHO LVOT PEAK VELOCITY: 0.9 M/S
ECHO LVOT STROKE VOLUME INDEX: 40.2 ML/M2
ECHO LVOT SV: 74.8 ML
ECHO LVOT VTI: 19.7 CM
ECHO MV A VELOCITY: 0.55 M/S
ECHO MV E DECELERATION TIME (DT): 253 MS
ECHO MV E VELOCITY: 0.88 M/S
ECHO MV E/A RATIO: 1.6
ECHO MV E/E' LATERAL: 16.57
ECHO MV E/E' RATIO (AVERAGED): 19.29
ECHO MV E/E' SEPTAL: 22
ECHO PULMONARY ARTERY END DIASTOLIC PRESSURE: 6 MMHG
ECHO PV REGURGITANT MAX VELOCITY: 1.2 M/S
ECHO RA AREA 4C: 10.9 CM2
ECHO RA END SYSTOLIC VOLUME APICAL 4 CHAMBER INDEX BSA: 12 ML/M2
ECHO RA VOLUME: 23 ML
ECHO RIGHT VENTRICULAR SYSTOLIC PRESSURE (RVSP): 29 MMHG
ECHO RV BASAL DIMENSION: 4.2 CM
ECHO RV INTERNAL DIMENSION: 3.3 CM
ECHO RV LONGITUDINAL DIMENSION: 7.7 CM
ECHO RV MID DIMENSION: 5 CM
ECHO RV TAPSE: 2.2 CM (ref 1.7–?)
ECHO TV REGURGITANT MAX VELOCITY: 2.54 M/S
ECHO TV REGURGITANT PEAK GRADIENT: 26 MMHG
EKG P AXIS: 56 DEGREES
EKG P AXIS: NORMAL DEGREES
EKG P-R INTERVAL: 170 MS
EKG P-R INTERVAL: NORMAL MS
EKG Q-T INTERVAL: 486 MS
EKG Q-T INTERVAL: 500 MS
EKG Q-T INTERVAL: 546 MS
EKG Q-T INTERVAL: 566 MS
EKG QRS DURATION: 116 MS
EKG QRS DURATION: 122 MS
EKG QRS DURATION: 124 MS
EKG QRS DURATION: 126 MS
EKG QTC CALCULATION (BAZETT): 471 MS
EKG QTC CALCULATION (BAZETT): 483 MS
EKG QTC CALCULATION (BAZETT): 508 MS
EKG QTC CALCULATION (BAZETT): 531 MS
EKG T AXIS: -15 DEGREES
EKG T AXIS: 21 DEGREES
EKG T AXIS: 46 DEGREES
EKG T AXIS: 8 DEGREES
GLUCOSE SERPL-MCNC: 91 MG/DL (ref 70–99)
INR PPP: 1.15 (ref 0.88–1.18)
MAGNESIUM SERPL-MCNC: 2 MG/DL (ref 1.6–2.4)
POTASSIUM SERPL-SCNC: 3.8 MMOL/L (ref 3.5–5.1)
PROTHROMBIN TIME: 14.6 SEC (ref 12–14.6)
SODIUM SERPL-SCNC: 142 MMOL/L (ref 136–145)

## 2025-04-04 PROCEDURE — 80048 BASIC METABOLIC PNL TOTAL CA: CPT

## 2025-04-04 PROCEDURE — 93010 ELECTROCARDIOGRAM REPORT: CPT | Performed by: INTERNAL MEDICINE

## 2025-04-04 PROCEDURE — 93005 ELECTROCARDIOGRAM TRACING: CPT | Performed by: INTERNAL MEDICINE

## 2025-04-04 PROCEDURE — 6370000000 HC RX 637 (ALT 250 FOR IP)

## 2025-04-04 PROCEDURE — 83735 ASSAY OF MAGNESIUM: CPT

## 2025-04-04 PROCEDURE — 2500000003 HC RX 250 WO HCPCS

## 2025-04-04 PROCEDURE — 93306 TTE W/DOPPLER COMPLETE: CPT | Performed by: INTERNAL MEDICINE

## 2025-04-04 PROCEDURE — 99232 SBSQ HOSP IP/OBS MODERATE 35: CPT | Performed by: INTERNAL MEDICINE

## 2025-04-04 PROCEDURE — 36415 COLL VENOUS BLD VENIPUNCTURE: CPT

## 2025-04-04 PROCEDURE — 2060000000 HC ICU INTERMEDIATE R&B

## 2025-04-04 PROCEDURE — 85610 PROTHROMBIN TIME: CPT

## 2025-04-04 PROCEDURE — C8929 TTE W OR WO FOL WCON,DOPPLER: HCPCS

## 2025-04-04 PROCEDURE — 6370000000 HC RX 637 (ALT 250 FOR IP): Performed by: INTERNAL MEDICINE

## 2025-04-04 PROCEDURE — 6360000004 HC RX CONTRAST MEDICATION

## 2025-04-04 RX ADMIN — SODIUM CHLORIDE, PRESERVATIVE FREE 10 ML: 5 INJECTION INTRAVENOUS at 09:11

## 2025-04-04 RX ADMIN — LEVOTHYROXINE SODIUM 125 MCG: 0.12 TABLET ORAL at 09:11

## 2025-04-04 RX ADMIN — ATORVASTATIN CALCIUM 20 MG: 20 TABLET, FILM COATED ORAL at 18:04

## 2025-04-04 RX ADMIN — AMIODARONE HYDROCHLORIDE 100 MG: 100 TABLET ORAL at 09:11

## 2025-04-04 RX ADMIN — SULFUR HEXAFLUORIDE 2 ML: 60.7; .19; .19 INJECTION, POWDER, LYOPHILIZED, FOR SUSPENSION INTRAVENOUS; INTRAVESICAL at 07:53

## 2025-04-04 NOTE — PROGRESS NOTES
Comprehensive Nutrition Assessment    Type and Reason for Visit:  Positive nutrition screen    Nutrition Recommendations/Plan:   Will follow for diet advancement as tolerated and supplementation  Add severe malnutrition to problem list     Malnutrition Assessment:  Malnutrition Status:  Severe malnutrition (04/04/25 0945)    Context:  Acute Illness     Findings of the 6 clinical characteristics of malnutrition:  Energy Intake:  Mild decrease in energy intake  Weight Loss:  Greater than 5% over 1 month     Body Fat Loss:  Moderate body fat loss Orbital   Muscle Mass Loss:  Moderate muscle mass loss Temples (temporalis), Clavicles (pectoralis & deltoids)  Fluid Accumulation:  No fluid accumulation     Strength:  Not Performed    Nutrition Assessment:    +NS for wounds. Pt is currently NPO for cardiac cath today. Pt noted to have significant weight loss of 8.4% in past 30 days. Pt also followed by oncology. Will follow for diet advancement as tolerated and supplementation to help prevent further weight loss and aid in wound healing.    Nutrition Related Findings:    BUN 37, Cr 1.7, GFR 42 Wound Type: Pressure Injury, Stage II, Surgical Incision       Current Nutrition Intake & Therapies:    Average Meal Intake: NPO  Average Supplements Intake: NPO  Diet NPO    Anthropometric Measures:  Height: 177.8 cm (5' 10\")  Ideal Body Weight (IBW): 166 lbs (75 kg)    Admission Body Weight: 68.5 kg (151 lb 0.2 oz)  Current Body Weight: 69 kg (152 lb 1.9 oz), 91.6 % IBW. Weight Source: Bed scale  Current BMI (kg/m2): 21.8  Usual Body Weight: 75.3 kg (166 lb 0.1 oz) (Per office visit 2-27-25)   % Weight Change (Calculated): -8.4  Weight Adjustment For: No Adjustment   BMI Categories: Underweight (BMI less than 22) age over 65    Estimated Daily Nutrient Needs:  Energy Requirements Based On: Kcal/kg  Weight Used for Energy Requirements: Current  Energy (kcal/day): 1765-7473 (25-30/kg)  Weight Used for Protein Requirements:

## 2025-04-04 NOTE — PLAN OF CARE
Problem: Chronic Conditions and Co-morbidities  Goal: Patient's chronic conditions and co-morbidity symptoms are monitored and maintained or improved  Outcome: Progressing     Problem: Safety - Adult  Goal: Free from fall injury  Outcome: Progressing     Problem: ABCDS Injury Assessment  Goal: Absence of physical injury  Outcome: Progressing     Problem: Skin/Tissue Integrity  Goal: Skin integrity remains intact  Description: 1.  Monitor for areas of redness and/or skin breakdown  2.  Assess vascular access sites hourly  3.  Every 4-6 hours minimum:  Change oxygen saturation probe site  4.  Every 4-6 hours:  If on nasal continuous positive airway pressure, respiratory therapy assess nares and determine need for appliance change or resting period  Outcome: Progressing

## 2025-04-04 NOTE — PROGRESS NOTES
MEDICAL ONCOLOGY PROGRESS NOTE     Pt Name: Damian You  MRN: [id number]  YOB: 1950  Date of evaluation: 4/4/2025    Subjective-patient denies new complaints morning.  Slept well.  Afebrile.  Denies any chest pain.  Denies any short of breath.    HISTORY OF PRESENT ILLNESS:   Mr. You is a pleasant 80 years old male who has a diagnosis of stage IV colonic adenocarcinoma, K-gomez wild-type currently receiving palliative chemotherapy with modified FOLFOX.  The patient is receiving treatment with dose reduction.  Patient has several other multiple comorbidities.  Patient is very frail as well.  Patient receives his second treatment on 4/2/2025 in the clinic.  Patient had a fall at home.  Patient was checked by home health and was found to be bradycardic.  Patient has a history of atrial fibrillation.  Patient was sent to the emergency department admitted to the hospital service.  I was consulted for continuity of care.    Laboratory studies at admission showed WBC 15.2, hemoglobin 11.1, platelet count 285,000.  Chemistry remarkable for potassium 3.4, creatinine 1.8.  Elevated proBNP.  Normal LFTs.    Chest x-ray showed interval removal of previous documented esophageal gastric tube with placement of an implanted right chest port.  Stable elevation of the right hemidiaphragm without additional acute intrathoracic process.        PRIOR ONCOLOGICAL HISTORY    The patient is a very pleasant 74-year-old male who presents today for cycle number 2 of palliative chemotherapy for his newly diagnosed colonic adenocarcinoma K-gomez wild-type with liver metastasis stage IV, MMR proficient.     Palliative chemotherapy with modified FOLFOX and dose reduction was offered due to his advanced age and frailty. The first cycle of chemotherapy was tolerated with significant fatigue and diarrhea. Lomotil and Imodium were used to manage the diarrhea, which has since subsided. He reports feeling overall better today, with a slight  recommendations  Cardiology assessing  I will see the patient during his next treatment on 4/16/2025        (Please note that portions of this note were completed with a voice recognition program. Efforts were made to edit the dictations but occasionally words are mis-transcribed.)        Halle Boswell MD    04/04/25  5:12 AM

## 2025-04-04 NOTE — PROGRESS NOTES
[facility]  Nurse  Consult Note       NAME:  Damian You  MEDICAL RECORD NUMBER:  [id number]  AGE: 74 y.o.   GENDER: male  : 1950  TODAY'S DATE:  2025    Subjective   Reason for Wound Nurse Evaluation and Assessment: non-healing surgical wound in umbilicus      Damian You is a 74 y.o. male referred by:   [] Physician  [x] Nursing  [] Other:     Wound Identification:  Wound Type: non-healing surgical  Contributing Factors: none    Wound History: Patient presented to the hospital with a non-healing wound to his umbilical area that is currently being followed by outpatient wound care center. There is a small amount of slough present in the wound bed with a small amount of serous drainage noted.       Current Wound Care Treatment:      UMBILICUS: Cleanse wound with soap and water with each dressing change. Apply Hydrofera Blue cut to fit the wound and moisten with saline. Cover with dry gauze and tape. Change every 48hrs and PRN      Patient Goal of Care:  [x] Wound Healing  [] Odor Control  [] Palliative Care  [] Pain Control   [] Other:         PAST MEDICAL HISTORY        Diagnosis Date    CAD (coronary artery disease)     Gout     Hypertension     Hypotension 2020    Non-ST elevation MI (NSTEMI) ([facility]) 14    Palliative care patient 2020    Pneumonia due to infectious organism 2020       PAST SURGICAL HISTORY    Past Surgical History:   Procedure Laterality Date    CARDIAC CATHETERIZATION  14  [facility]    angioplasty, stent to LAD. EF 45%    CHOLECYSTECTOMY      FEMUR FRACTURE SURGERY Right 10/30/2020    TFN, SHORT performed by Manuelito Biggs MD at [facility] OR    LAPAROTOMY N/A 2025    EXPLORATORY LAPAROTOMY, CECOSTOMY, LOOP COLOSTOMY, LYSIS OF ADHESIONS performed by Toro Gutiérrez MD at [facility] OR    PORT SURGERY Right 2025    PORT INSERTION performed by Amberly Lewis MD at [facility] OR    SIGMOIDOSCOPY N/A 02/10/2025    Dr Murray-5 mm stricture noted at 20 cm in the  Serosanguinous 04/02/25 0956   Odor None 04/02/25 0956   Zenobia-wound Assessment Fragile 04/03/25 1620   Margins Attached edges 04/02/25 0956   Number of days: 9     Incision 02/14/25 Chest Right (Active)   Number of days: 49         Response to treatment:  Well tolerated by patient.     Pain Assessment:  Severity:  0 / 10  Quality of pain: N/A  Wound Pain Timing/Severity: none  Premedicated: N/A    Plan   Plan of Care: Wound 02/20/25 Abdomen Medial #1 Umbilicus (Surgical)-Dressing/Treatment: Moist to dry, Gauze dressing/dressing sponge, Tape/Soft cloth adhesive tape    Specialty Bed Required : N/A   [] Low Air Loss   [] Pressure Redistribution  [] Fluid Immersion  [] Bariatric  [] Total Pressure Relief  [] Other:     Current Diet: Diet NPO  Dietician consult:  N/A    Discharge Plan:  Placement for patient upon discharge: home with support    Patient appropriate for Outpatient Wound Care Center: Yes    Referrals:  []   [x] Home Health Care  [] Supplies  [] Other    Patient/Caregiver Teaching:  Level of patient/caregiver understanding able to:   [] Indicates understanding       [] Needs reinforcement  [] Unsuccessful      [x] Verbal Understanding  [] Demonstrated understanding       [] No evidence of learning  [] Refused teaching         [] N/A       Electronically signed by   Raven Arrington RN 4/4/2025

## 2025-04-04 NOTE — PROGRESS NOTES
Cardiology Progress Note Calvin Martinez MD      Patient:  Damian You  [id number]    Patient Active Problem List    Diagnosis Date Noted    Postoperative anemia due to acute blood loss 10/31/2020     Priority: High    Severe malnutrition 04/04/2025     Priority: Low    Bradycardia 04/03/2025     Priority: Low    Hypothyroid 04/03/2025     Priority: Low    Chronic kidney disease 04/03/2025     Priority: Low    Chemotherapy management, encounter for 04/03/2025     Priority: Low    Normocytic anemia 04/03/2025     Priority: Low    Bradycardia, unspecified 04/03/2025     Priority: Low    Buttock wound, right, initial encounter 03/26/2025     Priority: Low    Hypomagnesemia 03/26/2025     Priority: Low    Encounter for screening for other viral diseases 03/12/2025     Priority: Low    Abdominal wall skin ulcer, with fat layer exposed (HCC) 02/21/2025     Priority: Low    Moderate malnutrition 02/11/2025     Priority: Low    Colon cancer metastasized to liver (HCC) 02/10/2025     Priority: Low    Colonic thickening 02/10/2025     Priority: Low    Medically complex patient 02/10/2025     Priority: Low    Life threatening medical illness 02/10/2025     Priority: Low    Hypokalemia 02/06/2025     Priority: Low    Abnormal abdominal CT scan 02/05/2025     Priority: Low    Abnormal computed tomography of sigmoid colon 02/05/2025     Priority: Low    Adenocarcinoma of colon 02/05/2025     Priority: Low     Overview Note:     Stage 4 colon cancer with liver mets  2/10/2025-sigmoidoscopy diagnostic flexible-mass at 20 cm with lumen less than 5 mm.  Biopsies taken: MMR proficient invasive moderate differentiated adenocarcinoma  2/13/2025-essentially, MMR proficient stage IV metastatic sigmoid adenocarcinoma.  Recommended modified FOLFOX.  Recommended NGS Tempus.      CKD stage 3a, GFR 45-59 ml/min (HCC) 02/14/2022     Priority: Low    Neutrophilic leukocytosis 02/14/2022     Priority: Low    Intracerebral hemorrhage, nontraumatic

## 2025-04-04 NOTE — PROGRESS NOTES
[facility]      [facility]    Patient:  Damian You  YOB: 1950  Date of Service: 4/4/2025  MRN: [id number]   Acct: [id number]   Primary Care Physician: Ramona Smith MD  Advance Directive: Full Code  Admit Date: 4/3/2025       Hospital Day: 1    Portions of this note have been copied forward, however, updated to reflect the most current clinical status of this patient.     CHIEF COMPLAINT bradycardia    SUBJECTIVE:  Mr. You was resting in bed this morning.  Denies lightheadedness or dizziness.  Denies shortness of breath or chest pain.      CUMULATIVE HOSPITAL COURSE:   Patient is a 74-year-old male with past medical history of paroxysmal A-fib, CHF, CAD, hypertension, history of colon cancer with liver metastasis on palliative chemotherapy who presented to [facility] ED for evaluation of bradycardia.  Reportedly home health found patient to have heart rate in 30s and 40s during vital checks.  Denied lightheadedness or dizziness.  Denied shortness of breath or chest pain.  Was given a dose of atropine in ED with improvement of his heart rate in the 60s.  Workup in ED revealed initial EKG with findings of A-fib with slow ventricular response with PVCs HR 40; CXR with no acute intrathoracic process; WBC 15, H&H 11.1/34.6, BNP 6548, troponin 33, creatinine 1.8, BUN 37, GFR 39, potassium 3.4.  Patient was admitted to hospital medicine for further evaluation with cardiology consultation.  Cardiology plans for pacemaker on Monday. ECHO indicated EF 55-60%, LV is dilated, normal wall thickness, normal wall motion, grade II diastolic dysfunction with increased LAP.       Review of Systems   Constitutional:  Negative for chills, diaphoresis, fatigue and fever.   HENT:  Negative for congestion and ear pain.    Eyes:  Negative for visual disturbance.   Respiratory:  Negative for cough, shortness of breath and wheezing.    Cardiovascular:  Negative for chest pain, palpitations and leg swelling.

## 2025-04-04 NOTE — PROGRESS NOTES
Palliative Care initiation: This  visited with pt to initiate palliative care and provide spiritual care. Pt says he is getting a pacemaker on Monday. He says his heart rate has been too low. Pt says he is a Restoration and says his emerald is important to him. Pt is known to palliative care.         Advance Directives: Pt says his sisters Ngozi Tapia and Cassie Pope are his primary decision makers. Pt says he is full code and wants CPR and Ventilator. SEE ACP NOTE.         Pain/other symptoms: Pt says he is not having pain.       Spiritual: Pt says he is a Restoration.         Pt/family discussion r/t goals: Pt says he lives at home with his dog and has been able to perform daily living skills to care for himself. Pt says he ambulates with a walker. Pt's goal is a pacemaker on Monday and he wants to return home and continue care for himself and his puppy.     Provided spiritual care with sustaining presence, support, and prayer.   Pt expressed gratitude for spiritual care.         Spiritual Health History and Assessment/Progress Note  [facility]    Initial Encounter, Spiritual/Emotional Needs,  ,  ,      Name: Damian You MRN: [id number]    Age: 74 y.o.     Sex: male   Language: English   Nondenominational: None   Bradycardia     Date: 4/4/2025            Total Time Calculated: 30 min              Spiritual Assessment began in [facility] ONCOLOGY UNIT        Referral/Consult From: Palliative Care   Encounter Overview/Reason: Initial Encounter, Spiritual/Emotional Needs  Service Provided For: Patient    Emerald, Belief, Meaning:   Patient identifies as spiritual and is connected with a emerald tradition or spiritual practice  Family/Friends No family/friends present      Importance and Influence:  Patient has spiritual/personal beliefs that influence decisions regarding their health  Family/Friends No family/friends present    Community:  Patient Other: Not connected with a specific emerald

## 2025-04-04 NOTE — ACP (ADVANCE CARE PLANNING)
Advance Care Planning     Advance Care Planning Inpatient Note  [facility] Department    Today's Date: 4/4/2025  Unit: [facility] 4 ONCOLOGY UNIT    Received request from Other: Palliative care .  Upon review of chart and communication with care team, patient's decision making abilities are not in question.. Patient was/were present in the room during visit.    Goals of ACP Conversation:  Discuss advance care planning documents    Health Care Decision Makers:       Primary Decision Maker (Active): Ngozi Tapia - Brother/Sister - [phone]    Secondary Decision Maker: Cassie Pope - Brother/Sister - [phone]    Secondary Decision Maker: Cynthia Doe - Other - [phone]    Secondary Decision Maker: Darien Soto - Other - [phone]  Summary:  Documented Next of Kin, per patient report    Advance Care Planning Documents (Patient Wishes):  Confirmed NOK decision makers.       Assessment:  Pt is a 74 year old male who lives at home with his dog. He says he ambulates with a walker and usually is able to care for himself. Pt says he is getting a pacemaker on Monday. His goal is to return home and continue to care for himself and his dog.     Interventions:  Confirmed NOK decision makers, and code status.     Care Preferences Communicated:     Hospitalization:  If the patient's health worsens and it becomes clear that the chance of recovery is unlikely,     the patient wants hospitalization.    Ventilation:   If the patient, in their present state of health, suddenly became very ill and unable to breathe on their own,     the patient would desire the use of a ventilator (breathing machine).    If their health worsens and it becomes clear that the change of recovery is unlikely,     the patient would desire the use of a ventilator (breathing machine).    Resuscitation:  In the event the patient's heart stopped as a result of an underlying serious health condition, the patient communicates a preference for       resuscitative attempts (CPR).    Outcomes/Plan:  ACP Discussion: Completed    Electronically signed by Mary Behrens, Chaplain on 4/4/2025 at 10:23 AM

## 2025-04-05 LAB
ANION GAP SERPL CALCULATED.3IONS-SCNC: 9 MMOL/L (ref 8–16)
BUN SERPL-MCNC: 36 MG/DL (ref 8–23)
CALCIUM SERPL-MCNC: 8.2 MG/DL (ref 8.8–10.2)
CHLORIDE SERPL-SCNC: 110 MMOL/L (ref 98–107)
CO2 SERPL-SCNC: 22 MMOL/L (ref 22–29)
CREAT SERPL-MCNC: 1.6 MG/DL (ref 0.7–1.2)
GLUCOSE SERPL-MCNC: 83 MG/DL (ref 70–99)
MAGNESIUM SERPL-MCNC: 1.9 MG/DL (ref 1.6–2.4)
POTASSIUM SERPL-SCNC: 3.6 MMOL/L (ref 3.5–5.1)
SODIUM SERPL-SCNC: 141 MMOL/L (ref 136–145)

## 2025-04-05 PROCEDURE — 2500000003 HC RX 250 WO HCPCS

## 2025-04-05 PROCEDURE — 36415 COLL VENOUS BLD VENIPUNCTURE: CPT

## 2025-04-05 PROCEDURE — 80048 BASIC METABOLIC PNL TOTAL CA: CPT

## 2025-04-05 PROCEDURE — 2060000000 HC ICU INTERMEDIATE R&B

## 2025-04-05 PROCEDURE — 6360000002 HC RX W HCPCS

## 2025-04-05 PROCEDURE — 6370000000 HC RX 637 (ALT 250 FOR IP)

## 2025-04-05 PROCEDURE — 99231 SBSQ HOSP IP/OBS SF/LOW 25: CPT | Performed by: INTERNAL MEDICINE

## 2025-04-05 PROCEDURE — 83735 ASSAY OF MAGNESIUM: CPT

## 2025-04-05 RX ADMIN — LEVOTHYROXINE SODIUM 125 MCG: 0.12 TABLET ORAL at 08:52

## 2025-04-05 RX ADMIN — SODIUM CHLORIDE, PRESERVATIVE FREE 10 ML: 5 INJECTION INTRAVENOUS at 08:53

## 2025-04-05 RX ADMIN — ENOXAPARIN SODIUM 40 MG: 100 INJECTION SUBCUTANEOUS at 08:52

## 2025-04-05 RX ADMIN — SODIUM CHLORIDE, PRESERVATIVE FREE 10 ML: 5 INJECTION INTRAVENOUS at 21:01

## 2025-04-05 RX ADMIN — ATORVASTATIN CALCIUM 20 MG: 20 TABLET, FILM COATED ORAL at 17:28

## 2025-04-05 NOTE — PLAN OF CARE
Problem: Chronic Conditions and Co-morbidities  Goal: Patient's chronic conditions and co-morbidity symptoms are monitored and maintained or improved  Outcome: Progressing     Problem: Safety - Adult  Goal: Free from fall injury  Outcome: Progressing     Problem: ABCDS Injury Assessment  Goal: Absence of physical injury  Outcome: Progressing     Problem: Skin/Tissue Integrity  Goal: Skin integrity remains intact  Description: 1.  Monitor for areas of redness and/or skin breakdown  2.  Assess vascular access sites hourly  3.  Every 4-6 hours minimum:  Change oxygen saturation probe site  4.  Every 4-6 hours:  If on nasal continuous positive airway pressure, respiratory therapy assess nares and determine need for appliance change or resting period  Outcome: Progressing     Problem: Nutrition Deficit:  Goal: Optimize nutritional status  Outcome: Progressing

## 2025-04-05 NOTE — PROGRESS NOTES
[facility]      [facility]    Patient:  Damian You  YOB: 1950  Date of Service: 4/5/2025  MRN: [id number]   Acct: [id number]   Primary Care Physician: Ramona Smith MD  Advance Directive: Full Code  Admit Date: 4/3/2025       Hospital Day: 2    Portions of this note have been copied forward, however, updated to reflect the most current clinical status of this patient.     CHIEF COMPLAINT bradycardia    SUBJECTIVE:  Mr. You was resting in bed this morning.  Offers no new complaints at this time.      CUMULATIVE HOSPITAL COURSE:   Patient is a 74-year-old male with past medical history of paroxysmal A-fib, CHF, CAD, hypertension, history of colon cancer with liver metastasis on palliative chemotherapy who presented to [facility] ED for evaluation of bradycardia.  Reportedly home health found patient to have heart rate in 30s and 40s during vital checks.  Denied lightheadedness or dizziness.  Denied shortness of breath or chest pain.  Was given a dose of atropine in ED with improvement of his heart rate in the 60s.  Workup in ED revealed initial EKG with findings of A-fib with slow ventricular response with PVCs HR 40; CXR with no acute intrathoracic process; WBC 15, H&H 11.1/34.6, BNP 6548, troponin 33, creatinine 1.8, BUN 37, GFR 39, potassium 3.4.  Patient was admitted to hospital medicine for further evaluation with cardiology consultation.  Cardiology plans for pacemaker on Monday. ECHO indicated EF 55-60%, LV is dilated, normal wall thickness, normal wall motion, grade II diastolic dysfunction with increased LAP.       Review of Systems   Constitutional:  Negative for chills, diaphoresis, fatigue and fever.   HENT:  Negative for congestion and ear pain.    Eyes:  Negative for visual disturbance.   Respiratory:  Negative for cough, shortness of breath and wheezing.    Cardiovascular:  Negative for chest pain, palpitations and leg swelling.   Gastrointestinal:  Negative for  resolved hospital problems. *    Principal Problem:    Bradycardia-    - Cardiology following     - Plans for pacemaker on Monday    - Amiodarone and Toprol XL on hold per cardiology     - ECHO reviewed, results as above     - Monitor labs    - Monitor on telemetry       Active Problems:    Mixed hyperlipidemia-    - continue statin therapy       Atrial fibrillation (HCC)-   - Cardiology following   - Currently sinus bradycardia   - not on anticoagulation at home   - Home amiodarone and Toprol XL on hold per cardiology    - ECHO indicated EF 55-60%, LV is dilated, normal wall thickness, normal wall motion, grade II diastolic dysfunction with increased LAP   - Trend troponin- elevated but flat   - Serial and PRN EKGs  - Monitor on telemetry       Colon cancer metastasized to liver (HCC)/ Neutrophilic leukocytosis-    - Oncology following        Hypothyroid-    - Continue Synthroid       Chronic kidney disease-               - Monitor I's and O's closely              - Monitor labs closely              - Avoid hypotension              - Avoid nephrotoxic agents      Normocytic anemia-    - Monitor labs       Severe malnutrition-   - noted         DVT Prophylaxis: Lovenox     Discharge planning: TBD        Further Orders per Clinical course/attending.     Electronically signed by ADY Schroeder CNP on 4/5/2025 at 2:46 PM       EMR Dragon/Transcription disclaimer:   Much of this encounter note is an electronic transcription/translation of spoken language to printed text. The electronic translation of spoken language may permit erroneous, or at times, nonsensical words or phrases to be inadvertently transcribed; although attempts have made to review the note for such errors, some may still exist.

## 2025-04-05 NOTE — PROGRESS NOTES
MEDICAL ONCOLOGY PROGRESS NOTE     Pt Name: Damian You  MRN: [id number]  YOB: 1950  Date of evaluation: 4/5/2025    Subjective-  Denies new complaints, states he feels with his fingers.  Slept well.  Afebrile.    Denies any chest pain.  Denies any short of breath.        HISTORY OF PRESENT ILLNESS:   Mr. You is a pleasant 80 years old male who has a diagnosis of stage IV colonic adenocarcinoma, K-gomez wild-type currently receiving palliative chemotherapy with modified FOLFOX.  The patient is receiving treatment with dose reduction.  Patient has several other multiple comorbidities.  Patient is very frail as well.  Patient receives his second treatment on 4/2/2025 in the clinic.  Patient had a fall at home.  Patient was checked by home health and was found to be bradycardic.  Patient has a history of atrial fibrillation.  Patient was sent to the emergency department admitted to the hospital service.  I was consulted for continuity of care.    Laboratory studies at admission showed WBC 15.2, hemoglobin 11.1, platelet count 285,000.  Chemistry remarkable for potassium 3.4, creatinine 1.8.  Elevated proBNP.  Normal LFTs.    Chest x-ray showed interval removal of previous documented esophageal gastric tube with placement of an implanted right chest port.  Stable elevation of the right hemidiaphragm without additional acute intrathoracic process.        PRIOR ONCOLOGICAL HISTORY    The patient is a very pleasant 74-year-old male who presents today for cycle number 2 of palliative chemotherapy for his newly diagnosed colonic adenocarcinoma K-gomez wild-type with liver metastasis stage IV, MMR proficient.     Palliative chemotherapy with modified FOLFOX and dose reduction was offered due to his advanced age and frailty. The first cycle of chemotherapy was tolerated with significant fatigue and diarrhea. Lomotil and Imodium were used to manage the diarrhea, which has since subsided. He reports feeling overall

## 2025-04-06 LAB
ANION GAP SERPL CALCULATED.3IONS-SCNC: 9 MMOL/L (ref 8–16)
BASOPHILS # BLD: 0 K/UL (ref 0–0.2)
BASOPHILS NFR BLD: 0.4 % (ref 0–1)
BUN SERPL-MCNC: 40 MG/DL (ref 8–23)
CALCIUM SERPL-MCNC: 8.3 MG/DL (ref 8.8–10.2)
CHLORIDE SERPL-SCNC: 106 MMOL/L (ref 98–107)
CO2 SERPL-SCNC: 23 MMOL/L (ref 22–29)
CREAT SERPL-MCNC: 1.6 MG/DL (ref 0.7–1.2)
EOSINOPHIL # BLD: 0.1 K/UL (ref 0–0.6)
EOSINOPHIL NFR BLD: 1 % (ref 0–5)
ERYTHROCYTE [DISTWIDTH] IN BLOOD BY AUTOMATED COUNT: 15.1 % (ref 11.5–14.5)
GLUCOSE SERPL-MCNC: 89 MG/DL (ref 70–99)
HCT VFR BLD AUTO: 31.5 % (ref 42–52)
HGB BLD-MCNC: 10 G/DL (ref 14–18)
IMM GRANULOCYTES # BLD: 0.1 K/UL
LYMPHOCYTES # BLD: 1.7 K/UL (ref 1.1–4.5)
LYMPHOCYTES NFR BLD: 16.9 % (ref 20–40)
MAGNESIUM SERPL-MCNC: 1.9 MG/DL (ref 1.6–2.4)
MCH RBC QN AUTO: 28.2 PG (ref 27–31)
MCHC RBC AUTO-ENTMCNC: 31.7 G/DL (ref 33–37)
MCV RBC AUTO: 89 FL (ref 80–94)
MONOCYTES # BLD: 0.7 K/UL (ref 0–0.9)
MONOCYTES NFR BLD: 6.7 % (ref 0–10)
NEUTROPHILS # BLD: 7.3 K/UL (ref 1.5–7.5)
NEUTS SEG NFR BLD: 74.3 % (ref 50–65)
PLATELET # BLD AUTO: 212 K/UL (ref 130–400)
PMV BLD AUTO: 9.2 FL (ref 9.4–12.4)
POTASSIUM SERPL-SCNC: 3.7 MMOL/L (ref 3.5–5.1)
RBC # BLD AUTO: 3.54 M/UL (ref 4.7–6.1)
SODIUM SERPL-SCNC: 138 MMOL/L (ref 136–145)
WBC # BLD AUTO: 9.9 K/UL (ref 4.8–10.8)

## 2025-04-06 PROCEDURE — 2060000000 HC ICU INTERMEDIATE R&B

## 2025-04-06 PROCEDURE — 6370000000 HC RX 637 (ALT 250 FOR IP)

## 2025-04-06 PROCEDURE — 85025 COMPLETE CBC W/AUTO DIFF WBC: CPT

## 2025-04-06 PROCEDURE — 2500000003 HC RX 250 WO HCPCS

## 2025-04-06 PROCEDURE — 36415 COLL VENOUS BLD VENIPUNCTURE: CPT

## 2025-04-06 PROCEDURE — 80048 BASIC METABOLIC PNL TOTAL CA: CPT

## 2025-04-06 PROCEDURE — 83735 ASSAY OF MAGNESIUM: CPT

## 2025-04-06 PROCEDURE — 6360000002 HC RX W HCPCS

## 2025-04-06 PROCEDURE — 99232 SBSQ HOSP IP/OBS MODERATE 35: CPT | Performed by: INTERNAL MEDICINE

## 2025-04-06 RX ADMIN — LEVOTHYROXINE SODIUM 125 MCG: 0.12 TABLET ORAL at 08:05

## 2025-04-06 RX ADMIN — ATORVASTATIN CALCIUM 20 MG: 20 TABLET, FILM COATED ORAL at 19:32

## 2025-04-06 RX ADMIN — ENOXAPARIN SODIUM 40 MG: 100 INJECTION SUBCUTANEOUS at 08:05

## 2025-04-06 RX ADMIN — SODIUM CHLORIDE, PRESERVATIVE FREE 10 ML: 5 INJECTION INTRAVENOUS at 08:05

## 2025-04-06 RX ADMIN — SODIUM CHLORIDE, PRESERVATIVE FREE 10 ML: 5 INJECTION INTRAVENOUS at 19:32

## 2025-04-06 NOTE — PROGRESS NOTES
[facility]      Progress Note    Patient:  Damian You  YOB: 1950  Date of Service: 4/6/2025  MRN: [id number]   Acct: [id number]   Primary Care Physician: Ramona Smith MD  Advance Directive: Full Code  Admit Date: 4/3/2025       Hospital Day: 3    Portions of this note have been copied forward, however, updated to reflect the most current clinical status of this patient.     CHIEF COMPLAINT bradycardia    SUBJECTIVE:  Mr. You was resting in bed this morning.  Offers no new complaints at this time.      CUMULATIVE HOSPITAL COURSE:   Patient is a 74-year-old male with past medical history of paroxysmal A-fib, CHF, CAD, hypertension, history of colon cancer with liver metastasis on palliative chemotherapy who presented to [facility] ED for evaluation of bradycardia.  Reportedly home health found patient to have heart rate in 30s and 40s during vital checks.  Denied lightheadedness or dizziness.  Denied shortness of breath or chest pain.  Was given a dose of atropine in ED with improvement of his heart rate in the 60s.  Workup in ED revealed initial EKG with findings of A-fib with slow ventricular response with PVCs HR 40; CXR with no acute intrathoracic process; WBC 15, H&H 11.1/34.6, BNP 6548, troponin 33, creatinine 1.8, BUN 37, GFR 39, potassium 3.4.  Patient was admitted to hospital medicine for further evaluation with cardiology consultation. Cardiology plans for pacemaker on Monday. ECHO indicated EF 55-60%, LV is dilated, normal wall thickness, normal wall motion, grade II diastolic dysfunction with increased LAP.         Review of Systems   Constitutional:  Negative for chills, diaphoresis, fatigue and fever.   HENT:  Negative for congestion and ear pain.    Eyes:  Negative for visual disturbance.   Respiratory:  Negative for cough, shortness of breath and wheezing.    Cardiovascular:  Negative for chest pain, palpitations and leg swelling.   Gastrointestinal:  Negative for  abdominal distention, abdominal pain, blood in stool, constipation, diarrhea, nausea and vomiting.   Endocrine: Negative for cold intolerance and heat intolerance.   Genitourinary:  Negative for difficulty urinating, flank pain, frequency and urgency.   Musculoskeletal:  Negative for arthralgias and myalgias.   Skin:  Negative for color change and wound.   Neurological:  Negative for dizziness, syncope, weakness, light-headedness, numbness and headaches.   Hematological:  Does not bruise/bleed easily.   Psychiatric/Behavioral:  Negative for agitation, confusion and dysphoric mood.         Objective:   VITALS:  /80   Pulse 88   Temp 97.9 °F (36.6 °C) (Temporal)   Resp 14   Ht 1.778 m (5' 10\")   Wt 69 kg (152 lb 1.9 oz)   SpO2 95%   BMI 21.83 kg/m²   24HR INTAKE/OUTPUT:    Intake/Output Summary (Last 24 hours) at 4/6/2025 1320  Last data filed at 4/6/2025 1155  Gross per 24 hour   Intake 10 ml   Output 1250 ml   Net -1240 ml       Physical Exam  Constitutional:       General: He is not in acute distress.     Appearance: Normal appearance. He is not ill-appearing.   HENT:      Head: Normocephalic and atraumatic.      Right Ear: External ear normal.      Left Ear: External ear normal.      Nose: Nose normal.      Mouth/Throat:      Mouth: Mucous membranes are moist.   Eyes:      Extraocular Movements: Extraocular movements intact.      Conjunctiva/sclera: Conjunctivae normal.      Pupils: Pupils are equal, round, and reactive to light.   Cardiovascular:      Rate and Rhythm: Normal rate and regular rhythm.      Pulses: Normal pulses.      Heart sounds: Normal heart sounds.      Comments: NSR 85 per monitor   Pulmonary:      Effort: Pulmonary effort is normal. No respiratory distress.      Breath sounds: Normal breath sounds. No wheezing, rhonchi or rales.   Abdominal:      General: Bowel sounds are normal. There is no distension.      Palpations: Abdomen is soft.      Tenderness: There is no abdominal

## 2025-04-07 ENCOUNTER — TELEPHONE (OUTPATIENT)
Dept: PRIMARY CARE CLINIC | Age: 75
End: 2025-04-07

## 2025-04-07 ENCOUNTER — APPOINTMENT (OUTPATIENT)
Dept: GENERAL RADIOLOGY | Age: 75
DRG: 242 | End: 2025-04-07
Payer: MEDICARE

## 2025-04-07 LAB
ANION GAP SERPL CALCULATED.3IONS-SCNC: 7 MMOL/L (ref 8–16)
BASOPHILS # BLD: 0.1 K/UL (ref 0–0.2)
BASOPHILS NFR BLD: 0.8 % (ref 0–1)
BUN SERPL-MCNC: 43 MG/DL (ref 8–23)
CALCIUM SERPL-MCNC: 8.3 MG/DL (ref 8.8–10.2)
CHLORIDE SERPL-SCNC: 106 MMOL/L (ref 98–107)
CO2 SERPL-SCNC: 26 MMOL/L (ref 22–29)
CREAT SERPL-MCNC: 1.4 MG/DL (ref 0.7–1.2)
ECHO BSA: 1.84 M2
EKG P AXIS: -165 DEGREES
EKG P-R INTERVAL: 220 MS
EKG Q-T INTERVAL: 492 MS
EKG QRS DURATION: 112 MS
EKG QTC CALCULATION (BAZETT): 492 MS
EKG T AXIS: 0 DEGREES
EOSINOPHIL # BLD: 0.2 K/UL (ref 0–0.6)
EOSINOPHIL NFR BLD: 1.8 % (ref 0–5)
ERYTHROCYTE [DISTWIDTH] IN BLOOD BY AUTOMATED COUNT: 14.8 % (ref 11.5–14.5)
GLUCOSE SERPL-MCNC: 81 MG/DL (ref 70–99)
HCT VFR BLD AUTO: 31.9 % (ref 42–52)
HGB BLD-MCNC: 10.1 G/DL (ref 14–18)
IMM GRANULOCYTES # BLD: 0 K/UL
LYMPHOCYTES # BLD: 1.7 K/UL (ref 1.1–4.5)
LYMPHOCYTES NFR BLD: 19.5 % (ref 20–40)
MCH RBC QN AUTO: 28 PG (ref 27–31)
MCHC RBC AUTO-ENTMCNC: 31.7 G/DL (ref 33–37)
MCV RBC AUTO: 88.4 FL (ref 80–94)
MONOCYTES # BLD: 0.5 K/UL (ref 0–0.9)
MONOCYTES NFR BLD: 5.8 % (ref 0–10)
NEUTROPHILS # BLD: 6.2 K/UL (ref 1.5–7.5)
NEUTS SEG NFR BLD: 71.6 % (ref 50–65)
PLATELET # BLD AUTO: 207 K/UL (ref 130–400)
PMV BLD AUTO: 9 FL (ref 9.4–12.4)
POTASSIUM SERPL-SCNC: 3.7 MMOL/L (ref 3.5–5.1)
RBC # BLD AUTO: 3.61 M/UL (ref 4.7–6.1)
SODIUM SERPL-SCNC: 139 MMOL/L (ref 136–145)
WBC # BLD AUTO: 8.7 K/UL (ref 4.8–10.8)

## 2025-04-07 PROCEDURE — 2500000003 HC RX 250 WO HCPCS: Performed by: INTERNAL MEDICINE

## 2025-04-07 PROCEDURE — 2720000010 HC SURG SUPPLY STERILE: Performed by: INTERNAL MEDICINE

## 2025-04-07 PROCEDURE — C1892 INTRO/SHEATH,FIXED,PEEL-AWAY: HCPCS | Performed by: INTERNAL MEDICINE

## 2025-04-07 PROCEDURE — 99152 MOD SED SAME PHYS/QHP 5/>YRS: CPT | Performed by: INTERNAL MEDICINE

## 2025-04-07 PROCEDURE — 1200000000 HC SEMI PRIVATE

## 2025-04-07 PROCEDURE — 02HK3JZ INSERTION OF PACEMAKER LEAD INTO RIGHT VENTRICLE, PERCUTANEOUS APPROACH: ICD-10-PCS | Performed by: INTERNAL MEDICINE

## 2025-04-07 PROCEDURE — C1785 PMKR, DUAL, RATE-RESP: HCPCS | Performed by: INTERNAL MEDICINE

## 2025-04-07 PROCEDURE — 99153 MOD SED SAME PHYS/QHP EA: CPT | Performed by: INTERNAL MEDICINE

## 2025-04-07 PROCEDURE — 6360000004 HC RX CONTRAST MEDICATION: Performed by: INTERNAL MEDICINE

## 2025-04-07 PROCEDURE — 85025 COMPLETE CBC W/AUTO DIFF WBC: CPT

## 2025-04-07 PROCEDURE — 0JH606Z INSERTION OF PACEMAKER, DUAL CHAMBER INTO CHEST SUBCUTANEOUS TISSUE AND FASCIA, OPEN APPROACH: ICD-10-PCS | Performed by: INTERNAL MEDICINE

## 2025-04-07 PROCEDURE — 6360000002 HC RX W HCPCS: Performed by: INTERNAL MEDICINE

## 2025-04-07 PROCEDURE — 6370000000 HC RX 637 (ALT 250 FOR IP)

## 2025-04-07 PROCEDURE — 71045 X-RAY EXAM CHEST 1 VIEW: CPT

## 2025-04-07 PROCEDURE — C1769 GUIDE WIRE: HCPCS | Performed by: INTERNAL MEDICINE

## 2025-04-07 PROCEDURE — 2709999900 HC NON-CHARGEABLE SUPPLY: Performed by: INTERNAL MEDICINE

## 2025-04-07 PROCEDURE — C1898 LEAD, PMKR, OTHER THAN TRANS: HCPCS | Performed by: INTERNAL MEDICINE

## 2025-04-07 PROCEDURE — 80048 BASIC METABOLIC PNL TOTAL CA: CPT

## 2025-04-07 PROCEDURE — 33208 INSRT HEART PM ATRIAL & VENT: CPT | Performed by: INTERNAL MEDICINE

## 2025-04-07 PROCEDURE — 02H63JZ INSERTION OF PACEMAKER LEAD INTO RIGHT ATRIUM, PERCUTANEOUS APPROACH: ICD-10-PCS | Performed by: INTERNAL MEDICINE

## 2025-04-07 PROCEDURE — 2580000003 HC RX 258: Performed by: INTERNAL MEDICINE

## 2025-04-07 PROCEDURE — C1889 IMPLANT/INSERT DEVICE, NOC: HCPCS | Performed by: INTERNAL MEDICINE

## 2025-04-07 PROCEDURE — 36415 COLL VENOUS BLD VENIPUNCTURE: CPT

## 2025-04-07 PROCEDURE — 6370000000 HC RX 637 (ALT 250 FOR IP): Performed by: INTERNAL MEDICINE

## 2025-04-07 PROCEDURE — 2500000003 HC RX 250 WO HCPCS

## 2025-04-07 DEVICE — LEAD 5076-52 MRI US RCMCRD
Type: IMPLANTABLE DEVICE | Status: FUNCTIONAL
Brand: CAPSUREFIX NOVUS MRI™ SURESCAN®

## 2025-04-07 DEVICE — ENVELOPE CMRM6122 ABSORB MED MR
Type: IMPLANTABLE DEVICE | Status: FUNCTIONAL
Brand: TYRX™

## 2025-04-07 DEVICE — LEAD 5076-58 MRI US RCMCRD
Type: IMPLANTABLE DEVICE | Status: FUNCTIONAL
Brand: CAPSUREFIX NOVUS MRI™ SURESCAN®

## 2025-04-07 DEVICE — IPG W1DR01 AZURE XT DR MRI USA
Type: IMPLANTABLE DEVICE | Status: FUNCTIONAL
Brand: AZURE™ XT DR MRI SURESCAN™

## 2025-04-07 RX ORDER — AMIODARONE HYDROCHLORIDE 200 MG/1
200 TABLET ORAL DAILY
Status: DISCONTINUED | OUTPATIENT
Start: 2025-04-07 | End: 2025-04-08 | Stop reason: HOSPADM

## 2025-04-07 RX ORDER — CEPHALEXIN 500 MG/1
500 CAPSULE ORAL EVERY 8 HOURS SCHEDULED
Status: DISCONTINUED | OUTPATIENT
Start: 2025-04-08 | End: 2025-04-08 | Stop reason: HOSPADM

## 2025-04-07 RX ORDER — OXYCODONE AND ACETAMINOPHEN 5; 325 MG/1; MG/1
1 TABLET ORAL EVERY 6 HOURS PRN
Refills: 0 | Status: DISCONTINUED | OUTPATIENT
Start: 2025-04-07 | End: 2025-04-08 | Stop reason: HOSPADM

## 2025-04-07 RX ORDER — CHLORHEXIDINE GLUCONATE 40 MG/ML
SOLUTION TOPICAL ONCE
Status: COMPLETED | OUTPATIENT
Start: 2025-04-07 | End: 2025-04-07

## 2025-04-07 RX ORDER — FENTANYL CITRATE 50 UG/ML
INJECTION, SOLUTION INTRAMUSCULAR; INTRAVENOUS PRN
Status: DISCONTINUED | OUTPATIENT
Start: 2025-04-07 | End: 2025-04-07 | Stop reason: HOSPADM

## 2025-04-07 RX ORDER — MIDAZOLAM HYDROCHLORIDE 1 MG/ML
INJECTION, SOLUTION INTRAMUSCULAR; INTRAVENOUS PRN
Status: DISCONTINUED | OUTPATIENT
Start: 2025-04-07 | End: 2025-04-07 | Stop reason: HOSPADM

## 2025-04-07 RX ORDER — IODIXANOL 320 MG/ML
INJECTION, SOLUTION INTRAVASCULAR PRN
Status: DISCONTINUED | OUTPATIENT
Start: 2025-04-07 | End: 2025-04-07 | Stop reason: HOSPADM

## 2025-04-07 RX ORDER — METOPROLOL SUCCINATE 25 MG/1
25 TABLET, EXTENDED RELEASE ORAL 2 TIMES DAILY
Status: DISCONTINUED | OUTPATIENT
Start: 2025-04-07 | End: 2025-04-08 | Stop reason: HOSPADM

## 2025-04-07 RX ORDER — SODIUM CHLORIDE 9 MG/ML
INJECTION, SOLUTION INTRAVENOUS CONTINUOUS
Status: DISCONTINUED | OUTPATIENT
Start: 2025-04-07 | End: 2025-04-07 | Stop reason: HOSPADM

## 2025-04-07 RX ADMIN — CHLORHEXIDINE GLUCONATE: 4 LIQUID TOPICAL at 05:37

## 2025-04-07 RX ADMIN — METOPROLOL SUCCINATE 25 MG: 25 TABLET, EXTENDED RELEASE ORAL at 19:57

## 2025-04-07 RX ADMIN — WATER 2000 MG: 1 INJECTION INTRAMUSCULAR; INTRAVENOUS; SUBCUTANEOUS at 19:58

## 2025-04-07 RX ADMIN — SODIUM CHLORIDE: 0.9 INJECTION, SOLUTION INTRAVENOUS at 01:02

## 2025-04-07 RX ADMIN — SODIUM CHLORIDE, PRESERVATIVE FREE 10 ML: 5 INJECTION INTRAVENOUS at 20:11

## 2025-04-07 RX ADMIN — LEVOTHYROXINE SODIUM 125 MCG: 0.12 TABLET ORAL at 09:07

## 2025-04-07 RX ADMIN — ATORVASTATIN CALCIUM 20 MG: 20 TABLET, FILM COATED ORAL at 17:16

## 2025-04-07 RX ADMIN — METOPROLOL SUCCINATE 25 MG: 25 TABLET, EXTENDED RELEASE ORAL at 14:29

## 2025-04-07 RX ADMIN — AMIODARONE HYDROCHLORIDE 200 MG: 200 TABLET ORAL at 14:29

## 2025-04-07 NOTE — TELEPHONE ENCOUNTER
[facility] called to report they have attempted to contact patient 3 times but have been unsuccessful, so patient will need to contact their office to set up his appointment. Letter mailed to patient with this information and podiatry's contact information.

## 2025-04-07 NOTE — PROGRESS NOTES
Nutrition Assessment     Type and Reason for Visit: Positive nutrition screen    Nutrition Recommendations/Plan:   Continue ONS post-procedure.     Malnutrition Assessment:  Malnutrition Status: Severe malnutrition    Nutrition Assessment:  Pt is NPO today for pacemaker placement. Will follow s/p procedure for diet advancement and reorder ONS.    Estimated Daily Nutrient Needs:  Energy (kcal):  9211-1946 (25-30/kg) Weight Used for Energy Requirements: Current     Protein (g):   (1-2/kg) Weight Used for Protein Requirements: Current        Fluid (ml/day):  0687-0841 (25-30/kg) Method Used for Fluid Requirements: 1 ml/kcal    Nutrition Related Findings:   BUN 43, CR 1.4, GFR 53. NS @ 75 mL/hr. Wound Type: Pressure Injury, Stage II, Surgical Incision    Current Nutrition Therapies:    Diet NPO Exceptions are: Sips of Water with Meds    Anthropometric Measures:  Height: 177.8 cm (5' 10\")  Current Body Wt: 69 kg (152 lb 1.9 oz)   BMI: 21.8        Nutrition Diagnosis:   Severe malnutrition related to inadequate protein-energy intake as evidenced by criteria as identified in malnutrition assessment    Nutrition Interventions:   Food and/or Nutrient Delivery: Continue NPO  Nutrition Education/Counseling: No recommendation at this time  Coordination of Nutrition Care: Continue to monitor while inpatient       Goals:  Goals: Initiation of nutrition  Type of Goal: Continue current goal  Previous Goal Met: New Goal    Nutrition Monitoring and Evaluation:   Behavioral-Environmental Outcomes: None Identified  Food/Nutrient Intake Outcomes: Progression of Nutrition  Physical Signs/Symptoms Outcomes: Nutrition Focused Physical Findings, Biochemical Data, Fluid Status or Edema, Skin, Weight    Discharge Planning:    Too soon to determine     Eileen E Summerlin, MS, RD, LD  Contact: [phone]

## 2025-04-07 NOTE — PROGRESS NOTES
[facility]      [facility] Note    Patient:  Damian You  YOB: 1950  Date of Service: 4/7/2025  MRN: [id number]   Acct: [id number]   Primary Care Physician: Ramona Smith MD  Advance Directive: Full Code  Admit Date: 4/3/2025       Hospital Day: 4    Portions of this note have been copied forward, however, updated to reflect the most current clinical status of this patient.     CHIEF COMPLAINT slow heart rate    SUBJECTIVE: No complaints awaiting to go down for pacemaker      CUMULATIVE HOSPITAL COURSE:   Patient is a 74-year-old with past medical history of IPF,, CHF, CAD, hypertension and history of colon cancer with liver mets on palliative chemo.  Patient presented to the emergency room with complaint of low heart rate and dizziness and lightheaded.  Home health found his heart rate to be in the 30s to 40s when they checked him.  Patient was given a dose of atropine in the ED with improvement to the 60s.  EKG showed A-fib with slow ventricular response heart rate of 40 chest x-ray with no acute process.  Electrolytes sodium 141 potassium 3.6 creatinine 1.6 BUN 36 glucose 83 white count 9.9 hemoglobin 10 hematocrit 31.5 platelets 212.  Cardiology was consulted and he is down for pacemaker implantation today.        Objective:   VITALS:  BP (!) 172/66   Pulse (!) 40   Temp 96.8 °F (36 °C) (Temporal)   Resp 16   Ht 1.778 m (5' 10\")   Wt 69 kg (152 lb 1.9 oz)   SpO2 98%   BMI 21.83 kg/m²   24HR INTAKE/OUTPUT:    Intake/Output Summary (Last 24 hours) at 4/7/2025 1001  Last data filed at 4/7/2025 0857  Gross per 24 hour   Intake --   Output 1425 ml   Net -1425 ml           Physical Exam  Vitals and nursing note reviewed.   Constitutional:       Appearance: He is ill-appearing.   HENT:      Head: Normocephalic.      Mouth/Throat:      Mouth: Mucous membranes are moist.   Eyes:      Pupils: Pupils are equal, round, and reactive to light.   Cardiovascular:      Rate and Rhythm:  changes.    ______________________________________ Electronically signed by: IDA LAMBERT M.D. Date:     04/03/2025 Time:    13:04             Assessment/Plan   Principal Problem:    Bradycardia  Active Problems:    Mixed hyperlipidemia    Atrial fibrillation (HCC)    GERD (gastroesophageal reflux disease)    Neutrophilic leukocytosis    Colon cancer metastasized to liver (HCC)    Hypothyroid    Chronic kidney disease    Chemotherapy management, encounter for    Normocytic anemia    Bradycardia, unspecified    Severe malnutrition  Resolved Problems:    * No resolved hospital problems. *    Principal Problem:    Bradycardia   Continuous telemetry   Cardiology consult   Pacemaker implantation today  Active Problems:    Mixed hyperlipidemia   Continue statin    Atrial fibrillation (HCC)   Continues telemetry   Continue home medications      Colon cancer metastasized to liver (HCC)   oncology following    Hypothyroid   Continue Synthroid    Chronic kidney disease   Intake and output  Daily lab   Avoid hypotension        Resolved Problems:    * No resolved hospital problems. *         DVT Prophylaxis: Lovenox      Discharge planning: To be determined      Further Orders per Clinical course/attending.     Electronically signed by ADY Todd CNP on 4/7/2025 at 10:01 AM       EMR Dragon/Transcription disclaimer:   Much of this encounter note is an electronic transcription/translation of spoken language to printed text. The electronic translation of spoken language may permit erroneous, or at times, nonsensical words or phrases to be inadvertently transcribed; although attempts have made to review the note for such errors, some may still exist.

## 2025-04-07 NOTE — PLAN OF CARE
Patient continues to work to meet care plan goals while hospitalized, education provided.      Problem: Chronic Conditions and Co-morbidities  Goal: Patient's chronic conditions and co-morbidity symptoms are monitored and maintained or improved  4/7/2025 1401 by Micaela Azevedo RN  Outcome: Progressing  Flowsheets (Taken 4/7/2025 0930)  Care Plan - Patient's Chronic Conditions and Co-Morbidity Symptoms are Monitored and Maintained or Improved:   Monitor and assess patient's chronic conditions and comorbid symptoms for stability, deterioration, or improvement   Collaborate with multidisciplinary team to address chronic and comorbid conditions and prevent exacerbation or deterioration   Update acute care plan with appropriate goals if chronic or comorbid symptoms are exacerbated and prevent overall improvement and discharge  4/7/2025 0421 by Victorina Lou RN  Outcome: Progressing     Problem: Safety - Adult  Goal: Free from fall injury  4/7/2025 1401 by Micaela Azevedo RN  Outcome: Progressing  4/7/2025 0421 by Victorina Lou RN  Outcome: Progressing     Problem: ABCDS Injury Assessment  Goal: Absence of physical injury  4/7/2025 1401 by Micaela Azevedo RN  Outcome: Progressing  4/7/2025 0421 by Victorina Lou RN  Outcome: Progressing     Problem: Skin/Tissue Integrity  Goal: Skin integrity remains intact  Description: 1.  Monitor for areas of redness and/or skin breakdown  2.  Assess vascular access sites hourly  3.  Every 4-6 hours minimum:  Change oxygen saturation probe site  4.  Every 4-6 hours:  If on nasal continuous positive airway pressure, respiratory therapy assess nares and determine need for appliance change or resting period  4/7/2025 1401 by Micaela Azevedo RN  Outcome: Progressing  Flowsheets (Taken 4/7/2025 0930)  Skin Integrity Remains Intact: Monitor for areas of redness and/or skin breakdown  4/7/2025 0421 by Victorina Lou RN  Outcome: Progressing    Reminder appt call     Patient is scheduled on 11/21/2022 @ 4 pm with an arrival time 345 pm in the [facility] location with Dr Ricky Aguilera  Left message on vm (1)    Problem: Nutrition Deficit:  Goal: Optimize nutritional status  4/7/2025 1401 by Micaela Azevedo, RN  Outcome: Progressing  4/7/2025 0421 by Victorina Lou, RN  Outcome: Progressing

## 2025-04-07 NOTE — OP NOTE
Operative Note      Patient: Damian You  YOB: 1950  MRN: [id number]    Date of Procedure: 4/7/2025    Pre-Op Diagnosis Codes:      * Bradycardia, unspecified [R00.1] severe bradycardia with atrial fibrillation and on antiarrhythmic therapy    Post-Op Diagnosis: Same       Procedure(s):  Insert PPM dual    Surgeon(s):  Calvin Martinez MD      Anesthesia: IV Sedation  Anesthesia: Lidocaine LA  Sedation: Versed  1mg; Fentanyl  50mg  Start time: 1215  Stop time: 1321  ASA Class: 3  An independent trained observer pushed medications at my direction.  Estimated blood loss less than 25 ML    The patient was monitored continuously with the ECG, pulse oximetry, blood pressure monitoring, and direct observation for level of consciousness.      Complications: None    Specimens:   * No specimens in log *    Implants:  Implant Name Type Inv. Item Serial No.  Lot No. LRB No. Used Action   LEAD PACE AD 6FR L58CM VENT KELLY PLAT INSUL BPLR PLATINIZED [id number]] [employer] CRM] - [id number] Cardiac Lead LEAD PACE AD 6FR L58CM VENT KELLY PLAT INSUL BPLR PLATINIZED [id number]] [employer] CRM] [id number] [employer] CARDIAC RTHYM MGT-WD  N/A 1 Implanted   LEAD PACE 6FR L52CM KELLY INSU BPLR PLATINIZED STEROID DXAC - [id number] Cardiac Lead LEAD PACE 6FR L52CM KELLY INSU BPLR PLATINIZED STEROID DXAC [id number] [employer] CARDIAC RTHYM MGT-WD  N/A 1 Implanted   PACEMAKER CARD 22.5GM W50.8XH46.6MM D7.4MM TI POLYUR KELLY - [id number] Cardiac PPM/CRT-P PACEMAKER CARD 22.5GM W50.8XH46.6MM D7.4MM TI POLYUR KELLY [id number] [employer] CARDIAC RTHYM MGT-WD  N/A 1 Implanted   ENVELOPE PACEMKR M W2.5XL2.7IN ABSRB ANTIBACT TYRX - [id number]  ENVELOPE PACEMKR M W2.5XL2.7IN ABSRB ANTIBACT TYRX  [employer] CARDIAC RTHYM MGT-WD [id number] N/A 1 Implanted         Detailed Description of Procedure:         After obtaining informed written consent, the patient was brought to the catheterization laboratory where the left chest area was prepared  of the administration of moderate conscious sedation      Electronically signed by Calvin Martinez MD on 4/7/25     Electronically signed by Calvin Martinez MD on 4/7/2025 at 1:26 PM

## 2025-04-08 VITALS
WEIGHT: 152.12 LBS | HEART RATE: 60 BPM | OXYGEN SATURATION: 94 % | RESPIRATION RATE: 18 BRPM | DIASTOLIC BLOOD PRESSURE: 82 MMHG | TEMPERATURE: 97.2 F | SYSTOLIC BLOOD PRESSURE: 164 MMHG | BODY MASS INDEX: 21.78 KG/M2 | HEIGHT: 70 IN

## 2025-04-08 LAB
ANION GAP SERPL CALCULATED.3IONS-SCNC: 8 MMOL/L (ref 8–16)
BASOPHILS # BLD: 0.1 K/UL (ref 0–0.2)
BASOPHILS NFR BLD: 0.6 % (ref 0–1)
BUN SERPL-MCNC: 30 MG/DL (ref 8–23)
CALCIUM SERPL-MCNC: 8.4 MG/DL (ref 8.8–10.2)
CHLORIDE SERPL-SCNC: 105 MMOL/L (ref 98–107)
CO2 SERPL-SCNC: 26 MMOL/L (ref 22–29)
CREAT SERPL-MCNC: 1.3 MG/DL (ref 0.7–1.2)
EOSINOPHIL # BLD: 0.2 K/UL (ref 0–0.6)
EOSINOPHIL NFR BLD: 2.3 % (ref 0–5)
ERYTHROCYTE [DISTWIDTH] IN BLOOD BY AUTOMATED COUNT: 14.9 % (ref 11.5–14.5)
GLUCOSE SERPL-MCNC: 75 MG/DL (ref 70–99)
HCT VFR BLD AUTO: 33.1 % (ref 42–52)
HGB BLD-MCNC: 10.7 G/DL (ref 14–18)
IMM GRANULOCYTES # BLD: 0 K/UL
LYMPHOCYTES # BLD: 1.3 K/UL (ref 1.1–4.5)
LYMPHOCYTES NFR BLD: 15.1 % (ref 20–40)
MCH RBC QN AUTO: 28.5 PG (ref 27–31)
MCHC RBC AUTO-ENTMCNC: 32.3 G/DL (ref 33–37)
MCV RBC AUTO: 88.3 FL (ref 80–94)
MONOCYTES # BLD: 0.6 K/UL (ref 0–0.9)
MONOCYTES NFR BLD: 7.3 % (ref 0–10)
NEUTROPHILS # BLD: 6.2 K/UL (ref 1.5–7.5)
NEUTS SEG NFR BLD: 74.2 % (ref 50–65)
PLATELET # BLD AUTO: 195 K/UL (ref 130–400)
PMV BLD AUTO: 9 FL (ref 9.4–12.4)
POTASSIUM SERPL-SCNC: 4 MMOL/L (ref 3.5–5.1)
RBC # BLD AUTO: 3.75 M/UL (ref 4.7–6.1)
SODIUM SERPL-SCNC: 139 MMOL/L (ref 136–145)
WBC # BLD AUTO: 8.3 K/UL (ref 4.8–10.8)

## 2025-04-08 PROCEDURE — 6360000002 HC RX W HCPCS: Performed by: INTERNAL MEDICINE

## 2025-04-08 PROCEDURE — 36415 COLL VENOUS BLD VENIPUNCTURE: CPT

## 2025-04-08 PROCEDURE — 80048 BASIC METABOLIC PNL TOTAL CA: CPT

## 2025-04-08 PROCEDURE — 85025 COMPLETE CBC W/AUTO DIFF WBC: CPT

## 2025-04-08 PROCEDURE — 2500000003 HC RX 250 WO HCPCS

## 2025-04-08 PROCEDURE — 6370000000 HC RX 637 (ALT 250 FOR IP): Performed by: INTERNAL MEDICINE

## 2025-04-08 PROCEDURE — 6370000000 HC RX 637 (ALT 250 FOR IP)

## 2025-04-08 PROCEDURE — 99231 SBSQ HOSP IP/OBS SF/LOW 25: CPT | Performed by: INTERNAL MEDICINE

## 2025-04-08 PROCEDURE — 2500000003 HC RX 250 WO HCPCS: Performed by: INTERNAL MEDICINE

## 2025-04-08 PROCEDURE — 99232 SBSQ HOSP IP/OBS MODERATE 35: CPT | Performed by: INTERNAL MEDICINE

## 2025-04-08 PROCEDURE — 6360000002 HC RX W HCPCS

## 2025-04-08 RX ORDER — METOPROLOL SUCCINATE 25 MG/1
25 TABLET, EXTENDED RELEASE ORAL 2 TIMES DAILY
Qty: 60 TABLET | Refills: 0 | Status: SHIPPED | OUTPATIENT
Start: 2025-04-08 | End: 2025-05-08

## 2025-04-08 RX ORDER — CEPHALEXIN 500 MG/1
500 CAPSULE ORAL EVERY 8 HOURS SCHEDULED
Qty: 20 CAPSULE | Refills: 0 | Status: SHIPPED | OUTPATIENT
Start: 2025-04-08 | End: 2025-04-15

## 2025-04-08 RX ORDER — AMIODARONE HYDROCHLORIDE 200 MG/1
200 TABLET ORAL DAILY
Qty: 30 TABLET | Refills: 0 | Status: SHIPPED | OUTPATIENT
Start: 2025-04-09 | End: 2025-05-09

## 2025-04-08 RX ADMIN — METOPROLOL SUCCINATE 25 MG: 25 TABLET, EXTENDED RELEASE ORAL at 08:37

## 2025-04-08 RX ADMIN — AMIODARONE HYDROCHLORIDE 200 MG: 200 TABLET ORAL at 08:37

## 2025-04-08 RX ADMIN — SODIUM CHLORIDE, PRESERVATIVE FREE 10 ML: 5 INJECTION INTRAVENOUS at 08:37

## 2025-04-08 RX ADMIN — WATER 2000 MG: 1 INJECTION INTRAMUSCULAR; INTRAVENOUS; SUBCUTANEOUS at 04:24

## 2025-04-08 RX ADMIN — LEVOTHYROXINE SODIUM 125 MCG: 0.12 TABLET ORAL at 08:37

## 2025-04-08 RX ADMIN — CEPHALEXIN 500 MG: 500 CAPSULE ORAL at 05:57

## 2025-04-08 RX ADMIN — ENOXAPARIN SODIUM 40 MG: 100 INJECTION SUBCUTANEOUS at 08:36

## 2025-04-08 NOTE — DISCHARGE INSTRUCTIONS
Fu with PCP  FU with cardiology   FU with Oncology  You have 20 more doses of antibiotic,  Your cordorone and metoprolol doses have changed and sent to Pharmacy

## 2025-04-08 NOTE — PROGRESS NOTES
Cardiology Progress Note Calvin Martinez MD      Patient:  Damian You  [id number]    Patient Active Problem List    Diagnosis Date Noted    Postoperative anemia due to acute blood loss 10/31/2020     Priority: High    Severe malnutrition 04/04/2025     Priority: Low    Bradycardia 04/03/2025     Priority: Low    Hypothyroid 04/03/2025     Priority: Low    Chronic kidney disease 04/03/2025     Priority: Low    Chemotherapy management, encounter for 04/03/2025     Priority: Low    Normocytic anemia 04/03/2025     Priority: Low    Bradycardia, unspecified 04/03/2025     Priority: Low    Buttock wound, right, initial encounter 03/26/2025     Priority: Low    Hypomagnesemia 03/26/2025     Priority: Low    Encounter for screening for other viral diseases 03/12/2025     Priority: Low    Abdominal wall skin ulcer, with fat layer exposed (HCC) 02/21/2025     Priority: Low    Moderate malnutrition 02/11/2025     Priority: Low    Colon cancer metastasized to liver (HCC) 02/10/2025     Priority: Low    Colonic thickening 02/10/2025     Priority: Low    Medically complex patient 02/10/2025     Priority: Low    Life threatening medical illness 02/10/2025     Priority: Low    Hypokalemia 02/06/2025     Priority: Low    Abnormal abdominal CT scan 02/05/2025     Priority: Low    Abnormal computed tomography of sigmoid colon 02/05/2025     Priority: Low    Adenocarcinoma of colon 02/05/2025     Priority: Low     Overview Note:     Stage 4 colon cancer with liver mets  2/10/2025-sigmoidoscopy diagnostic flexible-mass at 20 cm with lumen less than 5 mm.  Biopsies taken: MMR proficient invasive moderate differentiated adenocarcinoma  2/13/2025-essentially, MMR proficient stage IV metastatic sigmoid adenocarcinoma.  Recommended modified FOLFOX.  Recommended NGS Tempus.      CKD stage 3a, GFR 45-59 ml/min (HCC) 02/14/2022     Priority: Low    Neutrophilic leukocytosis 02/14/2022     Priority: Low    Intracerebral hemorrhage, nontraumatic

## 2025-04-08 NOTE — PLAN OF CARE
Problem: Chronic Conditions and Co-morbidities  Goal: Patient's chronic conditions and co-morbidity symptoms are monitored and maintained or improved  4/8/2025 0937 by Maria Smith RN  Outcome: Adequate for Discharge  4/7/2025 2243 by Daisy Roberto RN  Outcome: Progressing     Problem: Safety - Adult  Goal: Free from fall injury  4/8/2025 0937 by Maria Smith RN  Outcome: Adequate for Discharge  4/7/2025 2243 by Daisy Roberto RN  Outcome: Progressing     Problem: ABCDS Injury Assessment  Goal: Absence of physical injury  4/8/2025 0937 by Maria Smith RN  Outcome: Adequate for Discharge  4/7/2025 2243 by Daisy Roberto RN  Outcome: Progressing     Problem: Skin/Tissue Integrity  Goal: Skin integrity remains intact  Description: 1.  Monitor for areas of redness and/or skin breakdown  2.  Assess vascular access sites hourly  3.  Every 4-6 hours minimum:  Change oxygen saturation probe site  4.  Every 4-6 hours:  If on nasal continuous positive airway pressure, respiratory therapy assess nares and determine need for appliance change or resting period  4/8/2025 0937 by Maria Smith RN  Outcome: Adequate for Discharge  4/7/2025 2243 by Daisy Roberto RN  Outcome: Progressing     Problem: Nutrition Deficit:  Goal: Optimize nutritional status  4/8/2025 0937 by Maria Smith RN  Outcome: Adequate for Discharge  4/7/2025 2243 by Daisy Roberto RN  Outcome: Progressing

## 2025-04-08 NOTE — PROGRESS NOTES
MEDICAL ONCOLOGY PROGRESS NOTE     Pt Name: Damian You  MRN: [id number]  YOB: 1950  Date of evaluation: 4/8/2025    Subjective-no new complaints.  Reviewed interval notes.  Afebrile.  Status post dual-chamber pacemaker placement for severe bradycardia.  Heart rate improved.  Feeling much better.  Eager to be discharged      HISTORY OF PRESENT ILLNESS:   Mr. You is a pleasant 80 years old male who has a diagnosis of stage IV colonic adenocarcinoma, K-gomez wild-type currently receiving palliative chemotherapy with modified FOLFOX.  The patient is receiving treatment with dose reduction.  Patient has several other multiple comorbidities.  Patient is very frail as well.  Patient receives his second treatment on 4/2/2025 in the clinic.  Patient had a fall at home.  Patient was checked by home health and was found to be bradycardic.  Patient has a history of atrial fibrillation.  Patient was sent to the emergency department admitted to the hospital service.  I was consulted for continuity of care.    Laboratory studies at admission showed WBC 15.2, hemoglobin 11.1, platelet count 285,000.  Chemistry remarkable for potassium 3.4, creatinine 1.8.  Elevated proBNP.  Normal LFTs.    Chest x-ray showed interval removal of previous documented esophageal gastric tube with placement of an implanted right chest port.  Stable elevation of the right hemidiaphragm without additional acute intrathoracic process.        PRIOR ONCOLOGICAL HISTORY    The patient is a very pleasant 74-year-old male who presents today for cycle number 2 of palliative chemotherapy for his newly diagnosed colonic adenocarcinoma K-gomez wild-type with liver metastasis stage IV, MMR proficient.     Palliative chemotherapy with modified FOLFOX and dose reduction was offered due to his advanced age and frailty. The first cycle of chemotherapy was tolerated with significant fatigue and diarrhea. Lomotil and Imodium were used to manage the diarrhea,

## 2025-04-08 NOTE — PROGRESS NOTES
This  visited with pt to follow up with spiritual care and palliative care. Pt says he needed to speak to his sister. This  called his sister and he spoke with her. Pt says he is a Adventist but his greatest need was to speak with his sister. This  also provided spiritual care with sustaining presence, support, mediation, and prayer. Pt expressed gratitude for spiritual care.       Spiritual Health History and Assessment/Progress Note  [facility]    Spiritual/Emotional Needs, Emotional distress, Adjustment to illness,      Name: Damian You MRN: [id number]    Age: 74 y.o.     Sex: male   Language: English   Scientologist: None   Bradycardia     Date: 4/8/2025            Total Time Calculated: 12 min              Spiritual Assessment continued in [facility] ONCOLOGY UNIT        Referral/Consult From: Palliative Care   Encounter Overview/Reason: Spiritual/Emotional Needs  Service Provided For: Patient    Emerald, Belief, Meaning:   Patient identifies as spiritual and is connected with a emerald tradition or spiritual practice  Family/Friends No family/friends present      Importance and Influence:  Patient has spiritual/personal beliefs that influence decisions regarding their health  Family/Friends No family/friends present    Community:  Patient Other: Pt did not specify a spiritual community.   Family/Friends No family/friends present    Assessment and Plan of Care:     Patient Interventions include: Provided sacramental/Tenriism ritual and Other: Assisted pt with calling his sister.   Family/Friends Interventions include: No family/friends present    Patient Plan of Care: Other: Pt is suppose to discharge.  Family/Friends Plan of Care: No family/friends present    Electronically signed by Mary Behrens, Chaplain on 4/8/2025 at 10:45 AM

## 2025-04-08 NOTE — DISCHARGE SUMMARY
Discharge Summary    NAME: Damian You  :  1950  MRN:  [id number]    Admit date:  4/3/2025  Discharge date:  2025    Admitting Physician:  Jon Hunter MD    Advance Directive: Full Code    Consults: cardiology and hematology/oncology    Primary Care Physician:  Ramona Smith MD    Discharge Diagnoses:  Principal Problem:    Bradycardia  Active Problems:    Mixed hyperlipidemia    Atrial fibrillation (HCC)    GERD (gastroesophageal reflux disease)    Neutrophilic leukocytosis    Colon cancer metastasized to liver (HCC)    Hypothyroid    Chronic kidney disease    Chemotherapy management, encounter for    Normocytic anemia    Bradycardia, unspecified    Severe malnutrition  Resolved Problems:    * No resolved hospital problems. *      Significant Diagnostic Studies:   Echo (TTE) complete (PRN contrast/bubble/strain/3D)  Result Date: 2025    Left Ventricle: Normal left ventricular systolic function with a visually estimated EF of 55 - 60%. EF by visual approximation is 60%. Left ventricle is dilated. Normal wall thickness. Normal wall motion. Grade II diastolic dysfunction with increased LAP.   Right Ventricle: Right ventricle is mildly dilated.   Mitral Valve: Annular calcification. Mildly thickened leaflets.   Tricuspid Valve: Mild regurgitation. The estimated RVSP is 29 mmHg.   Left Atrium: Left atrium is mildly dilated.   Pericardium: Small (<1 cm) pericardial effusion present.   Image quality is adequate. Contrast used: Lumason.     XR CHEST PORTABLE  Result Date: 4/3/2025  EXAM: CHEST RADIOGRAPH  TECHNIQUE: Single frontal chest radiograph.  HISTORY: Shortness of breath. Bradycardia.  COMPARISON: To 625.  FINDINGS:  Interval removal of previously documented esophagogastric tube. Interval placement of an implanted right chest port. Cardiomediastinal silhouette and central pulmonary vessels appear unremarkable. Stable elevation of the right hemidiaphragm. No new consolidation,  medications      diphenoxylate-atropine 2.5-0.025 MG per tablet  Commonly known as: LOMOTIL     metoclopramide 10 MG tablet  Commonly known as: REGLAN     ondansetron 4 MG tablet  Commonly known as: ZOFRAN     potassium chloride 20 MEQ extended release tablet  Commonly known as: KLOR-CON M               Where to Get Your Medications        These medications were sent to [facility] - [place], [place] - [address] - P [phone] - F [phone]  [address] [place] [address], [facility] [address]      Phone: [phone]   amiodarone 200 MG tablet  cephALEXin 500 MG capsule  metoprolol succinate 25 MG extended release tablet         Discharge Instructions:   Follow up with Ramona Smith MD in 3-10 days.  Take medications as directed.  Resume activity as tolerated.    Diet: ADULT DIET; Regular; Low Fat/Low Chol/High Fiber/RAMÍREZ     Disposition: Patient is medically stable and will be discharged home.    Time spent on discharge 40.    Signed:  ADY Todd CNP  4/8/2025 12:17 PM

## 2025-04-08 NOTE — CARE COORDINATION
04/04/25 1139   IMM Letter   IMM Letter given to Patient/Family/Significant other/Guardian/POA/by: CHRISTOPHE Carrillo   IMM Letter date given: 04/04/25   IMM Letter time given: 1115     Second IMM given to patient and explained with patient verbalizing understanding.  All questions and concerns addressed   Patient declined waiting 4 hr period prior to discharge.   Signed letter placed in pt soft chart   Electronically signed by CHRISTOPHE Carrillo on 4/4/2025 at 11:40 AM   
   04/08/25 0858   IMM Letter   IMM Letter given to Patient/Family/Significant other/Guardian/POA/by: CHRISTOPHE Carrillo   IMM Letter date given: 04/08/25   IMM Letter time given: 0845     Second IMM given to patient and explained with patient verbalizing understanding.  All questions and concerns addressed   Patient declined waiting 4 hr period prior to discharge.   Signed letter placed in pt soft chart   Electronically signed by CHRISTOPHE Carrillo on 4/8/2025 at 8:59 AM   
provide assistance at DC: Yes  Would you like Case Management to discuss the discharge plan with any other family members/significant others, and if so, who? Yes  Plans to Return to Present Housing: Yes  Potential Assistance needed at discharge: N/A            Potential DME:    Patient expects to discharge to: House  Plan for transportation at discharge: Family    Financial    Payor: MEDICARE / Plan: MEDICARE PART A AND B / Product Type: *No Product type* /     Does insurance require precert for SNF: No    Potential assistance Purchasing Medications: No  Meds-to-Beds request:        [facility] - [place], [place] - [address] - P [phone] - F [phone]  [address] [place] [address]  Phone: [phone] Fax: [phone]      Notes:    Factors facilitating achievement of predicted outcomes: Family support and Caregiver support    Barriers to discharge: Medical complications    Additional Case Management Notes: SW spoke with pt at bedside. He was accompanied by his sister. They are current with HH with [facility] and wish to continue when discharged. He does Chemotherapy every 2 weeks. He recently had a port put in. He lives alone but has sister and a caretaker come in and assist him.     The Plan for Transition of Care is related to the following treatment goals of No admission diagnoses are documented for this encounter.    IF APPLICABLE: The Patient and/or patient representative Damian and his family were provided with a choice of provider and agrees with the discharge plan. Freedom of choice list with basic dialogue that supports the patient's individualized plan of care/goals and shares the quality data associated with the providers was provided to:     Patient Representative Name:       The Patient and/or Patient Representative Agree with the Discharge Plan?      Hilary Glover  Case Management Department

## 2025-04-09 ENCOUNTER — CARE COORDINATION (OUTPATIENT)
Dept: CASE MANAGEMENT | Age: 75
End: 2025-04-09

## 2025-04-09 ENCOUNTER — TELEPHONE (OUTPATIENT)
Dept: PALLATIVE CARE | Age: 75
End: 2025-04-09

## 2025-04-09 NOTE — TELEPHONE ENCOUNTER
SN called to scheduled SCOP visit due to recent discharge from Hospital. no answer. voicemail left.   Electronically signed by Jennifer Yoo RN on 4/9/2025 at 2:32 PM

## 2025-04-09 NOTE — CONSULTS
Pacemaker educational packet and cover letter sent to the patient's mailing address on record.  Information included; incision care, affected arm ROM and weight bearing limitations, present and future precautionary measures, sign & symptom awareness with reasons to call the cardiologist, keeping of appointments, carrying identification card and transmitter operation.  Patient instructed to contact cardiologist's office with questions or concerns.

## 2025-04-09 NOTE — CARE COORDINATION
Care Transitions Note    Initial Call - Call within 2 business days of discharge: Yes    Attempted to reach patient for transitions of care follow up. Unable to reach patient.    Outreach Attempts:   Unable to leave message. Mail box is full.      Patient: Damian You    Patient : 1950   MRN: [id number]    Reason for Admission: Bradycardia, Pacemaker Placement  Discharge Date: 25  RURS: Readmission Risk Score: 21.9    Last Discharge Facility       Date Complaint Diagnosis Description Type Department Provider    4/3/25 Bradycardia Bradycardia ... ED to Hosp-Admission (Discharged) (ADMITTED) [facility] Jon Hunter MD; KE[facility] Lockett          Was this an external facility discharge? No    Follow Up Appointment:   Patient has hospital follow up appointment scheduled within 7 days of discharge.    Future Appointments         Provider Specialty Dept Phone    2025 9:00 AM SCHEDULE, [facility] Cardiology [phone]    2025 11:00 AM Ramona Smith MD Primary Care [phone]    2025 9:30 AM Yuniel Aponte MD Wound Ostomy [phone]    2025 11:30 AM Halle Boswell MD Hematology and Oncology [phone]    2025 11:30 AM INFUSION SCHEDULE, [facility] Infusion Therapy [phone]    2025 12:30 PM SCHEDULE, [facility] Infusion Therapy [phone]    2025 10:15 AM INFUSION SCHEDULE, [facility] Infusion Therapy [phone]    2025 9:15 AM SCHEDULE, [facility] Infusion Therapy [phone]    2025 10:30 AM Lila Camarena APRN Cardiology [phone]    2025 10:00 AM Ramona Smith MD Primary Care [phone]          Plan for follow-up on next business day.      Idania Robert RN

## 2025-04-10 ENCOUNTER — CARE COORDINATION (OUTPATIENT)
Dept: CASE MANAGEMENT | Age: 75
End: 2025-04-10

## 2025-04-10 NOTE — CARE COORDINATION
Care Transitions Note    Initial Call - Call within 2 business days of discharge: Yes    Attempted to reach patient for transitions of care follow up. Unable to reach patient.    Outreach Attempts:   Multiple attempts to contact patient at phone numbers on file.   Unable to leave message. Voice mail box is full.    Patient: Damian You    Patient : 1950   MRN: [id number]    Reason for Admission: Bradycardia, Pacemaker placement  Discharge Date: 25  RURS: Readmission Risk Score: 21.9    Last Discharge Facility       Date Complaint Diagnosis Description Type Department Provider    4/3/25 Bradycardia Bradycardia ... ED to Hosp-Admission (Discharged) (ADMITTED) [facility] Jon Hunter MD; REY Lockett.          Was this an external facility discharge? No    Follow Up Appointment:   Patient has hospital follow up appointment scheduled within 7 days of discharge.    Future Appointments         Provider Specialty Dept Phone    2025 9:00 AM SCHEDULE, [facility] CARDIAC DEVICE Cardiology [phone]    2025 11:00 AM Ramona Smith MD Primary Care [phone]    2025 9:30 AM Yuniel Aponte MD Wound Ostomy [phone]    2025 11:30 AM Halle Boswell MD Hematology and Oncology [phone]    2025 11:30 AM INFUSION SCHEDULE, [facility] Infusion Therapy [phone]    2025 12:30 PM SCHEDULE, [facility] Infusion Therapy [phone]    2025 10:15 AM INFUSION SCHEDULE, [facility] Infusion Therapy [phone]    2025 9:15 AM SCHEDULE, [facility] Infusion Therapy [phone]    2025 10:30 AM Lila Camarena APRN Cardiology [phone]    2025 10:00 AM Ramona Smith MD Primary Care [phone]          No further follow-up call indicated     Idania Robert RN

## 2025-04-14 ENCOUNTER — OFFICE VISIT (OUTPATIENT)
Dept: PRIMARY CARE CLINIC | Age: 75
End: 2025-04-14

## 2025-04-14 ENCOUNTER — CLINICAL SUPPORT (OUTPATIENT)
Dept: CARDIOLOGY CLINIC | Age: 75
End: 2025-04-14

## 2025-04-14 VITALS
RESPIRATION RATE: 16 BRPM | HEART RATE: 67 BPM | HEIGHT: 70 IN | SYSTOLIC BLOOD PRESSURE: 120 MMHG | DIASTOLIC BLOOD PRESSURE: 70 MMHG | TEMPERATURE: 97.5 F | BODY MASS INDEX: 23.62 KG/M2 | OXYGEN SATURATION: 99 % | WEIGHT: 165 LBS

## 2025-04-14 DIAGNOSIS — Z51.89 VISIT FOR WOUND CHECK: Primary | ICD-10-CM

## 2025-04-14 DIAGNOSIS — Z09 HOSPITAL DISCHARGE FOLLOW-UP: ICD-10-CM

## 2025-04-14 DIAGNOSIS — I25.10 CORONARY ARTERY DISEASE INVOLVING NATIVE CORONARY ARTERY OF NATIVE HEART WITHOUT ANGINA PECTORIS: ICD-10-CM

## 2025-04-14 DIAGNOSIS — Z95.0 PACEMAKER: Primary | ICD-10-CM

## 2025-04-14 DIAGNOSIS — I10 ESSENTIAL HYPERTENSION: ICD-10-CM

## 2025-04-14 PROBLEM — R00.1 BRADYCARDIA: Status: RESOLVED | Noted: 2025-04-03 | Resolved: 2025-04-14

## 2025-04-14 PROBLEM — R00.1 BRADYCARDIA, UNSPECIFIED: Status: RESOLVED | Noted: 2025-04-03 | Resolved: 2025-04-14

## 2025-04-14 NOTE — PROGRESS NOTES
Post-Discharge Transitional Care  Follow Up      Damian You   YOB: 1950    Date of Office Visit:  4/14/2025  Date of Hospital Admission: 4/3/25  Date of Hospital Discharge: 4/8/25  Risk of hospital readmission (high >=14%. Medium >=10%) :Readmission Risk Score: 21.9      Care management risk score Rising risk (score 2-5) and Complex Care (Scores >=6): No Risk Score On File     Non face to face  following discharge, date last encounter closed (first attempt may have been earlier): 04/10/2025    Call initiated 2 business days of discharge: Yes    ASSESSMENT/PLAN:   Pacemaker  Assessment & Plan:    Patient's heart rate and blood pressure were normal pacemaker site appears clean  Essential hypertension  Assessment & Plan:   Chronic, at goal (stable), continue current treatment plan  Coronary artery disease involving native coronary artery of native heart without angina pectoris  Assessment & Plan:   Monitored by specialist- no acute findings meriting change in the plan      Medical Decision Making: moderate complexity  No follow-ups on file.         .    Subjective:   HPI:  Follow up of Hospital problems/diagnosis(es):  74-year-old with past medical history of IPF,, CHF, CAD, hypertension and history of colon cancer with liver mets on palliative chemo.  Patient presented to the emergency room with complaint of low heart rate and dizziness and lightheaded.  Home health found his heart rate to be in the 30s to 40s when they checked him.  Patient was given a dose of atropine in the ED with improvement to the 60s.  EKG showed A-fib with slow ventricular response heart rate of 40 chest x-ray with no acute process.  Electrolytes sodium 141 potassium 3.6 creatinine 1.6 BUN 36 glucose 83 white count 9.9 hemoglobin 10 hematocrit 31.5 platelets 212.  Cardiology implanted a new pacemaker successfully.  Patient is ready for discharge.     Inpatient course: Discharge summary reviewed- see chart.    Interval

## 2025-04-14 NOTE — PROGRESS NOTES
Patient here for a wound check visit status post Pacemaker implant.  Incision dry and intact.  No redness, swelling, or drainage noted  Edges well approximated  Instructed patient to wash area with antibacterial soap and keep it clean and dry.  Reviewed discharged instructions and questions answered.  Reviewed [facility] home monitor and patient verbalized understanding  Follow up as scheduled

## 2025-04-15 ENCOUNTER — TELEPHONE (OUTPATIENT)
Dept: PRIMARY CARE CLINIC | Age: 75
End: 2025-04-15

## 2025-04-15 DIAGNOSIS — C18.9 ADENOCARCINOMA OF COLON: Primary | ICD-10-CM

## 2025-04-15 NOTE — TELEPHONE ENCOUNTER
Iraida from [facility] called to make dr tobin aware that they are seeing him 2x a week for physical therapy

## 2025-04-15 NOTE — PROGRESS NOTES
pain, no nausea, no vomiting, no diarrhea, no constipation;  OANH: no dysuria, no hematuria, no frequency or urgency, no nephrolithiasis;  MUSCULOSKELETAL: no joint pain, no swelling, no stiffness;  ENDOCRINE: no polyuria, no polydipsia, no cold or heat intolerance;  HEMATOLOGY: no easy bruising or bleeding, no history of clotting disorder;  DERMATOLOGY: no skin rash, no eczema, no pruritus;  PSYCHIATRY: no depression, no anxiety, no panic attacks, no suicidal ideation, no homicidal ideation;  NEUROLOGY: no syncope, no seizures, no numbness or tingling of hands, no numbness or tingling of feet, no paresis;     Vitals signs:  BP (!) 156/89   Pulse 80   Temp 97.1 °F (36.2 °C)   Resp 18   Ht 1.778 m (5' 10\")   Wt 75 kg (165 lb 4.8 oz)   SpO2 97%   BMI 23.72 kg/m²        Wt Readings from Last 3 Encounters:   04/16/25 75 kg (165 lb 4.8 oz)   04/16/25 74.8 kg (165 lb)   04/14/25 74.8 kg (165 lb)     PHYSICAL EXAM:  CONSTITUTIONAL: Alert, appro elderly priate, no acute distress  EYES: Non icteric, EOM intact, pupils equal round   ENT: Mucus membranes moist, no oral pharyngeal lesions, external inspection of ears and nose are normal.  NECK: Supple, no masses.  No palpable thyroid mass  CHEST/LUNGS: CTA bilaterally, normal respiratory effort   CARDIOVASCULAR: RRR, no murmurs.  No lower extremity edema  ABDOMEN: soft non-tender, active bowel sounds, no HSM.  No palpable masses  EXTREMITIES: warm, full ROM in all 4 extremities, no focal weakness.  SKIN: warm, dry with no rashes or lesions  LYMPH: No cervical, clavicular, axillary, or inguinal lymphadenopathy  NEUROLOGIC: follows commands, non focal   PSYCH: mood and affect appropriate.  Alert and oriented to time, place, person    Relevant Lab findings/reviewed by me:  Cancer Markers:  Lab Results   Component Value Date/Time    PSA 1.26 03/25/2024 10:00 AM    CEA 77.2 04/16/2025 11:44 AM     212 03/12/2025 11:43 AM     Lab Results   Component Value Date    WBC 7.09

## 2025-04-16 ENCOUNTER — HOSPITAL ENCOUNTER (OUTPATIENT)
Dept: INFUSION THERAPY | Age: 75
Discharge: HOME | End: 2025-04-16
Payer: MEDICARE

## 2025-04-16 ENCOUNTER — OFFICE VISIT (OUTPATIENT)
Dept: PALLATIVE CARE | Age: 75
End: 2025-04-16
Payer: MEDICARE

## 2025-04-16 ENCOUNTER — OFFICE VISIT (OUTPATIENT)
Dept: HEMATOLOGY | Age: 75
End: 2025-04-16
Payer: MEDICARE

## 2025-04-16 ENCOUNTER — HOSPITAL ENCOUNTER (OUTPATIENT)
Dept: WOUND CARE | Age: 75
Discharge: HOME | End: 2025-04-16
Attending: NURSE PRACTITIONER
Payer: MEDICARE

## 2025-04-16 VITALS
RESPIRATION RATE: 18 BRPM | SYSTOLIC BLOOD PRESSURE: 156 MMHG | HEART RATE: 80 BPM | HEIGHT: 70 IN | BODY MASS INDEX: 23.66 KG/M2 | OXYGEN SATURATION: 97 % | DIASTOLIC BLOOD PRESSURE: 89 MMHG | WEIGHT: 165.3 LBS | TEMPERATURE: 97.1 F

## 2025-04-16 VITALS
DIASTOLIC BLOOD PRESSURE: 97 MMHG | WEIGHT: 165 LBS | HEART RATE: 74 BPM | RESPIRATION RATE: 16 BRPM | HEIGHT: 70 IN | SYSTOLIC BLOOD PRESSURE: 182 MMHG | BODY MASS INDEX: 23.62 KG/M2 | TEMPERATURE: 97.5 F

## 2025-04-16 VITALS
TEMPERATURE: 97.3 F | OXYGEN SATURATION: 97 % | RESPIRATION RATE: 16 BRPM | SYSTOLIC BLOOD PRESSURE: 128 MMHG | DIASTOLIC BLOOD PRESSURE: 86 MMHG | HEART RATE: 87 BPM

## 2025-04-16 DIAGNOSIS — Z51.11 CHEMOTHERAPY MANAGEMENT, ENCOUNTER FOR: ICD-10-CM

## 2025-04-16 DIAGNOSIS — C78.7 COLON CANCER METASTASIZED TO LIVER (HCC): Primary | ICD-10-CM

## 2025-04-16 DIAGNOSIS — K52.1 CHEMOTHERAPY INDUCED DIARRHEA: ICD-10-CM

## 2025-04-16 DIAGNOSIS — C18.9 ADENOCARCINOMA OF COLON: Primary | ICD-10-CM

## 2025-04-16 DIAGNOSIS — Z74.1 REQUIRES ASSISTANCE WITH ACTIVITIES OF DAILY LIVING (ADL): ICD-10-CM

## 2025-04-16 DIAGNOSIS — C18.9 ADENOCARCINOMA OF COLON (HCC): ICD-10-CM

## 2025-04-16 DIAGNOSIS — T45.1X5D ADVERSE EFFECT OF CHEMOTHERAPY, SUBSEQUENT ENCOUNTER: ICD-10-CM

## 2025-04-16 DIAGNOSIS — Z51.12 ENCOUNTER FOR ANTINEOPLASTIC IMMUNOTHERAPY: ICD-10-CM

## 2025-04-16 DIAGNOSIS — R53.81 PHYSICAL DECONDITIONING: ICD-10-CM

## 2025-04-16 DIAGNOSIS — C18.9 COLON CANCER METASTASIZED TO LIVER (HCC): Primary | ICD-10-CM

## 2025-04-16 DIAGNOSIS — D64.81 ANEMIA ASSOCIATED WITH CHEMOTHERAPY: ICD-10-CM

## 2025-04-16 DIAGNOSIS — Z11.59 ENCOUNTER FOR SCREENING FOR OTHER VIRAL DISEASES: Primary | ICD-10-CM

## 2025-04-16 DIAGNOSIS — C80.1 CANCER (HCC): Primary | ICD-10-CM

## 2025-04-16 DIAGNOSIS — T45.1X5A ANEMIA ASSOCIATED WITH CHEMOTHERAPY: ICD-10-CM

## 2025-04-16 DIAGNOSIS — L98.492 ABDOMINAL WALL SKIN ULCER, WITH FAT LAYER EXPOSED (HCC): Primary | ICD-10-CM

## 2025-04-16 DIAGNOSIS — Z71.89 CARE PLAN DISCUSSED WITH PATIENT: ICD-10-CM

## 2025-04-16 DIAGNOSIS — R97.8 ABNORMAL TUMOR MARKERS: ICD-10-CM

## 2025-04-16 DIAGNOSIS — Z95.0 PACEMAKER: ICD-10-CM

## 2025-04-16 DIAGNOSIS — T45.1X5A CHEMOTHERAPY INDUCED DIARRHEA: ICD-10-CM

## 2025-04-16 DIAGNOSIS — Z51.5 ENCOUNTER FOR PALLIATIVE CARE: ICD-10-CM

## 2025-04-16 DIAGNOSIS — S31.819A BUTTOCK WOUND, RIGHT, INITIAL ENCOUNTER: ICD-10-CM

## 2025-04-16 DIAGNOSIS — R54 FRAILTY SYNDROME IN GERIATRIC PATIENT: ICD-10-CM

## 2025-04-16 LAB
ALBUMIN SERPL-MCNC: 3.3 G/DL (ref 3.5–5.2)
ALP SERPL-CCNC: 135 U/L (ref 40–129)
ALT SERPL-CCNC: 21 U/L (ref 5–41)
ANION GAP SERPL CALCULATED.3IONS-SCNC: 10 MMOL/L (ref 7–19)
AST SERPL-CCNC: 34 U/L (ref 5–40)
BASOPHILS # BLD: 0.06 K/UL (ref 0–0.2)
BASOPHILS NFR BLD: 0.8 % (ref 0–1)
BILIRUB SERPL-MCNC: 0.3 MG/DL (ref 0–1.2)
BUN SERPL-MCNC: 12 MG/DL (ref 8–23)
CALCIUM SERPL-MCNC: 8.4 MG/DL (ref 8.8–10.2)
CEA SERPL-MCNC: 77.2 NG/ML (ref 0–4.7)
CHLORIDE SERPL-SCNC: 107 MMOL/L (ref 98–107)
CO2 SERPL-SCNC: 25 MMOL/L (ref 22–29)
CREAT SERPL-MCNC: 1.1 MG/DL (ref 0.7–1.2)
EOSINOPHIL # BLD: 0.2 K/UL (ref 0–0.6)
EOSINOPHIL NFR BLD: 2.8 % (ref 0–5)
ERYTHROCYTE [DISTWIDTH] IN BLOOD BY AUTOMATED COUNT: 16.8 % (ref 11.5–14.5)
GLUCOSE SERPL-MCNC: 81 MG/DL (ref 70–99)
HCT VFR BLD AUTO: 34.4 % (ref 42–52)
HGB BLD-MCNC: 10.9 G/DL (ref 14–18)
LYMPHOCYTES # BLD: 1.21 K/UL (ref 1.1–4.5)
LYMPHOCYTES NFR BLD: 17.1 % (ref 20–40)
Lab: NORMAL
MAGNESIUM SERPL-MCNC: 1.8 MG/DL (ref 1.6–2.4)
MCH RBC QN AUTO: 28.7 PG (ref 27–31)
MCHC RBC AUTO-ENTMCNC: 31.7 G/DL (ref 33–37)
MCV RBC AUTO: 90.5 FL (ref 80–94)
MONOCYTES # BLD: 0.46 K/UL (ref 0–0.9)
MONOCYTES NFR BLD: 6.5 % (ref 1–10)
NEUTROPHILS # BLD: 5.14 K/UL (ref 1.5–7.5)
NEUTS SEG NFR BLD: 72.5 % (ref 50–65)
PHOSPHATE SERPL-MCNC: 2.4 MG/DL (ref 2.5–4.5)
PLATELET # BLD AUTO: 188 K/UL (ref 130–400)
PMV BLD AUTO: 8.9 FL (ref 9.4–12.4)
POTASSIUM SERPL-SCNC: 3.9 MMOL/L (ref 3.5–5.1)
PROT SERPL-MCNC: 6.2 G/DL (ref 6.4–8.3)
RBC # BLD AUTO: 3.8 M/UL (ref 4.7–6.1)
REPORT: NORMAL
SODIUM SERPL-SCNC: 142 MMOL/L (ref 136–145)
THIS TEST SENT TO: NORMAL
WBC # BLD AUTO: 7.09 K/UL (ref 4.8–10.8)

## 2025-04-16 PROCEDURE — 96361 HYDRATE IV INFUSION ADD-ON: CPT

## 2025-04-16 PROCEDURE — 1111F DSCHRG MED/CURRENT MED MERGE: CPT

## 2025-04-16 PROCEDURE — 2580000003 HC RX 258: Performed by: INTERNAL MEDICINE

## 2025-04-16 PROCEDURE — 96375 TX/PRO/DX INJ NEW DRUG ADDON: CPT

## 2025-04-16 PROCEDURE — G8427 DOCREV CUR MEDS BY ELIG CLIN: HCPCS | Performed by: INTERNAL MEDICINE

## 2025-04-16 PROCEDURE — 6360000002 HC RX W HCPCS: Performed by: INTERNAL MEDICINE

## 2025-04-16 PROCEDURE — 6370000000 HC RX 637 (ALT 250 FOR IP): Performed by: INTERNAL MEDICINE

## 2025-04-16 PROCEDURE — 1111F DSCHRG MED/CURRENT MED MERGE: CPT | Performed by: INTERNAL MEDICINE

## 2025-04-16 PROCEDURE — G8420 CALC BMI NORM PARAMETERS: HCPCS

## 2025-04-16 PROCEDURE — G8428 CUR MEDS NOT DOCUMENT: HCPCS

## 2025-04-16 PROCEDURE — 82378 CARCINOEMBRYONIC ANTIGEN: CPT

## 2025-04-16 PROCEDURE — 83735 ASSAY OF MAGNESIUM: CPT

## 2025-04-16 PROCEDURE — 81404 MOPATH PROCEDURE LEVEL 5: CPT

## 2025-04-16 PROCEDURE — 96417 CHEMO IV INFUS EACH ADDL SEQ: CPT

## 2025-04-16 PROCEDURE — 3017F COLORECTAL CA SCREEN DOC REV: CPT | Performed by: INTERNAL MEDICINE

## 2025-04-16 PROCEDURE — 97597 DBRDMT OPN WND 1ST 20 CM/<: CPT | Performed by: SURGERY

## 2025-04-16 PROCEDURE — 3017F COLORECTAL CA SCREEN DOC REV: CPT

## 2025-04-16 PROCEDURE — 99214 OFFICE O/P EST MOD 30 MIN: CPT | Performed by: INTERNAL MEDICINE

## 2025-04-16 PROCEDURE — 80053 COMPREHEN METABOLIC PANEL: CPT

## 2025-04-16 PROCEDURE — 96366 THER/PROPH/DIAG IV INF ADDON: CPT

## 2025-04-16 PROCEDURE — 85025 COMPLETE CBC W/AUTO DIFF WBC: CPT

## 2025-04-16 PROCEDURE — 99214 OFFICE O/P EST MOD 30 MIN: CPT

## 2025-04-16 PROCEDURE — 36415 COLL VENOUS BLD VENIPUNCTURE: CPT

## 2025-04-16 PROCEDURE — 96415 CHEMO IV INFUSION ADDL HR: CPT

## 2025-04-16 PROCEDURE — G2211 COMPLEX E/M VISIT ADD ON: HCPCS | Performed by: INTERNAL MEDICINE

## 2025-04-16 PROCEDURE — 84100 ASSAY OF PHOSPHORUS: CPT

## 2025-04-16 PROCEDURE — 3079F DIAST BP 80-89 MM HG: CPT | Performed by: INTERNAL MEDICINE

## 2025-04-16 PROCEDURE — G8420 CALC BMI NORM PARAMETERS: HCPCS | Performed by: INTERNAL MEDICINE

## 2025-04-16 PROCEDURE — 1123F ACP DISCUSS/DSCN MKR DOCD: CPT

## 2025-04-16 PROCEDURE — 1159F MED LIST DOCD IN RCRD: CPT

## 2025-04-16 PROCEDURE — 3077F SYST BP >= 140 MM HG: CPT | Performed by: INTERNAL MEDICINE

## 2025-04-16 PROCEDURE — 1159F MED LIST DOCD IN RCRD: CPT | Performed by: INTERNAL MEDICINE

## 2025-04-16 PROCEDURE — 96367 TX/PROPH/DG ADDL SEQ IV INF: CPT

## 2025-04-16 PROCEDURE — 1123F ACP DISCUSS/DSCN MKR DOCD: CPT | Performed by: INTERNAL MEDICINE

## 2025-04-16 PROCEDURE — 81232 DPYD GENE COMMON VARIANTS: CPT

## 2025-04-16 PROCEDURE — G0498 CHEMO EXTEND IV INFUS W/PUMP: HCPCS

## 2025-04-16 PROCEDURE — NBSRV NON-BILLABLE SERVICE: Performed by: INTERNAL MEDICINE

## 2025-04-16 PROCEDURE — 97597 DBRDMT OPN WND 1ST 20 CM/<: CPT

## 2025-04-16 PROCEDURE — 1036F TOBACCO NON-USER: CPT

## 2025-04-16 PROCEDURE — 96413 CHEMO IV INFUSION 1 HR: CPT

## 2025-04-16 PROCEDURE — 96368 THER/DIAG CONCURRENT INF: CPT

## 2025-04-16 PROCEDURE — 1036F TOBACCO NON-USER: CPT | Performed by: INTERNAL MEDICINE

## 2025-04-16 RX ORDER — FAMOTIDINE 10 MG/ML
INJECTION, SOLUTION INTRAVENOUS
Status: DISCONTINUED
Start: 2025-04-16 | End: 2025-04-17 | Stop reason: HOSPADM

## 2025-04-16 RX ORDER — LIDOCAINE 40 MG/G
CREAM TOPICAL PRN
OUTPATIENT
Start: 2025-04-16

## 2025-04-16 RX ORDER — PALONOSETRON 0.05 MG/ML
0.25 INJECTION, SOLUTION INTRAVENOUS ONCE
Status: COMPLETED | OUTPATIENT
Start: 2025-04-16 | End: 2025-04-16

## 2025-04-16 RX ORDER — NEOMYCIN/BACITRACIN/POLYMYXINB 3.5-400-5K
OINTMENT (GRAM) TOPICAL PRN
OUTPATIENT
Start: 2025-04-16

## 2025-04-16 RX ORDER — MEPERIDINE HYDROCHLORIDE 50 MG/ML
12.5 INJECTION INTRAMUSCULAR; INTRAVENOUS; SUBCUTANEOUS PRN
Status: CANCELLED | OUTPATIENT
Start: 2025-04-16

## 2025-04-16 RX ORDER — LIDOCAINE 50 MG/G
OINTMENT TOPICAL PRN
OUTPATIENT
Start: 2025-04-16

## 2025-04-16 RX ORDER — LIDOCAINE HYDROCHLORIDE 20 MG/ML
JELLY TOPICAL PRN
OUTPATIENT
Start: 2025-04-16

## 2025-04-16 RX ORDER — SODIUM CHLORIDE 0.9 % (FLUSH) 0.9 %
5-40 SYRINGE (ML) INJECTION PRN
Status: CANCELLED | OUTPATIENT
Start: 2025-04-16

## 2025-04-16 RX ORDER — CLOBETASOL PROPIONATE 0.5 MG/G
OINTMENT TOPICAL PRN
OUTPATIENT
Start: 2025-04-16

## 2025-04-16 RX ORDER — FAMOTIDINE 10 MG/ML
20 INJECTION, SOLUTION INTRAVENOUS
Status: COMPLETED | OUTPATIENT
Start: 2025-04-16 | End: 2025-04-16

## 2025-04-16 RX ORDER — SILVER SULFADIAZINE 10 MG/G
CREAM TOPICAL PRN
OUTPATIENT
Start: 2025-04-16

## 2025-04-16 RX ORDER — BETAMETHASONE DIPROPIONATE 0.5 MG/G
CREAM TOPICAL PRN
OUTPATIENT
Start: 2025-04-16

## 2025-04-16 RX ORDER — TRIAMCINOLONE ACETONIDE 1 MG/G
OINTMENT TOPICAL PRN
OUTPATIENT
Start: 2025-04-16

## 2025-04-16 RX ORDER — ALBUTEROL SULFATE 90 UG/1
4 INHALANT RESPIRATORY (INHALATION) PRN
Status: CANCELLED | OUTPATIENT
Start: 2025-04-16

## 2025-04-16 RX ORDER — GINSENG 100 MG
CAPSULE ORAL PRN
OUTPATIENT
Start: 2025-04-16

## 2025-04-16 RX ORDER — IPRATROPIUM BROMIDE AND ALBUTEROL SULFATE 2.5; .5 MG/3ML; MG/3ML
SOLUTION RESPIRATORY (INHALATION)
Status: DISCONTINUED
Start: 2025-04-16 | End: 2025-04-17 | Stop reason: HOSPADM

## 2025-04-16 RX ORDER — DIPHENHYDRAMINE HYDROCHLORIDE 50 MG/ML
50 INJECTION, SOLUTION INTRAMUSCULAR; INTRAVENOUS
Status: COMPLETED | OUTPATIENT
Start: 2025-04-16 | End: 2025-04-16

## 2025-04-16 RX ORDER — HYDROCORTISONE SODIUM SUCCINATE 100 MG/2ML
100 INJECTION INTRAMUSCULAR; INTRAVENOUS
Status: CANCELLED | OUTPATIENT
Start: 2025-04-16

## 2025-04-16 RX ORDER — ONDANSETRON 2 MG/ML
8 INJECTION INTRAMUSCULAR; INTRAVENOUS
Status: CANCELLED | OUTPATIENT
Start: 2025-04-16

## 2025-04-16 RX ORDER — SODIUM CHLORIDE 9 MG/ML
INJECTION, SOLUTION INTRAVENOUS CONTINUOUS
Status: CANCELLED | OUTPATIENT
Start: 2025-04-16

## 2025-04-16 RX ORDER — DIPHENHYDRAMINE HYDROCHLORIDE 50 MG/ML
50 INJECTION, SOLUTION INTRAMUSCULAR; INTRAVENOUS
Status: CANCELLED | OUTPATIENT
Start: 2025-04-16

## 2025-04-16 RX ORDER — IPRATROPIUM BROMIDE AND ALBUTEROL SULFATE 2.5; .5 MG/3ML; MG/3ML
1 SOLUTION RESPIRATORY (INHALATION) ONCE
Status: COMPLETED | OUTPATIENT
Start: 2025-04-16 | End: 2025-04-16

## 2025-04-16 RX ORDER — DIPHENHYDRAMINE HYDROCHLORIDE 50 MG/ML
INJECTION, SOLUTION INTRAMUSCULAR; INTRAVENOUS
Status: DISCONTINUED
Start: 2025-04-16 | End: 2025-04-17 | Stop reason: HOSPADM

## 2025-04-16 RX ORDER — LIDOCAINE HYDROCHLORIDE 20 MG/ML
JELLY TOPICAL PRN
Status: DISCONTINUED | OUTPATIENT
Start: 2025-04-16 | End: 2025-04-17 | Stop reason: HOSPADM

## 2025-04-16 RX ORDER — MUPIROCIN 20 MG/G
OINTMENT TOPICAL PRN
OUTPATIENT
Start: 2025-04-16

## 2025-04-16 RX ORDER — DEXAMETHASONE SODIUM PHOSPHATE 10 MG/ML
10 INJECTION, SOLUTION INTRAMUSCULAR; INTRAVENOUS ONCE
Status: COMPLETED | OUTPATIENT
Start: 2025-04-16 | End: 2025-04-16

## 2025-04-16 RX ORDER — PALONOSETRON 0.05 MG/ML
0.25 INJECTION, SOLUTION INTRAVENOUS ONCE
Status: CANCELLED | OUTPATIENT
Start: 2025-04-16 | End: 2025-04-16

## 2025-04-16 RX ORDER — HEPARIN SODIUM (PORCINE) LOCK FLUSH IV SOLN 100 UNIT/ML 100 UNIT/ML
500 SOLUTION INTRAVENOUS PRN
Status: CANCELLED | OUTPATIENT
Start: 2025-04-18

## 2025-04-16 RX ORDER — SODIUM CHLORIDE 9 MG/ML
5-250 INJECTION, SOLUTION INTRAVENOUS PRN
Status: CANCELLED | OUTPATIENT
Start: 2025-04-16

## 2025-04-16 RX ORDER — FAMOTIDINE 10 MG/ML
20 INJECTION, SOLUTION INTRAVENOUS
Status: CANCELLED | OUTPATIENT
Start: 2025-04-16

## 2025-04-16 RX ORDER — SODIUM CHLOR/HYPOCHLOROUS ACID 0.033 %
SOLUTION, IRRIGATION IRRIGATION PRN
OUTPATIENT
Start: 2025-04-16

## 2025-04-16 RX ORDER — SODIUM CHLORIDE 9 MG/ML
5-250 INJECTION, SOLUTION INTRAVENOUS PRN
OUTPATIENT
Start: 2025-04-18

## 2025-04-16 RX ORDER — GENTAMICIN SULFATE 1 MG/G
OINTMENT TOPICAL PRN
OUTPATIENT
Start: 2025-04-16

## 2025-04-16 RX ORDER — SODIUM CHLORIDE 0.9 % (FLUSH) 0.9 %
5-40 SYRINGE (ML) INJECTION PRN
Status: CANCELLED | OUTPATIENT
Start: 2025-04-18

## 2025-04-16 RX ORDER — HEPARIN SODIUM (PORCINE) LOCK FLUSH IV SOLN 100 UNIT/ML 100 UNIT/ML
500 SOLUTION INTRAVENOUS PRN
Status: CANCELLED | OUTPATIENT
Start: 2025-04-16

## 2025-04-16 RX ORDER — LIDOCAINE HYDROCHLORIDE 40 MG/ML
SOLUTION TOPICAL PRN
OUTPATIENT
Start: 2025-04-16

## 2025-04-16 RX ORDER — SODIUM CHLORIDE 9 MG/ML
INJECTION, SOLUTION INTRAVENOUS CONTINUOUS
Status: DISCONTINUED | OUTPATIENT
Start: 2025-04-16 | End: 2025-04-17 | Stop reason: HOSPADM

## 2025-04-16 RX ORDER — ACETAMINOPHEN 325 MG/1
650 TABLET ORAL
Status: CANCELLED | OUTPATIENT
Start: 2025-04-16

## 2025-04-16 RX ORDER — DEXTROSE MONOHYDRATE 50 MG/ML
5-250 INJECTION, SOLUTION INTRAVENOUS PRN
Status: CANCELLED | OUTPATIENT
Start: 2025-04-16

## 2025-04-16 RX ORDER — EPINEPHRINE 1 MG/ML
0.3 INJECTION, SOLUTION, CONCENTRATE INTRAVENOUS PRN
Status: CANCELLED | OUTPATIENT
Start: 2025-04-16

## 2025-04-16 RX ORDER — BACITRACIN ZINC AND POLYMYXIN B SULFATE 500; 1000 [USP'U]/G; [USP'U]/G
OINTMENT TOPICAL PRN
OUTPATIENT
Start: 2025-04-16

## 2025-04-16 RX ADMIN — SODIUM CHLORIDE: 0.9 INJECTION, SOLUTION INTRAVENOUS at 15:10

## 2025-04-16 RX ADMIN — FAMOTIDINE 20 MG: 10 INJECTION, SOLUTION INTRAVENOUS at 15:12

## 2025-04-16 RX ADMIN — IPRATROPIUM BROMIDE AND ALBUTEROL SULFATE 1 DOSE: 2.5; .5 SOLUTION RESPIRATORY (INHALATION) at 15:49

## 2025-04-16 RX ADMIN — LEUCOVORIN CALCIUM 200 MG: 200 INJECTION, POWDER, LYOPHILIZED, FOR SUSPENSION INTRAMUSCULAR; INTRAVENOUS at 13:44

## 2025-04-16 RX ADMIN — PANITUMUMAB 432 MG: 400 SOLUTION INTRAVENOUS at 12:32

## 2025-04-16 RX ADMIN — FLUOROURACIL 3400 MG: 50 INJECTION, SOLUTION INTRAVENOUS at 16:15

## 2025-04-16 RX ADMIN — DIPHENHYDRAMINE HYDROCHLORIDE 50 MG: 50 INJECTION INTRAMUSCULAR; INTRAVENOUS at 15:13

## 2025-04-16 RX ADMIN — OXALIPLATIN 95 MG: 5 INJECTION, SOLUTION INTRAVENOUS at 13:42

## 2025-04-16 RX ADMIN — DEXAMETHASONE SODIUM PHOSPHATE 10 MG: 10 INJECTION, SOLUTION INTRAMUSCULAR; INTRAVENOUS at 12:11

## 2025-04-16 RX ADMIN — LIDOCAINE HYDROCHLORIDE: 20 JELLY TOPICAL at 09:31

## 2025-04-16 RX ADMIN — PALONOSETRON 0.25 MG: 0.05 INJECTION, SOLUTION INTRAVENOUS at 12:10

## 2025-04-16 NOTE — PROGRESS NOTES
[facility]   Progress Note and Procedure Note      Damian You  MEDICAL RECORD NUMBER:  [id number]  AGE: 74 y.o.   GENDER: male  : 1950  EPISODE DATE:  2025    Subjective:     Chief Complaint   Patient presents with    Wound Check         HISTORY of PRESENT ILLNESS HPI     Damian You is a 74 y.o. male who presents today for wound/ulcer evaluation.   Wound Context: Pt with umbilical surgical wound here for eval/treat  Wound/Ulcer Pain Timing/Severity: none  Quality of pain: N/A  Severity:  0 / 10   Modifying Factors: None  Associated Signs/Symptoms: none    Ulcer Identification:  Ulcer Type: non-healing surgical  Contributing Factors: none    Wound: Surgical incision        PAST MEDICAL HISTORY        Diagnosis Date    CAD (coronary artery disease)     Gout     Hypertension     Hypotension 2020    Non-ST elevation MI (NSTEMI) ([facility]) 14    Palliative care patient 2020    Pneumonia due to infectious organism 2020       PAST SURGICAL HISTORY    Past Surgical History:   Procedure Laterality Date    CARDIAC CATHETERIZATION  14  [facility]    angioplasty, stent to LAD. EF 45%    CHOLECYSTECTOMY      EP DEVICE PROCEDURE N/A 2025    Insert PPM dual [facility] CARDIAC CATH LAB    FEMUR FRACTURE SURGERY Right 10/30/2020    TFN, SHORT performed by Manuelito Biggs MD at [facility] OR    LAPAROTOMY N/A 2025    EXPLORATORY LAPAROTOMY, CECOSTOMY, LOOP COLOSTOMY, LYSIS OF ADHESIONS performed by Toro Gutiérrez MD at [facility] OR    PORT SURGERY Right 2025    PORT INSERTION performed by Amberly Lewis MD at [facility] OR    SIGMOIDOSCOPY N/A 02/10/2025    Dr Murray-5 mm stricture noted at 20 cm in the rectosigmoid colon, tissue just proximal to the stricture was friable and erythematous-Invasive moderately differentiated adenocarcinoma       FAMILY HISTORY    Family History   Problem Relation Age of Onset    No Known Problems Mother     Heart

## 2025-04-16 NOTE — PROGRESS NOTES
[facility]  [address] [place] [address] [place] [address]  Phone: [phone]  Fax: [phone]          BRITTNEY Machuca 74 y.o. 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 Treatment consent signed.  4/2/25 Dose reduction oxaliplatin to 50 mg/m² and CI 5-FU to 1800 mg/m²  4/16/25 Added Vectibix 6 mg/m2 to C# 3 every 2 weeks. Treatment Consent signed  Current Outpatient Medications   Medication Sig Dispense Refill    amiodarone (CORDARONE) 200 MG tablet Take 1 tablet by mouth daily 30 tablet 0    metoprolol succinate (TOPROL XL) 25 MG extended release tablet Take 1 tablet by mouth in the morning and at bedtime 60 tablet 0    ferrous sulfate (IRON 325) 325 (65 Fe) MG tablet Take 1 tablet by mouth 2 times daily 180 tablet 1    thiamine mononitrate (THIAMINE) 100 MG tablet Take 1 tablet by mouth daily 30 tablet 0    folic acid (FOLVITE) 1 MG tablet TAKE 1 TABLET BY MOUTH DAILY 90 tablet 1    levothyroxine (SYNTHROID) 125 MCG tablet TAKE 1 TABLET BY MOUTH ONCE DAILY 90 tablet 1    atorvastatin (LIPITOR) 20 MG tablet TAKE 1 TABLET BY MOUTH EVERY EVENING 90 tablet 3     No current facility-administered medications for this visit.     Facility-Administered Medications Ordered in Other Visits   Medication Dose Route Frequency Provider Last Rate Last Admin    lidocaine (XYLOCAINE) 2 % uro-jet   Topical PRN Christel Ugalde, APRJENNIFER - CNP   Given at 04/16/25 0931    panitumumab (VECTIBIX) 432 mg in sodium chloride 0.9 % 100 mL chemo IVPB  6 mg/kg (Treatment Plan Recorded) IntraVENous Once

## 2025-04-16 NOTE — PLAN OF CARE
Problem: Wound:  Goal: Will show signs of wound healing; wound closure and no evidence of infection  Description: Will show signs of wound healing; wound closure and no evidence of infection  4/16/2025 0941 by Margaux Ray RN  Outcome: Progressing  4/16/2025 0941 by Margaux Ray RN  Outcome: Progressing     Problem: Pressure Ulcer:  Goal: Signs of wound healing will improve  Description: Signs of wound healing will improve  4/16/2025 0941 by Margaux Ray RN  Outcome: Progressing  4/16/2025 0941 by Margaux Ray RN  Outcome: Progressing  Goal: Absence of new pressure ulcer  Description: Absence of new pressure ulcer  4/16/2025 0941 by Margaux Ray RN  Outcome: Progressing  4/16/2025 0941 by Margaux Ray RN  Outcome: Progressing  Goal: Will show no infection signs and symptoms  Description: Will show no infection signs and symptoms  4/16/2025 0941 by Margaux Ray RN  Outcome: Progressing  4/16/2025 0941 by Margaux Ray RN  Outcome: Progressing     Problem: Falls - Risk of:  Goal: Will remain free from falls  Description: Will remain free from falls  4/16/2025 0941 by Margaux Ray RN  Outcome: Progressing  4/16/2025 0941 by Margaux Ray RN  Outcome: Progressing

## 2025-04-16 NOTE — PATIENT INSTRUCTIONS
[facility]   Physician Orders and Discharge Instructions  [address] [place] [address]  [address]  [place] [address]  Telephone: [phone]      FAX [phone]    NAME:  Damian You  YOB: 1950  MEDICAL RECORD NUMBER:  [id number]  DATE:  4/16/2025    Discharge condition: Stable    Discharge to: Home    Left via:Private automobile    Accompanied by: Family    ECF/HHA: [facility]    Dressing Orders: Buttocks Wounds  Healed, Moisturize and Protect   Sit on pillow anytime when sitting    Dressing Orders: Abdomen Wound  Saline moist 2x2 tucked into the wound, cover with dry gauze and medipore tape, change twice daily  Antifungal powder to abdominal skin fold yeasty areas apply twice daily  High protein diet unless restricted by your Family Practitioner    Treatment Orders:  Change Pouch and Wafer: Every 3-5 days and as needed for leaking  Supplies:  SenSura Bunch Flex flat #[id number] (3/8-2-11/16\" YELLOW)  Sensura John Flex Drainable Pouch #[id number] YELLOW  CeraRIng Slim [place] #[id number]  Brava Powder #[id number]  Brava Elastic Barrier Strips #[id number]  Adapt Ostomy Pouch Lubricating Deodorant Ref#[id number]  Hytape Ref #[id number]  Gather supplies needed to change pouch and wafer.  Gently remove your old pouch  Look at the back of pouch before throwing away to see how it has worn.  Wash the skin around your stoma with water. Pat completely dry.  Use the measuring guide to measure your stoma size or use the old pattern to  trace correct size opening on plastic backing of wafer.  Cut out the opening and remove the plastic backing from the wafer.  Optional - Apply Protective Barrier to skin around the stoma or to the back of the wafer.  Apply Hyrofera Blue ostomy dressing to open area at 9 o'clock by the stoma,  cover with CeraRing Slim, then place ostomy wafer as directred  Apply your wafer by centering the opening of the wafer around the stoma and  press the wafer down

## 2025-04-16 NOTE — PROGRESS NOTES
4/16/25 1515: Notified Dr. Boswell of pt reaction, s/s of rigors, hypertension, SOA, anxiety after receiving just over half of the oxaliplatin infusion. Also notified of interventions; infusion stopped, gave pepcid 20 mg IV & benadryl 50 mg IV, warm blankets placed on pt. Orders received to discontinue the remainder of oxaliplatin infusion today & once symptoms subside, connect CAD pump for 5FU infusion & allow pt to leave.

## 2025-04-16 NOTE — PROGRESS NOTES
1549: Breathing treatment administered    1610: Patient resting in chair without complaints of pain or signs of distress. Patient denies experiencing shortness of breath, anxiety or rigors. No new complaints at this time and patient denies pain. Friend with patient and patient feel comfortable to be discharged home at this time. Patient and patient's friend, [place], instructed to call our clinic for any further questions or concerns and/or go to their nearest Emergency Department if needed. Both verbalized understanding. CAD pump connected and patient discharged to home via private vehicle.    Please refer to infusion reaction documentation flowsheet for details.    Electronically signed by Del Mcdowell RN on 4/16/2025 at 4:30 PM

## 2025-04-18 ENCOUNTER — CLINICAL DOCUMENTATION (OUTPATIENT)
Dept: HEMATOLOGY | Age: 75
End: 2025-04-18

## 2025-04-18 ENCOUNTER — HOSPITAL ENCOUNTER (OUTPATIENT)
Dept: INFUSION THERAPY | Age: 75
Discharge: HOME | End: 2025-04-18
Payer: MEDICARE

## 2025-04-18 ENCOUNTER — TELEPHONE (OUTPATIENT)
Dept: PRIMARY CARE CLINIC | Age: 75
End: 2025-04-18

## 2025-04-18 DIAGNOSIS — Z11.59 ENCOUNTER FOR SCREENING FOR OTHER VIRAL DISEASES: ICD-10-CM

## 2025-04-18 DIAGNOSIS — C18.9 ADENOCARCINOMA OF COLON (HCC): Primary | ICD-10-CM

## 2025-04-18 PROCEDURE — 6360000002 HC RX W HCPCS: Performed by: INTERNAL MEDICINE

## 2025-04-18 PROCEDURE — 2500000003 HC RX 250 WO HCPCS: Performed by: INTERNAL MEDICINE

## 2025-04-18 PROCEDURE — 96523 IRRIG DRUG DELIVERY DEVICE: CPT

## 2025-04-18 RX ORDER — HEPARIN 100 UNIT/ML
500 SYRINGE INTRAVENOUS PRN
Status: DISCONTINUED | OUTPATIENT
Start: 2025-04-18 | End: 2025-04-19 | Stop reason: HOSPADM

## 2025-04-18 RX ORDER — SODIUM CHLORIDE 0.9 % (FLUSH) 0.9 %
5-40 SYRINGE (ML) INJECTION PRN
Status: DISCONTINUED | OUTPATIENT
Start: 2025-04-18 | End: 2025-04-19 | Stop reason: HOSPADM

## 2025-04-18 RX ADMIN — HEPARIN 500 UNITS: 100 SYRINGE at 12:17

## 2025-04-18 RX ADMIN — SODIUM CHLORIDE, PRESERVATIVE FREE 10 ML: 5 INJECTION INTRAVENOUS at 12:17

## 2025-04-18 NOTE — PROGRESS NOTES
Received call from sister/Cassie stating during the night Damian keeps pulling off his clothes, he pulled his ostomy bag off and put in the garbage can, he spilled his urinal all over a chair and floor and he pulled all the tape off of his port needle, but didn't pull the needle out this time. He seems very confused and doesn't remember doing any of this. Cassie is asking if he could have something to sleep or could he take Melatonin to help him sleep during the night so he won't be doing any of these things.     Discussed with Dr Boswell who said pt can take OTC Melatonin until he can talk to PCP as they will need to be the one who orders any sleeping medication. Signer discussed recommendations with Cassie who voiced understanding.

## 2025-04-18 NOTE — TELEPHONE ENCOUNTER
Cassie Sister to Damian is asking if patient can be prescribed a sleeping medication or extended release melatonin?  Damian keeps pulling off his clothes,  Ostomy bag, spilling his urinal all over, pulled all the tape off that was around the needle that was going to his chemo medication, his port needle had to be replaced due him trying to pull it out all during the night or very early morning hours. He has someone with him at all times but seems to not make noise. Damian doesn't remember doing this.     I ask Cassie if she had asked  about the sleeping medication for the patient, she wanted to ask Dr. Martinez first.        I explained it would be Monday before Dr. Martinez would see this message. Cassie stated she understood.

## 2025-04-21 ENCOUNTER — TELEPHONE (OUTPATIENT)
Dept: CARDIOLOGY CLINIC | Age: 75
End: 2025-04-21

## 2025-04-21 NOTE — PROGRESS NOTES
Supportive Care/Community Based Palliative Care  Follow-up note      Patient Name:  Damian You  Medical Record Number:  [id number]  YOB: 1950    Date of Visit: 4/16/2025  Location of Visit:  Other - [facility]    Patient Care Team:  Ramona Smith MD as PCP - General (Internal Medicine)  Ramona Smith MD as PCP - Empaneled Provider  Ankush Brothers MD as Consulting Physician (Neurology)  Jorge Mccloud MD as Consulting Physician (Cardiology)  Halle Boswell MD as Consulting Physician (Medical Oncology)  Kofi Somers APRN - CNP as Advanced Practice Nurse (Hospice and Palliative Medicine)    Reason for Consult:   Goals of care   Symptom Management    ACP  Family Support  Patient Support    History obtained from:  Patient, caregiver, EMR    PALLIATIVE DIAGNOSES AND ORDERS/RECOMMENDATIONS/PLAN:   Damian was seen today for other.    Diagnoses and all orders for this visit:    Colon cancer metastasized to liver (HCC)  Continue palliative chemotherapy until treatment failure or progression of disease  No adverse reactions noted    Requires assistance with activities of daily living (ADL)  Patient has around-the-clock care in his home, he feels that is sufficient at this time    Pacemaker  Denies any complications, bradycardia, shortness of breath, or chest pain  Heart rate within normal limits today    Encounter for palliative care  Current goals of care include: Continue treatment with palliative intent, Preserve independence/control/autonomy, Maintain or improve functional status, Maintain or improve quality of life, Remain at home, Would want hospitalization if needed    Code status discussed and patient will continue to consider options.  Will continue ACP discussions at future visit  Encouraged completion of ACP documents. Supportive Care  is available to assist if patient desires  Will continue goals of care discussions based on clinical

## 2025-04-21 NOTE — TELEPHONE ENCOUNTER
Received initial carelink remote pacemaker set up transmission.  Unable to reach patient, notified Ngozi.  She stated patient never answers his phone.  She will give him the message.

## 2025-04-21 NOTE — TELEPHONE ENCOUNTER
Called and spoke with Cassie. Informed her that she would need to contact Dr Boswell to discuss options due to possible interactions with patient's chemotherapy treatment. She stated Dr Boswell informed her that any sleep aid medication would have to come from the PCP. She further stated she gave patient melatonin Friday and Saturday nights and patient did well without awaking confused. Instructed her to continue the melatonin and report any further issues.

## 2025-04-24 LAB
DPYD GENE MUT ANL BLD/T: NORMAL
DPYD GENE PROD MET ACT IMP BLD/T-IMP: NORMAL
DPYD PHENOTYPE: NORMAL
EER DIHYDROPYRIMIDINE DEHYDROGENASE: NORMAL
SPECIMEN SOURCE: NORMAL

## 2025-04-28 DIAGNOSIS — E87.1 HYPONATREMIA: ICD-10-CM

## 2025-04-28 DIAGNOSIS — E78.2 MIXED HYPERLIPIDEMIA: ICD-10-CM

## 2025-04-28 RX ORDER — ATORVASTATIN CALCIUM 20 MG/1
20 TABLET, FILM COATED ORAL EVERY EVENING
Qty: 90 TABLET | Refills: 3 | Status: SHIPPED | OUTPATIENT
Start: 2025-04-28

## 2025-04-29 DIAGNOSIS — L27.0 DRUG RASH: Primary | ICD-10-CM

## 2025-04-29 RX ORDER — DOXYCYCLINE HYCLATE 100 MG
100 TABLET ORAL 2 TIMES DAILY
Qty: 20 TABLET | Refills: 0 | Status: SHIPPED | OUTPATIENT
Start: 2025-04-29 | End: 2025-05-09

## 2025-04-29 RX ORDER — CLINDAMYCIN PHOSPHATE 10 UG/ML
LOTION TOPICAL
Qty: 60 ML | Refills: 5 | Status: SHIPPED | OUTPATIENT
Start: 2025-04-29 | End: 2025-05-29

## 2025-04-30 ENCOUNTER — HOSPITAL ENCOUNTER (OUTPATIENT)
Dept: INFUSION THERAPY | Age: 75
Discharge: HOME | End: 2025-04-30
Payer: MEDICARE

## 2025-04-30 ENCOUNTER — HOSPITAL ENCOUNTER (OUTPATIENT)
Dept: WOUND CARE | Age: 75
Discharge: HOME | End: 2025-04-30
Attending: NURSE PRACTITIONER
Payer: MEDICARE

## 2025-04-30 ENCOUNTER — CLINICAL DOCUMENTATION (OUTPATIENT)
Dept: HEMATOLOGY | Age: 75
End: 2025-04-30

## 2025-04-30 VITALS
HEART RATE: 66 BPM | TEMPERATURE: 98.2 F | SYSTOLIC BLOOD PRESSURE: 171 MMHG | BODY MASS INDEX: 23.62 KG/M2 | WEIGHT: 165 LBS | DIASTOLIC BLOOD PRESSURE: 95 MMHG | HEIGHT: 70 IN | RESPIRATION RATE: 16 BRPM

## 2025-04-30 VITALS
BODY MASS INDEX: 23.39 KG/M2 | TEMPERATURE: 96.7 F | DIASTOLIC BLOOD PRESSURE: 85 MMHG | SYSTOLIC BLOOD PRESSURE: 160 MMHG | WEIGHT: 163 LBS | RESPIRATION RATE: 18 BRPM | HEART RATE: 63 BPM | OXYGEN SATURATION: 99 %

## 2025-04-30 DIAGNOSIS — S31.819A BUTTOCK WOUND, RIGHT, INITIAL ENCOUNTER: ICD-10-CM

## 2025-04-30 DIAGNOSIS — E83.39 HYPOPHOSPHATEMIA: ICD-10-CM

## 2025-04-30 DIAGNOSIS — E83.42 HYPOMAGNESEMIA: ICD-10-CM

## 2025-04-30 DIAGNOSIS — C18.9 ADENOCARCINOMA OF COLON (HCC): ICD-10-CM

## 2025-04-30 DIAGNOSIS — L98.492 ABDOMINAL WALL SKIN ULCER, WITH FAT LAYER EXPOSED (HCC): Primary | ICD-10-CM

## 2025-04-30 DIAGNOSIS — C18.9 ADENOCARCINOMA OF COLON (HCC): Primary | ICD-10-CM

## 2025-04-30 DIAGNOSIS — Z11.59 ENCOUNTER FOR SCREENING FOR OTHER VIRAL DISEASES: Primary | ICD-10-CM

## 2025-04-30 LAB
ALBUMIN SERPL-MCNC: 3.8 G/DL (ref 3.5–5.2)
ALP SERPL-CCNC: 137 U/L (ref 40–129)
ALT SERPL-CCNC: 14 U/L (ref 5–41)
ANION GAP SERPL CALCULATED.3IONS-SCNC: 11 MMOL/L (ref 7–19)
AST SERPL-CCNC: 27 U/L (ref 5–40)
BASOPHILS # BLD: 0.1 K/UL (ref 0–0.2)
BASOPHILS NFR BLD: 0.9 % (ref 0–1)
BILIRUB SERPL-MCNC: 0.5 MG/DL (ref 0–1.2)
BUN SERPL-MCNC: 25 MG/DL (ref 8–23)
CALCIUM SERPL-MCNC: 8.6 MG/DL (ref 8.8–10.2)
CEA SERPL-MCNC: 49.1 NG/ML (ref 0–4.7)
CHLORIDE SERPL-SCNC: 102 MMOL/L (ref 98–107)
CO2 SERPL-SCNC: 23 MMOL/L (ref 22–29)
CREAT SERPL-MCNC: 1.3 MG/DL (ref 0.7–1.2)
EOSINOPHIL # BLD: 0.27 K/UL (ref 0–0.6)
EOSINOPHIL NFR BLD: 2.5 % (ref 0–5)
ERYTHROCYTE [DISTWIDTH] IN BLOOD BY AUTOMATED COUNT: 17.3 % (ref 11.5–14.5)
GLUCOSE SERPL-MCNC: 82 MG/DL (ref 70–99)
HCT VFR BLD AUTO: 36.9 % (ref 42–52)
HGB BLD-MCNC: 12 G/DL (ref 14–18)
LYMPHOCYTES # BLD: 1.57 K/UL (ref 1.1–4.5)
LYMPHOCYTES NFR BLD: 14.5 % (ref 20–40)
MAGNESIUM SERPL-MCNC: 1.8 MG/DL (ref 1.6–2.4)
MCH RBC QN AUTO: 29.1 PG (ref 27–31)
MCHC RBC AUTO-ENTMCNC: 32.5 G/DL (ref 33–37)
MCV RBC AUTO: 89.3 FL (ref 80–94)
MONOCYTES # BLD: 0.93 K/UL (ref 0–0.9)
MONOCYTES NFR BLD: 8.6 % (ref 1–10)
NEUTROPHILS # BLD: 7.92 K/UL (ref 1.5–7.5)
NEUTS SEG NFR BLD: 73.1 % (ref 50–65)
PHOSPHATE SERPL-MCNC: 3.3 MG/DL (ref 2.5–4.5)
PLATELET # BLD AUTO: 158 K/UL (ref 130–400)
PMV BLD AUTO: 8.9 FL (ref 9.4–12.4)
POTASSIUM SERPL-SCNC: 4.3 MMOL/L (ref 3.5–5.1)
PROT SERPL-MCNC: 6.9 G/DL (ref 6.4–8.3)
RBC # BLD AUTO: 4.13 M/UL (ref 4.7–6.1)
SODIUM SERPL-SCNC: 136 MMOL/L (ref 136–145)
WBC # BLD AUTO: 10.83 K/UL (ref 4.8–10.8)

## 2025-04-30 PROCEDURE — 85025 COMPLETE CBC W/AUTO DIFF WBC: CPT

## 2025-04-30 PROCEDURE — 99212 OFFICE O/P EST SF 10 MIN: CPT

## 2025-04-30 PROCEDURE — 84100 ASSAY OF PHOSPHORUS: CPT

## 2025-04-30 PROCEDURE — 82378 CARCINOEMBRYONIC ANTIGEN: CPT

## 2025-04-30 PROCEDURE — 96413 CHEMO IV INFUSION 1 HR: CPT

## 2025-04-30 PROCEDURE — 99212 OFFICE O/P EST SF 10 MIN: CPT | Performed by: SURGERY

## 2025-04-30 PROCEDURE — G0498 CHEMO EXTEND IV INFUS W/PUMP: HCPCS

## 2025-04-30 PROCEDURE — 36415 COLL VENOUS BLD VENIPUNCTURE: CPT

## 2025-04-30 PROCEDURE — 2580000003 HC RX 258: Performed by: INTERNAL MEDICINE

## 2025-04-30 PROCEDURE — 96417 CHEMO IV INFUS EACH ADDL SEQ: CPT

## 2025-04-30 PROCEDURE — 96366 THER/PROPH/DIAG IV INF ADDON: CPT

## 2025-04-30 PROCEDURE — 6360000002 HC RX W HCPCS: Performed by: INTERNAL MEDICINE

## 2025-04-30 PROCEDURE — 83735 ASSAY OF MAGNESIUM: CPT

## 2025-04-30 PROCEDURE — 96415 CHEMO IV INFUSION ADDL HR: CPT

## 2025-04-30 PROCEDURE — 80053 COMPREHEN METABOLIC PANEL: CPT

## 2025-04-30 PROCEDURE — 96375 TX/PRO/DX INJ NEW DRUG ADDON: CPT

## 2025-04-30 PROCEDURE — 96368 THER/DIAG CONCURRENT INF: CPT

## 2025-04-30 RX ORDER — GINSENG 100 MG
CAPSULE ORAL PRN
OUTPATIENT
Start: 2025-04-30

## 2025-04-30 RX ORDER — DIPHENHYDRAMINE HYDROCHLORIDE 50 MG/ML
25 INJECTION, SOLUTION INTRAMUSCULAR; INTRAVENOUS ONCE
Status: CANCELLED
Start: 2025-04-30 | End: 2025-04-30

## 2025-04-30 RX ORDER — LIDOCAINE HYDROCHLORIDE 20 MG/ML
JELLY TOPICAL PRN
OUTPATIENT
Start: 2025-04-30

## 2025-04-30 RX ORDER — MUPIROCIN 20 MG/G
OINTMENT TOPICAL PRN
OUTPATIENT
Start: 2025-04-30

## 2025-04-30 RX ORDER — SODIUM CHLORIDE 9 MG/ML
5-250 INJECTION, SOLUTION INTRAVENOUS PRN
Status: CANCELLED | OUTPATIENT
Start: 2025-04-30

## 2025-04-30 RX ORDER — EPINEPHRINE 1 MG/ML
0.3 INJECTION, SOLUTION, CONCENTRATE INTRAVENOUS PRN
Status: CANCELLED | OUTPATIENT
Start: 2025-04-30

## 2025-04-30 RX ORDER — SILVER SULFADIAZINE 10 MG/G
CREAM TOPICAL PRN
OUTPATIENT
Start: 2025-04-30

## 2025-04-30 RX ORDER — PALONOSETRON 0.05 MG/ML
0.25 INJECTION, SOLUTION INTRAVENOUS ONCE
Status: CANCELLED | OUTPATIENT
Start: 2025-04-30 | End: 2025-04-30

## 2025-04-30 RX ORDER — SODIUM CHLORIDE 9 MG/ML
INJECTION, SOLUTION INTRAVENOUS CONTINUOUS
Status: CANCELLED | OUTPATIENT
Start: 2025-04-30

## 2025-04-30 RX ORDER — LIDOCAINE 40 MG/G
CREAM TOPICAL PRN
OUTPATIENT
Start: 2025-04-30

## 2025-04-30 RX ORDER — BACITRACIN ZINC AND POLYMYXIN B SULFATE 500; 1000 [USP'U]/G; [USP'U]/G
OINTMENT TOPICAL PRN
OUTPATIENT
Start: 2025-04-30

## 2025-04-30 RX ORDER — DEXAMETHASONE SODIUM PHOSPHATE 10 MG/ML
10 INJECTION, SOLUTION INTRAMUSCULAR; INTRAVENOUS ONCE
Status: COMPLETED | OUTPATIENT
Start: 2025-04-30 | End: 2025-04-30

## 2025-04-30 RX ORDER — NEOMYCIN/BACITRACIN/POLYMYXINB 3.5-400-5K
OINTMENT (GRAM) TOPICAL PRN
OUTPATIENT
Start: 2025-04-30

## 2025-04-30 RX ORDER — CLOBETASOL PROPIONATE 0.5 MG/G
OINTMENT TOPICAL PRN
OUTPATIENT
Start: 2025-04-30

## 2025-04-30 RX ORDER — PALONOSETRON 0.05 MG/ML
0.25 INJECTION, SOLUTION INTRAVENOUS ONCE
Status: COMPLETED | OUTPATIENT
Start: 2025-04-30 | End: 2025-04-30

## 2025-04-30 RX ORDER — HYDROCORTISONE SODIUM SUCCINATE 100 MG/2ML
100 INJECTION INTRAMUSCULAR; INTRAVENOUS
Status: CANCELLED | OUTPATIENT
Start: 2025-04-30

## 2025-04-30 RX ORDER — LIDOCAINE HYDROCHLORIDE 20 MG/ML
JELLY TOPICAL PRN
Status: DISCONTINUED | OUTPATIENT
Start: 2025-04-30 | End: 2025-05-02 | Stop reason: HOSPADM

## 2025-04-30 RX ORDER — MEPERIDINE HYDROCHLORIDE 50 MG/ML
12.5 INJECTION INTRAMUSCULAR; INTRAVENOUS; SUBCUTANEOUS PRN
Status: CANCELLED | OUTPATIENT
Start: 2025-04-30

## 2025-04-30 RX ORDER — SODIUM CHLORIDE 9 MG/ML
5-250 INJECTION, SOLUTION INTRAVENOUS PRN
OUTPATIENT
Start: 2025-05-02

## 2025-04-30 RX ORDER — GENTAMICIN SULFATE 1 MG/G
OINTMENT TOPICAL PRN
OUTPATIENT
Start: 2025-04-30

## 2025-04-30 RX ORDER — BETAMETHASONE DIPROPIONATE 0.5 MG/G
CREAM TOPICAL PRN
OUTPATIENT
Start: 2025-04-30

## 2025-04-30 RX ORDER — ALBUTEROL SULFATE 90 UG/1
4 INHALANT RESPIRATORY (INHALATION) PRN
Status: CANCELLED | OUTPATIENT
Start: 2025-04-30

## 2025-04-30 RX ORDER — FAMOTIDINE 10 MG/ML
20 INJECTION, SOLUTION INTRAVENOUS
Status: CANCELLED | OUTPATIENT
Start: 2025-04-30

## 2025-04-30 RX ORDER — HEPARIN SODIUM (PORCINE) LOCK FLUSH IV SOLN 100 UNIT/ML 100 UNIT/ML
500 SOLUTION INTRAVENOUS PRN
Status: CANCELLED | OUTPATIENT
Start: 2025-04-30

## 2025-04-30 RX ORDER — FAMOTIDINE 10 MG/ML
20 INJECTION, SOLUTION INTRAVENOUS ONCE
Status: CANCELLED
Start: 2025-04-30 | End: 2025-04-30

## 2025-04-30 RX ORDER — LIDOCAINE 50 MG/G
OINTMENT TOPICAL PRN
OUTPATIENT
Start: 2025-04-30

## 2025-04-30 RX ORDER — FAMOTIDINE 10 MG/ML
20 INJECTION, SOLUTION INTRAVENOUS ONCE
Status: COMPLETED | OUTPATIENT
Start: 2025-04-30 | End: 2025-04-30

## 2025-04-30 RX ORDER — DIPHENHYDRAMINE HYDROCHLORIDE 50 MG/ML
25 INJECTION, SOLUTION INTRAMUSCULAR; INTRAVENOUS ONCE
Status: COMPLETED | OUTPATIENT
Start: 2025-04-30 | End: 2025-04-30

## 2025-04-30 RX ORDER — SODIUM CHLOR/HYPOCHLOROUS ACID 0.033 %
SOLUTION, IRRIGATION IRRIGATION PRN
OUTPATIENT
Start: 2025-04-30

## 2025-04-30 RX ORDER — DIPHENHYDRAMINE HYDROCHLORIDE 50 MG/ML
50 INJECTION, SOLUTION INTRAMUSCULAR; INTRAVENOUS
Status: CANCELLED | OUTPATIENT
Start: 2025-04-30

## 2025-04-30 RX ORDER — SODIUM CHLORIDE 0.9 % (FLUSH) 0.9 %
5-40 SYRINGE (ML) INJECTION PRN
Status: CANCELLED | OUTPATIENT
Start: 2025-04-30

## 2025-04-30 RX ORDER — TRIAMCINOLONE ACETONIDE 1 MG/G
OINTMENT TOPICAL PRN
OUTPATIENT
Start: 2025-04-30

## 2025-04-30 RX ORDER — ONDANSETRON 2 MG/ML
8 INJECTION INTRAMUSCULAR; INTRAVENOUS
Status: CANCELLED | OUTPATIENT
Start: 2025-04-30

## 2025-04-30 RX ORDER — HEPARIN SODIUM (PORCINE) LOCK FLUSH IV SOLN 100 UNIT/ML 100 UNIT/ML
500 SOLUTION INTRAVENOUS PRN
Status: CANCELLED | OUTPATIENT
Start: 2025-05-02

## 2025-04-30 RX ORDER — ACETAMINOPHEN 325 MG/1
650 TABLET ORAL
Status: CANCELLED | OUTPATIENT
Start: 2025-04-30

## 2025-04-30 RX ORDER — DEXTROSE MONOHYDRATE 50 MG/ML
5-250 INJECTION, SOLUTION INTRAVENOUS PRN
Status: CANCELLED | OUTPATIENT
Start: 2025-04-30

## 2025-04-30 RX ORDER — LIDOCAINE HYDROCHLORIDE 40 MG/ML
SOLUTION TOPICAL PRN
OUTPATIENT
Start: 2025-04-30

## 2025-04-30 RX ORDER — SODIUM CHLORIDE 0.9 % (FLUSH) 0.9 %
5-40 SYRINGE (ML) INJECTION PRN
Status: CANCELLED | OUTPATIENT
Start: 2025-05-02

## 2025-04-30 RX ADMIN — DEXAMETHASONE SODIUM PHOSPHATE 10 MG: 10 INJECTION, SOLUTION INTRAMUSCULAR; INTRAVENOUS at 11:54

## 2025-04-30 RX ADMIN — FAMOTIDINE 20 MG: 10 INJECTION, SOLUTION INTRAVENOUS at 11:54

## 2025-04-30 RX ADMIN — PANITUMUMAB 432 MG: 400 SOLUTION INTRAVENOUS at 12:20

## 2025-04-30 RX ADMIN — DIPHENHYDRAMINE HYDROCHLORIDE 25 MG: 50 INJECTION INTRAMUSCULAR; INTRAVENOUS at 11:54

## 2025-04-30 RX ADMIN — PALONOSETRON 0.25 MG: 0.05 INJECTION, SOLUTION INTRAVENOUS at 11:54

## 2025-04-30 RX ADMIN — OXALIPLATIN 95 MG: 5 INJECTION, SOLUTION INTRAVENOUS at 13:01

## 2025-04-30 RX ADMIN — LEUCOVORIN CALCIUM 200 MG: 200 INJECTION, POWDER, LYOPHILIZED, FOR SUSPENSION INTRAMUSCULAR; INTRAVENOUS at 12:59

## 2025-04-30 RX ADMIN — FLUOROURACIL 3400 MG: 50 INJECTION, SOLUTION INTRAVENOUS at 15:32

## 2025-04-30 NOTE — PLAN OF CARE
Problem: Wound:  Goal: Will show signs of wound healing; wound closure and no evidence of infection  Description: Will show signs of wound healing; wound closure and no evidence of infection  Outcome: Progressing     Problem: Weight control:  Goal: Ability to maintain an optimal weight for height and age will be supported  Description: Ability to maintain an optimal weight for height and age will be supported  Outcome: Progressing     Problem: Falls - Risk of:  Goal: Will remain free from falls  Description: Will remain free from falls  Outcome: Progressing

## 2025-04-30 NOTE — PROGRESS NOTES
MD notified of pt facial crusty/dry/flaky/non itchy rash and he will be coming to assess prior to tx.

## 2025-04-30 NOTE — PROGRESS NOTES
Late entry-    On 4/29/25 sister/Cynthia called stating pt has developed rash on his face. Discussed with Dr Boswell who stated this was due to Vectibix. He recommended to start Doxycycline 100mg twice a day x10 days and Clindamycin cream/ointment twice a day as needed. Return call to Cynthia to inform of above who voiced understanding. Scripts sent.

## 2025-04-30 NOTE — PROGRESS NOTES
Lab Results   Component Value Date    WBC 10.83 (H) 04/30/2025    HGB 12.0 (L) 04/30/2025    HCT 36.9 (L) 04/30/2025    MCV 89.3 04/30/2025     04/30/2025     Lab Results   Component Value Date    NEUTROABS 7.92 (H) 04/30/2025     Lab Results   Component Value Date     04/30/2025    K 4.3 04/30/2025     04/30/2025    CO2 23 04/30/2025    BUN 25 (H) 04/30/2025    CREATININE 1.3 (H) 04/30/2025    GLUCOSE 82 04/30/2025    CALCIUM 8.6 (L) 04/30/2025    BILITOT 0.5 04/30/2025    ALKPHOS 137 (H) 04/30/2025    AST 27 04/30/2025    ALT 14 04/30/2025    LABGLOM 58 (A) 04/30/2025    GFRAA 52 (L) 08/15/2022     Vectibix rash on face only. Continue Clindamycin oint and oral Doxycycline. Give treatment today with Vectibix.

## 2025-04-30 NOTE — PROGRESS NOTES
[facility]   Progress Note and Procedure Note      Damian You  MEDICAL RECORD NUMBER:  [id number]  AGE: 74 y.o.   GENDER: male  : 1950  EPISODE DATE:  2025    Subjective:     Chief Complaint   Patient presents with    Wound Check     Patient presents today for recheck mid abdomen wound.         HISTORY of PRESENT ILLNESS HPI     Damian You is a 74 y.o. male who presents today for wound/ulcer evaluation.   History of Wound Context: Pt with abdominaql wound here for eval/della  Wound/Ulcer Pain Timing/Severity: none  Quality of pain: N/A  Severity:  0 / 10   Modifying Factors: None  Associated Signs/Symptoms: none    Ulcer Identification:  Ulcer Type: non-healing surgical  Contributing Factors: none    Wound: Surgical incision        PAST MEDICAL HISTORY        Diagnosis Date    CAD (coronary artery disease)     Gout     Hypertension     Hypotension 2020    Non-ST elevation MI (NSTEMI) ([facility]) 14    Palliative care patient 2020    Pneumonia due to infectious organism 2020       PAST SURGICAL HISTORY    Past Surgical History:   Procedure Laterality Date    CARDIAC CATHETERIZATION  14  [facility]    angioplasty, stent to LAD. EF 45%    CHOLECYSTECTOMY      EP DEVICE PROCEDURE N/A 2025    Insert PPM dual performed by Calvin Martinez MD at [facility] CARDIAC CATH LAB    FEMUR FRACTURE SURGERY Right 10/30/2020    TFN, SHORT performed by Manuelito Biggs MD at [facility] OR    LAPAROTOMY N/A 2025    EXPLORATORY LAPAROTOMY, CECOSTOMY, LOOP COLOSTOMY, LYSIS OF ADHESIONS performed by Toro Gutiérrez MD at [facility] OR    PORT SURGERY Right 2025    PORT INSERTION performed by Amberly Lewis MD at [facility] OR    SIGMOIDOSCOPY N/A 02/10/2025    Dr Murray-5 mm stricture noted at 20 cm in the rectosigmoid colon, tissue just proximal to the stricture was friable and erythematous-Invasive moderately differentiated adenocarcinoma       FAMILY HISTORY    Family History   Problem

## 2025-04-30 NOTE — PATIENT INSTRUCTIONS
[facility] Wound Care and Hyperbaric Oxygen Therapy   Physician Orders and Discharge Instructions  [address] [place] [address] Drive  [address]  [place] [address]  Telephone: [phone]      FAX [phone]    NAME:  Damian You  YOB: 1950  MEDICAL RECORD NUMBER:  [id number]  DATE:  4/30/2025    Discharge condition: Stable    Discharge to: Home    Left via:Private automobile    Accompanied by: Self    ECF/HHA: [facility]    Dressing Orders: Buttocks and Abdomen Wounds  Healed, Moisturize and Protect  Moisturize and Protect  Follow up with your Family Practitioner as Instructed     [facility] follow up visit _________PRN____________________  (Please note your next appointment above and if you are unable to keep, kindly give a 24 hour notice. Thank you.)    If you experience any of the following, please call the Wound Care Center during business hours:    * Increase in Pain  * Temperature over 101  * Increase in drainage from your wound  * Drainage with a foul odor  * Bleeding  * Increase in swelling  * Need for compression bandage changes due to slippage, breakthrough drainage.    If you need medical attention outside of the business hours of the Wound Care Centers please contact your PCP or go to the nearest emergency room.

## 2025-04-30 NOTE — WOUND CARE
Spoke with patient sister Cassie re: ordering supplies for ostomy. Informed that C to stay on 2 more weeks re:therapy schedule. Informed had spoken with caregiver Cynthia today re: local supplies if she needs to purchase supplies. Will plan to place order through [facility] for ostomy supplies on May 15th. Patient verbalized understanding.

## 2025-05-01 ENCOUNTER — HOSPITAL ENCOUNTER (OUTPATIENT)
Dept: INFUSION THERAPY | Age: 75
Discharge: HOME | End: 2025-05-01
Payer: MEDICARE

## 2025-05-01 ENCOUNTER — TELEPHONE (OUTPATIENT)
Dept: HEMATOLOGY | Age: 75
End: 2025-05-01

## 2025-05-01 DIAGNOSIS — Z11.59 ENCOUNTER FOR SCREENING FOR OTHER VIRAL DISEASES: ICD-10-CM

## 2025-05-01 DIAGNOSIS — C18.9 ADENOCARCINOMA OF COLON (HCC): Primary | ICD-10-CM

## 2025-05-01 PROCEDURE — 2500000003 HC RX 250 WO HCPCS: Performed by: INTERNAL MEDICINE

## 2025-05-01 PROCEDURE — 96523 IRRIG DRUG DELIVERY DEVICE: CPT

## 2025-05-01 PROCEDURE — 6360000002 HC RX W HCPCS: Performed by: INTERNAL MEDICINE

## 2025-05-01 RX ORDER — SODIUM CHLORIDE 0.9 % (FLUSH) 0.9 %
5-40 SYRINGE (ML) INJECTION PRN
Status: DISCONTINUED | OUTPATIENT
Start: 2025-05-01 | End: 2025-05-02 | Stop reason: HOSPADM

## 2025-05-01 RX ORDER — HEPARIN 100 UNIT/ML
500 SYRINGE INTRAVENOUS PRN
Status: DISCONTINUED | OUTPATIENT
Start: 2025-05-01 | End: 2025-05-02 | Stop reason: HOSPADM

## 2025-05-01 RX ADMIN — SODIUM CHLORIDE, PRESERVATIVE FREE 10 ML: 5 INJECTION INTRAVENOUS at 16:18

## 2025-05-01 RX ADMIN — HEPARIN 500 UNITS: 100 SYRINGE at 16:18

## 2025-05-01 NOTE — TELEPHONE ENCOUNTER
Patients sister has spoken with nurse in the treatment room. Electronically signed by Gris Spence RN on 5/1/2025 at 3:36 PM

## 2025-05-07 ENCOUNTER — TELEPHONE (OUTPATIENT)
Dept: PRIMARY CARE CLINIC | Age: 75
End: 2025-05-07

## 2025-05-07 NOTE — TELEPHONE ENCOUNTER
Micaela with [facility] called and left a voicemail reporting patient has met 3 out of 4 of his goals for physical therapy and is being discharged due to reaching a plateau in progress.

## 2025-05-08 ENCOUNTER — OFFICE VISIT (OUTPATIENT)
Dept: CARDIOLOGY CLINIC | Age: 75
End: 2025-05-08

## 2025-05-08 VITALS
BODY MASS INDEX: 23.48 KG/M2 | DIASTOLIC BLOOD PRESSURE: 90 MMHG | HEART RATE: 63 BPM | WEIGHT: 164 LBS | SYSTOLIC BLOOD PRESSURE: 140 MMHG | HEIGHT: 70 IN

## 2025-05-08 DIAGNOSIS — I48.0 PAROXYSMAL ATRIAL FIBRILLATION (HCC): ICD-10-CM

## 2025-05-08 DIAGNOSIS — I42.8 NON-ISCHEMIC CARDIOMYOPATHY (HCC): ICD-10-CM

## 2025-05-08 DIAGNOSIS — I10 ESSENTIAL HYPERTENSION: ICD-10-CM

## 2025-05-08 DIAGNOSIS — Z95.0 PACEMAKER: ICD-10-CM

## 2025-05-08 DIAGNOSIS — R00.1 SYMPTOMATIC BRADYCARDIA: ICD-10-CM

## 2025-05-08 DIAGNOSIS — I25.10 CORONARY ARTERY DISEASE INVOLVING NATIVE CORONARY ARTERY OF NATIVE HEART WITHOUT ANGINA PECTORIS: Primary | ICD-10-CM

## 2025-05-08 NOTE — PATIENT INSTRUCTIONS
Maintain good blood pressure control-goal<130/80 at rest  Maintain good cholesterol control LDL goal<70 with arterial disease  If you are diabetic work to keep/obtain hemoglobin A1c< 7    Follow up in 6 mos  With pacemaker   Call with any questions or concerns  Follow up with Ramona Smith MD for non cardiac problems  Report any new problems  Cardiovascular Fitness-Exercise as tolerated.    Fall precautions   Cardiac / Healthy Diet- Avoid processed high fat foods, maintain low sodium/salt   Continue current medications as directed  Continue plan of treatment  It is always recommended that you bring your medications bottles with you to each visit - this is for your safety!

## 2025-05-08 NOTE — PROGRESS NOTES
[facility] Cardiology  [address] [place] [address]  Phone: [phone]  Fax: [phone]    OFFICE VISIT:  2025    Damian You - : 1950    Reason For Visit:  Damian is a 74 y.o. male who is here for Follow-Up from Hospital (Patient took medication at 9:45.)  He has history of IPF, CHF, CAD, hypertension and history of colon cancer with liver mets on palliative chemo. Patient presented to the emergency room for 325 with complaint of low heart rate and dizziness and lightheaded. Home health found his heart rate to be in the 30s to 40s when they checked him. Patient was given a dose of atropine in the ED with improvement to the 60s. EKG showed A-fib with slow ventricular response heart rate of 40 chest x-ray with no acute process.   He had successful pacemaker placement    He returns today in follow-up.  He states he is doing okay.  Rash on his face post chemotherapy    Daughter states sleeping less now but he is pacemaker        Subjective  Damian denies exertional chest pain, shortness of breath, orthopnea, paroxysmal nocturnal dyspnea, syncope, presyncope, arrhythmia, edema The patient denies numbness or weakness to suggest cerebrovascular accident or transient ischemic attack.    Ramona Smith MD is PCP and   Oncology following labs.  Damian You has the following history as recorded in [facility]:    Patient Active Problem List    Diagnosis Date Noted    Postoperative anemia due to acute blood loss 10/31/2020    Pacemaker 2025    Severe malnutrition 2025    Hypothyroid 2025    Chronic kidney disease 2025    Chemotherapy management, encounter for 2025    Normocytic anemia 2025    Buttock wound, right, initial encounter 2025    Hypomagnesemia 2025    Encounter for screening for other viral diseases 2025    Abdominal wall skin ulcer, with fat layer exposed (HCC) 2025    Moderate malnutrition 2025

## 2025-05-13 NOTE — PROGRESS NOTES
no joint pain, no swelling, no stiffness;  ENDOCRINE: no polyuria, no polydipsia, no cold or heat intolerance;  HEMATOLOGY: no easy bruising or bleeding, no history of clotting disorder;  DERMATOLOGY: skin rash, no eczema, no pruritus;  NEUROLOGY: no syncope, no seizures, no numbness or tingling of hands, no numbness or tingling of feet, no paresis;      Vitals signs:  BP (!) 154/89   Pulse 89   Temp 97.7 °F (36.5 °C)   Resp 18   Ht 1.778 m (5' 10\")   Wt 75.9 kg (167 lb 6.4 oz)   SpO2 98%   BMI 24.02 kg/m²    Pain Score:   0 - No pain   Wt Readings from Last 3 Encounters:   05/14/25 75.9 kg (167 lb 6.4 oz)   05/08/25 74.4 kg (164 lb)   04/30/25 73.9 kg (163 lb)     PHYSICAL EXAM:  CONSTITUTIONAL: Alert, appro elderly priate, no acute distress  EYES: Non icteric, EOM intact, pupils equal round   ENT: Mucus membranes moist, no oral pharyngeal lesions, external inspection of ears and nose are normal.  NECK: Supple, no masses.  No palpable thyroid mass  CHEST/LUNGS: CTA bilaterally, normal respiratory effort   CARDIOVASCULAR: RRR, no murmurs.  No lower extremity edema  ABDOMEN: soft non-tender, active bowel sounds, no HSM.  No palpable masses  EXTREMITIES: warm, full ROM in all 4 extremities, no focal weakness.  SKIN: warm, dry with no rashes or lesions  LYMPH: No cervical, clavicular, axillary, or inguinal lymphadenopathy  NEUROLOGIC: follows commands, non focal       Relevant Lab findings/reviewed by me:  4/16/25 DPD 5-Fluorouracil Toxicity: Negative     4/30/25 Labs: Magnesium 1.8, Phosphorus 3.3  4/30/25 CEA 49.1  Lab Results   Component Value Date    WBC 7.84 05/14/2025    HGB 12.0 (L) 05/14/2025    HCT 37.4 (L) 05/14/2025    MCV 92.1 05/14/2025     05/14/2025     Lab Results   Component Value Date    NEUTROABS 5.15 05/14/2025     Lab Results   Component Value Date     05/14/2025    K 3.9 05/14/2025     (H) 05/14/2025    CO2 23 05/14/2025    BUN 23 05/14/2025    CREATININE 1.3 (H)

## 2025-05-14 ENCOUNTER — OFFICE VISIT (OUTPATIENT)
Dept: HEMATOLOGY | Age: 75
End: 2025-05-14
Payer: MEDICARE

## 2025-05-14 ENCOUNTER — HOSPITAL ENCOUNTER (OUTPATIENT)
Dept: INFUSION THERAPY | Age: 75
Discharge: HOME | End: 2025-05-14
Payer: MEDICARE

## 2025-05-14 VITALS
TEMPERATURE: 97.7 F | DIASTOLIC BLOOD PRESSURE: 89 MMHG | OXYGEN SATURATION: 98 % | HEIGHT: 70 IN | SYSTOLIC BLOOD PRESSURE: 154 MMHG | BODY MASS INDEX: 23.96 KG/M2 | RESPIRATION RATE: 18 BRPM | HEART RATE: 89 BPM | WEIGHT: 167.4 LBS

## 2025-05-14 VITALS
SYSTOLIC BLOOD PRESSURE: 144 MMHG | HEART RATE: 62 BPM | OXYGEN SATURATION: 100 % | TEMPERATURE: 97 F | RESPIRATION RATE: 18 BRPM | DIASTOLIC BLOOD PRESSURE: 78 MMHG

## 2025-05-14 DIAGNOSIS — D64.81 ANTINEOPLASTIC CHEMOTHERAPY INDUCED ANEMIA: ICD-10-CM

## 2025-05-14 DIAGNOSIS — T45.1X5D ADVERSE EFFECT OF CHEMOTHERAPY, SUBSEQUENT ENCOUNTER: ICD-10-CM

## 2025-05-14 DIAGNOSIS — D64.81 ANEMIA ASSOCIATED WITH CHEMOTHERAPY: ICD-10-CM

## 2025-05-14 DIAGNOSIS — R54 FRAILTY SYNDROME IN GERIATRIC PATIENT: ICD-10-CM

## 2025-05-14 DIAGNOSIS — T45.1X5A ANEMIA ASSOCIATED WITH CHEMOTHERAPY: ICD-10-CM

## 2025-05-14 DIAGNOSIS — T45.1X5A ANTINEOPLASTIC CHEMOTHERAPY INDUCED ANEMIA: ICD-10-CM

## 2025-05-14 DIAGNOSIS — R97.8 ABNORMAL TUMOR MARKERS: ICD-10-CM

## 2025-05-14 DIAGNOSIS — R53.83 CHEMOTHERAPY-INDUCED FATIGUE: ICD-10-CM

## 2025-05-14 DIAGNOSIS — T45.1X5A CHEMOTHERAPY INDUCED DIARRHEA: ICD-10-CM

## 2025-05-14 DIAGNOSIS — K52.1 CHEMOTHERAPY INDUCED DIARRHEA: ICD-10-CM

## 2025-05-14 DIAGNOSIS — R53.81 PHYSICAL DECONDITIONING: ICD-10-CM

## 2025-05-14 DIAGNOSIS — N28.9 RENAL IMPAIRMENT: ICD-10-CM

## 2025-05-14 DIAGNOSIS — T45.1X5A CHEMOTHERAPY-INDUCED FATIGUE: ICD-10-CM

## 2025-05-14 DIAGNOSIS — C18.9 ADENOCARCINOMA OF COLON (HCC): ICD-10-CM

## 2025-05-14 DIAGNOSIS — L27.0 DRUG-INDUCED SKIN RASH: ICD-10-CM

## 2025-05-14 DIAGNOSIS — Z11.59 ENCOUNTER FOR SCREENING FOR OTHER VIRAL DISEASES: Primary | ICD-10-CM

## 2025-05-14 DIAGNOSIS — Z71.89 CARE PLAN DISCUSSED WITH PATIENT: ICD-10-CM

## 2025-05-14 DIAGNOSIS — Z51.12 ENCOUNTER FOR ANTINEOPLASTIC IMMUNOTHERAPY: ICD-10-CM

## 2025-05-14 DIAGNOSIS — Z51.11 CHEMOTHERAPY MANAGEMENT, ENCOUNTER FOR: ICD-10-CM

## 2025-05-14 DIAGNOSIS — C18.9 ADENOCARCINOMA OF COLON (HCC): Primary | ICD-10-CM

## 2025-05-14 DIAGNOSIS — F41.9 ANXIETY: Primary | ICD-10-CM

## 2025-05-14 LAB
ALBUMIN SERPL-MCNC: 3.5 G/DL (ref 3.5–5.2)
ALP SERPL-CCNC: 129 U/L (ref 40–129)
ALT SERPL-CCNC: 16 U/L (ref 5–41)
ANION GAP SERPL CALCULATED.3IONS-SCNC: 9 MMOL/L (ref 7–19)
AST SERPL-CCNC: 29 U/L (ref 5–40)
BASOPHILS # BLD: 0.09 K/UL (ref 0–0.2)
BASOPHILS NFR BLD: 1.1 % (ref 0–1)
BILIRUB SERPL-MCNC: 0.4 MG/DL (ref 0–1.2)
BUN SERPL-MCNC: 23 MG/DL (ref 8–23)
CALCIUM SERPL-MCNC: 8.2 MG/DL (ref 8.8–10.2)
CEA SERPL-MCNC: 28.3 NG/ML (ref 0–4.7)
CHLORIDE SERPL-SCNC: 109 MMOL/L (ref 98–107)
CO2 SERPL-SCNC: 23 MMOL/L (ref 22–29)
CREAT SERPL-MCNC: 1.3 MG/DL (ref 0.7–1.2)
EOSINOPHIL # BLD: 0.49 K/UL (ref 0–0.6)
EOSINOPHIL NFR BLD: 6.3 % (ref 0–5)
ERYTHROCYTE [DISTWIDTH] IN BLOOD BY AUTOMATED COUNT: 18.6 % (ref 11.5–14.5)
GLUCOSE SERPL-MCNC: 93 MG/DL (ref 70–99)
HCT VFR BLD AUTO: 37.4 % (ref 42–52)
HGB BLD-MCNC: 12 G/DL (ref 14–18)
LYMPHOCYTES # BLD: 1.4 K/UL (ref 1.1–4.5)
LYMPHOCYTES NFR BLD: 17.9 % (ref 20–40)
MAGNESIUM SERPL-MCNC: 1.5 MG/DL (ref 1.6–2.4)
MCH RBC QN AUTO: 29.6 PG (ref 27–31)
MCHC RBC AUTO-ENTMCNC: 32.1 G/DL (ref 33–37)
MCV RBC AUTO: 92.1 FL (ref 80–94)
MONOCYTES # BLD: 0.67 K/UL (ref 0–0.9)
MONOCYTES NFR BLD: 8.5 % (ref 1–10)
NEUTROPHILS # BLD: 5.15 K/UL (ref 1.5–7.5)
NEUTS SEG NFR BLD: 65.7 % (ref 50–65)
PHOSPHATE SERPL-MCNC: 2.8 MG/DL (ref 2.5–4.5)
PLATELET # BLD AUTO: 149 K/UL (ref 130–400)
PMV BLD AUTO: 8.9 FL (ref 9.4–12.4)
POTASSIUM SERPL-SCNC: 3.9 MMOL/L (ref 3.5–5.1)
PROT SERPL-MCNC: 6.2 G/DL (ref 6.4–8.3)
RBC # BLD AUTO: 4.06 M/UL (ref 4.7–6.1)
SODIUM SERPL-SCNC: 141 MMOL/L (ref 136–145)
WBC # BLD AUTO: 7.84 K/UL (ref 4.8–10.8)

## 2025-05-14 PROCEDURE — 96415 CHEMO IV INFUSION ADDL HR: CPT

## 2025-05-14 PROCEDURE — 84100 ASSAY OF PHOSPHORUS: CPT

## 2025-05-14 PROCEDURE — 96366 THER/PROPH/DIAG IV INF ADDON: CPT

## 2025-05-14 PROCEDURE — 96368 THER/DIAG CONCURRENT INF: CPT

## 2025-05-14 PROCEDURE — 96413 CHEMO IV INFUSION 1 HR: CPT

## 2025-05-14 PROCEDURE — 82378 CARCINOEMBRYONIC ANTIGEN: CPT

## 2025-05-14 PROCEDURE — G0498 CHEMO EXTEND IV INFUS W/PUMP: HCPCS

## 2025-05-14 PROCEDURE — 6360000002 HC RX W HCPCS: Performed by: INTERNAL MEDICINE

## 2025-05-14 PROCEDURE — 96375 TX/PRO/DX INJ NEW DRUG ADDON: CPT

## 2025-05-14 PROCEDURE — 80053 COMPREHEN METABOLIC PANEL: CPT

## 2025-05-14 PROCEDURE — 83735 ASSAY OF MAGNESIUM: CPT

## 2025-05-14 PROCEDURE — 2580000003 HC RX 258: Performed by: INTERNAL MEDICINE

## 2025-05-14 PROCEDURE — 85025 COMPLETE CBC W/AUTO DIFF WBC: CPT

## 2025-05-14 RX ORDER — FAMOTIDINE 10 MG/ML
20 INJECTION, SOLUTION INTRAVENOUS
Status: CANCELLED | OUTPATIENT
Start: 2025-05-14

## 2025-05-14 RX ORDER — ALBUTEROL SULFATE 90 UG/1
4 INHALANT RESPIRATORY (INHALATION) PRN
Status: CANCELLED | OUTPATIENT
Start: 2025-05-14

## 2025-05-14 RX ORDER — EPINEPHRINE 1 MG/ML
0.3 INJECTION, SOLUTION, CONCENTRATE INTRAVENOUS PRN
Status: CANCELLED | OUTPATIENT
Start: 2025-05-14

## 2025-05-14 RX ORDER — ONDANSETRON 2 MG/ML
8 INJECTION INTRAMUSCULAR; INTRAVENOUS
Status: CANCELLED | OUTPATIENT
Start: 2025-05-14

## 2025-05-14 RX ORDER — LORAZEPAM 0.5 MG/1
0.5 TABLET ORAL EVERY 8 HOURS PRN
Qty: 30 TABLET | Refills: 0 | Status: SHIPPED | OUTPATIENT
Start: 2025-05-14 | End: 2025-06-13

## 2025-05-14 RX ORDER — DEXAMETHASONE SODIUM PHOSPHATE 10 MG/ML
10 INJECTION, SOLUTION INTRAMUSCULAR; INTRAVENOUS ONCE
Status: COMPLETED | OUTPATIENT
Start: 2025-05-14 | End: 2025-05-14

## 2025-05-14 RX ORDER — SODIUM CHLORIDE 0.9 % (FLUSH) 0.9 %
5-40 SYRINGE (ML) INJECTION PRN
Status: CANCELLED | OUTPATIENT
Start: 2025-05-16

## 2025-05-14 RX ORDER — FAMOTIDINE 10 MG/ML
20 INJECTION, SOLUTION INTRAVENOUS ONCE
Status: COMPLETED | OUTPATIENT
Start: 2025-05-14 | End: 2025-05-14

## 2025-05-14 RX ORDER — FAMOTIDINE 10 MG/ML
20 INJECTION, SOLUTION INTRAVENOUS ONCE
Status: CANCELLED
Start: 2025-05-14 | End: 2025-05-14

## 2025-05-14 RX ORDER — DIPHENHYDRAMINE HYDROCHLORIDE 50 MG/ML
50 INJECTION, SOLUTION INTRAMUSCULAR; INTRAVENOUS
Status: CANCELLED | OUTPATIENT
Start: 2025-05-14

## 2025-05-14 RX ORDER — SODIUM CHLORIDE 9 MG/ML
INJECTION, SOLUTION INTRAVENOUS CONTINUOUS
Status: CANCELLED | OUTPATIENT
Start: 2025-05-14

## 2025-05-14 RX ORDER — HEPARIN SODIUM (PORCINE) LOCK FLUSH IV SOLN 100 UNIT/ML 100 UNIT/ML
500 SOLUTION INTRAVENOUS PRN
Status: CANCELLED | OUTPATIENT
Start: 2025-05-14

## 2025-05-14 RX ORDER — DIPHENHYDRAMINE HYDROCHLORIDE 50 MG/ML
25 INJECTION, SOLUTION INTRAMUSCULAR; INTRAVENOUS ONCE
Status: CANCELLED
Start: 2025-05-14 | End: 2025-05-14

## 2025-05-14 RX ORDER — MEPERIDINE HYDROCHLORIDE 50 MG/ML
12.5 INJECTION INTRAMUSCULAR; INTRAVENOUS; SUBCUTANEOUS PRN
Status: CANCELLED | OUTPATIENT
Start: 2025-05-14

## 2025-05-14 RX ORDER — DIPHENHYDRAMINE HYDROCHLORIDE 50 MG/ML
25 INJECTION, SOLUTION INTRAMUSCULAR; INTRAVENOUS ONCE
Status: COMPLETED | OUTPATIENT
Start: 2025-05-14 | End: 2025-05-14

## 2025-05-14 RX ORDER — HYDROCORTISONE SODIUM SUCCINATE 100 MG/2ML
100 INJECTION INTRAMUSCULAR; INTRAVENOUS
Status: CANCELLED | OUTPATIENT
Start: 2025-05-14

## 2025-05-14 RX ORDER — SODIUM CHLORIDE 0.9 % (FLUSH) 0.9 %
5-40 SYRINGE (ML) INJECTION PRN
Status: CANCELLED | OUTPATIENT
Start: 2025-05-14

## 2025-05-14 RX ORDER — ACETAMINOPHEN 325 MG/1
650 TABLET ORAL
Status: CANCELLED | OUTPATIENT
Start: 2025-05-14

## 2025-05-14 RX ORDER — HEPARIN SODIUM (PORCINE) LOCK FLUSH IV SOLN 100 UNIT/ML 100 UNIT/ML
500 SOLUTION INTRAVENOUS PRN
Status: CANCELLED | OUTPATIENT
Start: 2025-05-16

## 2025-05-14 RX ORDER — PALONOSETRON 0.05 MG/ML
0.25 INJECTION, SOLUTION INTRAVENOUS ONCE
Status: COMPLETED | OUTPATIENT
Start: 2025-05-14 | End: 2025-05-14

## 2025-05-14 RX ORDER — SODIUM CHLORIDE 9 MG/ML
5-250 INJECTION, SOLUTION INTRAVENOUS PRN
Status: CANCELLED | OUTPATIENT
Start: 2025-05-16

## 2025-05-14 RX ORDER — PALONOSETRON 0.05 MG/ML
0.25 INJECTION, SOLUTION INTRAVENOUS ONCE
Status: CANCELLED | OUTPATIENT
Start: 2025-05-14 | End: 2025-05-14

## 2025-05-14 RX ORDER — DEXTROSE MONOHYDRATE 50 MG/ML
5-250 INJECTION, SOLUTION INTRAVENOUS PRN
Status: CANCELLED | OUTPATIENT
Start: 2025-05-14

## 2025-05-14 RX ORDER — SODIUM CHLORIDE 9 MG/ML
5-250 INJECTION, SOLUTION INTRAVENOUS PRN
Status: CANCELLED | OUTPATIENT
Start: 2025-05-14

## 2025-05-14 RX ADMIN — DEXAMETHASONE SODIUM PHOSPHATE 10 MG: 10 INJECTION, SOLUTION INTRAMUSCULAR; INTRAVENOUS at 12:06

## 2025-05-14 RX ADMIN — FLUOROURACIL 3400 MG: 50 INJECTION, SOLUTION INTRAVENOUS at 14:29

## 2025-05-14 RX ADMIN — FAMOTIDINE 20 MG: 10 INJECTION, SOLUTION INTRAVENOUS at 12:05

## 2025-05-14 RX ADMIN — OXALIPLATIN 95 MG: 5 INJECTION, SOLUTION INTRAVENOUS at 12:26

## 2025-05-14 RX ADMIN — PALONOSETRON 0.25 MG: 0.05 INJECTION, SOLUTION INTRAVENOUS at 12:05

## 2025-05-14 RX ADMIN — DIPHENHYDRAMINE HYDROCHLORIDE 25 MG: 50 INJECTION INTRAMUSCULAR; INTRAVENOUS at 12:05

## 2025-05-14 RX ADMIN — LEUCOVORIN CALCIUM 200 MG: 200 INJECTION, POWDER, LYOPHILIZED, FOR SUSPENSION INTRAMUSCULAR; INTRAVENOUS at 12:25

## 2025-05-15 ENCOUNTER — HOSPITAL ENCOUNTER (OUTPATIENT)
Dept: INFUSION THERAPY | Age: 75
Discharge: HOME | End: 2025-05-15
Payer: MEDICARE

## 2025-05-15 ENCOUNTER — TELEPHONE (OUTPATIENT)
Dept: WOUND CARE | Age: 75
End: 2025-05-15

## 2025-05-15 DIAGNOSIS — C18.9 ADENOCARCINOMA OF COLON (HCC): Primary | ICD-10-CM

## 2025-05-15 DIAGNOSIS — Z11.59 ENCOUNTER FOR SCREENING FOR OTHER VIRAL DISEASES: ICD-10-CM

## 2025-05-15 PROCEDURE — 2500000003 HC RX 250 WO HCPCS: Performed by: INTERNAL MEDICINE

## 2025-05-15 PROCEDURE — 96523 IRRIG DRUG DELIVERY DEVICE: CPT

## 2025-05-15 PROCEDURE — 6360000002 HC RX W HCPCS: Performed by: INTERNAL MEDICINE

## 2025-05-15 RX ORDER — HEPARIN 100 UNIT/ML
500 SYRINGE INTRAVENOUS PRN
Status: DISCONTINUED | OUTPATIENT
Start: 2025-05-15 | End: 2025-05-16 | Stop reason: HOSPADM

## 2025-05-15 RX ORDER — SODIUM CHLORIDE 0.9 % (FLUSH) 0.9 %
5-40 SYRINGE (ML) INJECTION PRN
Status: DISCONTINUED | OUTPATIENT
Start: 2025-05-15 | End: 2025-05-16 | Stop reason: HOSPADM

## 2025-05-15 RX ADMIN — HEPARIN 500 UNITS: 100 SYRINGE at 10:18

## 2025-05-15 RX ADMIN — SODIUM CHLORIDE, PRESERVATIVE FREE 10 ML: 5 INJECTION INTRAVENOUS at 10:18

## 2025-05-15 NOTE — PROGRESS NOTES
Pt's sister called stating patient is confused and trying to pull port needle out.  This RN offered for her to bring PT to office to have 5FU pump removed, she refused.  Dr. Boswell notified, per Dr. Boswell Ativan 0.5mg every 8 hours as needed sent to PT's pharmacy.  Pt's sister instructed to take PT to ER if PT gets worse.

## 2025-05-15 NOTE — TELEPHONE ENCOUNTER
Call placed to [facility] and spoke with Jennifer COSTELLO re: patient status with [facility]. Stated Therapy has been d/c'd and nurse id still current. Advised to call back next week May 22nd to they will check with with Dr. Faustin.  When patient is d/c'd from [facility] will start Ostomy Supply Order with [facility].   History of Present Illness:   Ms. Lay Michaels is a 70 year old woman who presents with an imaging detected concern of the left breast.  The patient denies any palpable masses, nipple discharge, skin changes or axillary symptoms.  She does not have any known family history of breast cancer.  She has no personal prior history of breast disease or biopsies.  She had a screening mammogram in 2024 that was unremarkable.  She presented this year for screening mammogram on 2025 and was found to have a focal asymmetry in the upper outer left breast.  She had a left diagnostic evaluation on 2025 and was confirmed to have a 7 mm spiculated suspicious nodule at 2:00, 6 cm from the nipple for which biopsy was recommended.  She had a biopsy in 2025 and was confirmed to have invasive ductal carcinoma, grade 1, ER 95%, SD 0%, HER2/dari negative.  She is here today for evaluation and recommendations for further therapy.        Past Medical History       Past Medical History:    Allergic rhinitis    Allergic rhinitis due to other allergen    Asthma (HCC)    Back pain    Constipation    Family history of other cardiovascular diseases    H/O colonoscopy    Hemorrhoids    Leaking of urine    Tubular adenoma of colon    Urinary tract infection, site not specified    Wears glasses            Past Surgical History         Past Surgical History:   Procedure Laterality Date    Colonoscopy   2015     Tubular adenoma, recheck 5 years    Colonoscopy   2021     Diverticulosis, recheck 5 years            Oral surgery        Other surgical history   2008     C-scope  int hemorrhoids    Other surgical history   2018     LAPAROSCOPIC BILATERAL SALPINGO OOPHORECTOMY WITH LEFT CYSTECTOMY            Gynecological History:  Pt is a   Pt was 23 years old at time of first pregnancy.    She denies any cumulative breastfeeding history  She achieved menarche at age 13 and LMP age 57  Age of  Menopause: 57  Type: natural menopause  She denies any history of hormone replacement therapy  She has history of oral contraceptive use for 10 years, last in 1986.  She denies infertility treatment to achieve pregnancy.     Medications:    Current Medications   No outpatient medications have been marked as taking for the 4/4/25 encounter (Appointment) with Kassie Johnson MD.            Allergies:    [Allergies]    [Allergies]       Allergen Reactions    Penicillin V Potassium [Penicillin V] RASH    Penicillins RASH        Family History:   Family History         Family History   Problem Relation Age of Onset    Cancer Father           Lung, lymphoma, uterine, kidney    Other (HTN) Father      Other (AAA) Father      Other (Mitral Valve Replacement) Father      Cancer Mother           Kidney    Other (Diverticulitis) Mother      Other (HTN) Mother      Ulcerative Colitis Son      Stroke Paternal Grandmother      Ulcerative Colitis Sister      Crohn's Disease Sister      Bleeding Disorders Sister      Colon Polyps Sister      Crohn's Disease Brother      Alcohol and Other Disorders Associated Brother      Colon Cancer Paternal Aunt      Colon Cancer Paternal Uncle              She is not of Ashkenazi Mormonism ancestry.     Social History:       History   Alcohol Use    5.0 standard drinks of alcohol/week    2 Glasses of wine, 3 Standard drinks or equivalent per week       Comment: Occasional              History   Smoking Status    Never   Smokeless Tobacco    Never      Ms. Lay Michaels is  with 3 children. She has 5 siblings. She is currently Retired     Review of Systems:  General:   The patient denies, fever, chills, night sweats, fatigue, generalized weakness, change in appetite or weight loss.     HEENT:     The patient denies +eye irritation, +cataracts, redness, glaucoma, yellowing of the eyes, change in vision, color blindness, or +wearing contacts/glasses. The patient denies hearing loss, ringing in  the ears, ear drainage, earaches, nasal congestion, nose bleeds, +snoring, pain in mouth/throat, hoarseness, change in voice, facial trauma.     Respiratory:  The patient denies chronic cough, phlegm, hemoptysis, pleurisy/chest pain, pneumonia, +asthma, wheezing, difficulty in breathing with exertion, emphysema, chronic bronchitis, shortness of breath or abnormal sound when breathing.      Cardiovascular:  There is no history of chest pain, chest pressure/discomfort, palpitations, irregular heartbeat, fainting or near-fainting, difficulty breathing when lying flat, SOB/Coughing at night, swelling of the legs or chest pain while walking.     Breasts:  See history of present illness     Gastrointestinal:     There is no history of difficulty or pain with swallowing, reflux symptoms, vomiting, dark or bloody stools, constipation, yellowing of the skin, indigestion, nausea, change in bowel habits, diarrhea, abdominal pain or vomiting blood.      Genitourinary:  The patient denies +frequent urination, +needing to get up at night to urinate, urinary hesitancy or retaining urine, painful urination, +urinary incontinence, decreased urine stream, blood in the urine or vaginal/penile discharge.     Skin:    The patient denies rash, itching, skin lesions, dry skin, change in skin color or change in moles.      Hematologic/Lymphatic:  The patient denies easily bruising or bleeding or persistent swollen glands or lymph nodes.      Musculoskeletal:  The patient denies muscle aches/pain, joint pain, stiff joints, neck pain, back pain or bone pain.     Neuropsychiatric:  There is no history of migraines or severe headaches, seizure/epilepsy, speech problems, coordination problems, trembling/tremors, fainting/black outs, dizziness, memory problems, loss of sensation/numbness, problems walking, weakness, tingling or burning in hands/feet. There is no history of abusive relationship, bipolar disorder, sleep disturbance, anxiety,  depression or feeling of despair.     Endocrine:    There is no history of poor/slow wound healing, weight loss/gain, fertility or hormone problems, cold intolerance, thyroid disease.      Allergic/Immunologic:  There is no history of hives, hay fever, angioedema or anaphylaxis.     BP (!) 164/84 (BP Location: Right arm, Patient Position: Sitting, Cuff Size: adult)   Pulse 60   Temp 98.1 °F (36.7 °C) (Temporal)   Resp 16   Ht 1.6 m (5' 3\")   Wt 66.7 kg (147 lb)   LMP 12/23/2013   SpO2 100%   BMI 26.04 kg/m²      Physical Exam:  The patient is an alert, oriented, well-nourished and  well-developed woman who appears her stated age. Her speech patterns and movements are normal. Her affect is appropriate.     HEENT: The head is normocephalic. The neck is supple. The thyroid is not enlarged and is without palpable masses/nodules. There are no palpable masses. The trachea is in the midline. Conjunctiva are clear, non-icteric.     Chest: The chest expands symmetrically. The lungs are clear to auscultation.     Heart: The rhythm is regular.  There are no murmurs, rubs, gallops or thrills.     Breasts:  Her breasts are symmetrical with a cup size 36C.  Right breast: The skin, nipple ,and areola appear normal. There is no skin dimpling with movement of the pectoralis. There is no nipple retraction. No nipple discharge can be elicited. The parenchyma is mildly nodular. There are no dominant masses in the breast. The axillary tail is normal.  Left breast:   The skin, nipple, and areola appear normal. There is no skin dimpling with movement of the pectoralis. There is no nipple retraction. No nipple discharge can be elicited. The parenchyma is mildly nodular. There are no dominant masses in the breast. The axillary tail is normal.     Abdomen:  The abdomen is soft, flat and non tender. The liver is not enlarged. There are no palpable masses.     Lymph Nodes:  The supraclavicular, axillary and cervical regions are free of  significant lymphadenopathy.     Back: There is no vertebral column tenderness.     Skin: The skin appears normal. There are no suspicious appearing rashes or lesions.     Extremities: The extremities are without deformity, cyanosis or edema.     Impression:   Ms. Lay Michaels is a 70 year old woman presents with imaging detected left breast cancer, clinical stage T1 NX MX.     Discussion and Plan:  I had a discussion with the Patient regarding her breast exam. On exam today I found her to be healing well since recent biopsy with no other clinical findings.  I personally reviewed the recent imaging and pathology and we discussed this at length.     The natural history and evolution of breast cancer were discussed with Ms. Lay Michaels and her  family, including the difference between in-situ and invasive carcinoma, and the distinction  between local and systemic disease and local and systemic therapy. For local treatment options,  I explained the risks and benefits of breast conservation and mastectomy (with or without  reconstruction), including the fact that survival rates are equal with these two approaches.  If breast conservation is elected, I explained the need for free margins, the possibility of re-  excision to achieve free margins, and the need for post-operative radiotherapy. The approach  to fredy staging was also described, including the technique, risks and benefits of sentinel node  biopsy, the possible need for axillary dissection, and the long-term sequelae of this procedure.  With regard to systemic therapy, final recommendation will be made following receipt of final  pathology post-op, but I outlined the possibility of endocrine therapy, chemotherapy, and  herceptin, depending on tumor marker profile.  Following this discussion, where all of the patient's questions were answered, we agreed to  proceed with left breast wire localized impact with left sentinel lymph node biopsy. The risks and possible  complications of the procedure were explained to the patient and her family and she understood and agreed to the proposed plan. She was given ample opportunity for questions and those questions were answered to her satisfaction. She has been  encouraged to contact the office with any questions or concerns prior to her next appointment.      This note was created by [employer] voice recognition. Errors in content may be related to improper recognition by the system; efforts to review and correct have been done but errors may still exist. Please be advised the primary purpose of this note is for me to communicate medical care. Standard sentence structure is not always used. Medical terminology and medical abbreviations may be used. There may be grammatical, typographical, and automated fill ins that may have errors missed in proofreading.    Pre-op Diagnosis: Invasive carcinoma of breast (HCC) [C50.919]    The above referenced H&P was reviewed by Kassie Johnson MD on 5/1/2025, the patient was examined and no significant changes have occurred in the patient's condition since the H&P was performed.  I discussed with the patient and/or legal representative the potential benefits, risks and side effects of this procedure; the likelihood of the patient achieving goals; and potential problems that might occur during recuperation.  I discussed reasonable alternatives to the procedure, including risks, benefits and side effects related to the alternatives and risks related to not receiving this procedure.  We will proceed with procedure as planned.

## 2025-05-16 ENCOUNTER — TELEPHONE (OUTPATIENT)
Dept: PRIMARY CARE CLINIC | Age: 75
End: 2025-05-16

## 2025-05-16 NOTE — TELEPHONE ENCOUNTER
JenniferRN with [facility] reported Damian is doing well with his Ostomy. Home Health plans to discharge the patient on 6/22/25 if Dr. Martinez approves. Please call PRAVIN Rodriguez at  [phone] don't leave a message, or call Home Health office at [phone] if Dr. Martinez doesn't approve.

## 2025-05-23 ENCOUNTER — CLINICAL DOCUMENTATION (OUTPATIENT)
Dept: HEMATOLOGY | Age: 75
End: 2025-05-23

## 2025-05-23 ENCOUNTER — TELEPHONE (OUTPATIENT)
Dept: WOUND CARE | Age: 75
End: 2025-05-23

## 2025-05-23 NOTE — TELEPHONE ENCOUNTER
Follow up call with [facility] re:[facility]. Spoke with [facility], stated has been discharged on Thursday 5/22/25. Will order ostomy supplies from [place], spoke with patient sister Cassie on 4/30/25 we had discussed ordering supplies and setting up with [facility] when [facility] d/c'd patient from their services.  Call placed to patient sister Cassie to verify the appliance patient has been using, he has been using [employer]oplast 1 piece #[id number] [employer], stated this is working the best and getting a better fit with this appliance.  Order to be placed today with [facility].

## 2025-05-23 NOTE — PROGRESS NOTES
Sister is concerned about patients care and further treatment options for patient. I explained to sister that MD will be available on 5/28/25 during patients next appt to discuss further treatment options with the patient and family. She expressed understanding and reports family will be here for discussion with MD. Electronically signed by Gris Spence RN on 5/23/2025 at 4:48 PM

## 2025-05-27 NOTE — PROGRESS NOTES
Serial          MEDICAL ONCOLOGY PROGRESS NOTE                                                          Damian You   1950 5/28/2025     Chief Complaint   Patient presents with    Cancer     Adenocarcinoma of colon (HCC)  Chemotherapy induced diarrhea          INTERVAL HISTORY/HISTORY OF PRESENT ILLNESS:  Reason for MD visit-disease management, chemotherapy management  History of Present Illness  The patient is a 74-year-old male with a diagnosis of sigmoid adenocarcinoma, stage IV, with liver metastasis, diagnosed in 02/2025. His molecular profile is K-gomez, BRAF, and NRAS wild-type, indicating MSI stable disease. He is currently receiving palliative chemotherapy with a dose reduction, specifically modified FOLFOX.  The patient has been having significant confusion for the first 2 days after palliative chemotherapy.  Patient has pulled on his work.    Complications have arisen with the pump used for his treatment at home. There is interest in transitioning to oral medication due to concerns about compliance with the current regimen. He is not left alone and is assisted with his medication intake. Recently, he had a positive weekend, suggesting an improvement in his condition as the interval between chemotherapy sessions increases. His fluid intake is primarily composed of Coke, with a noted aversion to water.      Diagnosis  Sigmoid adenocarcinoma stage IV (liver)  MMR proficient  Tempus xT: FBXW7, TP53, APC, KMT2C, CDKN2A, CDKN2B, MTAP, NFK81A: Positive  KRAS,BRAF,NRAS: Negative  TMB: 7.9m/MB  MSI-Stable   2/27/25 Tempus xT Tumor Origin:  Colorectal adenocarcinoma 99%    Treatment Summary:  2/6/25-2/8/25 Patient received IV Venofer 1000mg  3/12/25 Initiate mFOLFOX every 2 weeks. Treatment consent signed.  4/2/25 Dose reduction oxaliplatin to 50 mg/m² and CI 5-FU to 1800 mg/m²  4/16/25 Added Vectibix 6 mg/m2 to C# 3 every 2 weeks. Treatment Consent signed      Cancer history  Damain You was first seen

## 2025-05-28 ENCOUNTER — HOSPITAL ENCOUNTER (OUTPATIENT)
Dept: INFUSION THERAPY | Age: 75
Discharge: HOME | End: 2025-05-28
Payer: MEDICARE

## 2025-05-28 ENCOUNTER — OFFICE VISIT (OUTPATIENT)
Dept: HEMATOLOGY | Age: 75
End: 2025-05-28
Payer: MEDICARE

## 2025-05-28 VITALS
RESPIRATION RATE: 16 BRPM | WEIGHT: 169.8 LBS | SYSTOLIC BLOOD PRESSURE: 160 MMHG | OXYGEN SATURATION: 98 % | HEIGHT: 70 IN | TEMPERATURE: 97.2 F | HEART RATE: 63 BPM | BODY MASS INDEX: 24.31 KG/M2 | DIASTOLIC BLOOD PRESSURE: 90 MMHG

## 2025-05-28 DIAGNOSIS — T45.1X5D ADVERSE EFFECT OF CHEMOTHERAPY, SUBSEQUENT ENCOUNTER: ICD-10-CM

## 2025-05-28 DIAGNOSIS — Z51.11 CHEMOTHERAPY MANAGEMENT, ENCOUNTER FOR: ICD-10-CM

## 2025-05-28 DIAGNOSIS — Z71.89 GOALS OF CARE, COUNSELING/DISCUSSION: ICD-10-CM

## 2025-05-28 DIAGNOSIS — Z11.59 ENCOUNTER FOR SCREENING FOR OTHER VIRAL DISEASES: Primary | ICD-10-CM

## 2025-05-28 DIAGNOSIS — C18.9 ADENOCARCINOMA OF COLON (HCC): ICD-10-CM

## 2025-05-28 DIAGNOSIS — Z51.12 ENCOUNTER FOR ANTINEOPLASTIC IMMUNOTHERAPY: ICD-10-CM

## 2025-05-28 DIAGNOSIS — R54 FRAILTY SYNDROME IN GERIATRIC PATIENT: ICD-10-CM

## 2025-05-28 DIAGNOSIS — E83.39 HYPOPHOSPHATEMIA: ICD-10-CM

## 2025-05-28 DIAGNOSIS — N28.9 RENAL IMPAIRMENT: ICD-10-CM

## 2025-05-28 DIAGNOSIS — Z71.89 CARE PLAN DISCUSSED WITH PATIENT: ICD-10-CM

## 2025-05-28 DIAGNOSIS — D64.81 ANTINEOPLASTIC CHEMOTHERAPY INDUCED ANEMIA: ICD-10-CM

## 2025-05-28 DIAGNOSIS — C18.9 ADENOCARCINOMA OF COLON (HCC): Primary | ICD-10-CM

## 2025-05-28 DIAGNOSIS — T45.1X5A ANTINEOPLASTIC CHEMOTHERAPY INDUCED ANEMIA: ICD-10-CM

## 2025-05-28 DIAGNOSIS — E83.42 HYPOMAGNESEMIA: ICD-10-CM

## 2025-05-28 LAB
ALBUMIN SERPL-MCNC: 3.6 G/DL (ref 3.5–5.2)
ALP SERPL-CCNC: 132 U/L (ref 40–129)
ALT SERPL-CCNC: 19 U/L (ref 5–41)
ANION GAP SERPL CALCULATED.3IONS-SCNC: 10 MMOL/L (ref 7–19)
AST SERPL-CCNC: 30 U/L (ref 5–40)
BASOPHILS # BLD: 0.06 K/UL (ref 0–0.2)
BASOPHILS NFR BLD: 0.8 % (ref 0–1)
BILIRUB SERPL-MCNC: 0.5 MG/DL (ref 0–1.2)
BUN SERPL-MCNC: 25 MG/DL (ref 8–23)
CALCIUM SERPL-MCNC: 8.5 MG/DL (ref 8.8–10.2)
CEA SERPL-MCNC: 20.8 NG/ML (ref 0–4.7)
CHLORIDE SERPL-SCNC: 107 MMOL/L (ref 98–107)
CO2 SERPL-SCNC: 23 MMOL/L (ref 22–29)
CREAT SERPL-MCNC: 1.3 MG/DL (ref 0.7–1.2)
EOSINOPHIL # BLD: 0.37 K/UL (ref 0–0.6)
EOSINOPHIL NFR BLD: 5.2 % (ref 0–5)
ERYTHROCYTE [DISTWIDTH] IN BLOOD BY AUTOMATED COUNT: 18.4 % (ref 11.5–14.5)
GLUCOSE SERPL-MCNC: 82 MG/DL (ref 70–99)
HCT VFR BLD AUTO: 36.4 % (ref 42–52)
HGB BLD-MCNC: 12 G/DL (ref 14–18)
LYMPHOCYTES # BLD: 1.24 K/UL (ref 1.1–4.5)
LYMPHOCYTES NFR BLD: 17.5 % (ref 20–40)
MAGNESIUM SERPL-MCNC: 1.7 MG/DL (ref 1.6–2.4)
MCH RBC QN AUTO: 30.5 PG (ref 27–31)
MCHC RBC AUTO-ENTMCNC: 33 G/DL (ref 33–37)
MCV RBC AUTO: 92.4 FL (ref 80–94)
MONOCYTES # BLD: 0.69 K/UL (ref 0–0.9)
MONOCYTES NFR BLD: 9.7 % (ref 1–10)
NEUTROPHILS # BLD: 4.73 K/UL (ref 1.5–7.5)
NEUTS SEG NFR BLD: 66.7 % (ref 50–65)
PHOSPHATE SERPL-MCNC: 3.2 MG/DL (ref 2.5–4.5)
PLATELET # BLD AUTO: 156 K/UL (ref 130–400)
PMV BLD AUTO: 9 FL (ref 9.4–12.4)
POTASSIUM SERPL-SCNC: 4.3 MMOL/L (ref 3.5–5.1)
PROT SERPL-MCNC: 6.7 G/DL (ref 6.4–8.3)
RBC # BLD AUTO: 3.94 M/UL (ref 4.7–6.1)
SODIUM SERPL-SCNC: 140 MMOL/L (ref 136–145)
WBC # BLD AUTO: 7.1 K/UL (ref 4.8–10.8)

## 2025-05-28 PROCEDURE — G8420 CALC BMI NORM PARAMETERS: HCPCS | Performed by: INTERNAL MEDICINE

## 2025-05-28 PROCEDURE — 36415 COLL VENOUS BLD VENIPUNCTURE: CPT

## 2025-05-28 PROCEDURE — 3077F SYST BP >= 140 MM HG: CPT | Performed by: INTERNAL MEDICINE

## 2025-05-28 PROCEDURE — 3080F DIAST BP >= 90 MM HG: CPT | Performed by: INTERNAL MEDICINE

## 2025-05-28 PROCEDURE — 3017F COLORECTAL CA SCREEN DOC REV: CPT | Performed by: INTERNAL MEDICINE

## 2025-05-28 PROCEDURE — 84100 ASSAY OF PHOSPHORUS: CPT

## 2025-05-28 PROCEDURE — G8427 DOCREV CUR MEDS BY ELIG CLIN: HCPCS | Performed by: INTERNAL MEDICINE

## 2025-05-28 PROCEDURE — 85025 COMPLETE CBC W/AUTO DIFF WBC: CPT

## 2025-05-28 PROCEDURE — 80053 COMPREHEN METABOLIC PANEL: CPT

## 2025-05-28 PROCEDURE — 1123F ACP DISCUSS/DSCN MKR DOCD: CPT | Performed by: INTERNAL MEDICINE

## 2025-05-28 PROCEDURE — 36591 DRAW BLOOD OFF VENOUS DEVICE: CPT

## 2025-05-28 PROCEDURE — 1126F AMNT PAIN NOTED NONE PRSNT: CPT | Performed by: INTERNAL MEDICINE

## 2025-05-28 PROCEDURE — 82378 CARCINOEMBRYONIC ANTIGEN: CPT

## 2025-05-28 PROCEDURE — G2211 COMPLEX E/M VISIT ADD ON: HCPCS | Performed by: INTERNAL MEDICINE

## 2025-05-28 PROCEDURE — 99213 OFFICE O/P EST LOW 20 MIN: CPT

## 2025-05-28 PROCEDURE — 1159F MED LIST DOCD IN RCRD: CPT | Performed by: INTERNAL MEDICINE

## 2025-05-28 PROCEDURE — 83735 ASSAY OF MAGNESIUM: CPT

## 2025-05-28 PROCEDURE — 6360000002 HC RX W HCPCS: Performed by: INTERNAL MEDICINE

## 2025-05-28 PROCEDURE — 99214 OFFICE O/P EST MOD 30 MIN: CPT | Performed by: INTERNAL MEDICINE

## 2025-05-28 PROCEDURE — 2500000003 HC RX 250 WO HCPCS: Performed by: INTERNAL MEDICINE

## 2025-05-28 PROCEDURE — 1036F TOBACCO NON-USER: CPT | Performed by: INTERNAL MEDICINE

## 2025-05-28 RX ORDER — SODIUM CHLORIDE 0.9 % (FLUSH) 0.9 %
5-40 SYRINGE (ML) INJECTION PRN
Status: DISCONTINUED | OUTPATIENT
Start: 2025-05-28 | End: 2025-05-29 | Stop reason: HOSPADM

## 2025-05-28 RX ORDER — HEPARIN 100 UNIT/ML
500 SYRINGE INTRAVENOUS PRN
Status: DISCONTINUED | OUTPATIENT
Start: 2025-05-28 | End: 2025-05-29 | Stop reason: HOSPADM

## 2025-05-28 RX ADMIN — SODIUM CHLORIDE, PRESERVATIVE FREE 10 ML: 5 INJECTION INTRAVENOUS at 11:12

## 2025-05-28 RX ADMIN — WATER 2 MG: 1 INJECTION INTRAMUSCULAR; INTRAVENOUS; SUBCUTANEOUS at 10:35

## 2025-05-28 RX ADMIN — HEPARIN 500 UNITS: 100 SYRINGE at 11:12

## 2025-06-02 ENCOUNTER — TELEPHONE (OUTPATIENT)
Dept: HEMATOLOGY | Age: 75
End: 2025-06-02

## 2025-06-02 NOTE — TELEPHONE ENCOUNTER
I called and spoke with patients sister Cassie at this time. Patient would like to proceed with chemotherapy. Patients sister reports patient continues to have sun downers. Sister reports melatonin was working however, it is not working any longer. She reports that primary care physician informed them that they need to follow up with us. I spoke with MD and new plan will be built for patient. New referral to neurology for altered mental status at night.

## 2025-06-05 DIAGNOSIS — C18.9 ADENOCARCINOMA OF COLON (HCC): Primary | ICD-10-CM

## 2025-06-05 RX ORDER — CAPECITABINE 500 MG/1
1000 TABLET, FILM COATED ORAL 2 TIMES DAILY
Qty: 56 TABLET | Refills: 5 | Status: SHIPPED
Start: 2025-06-05 | End: 2025-06-05

## 2025-06-05 RX ORDER — CAPECITABINE 500 MG/1
1000 TABLET, FILM COATED ORAL 2 TIMES DAILY
Qty: 56 TABLET | Refills: 5 | Status: ACTIVE | OUTPATIENT
Start: 2025-06-05 | End: 2025-08-28

## 2025-06-07 DIAGNOSIS — E03.9 ACQUIRED HYPOTHYROIDISM: ICD-10-CM

## 2025-06-09 RX ORDER — LEVOTHYROXINE SODIUM 125 UG/1
125 TABLET ORAL DAILY
Qty: 90 TABLET | Refills: 1 | Status: SHIPPED | OUTPATIENT
Start: 2025-06-09

## 2025-06-09 NOTE — TELEPHONE ENCOUNTER
Damian PAPITO SchneiderYou called to request a refill on his medication.      Last office visit : 4/14/2025   Next office visit : 9/30/2025     Requested Prescriptions     Signed Prescriptions Disp Refills    levothyroxine (SYNTHROID) 125 MCG tablet 90 tablet 1     Sig: TAKE 1 TABLET BY MOUTH ONCE DAILY     Authorizing Provider: SUE BHAKTA     Ordering User: KATHARINE DAILY LPN

## 2025-06-12 ENCOUNTER — CLINICAL DOCUMENTATION (OUTPATIENT)
Dept: HEMATOLOGY | Age: 75
End: 2025-06-12

## 2025-06-12 ENCOUNTER — TELEPHONE (OUTPATIENT)
Dept: HEMATOLOGY | Age: 75
End: 2025-06-12

## 2025-06-12 NOTE — PROGRESS NOTES
Called patients sister Cassie back at this time. I educated her that patient is to start Capecitabine the same day as his infusion. Patient is scheduled for infusion Friday 6/20/25 at 0930. Education performed that each cycle of pills will be on the same day as patients infusion. Sister verbalized understanding. Education performed that pharmacy will call before each delivery to schedule, she voiced understanding. Electronically signed by Gris Spence RN on 6/12/2025 at 2:52 PM

## 2025-06-12 NOTE — PROGRESS NOTES
Patients care taker called and reports not hearing from pharmacy about medication. I called and spoke with pharmacy they had correct patient contact information and state \"several attempts have been made to contact patient, there is no VM available\". I called Cynthia and informed her that the pharmacy has been trying to get her on the phone. She reports that she will go to pharmacy and  medication. I had to explain to her that the medication was not local. I provided her with the number to the pharmacy for her to set up delivery and explained that medication would be shipped by [employer] or [employer] after she called the pharmacy. She verbalized understanding. Electronically signed by Gris Spence RN on 6/12/2025 at 11:20 AM

## 2025-06-19 ENCOUNTER — HOSPITAL ENCOUNTER (INPATIENT)
Age: 75
LOS: 7 days | Discharge: HOME HEALTH | DRG: 854 | End: 2025-06-26
Attending: STUDENT IN AN ORGANIZED HEALTH CARE EDUCATION/TRAINING PROGRAM | Admitting: STUDENT IN AN ORGANIZED HEALTH CARE EDUCATION/TRAINING PROGRAM
Payer: MEDICARE

## 2025-06-19 ENCOUNTER — APPOINTMENT (OUTPATIENT)
Dept: GENERAL RADIOLOGY | Age: 75
DRG: 854 | End: 2025-06-19
Payer: MEDICARE

## 2025-06-19 ENCOUNTER — HOSPITAL ENCOUNTER (OUTPATIENT)
Dept: WOUND CARE | Age: 75
Discharge: HOME | End: 2025-06-19
Payer: MEDICARE

## 2025-06-19 ENCOUNTER — APPOINTMENT (OUTPATIENT)
Dept: VASCULAR LAB | Age: 75
DRG: 854 | End: 2025-06-19
Payer: MEDICARE

## 2025-06-19 VITALS
RESPIRATION RATE: 16 BRPM | DIASTOLIC BLOOD PRESSURE: 89 MMHG | HEART RATE: 106 BPM | TEMPERATURE: 99.4 F | SYSTOLIC BLOOD PRESSURE: 162 MMHG

## 2025-06-19 DIAGNOSIS — L98.492 ABDOMINAL WALL SKIN ULCER, WITH FAT LAYER EXPOSED (HCC): Primary | ICD-10-CM

## 2025-06-19 DIAGNOSIS — L03.115 CELLULITIS OF RIGHT LOWER EXTREMITY: Primary | ICD-10-CM

## 2025-06-19 DIAGNOSIS — M86.171 ACUTE OSTEOMYELITIS OF RIGHT CALCANEUS (HCC): ICD-10-CM

## 2025-06-19 DIAGNOSIS — S31.819A BUTTOCK WOUND, RIGHT, INITIAL ENCOUNTER: ICD-10-CM

## 2025-06-19 DIAGNOSIS — L08.9 FOOT INFECTION: ICD-10-CM

## 2025-06-19 DIAGNOSIS — N17.9 ACUTE KIDNEY INJURY: ICD-10-CM

## 2025-06-19 DIAGNOSIS — L97.212 NON-PRESSURE CHRONIC ULCER OF RIGHT CALF WITH FAT LAYER EXPOSED (HCC): Chronic | ICD-10-CM

## 2025-06-19 PROBLEM — N18.9 ACUTE KIDNEY INJURY SUPERIMPOSED ON CKD: Status: ACTIVE | Noted: 2020-11-03

## 2025-06-19 PROBLEM — A41.9 SEPSIS (HCC): Status: ACTIVE | Noted: 2025-06-19

## 2025-06-19 PROBLEM — I50.32 HEART FAILURE WITH RECOVERED EJECTION FRACTION (HFRECEF) (HCC): Status: ACTIVE | Noted: 2020-07-28

## 2025-06-19 PROBLEM — S91.301A NON-HEALING WOUND OF RIGHT HEEL: Status: ACTIVE | Noted: 2025-06-19

## 2025-06-19 PROBLEM — Z93.3 COLOSTOMY IN PLACE (HCC): Status: ACTIVE | Noted: 2025-06-19

## 2025-06-19 LAB
ALBUMIN SERPL-MCNC: 3.3 G/DL (ref 3.5–5.2)
ALP SERPL-CCNC: 155 U/L (ref 40–129)
ALT SERPL-CCNC: 12 U/L (ref 10–50)
ANION GAP SERPL CALCULATED.3IONS-SCNC: 13 MMOL/L (ref 8–16)
AST SERPL-CCNC: 24 U/L (ref 10–50)
BASOPHILS # BLD: 0 K/UL (ref 0–0.2)
BASOPHILS NFR BLD: 0.2 % (ref 0–1)
BILIRUB SERPL-MCNC: 1 MG/DL (ref 0.2–1.2)
BUN SERPL-MCNC: 27 MG/DL (ref 8–23)
CALCIUM SERPL-MCNC: 9.1 MG/DL (ref 8.8–10.2)
CHLORIDE SERPL-SCNC: 100 MMOL/L (ref 98–107)
CO2 SERPL-SCNC: 18 MMOL/L (ref 22–29)
CREAT SERPL-MCNC: 2.1 MG/DL (ref 0.7–1.2)
EOSINOPHIL # BLD: 0 K/UL (ref 0–0.6)
EOSINOPHIL NFR BLD: 0.1 % (ref 0–5)
ERYTHROCYTE [DISTWIDTH] IN BLOOD BY AUTOMATED COUNT: 15.2 % (ref 11.5–14.5)
GLUCOSE SERPL-MCNC: 119 MG/DL (ref 70–99)
HCT VFR BLD AUTO: 38.6 % (ref 42–52)
HGB BLD-MCNC: 12.8 G/DL (ref 14–18)
IMM GRANULOCYTES # BLD: 0.1 K/UL
LACTATE BLDV-SCNC: 1.7 MMOL/L (ref 0.5–1.9)
LYMPHOCYTES # BLD: 0.7 K/UL (ref 1.1–4.5)
LYMPHOCYTES NFR BLD: 3.7 % (ref 20–40)
MCH RBC QN AUTO: 30.3 PG (ref 27–31)
MCHC RBC AUTO-ENTMCNC: 33.2 G/DL (ref 33–37)
MCV RBC AUTO: 91.3 FL (ref 80–94)
MONOCYTES # BLD: 0.9 K/UL (ref 0–0.9)
MONOCYTES NFR BLD: 5.2 % (ref 0–10)
NEUTROPHILS # BLD: 16.2 K/UL (ref 1.5–7.5)
NEUTS SEG NFR BLD: 90.2 % (ref 50–65)
PLATELET # BLD AUTO: 239 K/UL (ref 130–400)
PMV BLD AUTO: 8.9 FL (ref 9.4–12.4)
POTASSIUM SERPL-SCNC: 4.4 MMOL/L (ref 3.5–5)
PROT SERPL-MCNC: 6.9 G/DL (ref 6.4–8.3)
RBC # BLD AUTO: 4.23 M/UL (ref 4.7–6.1)
SODIUM SERPL-SCNC: 131 MMOL/L (ref 136–145)
WBC # BLD AUTO: 18 K/UL (ref 4.8–10.8)

## 2025-06-19 PROCEDURE — 99214 OFFICE O/P EST MOD 30 MIN: CPT | Performed by: SURGERY

## 2025-06-19 PROCEDURE — 36415 COLL VENOUS BLD VENIPUNCTURE: CPT

## 2025-06-19 PROCEDURE — 87040 BLOOD CULTURE FOR BACTERIA: CPT

## 2025-06-19 PROCEDURE — 2500000003 HC RX 250 WO HCPCS: Performed by: NURSE PRACTITIONER

## 2025-06-19 PROCEDURE — 99285 EMERGENCY DEPT VISIT HI MDM: CPT

## 2025-06-19 PROCEDURE — 96375 TX/PRO/DX INJ NEW DRUG ADDON: CPT

## 2025-06-19 PROCEDURE — 6360000002 HC RX W HCPCS: Performed by: NURSE PRACTITIONER

## 2025-06-19 PROCEDURE — 96365 THER/PROPH/DIAG IV INF INIT: CPT

## 2025-06-19 PROCEDURE — 99214 OFFICE O/P EST MOD 30 MIN: CPT

## 2025-06-19 PROCEDURE — 94760 N-INVAS EAR/PLS OXIMETRY 1: CPT

## 2025-06-19 PROCEDURE — 93971 EXTREMITY STUDY: CPT

## 2025-06-19 PROCEDURE — 1200000000 HC SEMI PRIVATE

## 2025-06-19 PROCEDURE — 2580000003 HC RX 258: Performed by: NURSE PRACTITIONER

## 2025-06-19 PROCEDURE — 2580000003 HC RX 258: Performed by: PHYSICIAN ASSISTANT

## 2025-06-19 PROCEDURE — 6360000002 HC RX W HCPCS: Performed by: PHYSICIAN ASSISTANT

## 2025-06-19 PROCEDURE — 83605 ASSAY OF LACTIC ACID: CPT

## 2025-06-19 PROCEDURE — 85025 COMPLETE CBC W/AUTO DIFF WBC: CPT

## 2025-06-19 PROCEDURE — 73620 X-RAY EXAM OF FOOT: CPT

## 2025-06-19 PROCEDURE — 80053 COMPREHEN METABOLIC PANEL: CPT

## 2025-06-19 RX ORDER — SODIUM CHLORIDE 0.9 % (FLUSH) 0.9 %
5-40 SYRINGE (ML) INJECTION PRN
Status: DISCONTINUED | OUTPATIENT
Start: 2025-06-19 | End: 2025-06-24 | Stop reason: SDUPTHER

## 2025-06-19 RX ORDER — 0.9 % SODIUM CHLORIDE 0.9 %
30 INTRAVENOUS SOLUTION INTRAVENOUS ONCE
Status: COMPLETED | OUTPATIENT
Start: 2025-06-19 | End: 2025-06-19

## 2025-06-19 RX ORDER — MAGNESIUM SULFATE IN WATER 40 MG/ML
2000 INJECTION, SOLUTION INTRAVENOUS PRN
Status: DISCONTINUED | OUTPATIENT
Start: 2025-06-19 | End: 2025-06-26 | Stop reason: HOSPADM

## 2025-06-19 RX ORDER — BETAMETHASONE DIPROPIONATE 0.5 MG/G
CREAM TOPICAL PRN
OUTPATIENT
Start: 2025-06-19

## 2025-06-19 RX ORDER — LIDOCAINE HYDROCHLORIDE 20 MG/ML
JELLY TOPICAL PRN
OUTPATIENT
Start: 2025-06-19

## 2025-06-19 RX ORDER — SODIUM CHLORIDE 9 MG/ML
INJECTION, SOLUTION INTRAVENOUS CONTINUOUS
Status: ACTIVE | OUTPATIENT
Start: 2025-06-19 | End: 2025-06-20

## 2025-06-19 RX ORDER — HYDROCODONE BITARTRATE AND ACETAMINOPHEN 5; 325 MG/1; MG/1
1 TABLET ORAL EVERY 6 HOURS PRN
Status: DISCONTINUED | OUTPATIENT
Start: 2025-06-19 | End: 2025-06-26 | Stop reason: HOSPADM

## 2025-06-19 RX ORDER — SODIUM CHLOR/HYPOCHLOROUS ACID 0.033 %
SOLUTION, IRRIGATION IRRIGATION PRN
OUTPATIENT
Start: 2025-06-19

## 2025-06-19 RX ORDER — MUPIROCIN 2 %
OINTMENT (GRAM) TOPICAL PRN
OUTPATIENT
Start: 2025-06-19

## 2025-06-19 RX ORDER — TRIAMCINOLONE ACETONIDE 1 MG/G
OINTMENT TOPICAL PRN
OUTPATIENT
Start: 2025-06-19

## 2025-06-19 RX ORDER — METOPROLOL SUCCINATE 25 MG/1
25 TABLET, EXTENDED RELEASE ORAL 2 TIMES DAILY
Status: DISCONTINUED | OUTPATIENT
Start: 2025-06-19 | End: 2025-06-26 | Stop reason: HOSPADM

## 2025-06-19 RX ORDER — POTASSIUM CHLORIDE 1500 MG/1
40 TABLET, EXTENDED RELEASE ORAL PRN
Status: ACTIVE | OUTPATIENT
Start: 2025-06-19 | End: 2025-06-24

## 2025-06-19 RX ORDER — ACETAMINOPHEN 325 MG/1
650 TABLET ORAL EVERY 6 HOURS PRN
Status: DISCONTINUED | OUTPATIENT
Start: 2025-06-19 | End: 2025-06-26 | Stop reason: HOSPADM

## 2025-06-19 RX ORDER — SODIUM CHLORIDE 0.9 % (FLUSH) 0.9 %
5-40 SYRINGE (ML) INJECTION EVERY 12 HOURS SCHEDULED
Status: DISCONTINUED | OUTPATIENT
Start: 2025-06-19 | End: 2025-06-24 | Stop reason: SDUPTHER

## 2025-06-19 RX ORDER — LIDOCAINE 50 MG/G
OINTMENT TOPICAL PRN
OUTPATIENT
Start: 2025-06-19

## 2025-06-19 RX ORDER — LEVOTHYROXINE SODIUM 125 UG/1
125 TABLET ORAL DAILY
Status: DISCONTINUED | OUTPATIENT
Start: 2025-06-19 | End: 2025-06-26 | Stop reason: HOSPADM

## 2025-06-19 RX ORDER — GINSENG 100 MG
CAPSULE ORAL PRN
OUTPATIENT
Start: 2025-06-19

## 2025-06-19 RX ORDER — CLOBETASOL PROPIONATE 0.5 MG/G
OINTMENT TOPICAL PRN
OUTPATIENT
Start: 2025-06-19

## 2025-06-19 RX ORDER — SILVER SULFADIAZINE 10 MG/G
CREAM TOPICAL PRN
OUTPATIENT
Start: 2025-06-19

## 2025-06-19 RX ORDER — GENTAMICIN SULFATE 1 MG/G
OINTMENT TOPICAL PRN
OUTPATIENT
Start: 2025-06-19

## 2025-06-19 RX ORDER — ONDANSETRON 2 MG/ML
4 INJECTION INTRAMUSCULAR; INTRAVENOUS EVERY 6 HOURS PRN
Status: DISCONTINUED | OUTPATIENT
Start: 2025-06-19 | End: 2025-06-26 | Stop reason: HOSPADM

## 2025-06-19 RX ORDER — POLYETHYLENE GLYCOL 3350 17 G/17G
17 POWDER, FOR SOLUTION ORAL DAILY PRN
Status: DISCONTINUED | OUTPATIENT
Start: 2025-06-19 | End: 2025-06-26 | Stop reason: HOSPADM

## 2025-06-19 RX ORDER — METRONIDAZOLE 500 MG/100ML
500 INJECTION, SOLUTION INTRAVENOUS EVERY 8 HOURS
Status: DISCONTINUED | OUTPATIENT
Start: 2025-06-19 | End: 2025-06-26

## 2025-06-19 RX ORDER — LIDOCAINE HYDROCHLORIDE 40 MG/ML
SOLUTION TOPICAL PRN
OUTPATIENT
Start: 2025-06-19

## 2025-06-19 RX ORDER — ENOXAPARIN SODIUM 100 MG/ML
40 INJECTION SUBCUTANEOUS DAILY
Status: DISCONTINUED | OUTPATIENT
Start: 2025-06-19 | End: 2025-06-26 | Stop reason: HOSPADM

## 2025-06-19 RX ORDER — SODIUM CHLORIDE 9 MG/ML
INJECTION, SOLUTION INTRAVENOUS PRN
Status: DISCONTINUED | OUTPATIENT
Start: 2025-06-19 | End: 2025-06-24 | Stop reason: SDUPTHER

## 2025-06-19 RX ORDER — ONDANSETRON 4 MG/1
4 TABLET, ORALLY DISINTEGRATING ORAL EVERY 8 HOURS PRN
Status: DISCONTINUED | OUTPATIENT
Start: 2025-06-19 | End: 2025-06-26 | Stop reason: HOSPADM

## 2025-06-19 RX ORDER — LIDOCAINE 40 MG/G
CREAM TOPICAL PRN
OUTPATIENT
Start: 2025-06-19

## 2025-06-19 RX ORDER — BACITRACIN ZINC AND POLYMYXIN B SULFATE 500; 1000 [USP'U]/G; [USP'U]/G
OINTMENT TOPICAL PRN
OUTPATIENT
Start: 2025-06-19

## 2025-06-19 RX ORDER — POTASSIUM CHLORIDE 7.45 MG/ML
10 INJECTION INTRAVENOUS PRN
Status: ACTIVE | OUTPATIENT
Start: 2025-06-19 | End: 2025-06-24

## 2025-06-19 RX ORDER — ACETAMINOPHEN 650 MG/1
650 SUPPOSITORY RECTAL EVERY 6 HOURS PRN
Status: DISCONTINUED | OUTPATIENT
Start: 2025-06-19 | End: 2025-06-26 | Stop reason: HOSPADM

## 2025-06-19 RX ORDER — NEOMYCIN/BACITRACIN/POLYMYXINB 3.5-400-5K
OINTMENT (GRAM) TOPICAL PRN
OUTPATIENT
Start: 2025-06-19

## 2025-06-19 RX ADMIN — SODIUM CHLORIDE, PRESERVATIVE FREE 10 ML: 5 INJECTION INTRAVENOUS at 20:57

## 2025-06-19 RX ADMIN — VANCOMYCIN HYDROCHLORIDE 1750 MG: 10 INJECTION, POWDER, LYOPHILIZED, FOR SOLUTION INTRAVENOUS at 17:28

## 2025-06-19 RX ADMIN — ENOXAPARIN SODIUM 40 MG: 100 INJECTION SUBCUTANEOUS at 20:59

## 2025-06-19 RX ADMIN — METRONIDAZOLE 500 MG: 500 INJECTION, SOLUTION INTRAVENOUS at 21:04

## 2025-06-19 RX ADMIN — CEFEPIME 2000 MG: 2 INJECTION, POWDER, FOR SOLUTION INTRAVENOUS at 16:28

## 2025-06-19 RX ADMIN — SODIUM CHLORIDE 2250 ML: 0.9 INJECTION, SOLUTION INTRAVENOUS at 16:25

## 2025-06-19 RX ADMIN — SODIUM CHLORIDE: 0.9 INJECTION, SOLUTION INTRAVENOUS at 20:58

## 2025-06-19 ASSESSMENT — PAIN SCALES - GENERAL: PAINLEVEL_OUTOF10: 5

## 2025-06-19 ASSESSMENT — PAIN - FUNCTIONAL ASSESSMENT: PAIN_FUNCTIONAL_ASSESSMENT: PREVENTS OR INTERFERES WITH MANY ACTIVE NOT PASSIVE ACTIVITIES

## 2025-06-19 ASSESSMENT — PAIN DESCRIPTION - PAIN TYPE: TYPE: ACUTE PAIN

## 2025-06-19 ASSESSMENT — PAIN DESCRIPTION - FREQUENCY: FREQUENCY: CONTINUOUS

## 2025-06-19 ASSESSMENT — PAIN DESCRIPTION - LOCATION: LOCATION: LEG

## 2025-06-19 ASSESSMENT — PAIN DESCRIPTION - ONSET: ONSET: ON-GOING

## 2025-06-19 ASSESSMENT — PAIN DESCRIPTION - DESCRIPTORS: DESCRIPTORS: BURNING;CRAMPING

## 2025-06-19 ASSESSMENT — PAIN DESCRIPTION - ORIENTATION: ORIENTATION: RIGHT

## 2025-06-19 NOTE — PLAN OF CARE
Problem: Pain  Goal: Verbalizes/displays adequate comfort level or baseline comfort level  Outcome: Progressing     Problem: Wound:  Goal: Will show signs of wound healing; wound closure and no evidence of infection  Description: Will show signs of wound healing; wound closure and no evidence of infection  Outcome: Progressing     Problem: Pressure Ulcer:  Goal: Signs of wound healing will improve  Description: Signs of wound healing will improve  Outcome: Progressing  Goal: Absence of new pressure ulcer  Description: Absence of new pressure ulcer  Outcome: Progressing  Goal: Will show no infection signs and symptoms  Description: Will show no infection signs and symptoms  Outcome: Progressing     Problem: Falls - Risk of:  Goal: Will remain free from falls  Description: Will remain free from falls  Outcome: Progressing

## 2025-06-19 NOTE — PROGRESS NOTES
[facility]   Progress Note and Procedure Note      Damian You  MEDICAL RECORD NUMBER:  [id number]  AGE: 74 y.o.   GENDER: male  : 1950  EPISODE DATE:  2025    Subjective:     Chief Complaint   Patient presents with    Wound Check     New wound         HISTORY of PRESENT ILLNESS HPI     Damian You is a 74 y.o. male who presents today for wound/ulcer evaluation.   History of Wound Context: Pt with R heel and RLE wound here for eval/treat  Wound/Ulcer Pain Timing/Severity: none  Quality of pain: N/A  Severity:  0 / 10   Modifying Factors: None  Associated Signs/Symptoms: none    Ulcer Identification:  Ulcer Type: venous  Contributing Factors: edema and immunosuppression    Wound: abscess and chenotherapy        PAST MEDICAL HISTORY        Diagnosis Date    CAD (coronary artery disease)     Gout     Hypertension     Hypotension 2020    Non-ST elevation MI (NSTEMI) ([facility]) 14    Palliative care patient 2020    Pneumonia due to infectious organism 2020       PAST SURGICAL HISTORY    Past Surgical History:   Procedure Laterality Date    CARDIAC CATHETERIZATION  14  [facility]    angioplasty, stent to LAD. EF 45%    CHOLECYSTECTOMY      EP DEVICE PROCEDURE N/A 2025    Insert PPM dual performed by Calvin Martinez MD at [facility] CARDIAC CATH LAB    FEMUR FRACTURE SURGERY Right 10/30/2020    TFN, SHORT performed by Manuelito Biggs MD at [facility] OR    LAPAROTOMY N/A 2025    EXPLORATORY LAPAROTOMY, CECOSTOMY, LOOP COLOSTOMY, LYSIS OF ADHESIONS performed by Toro Gutiérrez MD at [facility] OR    PORT SURGERY Right 2025    PORT INSERTION performed by Amberly Lewis MD at [facility] OR    SIGMOIDOSCOPY N/A 02/10/2025    Dr Murray-5 mm stricture noted at 20 cm in the rectosigmoid colon, tissue just proximal to the stricture was friable and erythematous-Invasive moderately differentiated adenocarcinoma       FAMILY HISTORY    Family History   Problem Relation Age of Onset

## 2025-06-19 NOTE — ED PROVIDER NOTES
[facility] EMERGENCY DEPARTMENT  eMERGENCYdEPARTMENT eNCOUnter      Pt Name: Damian You  MRN: [id number]  Birthdate 1950  Date of evaluation: 6/19/2025  Provider:KAI George    CHIEF COMPLAINT       Chief Complaint   Patient presents with    Wound Infection     Right ankle redness, swelling; sent by wound care    Fever    Chills         HISTORY OF PRESENT ILLNESS  (Location/Symptom, Timing/Onset, Context/Setting, Quality, Duration, Modifying Factors, Severity.)   Damian You is a 74 y.o. male who presents to the emergency department with concerns for cellulitis acute onset with wound ulceration around the right calf and ankle sent by wound care today per sister present pain in legs started a week ago redness swelling and pain onset today.  He is receiving chemo 3 weeks ago for known colon CA with Dr. Andreia vasquez.  No blood thinners.  History of other wound care management with other ulcers. On exam the heel ankle area seems most tender swollen. Has taken ibuprofen for pain may mask fever in triage.     HPI    Nursing Notes were reviewed and I agree.    REVIEW OF SYSTEMS    (2-9 systems for level 4, 10 or more for level 5)     Review of Systems   Constitutional:  Negative for activity change, appetite change, chills and fever.   HENT:  Negative for congestion and dental problem.    Eyes:  Negative for photophobia, discharge and itching.   Respiratory:  Negative for apnea, cough and shortness of breath.    Cardiovascular:  Negative for chest pain.   Musculoskeletal:  Positive for myalgias. Negative for arthralgias, back pain, gait problem and neck pain.   Skin:  Positive for color change and wound. Negative for pallor and rash.   Neurological:  Negative for dizziness, seizures and syncope.   Psychiatric/Behavioral:  Negative for agitation. The patient is not nervous/anxious.         Except as noted above the remainder of the review of systems was reviewed and negative.       PAST MEDICAL

## 2025-06-19 NOTE — PATIENT INSTRUCTIONS
[facility] Hyper[facility]   Physician Orders and Discharge Instructions  [address] [place] [address]  [address]  [place] [address]  Telephone: [phone]      FAX [phone]    NAME:  Damian You  YOB: 1950  MEDICAL RECORD NUMBER:  [id number]  DATE:  6/19/2025    Discharge condition: Stable    Discharge to: Home    Left via:Private automobile    Accompanied by: Family    ECF/HHA: None     Dressing Orders: Right Heel Wound and Leg Areas  Xeroform to open areas, cover with dry gauze and roll gauze, change daily   Wear open back shoe, keep pressure off heel, wear heel lift boots or elevate foot off mattress when in bed  Go to ER today for Evaluation     C follow up visit ____________1 week_________________  (Please note your next appointment above and if you are unable to keep, kindly give a 24 hour notice. Thank you.)    If you experience any of the following, please call the Wound Care Center during business hours:    * Increase in Pain  * Temperature over 101  * Increase in drainage from your wound  * Drainage with a foul odor  * Bleeding  * Increase in swelling  * Need for compression bandage changes due to slippage, breakthrough drainage.    If you need medical attention outside of the business hours of the Wound Care Centers please contact your PCP or go to the nearest emergency room.

## 2025-06-19 NOTE — H&P
[facility]      Hospitalist - History & Physical      PCP: Ramona Smith MD    Date of Admission: 6/19/2025    Date of Service: 6/19/2025    Chief Complaint:  right heel wound    History Of Present Illness:   The patient is a 74 y.o. male with a PMH of atrial fibrillation, CAD, chronic systolic CHF, HTN, HLD, colon cancer with liver mets s/p diverting colostomy currently undergoing treatment per Dr. Boswell who presented to [facility] ED on 6/19/2025 complaining of right heel wound. He states that he began to have right calf pain and foot pain. He has been able to walk until today. He reports subjective fever and chills. His last chemotherapy treatment was 3 weeks avoid and his family member at bedside reports that he is to start a new \"chemo pill\" tomorrow (6/20). He began to have redness and swelling to his right heel, which his family reported began ~2 days prior to admission and has worsened throughout the day of admission. He was seen by wound care who recommended that he come to the ED for further evaluation.     Further ED work-up ouduvfug-TQL-81.0, H&H-12.8/38.6 and plt-239. Na-131, K-4.4, Co2-18, BUN-27 and creatinine-2.1 (baseline 1.3-1.6). X-ray right foot showed no acute osseous abnormality. Venous US RLE-negative for DVT/SVT. No osteomyelitis. He did meet sepsis criteria in the ED, therefore, he received sepsis bolus in the in the ED. The patient will be admitted hospital medicine for sepsis due to cellulitis to the RLE, suspicious for possible abscess in right heel.     Past Medical History:        Diagnosis Date    CAD (coronary artery disease)     Gout     Hypertension     Hypotension 6/23/2020    Non-ST elevation MI (NSTEMI) ([facility]) 12/28/14    Palliative care patient 06/29/2020    Pneumonia due to infectious organism 6/24/2020       Past Surgical History:        Procedure Laterality Date    CARDIAC CATHETERIZATION  12/29/14  [facility]    angioplasty, stent to LAD. EF 45%    CHOLECYSTECTOMY

## 2025-06-20 ENCOUNTER — HOSPITAL ENCOUNTER (OUTPATIENT)
Dept: INFUSION THERAPY | Age: 75
Discharge: HOME | End: 2025-06-20

## 2025-06-20 ENCOUNTER — APPOINTMENT (OUTPATIENT)
Dept: CT IMAGING | Age: 75
DRG: 854 | End: 2025-06-20
Payer: MEDICARE

## 2025-06-20 LAB
ANION GAP SERPL CALCULATED.3IONS-SCNC: 10 MMOL/L (ref 8–16)
BASOPHILS # BLD: 0.1 K/UL (ref 0–0.2)
BASOPHILS NFR BLD: 0.3 % (ref 0–1)
BUN SERPL-MCNC: 23 MG/DL (ref 8–23)
CALCIUM SERPL-MCNC: 8.4 MG/DL (ref 8.8–10.2)
CHLORIDE SERPL-SCNC: 106 MMOL/L (ref 98–107)
CO2 SERPL-SCNC: 20 MMOL/L (ref 22–29)
CREAT SERPL-MCNC: 1.8 MG/DL (ref 0.7–1.2)
CRP SERPL-MCNC: 277 MG/L (ref 0–5)
EOSINOPHIL # BLD: 0 K/UL (ref 0–0.6)
EOSINOPHIL NFR BLD: 0.1 % (ref 0–5)
ERYTHROCYTE [DISTWIDTH] IN BLOOD BY AUTOMATED COUNT: 15.4 % (ref 11.5–14.5)
ERYTHROCYTE [SEDIMENTATION RATE] IN BLOOD BY WESTERGREN METHOD: 85 MM/HR (ref 0–15)
GLUCOSE SERPL-MCNC: 91 MG/DL (ref 70–99)
HCT VFR BLD AUTO: 30.5 % (ref 42–52)
HGB BLD-MCNC: 10 G/DL (ref 14–18)
IMM GRANULOCYTES # BLD: 0.1 K/UL
LYMPHOCYTES # BLD: 0.9 K/UL (ref 1.1–4.5)
LYMPHOCYTES NFR BLD: 6.6 % (ref 20–40)
MCH RBC QN AUTO: 30.3 PG (ref 27–31)
MCHC RBC AUTO-ENTMCNC: 32.8 G/DL (ref 33–37)
MCV RBC AUTO: 92.4 FL (ref 80–94)
MONOCYTES # BLD: 0.9 K/UL (ref 0–0.9)
MONOCYTES NFR BLD: 6.2 % (ref 0–10)
NEUTROPHILS # BLD: 12.3 K/UL (ref 1.5–7.5)
NEUTS SEG NFR BLD: 86.2 % (ref 50–65)
PLATELET # BLD AUTO: 197 K/UL (ref 130–400)
PMV BLD AUTO: 8.9 FL (ref 9.4–12.4)
POTASSIUM SERPL-SCNC: 4.5 MMOL/L (ref 3.5–5)
RBC # BLD AUTO: 3.3 M/UL (ref 4.7–6.1)
SODIUM SERPL-SCNC: 136 MMOL/L (ref 136–145)
VANCOMYCIN TROUGH SERPL-MCNC: 12 UG/ML (ref 10–20)
WBC # BLD AUTO: 14.3 K/UL (ref 4.8–10.8)

## 2025-06-20 PROCEDURE — 2580000003 HC RX 258: Performed by: NURSE PRACTITIONER

## 2025-06-20 PROCEDURE — 1200000000 HC SEMI PRIVATE

## 2025-06-20 PROCEDURE — 94760 N-INVAS EAR/PLS OXIMETRY 1: CPT

## 2025-06-20 PROCEDURE — 80048 BASIC METABOLIC PNL TOTAL CA: CPT

## 2025-06-20 PROCEDURE — 93971 EXTREMITY STUDY: CPT | Performed by: SURGERY

## 2025-06-20 PROCEDURE — 99222 1ST HOSP IP/OBS MODERATE 55: CPT | Performed by: INTERNAL MEDICINE

## 2025-06-20 PROCEDURE — 73701 CT LOWER EXTREMITY W/DYE: CPT

## 2025-06-20 PROCEDURE — 6360000004 HC RX CONTRAST MEDICATION: Performed by: STUDENT IN AN ORGANIZED HEALTH CARE EDUCATION/TRAINING PROGRAM

## 2025-06-20 PROCEDURE — 86140 C-REACTIVE PROTEIN: CPT

## 2025-06-20 PROCEDURE — 6370000000 HC RX 637 (ALT 250 FOR IP): Performed by: NURSE PRACTITIONER

## 2025-06-20 PROCEDURE — 2500000003 HC RX 250 WO HCPCS: Performed by: NURSE PRACTITIONER

## 2025-06-20 PROCEDURE — 36415 COLL VENOUS BLD VENIPUNCTURE: CPT

## 2025-06-20 PROCEDURE — 85025 COMPLETE CBC W/AUTO DIFF WBC: CPT

## 2025-06-20 PROCEDURE — 85652 RBC SED RATE AUTOMATED: CPT

## 2025-06-20 PROCEDURE — 6370000000 HC RX 637 (ALT 250 FOR IP): Performed by: STUDENT IN AN ORGANIZED HEALTH CARE EDUCATION/TRAINING PROGRAM

## 2025-06-20 PROCEDURE — 6360000002 HC RX W HCPCS: Performed by: NURSE PRACTITIONER

## 2025-06-20 PROCEDURE — 80202 ASSAY OF VANCOMYCIN: CPT

## 2025-06-20 PROCEDURE — 99231 SBSQ HOSP IP/OBS SF/LOW 25: CPT

## 2025-06-20 RX ORDER — IOPAMIDOL 755 MG/ML
70 INJECTION, SOLUTION INTRAVASCULAR
Status: COMPLETED | OUTPATIENT
Start: 2025-06-20 | End: 2025-06-20

## 2025-06-20 RX ORDER — BISMUTH TRIBROMOPH/PETROLATUM 5"X9"
1 BANDAGE TOPICAL DAILY
Status: DISCONTINUED | OUTPATIENT
Start: 2025-06-20 | End: 2025-06-26 | Stop reason: HOSPADM

## 2025-06-20 RX ADMIN — Medication 1 EACH: at 11:16

## 2025-06-20 RX ADMIN — METRONIDAZOLE 500 MG: 500 INJECTION, SOLUTION INTRAVENOUS at 05:47

## 2025-06-20 RX ADMIN — METRONIDAZOLE 500 MG: 500 INJECTION, SOLUTION INTRAVENOUS at 20:42

## 2025-06-20 RX ADMIN — SODIUM CHLORIDE, PRESERVATIVE FREE 10 ML: 5 INJECTION INTRAVENOUS at 20:38

## 2025-06-20 RX ADMIN — SODIUM CHLORIDE: 0.9 INJECTION, SOLUTION INTRAVENOUS at 07:36

## 2025-06-20 RX ADMIN — METRONIDAZOLE 500 MG: 500 INJECTION, SOLUTION INTRAVENOUS at 13:19

## 2025-06-20 RX ADMIN — SODIUM CHLORIDE, PRESERVATIVE FREE 10 ML: 5 INJECTION INTRAVENOUS at 07:33

## 2025-06-20 RX ADMIN — METOPROLOL SUCCINATE 25 MG: 25 TABLET, EXTENDED RELEASE ORAL at 07:33

## 2025-06-20 RX ADMIN — IOPAMIDOL 70 ML: 755 INJECTION, SOLUTION INTRAVENOUS at 10:40

## 2025-06-20 RX ADMIN — CEFEPIME 2000 MG: 2 INJECTION, POWDER, FOR SOLUTION INTRAVENOUS at 16:39

## 2025-06-20 RX ADMIN — VANCOMYCIN HYDROCHLORIDE 1000 MG: 1 INJECTION, POWDER, LYOPHILIZED, FOR SOLUTION INTRAVENOUS at 05:50

## 2025-06-20 RX ADMIN — ENOXAPARIN SODIUM 40 MG: 100 INJECTION SUBCUTANEOUS at 16:37

## 2025-06-20 RX ADMIN — LEVOTHYROXINE SODIUM 125 MCG: 0.12 TABLET ORAL at 07:33

## 2025-06-20 NOTE — CARE COORDINATION
Case Management Assessment  Initial Evaluation    Date/Time of Evaluation: 6/20/2025 8:15 AM  Assessment Completed by: Corine Quick    If patient is discharged prior to next notation, then this note serves as note for discharge by case management.    Patient Name: Damian You                   YOB: 1950  Diagnosis: Acute kidney injury [N17.9]  Cellulitis of right lower extremity [L03.115]  Sepsis (HCC) [A41.9]                   Date / Time: 6/19/2025  3:13 PM    Patient Admission Status: Inpatient   Readmission Risk (Low < 19, Mod (19-27), High > 27): Readmission Risk Score: 25.4    Current PCP: Ramona Smith MD  PCP verified by CM? (P) Yes    Chart Reviewed: Yes      History Provided by: (P) Patient, Child/Family  Patient Orientation: (P) Alert and Oriented    Patient Cognition: (P) Alert    Hospitalization in the last 30 days (Readmission):  No    If yes, Readmission Assessment in CM Navigator will be completed.    Advance Directives:      Code Status: Full Code   Patient's Primary Decision Maker is: (P) Legal Next of Kin    Primary Decision Maker (Active): Ngozi Tapia - Brother/Sister - [phone]    Secondary Decision Maker: Cynthia Doe - Other - [phone]    Secondary Decision Maker: Cassie Pope - Brother/Sister - [phone]    Secondary Decision Maker: Darien Soto - Other - [phone]    Discharge Planning:    Patient lives with: (P) Alone Type of Home: (P) House  Primary Care Giver: (P) Self  Patient Support Systems include: (P) Family Members   Current Financial resources: (P) Medicare  Current community resources: (P) None  Current services prior to admission: (P) None            Current DME:              Type of Home Care services:  (P) None    ADLS  Prior functional level: (P) Assistance with the following:, Housework, Cooking, Shopping  Current functional level: (P) Assistance with the following:, Housework, Cooking, Shopping    PT AM-PAC:   /24  OT AM-PAC:

## 2025-06-20 NOTE — ED NOTES
ED TO INPATIENT SBAR HANDOFF    Patient Name: Damian You   : 1950  74 y.o.   Family/Caregiver Present: Yes  Code Status Order: Prior    C-SSRS: Risk of Suicide: No Risk  Sitter No  Restraints:         Situation  Chief Complaint:   Chief Complaint   Patient presents with    Wound Infection     Right ankle redness, swelling; sent by wound care    Fever    Chills     Patient Diagnosis: Sepsis (HCC) [A41.9]     Brief Description of Patient's Condition: pt being followed by wound care. Had apt today with them for right calf wound, pt noted to have right swollen and tender ankle as well. Sepsis protocol initiated in ED.   Mental Status: oriented and alert  Arrived from: home    Imaging:   XR FOOT RIGHT (2 VIEWS)   Final Result       No acute osseous abnormality.       No osteomyelitis.       Kiera's deformity. Large posterior calcaneal enthesophyte.           ______________________________________    Electronically signed by: SORIN MARTELL M.D.   Date:     2025   Time:    16:51       Vascular duplex lower extremity venous right    (Results Pending)     COVID-19 Results:   Internal Administration   First Dose COVID-19, MODERNA BLUE border, Primary or Immunocompromised, (age 12y+), IM, 100 mcg/0.5mL  2021   Second Dose COVID-19, MODERNA BLUE border, Primary or Immunocompromised, (age 12y+), IM, 100 mcg/0.5mL   2021       Last COVID Lab SARS-CoV-2, PCR (no units)   Date Value   2020 Not Detected     SARS-CoV-2, NAAT (no units)   Date Value   2021 Not Detected           Abnormal labs:   Abnormal Labs Reviewed   CBC WITH AUTO DIFFERENTIAL - Abnormal; Notable for the following components:       Result Value    WBC 18.0 (*)     RBC 4.23 (*)     Hemoglobin 12.8 (*)     Hematocrit 38.6 (*)     RDW 15.2 (*)     MPV 8.9 (*)     Neutrophils % 90.2 (*)     Lymphocytes % 3.7 (*)     Neutrophils Absolute 16.2 (*)     Lymphocytes Absolute 0.7 (*)     All other components within normal limits

## 2025-06-20 NOTE — ACP (ADVANCE CARE PLANNING)
Advance Care Planning      Palliative Medicine Provider (MD/NP)  Advance Care Planning (ACP) Conversation      Date of Conversation: 06/20/25  The patient and/or authorized decision maker consented to a voluntary Advance Care Planning conversation.   Individuals present for the conversation:   Patient at bedside and sisterNgozi, over telephone    Legal Healthcare Agent(s):    Primary Decision Maker (Active): Ngozi Tapia - Brother/Sister - [phone]    Secondary Decision Maker: Cassie Pope - Brother/Sister - [phone]    ACP documents available in EMR prior to discussion:  -Living Will    Primary Palliative Diagnosis(es):  Sepsis  RLE cellulitis  metastatic colonic adenocarcinoma    Conversation Summary: Met with patient at bedside this morning to discuss current status and plan of care. He has had issues with his mental status at home but does answer orientation questions appropriately during my exam. Reviewed living will on file which names his sisters as primary and secondary HCS. This document is current. Discussed code status and meaning of \"full code\" vs \"DNR\" and educated on typical process of aggressive resuscitation. Patient confirms he wishes to remain a FULL CODE in the event of cardiac or respiratory arrest. I called his sister, Ngozi, and we reviewed the above. Family is supportive of patients medical wishes.     Resuscitation Status:    Code Status: Full Code    I spent 10 minutes providing ACP services with the patient and/or surrogate decision maker in a voluntary, in-person conversation discussing the patient's wishes and goals as detailed in the above note during consult visit.       Falguni Hong, APRN - CNP

## 2025-06-21 ENCOUNTER — ANESTHESIA EVENT (OUTPATIENT)
Dept: OPERATING ROOM | Age: 75
End: 2025-06-21
Payer: MEDICARE

## 2025-06-21 ENCOUNTER — ANESTHESIA (OUTPATIENT)
Dept: OPERATING ROOM | Age: 75
End: 2025-06-21
Payer: MEDICARE

## 2025-06-21 LAB
ANION GAP SERPL CALCULATED.3IONS-SCNC: 10 MMOL/L (ref 8–16)
BASOPHILS # BLD: 0.1 K/UL (ref 0–0.2)
BASOPHILS NFR BLD: 0.5 % (ref 0–1)
BUN SERPL-MCNC: 24 MG/DL (ref 8–23)
CALCIUM SERPL-MCNC: 8.2 MG/DL (ref 8.8–10.2)
CHLORIDE SERPL-SCNC: 106 MMOL/L (ref 98–107)
CO2 SERPL-SCNC: 19 MMOL/L (ref 22–29)
CREAT SERPL-MCNC: 1.7 MG/DL (ref 0.7–1.2)
EOSINOPHIL # BLD: 0.1 K/UL (ref 0–0.6)
EOSINOPHIL NFR BLD: 1.1 % (ref 0–5)
ERYTHROCYTE [DISTWIDTH] IN BLOOD BY AUTOMATED COUNT: 15.6 % (ref 11.5–14.5)
GLUCOSE SERPL-MCNC: 84 MG/DL (ref 70–99)
HCT VFR BLD AUTO: 28.9 % (ref 42–52)
HGB BLD-MCNC: 9.3 G/DL (ref 14–18)
IMM GRANULOCYTES # BLD: 0.1 K/UL
LYMPHOCYTES # BLD: 1 K/UL (ref 1.1–4.5)
LYMPHOCYTES NFR BLD: 9.7 % (ref 20–40)
MCH RBC QN AUTO: 29.9 PG (ref 27–31)
MCHC RBC AUTO-ENTMCNC: 32.2 G/DL (ref 33–37)
MCV RBC AUTO: 92.9 FL (ref 80–94)
MONOCYTES # BLD: 0.6 K/UL (ref 0–0.9)
MONOCYTES NFR BLD: 6.5 % (ref 0–10)
NEUTROPHILS # BLD: 8 K/UL (ref 1.5–7.5)
NEUTS SEG NFR BLD: 81.7 % (ref 50–65)
PLATELET # BLD AUTO: 221 K/UL (ref 130–400)
PMV BLD AUTO: 9.1 FL (ref 9.4–12.4)
POTASSIUM SERPL-SCNC: 4 MMOL/L (ref 3.5–5)
RBC # BLD AUTO: 3.11 M/UL (ref 4.7–6.1)
SODIUM SERPL-SCNC: 135 MMOL/L (ref 136–145)
VANCOMYCIN TROUGH SERPL-MCNC: 10.5 UG/ML (ref 10–20)
WBC # BLD AUTO: 9.8 K/UL (ref 4.8–10.8)

## 2025-06-21 PROCEDURE — 2500000003 HC RX 250 WO HCPCS

## 2025-06-21 PROCEDURE — 80048 BASIC METABOLIC PNL TOTAL CA: CPT

## 2025-06-21 PROCEDURE — 6360000002 HC RX W HCPCS: Performed by: NURSE PRACTITIONER

## 2025-06-21 PROCEDURE — 87205 SMEAR GRAM STAIN: CPT

## 2025-06-21 PROCEDURE — 6370000000 HC RX 637 (ALT 250 FOR IP): Performed by: STUDENT IN AN ORGANIZED HEALTH CARE EDUCATION/TRAINING PROGRAM

## 2025-06-21 PROCEDURE — 3600000013 HC SURGERY LEVEL 3 ADDTL 15MIN: Performed by: SURGERY

## 2025-06-21 PROCEDURE — 80202 ASSAY OF VANCOMYCIN: CPT

## 2025-06-21 PROCEDURE — 2580000003 HC RX 258: Performed by: SURGERY

## 2025-06-21 PROCEDURE — 7100000001 HC PACU RECOVERY - ADDTL 15 MIN: Performed by: SURGERY

## 2025-06-21 PROCEDURE — 2500000003 HC RX 250 WO HCPCS: Performed by: NURSE PRACTITIONER

## 2025-06-21 PROCEDURE — 85025 COMPLETE CBC W/AUTO DIFF WBC: CPT

## 2025-06-21 PROCEDURE — 2580000003 HC RX 258

## 2025-06-21 PROCEDURE — 3600000003 HC SURGERY LEVEL 3 BASE: Performed by: SURGERY

## 2025-06-21 PROCEDURE — 11044 DBRDMT BONE 1ST 20 SQ CM/<: CPT | Performed by: SURGERY

## 2025-06-21 PROCEDURE — 6370000000 HC RX 637 (ALT 250 FOR IP): Performed by: SURGERY

## 2025-06-21 PROCEDURE — 6360000002 HC RX W HCPCS: Performed by: SURGERY

## 2025-06-21 PROCEDURE — 2500000003 HC RX 250 WO HCPCS: Performed by: SURGERY

## 2025-06-21 PROCEDURE — 2580000003 HC RX 258: Performed by: STUDENT IN AN ORGANIZED HEALTH CARE EDUCATION/TRAINING PROGRAM

## 2025-06-21 PROCEDURE — 6360000002 HC RX W HCPCS

## 2025-06-21 PROCEDURE — 87176 TISSUE HOMOGENIZATION CULTR: CPT

## 2025-06-21 PROCEDURE — 87075 CULTR BACTERIA EXCEPT BLOOD: CPT

## 2025-06-21 PROCEDURE — 6370000000 HC RX 637 (ALT 250 FOR IP): Performed by: NURSE PRACTITIONER

## 2025-06-21 PROCEDURE — 0QBL0ZZ EXCISION OF RIGHT TARSAL, OPEN APPROACH: ICD-10-PCS | Performed by: SURGERY

## 2025-06-21 PROCEDURE — 3700000001 HC ADD 15 MINUTES (ANESTHESIA): Performed by: SURGERY

## 2025-06-21 PROCEDURE — 88311 DECALCIFY TISSUE: CPT | Performed by: PATHOLOGY

## 2025-06-21 PROCEDURE — 36415 COLL VENOUS BLD VENIPUNCTURE: CPT

## 2025-06-21 PROCEDURE — 99232 SBSQ HOSP IP/OBS MODERATE 35: CPT | Performed by: INTERNAL MEDICINE

## 2025-06-21 PROCEDURE — 99221 1ST HOSP IP/OBS SF/LOW 40: CPT | Performed by: SURGERY

## 2025-06-21 PROCEDURE — 3700000000 HC ANESTHESIA ATTENDED CARE: Performed by: SURGERY

## 2025-06-21 PROCEDURE — 88311 DECALCIFY TISSUE: CPT

## 2025-06-21 PROCEDURE — 87070 CULTURE OTHR SPECIMN AEROBIC: CPT

## 2025-06-21 PROCEDURE — 2709999900 HC NON-CHARGEABLE SUPPLY: Performed by: SURGERY

## 2025-06-21 PROCEDURE — 88307 TISSUE EXAM BY PATHOLOGIST: CPT

## 2025-06-21 PROCEDURE — 7100000000 HC PACU RECOVERY - FIRST 15 MIN: Performed by: SURGERY

## 2025-06-21 PROCEDURE — 94760 N-INVAS EAR/PLS OXIMETRY 1: CPT

## 2025-06-21 PROCEDURE — 1200000000 HC SEMI PRIVATE

## 2025-06-21 PROCEDURE — 2580000003 HC RX 258: Performed by: NURSE PRACTITIONER

## 2025-06-21 PROCEDURE — 88307 TISSUE EXAM BY PATHOLOGIST: CPT | Performed by: PATHOLOGY

## 2025-06-21 RX ORDER — SODIUM CHLORIDE 0.9 % (FLUSH) 0.9 %
5-40 SYRINGE (ML) INJECTION EVERY 12 HOURS SCHEDULED
Status: DISCONTINUED | OUTPATIENT
Start: 2025-06-21 | End: 2025-06-21 | Stop reason: HOSPADM

## 2025-06-21 RX ORDER — SODIUM CHLORIDE, SODIUM LACTATE, POTASSIUM CHLORIDE, CALCIUM CHLORIDE 600; 310; 30; 20 MG/100ML; MG/100ML; MG/100ML; MG/100ML
INJECTION, SOLUTION INTRAVENOUS CONTINUOUS
Status: DISCONTINUED | OUTPATIENT
Start: 2025-06-21 | End: 2025-06-23

## 2025-06-21 RX ORDER — SODIUM CHLORIDE, SODIUM LACTATE, POTASSIUM CHLORIDE, CALCIUM CHLORIDE 600; 310; 30; 20 MG/100ML; MG/100ML; MG/100ML; MG/100ML
INJECTION, SOLUTION INTRAVENOUS
Status: DISCONTINUED | OUTPATIENT
Start: 2025-06-21 | End: 2025-06-21 | Stop reason: SDUPTHER

## 2025-06-21 RX ORDER — METOCLOPRAMIDE HYDROCHLORIDE 5 MG/ML
10 INJECTION INTRAMUSCULAR; INTRAVENOUS
Status: DISCONTINUED | OUTPATIENT
Start: 2025-06-21 | End: 2025-06-21 | Stop reason: HOSPADM

## 2025-06-21 RX ORDER — SODIUM CHLORIDE 0.9 % (FLUSH) 0.9 %
5-40 SYRINGE (ML) INJECTION PRN
Status: DISCONTINUED | OUTPATIENT
Start: 2025-06-21 | End: 2025-06-21 | Stop reason: HOSPADM

## 2025-06-21 RX ORDER — FENTANYL CITRATE 50 UG/ML
INJECTION, SOLUTION INTRAMUSCULAR; INTRAVENOUS
Status: DISCONTINUED | OUTPATIENT
Start: 2025-06-21 | End: 2025-06-21 | Stop reason: SDUPTHER

## 2025-06-21 RX ORDER — SODIUM CHLORIDE 0.9 % (FLUSH) 0.9 %
5-40 SYRINGE (ML) INJECTION PRN
Status: DISCONTINUED | OUTPATIENT
Start: 2025-06-21 | End: 2025-06-26 | Stop reason: HOSPADM

## 2025-06-21 RX ORDER — PROPOFOL 10 MG/ML
INJECTION, EMULSION INTRAVENOUS
Status: DISCONTINUED | OUTPATIENT
Start: 2025-06-21 | End: 2025-06-21 | Stop reason: SDUPTHER

## 2025-06-21 RX ORDER — NALOXONE HYDROCHLORIDE 0.4 MG/ML
INJECTION, SOLUTION INTRAMUSCULAR; INTRAVENOUS; SUBCUTANEOUS PRN
Status: DISCONTINUED | OUTPATIENT
Start: 2025-06-21 | End: 2025-06-21 | Stop reason: HOSPADM

## 2025-06-21 RX ORDER — LIDOCAINE HYDROCHLORIDE 10 MG/ML
INJECTION, SOLUTION INFILTRATION; PERINEURAL
Status: DISCONTINUED | OUTPATIENT
Start: 2025-06-21 | End: 2025-06-21 | Stop reason: SDUPTHER

## 2025-06-21 RX ORDER — HYDROMORPHONE HYDROCHLORIDE 1 MG/ML
0.5 INJECTION, SOLUTION INTRAMUSCULAR; INTRAVENOUS; SUBCUTANEOUS ONCE
Status: DISCONTINUED | OUTPATIENT
Start: 2025-06-21 | End: 2025-06-21 | Stop reason: HOSPADM

## 2025-06-21 RX ORDER — ONDANSETRON 2 MG/ML
INJECTION INTRAMUSCULAR; INTRAVENOUS
Status: DISCONTINUED | OUTPATIENT
Start: 2025-06-21 | End: 2025-06-21 | Stop reason: SDUPTHER

## 2025-06-21 RX ORDER — SODIUM CHLORIDE 9 MG/ML
INJECTION, SOLUTION INTRAVENOUS PRN
Status: DISCONTINUED | OUTPATIENT
Start: 2025-06-21 | End: 2025-06-21 | Stop reason: HOSPADM

## 2025-06-21 RX ORDER — SODIUM CHLORIDE 9 MG/ML
INJECTION, SOLUTION INTRAVENOUS PRN
Status: DISCONTINUED | OUTPATIENT
Start: 2025-06-21 | End: 2025-06-26 | Stop reason: HOSPADM

## 2025-06-21 RX ORDER — HYDROMORPHONE HYDROCHLORIDE 1 MG/ML
0.25 INJECTION, SOLUTION INTRAMUSCULAR; INTRAVENOUS; SUBCUTANEOUS ONCE
Status: DISCONTINUED | OUTPATIENT
Start: 2025-06-21 | End: 2025-06-21 | Stop reason: HOSPADM

## 2025-06-21 RX ORDER — SODIUM CHLORIDE 0.9 % (FLUSH) 0.9 %
5-40 SYRINGE (ML) INJECTION EVERY 12 HOURS SCHEDULED
Status: DISCONTINUED | OUTPATIENT
Start: 2025-06-21 | End: 2025-06-26 | Stop reason: HOSPADM

## 2025-06-21 RX ORDER — SODIUM HYPOCHLORITE 1.25 MG/ML
SOLUTION TOPICAL PRN
Status: DISCONTINUED | OUTPATIENT
Start: 2025-06-21 | End: 2025-06-21 | Stop reason: HOSPADM

## 2025-06-21 RX ORDER — EPHEDRINE SULFATE/0.9% NACL/PF 25 MG/5 ML
SYRINGE (ML) INTRAVENOUS
Status: DISCONTINUED | OUTPATIENT
Start: 2025-06-21 | End: 2025-06-21 | Stop reason: SDUPTHER

## 2025-06-21 RX ORDER — DIPHENHYDRAMINE HYDROCHLORIDE 50 MG/ML
12.5 INJECTION, SOLUTION INTRAMUSCULAR; INTRAVENOUS
Status: DISCONTINUED | OUTPATIENT
Start: 2025-06-21 | End: 2025-06-21 | Stop reason: HOSPADM

## 2025-06-21 RX ORDER — CEFAZOLIN SODIUM 1 G/3ML
INJECTION, POWDER, FOR SOLUTION INTRAMUSCULAR; INTRAVENOUS
Status: DISCONTINUED | OUTPATIENT
Start: 2025-06-21 | End: 2025-06-21 | Stop reason: SDUPTHER

## 2025-06-21 RX ADMIN — SODIUM CHLORIDE, PRESERVATIVE FREE 10 ML: 5 INJECTION INTRAVENOUS at 21:15

## 2025-06-21 RX ADMIN — METRONIDAZOLE 500 MG: 500 INJECTION, SOLUTION INTRAVENOUS at 15:39

## 2025-06-21 RX ADMIN — FENTANYL CITRATE 50 MCG: 0.05 INJECTION, SOLUTION INTRAMUSCULAR; INTRAVENOUS at 14:08

## 2025-06-21 RX ADMIN — LEVOTHYROXINE SODIUM 125 MCG: 0.12 TABLET ORAL at 09:56

## 2025-06-21 RX ADMIN — METRONIDAZOLE 500 MG: 500 INJECTION, SOLUTION INTRAVENOUS at 23:04

## 2025-06-21 RX ADMIN — METOPROLOL SUCCINATE 25 MG: 25 TABLET, EXTENDED RELEASE ORAL at 21:14

## 2025-06-21 RX ADMIN — EPHEDRINE SULFATE 10 MG: 5 INJECTION INTRAVENOUS at 13:59

## 2025-06-21 RX ADMIN — SODIUM CHLORIDE, PRESERVATIVE FREE 10 ML: 5 INJECTION INTRAVENOUS at 10:05

## 2025-06-21 RX ADMIN — Medication 1 EACH: at 09:57

## 2025-06-21 RX ADMIN — SODIUM CHLORIDE, SODIUM LACTATE, POTASSIUM CHLORIDE, AND CALCIUM CHLORIDE: .6; .31; .03; .02 INJECTION, SOLUTION INTRAVENOUS at 23:03

## 2025-06-21 RX ADMIN — ENOXAPARIN SODIUM 40 MG: 100 INJECTION SUBCUTANEOUS at 17:41

## 2025-06-21 RX ADMIN — HYDROCODONE BITARTRATE AND ACETAMINOPHEN 1 TABLET: 5; 325 TABLET ORAL at 04:57

## 2025-06-21 RX ADMIN — CEFEPIME 2000 MG: 2 INJECTION, POWDER, FOR SOLUTION INTRAVENOUS at 16:48

## 2025-06-21 RX ADMIN — SODIUM CHLORIDE, SODIUM LACTATE, POTASSIUM CHLORIDE, AND CALCIUM CHLORIDE: 600; 310; 30; 20 INJECTION, SOLUTION INTRAVENOUS at 13:42

## 2025-06-21 RX ADMIN — CEFAZOLIN 2 G: 1 INJECTION, POWDER, FOR SOLUTION INTRAMUSCULAR; INTRAVENOUS at 13:57

## 2025-06-21 RX ADMIN — METOPROLOL SUCCINATE 25 MG: 25 TABLET, EXTENDED RELEASE ORAL at 09:56

## 2025-06-21 RX ADMIN — VANCOMYCIN HYDROCHLORIDE 1250 MG: 5 INJECTION, POWDER, LYOPHILIZED, FOR SOLUTION INTRAVENOUS at 05:30

## 2025-06-21 RX ADMIN — METRONIDAZOLE 500 MG: 500 INJECTION, SOLUTION INTRAVENOUS at 05:00

## 2025-06-21 RX ADMIN — LIDOCAINE HYDROCHLORIDE 50 MG: 10 INJECTION, SOLUTION INFILTRATION; PERINEURAL at 13:51

## 2025-06-21 RX ADMIN — FENTANYL CITRATE 50 MCG: 0.05 INJECTION, SOLUTION INTRAMUSCULAR; INTRAVENOUS at 13:48

## 2025-06-21 RX ADMIN — ONDANSETRON 4 MG: 2 INJECTION INTRAMUSCULAR; INTRAVENOUS at 14:28

## 2025-06-21 RX ADMIN — PROPOFOL 120 MG: 10 INJECTION, EMULSION INTRAVENOUS at 13:51

## 2025-06-21 RX ADMIN — SODIUM CHLORIDE, SODIUM LACTATE, POTASSIUM CHLORIDE, AND CALCIUM CHLORIDE: .6; .31; .03; .02 INJECTION, SOLUTION INTRAVENOUS at 10:05

## 2025-06-21 ASSESSMENT — PAIN DESCRIPTION - ORIENTATION: ORIENTATION: RIGHT

## 2025-06-21 ASSESSMENT — PAIN - FUNCTIONAL ASSESSMENT: PAIN_FUNCTIONAL_ASSESSMENT: ACTIVITIES ARE NOT PREVENTED

## 2025-06-21 ASSESSMENT — PAIN DESCRIPTION - DESCRIPTORS: DESCRIPTORS: THROBBING

## 2025-06-21 ASSESSMENT — PAIN DESCRIPTION - LOCATION: LOCATION: LEG;ANKLE

## 2025-06-21 ASSESSMENT — PAIN SCALES - GENERAL: PAINLEVEL_OUTOF10: 6

## 2025-06-21 ASSESSMENT — LIFESTYLE VARIABLES: SMOKING_STATUS: 0

## 2025-06-21 NOTE — ANESTHESIA POSTPROCEDURE EVALUATION
Department of Anesthesiology  Postprocedure Note    Patient: Damian You  MRN: [id number]  YOB: 1950  Date of evaluation: 6/21/2025    Procedure Summary       Date: 06/21/25 Room / Location: [address]    Anesthesia Start: 1342 Anesthesia Stop: 1444    Procedure: FOOT DEBRIDEMENT INCISION AND DRAINAGE WITH CALCANEOUS BONE DEBRIDEMENT (Right) Diagnosis:       Foot infection      (Foot infection [L08.9])    Surgeons: Melida Berg DO Responsible Provider: Lois Craig APRN - CRNA    Anesthesia Type: General ASA Status: 3            Anesthesia Type: General    Apola Phase I:      Paola Phase II:      Anesthesia Post Evaluation    Patient location during evaluation: PACU  Patient participation: complete - patient participated  Level of consciousness: awake  Pain score: 0  Airway patency: patent  Nausea & Vomiting: no nausea and no vomiting  Cardiovascular status: hemodynamically stable  Respiratory status: spontaneous ventilation and room air  Hydration status: stable  Comments: BP (!) 159/82   Pulse 76   Temp 97 °F (36.1 °C) (Temporal)   Resp 16    SpO2 97%   VSS, pt on room air, report given to Chente COSTELLO  Pain management: adequate    No notable events documented.

## 2025-06-21 NOTE — ANESTHESIA PRE PROCEDURE
Department of Anesthesiology  Preprocedure Note       Name:  Damian You   Age:  74 y.o.  :  1950                                          MRN:  [id number]         Date:  2025      Surgeon: Surgeon(s):  Melida Berg DO    Procedure: Procedure(s):  FOOT DEBRIDEMENT INCISION AND DRAINAGE    Medications prior to admission:   Prior to Admission medications    Medication Sig Start Date End Date Taking? Authorizing Provider   levothyroxine (SYNTHROID) 125 MCG tablet TAKE 1 TABLET BY MOUTH ONCE DAILY 25  Yes Ramona Smith MD   atorvastatin (LIPITOR) 20 MG tablet Take 1 tablet by mouth every evening 25  Yes Emilia Liang APRN - NP   ferrous sulfate (IRON 325) 325 (65 Fe) MG tablet Take 1 tablet by mouth 2 times daily 25  Yes Ramona Smith MD   folic acid (FOLVITE) 1 MG tablet TAKE 1 TABLET BY MOUTH DAILY 24  Yes Ramona Smith MD   capecitabine (XELODA) 500 MG chemo tablet Take 2 tablets by mouth 2 times daily Days 1-14 every 21 days (Two weeks on One week Off) 25  Halle Boswell MD   amiodarone (CORDARONE) 200 MG tablet Take 1 tablet by mouth daily 25  Fani Epperson APRN - CNP   metoprolol succinate (TOPROL XL) 25 MG extended release tablet Take 1 tablet by mouth in the morning and at bedtime 25  Fani Epperson APRN - CNP   thiamine mononitrate (THIAMINE) 100 MG tablet Take 1 tablet by mouth daily 2/15/25 4/14/25  Omi Rivera APRN - CNP       Current medications:    Current Facility-Administered Medications   Medication Dose Route Frequency Provider Last Rate Last Admin    lactated ringers infusion   IntraVENous Continuous Jarred Navarro  mL/hr at 25 1005 New Bag at 25 1005    xeroform petrolatum gauze 5\"X9\" external pads 1 each  1 each Topical Daily Jarred Navarro MD   1 each at 25 0957    vancomycin (VANCOCIN) intermittent dosing (placeholder)   Other RX Placeholder Cody Link,

## 2025-06-21 NOTE — OP NOTE
Operative Note      Patient: Damian You  YOB: 1950  MRN: [id number]    Date of Procedure: 6/21/2025    Pre-Op Diagnosis Codes:      * Foot infection [L08.9]    Post-Op Diagnosis: Same       Procedure:  Excisional debridement of the right heel with calcaneal bone  Cavity depth 4 cm    Surgeon(s):  Melida Berg DO    Assistant:   * No surgical staff found *    Anesthesia: General    Estimated Blood Loss (mL): less than 50     Complications: None    Specimens:   ID Type Source Tests Collected by Time Destination   1 : RIGHT HEEL WOUND Swab Foot CULTURE, SURGICAL Melida Berg,  6/21/2025 1408    2 : RIGHT HEEL BONE Bone Foot CULTURE, SURGICAL Melida Berg,  6/21/2025 1427    A : RIGHT HEEL BONE Bone Foot SURGICAL PATHOLOGY Melida Berg,  6/21/2025 1433        Implants:  * No implants in log *      Drains:   Colostomy LLQ Loop (Active)   Peristomal Assessment Clean, dry & intact 06/21/25 1319   Stool Color Brown 06/21/25 1319   Stool Amount Small 06/21/25 1319   Output (mL) 50 ml 06/21/25 1319       Findings:  Infection Present At Time Of Surgery (PATOS) (choose all levels that have infection present):  - Deep Infection (muscle/fascia) present as evidenced by fluid consistent with infection  - Organ Space infection (below fascia) present as evidenced by osteomyelitis  Other Findings: Calcaneal bone destructive with most likely osteomyelitis in the anterior and this can with calcaneal bone pieces felt right under the skin    Detailed Description of Procedure:   Patient was brought and to the operating room and placed in supine position.  His right foot and leg were prepped and draped in sterile fashion.  Timeout was performed.  The incision was made on top of the fluctuant area using 15 blade in the transverse fashion over the heel area.  Purulent discharge was not appreciated.  Then dissected into the palpable destroyed bone using Guerra scissors.  Fluid appreciated.  Swab

## 2025-06-21 NOTE — PLAN OF CARE
Problem: Chronic Conditions and Co-morbidities  Goal: Patient's chronic conditions and co-morbidity symptoms are monitored and maintained or improved  Outcome: Progressing  Flowsheets  Taken 6/21/2025 1233  Care Plan - Patient's Chronic Conditions and Co-Morbidity Symptoms are Monitored and Maintained or Improved: Monitor and assess patient's chronic conditions and comorbid symptoms for stability, deterioration, or improvement  Taken 6/21/2025 1005  Care Plan - Patient's Chronic Conditions and Co-Morbidity Symptoms are Monitored and Maintained or Improved: Monitor and assess patient's chronic conditions and comorbid symptoms for stability, deterioration, or improvement     Problem: ABCDS Injury Assessment  Goal: Absence of physical injury  Outcome: Progressing     Problem: Safety - Adult  Goal: Free from fall injury  Outcome: Progressing     Problem: Skin/Tissue Integrity  Goal: Skin integrity remains intact  Description: 1.  Monitor for areas of redness and/or skin breakdown  2.  Assess vascular access sites hourly  3.  Every 4-6 hours minimum:  Change oxygen saturation probe site  4.  Every 4-6 hours:  If on nasal continuous positive airway pressure, respiratory therapy assess nares and determine need for appliance change or resting period  Outcome: Progressing  Flowsheets (Taken 6/21/2025 1005)  Skin Integrity Remains Intact: Monitor for areas of redness and/or skin breakdown     Problem: Discharge Planning  Goal: Discharge to home or other facility with appropriate resources  Outcome: Progressing

## 2025-06-22 PROBLEM — M86.171 ACUTE OSTEOMYELITIS OF RIGHT CALCANEUS (HCC): Status: ACTIVE | Noted: 2025-06-22

## 2025-06-22 LAB
ANION GAP SERPL CALCULATED.3IONS-SCNC: 8 MMOL/L (ref 8–16)
BASOPHILS # BLD: 0.1 K/UL (ref 0–0.2)
BASOPHILS NFR BLD: 0.7 % (ref 0–1)
BUN SERPL-MCNC: 27 MG/DL (ref 8–23)
CALCIUM SERPL-MCNC: 8.1 MG/DL (ref 8.8–10.2)
CHLORIDE SERPL-SCNC: 109 MMOL/L (ref 98–107)
CO2 SERPL-SCNC: 21 MMOL/L (ref 22–29)
CREAT SERPL-MCNC: 1.7 MG/DL (ref 0.7–1.2)
EOSINOPHIL # BLD: 0.2 K/UL (ref 0–0.6)
EOSINOPHIL NFR BLD: 2 % (ref 0–5)
ERYTHROCYTE [DISTWIDTH] IN BLOOD BY AUTOMATED COUNT: 15.6 % (ref 11.5–14.5)
GLUCOSE SERPL-MCNC: 97 MG/DL (ref 70–99)
HCT VFR BLD AUTO: 29.7 % (ref 42–52)
HGB BLD-MCNC: 9.4 G/DL (ref 14–18)
IMM GRANULOCYTES # BLD: 0 K/UL
LYMPHOCYTES # BLD: 0.9 K/UL (ref 1.1–4.5)
LYMPHOCYTES NFR BLD: 11.3 % (ref 20–40)
MCH RBC QN AUTO: 29.9 PG (ref 27–31)
MCHC RBC AUTO-ENTMCNC: 31.6 G/DL (ref 33–37)
MCV RBC AUTO: 94.6 FL (ref 80–94)
MONOCYTES # BLD: 0.6 K/UL (ref 0–0.9)
MONOCYTES NFR BLD: 7.5 % (ref 0–10)
NEUTROPHILS # BLD: 6.3 K/UL (ref 1.5–7.5)
NEUTS SEG NFR BLD: 78 % (ref 50–65)
PLATELET # BLD AUTO: 245 K/UL (ref 130–400)
PMV BLD AUTO: 8.8 FL (ref 9.4–12.4)
POTASSIUM SERPL-SCNC: 4.6 MMOL/L (ref 3.5–5)
RBC # BLD AUTO: 3.14 M/UL (ref 4.7–6.1)
SODIUM SERPL-SCNC: 138 MMOL/L (ref 136–145)
VANCOMYCIN TROUGH SERPL-MCNC: 12.9 UG/ML (ref 10–20)
WBC # BLD AUTO: 8 K/UL (ref 4.8–10.8)

## 2025-06-22 PROCEDURE — 1200000000 HC SEMI PRIVATE

## 2025-06-22 PROCEDURE — 6370000000 HC RX 637 (ALT 250 FOR IP): Performed by: SURGERY

## 2025-06-22 PROCEDURE — 80048 BASIC METABOLIC PNL TOTAL CA: CPT

## 2025-06-22 PROCEDURE — 80202 ASSAY OF VANCOMYCIN: CPT

## 2025-06-22 PROCEDURE — 6360000002 HC RX W HCPCS: Performed by: SURGERY

## 2025-06-22 PROCEDURE — 99232 SBSQ HOSP IP/OBS MODERATE 35: CPT | Performed by: SURGERY

## 2025-06-22 PROCEDURE — 94760 N-INVAS EAR/PLS OXIMETRY 1: CPT

## 2025-06-22 PROCEDURE — 2580000003 HC RX 258: Performed by: SURGERY

## 2025-06-22 PROCEDURE — 2500000003 HC RX 250 WO HCPCS: Performed by: SURGERY

## 2025-06-22 PROCEDURE — 85025 COMPLETE CBC W/AUTO DIFF WBC: CPT

## 2025-06-22 PROCEDURE — 6360000002 HC RX W HCPCS: Performed by: NURSE PRACTITIONER

## 2025-06-22 PROCEDURE — 36415 COLL VENOUS BLD VENIPUNCTURE: CPT

## 2025-06-22 PROCEDURE — 2580000003 HC RX 258: Performed by: NURSE PRACTITIONER

## 2025-06-22 RX ADMIN — METRONIDAZOLE 500 MG: 500 INJECTION, SOLUTION INTRAVENOUS at 23:42

## 2025-06-22 RX ADMIN — Medication 1 EACH: at 10:01

## 2025-06-22 RX ADMIN — METOPROLOL SUCCINATE 25 MG: 25 TABLET, EXTENDED RELEASE ORAL at 10:01

## 2025-06-22 RX ADMIN — SODIUM CHLORIDE, PRESERVATIVE FREE 10 ML: 5 INJECTION INTRAVENOUS at 21:56

## 2025-06-22 RX ADMIN — SODIUM CHLORIDE, PRESERVATIVE FREE 10 ML: 5 INJECTION INTRAVENOUS at 21:55

## 2025-06-22 RX ADMIN — LEVOTHYROXINE SODIUM 125 MCG: 0.12 TABLET ORAL at 10:01

## 2025-06-22 RX ADMIN — SODIUM CHLORIDE, PRESERVATIVE FREE 10 ML: 5 INJECTION INTRAVENOUS at 10:02

## 2025-06-22 RX ADMIN — ENOXAPARIN SODIUM 40 MG: 100 INJECTION SUBCUTANEOUS at 16:16

## 2025-06-22 RX ADMIN — VANCOMYCIN HYDROCHLORIDE 1250 MG: 10 INJECTION, POWDER, LYOPHILIZED, FOR SOLUTION INTRAVENOUS at 04:55

## 2025-06-22 RX ADMIN — METRONIDAZOLE 500 MG: 500 INJECTION, SOLUTION INTRAVENOUS at 07:02

## 2025-06-22 RX ADMIN — METOPROLOL SUCCINATE 25 MG: 25 TABLET, EXTENDED RELEASE ORAL at 21:30

## 2025-06-22 RX ADMIN — METRONIDAZOLE 500 MG: 500 INJECTION, SOLUTION INTRAVENOUS at 16:13

## 2025-06-22 RX ADMIN — CEFEPIME 2000 MG: 2 INJECTION, POWDER, FOR SOLUTION INTRAVENOUS at 16:15

## 2025-06-22 NOTE — PLAN OF CARE
Problem: Chronic Conditions and Co-morbidities  Goal: Patient's chronic conditions and co-morbidity symptoms are monitored and maintained or improved  6/21/2025 2119 by Bety Arrington RN  Outcome: Progressing  6/21/2025 1724 by Jigna Hutton RN  Outcome: Progressing  Flowsheets  Taken 6/21/2025 1233  Care Plan - Patient's Chronic Conditions and Co-Morbidity Symptoms are Monitored and Maintained or Improved: Monitor and assess patient's chronic conditions and comorbid symptoms for stability, deterioration, or improvement  Taken 6/21/2025 1005  Care Plan - Patient's Chronic Conditions and Co-Morbidity Symptoms are Monitored and Maintained or Improved: Monitor and assess patient's chronic conditions and comorbid symptoms for stability, deterioration, or improvement     Problem: ABCDS Injury Assessment  Goal: Absence of physical injury  6/21/2025 2119 by Bety Arrington RN  Outcome: Progressing  6/21/2025 1724 by Jigna Hutton RN  Outcome: Progressing     Problem: Safety - Adult  Goal: Free from fall injury  6/21/2025 2119 by Bety Arrington RN  Outcome: Progressing  6/21/2025 1724 by Jigna Hutton RN  Outcome: Progressing     Problem: Skin/Tissue Integrity  Goal: Skin integrity remains intact  Description: 1.  Monitor for areas of redness and/or skin breakdown  2.  Assess vascular access sites hourly  3.  Every 4-6 hours minimum:  Change oxygen saturation probe site  4.  Every 4-6 hours:  If on nasal continuous positive airway pressure, respiratory therapy assess nares and determine need for appliance change or resting period  6/21/2025 2119 by Bety Arrington RN  Outcome: Progressing  6/21/2025 1724 by Jigna Hutton RN  Outcome: Progressing  Flowsheets (Taken 6/21/2025 1005)  Skin Integrity Remains Intact: Monitor for areas of redness and/or skin breakdown     Problem: Discharge Planning  Goal: Discharge to home or other facility with appropriate resources  6/21/2025 2119 by

## 2025-06-22 NOTE — PLAN OF CARE
Problem: Chronic Conditions and Co-morbidities  Goal: Patient's chronic conditions and co-morbidity symptoms are monitored and maintained or improved  Outcome: Progressing     Problem: ABCDS Injury Assessment  Goal: Absence of physical injury  Outcome: Progressing     Problem: Safety - Adult  Goal: Free from fall injury  Outcome: Progressing     Problem: Skin/Tissue Integrity  Goal: Skin integrity remains intact  Description: 1.  Monitor for areas of redness and/or skin breakdown  2.  Assess vascular access sites hourly  3.  Every 4-6 hours minimum:  Change oxygen saturation probe site  4.  Every 4-6 hours:  If on nasal continuous positive airway pressure, respiratory therapy assess nares and determine need for appliance change or resting period  Outcome: Progressing     Problem: Discharge Planning  Goal: Discharge to home or other facility with appropriate resources  Outcome: Progressing

## 2025-06-23 ENCOUNTER — CLINICAL DOCUMENTATION (OUTPATIENT)
Dept: HEMATOLOGY | Age: 75
End: 2025-06-23

## 2025-06-23 ENCOUNTER — TELEPHONE (OUTPATIENT)
Age: 75
End: 2025-06-23
Payer: MEDICARE

## 2025-06-23 ENCOUNTER — OUTSIDE FACILITY SERVICE (OUTPATIENT)
Age: 75
End: 2025-06-23
Payer: MEDICARE

## 2025-06-23 LAB
ANION GAP SERPL CALCULATED.3IONS-SCNC: 8 MMOL/L (ref 8–16)
BASOPHILS # BLD: 0.1 K/UL (ref 0–0.2)
BASOPHILS NFR BLD: 0.9 % (ref 0–1)
BUN SERPL-MCNC: 25 MG/DL (ref 8–23)
CALCIUM SERPL-MCNC: 8.4 MG/DL (ref 8.8–10.2)
CHLORIDE SERPL-SCNC: 107 MMOL/L (ref 98–107)
CO2 SERPL-SCNC: 21 MMOL/L (ref 22–29)
CREAT SERPL-MCNC: 1.7 MG/DL (ref 0.7–1.2)
EOSINOPHIL # BLD: 0.2 K/UL (ref 0–0.6)
EOSINOPHIL NFR BLD: 3.1 % (ref 0–5)
ERYTHROCYTE [DISTWIDTH] IN BLOOD BY AUTOMATED COUNT: 15.6 % (ref 11.5–14.5)
GLUCOSE SERPL-MCNC: 86 MG/DL (ref 70–99)
HCT VFR BLD AUTO: 30.2 % (ref 42–52)
HGB BLD-MCNC: 9.4 G/DL (ref 14–18)
IMM GRANULOCYTES # BLD: 0 K/UL
LYMPHOCYTES # BLD: 1.4 K/UL (ref 1.1–4.5)
LYMPHOCYTES NFR BLD: 18.4 % (ref 20–40)
MCH RBC QN AUTO: 29.3 PG (ref 27–31)
MCHC RBC AUTO-ENTMCNC: 31.1 G/DL (ref 33–37)
MCV RBC AUTO: 94.1 FL (ref 80–94)
MONOCYTES # BLD: 0.7 K/UL (ref 0–0.9)
MONOCYTES NFR BLD: 9 % (ref 0–10)
NEUTROPHILS # BLD: 5.1 K/UL (ref 1.5–7.5)
NEUTS SEG NFR BLD: 68.3 % (ref 50–65)
PLATELET # BLD AUTO: 273 K/UL (ref 130–400)
PMV BLD AUTO: 8.9 FL (ref 9.4–12.4)
POTASSIUM SERPL-SCNC: 4.7 MMOL/L (ref 3.5–5)
RBC # BLD AUTO: 3.21 M/UL (ref 4.7–6.1)
SODIUM SERPL-SCNC: 136 MMOL/L (ref 136–145)
VANCOMYCIN TROUGH SERPL-MCNC: 14 UG/ML (ref 10–20)
WBC # BLD AUTO: 7.5 K/UL (ref 4.8–10.8)

## 2025-06-23 PROCEDURE — 80202 ASSAY OF VANCOMYCIN: CPT

## 2025-06-23 PROCEDURE — 94760 N-INVAS EAR/PLS OXIMETRY 1: CPT

## 2025-06-23 PROCEDURE — 6360000002 HC RX W HCPCS: Performed by: NURSE PRACTITIONER

## 2025-06-23 PROCEDURE — 6370000000 HC RX 637 (ALT 250 FOR IP): Performed by: SURGERY

## 2025-06-23 PROCEDURE — 6360000002 HC RX W HCPCS: Performed by: SURGERY

## 2025-06-23 PROCEDURE — 2580000003 HC RX 258: Performed by: NURSE PRACTITIONER

## 2025-06-23 PROCEDURE — 36415 COLL VENOUS BLD VENIPUNCTURE: CPT

## 2025-06-23 PROCEDURE — 99232 SBSQ HOSP IP/OBS MODERATE 35: CPT | Performed by: SURGERY

## 2025-06-23 PROCEDURE — 2500000003 HC RX 250 WO HCPCS: Performed by: SURGERY

## 2025-06-23 PROCEDURE — 2580000003 HC RX 258: Performed by: SURGERY

## 2025-06-23 PROCEDURE — 1200000000 HC SEMI PRIVATE

## 2025-06-23 PROCEDURE — 80048 BASIC METABOLIC PNL TOTAL CA: CPT

## 2025-06-23 PROCEDURE — 85025 COMPLETE CBC W/AUTO DIFF WBC: CPT

## 2025-06-23 RX ORDER — SODIUM HYPOCHLORITE 1.25 MG/ML
SOLUTION TOPICAL DAILY
Status: DISCONTINUED | OUTPATIENT
Start: 2025-06-24 | End: 2025-06-26 | Stop reason: HOSPADM

## 2025-06-23 RX ADMIN — METRONIDAZOLE 500 MG: 500 INJECTION, SOLUTION INTRAVENOUS at 23:54

## 2025-06-23 RX ADMIN — LEVOTHYROXINE SODIUM 125 MCG: 0.12 TABLET ORAL at 08:34

## 2025-06-23 RX ADMIN — Medication 1 EACH: at 08:47

## 2025-06-23 RX ADMIN — METRONIDAZOLE 500 MG: 500 INJECTION, SOLUTION INTRAVENOUS at 15:17

## 2025-06-23 RX ADMIN — ENOXAPARIN SODIUM 40 MG: 100 INJECTION SUBCUTANEOUS at 18:06

## 2025-06-23 RX ADMIN — METRONIDAZOLE 500 MG: 500 INJECTION, SOLUTION INTRAVENOUS at 08:42

## 2025-06-23 RX ADMIN — SODIUM CHLORIDE, PRESERVATIVE FREE 10 ML: 5 INJECTION INTRAVENOUS at 19:52

## 2025-06-23 RX ADMIN — VANCOMYCIN HYDROCHLORIDE 1250 MG: 10 INJECTION, POWDER, LYOPHILIZED, FOR SOLUTION INTRAVENOUS at 05:48

## 2025-06-23 RX ADMIN — SODIUM CHLORIDE, PRESERVATIVE FREE 10 ML: 5 INJECTION INTRAVENOUS at 08:34

## 2025-06-23 RX ADMIN — METOPROLOL SUCCINATE 25 MG: 25 TABLET, EXTENDED RELEASE ORAL at 19:52

## 2025-06-23 RX ADMIN — METOPROLOL SUCCINATE 25 MG: 25 TABLET, EXTENDED RELEASE ORAL at 08:34

## 2025-06-23 RX ADMIN — CEFEPIME 2000 MG: 2 INJECTION, POWDER, FOR SOLUTION INTRAVENOUS at 15:31

## 2025-06-23 NOTE — CONSULTS
MEDICAL ONCOLOGY CONSULTATION    Pt Name: Damian You  MRN: [id number]  YOB: 1950  Date of evaluation: 6/20/2025    REASON FOR CONSULTATION: Colon cancer, continuity of care  REQUESTING PHYSICIAN: Hospitalist    History Obtained From:    patient, electronic medical record    HISTORY OF PRESENT ILLNESS:  The patient is well-known to our clinic for diagnosis of metastatic colonic adenocarcinoma, K-gomez, BRAF wild-type, MMR proficient currently on treatment with capecitabine, oxaliplatin and panitumumab.    Patient presented to the ER department with concern for acute cellulitis and wound laceration around right calf and ankle.  Patient has been seen by wound care.    6/19/2025-x-ray right foot showed no evidence of osteomyelitis.  No acute osseous abnormality.    Laboratory studies showed sodium 131, creatinine 2.1 (baseline 1.3), lactic acid 1.7, albumin 3.3, normal LFTs.  WBC 18,000, hemoglobin 12.8, platelet count 239,000.    Patient was admitted to the hospitalist service.  Consult for continuity of care.  Palliative care has also been consulted.      PRIOR ONCOLOGICAL HISTORY    The patient is a 74-year-old male with a diagnosis of sigmoid adenocarcinoma, stage IV, with liver metastasis, diagnosed in 02/2025. His molecular profile is K-gomez, BRAF, and NRAS wild-type, indicating MSI stable disease. He is currently receiving palliative chemotherapy with a dose reduction, specifically modified FOLFOX.  The patient has been having significant confusion for the first 2 days after palliative chemotherapy.  Patient has pulled on his work.    Complications have arisen with the pump used for his treatment at home. There is interest in transitioning to oral medication due to concerns about compliance with the current regimen. He is not left alone and is assisted with his medication intake. Recently, he had a positive weekend, suggesting an improvement in his condition as the interval between chemotherapy 
Consultation vascular surgery      75-year-old male admitted to the hospital for right heel infection and calf wound.  In the ER venous duplex, foot x-ray were performed.  Patient was admitted for IV antibiotics to the hospitalist service.  He is currently admitted to medical [facility].  We were consulted for right heel infection.  He reports he developed a wound on right foot. This started 2 week(s) ago. He believes this is not healing.   He has not had  fever or chills.   Yesterday CT scan of the foot was performed showed calcaneal fracture/osteomyelitis, fluid collection.    Damian You is a 74 y.o. 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) 06/19/2025    Sepsis (HCC) 06/19/2025    Colostomy in place (HCC) 06/19/2025    Cellulitis of right lower extremity 06/19/2025    Pacemaker 04/14/2025    Severe malnutrition 04/04/2025    Hypothyroid 04/03/2025    Chronic kidney disease 04/03/2025    Chemotherapy management, encounter for 04/03/2025    Normocytic anemia 04/03/2025    Buttock wound, right, initial encounter 03/26/2025    Hypomagnesemia 03/26/2025    Encounter for screening for other viral diseases 03/12/2025    Abdominal wall skin ulcer, with fat layer exposed (HCC) 02/21/2025    Moderate malnutrition 02/11/2025    Carcinoma of colon metastatic to liver (HCC) 02/10/2025    Colonic thickening 02/10/2025    Medically complex patient 02/10/2025    Life threatening medical illness 02/10/2025    Hypokalemia 02/06/2025    Abnormal abdominal CT scan 02/05/2025    Abnormal computed tomography of sigmoid colon 02/05/2025    Adenocarcinoma of colon (HCC) 02/05/2025     Stage 4 colon cancer with liver mets  2/10/2025-sigmoidoscopy diagnostic flexible-mass at 20 cm with lumen less than 5 mm.  
INFECTIOUS DISEASES CONSULT NOTE    Patient:  Damian You 74 y.o. male  ROOM # [unfilled]  YOB: 1950  MRN: [id number]  [facility]:  [id number]  Admit date: 6/19/2025   Admitting Physician: Jarred Navarro MD  Primary Care Physician: Ramona Smith MD  REFERRING PROVIDER: No ref. provider found    Reason for Consultation: \"Osteomyelitis\"    Chief Complaint: Asked to evaluate for wound involving right calcaneal area which extends to bone    History of Present Illness: Pleasant 74-year-old man.  He has a history of colon cancer with liver metastasis, atrial fibrillation, coronary artery disease, chronic systolic congestive heart failure, and hypertension.  History is obtained from patient and from chart review.  He does not give a lot of detail to his history.  Much of the history is obtained from chart review.  He indicates he did not realize he had a wound.  He indicates he had developed some right calf and foot pain.  He had had some sets of fever and chills.  He had received chemotherapy 3 weeks ago per review of the admit H&P.  He indicates he lives with family who assists with his care.  He had evidence of a wound in the right heel at the calcaneal area.  He was taken to surgery on June 21.  Underwent debridement of nonviable tissue and had a wound cavity just proximal to the more posterior aspect of the calcaneus that tracks to 4 cm in depth.  He is currently on antibiotic treatment vancomycin, cefepime, and metronidazole.  What he describes he was ambulatory for short distances with a walker (a few steps) a few weeks ago.  Infectious diseases asked to evaluate and offer recommendations.  Spoke with Dr. Boswell and Dr. Engle    Current Scheduled Medications:    vancomycin  1,250 mg IntraVENous Q24H    sodium chloride flush  5-40 mL IntraVENous 2 times per day    xeroform petrolatum gauze 5\"X9\"  1 each Topical Daily    vancomycin (VANCOCIN) intermittent dosing (placeholder)   Other RX 
hospital problems. *       Visit Summary:  Chart reviewed, patient discussed with nursing staff. Reviewed health issues, work up and treatment plan as well as factors that lead to hospitalization. Mr. You is seen at bedside this morning with no family present.  Patient is awake and answers orientation questions appropriately.  He denies pain when asked in no distress during my exam.  I introduced myself, explained role of palliative care, reason for consult.  We reviewed events leading to hospitalization, current status workup, and plan of care.  Patient informs that he lives at home and his sisters and paid caregivers help take care of him. He has had some issues with confusion potentially related to chemotherapy. He tells me that he has not started his new treatment as they are working on a new plan for palliative therapy. He is also following closely with wound care OP. I called his sister/primary HCS decision maker, Ngozi, and we reviewed the above. She will be at the hospitla later today. She tells me that they know  his anticancer therapy is not curative but palliative in nature.  Patient has maintained that his goal is to prolong life and they are supporting him in this as long as they can.  Opportunity for questions and emotional support provided. Palliative team will follow as needed.    Candidate for SCOP: Patient is established without outpatient palliative care who will continue to follow at discharge    Recommendations:     Palliative Care- GOC prolong life, continue current medical treatment/workup and monitor for improvement.  D/C home once medically stable. Code status- FULL CODE  Sepsis 2/2 RLE cellulitis- mgmt per hospitalist.  Antibiotic therapy on board.  Follow cultures.  GER on CKD- improving with IVF resuscitation.  Chronic HF with recovered EF- stable. Continue home meds as warranted  Stage IV colon cancer with liver mets- oncology following. Treatment on hold at this time    Thank you for

## 2025-06-23 NOTE — TELEPHONE ENCOUNTER
Message sent to Dr. Gastelum:    New Consult: (she said you were already aware of this consult)  [facility] with [facility]   [phone]   Leo Gardiner, 1950  [facility] room [facility]  Consulting: Tanner Wise MD  For: osteomyelitis

## 2025-06-23 NOTE — PLAN OF CARE
Problem: Chronic Conditions and Co-morbidities  Goal: Patient's chronic conditions and co-morbidity symptoms are monitored and maintained or improved  6/22/2025 2253 by Bety Arrington RN  Outcome: Progressing  6/22/2025 1803 by Doni Rich RN  Outcome: Progressing     Problem: ABCDS Injury Assessment  Goal: Absence of physical injury  6/22/2025 2253 by Bety Arrington RN  Outcome: Progressing  6/22/2025 1803 by Doni Rich RN  Outcome: Progressing     Problem: Safety - Adult  Goal: Free from fall injury  6/22/2025 2253 by Bety Arrington RN  Outcome: Progressing  6/22/2025 1803 by Doni Rich RN  Outcome: Progressing     Problem: Skin/Tissue Integrity  Goal: Skin integrity remains intact  Description: 1.  Monitor for areas of redness and/or skin breakdown  2.  Assess vascular access sites hourly  3.  Every 4-6 hours minimum:  Change oxygen saturation probe site  4.  Every 4-6 hours:  If on nasal continuous positive airway pressure, respiratory therapy assess nares and determine need for appliance change or resting period  6/22/2025 2253 by Bety Arrington RN  Outcome: Progressing  6/22/2025 1803 by Doni Rich RN  Outcome: Progressing     Problem: Discharge Planning  Goal: Discharge to home or other facility with appropriate resources  6/22/2025 2253 by Bety Arrington RN  Outcome: Progressing  6/22/2025 1803 by Doni Rich RN  Outcome: Progressing

## 2025-06-23 NOTE — PROGRESS NOTES
I called and spoke with Cynthia and provided her with follow up appt information. She verbalized understanding. Electronically signed by Gris Spence RN on 6/23/2025 at 7:57 AM

## 2025-06-24 ENCOUNTER — OUTSIDE FACILITY SERVICE (OUTPATIENT)
Age: 75
End: 2025-06-24

## 2025-06-24 LAB
ANION GAP SERPL CALCULATED.3IONS-SCNC: 10 MMOL/L (ref 8–16)
BACTERIA BLD CULT ORG #2: NORMAL
BACTERIA BLD CULT: NORMAL
BASOPHILS # BLD: 0.1 K/UL (ref 0–0.2)
BASOPHILS NFR BLD: 1.1 % (ref 0–1)
BUN SERPL-MCNC: 28 MG/DL (ref 8–23)
CALCIUM SERPL-MCNC: 8.4 MG/DL (ref 8.8–10.2)
CHLORIDE SERPL-SCNC: 107 MMOL/L (ref 98–107)
CO2 SERPL-SCNC: 20 MMOL/L (ref 22–29)
CREAT SERPL-MCNC: 1.6 MG/DL (ref 0.7–1.2)
EOSINOPHIL # BLD: 0.3 K/UL (ref 0–0.6)
EOSINOPHIL NFR BLD: 3.6 % (ref 0–5)
ERYTHROCYTE [DISTWIDTH] IN BLOOD BY AUTOMATED COUNT: 15.4 % (ref 11.5–14.5)
GLUCOSE SERPL-MCNC: 79 MG/DL (ref 70–99)
HCT VFR BLD AUTO: 31.3 % (ref 42–52)
HGB BLD-MCNC: 10 G/DL (ref 14–18)
IMM GRANULOCYTES # BLD: 0.1 K/UL
LYMPHOCYTES # BLD: 1.3 K/UL (ref 1.1–4.5)
LYMPHOCYTES NFR BLD: 17.3 % (ref 20–40)
MCH RBC QN AUTO: 29.6 PG (ref 27–31)
MCHC RBC AUTO-ENTMCNC: 31.9 G/DL (ref 33–37)
MCV RBC AUTO: 92.6 FL (ref 80–94)
MONOCYTES # BLD: 0.6 K/UL (ref 0–0.9)
MONOCYTES NFR BLD: 8.3 % (ref 0–10)
NEUTROPHILS # BLD: 5.2 K/UL (ref 1.5–7.5)
NEUTS SEG NFR BLD: 69 % (ref 50–65)
PLATELET # BLD AUTO: 316 K/UL (ref 130–400)
PMV BLD AUTO: 8.7 FL (ref 9.4–12.4)
POTASSIUM SERPL-SCNC: 4.1 MMOL/L (ref 3.5–5)
RBC # BLD AUTO: 3.38 M/UL (ref 4.7–6.1)
SODIUM SERPL-SCNC: 137 MMOL/L (ref 136–145)
WBC # BLD AUTO: 7.6 K/UL (ref 4.8–10.8)

## 2025-06-24 PROCEDURE — 36415 COLL VENOUS BLD VENIPUNCTURE: CPT

## 2025-06-24 PROCEDURE — 85025 COMPLETE CBC W/AUTO DIFF WBC: CPT

## 2025-06-24 PROCEDURE — 6360000002 HC RX W HCPCS: Performed by: SURGERY

## 2025-06-24 PROCEDURE — 2580000003 HC RX 258: Performed by: NURSE PRACTITIONER

## 2025-06-24 PROCEDURE — 99231 SBSQ HOSP IP/OBS SF/LOW 25: CPT | Performed by: INTERNAL MEDICINE

## 2025-06-24 PROCEDURE — 6370000000 HC RX 637 (ALT 250 FOR IP): Performed by: SURGERY

## 2025-06-24 PROCEDURE — 2500000003 HC RX 250 WO HCPCS: Performed by: SURGERY

## 2025-06-24 PROCEDURE — 99232 SBSQ HOSP IP/OBS MODERATE 35: CPT

## 2025-06-24 PROCEDURE — 2580000003 HC RX 258: Performed by: SURGERY

## 2025-06-24 PROCEDURE — 6360000002 HC RX W HCPCS: Performed by: NURSE PRACTITIONER

## 2025-06-24 PROCEDURE — 80048 BASIC METABOLIC PNL TOTAL CA: CPT

## 2025-06-24 PROCEDURE — 94760 N-INVAS EAR/PLS OXIMETRY 1: CPT

## 2025-06-24 PROCEDURE — 1200000000 HC SEMI PRIVATE

## 2025-06-24 RX ADMIN — CEFEPIME 2000 MG: 2 INJECTION, POWDER, FOR SOLUTION INTRAVENOUS at 17:00

## 2025-06-24 RX ADMIN — METRONIDAZOLE 500 MG: 500 INJECTION, SOLUTION INTRAVENOUS at 06:06

## 2025-06-24 RX ADMIN — SODIUM CHLORIDE, PRESERVATIVE FREE 10 ML: 5 INJECTION INTRAVENOUS at 10:10

## 2025-06-24 RX ADMIN — LEVOTHYROXINE SODIUM 125 MCG: 0.12 TABLET ORAL at 10:09

## 2025-06-24 RX ADMIN — ENOXAPARIN SODIUM 40 MG: 100 INJECTION SUBCUTANEOUS at 18:17

## 2025-06-24 RX ADMIN — DAKIN'S SOLUTION 0.125% (QUARTER STRENGTH): 0.12 SOLUTION at 10:08

## 2025-06-24 RX ADMIN — METOPROLOL SUCCINATE 25 MG: 25 TABLET, EXTENDED RELEASE ORAL at 10:09

## 2025-06-24 RX ADMIN — METOPROLOL SUCCINATE 25 MG: 25 TABLET, EXTENDED RELEASE ORAL at 20:09

## 2025-06-24 RX ADMIN — SODIUM CHLORIDE, PRESERVATIVE FREE 10 ML: 5 INJECTION INTRAVENOUS at 20:10

## 2025-06-24 RX ADMIN — METRONIDAZOLE 500 MG: 500 INJECTION, SOLUTION INTRAVENOUS at 15:38

## 2025-06-24 RX ADMIN — VANCOMYCIN HYDROCHLORIDE 1250 MG: 10 INJECTION, POWDER, LYOPHILIZED, FOR SOLUTION INTRAVENOUS at 06:09

## 2025-06-24 RX ADMIN — SODIUM CHLORIDE, PRESERVATIVE FREE 10 ML: 5 INJECTION INTRAVENOUS at 10:09

## 2025-06-24 RX ADMIN — METRONIDAZOLE 500 MG: 500 INJECTION, SOLUTION INTRAVENOUS at 22:44

## 2025-06-24 RX ADMIN — Medication 1 EACH: at 10:08

## 2025-06-24 NOTE — PLAN OF CARE
Problem: Chronic Conditions and Co-morbidities  Goal: Patient's chronic conditions and co-morbidity symptoms are monitored and maintained or improved  Outcome: Progressing  Flowsheets (Taken 6/24/2025 1010)  Care Plan - Patient's Chronic Conditions and Co-Morbidity Symptoms are Monitored and Maintained or Improved: Monitor and assess patient's chronic conditions and comorbid symptoms for stability, deterioration, or improvement     Problem: ABCDS Injury Assessment  Goal: Absence of physical injury  Outcome: Progressing     Problem: Safety - Adult  Goal: Free from fall injury  Outcome: Progressing     Problem: Skin/Tissue Integrity  Goal: Skin integrity remains intact  Description: 1.  Monitor for areas of redness and/or skin breakdown  2.  Assess vascular access sites hourly  3.  Every 4-6 hours minimum:  Change oxygen saturation probe site  4.  Every 4-6 hours:  If on nasal continuous positive airway pressure, respiratory therapy assess nares and determine need for appliance change or resting period  Outcome: Progressing     Problem: Discharge Planning  Goal: Discharge to home or other facility with appropriate resources  Outcome: Progressing

## 2025-06-25 ENCOUNTER — RESULTS FOLLOW-UP (OUTPATIENT)
Dept: VASCULAR SURGERY | Age: 75
End: 2025-06-25

## 2025-06-25 LAB
ANION GAP SERPL CALCULATED.3IONS-SCNC: 9 MMOL/L (ref 8–16)
BASOPHILS # BLD: 0.1 K/UL (ref 0–0.2)
BASOPHILS NFR BLD: 1.1 % (ref 0–1)
BUN SERPL-MCNC: 27 MG/DL (ref 8–23)
CALCIUM SERPL-MCNC: 8.6 MG/DL (ref 8.8–10.2)
CHLORIDE SERPL-SCNC: 106 MMOL/L (ref 98–107)
CO2 SERPL-SCNC: 21 MMOL/L (ref 22–29)
CREAT SERPL-MCNC: 1.5 MG/DL (ref 0.7–1.2)
EOSINOPHIL # BLD: 0.4 K/UL (ref 0–0.6)
EOSINOPHIL NFR BLD: 5.4 % (ref 0–5)
ERYTHROCYTE [DISTWIDTH] IN BLOOD BY AUTOMATED COUNT: 15.7 % (ref 11.5–14.5)
GLUCOSE SERPL-MCNC: 79 MG/DL (ref 70–99)
HCT VFR BLD AUTO: 31.2 % (ref 42–52)
HGB BLD-MCNC: 10.1 G/DL (ref 14–18)
IMM GRANULOCYTES # BLD: 0 K/UL
LYMPHOCYTES # BLD: 1.5 K/UL (ref 1.1–4.5)
LYMPHOCYTES NFR BLD: 19.3 % (ref 20–40)
MCH RBC QN AUTO: 29.7 PG (ref 27–31)
MCHC RBC AUTO-ENTMCNC: 32.4 G/DL (ref 33–37)
MCV RBC AUTO: 91.8 FL (ref 80–94)
MONOCYTES # BLD: 0.6 K/UL (ref 0–0.9)
MONOCYTES NFR BLD: 7.4 % (ref 0–10)
NEUTROPHILS # BLD: 5.2 K/UL (ref 1.5–7.5)
NEUTS SEG NFR BLD: 66.4 % (ref 50–65)
PLATELET # BLD AUTO: 319 K/UL (ref 130–400)
PMV BLD AUTO: 8.7 FL (ref 9.4–12.4)
POTASSIUM SERPL-SCNC: 3.9 MMOL/L (ref 3.5–5)
RBC # BLD AUTO: 3.4 M/UL (ref 4.7–6.1)
SODIUM SERPL-SCNC: 136 MMOL/L (ref 136–145)
VANCOMYCIN TROUGH SERPL-MCNC: 14.5 UG/ML (ref 10–20)
WBC # BLD AUTO: 7.8 K/UL (ref 4.8–10.8)

## 2025-06-25 PROCEDURE — 99232 SBSQ HOSP IP/OBS MODERATE 35: CPT

## 2025-06-25 PROCEDURE — 2580000003 HC RX 258: Performed by: NURSE PRACTITIONER

## 2025-06-25 PROCEDURE — 80202 ASSAY OF VANCOMYCIN: CPT

## 2025-06-25 PROCEDURE — 6360000002 HC RX W HCPCS: Performed by: NURSE PRACTITIONER

## 2025-06-25 PROCEDURE — 36415 COLL VENOUS BLD VENIPUNCTURE: CPT

## 2025-06-25 PROCEDURE — 2500000003 HC RX 250 WO HCPCS: Performed by: SURGERY

## 2025-06-25 PROCEDURE — 94760 N-INVAS EAR/PLS OXIMETRY 1: CPT

## 2025-06-25 PROCEDURE — 6370000000 HC RX 637 (ALT 250 FOR IP): Performed by: SURGERY

## 2025-06-25 PROCEDURE — 80048 BASIC METABOLIC PNL TOTAL CA: CPT

## 2025-06-25 PROCEDURE — 2580000003 HC RX 258: Performed by: SURGERY

## 2025-06-25 PROCEDURE — 85025 COMPLETE CBC W/AUTO DIFF WBC: CPT

## 2025-06-25 PROCEDURE — 1200000000 HC SEMI PRIVATE

## 2025-06-25 PROCEDURE — 6360000002 HC RX W HCPCS: Performed by: SURGERY

## 2025-06-25 RX ADMIN — METRONIDAZOLE 500 MG: 500 INJECTION, SOLUTION INTRAVENOUS at 23:21

## 2025-06-25 RX ADMIN — CEFEPIME 2000 MG: 2 INJECTION, POWDER, FOR SOLUTION INTRAVENOUS at 16:36

## 2025-06-25 RX ADMIN — Medication 1 EACH: at 16:39

## 2025-06-25 RX ADMIN — METRONIDAZOLE 500 MG: 500 INJECTION, SOLUTION INTRAVENOUS at 16:36

## 2025-06-25 RX ADMIN — LEVOTHYROXINE SODIUM 125 MCG: 0.12 TABLET ORAL at 10:22

## 2025-06-25 RX ADMIN — ENOXAPARIN SODIUM 40 MG: 100 INJECTION SUBCUTANEOUS at 16:37

## 2025-06-25 RX ADMIN — DAKIN'S SOLUTION 0.125% (QUARTER STRENGTH): 0.12 SOLUTION at 16:40

## 2025-06-25 RX ADMIN — METOPROLOL SUCCINATE 25 MG: 25 TABLET, EXTENDED RELEASE ORAL at 10:22

## 2025-06-25 RX ADMIN — METOPROLOL SUCCINATE 25 MG: 25 TABLET, EXTENDED RELEASE ORAL at 20:34

## 2025-06-25 RX ADMIN — SODIUM CHLORIDE, PRESERVATIVE FREE 10 ML: 5 INJECTION INTRAVENOUS at 20:34

## 2025-06-25 RX ADMIN — VANCOMYCIN HYDROCHLORIDE 1250 MG: 10 INJECTION, POWDER, LYOPHILIZED, FOR SOLUTION INTRAVENOUS at 06:13

## 2025-06-25 RX ADMIN — METRONIDAZOLE 500 MG: 500 INJECTION, SOLUTION INTRAVENOUS at 06:15

## 2025-06-26 ENCOUNTER — HOSPITAL ENCOUNTER (OUTPATIENT)
Dept: WOUND CARE | Age: 75
Discharge: HOME | End: 2025-06-26
Attending: SURGERY

## 2025-06-26 ENCOUNTER — OUTSIDE FACILITY SERVICE (OUTPATIENT)
Age: 75
End: 2025-06-26

## 2025-06-26 VITALS
DIASTOLIC BLOOD PRESSURE: 88 MMHG | SYSTOLIC BLOOD PRESSURE: 170 MMHG | WEIGHT: 182.1 LBS | RESPIRATION RATE: 18 BRPM | HEIGHT: 70 IN | OXYGEN SATURATION: 94 % | TEMPERATURE: 97.5 F | HEART RATE: 65 BPM | BODY MASS INDEX: 26.07 KG/M2

## 2025-06-26 LAB
ANION GAP SERPL CALCULATED.3IONS-SCNC: 11 MMOL/L (ref 8–16)
BACTERIA SPEC AEROBE CULT: NORMAL
BACTERIA SPEC AEROBE CULT: NORMAL
BACTERIA SPEC ANAEROBE CULT: NORMAL
BACTERIA SPEC ANAEROBE CULT: NORMAL
BASOPHILS # BLD: 0.1 K/UL (ref 0–0.2)
BASOPHILS NFR BLD: 0.9 % (ref 0–1)
BUN SERPL-MCNC: 30 MG/DL (ref 8–23)
CALCIUM SERPL-MCNC: 8.5 MG/DL (ref 8.8–10.2)
CHLORIDE SERPL-SCNC: 105 MMOL/L (ref 98–107)
CO2 SERPL-SCNC: 20 MMOL/L (ref 22–29)
CREAT SERPL-MCNC: 1.5 MG/DL (ref 0.7–1.2)
EOSINOPHIL # BLD: 0.4 K/UL (ref 0–0.6)
EOSINOPHIL NFR BLD: 4.8 % (ref 0–5)
ERYTHROCYTE [DISTWIDTH] IN BLOOD BY AUTOMATED COUNT: 15.6 % (ref 11.5–14.5)
GLUCOSE SERPL-MCNC: 82 MG/DL (ref 70–99)
GRAM STN SPEC: NORMAL
GRAM STN SPEC: NORMAL
HCT VFR BLD AUTO: 31.7 % (ref 42–52)
HGB BLD-MCNC: 10.3 G/DL (ref 14–18)
IMM GRANULOCYTES # BLD: 0.1 K/UL
LYMPHOCYTES # BLD: 1.4 K/UL (ref 1.1–4.5)
LYMPHOCYTES NFR BLD: 16.8 % (ref 20–40)
MCH RBC QN AUTO: 29.8 PG (ref 27–31)
MCHC RBC AUTO-ENTMCNC: 32.5 G/DL (ref 33–37)
MCV RBC AUTO: 91.6 FL (ref 80–94)
MONOCYTES # BLD: 0.7 K/UL (ref 0–0.9)
MONOCYTES NFR BLD: 7.8 % (ref 0–10)
NEUTROPHILS # BLD: 5.8 K/UL (ref 1.5–7.5)
NEUTS SEG NFR BLD: 69 % (ref 50–65)
PLATELET # BLD AUTO: 331 K/UL (ref 130–400)
PMV BLD AUTO: 8.6 FL (ref 9.4–12.4)
POTASSIUM SERPL-SCNC: 4.1 MMOL/L (ref 3.5–5)
RBC # BLD AUTO: 3.46 M/UL (ref 4.7–6.1)
SODIUM SERPL-SCNC: 136 MMOL/L (ref 136–145)
WBC # BLD AUTO: 8.5 K/UL (ref 4.8–10.8)

## 2025-06-26 PROCEDURE — 6370000000 HC RX 637 (ALT 250 FOR IP): Performed by: INTERNAL MEDICINE

## 2025-06-26 PROCEDURE — 6370000000 HC RX 637 (ALT 250 FOR IP): Performed by: SURGERY

## 2025-06-26 PROCEDURE — 99232 SBSQ HOSP IP/OBS MODERATE 35: CPT

## 2025-06-26 PROCEDURE — 97161 PT EVAL LOW COMPLEX 20 MIN: CPT

## 2025-06-26 PROCEDURE — 80048 BASIC METABOLIC PNL TOTAL CA: CPT

## 2025-06-26 PROCEDURE — 36415 COLL VENOUS BLD VENIPUNCTURE: CPT

## 2025-06-26 PROCEDURE — 2500000003 HC RX 250 WO HCPCS: Performed by: SURGERY

## 2025-06-26 PROCEDURE — 85025 COMPLETE CBC W/AUTO DIFF WBC: CPT

## 2025-06-26 PROCEDURE — 97165 OT EVAL LOW COMPLEX 30 MIN: CPT

## 2025-06-26 PROCEDURE — 97530 THERAPEUTIC ACTIVITIES: CPT

## 2025-06-26 PROCEDURE — 6360000002 HC RX W HCPCS: Performed by: SURGERY

## 2025-06-26 PROCEDURE — 97116 GAIT TRAINING THERAPY: CPT

## 2025-06-26 RX ORDER — LEVOFLOXACIN 750 MG/1
750 TABLET, FILM COATED ORAL
Status: DISCONTINUED | OUTPATIENT
Start: 2025-06-26 | End: 2025-06-26 | Stop reason: HOSPADM

## 2025-06-26 RX ORDER — CLINDAMYCIN HYDROCHLORIDE 150 MG/1
600 CAPSULE ORAL EVERY 8 HOURS SCHEDULED
Status: DISCONTINUED | OUTPATIENT
Start: 2025-06-26 | End: 2025-06-26 | Stop reason: HOSPADM

## 2025-06-26 RX ORDER — BISMUTH TRIBROMOPH/PETROLATUM 5"X9"
1 BANDAGE TOPICAL DAILY
Qty: 50 EACH | Refills: 1 | Status: SHIPPED | OUTPATIENT
Start: 2025-06-27

## 2025-06-26 RX ORDER — METOPROLOL SUCCINATE 25 MG/1
25 TABLET, EXTENDED RELEASE ORAL 2 TIMES DAILY
Qty: 60 TABLET | Refills: 1 | Status: SHIPPED | OUTPATIENT
Start: 2025-06-26 | End: 2025-08-25

## 2025-06-26 RX ORDER — LEVOFLOXACIN 500 MG/1
500 TABLET, FILM COATED ORAL EVERY 24 HOURS
Status: DISCONTINUED | OUTPATIENT
Start: 2025-06-26 | End: 2025-06-26 | Stop reason: DRUGHIGH

## 2025-06-26 RX ORDER — CLINDAMYCIN HYDROCHLORIDE 300 MG/1
600 CAPSULE ORAL EVERY 8 HOURS SCHEDULED
Qty: 178 CAPSULE | Refills: 0 | Status: SHIPPED | OUTPATIENT
Start: 2025-06-26 | End: 2025-07-26

## 2025-06-26 RX ORDER — LEVOFLOXACIN 750 MG/1
750 TABLET, FILM COATED ORAL
Qty: 14 TABLET | Refills: 0 | Status: SHIPPED | OUTPATIENT
Start: 2025-06-28 | End: 2025-07-25

## 2025-06-26 RX ORDER — SODIUM HYPOCHLORITE 1.25 MG/ML
SOLUTION TOPICAL DAILY
Qty: 573 ML | Refills: 1 | Status: SHIPPED | OUTPATIENT
Start: 2025-06-27

## 2025-06-26 RX ORDER — HYDROCODONE BITARTRATE AND ACETAMINOPHEN 5; 325 MG/1; MG/1
1 TABLET ORAL EVERY 6 HOURS PRN
Qty: 12 TABLET | Refills: 0 | Status: SHIPPED | OUTPATIENT
Start: 2025-06-26 | End: 2025-06-29

## 2025-06-26 RX ADMIN — METOPROLOL SUCCINATE 25 MG: 25 TABLET, EXTENDED RELEASE ORAL at 10:33

## 2025-06-26 RX ADMIN — SODIUM CHLORIDE, PRESERVATIVE FREE 10 ML: 5 INJECTION INTRAVENOUS at 10:33

## 2025-06-26 RX ADMIN — LEVOFLOXACIN 750 MG: 750 TABLET, FILM COATED ORAL at 15:06

## 2025-06-26 RX ADMIN — DAKIN'S SOLUTION 0.125% (QUARTER STRENGTH): 0.12 SOLUTION at 10:35

## 2025-06-26 RX ADMIN — Medication 1 EACH: at 10:35

## 2025-06-26 RX ADMIN — METRONIDAZOLE 500 MG: 500 INJECTION, SOLUTION INTRAVENOUS at 06:57

## 2025-06-26 RX ADMIN — LEVOTHYROXINE SODIUM 125 MCG: 0.12 TABLET ORAL at 10:33

## 2025-06-26 RX ADMIN — CLINDAMYCIN HYDROCHLORIDE 600 MG: 150 CAPSULE ORAL at 15:06

## 2025-06-26 NOTE — PLAN OF CARE
Problem: Chronic Conditions and Co-morbidities  Goal: Patient's chronic conditions and co-morbidity symptoms are monitored and maintained or improved  6/26/2025 1611 by Doni Rich RN  Outcome: Progressing  6/26/2025 0514 by Theodora Brown RN  Outcome: Progressing     Problem: ABCDS Injury Assessment  Goal: Absence of physical injury  6/26/2025 1611 by Doni Rich RN  Outcome: Progressing  6/26/2025 0514 by Theodora Brown RN  Outcome: Progressing     Problem: Safety - Adult  Goal: Free from fall injury  6/26/2025 1611 by Doni Rich RN  Outcome: Progressing  6/26/2025 0514 by Theodora Brown RN  Outcome: Progressing     Problem: Skin/Tissue Integrity  Goal: Skin integrity remains intact  Description: 1.  Monitor for areas of redness and/or skin breakdown  2.  Assess vascular access sites hourly  3.  Every 4-6 hours minimum:  Change oxygen saturation probe site  4.  Every 4-6 hours:  If on nasal continuous positive airway pressure, respiratory therapy assess nares and determine need for appliance change or resting period  6/26/2025 1611 by Doni Rich RN  Outcome: Progressing  6/26/2025 0514 by Theodora Brown RN  Outcome: Progressing     Problem: Discharge Planning  Goal: Discharge to home or other facility with appropriate resources  6/26/2025 1611 by Doni Rich RN  Outcome: Progressing  6/26/2025 0514 by Theodora Brown RN  Outcome: Progressing

## 2025-06-26 NOTE — PROGRESS NOTES
Pharmacy Renal Adjustment    Damian You is a 74 y.o. male. Pharmacy has renally adjusted medications per protocol.    Recent Labs     06/25/25  0406 06/26/25  0317   BUN 27* 30*       Recent Labs     06/25/25  0406 06/26/25  0317   CREATININE 1.5* 1.5*       Estimated Creatinine Clearance: 46 mL/min (A) (based on SCr of 1.5 mg/dL (H)).    Height:   Ht Readings from Last 1 Encounters:   06/19/25 1.778 m (5' 10\")     Weight:  Wt Readings from Last 1 Encounters:   06/25/25 82.6 kg (182 lb 1.6 oz)         Plan: Adjust the following medications based on renal function:           Levofloxacin 500 mg every 24 hours for 30 days ordered for Bone and Joint Infection was changed to Levofloxacin 750mg every 48 hours for 30 days based on CrCl of 46 ml/min.     Electronically signed by Viji Hamilton RPH on 6/26/2025 at 1:26 PM     
     [facility]  Nurse  Consult Note       NAME:  Damian You  MEDICAL RECORD NUMBER:  [id number]  AGE: 74 y.o.   GENDER: male  : 1950  TODAY'S DATE:  2025    Subjective   Reason for Wound Nurse Evaluation and Assessment: right posterior leg wound, right heel wound      Damian You is a 74 y.o. male referred by:   [x] Physician  [] Nursing  [] Other:     Wound Identification:  Wound Type: venous and Pressure  Contributing Factors: venous stasis, chronic pressure, and decreased mobility    Wound History: Wound care has been consulted for an open wound to the posterior right lower leg and a deep tissue injury to the left heel.     The posterior lower leg wounds are pink/red with a moderate amount of serosanguinous drainage noted. No odor noted.     The right heel is purple/maroon in color. The heel has a callous present. No drainage noted.    Heel lift boots applied    Current Wound Care Treatment:      RIGHT POSTERIOR LOWER LEG: Cleanse with soap and water with each dressing change. Apply Xeroform cut slightly larger than the wound and doubled. Cover with ABD and secure with roll gauze. Change daily and PRN    RIGHT HEEL: heel lift boots on at all times while in bed      Patient Goal of Care:  [x] Wound Healing  [] Odor Control  [] Palliative Care  [] Pain Control   [] Other:         PAST MEDICAL HISTORY        Diagnosis Date    Atrial fibrillation ([facility]) 2020    CAD (coronary artery disease)     Carcinoma of colon metastatic to liver (HCC) 02/10/2025    CKD stage 3a, GFR 45-59 ml/min (HCC) 2022    Colostomy in place ([facility]) 2025    Gout     Hypertension     Hypotension 2020    Mixed hyperlipidemia 2020    Non-ischemic cardiomyopathy ([facility]) 2020    Ejection fraction 5-10% by cath, 2020, 40% by echo, 2020, [facility]      Non-pressure chronic ulcer of right calf with fat layer exposed ([facility]) 2025    Non-ST elevation MI (NSTEMI) ([facility]) 2014    
   Pharmacy Renal Adjustment    Damian You is a 74 y.o. male. Pharmacy has renally adjusted medications per protocol.    Recent Labs     06/19/25  1530   BUN 27*       Recent Labs     06/19/25  1530   CREATININE 2.1*       Estimated Creatinine Clearance: 33 mL/min (A) (based on SCr of 2.1 mg/dL (H)).    Height:   Ht Readings from Last 1 Encounters:   05/28/25 1.778 m (5' 10\")     Weight:  Wt Readings from Last 1 Encounters:   06/19/25 74.8 kg (165 lb)       CKD stage: GER           Plan: Adjust the following medications based on renal function:           Cefepime 2000mg every 12 hours over 30 minutes adjusted to Cefepime 2000mg every 24 hours over 240 minutes. Dose to start 6/20 as loading dose was given off of original order.     Electronically signed by Viji Hamilton RPH on 6/19/2025 at 4:54 PM     
  MEDICAL ONCOLOGY PROGRESS NOTE     Pt Name: Damian You  MRN: [id number]  YOB: 1950  Date of evaluation: 6/21/2025    Subjective-remains afebrile.  Sister at bedside.  Discussed with hospitalist.  Awaiting vascular consultation.    HISTORY OF PRESENT ILLNESS:  The patient is well-known to our clinic for diagnosis of metastatic colonic adenocarcinoma, K-gomez, BRAF wild-type, MMR proficient currently on treatment with capecitabine, oxaliplatin and panitumumab.    Patient presented to the ER department with concern for acute cellulitis and wound laceration around right calf and ankle.  Patient has been seen by wound care.    6/19/2025-x-ray right foot showed no evidence of osteomyelitis.  No acute osseous abnormality.    Laboratory studies showed sodium 131, creatinine 2.1 (baseline 1.3), lactic acid 1.7, albumin 3.3, normal LFTs.  WBC 18,000, hemoglobin 12.8, platelet count 239,000.    Patient was admitted to the hospitalist service.  Consult for continuity of care.  Palliative care has also been consulted.      PRIOR ONCOLOGICAL HISTORY    The patient is a 74-year-old male with a diagnosis of sigmoid adenocarcinoma, stage IV, with liver metastasis, diagnosed in 02/2025. His molecular profile is K-gomez, BRAF, and NRAS wild-type, indicating MSI stable disease. He is currently receiving palliative chemotherapy with a dose reduction, specifically modified FOLFOX.  The patient has been having significant confusion for the first 2 days after palliative chemotherapy.  Patient has pulled on his work.    Complications have arisen with the pump used for his treatment at home. There is interest in transitioning to oral medication due to concerns about compliance with the current regimen. He is not left alone and is assisted with his medication intake. Recently, he had a positive weekend, suggesting an improvement in his condition as the interval between chemotherapy sessions increases. His fluid intake is primarily 
  MEDICAL ONCOLOGY PROGRESS NOTE     Pt Name: Damian You  MRN: [id number]  YOB: 1950  Date of evaluation: 6/24/2025    Subjective-afebrile.  Status post debridement by vascular.  Concerns for cellulitis/acute osteomyelitis of the right calcaneus.  ID consulted.  The possibility of amputation has been discussed with the patient.     HISTORY OF PRESENT ILLNESS:  The patient is well-known to our clinic for diagnosis of metastatic colonic adenocarcinoma, K-gomez, BRAF wild-type, MMR proficient currently on treatment with capecitabine, oxaliplatin and panitumumab.    Patient presented to the ER department with concern for acute cellulitis and wound laceration around right calf and ankle.  Patient has been seen by wound care.    6/19/2025-x-ray right foot showed no evidence of osteomyelitis.  No acute osseous abnormality.    Laboratory studies showed sodium 131, creatinine 2.1 (baseline 1.3), lactic acid 1.7, albumin 3.3, normal LFTs.  WBC 18,000, hemoglobin 12.8, platelet count 239,000.    Patient was admitted to the hospitalist service.  Consult for continuity of care.  Palliative care has also been consulted.      PRIOR ONCOLOGICAL HISTORY    The patient is a 74-year-old male with a diagnosis of sigmoid adenocarcinoma, stage IV, with liver metastasis, diagnosed in 02/2025. His molecular profile is K-gomez, BRAF, and NRAS wild-type, indicating MSI stable disease. He is currently receiving palliative chemotherapy with a dose reduction, specifically modified FOLFOX.  The patient has been having significant confusion for the first 2 days after palliative chemotherapy.  Patient has pulled on his work.    Complications have arisen with the pump used for his treatment at home. There is interest in transitioning to oral medication due to concerns about compliance with the current regimen. He is not left alone and is assisted with his medication intake. Recently, he had a positive weekend, suggesting an improvement in 
  Palliative Care Progress Note      Patient:  Damian You  YOB: 1950  Primary Care Physician: Ramona Smith MD  Advance Directive: Full Code  Admit Date: 6/19/2025     Date of service: 6/24/2025 9:35 AM    Hospital Day: 5  Portions of this note have been copied forward, however, changed to reflect the most current clinical status of this patient.    CHIEF COMPLAINT/REASON FOR CONSULTATION Goals of care, family support, Code status, symptom management     SUBJECTIVE:  Mr. You is resting comfortably in bed with no new complaints. He denies pain when asked. He has not been OOB. No distress during my exam.     History of Present Illness:   The patient is a 74 y.o. male with PMH HTN, HLD, CAD, gout, stage IV sigmoid adenocarcinoma s/p colostomy, HFrEF, nonischemic cardiomyopathy, A-fib s/p recent pacemaker placement, CKD, chronic wounds follows OP with wound care, and other comorbidities and other comorbidities who presented to [facility] ED 6/19/2025 for evaluation of cellulitis with acute onset and chronic ulceration of the right calf.  Family reported that wound care suggested further evaluation at his outpatient visit. Workup in ED revealed WBC-18.0, H&H-12.8/38.6 and plt-239. Na-131, K-4.4, Co2-18, BUN-27 and creatinine-2.1 (baseline 1.3-1.6). X-ray right foot showed no acute osseous abnormality. Venous US RLE-negative for DVT/SVT. No osteomyelitis.  Patient did meet sepsis criteria and was initiated on protocol t IVF reatment and broad-spectrum antibiotics. Of note, patient has known stage IV sigmoid adenocarcinoma with liver metastasis initially diagnosed in February 2025. His molecular profile is K-gomez, BRAF, and NRAS wild-type, indicating MSI stable disease. He is currently receiving palliative chemotherapy with a dose reduction, specifically modified FOLFOX, with serologic response but overall poor tolerance. There is discussion about transitioning to options such as oral Xeloda, 
  Palliative Care Progress Note      Patient:  Damian You  YOB: 1950  Primary Care Physician: Ramona Smith MD  Advance Directive: Full Code  Admit Date: 6/19/2025     Date of service: 6/26/2025 8:38 AM    Hospital Day: 7  Portions of this note have been copied forward, however, changed to reflect the most current clinical status of this patient.    CHIEF COMPLAINT/REASON FOR CONSULTATION Goals of care, family support, Code status, symptom management     SUBJECTIVE:  Mr. You has no new complaints. Anxious for discharge home. He was able to ambulate with therapy today. Denies pain.     History of Present Illness:   The patient is a 74 y.o. male with PMH HTN, HLD, CAD, gout, stage IV sigmoid adenocarcinoma s/p colostomy, HFrEF, nonischemic cardiomyopathy, A-fib s/p recent pacemaker placement, CKD, chronic wounds follows OP with wound care, and other comorbidities and other comorbidities who presented to [facility] ED 6/19/2025 for evaluation of cellulitis with acute onset and chronic ulceration of the right calf.  Family reported that wound care suggested further evaluation at his outpatient visit. Workup in ED revealed WBC-18.0, H&H-12.8/38.6 and plt-239. Na-131, K-4.4, Co2-18, BUN-27 and creatinine-2.1 (baseline 1.3-1.6). X-ray right foot showed no acute osseous abnormality. Venous US RLE-negative for DVT/SVT. No osteomyelitis.  Patient did meet sepsis criteria and was initiated on protocol t IVF reatment and broad-spectrum antibiotics. Of note, patient has known stage IV sigmoid adenocarcinoma with liver metastasis initially diagnosed in February 2025. His molecular profile is K-gomez, BRAF, and NRAS wild-type, indicating MSI stable disease. He is currently receiving palliative chemotherapy with a dose reduction, specifically modified FOLFOX, with serologic response but overall poor tolerance. There is discussion about transitioning to options such as oral Xeloda, oxaliplatin and panitumumab.  
 Daily Progress Note    Date:2025  Patient: Damian You  : 1950  MRN:[id number]  CODE:Full Code No additional code details  PCP:Ramona Smith MD    Admit Date: 2025  3:13 PM   LOS: 2 days       Subjective:   Had some low-grade fever yesterday but no fevers otherwise overnight.  No chills.  Resting today with no new complaints.      Summary:  73 yo M w/ metastatic colon cancer, chronic LE wound send to ER from wound care clinic due to worsening R heel wound w/ increasing erythema, swelling as well as fevers/chills, workup significant for leukocytosis, elevated HR, admitted with probable sepsis with LE cellulitis, noted area of fluctuance concerning for developing abscess.  CT of extremity showed findings concerning for abscess.  Treated with broad spectrum IV Abx.  Seen in consultation by vascular surgery and taken for excisional debridement on .    Objective:      Vital signs in last 24 hours:  Patient Vitals for the past 24 hrs:   BP Temp Temp src Pulse Resp SpO2   25 1221 (!) 152/76 -- -- 65 -- 97 %   25 0800 (!) 145/81 -- -- 65 -- 97 %   25 0711 -- -- -- -- -- 95 %   25 0548 (!) 141/68 98.1 °F (36.7 °C) -- 62 18 94 %   25 0527 -- -- -- -- 16 --   25 0456 (!) 154/70 97.9 °F (36.6 °C) Temporal 65 16 96 %   25 0023 138/64 98.7 °F (37.1 °C) -- 76 18 98 %   25 1958 133/70 99 °F (37.2 °C) -- 67 18 96 %   25 1443 (!) 152/72 99.9 °F (37.7 °C) -- 70 18 92 %     Physical Exam    General:  poor historian, no distress  Cardiac:  normal rate, regular rhythm  Respiratory :   no wheezes, no tachypnea, no use of accessory muscles  Abdomen: nondistended  Extremities:  Heel wound with area of fluctuance, tenderness, surrounding erythema and edema      Lab Review   Recent Results (from the past 24 hours)   Vancomycin Level, Trough    Collection Time: 25  3:33 AM   Result Value Ref Range    Vancomycin Tr 10.5 10.0 - 20.0 ug/mL   Basic Metabolic 
 Daily Progress Note    Date:2025  Patient: Damian You  : 1950  MRN:[id number]  CODE:Full Code No additional code details  PCP:Ramona Smith MD    Admit Date: 2025  3:13 PM   LOS: 5 days       Subjective:   No significant issues overnight or this a.m. noted.  Denies any pain.  Otherwise no complaints.  No fevers or chills.      Summary:  75 yo M w/ metastatic colon cancer followed by oncology locally, chronic LE wound sent to ER from wound care clinic due to worsening R heel wound w/ increasing erythema, swelling as well as fevers/chills, workup significant for leukocytosis, elevated HR, admitted with probable sepsis with LE cellulitis, noted area of fluctuance concerning for developing abscess.  CT of extremity showed an area consistent with abscess.  Treated with broad spectrum IV Abx.  Seen in consultation by vascular surgery and taken for excisional debridement on , noted deep infection with drainage of abscess, evidence of calcaneal osteomyelitis, surgical cultures obtained.  Seen in consultation by ID.  Possibility of amputation considered by vascular surgery    Objective:      Vital signs in last 24 hours:  Patient Vitals for the past 24 hrs:   BP Temp Temp src Pulse Resp SpO2 Weight   25 1128 -- -- -- -- -- 96 % --   25 1115 (!) 170/89 97.9 °F (36.6 °C) Temporal 62 16 95 % --   25 0717 (!) 179/82 97.2 °F (36.2 °C) Temporal 61 18 98 % --   25 0439 (!) 177/75 97.7 °F (36.5 °C) Oral 62 18 93 % 84.3 kg (185 lb 13.6 oz)   25 2353 (!) 172/82 -- -- 65 18 95 % --   25 2231 -- -- -- -- -- 97 % --   25 1949 (!) 162/90 97.7 °F (36.5 °C) Oral 66 16 97 % --   25 1650 (!) 158/87 -- -- 64 -- 97 % --     Physical Exam    General:  poor historian, no acute distress  Cardiac:  normal rate, regular rhythm  Respiratory :   no wheezes, no tachypnea, no use of accessory muscles  Abdomen: nondistended  Extremities: Right foot/heel wound with dressings 
 Daily Progress Note    Date:2025  Patient: Damian You  : 1950  MRN:[id number]  CODE:Full Code No additional code details  PCP:Ramona Smith MD    Admit Date: 2025  3:13 PM   LOS: 6 days     Chief Complaint   Patient presents with    Wound Infection     Right ankle redness, swelling; sent by wound care    Fever    Chills         Subjective: NAEON. Denies any right heel pain. Pt asked about discharging home. He lives alone but states that he has family available for assistance 24 hours daily. He is not interested in SNF.         Hospital Summary: 73 yo M w/ metastatic colon cancer followed by oncology locally, chronic LE wound sent to ER from wound care clinic due to worsening R heel wound w/ increasing erythema, swelling as well as fevers/chills, workup significant for leukocytosis, elevated HR, admitted with probable sepsis with LE cellulitis, noted area of fluctuance concerning for developing abscess.  CT of extremity showed an area consistent with abscess.  Treated with broad spectrum IV Abx.  Seen in consultation by vascular surgery and taken for excisional debridement on , noted deep infection with drainage of abscess, evidence of calcaneal osteomyelitis, surgical cultures obtained.  Seen in consultation by ID.  Possibility of amputation considered by vascular surgery, not pursuing at this time.     ID plans to transition to PO antibiotics shortly.  PT/OT eval pending.         Review of Systems   All other systems reviewed and are negative.      Objective:      Vital signs in last 24 hours:  Patient Vitals for the past 24 hrs:   BP Temp Temp src Pulse Resp SpO2 Weight   25 1129 (!) 168/81 98.2 °F (36.8 °C) Temporal 63 18 98 % --   25 0737 -- -- -- -- -- 97 % --   25 0728 (!) 163/71 97.7 °F (36.5 °C) Temporal 58 16 -- --   25 0620 -- -- -- -- -- -- 82.6 kg (182 lb 1.6 oz)   25 0424 (!) 181/77 98.4 °F (36.9 °C) Temporal 64 16 98 % --   25 0033 (!) 
 Daily Progress Note    Date:2025  Patient: Damian You  : 1950  MRN:[id number]  CODE:Full Code No additional code details  PCP:Ramona Smith MD    Admit Date: 2025  3:13 PM   LOS: 4 days       Subjective:   No significant issues overnight or this a.m. denies any pain.  Otherwise no complaints.  No fevers or chills.      Summary:  73 yo M w/ metastatic colon cancer followed by oncology locally, chronic LE wound sent to ER from wound care clinic due to worsening R heel wound w/ increasing erythema, swelling as well as fevers/chills, workup significant for leukocytosis, elevated HR, admitted with probable sepsis with LE cellulitis, noted area of fluctuance concerning for developing abscess.  CT of extremity showed an area consistent with abscess.  Treated with broad spectrum IV Abx.  Seen in consultation by vascular surgery and taken for excisional debridement on , noted deep infection with drainage of abscess, evidence of calcaneal osteomyelitis, surgical cultures obtained.  Seen in consultation by ID.    Objective:      Vital signs in last 24 hours:  Patient Vitals for the past 24 hrs:   BP Temp Temp src Pulse Resp SpO2 Weight   25 1146 (!) 170/80 -- -- 62 -- 97 % --   25 0819 (!) 181/87 97.7 °F (36.5 °C) Temporal 67 15 95 % --   25 0611 -- -- -- -- -- -- 83.1 kg (183 lb 3.2 oz)   25 0444 (!) 174/77 99 °F (37.2 °C) Temporal 67 18 93 % --   25 0040 (!) 178/86 98.8 °F (37.1 °C) Temporal 73 18 92 % --   25 (!) 163/79 98.6 °F (37 °C) Temporal 73 16 91 % --   25 1728 (!) 162/79 98.2 °F (36.8 °C) Temporal 72 16 91 % --     Physical Exam    General:  poor historian, no distress  Cardiac:  normal rate, regular rhythm  Respiratory :   no wheezes, no tachypnea, no use of accessory muscles  Abdomen: nondistended  Extremities: Right foot/heel wound with dressings in place    Updated wound images reviewed       Media Information      Document 
 Daily Progress Note    Date:2025  Patient: Damian You  : 1950  MRN:[id number]  CODE:Full Code No additional code details  PCP:Ramona Smith MD    Admit Date: 2025  3:13 PM   LOS: 3 days       Subjective:   No significant issues overnight or this a.m.  Doing fairly well since surgical debridement yesterday afternoon.      Summary:  75 yo M w/ metastatic colon cancer, chronic LE wound send to ER from wound care clinic due to worsening R heel wound w/ increasing erythema, swelling as well as fevers/chills, workup significant for leukocytosis, elevated HR, admitted with probable sepsis with LE cellulitis, noted area of fluctuance concerning for developing abscess.  CT of extremity showed findings concerning for abscess.  Treated with broad spectrum IV Abx.  Seen in consultation by vascular surgery and taken for excisional debridement on , noted deep infection with drainage of abscess, evidence of calcaneal osteomyelitis, surgical cultures obtained.    Objective:      Vital signs in last 24 hours:  Patient Vitals for the past 24 hrs:   BP Temp Temp src Pulse Resp SpO2   25 1200 (!) 158/76 99 °F (37.2 °C) Oral 65 16 91 %   25 1001 (!) 154/79 -- -- 67 -- --   25 0821 (!) 154/79 98.6 °F (37 °C) Oral 67 16 93 %   25 0743 -- -- -- -- -- 98 %   25 0517 (!) 147/67 98 °F (36.7 °C) -- 79 18 98 %   25 0124 (!) 145/68 98.6 °F (37 °C) -- 70 18 --   25 2009 (!) 156/74 97.6 °F (36.4 °C) Temporal 72 18 95 %   25 1602 (!) 169/70 -- -- 66 -- 97 %   25 1510 (!) 167/78 -- -- 68 16 94 %   25 1505 (!) 166/78 97.2 °F (36.2 °C) Temporal 69 17 94 %   06/21/25 1500 (!) 167/76 -- -- 71 17 93 %   25 1455 (!) 161/75 -- -- 74 18 92 %   25 1450 (!) 154/82 -- -- 77 17 95 %   25 1445 (!) 159/82 -- -- 75 15 93 %   25 1443 (!) 159/82 97 °F (36.1 °C) Temporal 76 16 95 %     Physical Exam    General:  poor historian, no distress  Cardiac:  
..4 Eyes Skin Assessment     NAME:  Damian You  YOB: 1950  MEDICAL RECORD NUMBER:  [id number]    The patient is being assessed for  Admission    I agree that at least one RN has performed a thorough Head to Toe Skin Assessment on the patient. ALL assessment sites listed below have been assessed.      Areas assessed by both nurses:    Head, Face, Ears, Shoulders, Back, Chest, Arms, Elbows, Hands, Sacrum. Buttock, Coccyx, Ischium, Legs. Feet and Heels, and Under Medical Devices         Does the Patient have a Wound? Yes wound(s) were present on assessment. LDA wound assessment was Initiated and completed by RN       Christiano Prevention initiated by RN: Yes  Wound Care Orders initiated by RN: No    Pressure Injury (Stage 3,4, Unstageable, DTI, NWPT, and Complex wounds) if present, place Wound referral order by RN under : No    New Ostomies, if present place, Ostomy referral order under : No     Nurse 1 eSignature: Electronically signed by Yelena Washington RN on 6/20/25 at 2:13 AM CDT    **SHARE this note so that the co-signing nurse can place an eSignature**    Nurse 2 eSignature: Electronically signed by Bety Arrington RN on 6/20/25 at 2:42 AM CDT   
[facility]   Pharmacy Pharmacokinetic Monitoring Service - Vancomycin    Consulting Provider: Lashell Snell   Indication: skin/Soft Tissue Infection  Target Concentration: Tr: 15-20  Day of Therapy: 4  Additional Antimicrobials: Maxipime and Flagyl    Pertinent Laboratory Values:   Wt Readings from Last 1 Encounters:   06/19/25 79.6 kg (175 lb 7.8 oz)     Temp Readings from Last 1 Encounters:   06/22/25 98.6 °F (37 °C)     Estimated Creatinine Clearance: 41 mL/min (A) (based on SCr of 1.7 mg/dL (H)).  Recent Labs     06/21/25  0333 06/22/25  0233   CREATININE 1.7* 1.7*   BUN 24* 27*   WBC 9.8 8.0     Procalcitonin: N/A    Pertinent Cultures:  Culture Date Source Results   06/21/25 06/19/25 Surgical    Blood No organisms seen    No growth   MRSA Nasal Swab: N/A. Non-respiratory infection.    Recent vancomycin administrations                     vancomycin (VANCOCIN) 1,250 mg in sodium chloride 0.9 % 250 mL IVPB (mg) 1,250 mg New Bag 06/21/25 0530    vancomycin (VANCOCIN) 1,000 mg in sodium chloride 0.9 % 250 mL IVPB ([id number]) (mg) 1,000 mg New Bag 06/20/25 0550    vancomycin (VANCOCIN) 1,750 mg in sodium chloride 0.9 % 500 mL IVPB (mg) 1,750 mg New Bag 06/19/25 1728                    Assessment:  Date/Time Current Dose Concentration Timing of Concentration (h) AUC   06/22/25 Pulse dosing 12.9 Random level     Note: Serum concentrations collected for AUC dosing may appear elevated if collected in close proximity to the dose administered, this is not necessarily an indication of toxicity    Plan:  Current dosing regimen is therapeutic  Give Vancomycin 1250mg X1 dose today, with random level tomorrow morning  Repeat vancomycin concentration ordered for 06/23 @ 0300   Pharmacy will continue to monitor patient and adjust therapy as indicated    Thank you for the consult,  Jose Jones RPH  6/22/2025 3:34 AM   
[facility]   Pharmacy Pharmacokinetic Monitoring Service - Vancomycin     Damian You is a 74 y.o. male starting on vancomycin therapy for Skin and Soft Tissue Infection. Pharmacy consulted by KAI George for monitoring and adjustment.    Target Concentration: Goal trough of 10-15 mg/L and AUC/JUANIS <500 mg*hr/L    Additional Antimicrobials: Cefepime 2000 mg  every 12 hours, will be adjusted by pharmacy due to renal function    Pertinent Laboratory Values:   Wt Readings from Last 1 Encounters:   06/19/25 74.8 kg (165 lb)     Temp Readings from Last 1 Encounters:   06/19/25 99.7 °F (37.6 °C) (Oral)     Estimated Creatinine Clearance: 33 mL/min (A) (based on SCr of 2.1 mg/dL (H)).  Recent Labs     06/19/25  1530   CREATININE 2.1*   BUN 27*   WBC 18.0*     Procalcitonin: N/A    Pertinent Cultures:  Culture Date Source Results   06/19/25 Blood Culture x 2 Pending   MRSA Nasal Swab: N/A. Non-respiratory infection.    Plan:  Concentration-guided dosing due to renal impairment/insufficiency  Start vancomycin 1750 mg x once Loading Dose, check level at 0400 on 6/20/25  Renal labs as indicated   Vancomycin concentration ordered for 06/20/2025 0400  Pharmacy will continue to monitor patient and adjust therapy as indicated    Thank you for the consult,  Viij Hamilton RPH  6/19/2025 3:33 PM   
[facility]   Pharmacy Pharmacokinetic Monitoring Service - Vancomycin    Consulting Provider: Lashell Snell   Indication: Skin/Soft Tissue Infection  Target Concentration: Tr: 15-20  Day of Therapy: 3  Additional Antimicrobials: Maxipime and Flagyl    Pertinent Laboratory Values:   Wt Readings from Last 1 Encounters:   06/19/25 79.6 kg (175 lb 7.8 oz)     Temp Readings from Last 1 Encounters:   06/21/25 98.7 °F (37.1 °C)     Estimated Creatinine Clearance: 41 mL/min (A) (based on SCr of 1.7 mg/dL (H)).  Recent Labs     06/20/25  0432 06/21/25  0333   CREATININE 1.8* 1.7*   BUN 23 24*   WBC 14.3* 9.8     Procalcitonin: N/A    Pertinent Cultures:  Culture Date Source Results   06/19/25 Blood No growth   MRSA Nasal Swab: N/A. Non-respiratory infection.    Recent vancomycin administrations                     vancomycin (VANCOCIN) 1,000 mg in sodium chloride 0.9 % 250 mL IVPB ([id number]) (mg) 1,000 mg New Bag 06/20/25 0550    vancomycin (VANCOCIN) 1,750 mg in sodium chloride 0.9 % 500 mL IVPB (mg) 1,750 mg New Bag 06/19/25 1728                    Assessment:  Date/Time Current Dose Concentration Timing of Concentration (h) AUC   06/21/25 Pulse dosing 10.5 Random level     Note: Serum concentrations collected for AUC dosing may appear elevated if collected in close proximity to the dose administered, this is not necessarily an indication of toxicity    Plan:  Current dosing regimen is therapeutic  Give Vancomycin 1250mg x1 dose, with random level tomorrow morning  Repeat vancomycin concentration ordered for 06/22 @ 0300   Pharmacy will continue to monitor patient and adjust therapy as indicated    Thank you for the consult,  Jose Jones RPH  6/21/2025 4:56 AM   
[facility]   Pharmacy Pharmacokinetic Monitoring Service - Vancomycin    Consulting Provider: Lashell Snell   Indication: Skin/Soft Tissue Infection  Target Concentration: Tr: 15-20  Day of Therapy: 2  Additional Antimicrobials: Flagyl and Maxipime    Pertinent Laboratory Values:   Wt Readings from Last 1 Encounters:   06/19/25 79.6 kg (175 lb 7.8 oz)     Temp Readings from Last 1 Encounters:   06/20/25 98.2 °F (36.8 °C) (Temporal)     Estimated Creatinine Clearance: 38 mL/min (A) (based on SCr of 1.8 mg/dL (H)).  Recent Labs     06/19/25  1530 06/20/25  0432   CREATININE 2.1* 1.8*   BUN 27* 23   WBC 18.0* 14.3*     Procalcitonin: N/A    Pertinent Cultures: No current cultures at this time  Culture Date Source Results        MRSA Nasal Swab: N/A. Non-respiratory infection.    Recent vancomycin administrations                     vancomycin (VANCOCIN) 1,750 mg in sodium chloride 0.9 % 500 mL IVPB (mg) 1,750 mg New Bag 06/19/25 0768                    Assessment:  Date/Time Current Dose Concentration Timing of Concentration (h) AUC   06/20/25 Pulse dosing 12 Random level     Note: Serum concentrations collected for AUC dosing may appear elevated if collected in close proximity to the dose administered, this is not necessarily an indication of toxicity    Plan:  Current dosing regimen is therapeutic  Give Vancomycin 1000mg x1 dose today, with random level tomorrow morning  Repeat vancomycin concentration ordered for 06/21 @ 0300   Pharmacy will continue to monitor patient and adjust therapy as indicated    Thank you for the consult,  Jose Jones RPH  6/20/2025 5:27 AM   
[facility]   Pharmacy Pharmacokinetic Monitoring Service - Vancomycin    Consulting Provider: Lashell Snell   Indication: Skin/Soft Tissue Infection  Target Concentration: Goal AUC/JUANIS 400-600 mg*hr/L  Day of Therapy: 5  Additional Antimicrobials: Maxipime and Flagyl    Pertinent Laboratory Values:   Wt Readings from Last 1 Encounters:   06/19/25 79.6 kg (175 lb 7.8 oz)     Temp Readings from Last 1 Encounters:   06/23/25 98.8 °F (37.1 °C) (Temporal)     Estimated Creatinine Clearance: 41 mL/min (A) (based on SCr of 1.7 mg/dL (H)).  Recent Labs     06/22/25  0233 06/23/25  0320   CREATININE 1.7* 1.7*   BUN 27* 25*   WBC 8.0 7.5     Procalcitonin: N/A    Pertinent Cultures:  Culture Date Source Results   06/21/25 06/19/25 Surgical  Blood No growth  No growth   MRSA Nasal Swab: N/A. Non-respiratory infection.    Recent vancomycin administrations                     vancomycin (VANCOCIN) 1,250 mg in sodium chloride 0.9 % 250 mL IVPB (mg) 1,250 mg New Bag 06/22/25 0455    vancomycin (VANCOCIN) 1,250 mg in sodium chloride 0.9 % 250 mL IVPB (mg) 1,250 mg New Bag 06/21/25 0530    vancomycin (VANCOCIN) 1,000 mg in sodium chloride 0.9 % 250 mL IVPB ([id number]) (mg) 1,000 mg New Bag 06/20/25 0550                    Assessment:  Date/Time Current Dose Concentration Timing of Concentration (h) AUC   06/23/25 Pulse dosing 14 Random level    Note: Serum concentrations collected for AUC dosing may appear elevated if collected in close proximity to the dose administered, this is not necessarily an indication of toxicity    Plan:  Current dosing regimen is therapeutic  Start Vancomycin 1250mg Q24hr (Zfy=017  Tr= 15.3)  Repeat vancomycin concentration ordered for 06/25 @ 0400   Pharmacy will continue to monitor patient and adjust therapy as indicated    Thank you for the consult,  Jose Jones [facility]  6/23/2025 4:18 AM   
[facility]   Pharmacy Pharmacokinetic Monitoring Service - Vancomycin    Consulting Provider: Lashell Snell   Indication: Skin/Soft Tissue Infection  Target Concentration: Goal AUC/JUANIS 400-600 mg*hr/L  Day of Therapy: 7  Additional Antimicrobials: Maxipime and Flagyl    Pertinent Laboratory Values:   Wt Readings from Last 1 Encounters:   06/24/25 84.3 kg (185 lb 13.6 oz)     Temp Readings from Last 1 Encounters:   06/25/25 98.4 °F (36.9 °C) (Temporal)     Estimated Creatinine Clearance: 46 mL/min (A) (based on SCr of 1.5 mg/dL (H)).  Recent Labs     06/24/25  0435 06/25/25  0406   CREATININE 1.6* 1.5*   BUN 28* 27*   WBC 7.6 7.8     Procalcitonin: N/A    Pertinent Cultures:  Culture Date Source Results   06/19/25 06/21/25 Blood  Surgical No growth  No growth   MRSA Nasal Swab: N/A. Non-respiratory infection.    Recent vancomycin administrations                     vancomycin (VANCOCIN) 1,250 mg in sodium chloride 0.9 % 250 mL IVPB (mg) 1,250 mg New Bag 06/24/25 0609      Restarted 06/23/25 0846     1,250 mg New Bag  0548                    Assessment:  Date/Time Current Dose Concentration Timing of Concentration (h) AUC   06/25/25 1250mg Q24hr 14.5 21hr 56min 467   Note: Serum concentrations collected for AUC dosing may appear elevated if collected in close proximity to the dose administered, this is not necessarily an indication of toxicity    Plan:  Current dosing regimen is therapeutic  Continue current dose  This is day 7 of ABX and will complete therapy.   Pharmacy will continue to monitor patient and adjust therapy as indicated    Thank you for the consult,  Jose Jones RPH  6/25/2025 5:33 AM   
Damian You arrived to room # 427-02.   Presented with: Sepsis   Mental Status: Patient is oriented, alert, coherent, logical, thought processes intact, and able to concentrate and follow conversation.   Vitals:   BP: 159/71  Pulse: 81  Resp: 18  Temp: 99  SpO2: 99%  Patient safety contract and falls prevention contract reviewed with patient Yes.  Oriented Patient to room.  Call light within reach. Yes.  Needs, issues or concerns expressed at this time: no.      Electronically signed by Yelena Washington RN on 6/20/2025 at 2:10 AM  
Infectious Diseases Progress Note    Patient:  Damian You  YOB: 1950  MRN: [id number]   Admit date: 6/19/2025   Admitting Physician: Martin Salinas MD  Primary Care Physician: Ramona Smith MD    Chief Complaint: Seen today in follow-up of right ankle wound/osteomyelitis    Interval History: He is brighter today.  He indicates he does feel a little bit better.  He indicates his energy is a little bit better.  He indicates he lives at home.  He has family that assists with his care.  He indicates somebody is in the home 24 hours a day.  He is planning to return home at discharge.  He has been tolerating his antibiotic treatment without difficulty.  Discussed his culture results with him.  Explained he has a large wound in the calcaneal area.  Explained there may be some underlying osteomyelitis.  Reviewed IV and oral antibiotic treatment options.  Feel his preference would be for oral.  Reviewed possible risks of fluoroquinolone treatment including tendon soreness, tendon weakening, and rarely tendon rupture.  Explained one of the advantages is that they have good oral absorption and bone penetration.  He accepts the risks of fluoroquinolone treatment.    In/Out  No intake or output data in the 24 hours ending 06/26/25 2297  Allergies: No Known Allergies  Current Meds: sodium hypochlorite (DAKINS) 0.125 % external solution, Daily  sodium chloride flush 0.9 % injection 5-40 mL, 2 times per day  sodium chloride flush 0.9 % injection 5-40 mL, PRN  0.9 % sodium chloride infusion, PRN  xeroform petrolatum gauze 5\"X9\" external pads 1 each, Daily  vancomycin (VANCOCIN) intermittent dosing (placeholder), RX Placeholder  levothyroxine (SYNTHROID) tablet 125 mcg, Daily  metoprolol succinate (TOPROL XL) extended release tablet 25 mg, BID  magnesium sulfate 2000 mg in 50 mL IVPB premix, PRN  enoxaparin (LOVENOX) injection 40 mg, Daily  ondansetron (ZOFRAN-ODT) disintegrating tablet 4 mg, Q8H PRN   
Occupational Therapy  Facility/Department: [facility] 4 ONCOLOGY UNIT  Occupational Therapy Initial Assessment    Name: Damian You  : 1950  MRN: [id number]  Date of Service: 2025    Discharge Recommendations:  Patient would benefit from continued therapy after discharge          Patient Diagnosis(es): The primary encounter diagnosis was Cellulitis of right lower extremity. Diagnoses of Acute kidney injury, Foot infection, and Acute osteomyelitis of right calcaneus (HCC) were also pertinent to this visit.  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  Past Surgical History:  has a past surgical history that includes Cardiac catheterization (14  [facility]); Cholecystectomy; Femur fracture surgery (Right, 10/30/2020); laparotomy (N/A, 2025); sigmoidoscopy (N/A, 02/10/2025); Port Surgery (Right, 2025); ep device procedure (N/A, 2025); and Foot Debridement (Right, 2025).           Assessment  Assessment: Evaluation completed and tx initiated.  The patient would benefit from further skilled therapy to upgrade safety and functional independence.  REQUIRES OT FOLLOW-UP: Yes  Activity Tolerance  Activity Tolerance: Patient Tolerated treatment well     Plan  Occupational Therapy Plan  Times Per Week: 3-5    Restrictions  Restrictions/Precautions  Restrictions/Precautions: Fall Risk  Position Activity Restriction  Other Position/Activity Restrictions: OFFLOAD R POSTERIOR HEEL, HAD HEEL PROTECTION BOOTS FLOR    Subjective  General  Chart Reviewed: Yes  Patient assessed 
Physical Therapy  Facility/Department: [facility] ONCOLOGY UNIT  Physical Therapy Initial Assessment    Name: Damian You  : 1950  MRN: [id number]  Date of Service: 2025    Discharge Recommendations:  Continue to assess pending progress, 24 hour supervision or assist          Patient Diagnosis(es): The primary encounter diagnosis was Cellulitis of right lower extremity. Diagnoses of Acute kidney injury, Foot infection, and Acute osteomyelitis of right calcaneus (HCC) were also pertinent to this visit.  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  Past Surgical History:  has a past surgical history that includes Cardiac catheterization (14  [facility]); Cholecystectomy; Femur fracture surgery (Right, 10/30/2020); laparotomy (N/A, 2025); sigmoidoscopy (N/A, 02/10/2025); Port Surgery (Right, 2025); ep device procedure (N/A, 2025); and Foot Debridement (Right, 2025).    Assessment  Body Structures, Functions, Activity Limitations Requiring Skilled Therapeutic Intervention: Decreased functional mobility ;Decreased ADL status;Decreased ROM;Decreased strength;Decreased endurance;Decreased balance  Assessment: Pt ABLE TO AMB SHORT DISTANCE IN ROOM WITH WALKER. WILL PROGRESS AS TOLERATED. Pt HOPING FOR HOME SOON. VOICES NO CONCERN FOR RETURNING HOME W/ FAMILY ASSIST PRN.  Requires PT Follow-Up: Yes  Activity Tolerance  Activity Tolerance: Patient tolerated evaluation without incident    Plan  Physical Therapy Plan  General Plan: 5-7 times per week  Current Treatment 
Recent Labs     06/19/25  1530   BUN 27*       Recent Labs     06/19/25  1530   CREATININE 2.1*       Estimated Creatinine Clearance: 33 mL/min (A) (based on SCr of 2.1 mg/dL (H)).      Plan: Changed Maxipime to 2gm Q24hr due to patients renal function and indication for use.         Electronically signed by Jose Jones RPH on 6/19/2025 at 8:32 PM    
[facility] infectious Diseases Progress Note    Patient:  Damian You  YOB: 1950  MRN: [id number]   Admit date: 6/19/2025   Admitting Physician: Jarred Navarro MD  Primary Care Physician: Ramona Smith MD    Chief Complaint: Seen today in follow-up of right ankle wound/osteomyelitis    Interval History: He is comfortable at present.  He reports no new symptoms.  He has no fevers or chills.  He has no diarrhea or rash with antibiotic treatment.  He reports no new problems overnight.  No pain or discomfort at his peripheral IV site.  He has been mildly hypertensive.  No hypotension.    In/Out    Intake/Output Summary (Last 24 hours) at 6/24/2025 0656  Last data filed at 6/24/2025 0439  Gross per 24 hour   Intake --   Output 1475 ml   Net -1475 ml     Allergies: No Known Allergies  Current Meds: vancomycin (VANCOCIN) 1,250 mg in sodium chloride 0.9 % 250 mL IVPB, Q24H  sodium hypochlorite (DAKINS) 0.125 % external solution, Daily  sodium chloride flush 0.9 % injection 5-40 mL, 2 times per day  sodium chloride flush 0.9 % injection 5-40 mL, PRN  0.9 % sodium chloride infusion, PRN  xeroform petrolatum gauze 5\"X9\" external pads 1 each, Daily  vancomycin (VANCOCIN) intermittent dosing (placeholder), RX Placeholder  levothyroxine (SYNTHROID) tablet 125 mcg, Daily  metoprolol succinate (TOPROL XL) extended release tablet 25 mg, BID  sodium chloride flush 0.9 % injection 5-40 mL, 2 times per day  sodium chloride flush 0.9 % injection 5-40 mL, PRN  0.9 % sodium chloride infusion, PRN  potassium chloride (KLOR-CON M) extended release tablet 40 mEq, PRN   Or  potassium bicarb-citric acid (EFFER-K) effervescent tablet 40 mEq, PRN   Or  potassium chloride 10 mEq/100 mL IVPB (Peripheral Line), PRN  magnesium sulfate 2000 mg in 50 mL IVPB premix, PRN  enoxaparin (LOVENOX) injection 40 mg, Daily  ondansetron (ZOFRAN-ODT) disintegrating tablet 4 mg, Q8H PRN   Or  ondansetron (ZOFRAN) injection 4 mg, Q6H 
This  visited with pt to follow up and provide spiritual care. Pt says he is a [employer] and his emerald is important to him. Pt did not voice spiritual needs or distress. This  provided spiritual care with sustaining presence, support, and prayer. Pt expressed gratitude for spiritual care.         Spiritual Health History and Assessment/Progress Note  [facility]    Spiritual/Emotional Needs,  ,  ,      Name: Damian You MRN: [id number]    Age: 74 y.o.     Sex: male   Language: English   Buddhism: None   Acute osteomyelitis of right calcaneus (HCC)     Date: 6/23/2025            Total Time Calculated: 10 min              Spiritual Assessment began in [facility] 4 ONCOLOGY UNIT        Referral/Consult From: Palliative Care   Encounter Overview/Reason: Spiritual/Emotional Needs  Service Provided For: Patient    Emerald, Belief, Meaning:   Patient identifies as spiritual and is connected with a emerald tradition or spiritual practice  Family/Friends No family/friends present      Importance and Influence:  Patient has spiritual/personal beliefs that influence decisions regarding their health  Family/Friends No family/friends present    Community:  Patient Other: Pt did not share a emerald community.   Family/Friends No family/friends present    Assessment and Plan of Care:     Patient Interventions include: Provided sacramental/Church ritual  Family/Friends Interventions include: No family/friends present    Patient Plan of Care: Spiritual Care available upon further referral  Family/Friends Plan of Care: No family/friends present    Electronically signed by Mary Behrens, Chaplain on 6/23/2025 at 2:50 PM    
Vascular lab preliminary.  Right leg venous scan completed.  Limited visualization of calf veins due to edema and patient immobility.  No evidence for DVT, SVT, or reflux noted at this time.  Final report pending.  
day    xeroform petrolatum gauze 5\"X9\"  1 each Topical Daily    vancomycin (VANCOCIN) intermittent dosing (placeholder)   Other RX Placeholder    levothyroxine  125 mcg Oral Daily    metoprolol succinate  25 mg Oral BID    sodium chloride flush  5-40 mL IntraVENous 2 times per day    enoxaparin  40 mg SubCUTAneous Daily    cefepime  2,000 mg IntraVENous Q24H    metroNIDAZOLE  500 mg IntraVENous Q8H     Continuous Infusions:   lactated ringers 100 mL/hr at 06/21/25 2303    sodium chloride      sodium chloride       PRN Meds:sodium chloride flush, 5-40 mL, PRN  sodium chloride, , PRN  sodium chloride flush, 5-40 mL, PRN  sodium chloride, , PRN  potassium chloride, 40 mEq, PRN   Or  potassium alternative oral replacement, 40 mEq, PRN   Or  potassium chloride, 10 mEq, PRN  magnesium sulfate, 2,000 mg, PRN  ondansetron, 4 mg, Q8H PRN   Or  ondansetron, 4 mg, Q6H PRN  polyethylene glycol, 17 g, Daily PRN  acetaminophen, 650 mg, Q6H PRN   Or  acetaminophen, 650 mg, Q6H PRN  HYDROcodone 5 mg - acetaminophen, 1 tablet, Q6H PRN          PHYSICAL EXAM:     CONSTITUTIONAL: Alert and oriented times 3, no acute distress and cooperative to examination.  HEENT: Head is normocephalic, atraumatic. EOMI, PERRLA  NECK: Soft, trachea midline and straight  LUNGS: Chest expands equally bilaterally upon respiration, no accessory muscle used.   CARDIOVASCULAR: Heart regular rate and rhythm  ABDOMEN: Ostomy is functioning  WOUND/INCISION: Dressing intact      LABS:       CBC:   Recent Labs     06/20/25  0432 06/21/25  0333 06/22/25  0233   WBC 14.3* 9.8 8.0   RBC 3.30* 3.11* 3.14*   HGB 10.0* 9.3* 9.4*   HCT 30.5* 28.9* 29.7*   MCV 92.4 92.9 94.6*   MCH 30.3 29.9 29.9   MCHC 32.8* 32.2* 31.6*   RDW 15.4* 15.6* 15.6*    221 245   MPV 8.9* 9.1* 8.8*      Last 3 CMP:   Recent Labs     06/19/25  1530 06/20/25  0432 06/21/25  0333 06/22/25  0233   * 136 135* 138   K 4.4 4.5 4.0 4.6    106 106 109*   CO2 18* 20* 19* 21*   BUN 
   xeroform petrolatum gauze 5\"X9\"  1 each Topical Daily    vancomycin (VANCOCIN) intermittent dosing (placeholder)   Other RX Placeholder    levothyroxine  125 mcg Oral Daily    metoprolol succinate  25 mg Oral BID    enoxaparin  40 mg SubCUTAneous Daily    cefepime  2,000 mg IntraVENous Q24H    metroNIDAZOLE  500 mg IntraVENous Q8H     sodium chloride flush, sodium chloride, magnesium sulfate, ondansetron **OR** ondansetron, polyethylene glycol, acetaminophen **OR** acetaminophen, HYDROcodone 5 mg - acetaminophen  ADULT DIET; Regular; 3 carb choices (45 gm/meal); Low Fat/Low Chol/High Fiber/RAMÍREZ  ADULT ORAL NUTRITION SUPPLEMENT; Lunch; Diabetic Oral Supplement     Lab and other Data:     Recent Labs     06/23/25  0320 06/24/25  0435 06/25/25  0406   WBC 7.5 7.6 7.8   HGB 9.4* 10.0* 10.1*    316 319     Recent Labs     06/23/25 0320 06/24/25  0435 06/25/25  0406    137 136   K 4.7 4.1 3.9    107 106   CO2 21* 20* 21*   BUN 25* 28* 27*   CREATININE 1.7* 1.6* 1.5*   GLUCOSE 86 79 79     No results for input(s): \"AST\", \"ALT\", \"BILITOT\", \"ALKPHOS\" in the last 72 hours.    Invalid input(s): \"ALB\"    RAD:   CT FOOT RIGHT W CONTRAST  Result Date: 6/20/2025  EXAM: CT RIGHT FOOT WITH CONTRAST  HISTORY: possible abscess R foot/heel  TECHNIQUE: Helical axial sections were obtained through the foot following IV administration of iodinated contrast.  Coronal and sagittal reformatted images were obtained from the axial source images.  Images were reviewed on a high-resolution PACS workstation.  DICOM images are available. CT Dose Reduction Techniques Performed: Yes.  COMPARISON: None.  FINDINGS: There is an acute comminuted fracture of the posterior superior calcaneous with moderate displacement.  There is nonenhancing fluid noted within the fracture.  There is patchy osteopenia in the adjacent bone. The possibility of underlying osteomyelitis is not excluded.  Small amount of fluid extends medial to the 
CALCIUM 8.4 06/23/2025 03:20 AM    CALCIUM 8.1 06/22/2025 02:33 AM    CALCIUM 8.2 06/21/2025 03:33 AM      Lactic Acid:   Lab Results   Component Value Date/Time    LACTA 1.7 06/19/2025 03:30 PM    LACTA 1.2 02/05/2025 02:22 PM    LACTA 2.8 09/16/2020 03:55 PM      DVT prophylaxis: [x] Lovenox                                  ASSESSMENT:   Procedure 6/21/25: Excisional debridement of the right heel with calcaneal bone. Cavity depth 4 cm    HD # 4  Active Hospital Problems    Diagnosis Date Noted    Acute osteomyelitis of right calcaneus ([facility]) [M86.171] 06/22/2025    Non-pressure chronic ulcer of right calf with fat layer exposed ([facility]) [L97.212] 06/19/2025    Sepsis ([facility]) [A41.9] 06/19/2025    Colostomy in place ([facility]) [Z93.3] 06/19/2025    Cellulitis of right lower extremity [L03.115] 06/19/2025    Carcinoma of colon metastatic to liver (HCC) [C18.9, C78.7] 02/10/2025    Adenocarcinoma of colon (HCC) [C18.9] 02/05/2025    Acute kidney injury superimposed on CKD [N17.9, N18.9] 11/03/2020    Atrial fibrillation ([facility]) [I48.91] 09/01/2020    Heart failure with recovered ejection fraction (HFrecEF) ([facility]) [I50.32] 07/28/2020    Palliative care patient [Z51.5] 06/29/2020       Chief Complaint:  Chief Complaint   Patient presents with    Wound Infection     Right ankle redness, swelling; sent by wound care    Fever    Chills       PLAN:     Antibiotic coverage per ID Service. Spoke with micro in lab, the bone and culture pending.  Daily dressing changes to RLE.  Continue heel offloading  ONS per Dietary                         
2,000 mg, IntraVENous, Q24H, Nena Snell APRN, Last Rate: 25 mL/hr at 06/20/25 1639, 2,000 mg at 06/20/25 1639    metroNIDAZOLE (FLAGYL) 500 mg in 0.9% NaCl 100 mL IVPB premix, 500 mg, IntraVENous, Q8H, Nena Snell APRN, Stopped at 06/20/25 1419    HYDROcodone-acetaminophen (NORCO) 5-325 MG per tablet 1 tablet, 1 tablet, Oral, Q6H PRN, Nena Snell APRN      Assessment/Plan  Principal Problem:    Sepsis (HCC)  Active Problems:    Acute kidney injury superimposed on CKD    Cellulitis of right lower extremity    Palliative care patient    Heart failure with recovered ejection fraction (HFrecEF) (HCC)    Atrial fibrillation (HCC)    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 *    Probable sepsis on admission --- adequated treated  -follow cultures    RLE cellulitis with probable developing abscess  --continue IV Vancomycin, Cefepime, and Flayl  --obtain LE CT scan - reviewed - area concerning for abscess  --consult Vascular surgery  --wound care    GER on CKD  --improving, Cr with some downtrend, continue crystalloid maintenance IVFs    Chronic HF with recovered EF on most recent echocardiograms  Monitor volume status    Dvt ppx Lovenox    Status discussed with nursing staff    Multiple complex medical problems  Morbidity and mortality high risk  Medical decision making high complexity        Jarred Navarro MD 6/20/2025 5:55 PM    
displaced fracture of the posterior superior calcaneous with adjacent bony lucency/osteopenia suggesting possible adjacent osteomyelitis depending on the course/chronicity.  Clinical correlation is necessary.  Consider further evaluation with MRI without and with contrast. 2. Fluid collection containing gas at the lateral ankle soft tissues abutting the lateral calcaneus fracture as above consistent with abscess or infected hematoma.  This abuts the lateral margin of the fracture line.   ________________________________  All CT scans are performed using dose optimization techniques as appropriate to the performed exam and include at least one of the following: Automated exposure control, adjustment of the mA and/or kV according to size, and the use of iterative reconstruction technique.  ______________________________________ Electronically signed by: NANCIE BLACKBURN M.D. Date:     06/20/2025 Time:    10:59     XR FOOT RIGHT (2 VIEWS)  Result Date: 6/19/2025   No acute osseous abnormality.  No osteomyelitis.  Kiera's deformity. Large posterior calcaneal enthesophyte.   ______________________________________ Electronically signed by: SORIN MARTELL M.D. 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 *     Probable sepsis on admission --- appropriately treated     Osteomyelitis of right calcaneus  RLE cellulitis with abscess  --s/p surgical drainage of abscess on

## 2025-06-26 NOTE — DISCHARGE SUMMARY
[address] [place] [address]    DEPARTMENT OF HOSPITALIST MEDICINE      DISCHARGE SUMMARY:      PATIENT NAME:  Damian You  :  1950  MRN:  [id number]    Admission Date:   2025  3:13 PM Attending: aMrtin Salinas MD   Discharge Date:   2025              PCP: Ramona Smith MD  Length of Stay: 7 days     Chief Complaint on Admission:   Chief Complaint   Patient presents with    Wound Infection     Right ankle redness, swelling; sent by wound care    Fever    Chills       Consultants:     IP CONSULT TO ONCOLOGY  IP CONSULT TO PALLIATIVE CARE  IP CONSULT TO PHARMACY  IP CONSULT TO VASCULAR SURGERY  IP CONSULT TO INFECTIOUS DISEASES  IP CONSULT TO HOME CARE NEEDS       CUMULATIVE  HOSPITAL  COURSE  AND  TREATMENT:  73 yo M w/ metastatic colon cancer followed by oncology locally, chronic LE wound sent to ER from wound care clinic due to worsening R heel wound w/ increasing erythema, swelling as well as fevers/chills, workup significant for leukocytosis, elevated HR, admitted with probable sepsis with LE cellulitis, noted area of fluctuance concerning for developing abscess.  CT of extremity showed an area consistent with abscess.  Treated with broad spectrum IV Abx.  Seen in consultation by vascular surgery and taken for excisional debridement on , noted deep infection with drainage of abscess, evidence of calcaneal osteomyelitis, surgical cultures obtained.  Seen in consultation by ID.  Possibility of amputation considered by vascular surgery, not pursuing at this time.      ABX transitioned to PO Clindamycin and Levaquin to continue for 1 month after discharge. Prescriptions were sent.   PT/OT eval completed.    Pt not interested in SNF placement, opted to return home with home health and family assistance. He plans to perform wound care himself with family assistance and was instructed by nursing prior to discharge.    Pt discharged home in stable condition. Outpatient wound

## 2025-06-26 NOTE — DISCHARGE INSTRUCTIONS
RIGHT CALF LEG: Cleanse with soap and water with each dressing change. Apply Xeroform cut slightly larger than the wound and doubled. Cover with ABD and secure with roll gauze. Change daily and PRN    RIGHT HEEL: Dakins moistened 2\" stretch gauze roll gently packed into open wounds/tunnels right heel daily. IF difficulty packing in the stretch gauze, may switch to 1/2\" NuGauze moistened with Dakins. Cover moist dressings with single layer of Vaseline Gauze, dry fluffs, and gauze roll. Place blue shoe cover over dressing and replace the heel-lift boot.

## 2025-06-26 NOTE — PLAN OF CARE
Problem: Chronic Conditions and Co-morbidities  Goal: Patient's chronic conditions and co-morbidity symptoms are monitored and maintained or improved  6/26/2025 0514 by Theodora Brown RN  Outcome: Progressing  6/25/2025 1705 by Doni Rich RN  Outcome: Progressing     Problem: ABCDS Injury Assessment  Goal: Absence of physical injury  6/26/2025 0514 by Theodora Brown RN  Outcome: Progressing  6/25/2025 1705 by Doni Rich RN  Outcome: Progressing     Problem: Safety - Adult  Goal: Free from fall injury  6/26/2025 0514 by Theodora Brown RN  Outcome: Progressing  6/25/2025 1705 by Doni Rich RN  Outcome: Progressing     Problem: Skin/Tissue Integrity  Goal: Skin integrity remains intact  Description: 1.  Monitor for areas of redness and/or skin breakdown  2.  Assess vascular access sites hourly  3.  Every 4-6 hours minimum:  Change oxygen saturation probe site  4.  Every 4-6 hours:  If on nasal continuous positive airway pressure, respiratory therapy assess nares and determine need for appliance change or resting period  6/26/2025 0514 by Theodora Brown RN  Outcome: Progressing  6/25/2025 1705 by Doni Rich RN  Outcome: Progressing     Problem: Discharge Planning  Goal: Discharge to home or other facility with appropriate resources  6/26/2025 0514 by Theodora Brown RN  Outcome: Progressing  6/25/2025 1705 by Doni Rich RN  Outcome: Progressing

## 2025-06-27 ENCOUNTER — CARE COORDINATION (OUTPATIENT)
Dept: CASE MANAGEMENT | Age: 75
End: 2025-06-27

## 2025-06-27 NOTE — CARE COORDINATION
06/27/25 0752   IMM Letter   IMM Letter given to Patient/Family/Significant other/Guardian/POA/by: Gail Xiong   IMM Letter date given: 06/26/25     Patient declined waiting 4 hr period prior to discharge.  Second IMM given to patient and explained with patient verbalizing understanding.  All questions and concerns addressed     Signed letter placed in pt soft chart  Electronically signed by ADRIAN FOWLER on 6/27/2025 at 7:53 AM

## 2025-06-27 NOTE — CARE COORDINATION
Care Transitions Note    Initial Call - Call within 2 business days of discharge: Yes    Attempted to reach patient for transitions of care follow up. Unable to reach patient.    Outreach Attempts:   Unable to leave message.     Patient: Damian You    Patient : 1950   MRN: [id number]    Reason for Admission:   Discharge Date: 25  RURS: Readmission Risk Score: 26    Last Discharge Facility       Date Complaint Diagnosis Description Type Department Provider    25 Wound Infection; Fever; Chills Cellulitis of right lower extremity ... ED to Hosp-Admission (Discharged) (ADMITTED) [facility] Martin Salinas MD; Kj Navarro..            Was this an external facility discharge? No    Follow Up Appointment:   Patient does not have a follow up appointment scheduled at time of call. Routed to PCP office.   Future Appointments         Provider Specialty Dept Phone    2025 2:15 PM Yuniel Aponte MD Wound Ostomy [phone]    2025 11:00 AM INFUSION SCHEDULE, [facility] Infusion Therapy [phone]    2025 11:30 AM Halle Boswell MD Hematology and Oncology [phone]    2025 10:00 AM Ramona Smith MD Primary Care [phone]    10/14/2025 9:00 AM Ramona Smith MD Primary Care [phone]    2025 10:30 AM Lila Camarena, APRN Cardiology [phone]            Plan for follow-up on next business day.      Lamberto Nickerson RN

## 2025-06-30 ENCOUNTER — CARE COORDINATION (OUTPATIENT)
Dept: CASE MANAGEMENT | Age: 75
End: 2025-06-30

## 2025-06-30 NOTE — CARE COORDINATION
Care Transitions Note    Initial Call - Call within 2 business days of discharge: Yes    Attempted to reach patient for transitions of care follow up. Unable to reach patient.    Outreach Attempts:   Multiple attempts to contact patient at phone numbers on file.   Unable to leave message. Voice mailbox is full.      Patient: Damian You    Patient : 1950   MRN: [id number]    Reason for Admission: Osteomyelitis of Right Calcaneus  Discharge Date: 25  RURS: Readmission Risk Score: 26    Last Discharge Facility       Date Complaint Diagnosis Description Type Department Provider    25 Wound Infection; Fever; Chills Cellulitis of right lower extremity ... ED to Hosp-Admission (Discharged) (ADMITTED) [facility] Martin Salinas MD; Kj Navarro..          Was this an external facility discharge? No    Follow Up Appointment:   Patient has hospital follow up appointment scheduled within 7 days of discharge.    Future Appointments         Provider Specialty Dept Phone    2025  2:15 PM Yuniel Aponte MD Wound Ostomy [phone]    7/3/2025 2:00 PM Ramona Smith MD Primary Care [phone]    2025 11:00 AM INFUSION SCHEDULE, [facility] Infusion Therapy [phone]    2025 11:30 AM Halle Boswell MD Hematology and Oncology [phone]    2025 10:00 AM Ramona Smith MD Primary Care [phone]    10/14/2025 9:00 AM Ramona Smith MD Primary Care [phone]    2025 10:30 AM Lila Camarena, APRN Cardiology [phone]          No further follow-up call indicated     Idania Robert RN

## 2025-07-01 ENCOUNTER — HOSPITAL ENCOUNTER (OUTPATIENT)
Dept: WOUND CARE | Age: 75
Discharge: HOME | End: 2025-07-01
Attending: SURGERY
Payer: MEDICARE

## 2025-07-01 VITALS
RESPIRATION RATE: 18 BRPM | DIASTOLIC BLOOD PRESSURE: 81 MMHG | SYSTOLIC BLOOD PRESSURE: 170 MMHG | HEART RATE: 74 BPM | TEMPERATURE: 97.8 F

## 2025-07-01 DIAGNOSIS — L98.492 ABDOMINAL WALL SKIN ULCER, WITH FAT LAYER EXPOSED (HCC): Primary | ICD-10-CM

## 2025-07-01 DIAGNOSIS — L97.412 ULCER OF RIGHT HEEL, WITH FAT LAYER EXPOSED (HCC): Chronic | ICD-10-CM

## 2025-07-01 DIAGNOSIS — L97.212 NON-PRESSURE CHRONIC ULCER OF RIGHT CALF WITH FAT LAYER EXPOSED (HCC): ICD-10-CM

## 2025-07-01 DIAGNOSIS — S31.819A BUTTOCK WOUND, RIGHT, INITIAL ENCOUNTER: ICD-10-CM

## 2025-07-01 PROCEDURE — 11042 DBRDMT SUBQ TIS 1ST 20SQCM/<: CPT

## 2025-07-01 PROCEDURE — 11042 DBRDMT SUBQ TIS 1ST 20SQCM/<: CPT | Performed by: SURGERY

## 2025-07-01 RX ORDER — BETAMETHASONE DIPROPIONATE 0.5 MG/G
CREAM TOPICAL PRN
OUTPATIENT
Start: 2025-07-01

## 2025-07-01 RX ORDER — LIDOCAINE HYDROCHLORIDE 20 MG/ML
JELLY TOPICAL PRN
Status: DISCONTINUED | OUTPATIENT
Start: 2025-07-01 | End: 2025-07-03 | Stop reason: HOSPADM

## 2025-07-01 RX ORDER — MUPIROCIN 2 %
OINTMENT (GRAM) TOPICAL PRN
OUTPATIENT
Start: 2025-07-01

## 2025-07-01 RX ORDER — BACITRACIN ZINC AND POLYMYXIN B SULFATE 500; 1000 [USP'U]/G; [USP'U]/G
OINTMENT TOPICAL PRN
OUTPATIENT
Start: 2025-07-01

## 2025-07-01 RX ORDER — LIDOCAINE HYDROCHLORIDE 20 MG/ML
JELLY TOPICAL PRN
OUTPATIENT
Start: 2025-07-01

## 2025-07-01 RX ORDER — LIDOCAINE HYDROCHLORIDE 40 MG/ML
SOLUTION TOPICAL PRN
OUTPATIENT
Start: 2025-07-01

## 2025-07-01 RX ORDER — LIDOCAINE 50 MG/G
OINTMENT TOPICAL PRN
OUTPATIENT
Start: 2025-07-01

## 2025-07-01 RX ORDER — NEOMYCIN/BACITRACIN/POLYMYXINB 3.5-400-5K
OINTMENT (GRAM) TOPICAL PRN
OUTPATIENT
Start: 2025-07-01

## 2025-07-01 RX ORDER — GINSENG 100 MG
CAPSULE ORAL PRN
OUTPATIENT
Start: 2025-07-01

## 2025-07-01 RX ORDER — TRIAMCINOLONE ACETONIDE 1 MG/G
OINTMENT TOPICAL PRN
OUTPATIENT
Start: 2025-07-01

## 2025-07-01 RX ORDER — CLOBETASOL PROPIONATE 0.5 MG/G
OINTMENT TOPICAL PRN
OUTPATIENT
Start: 2025-07-01

## 2025-07-01 RX ORDER — SILVER SULFADIAZINE 10 MG/G
CREAM TOPICAL PRN
OUTPATIENT
Start: 2025-07-01

## 2025-07-01 RX ORDER — GENTAMICIN SULFATE 1 MG/G
OINTMENT TOPICAL PRN
OUTPATIENT
Start: 2025-07-01

## 2025-07-01 RX ORDER — LIDOCAINE 40 MG/G
CREAM TOPICAL PRN
OUTPATIENT
Start: 2025-07-01

## 2025-07-01 RX ORDER — SODIUM CHLOR/HYPOCHLOROUS ACID 0.033 %
SOLUTION, IRRIGATION IRRIGATION PRN
OUTPATIENT
Start: 2025-07-01

## 2025-07-01 RX ADMIN — LIDOCAINE HYDROCHLORIDE: 20 JELLY TOPICAL at 14:48

## 2025-07-01 NOTE — PATIENT INSTRUCTIONS
[facility] Wound Care and Hyperbaric Oxygen Therapy   Physician Orders and Discharge Instructions  [address] [place] [address]  [address]  [place] [address]  Telephone: [phone]      FAX [phone]    NAME:  Damian You  YOB: 1950  MEDICAL RECORD NUMBER:  [id number]  DATE:  7/1/2025    Discharge condition: Stable    Discharge to: Home    Left via:Private automobile    Accompanied by: Family    ECF/HHA: None    Dressing Orders: Right Heel Wounds  Soap and water wash  Dakins moist gauze tucked into the wound, cover with dry gauze roll gauze and medipore tape, change twice daily   Wear heel lift boot or elevate foot off mattress when in bed  High protein diet as tolerated   Continue Antibiotic as prescribed     [facility] follow up visit ___________1 week__________________  (Please note your next appointment above and if you are unable to keep, kindly give a 24 hour notice. Thank you.)    If you experience any of the following, please call the Wound Care Center during business hours:    * Increase in Pain  * Temperature over 101  * Increase in drainage from your wound  * Drainage with a foul odor  * Bleeding  * Increase in swelling  * Need for compression bandage changes due to slippage, breakthrough drainage.    If you need medical attention outside of the business hours of the Wound Care Centers please contact your PCP or go to the nearest emergency room.

## 2025-07-02 PROBLEM — R77.0 LOW SERUM ALBUMIN: Status: ACTIVE | Noted: 2025-07-02

## 2025-07-02 NOTE — PROGRESS NOTES
capsule  Commonly known as: CLEOCIN  Take 2 capsules by mouth every 8 hours for 89 doses     ferrous sulfate 325 (65 Fe) MG tablet  Commonly known as: IRON 325  Take 1 tablet by mouth 2 times daily     folic acid 1 MG tablet  Commonly known as: FOLVITE  TAKE 1 TABLET BY MOUTH DAILY     levoFLOXacin 750 MG tablet  Commonly known as: LEVAQUIN  Take 1 tablet by mouth every 48 hours for 14 doses     levothyroxine 125 MCG tablet  Commonly known as: SYNTHROID  TAKE 1 TABLET BY MOUTH ONCE DAILY     metoprolol succinate 25 MG extended release tablet  Commonly known as: TOPROL XL  Take 1 tablet by mouth in the morning and at bedtime     sodium hypochlorite 0.125 % Soln external solution  Commonly known as: DAKINS  Apply topically daily     xeroform petrolatum gauze 5\"X9\" Misc external pads  Apply 1 each topically daily               Where to Get Your Medications        These medications were sent to [facility] [place] [address] - P [phone] - F [phone]  [facility] [address] [address]      Phone: [phone]   vitamin B-1 100 MG tablet           Medications marked \"taking\" at this time  Outpatient Medications Marked as Taking for the 7/3/25 encounter (Office Visit) with Ramona Smith MD   Medication Sig Dispense Refill    vitamin B-1 (THIAMINE) 100 MG tablet Take 1 tablet by mouth daily 90 tablet 1    sodium hypochlorite (DAKINS) 0.125 % SOLN external solution Apply topically daily 573 mL 1    metoprolol succinate (TOPROL XL) 25 MG extended release tablet Take 1 tablet by mouth in the morning and at bedtime 60 tablet 1    Bismuth Tribromoph-Petrolatum (XEROFORM PETROLATUM GAUZE 5\"X9\") MISC external pads Apply 1 each topically daily 50 each 1    levoFLOXacin (LEVAQUIN) 750 MG tablet Take 1 tablet by mouth every 48 hours for 14 doses 14 tablet 0    clindamycin (CLEOCIN) 300 MG capsule Take 2 capsules by mouth every 8 hours for 89 doses 178 capsule 0    levothyroxine (SYNTHROID) 125 MCG

## 2025-07-02 NOTE — ASSESSMENT & PLAN NOTE
Monitored by specialist- no acute findings meriting change in the plan  Patient being treated for Osteomyelitis,ff by ID and will be seeing wound care, prognosis poor

## 2025-07-03 ENCOUNTER — OFFICE VISIT (OUTPATIENT)
Dept: PRIMARY CARE CLINIC | Age: 75
End: 2025-07-03

## 2025-07-03 VITALS
RESPIRATION RATE: 97 BRPM | BODY MASS INDEX: 26.13 KG/M2 | DIASTOLIC BLOOD PRESSURE: 90 MMHG | HEART RATE: 72 BPM | SYSTOLIC BLOOD PRESSURE: 150 MMHG | TEMPERATURE: 98.1 F | HEIGHT: 70 IN

## 2025-07-03 DIAGNOSIS — E03.9 ACQUIRED HYPOTHYROIDISM: ICD-10-CM

## 2025-07-03 DIAGNOSIS — M86.171 ACUTE OSTEOMYELITIS OF RIGHT CALCANEUS (HCC): Primary | ICD-10-CM

## 2025-07-03 DIAGNOSIS — Z09 HOSPITAL DISCHARGE FOLLOW-UP: ICD-10-CM

## 2025-07-03 DIAGNOSIS — E51.9 THIAMINE DEFICIENCY: ICD-10-CM

## 2025-07-03 DIAGNOSIS — F10.10 ALCOHOL ABUSE: Chronic | ICD-10-CM

## 2025-07-03 DIAGNOSIS — C18.9 ADENOCARCINOMA OF COLON (HCC): ICD-10-CM

## 2025-07-03 DIAGNOSIS — E43 SEVERE MALNUTRITION: ICD-10-CM

## 2025-07-03 DIAGNOSIS — R77.0 LOW SERUM ALBUMIN: ICD-10-CM

## 2025-07-03 RX ORDER — THIAMINE MONONITRATE (VIT B1) 100 MG
100 TABLET ORAL DAILY
Qty: 90 TABLET | Refills: 1 | Status: SHIPPED | OUTPATIENT
Start: 2025-07-03 | End: 2025-12-30

## 2025-07-05 NOTE — PROGRESS NOTES
all the questions to the patient’s satisfaction. I have also reviewed the chief complaint (CC) and part of the history (History of Present Illness (HPI), Past Family Social History (PFSH), or Review of Systems (ROS) and made changes when appropriated.       (Please note that portions of this note were completed with a voice recognition program. Efforts were made to edit the dictations but occasionally words are mis-transcribed.)Electronically signed by Halle Boswell MD on 7/7/2025 at 1:29 PM

## 2025-07-07 ENCOUNTER — HOSPITAL ENCOUNTER (OUTPATIENT)
Dept: INFUSION THERAPY | Age: 75
Discharge: HOME | End: 2025-07-07
Payer: MEDICARE

## 2025-07-07 ENCOUNTER — OFFICE VISIT (OUTPATIENT)
Dept: HEMATOLOGY | Age: 75
End: 2025-07-07
Payer: MEDICARE

## 2025-07-07 VITALS
OXYGEN SATURATION: 96 % | RESPIRATION RATE: 18 BRPM | DIASTOLIC BLOOD PRESSURE: 71 MMHG | SYSTOLIC BLOOD PRESSURE: 142 MMHG | HEART RATE: 61 BPM | HEIGHT: 70 IN | TEMPERATURE: 97.4 F | BODY MASS INDEX: 23.62 KG/M2 | WEIGHT: 165 LBS

## 2025-07-07 DIAGNOSIS — C18.9 ADENOCARCINOMA OF COLON (HCC): ICD-10-CM

## 2025-07-07 DIAGNOSIS — Z71.89 CARE PLAN DISCUSSED WITH PATIENT: ICD-10-CM

## 2025-07-07 DIAGNOSIS — C18.9 ADENOCARCINOMA OF COLON (HCC): Primary | ICD-10-CM

## 2025-07-07 DIAGNOSIS — Z51.11 CHEMOTHERAPY MANAGEMENT, ENCOUNTER FOR: ICD-10-CM

## 2025-07-07 DIAGNOSIS — Z11.59 ENCOUNTER FOR SCREENING FOR OTHER VIRAL DISEASES: Primary | ICD-10-CM

## 2025-07-07 DIAGNOSIS — D64.81 ANTINEOPLASTIC CHEMOTHERAPY INDUCED ANEMIA: ICD-10-CM

## 2025-07-07 DIAGNOSIS — Z51.12 ENCOUNTER FOR ANTINEOPLASTIC IMMUNOTHERAPY: ICD-10-CM

## 2025-07-07 DIAGNOSIS — R53.81 PHYSICAL DECONDITIONING: ICD-10-CM

## 2025-07-07 DIAGNOSIS — R54 FRAILTY SYNDROME IN GERIATRIC PATIENT: ICD-10-CM

## 2025-07-07 DIAGNOSIS — E83.42 HYPOMAGNESEMIA: ICD-10-CM

## 2025-07-07 DIAGNOSIS — E83.39 HYPOPHOSPHATEMIA: ICD-10-CM

## 2025-07-07 DIAGNOSIS — E86.0 DEHYDRATION: ICD-10-CM

## 2025-07-07 DIAGNOSIS — T45.1X5A ANEMIA ASSOCIATED WITH CHEMOTHERAPY: ICD-10-CM

## 2025-07-07 DIAGNOSIS — T45.1X5D ADVERSE EFFECT OF CHEMOTHERAPY, SUBSEQUENT ENCOUNTER: ICD-10-CM

## 2025-07-07 DIAGNOSIS — N28.9 RENAL IMPAIRMENT: ICD-10-CM

## 2025-07-07 DIAGNOSIS — D64.81 ANEMIA ASSOCIATED WITH CHEMOTHERAPY: ICD-10-CM

## 2025-07-07 DIAGNOSIS — T45.1X5A ANTINEOPLASTIC CHEMOTHERAPY INDUCED ANEMIA: ICD-10-CM

## 2025-07-07 LAB
ALBUMIN SERPL-MCNC: 3.3 G/DL (ref 3.5–5.2)
ALP SERPL-CCNC: 132 U/L (ref 40–129)
ALT SERPL-CCNC: 16 U/L (ref 5–41)
ANION GAP SERPL CALCULATED.3IONS-SCNC: 12 MMOL/L (ref 7–19)
AST SERPL-CCNC: 39 U/L (ref 5–40)
BASOPHILS # BLD: 0.12 K/UL (ref 0–0.2)
BASOPHILS NFR BLD: 1.2 % (ref 0–1)
BILIRUB SERPL-MCNC: 0.4 MG/DL (ref 0–1.2)
BUN SERPL-MCNC: 31 MG/DL (ref 8–23)
CALCIUM SERPL-MCNC: 8.7 MG/DL (ref 8.8–10.2)
CEA SERPL-MCNC: 50.6 NG/ML (ref 0–4.7)
CHLORIDE SERPL-SCNC: 100 MMOL/L (ref 98–107)
CO2 SERPL-SCNC: 21 MMOL/L (ref 22–29)
CREAT SERPL-MCNC: 2.4 MG/DL (ref 0.7–1.2)
EOSINOPHIL # BLD: 0.27 K/UL (ref 0–0.6)
EOSINOPHIL NFR BLD: 2.6 % (ref 0–5)
ERYTHROCYTE [DISTWIDTH] IN BLOOD BY AUTOMATED COUNT: 15.2 % (ref 11.5–14.5)
GLUCOSE SERPL-MCNC: 87 MG/DL (ref 70–99)
HCT VFR BLD AUTO: 33.5 % (ref 42–52)
HGB BLD-MCNC: 11.1 G/DL (ref 14–18)
LYMPHOCYTES # BLD: 1.33 K/UL (ref 1.1–4.5)
LYMPHOCYTES NFR BLD: 13 % (ref 20–40)
MAGNESIUM SERPL-MCNC: 2 MG/DL (ref 1.6–2.4)
MCH RBC QN AUTO: 29.8 PG (ref 27–31)
MCHC RBC AUTO-ENTMCNC: 33.1 G/DL (ref 33–37)
MCV RBC AUTO: 90.1 FL (ref 80–94)
MONOCYTES # BLD: 0.89 K/UL (ref 0–0.9)
MONOCYTES NFR BLD: 8.7 % (ref 1–10)
NEUTROPHILS # BLD: 7.57 K/UL (ref 1.5–7.5)
NEUTS SEG NFR BLD: 73.9 % (ref 50–65)
PHOSPHATE SERPL-MCNC: 4.1 MG/DL (ref 2.5–4.5)
PLATELET # BLD AUTO: 358 K/UL (ref 130–400)
PMV BLD AUTO: 8.3 FL (ref 9.4–12.4)
POTASSIUM SERPL-SCNC: 4.5 MMOL/L (ref 3.5–5.1)
PROT SERPL-MCNC: 7.1 G/DL (ref 6.4–8.3)
RBC # BLD AUTO: 3.72 M/UL (ref 4.7–6.1)
SODIUM SERPL-SCNC: 133 MMOL/L (ref 136–145)
WBC # BLD AUTO: 10.24 K/UL (ref 4.8–10.8)

## 2025-07-07 PROCEDURE — 99214 OFFICE O/P EST MOD 30 MIN: CPT | Performed by: INTERNAL MEDICINE

## 2025-07-07 PROCEDURE — 96360 HYDRATION IV INFUSION INIT: CPT

## 2025-07-07 PROCEDURE — 3017F COLORECTAL CA SCREEN DOC REV: CPT | Performed by: INTERNAL MEDICINE

## 2025-07-07 PROCEDURE — 84100 ASSAY OF PHOSPHORUS: CPT

## 2025-07-07 PROCEDURE — 2500000003 HC RX 250 WO HCPCS: Performed by: INTERNAL MEDICINE

## 2025-07-07 PROCEDURE — G8428 CUR MEDS NOT DOCUMENT: HCPCS | Performed by: INTERNAL MEDICINE

## 2025-07-07 PROCEDURE — 36415 COLL VENOUS BLD VENIPUNCTURE: CPT

## 2025-07-07 PROCEDURE — 1111F DSCHRG MED/CURRENT MED MERGE: CPT | Performed by: INTERNAL MEDICINE

## 2025-07-07 PROCEDURE — 6360000002 HC RX W HCPCS: Performed by: INTERNAL MEDICINE

## 2025-07-07 PROCEDURE — 96375 TX/PRO/DX INJ NEW DRUG ADDON: CPT

## 2025-07-07 PROCEDURE — 85025 COMPLETE CBC W/AUTO DIFF WBC: CPT

## 2025-07-07 PROCEDURE — 96417 CHEMO IV INFUS EACH ADDL SEQ: CPT

## 2025-07-07 PROCEDURE — 2580000003 HC RX 258: Performed by: INTERNAL MEDICINE

## 2025-07-07 PROCEDURE — 83735 ASSAY OF MAGNESIUM: CPT

## 2025-07-07 PROCEDURE — 96361 HYDRATE IV INFUSION ADD-ON: CPT

## 2025-07-07 PROCEDURE — 3078F DIAST BP <80 MM HG: CPT | Performed by: INTERNAL MEDICINE

## 2025-07-07 PROCEDURE — 82378 CARCINOEMBRYONIC ANTIGEN: CPT

## 2025-07-07 PROCEDURE — 1123F ACP DISCUSS/DSCN MKR DOCD: CPT | Performed by: INTERNAL MEDICINE

## 2025-07-07 PROCEDURE — 96415 CHEMO IV INFUSION ADDL HR: CPT

## 2025-07-07 PROCEDURE — G2211 COMPLEX E/M VISIT ADD ON: HCPCS | Performed by: INTERNAL MEDICINE

## 2025-07-07 PROCEDURE — 3077F SYST BP >= 140 MM HG: CPT | Performed by: INTERNAL MEDICINE

## 2025-07-07 PROCEDURE — 1036F TOBACCO NON-USER: CPT | Performed by: INTERNAL MEDICINE

## 2025-07-07 PROCEDURE — 96413 CHEMO IV INFUSION 1 HR: CPT

## 2025-07-07 PROCEDURE — 80053 COMPREHEN METABOLIC PANEL: CPT

## 2025-07-07 PROCEDURE — G8420 CALC BMI NORM PARAMETERS: HCPCS | Performed by: INTERNAL MEDICINE

## 2025-07-07 RX ORDER — SODIUM CHLORIDE 9 MG/ML
5-250 INJECTION, SOLUTION INTRAVENOUS PRN
Status: CANCELLED | OUTPATIENT
Start: 2025-07-07

## 2025-07-07 RX ORDER — HEPARIN 100 UNIT/ML
500 SYRINGE INTRAVENOUS PRN
OUTPATIENT
Start: 2025-07-07

## 2025-07-07 RX ORDER — FAMOTIDINE 10 MG/ML
20 INJECTION, SOLUTION INTRAVENOUS ONCE
Status: COMPLETED | OUTPATIENT
Start: 2025-07-07 | End: 2025-07-07

## 2025-07-07 RX ORDER — DIPHENHYDRAMINE HYDROCHLORIDE 50 MG/ML
50 INJECTION, SOLUTION INTRAMUSCULAR; INTRAVENOUS
Status: CANCELLED | OUTPATIENT
Start: 2025-07-07

## 2025-07-07 RX ORDER — ACETAMINOPHEN 325 MG/1
650 TABLET ORAL
Status: CANCELLED | OUTPATIENT
Start: 2025-07-07

## 2025-07-07 RX ORDER — SODIUM CHLORIDE 0.9 % (FLUSH) 0.9 %
5-40 SYRINGE (ML) INJECTION PRN
Status: DISCONTINUED | OUTPATIENT
Start: 2025-07-07 | End: 2025-07-08 | Stop reason: HOSPADM

## 2025-07-07 RX ORDER — DEXTROSE MONOHYDRATE 50 MG/ML
5-250 INJECTION, SOLUTION INTRAVENOUS PRN
Status: CANCELLED | OUTPATIENT
Start: 2025-07-07

## 2025-07-07 RX ORDER — HYDROCORTISONE SODIUM SUCCINATE 100 MG/2ML
100 INJECTION INTRAMUSCULAR; INTRAVENOUS
Status: CANCELLED | OUTPATIENT
Start: 2025-07-07

## 2025-07-07 RX ORDER — HEPARIN SODIUM (PORCINE) LOCK FLUSH IV SOLN 100 UNIT/ML 100 UNIT/ML
500 SOLUTION INTRAVENOUS PRN
Status: CANCELLED | OUTPATIENT
Start: 2025-07-07

## 2025-07-07 RX ORDER — SODIUM CHLORIDE 9 MG/ML
5-250 INJECTION, SOLUTION INTRAVENOUS PRN
OUTPATIENT
Start: 2025-07-07

## 2025-07-07 RX ORDER — MEPERIDINE HYDROCHLORIDE 50 MG/ML
12.5 INJECTION INTRAMUSCULAR; INTRAVENOUS; SUBCUTANEOUS PRN
Status: CANCELLED | OUTPATIENT
Start: 2025-07-07

## 2025-07-07 RX ORDER — SODIUM CHLORIDE 0.9 % (FLUSH) 0.9 %
5-40 SYRINGE (ML) INJECTION PRN
OUTPATIENT
Start: 2025-07-07

## 2025-07-07 RX ORDER — PALONOSETRON 0.05 MG/ML
0.25 INJECTION, SOLUTION INTRAVENOUS ONCE
Status: COMPLETED | OUTPATIENT
Start: 2025-07-07 | End: 2025-07-07

## 2025-07-07 RX ORDER — FAMOTIDINE 10 MG/ML
20 INJECTION, SOLUTION INTRAVENOUS ONCE
Status: CANCELLED
Start: 2025-07-07 | End: 2025-07-07

## 2025-07-07 RX ORDER — 0.9 % SODIUM CHLORIDE 0.9 %
1000 INTRAVENOUS SOLUTION INTRAVENOUS ONCE
Status: COMPLETED | OUTPATIENT
Start: 2025-07-07 | End: 2025-07-07

## 2025-07-07 RX ORDER — HEPARIN 100 UNIT/ML
500 SYRINGE INTRAVENOUS PRN
Status: DISCONTINUED | OUTPATIENT
Start: 2025-07-07 | End: 2025-07-08 | Stop reason: HOSPADM

## 2025-07-07 RX ORDER — DEXAMETHASONE SODIUM PHOSPHATE 10 MG/ML
10 INJECTION, SOLUTION INTRAMUSCULAR; INTRAVENOUS ONCE
Status: COMPLETED | OUTPATIENT
Start: 2025-07-07 | End: 2025-07-07

## 2025-07-07 RX ORDER — PALONOSETRON 0.05 MG/ML
0.25 INJECTION, SOLUTION INTRAVENOUS ONCE
Status: CANCELLED | OUTPATIENT
Start: 2025-07-07 | End: 2025-07-07

## 2025-07-07 RX ORDER — SODIUM CHLORIDE 9 MG/ML
INJECTION, SOLUTION INTRAVENOUS CONTINUOUS
Status: CANCELLED | OUTPATIENT
Start: 2025-07-07

## 2025-07-07 RX ORDER — DEXTROSE MONOHYDRATE 50 MG/ML
5-250 INJECTION, SOLUTION INTRAVENOUS PRN
Status: DISCONTINUED | OUTPATIENT
Start: 2025-07-07 | End: 2025-07-07

## 2025-07-07 RX ORDER — EPINEPHRINE 1 MG/ML
0.3 INJECTION, SOLUTION, CONCENTRATE INTRAVENOUS PRN
Status: CANCELLED | OUTPATIENT
Start: 2025-07-07

## 2025-07-07 RX ORDER — SODIUM CHLORIDE 0.9 % (FLUSH) 0.9 %
5-40 SYRINGE (ML) INJECTION PRN
Status: CANCELLED | OUTPATIENT
Start: 2025-07-07

## 2025-07-07 RX ORDER — 0.9 % SODIUM CHLORIDE 0.9 %
1000 INTRAVENOUS SOLUTION INTRAVENOUS ONCE
OUTPATIENT
Start: 2025-07-07 | End: 2025-07-07

## 2025-07-07 RX ORDER — ONDANSETRON 2 MG/ML
8 INJECTION INTRAMUSCULAR; INTRAVENOUS
Status: CANCELLED | OUTPATIENT
Start: 2025-07-07

## 2025-07-07 RX ORDER — ALBUTEROL SULFATE 90 UG/1
4 INHALANT RESPIRATORY (INHALATION) PRN
Status: CANCELLED | OUTPATIENT
Start: 2025-07-07

## 2025-07-07 RX ORDER — FAMOTIDINE 10 MG/ML
20 INJECTION, SOLUTION INTRAVENOUS
Status: CANCELLED | OUTPATIENT
Start: 2025-07-07

## 2025-07-07 RX ADMIN — HEPARIN 500 UNITS: 100 SYRINGE at 16:12

## 2025-07-07 RX ADMIN — SODIUM CHLORIDE, PRESERVATIVE FREE 10 ML: 5 INJECTION INTRAVENOUS at 16:12

## 2025-07-07 RX ADMIN — DEXAMETHASONE SODIUM PHOSPHATE 10 MG: 10 INJECTION, SOLUTION INTRAMUSCULAR; INTRAVENOUS at 13:08

## 2025-07-07 RX ADMIN — OXALIPLATIN 95 MG: 5 INJECTION, SOLUTION INTRAVENOUS at 14:30

## 2025-07-07 RX ADMIN — FAMOTIDINE 20 MG: 10 INJECTION, SOLUTION INTRAVENOUS at 13:08

## 2025-07-07 RX ADMIN — PANITUMUMAB 432 MG: 400 SOLUTION INTRAVENOUS at 13:29

## 2025-07-07 RX ADMIN — PALONOSETRON 0.25 MG: 0.05 INJECTION, SOLUTION INTRAVENOUS at 13:08

## 2025-07-07 RX ADMIN — SODIUM CHLORIDE 1000 ML: 0.9 INJECTION, SOLUTION INTRAVENOUS at 13:08

## 2025-07-08 ENCOUNTER — HOSPITAL ENCOUNTER (OUTPATIENT)
Dept: WOUND CARE | Age: 75
Discharge: HOME | End: 2025-07-08
Payer: MEDICARE

## 2025-07-08 VITALS
TEMPERATURE: 97.3 F | SYSTOLIC BLOOD PRESSURE: 153 MMHG | DIASTOLIC BLOOD PRESSURE: 90 MMHG | HEART RATE: 89 BPM | BODY MASS INDEX: 23.62 KG/M2 | HEIGHT: 70 IN | WEIGHT: 165 LBS | RESPIRATION RATE: 18 BRPM

## 2025-07-08 DIAGNOSIS — L98.492 ABDOMINAL WALL SKIN ULCER, WITH FAT LAYER EXPOSED (HCC): Primary | Chronic | ICD-10-CM

## 2025-07-08 DIAGNOSIS — L97.412 ULCER OF RIGHT HEEL, WITH FAT LAYER EXPOSED (HCC): Chronic | ICD-10-CM

## 2025-07-08 DIAGNOSIS — S31.819A BUTTOCK WOUND, RIGHT, INITIAL ENCOUNTER: Chronic | ICD-10-CM

## 2025-07-08 DIAGNOSIS — L97.212 NON-PRESSURE CHRONIC ULCER OF RIGHT CALF WITH FAT LAYER EXPOSED (HCC): Chronic | ICD-10-CM

## 2025-07-08 PROCEDURE — 97597 DBRDMT OPN WND 1ST 20 CM/<: CPT

## 2025-07-08 PROCEDURE — 97597 DBRDMT OPN WND 1ST 20 CM/<: CPT | Performed by: SURGERY

## 2025-07-08 RX ORDER — SODIUM CHLOR/HYPOCHLOROUS ACID 0.033 %
SOLUTION, IRRIGATION IRRIGATION PRN
OUTPATIENT
Start: 2025-07-08

## 2025-07-08 RX ORDER — LIDOCAINE HYDROCHLORIDE 20 MG/ML
JELLY TOPICAL PRN
OUTPATIENT
Start: 2025-07-08

## 2025-07-08 RX ORDER — LIDOCAINE 50 MG/G
OINTMENT TOPICAL PRN
OUTPATIENT
Start: 2025-07-08

## 2025-07-08 RX ORDER — BACITRACIN ZINC AND POLYMYXIN B SULFATE 500; 1000 [USP'U]/G; [USP'U]/G
OINTMENT TOPICAL PRN
OUTPATIENT
Start: 2025-07-08

## 2025-07-08 RX ORDER — LIDOCAINE 40 MG/G
CREAM TOPICAL PRN
OUTPATIENT
Start: 2025-07-08

## 2025-07-08 RX ORDER — MUPIROCIN 2 %
OINTMENT (GRAM) TOPICAL PRN
OUTPATIENT
Start: 2025-07-08

## 2025-07-08 RX ORDER — LIDOCAINE HYDROCHLORIDE 20 MG/ML
JELLY TOPICAL PRN
Status: DISCONTINUED | OUTPATIENT
Start: 2025-07-08 | End: 2025-07-10 | Stop reason: HOSPADM

## 2025-07-08 RX ORDER — NEOMYCIN/BACITRACIN/POLYMYXINB 3.5-400-5K
OINTMENT (GRAM) TOPICAL PRN
OUTPATIENT
Start: 2025-07-08

## 2025-07-08 RX ORDER — GINSENG 100 MG
CAPSULE ORAL PRN
OUTPATIENT
Start: 2025-07-08

## 2025-07-08 RX ORDER — BETAMETHASONE DIPROPIONATE 0.5 MG/G
CREAM TOPICAL PRN
OUTPATIENT
Start: 2025-07-08

## 2025-07-08 RX ORDER — GENTAMICIN SULFATE 1 MG/G
OINTMENT TOPICAL PRN
OUTPATIENT
Start: 2025-07-08

## 2025-07-08 RX ORDER — CLOBETASOL PROPIONATE 0.5 MG/G
OINTMENT TOPICAL PRN
OUTPATIENT
Start: 2025-07-08

## 2025-07-08 RX ORDER — TRIAMCINOLONE ACETONIDE 1 MG/G
OINTMENT TOPICAL PRN
OUTPATIENT
Start: 2025-07-08

## 2025-07-08 RX ORDER — SILVER SULFADIAZINE 10 MG/G
CREAM TOPICAL PRN
OUTPATIENT
Start: 2025-07-08

## 2025-07-08 RX ORDER — LIDOCAINE HYDROCHLORIDE 40 MG/ML
SOLUTION TOPICAL PRN
OUTPATIENT
Start: 2025-07-08

## 2025-07-08 RX ADMIN — LIDOCAINE HYDROCHLORIDE: 20 JELLY TOPICAL at 11:38

## 2025-07-08 NOTE — PROGRESS NOTES
[facility]   Progress Note and Procedure Note      Dmaian You  MEDICAL RECORD NUMBER:  [id number]  AGE: 74 y.o.   GENDER: male  : 1950  EPISODE DATE:  2025    Subjective:     Chief Complaint   Patient presents with    Wound Check     Pt presents for recheck of right heel wounds         HISTORY of PRESENT ILLNESS HPI     Damian You is a 74 y.o. male who presents today for wound/ulcer evaluation.   Wound Context: Pt with R heel wound on chemo here for eval/treat  Wound/Ulcer Pain Timing/Severity: none  Quality of pain: N/A  Severity:  0 / 10   Modifying Factors: None  Associated Signs/Symptoms: none    Ulcer Identification:  Ulcer Type: pressure  Contributing Factors: immunosuppression    Wound: pressure        PAST MEDICAL HISTORY        Diagnosis Date    Atrial fibrillation (HCC) 2020    CAD (coronary artery disease)     Carcinoma of colon metastatic to liver (HCC) 02/10/2025    CKD stage 3a, GFR 45-59 ml/min ([facility]) 2022    Colostomy in place ([facility]) 2025    Gout     Hypertension     Hypotension 2020    Mixed hyperlipidemia 2020    Non-ischemic cardiomyopathy ([facility]) 2020    Ejection fraction 5-10% by cath, 2020, 40% by echo, 2020, [facility]      Non-pressure chronic ulcer of right calf with fat layer exposed ([facility]) 2025    Non-ST elevation MI (NSTEMI) ([facility]) 2014    Palliative care patient 2020    Pneumonia due to infectious organism 2020       PAST SURGICAL HISTORY    Past Surgical History:   Procedure Laterality Date    CARDIAC CATHETERIZATION  14  [facility]    angioplasty, stent to LAD. EF 45%    CHOLECYSTECTOMY      EP DEVICE PROCEDURE N/A 2025    Insert PPM dual performed by Calvin Martinez MD at [facility] CARDIAC CATH LAB    FEMUR FRACTURE SURGERY Right 10/30/2020    TFN, SHORT performed by Manuelito Biggs MD at [facility] OR    FOOT DEBRIDEMENT Right 2025    FOOT DEBRIDEMENT INCISION AND DRAINAGE WITH

## 2025-07-08 NOTE — PATIENT INSTRUCTIONS
[facility] Wound Care and Hyperbaric Oxygen Therapy   Physician Orders and Discharge Instructions  [address] [place] [address]  [address]  [place] [address]  Telephone: [phone]      FAX [phone]    NAME:  Damian You  YOB: 1950  MEDICAL RECORD NUMBER:  [id number]  DATE:  7/8/2025    Discharge condition: Stable    Discharge to: Home    Left via:Private automobile    Accompanied by: Family    ECF/HHA: None    Dressing Orders: Right Heel Wounds  Soap and water wash  Dakins moist gauze tucked into the wound, cover with dry gauze roll gauze and medipore tape, change twice daily  Wear heel lift boot or elevate foot off mattress when in bed  High protein diet as tolerated  Continue Antibiotic as prescribed    [facility] follow up visit ___________1 week__________________  (Please note your next appointment above and if you are unable to keep, kindly give a 24 hour notice. Thank you.)    If you experience any of the following, please call the Wound Care Center during business hours:    * Increase in Pain  * Temperature over 101  * Increase in drainage from your wound  * Drainage with a foul odor  * Bleeding  * Increase in swelling  * Need for compression bandage changes due to slippage, breakthrough drainage.    If you need medical attention outside of the business hours of the Wound Care Centers please contact your PCP or go to the nearest emergency room.

## 2025-07-09 ENCOUNTER — OFFICE VISIT (OUTPATIENT)
Age: 75
End: 2025-07-09
Payer: MEDICARE

## 2025-07-09 ENCOUNTER — TELEPHONE (OUTPATIENT)
Age: 75
End: 2025-07-09
Payer: MEDICARE

## 2025-07-09 VITALS
TEMPERATURE: 98 F | DIASTOLIC BLOOD PRESSURE: 78 MMHG | HEART RATE: 65 BPM | WEIGHT: 182 LBS | OXYGEN SATURATION: 98 % | HEIGHT: 70 IN | SYSTOLIC BLOOD PRESSURE: 133 MMHG | BODY MASS INDEX: 26.05 KG/M2

## 2025-07-09 DIAGNOSIS — S91.001D ANKLE WOUND, RIGHT, SUBSEQUENT ENCOUNTER: Primary | ICD-10-CM

## 2025-07-09 PROCEDURE — 99214 OFFICE O/P EST MOD 30 MIN: CPT | Performed by: INTERNAL MEDICINE

## 2025-07-09 PROCEDURE — 1159F MED LIST DOCD IN RCRD: CPT | Performed by: INTERNAL MEDICINE

## 2025-07-09 PROCEDURE — 1160F RVW MEDS BY RX/DR IN RCRD: CPT | Performed by: INTERNAL MEDICINE

## 2025-07-09 RX ORDER — THIAMINE MONONITRATE (VIT B1) 100 MG
TABLET ORAL
COMMUNITY

## 2025-07-09 RX ORDER — FERROUS SULFATE 325(65) MG
1 TABLET ORAL 2 TIMES DAILY
COMMUNITY
Start: 2025-02-25

## 2025-07-09 RX ORDER — LEVOTHYROXINE SODIUM 125 UG/1
125 TABLET ORAL DAILY
COMMUNITY
Start: 2025-06-09

## 2025-07-09 RX ORDER — LORAZEPAM 0.5 MG/1
0.5 TABLET ORAL EVERY 6 HOURS PRN
COMMUNITY
Start: 2025-05-14

## 2025-07-09 RX ORDER — LEVOFLOXACIN 750 MG/1
750 TABLET, FILM COATED ORAL DAILY
COMMUNITY
Start: 2025-06-28 | End: 2025-07-26

## 2025-07-09 RX ORDER — ATORVASTATIN CALCIUM 20 MG/1
20 TABLET, FILM COATED ORAL EVERY EVENING
COMMUNITY
Start: 2025-04-28

## 2025-07-09 RX ORDER — FOLIC ACID 1 MG/1
1000 TABLET ORAL DAILY
COMMUNITY

## 2025-07-09 RX ORDER — SODIUM HYPOCHLORITE 1.25 MG/ML
SOLUTION TOPICAL DAILY
COMMUNITY
Start: 2025-06-27

## 2025-07-09 RX ORDER — HYDROCODONE BITARTRATE AND ACETAMINOPHEN 5; 325 MG/1; MG/1
1 TABLET ORAL EVERY 4 HOURS PRN
COMMUNITY
Start: 2025-06-26

## 2025-07-09 RX ORDER — DIPHENOXYLATE HYDROCHLORIDE AND ATROPINE SULFATE 2.5; .025 MG/1; MG/1
1 TABLET ORAL DAILY
COMMUNITY

## 2025-07-09 RX ORDER — BISMUTH TRIBROMOPH/PETROLATUM 5"X9"
1 BANDAGE TOPICAL
COMMUNITY
Start: 2025-06-27

## 2025-07-09 RX ORDER — CAPECITABINE 500 MG/1
1000 TABLET, FILM COATED ORAL
COMMUNITY
Start: 2025-06-05 | End: 2025-08-29

## 2025-07-09 RX ORDER — ONDANSETRON 4 MG/1
1 TABLET, FILM COATED ORAL
COMMUNITY
Start: 2025-03-12

## 2025-07-09 RX ORDER — AMIODARONE HYDROCHLORIDE 200 MG/1
0.5 TABLET ORAL DAILY
COMMUNITY
Start: 2025-05-14

## 2025-07-09 RX ORDER — CLINDAMYCIN HYDROCHLORIDE 300 MG/1
600 CAPSULE ORAL 3 TIMES DAILY
COMMUNITY
Start: 2025-06-26 | End: 2025-07-27

## 2025-07-09 RX ORDER — METOPROLOL SUCCINATE 25 MG/1
25 TABLET, EXTENDED RELEASE ORAL DAILY
COMMUNITY
Start: 2025-06-26 | End: 2025-08-26

## 2025-07-09 RX ORDER — LISINOPRIL 40 MG/1
40 TABLET ORAL DAILY
COMMUNITY

## 2025-07-09 RX ORDER — PANTOPRAZOLE SODIUM 40 MG/1
40 TABLET, DELAYED RELEASE ORAL DAILY
COMMUNITY

## 2025-07-09 NOTE — TELEPHONE ENCOUNTER
I called patient's friend and authorized medical contact Bryant who accompanied him in the office today, and let him know Dr. Gastelum said he can finish both oral antibiotics (clindamycin and levofloxacin) after last dose on July 16, 2025.  Please contact our office if any concerns or questions.  He thanked me for calling.

## 2025-07-09 NOTE — PROGRESS NOTES
"[facility] [place]    Patient:  Leo Gardiner  YOB: 1950  MRN: [id number]   Primary Care Physician: Patricia Blackwell MD  Referring Physician: No ref. provider found     Chief Complaint:  Hospital follow-up of right ankle wound/osteomyelitis with remarks \"it turned out not to be a bone infection\"    Interval History/HPI: He is here today for follow-up.  He is being seen in wound posterior right heel/calcaneal area.  He is currently at home.  He gets dressing changes.  He is here with a friend.  Both indicate it has been improving.  He has been tolerating levofloxacin and clindamycin without side effect.  He did not describe any joint or bone related symptoms.  He has not had nausea, diarrhea, or rash with his antibiotic treatment.    Allergies: No Known Allergies    Current Scheduled Medications:   Current Outpatient Medications on File Prior to Visit   Medication Sig    capecitabine (XELODA) 500 MG chemo tablet Take 2 tablets by mouth.    clindamycin (CLEOCIN) 300 MG capsule Take 2 capsules by mouth 3 (Three) Times a Day.    levoFLOXacin (LEVAQUIN) 750 MG tablet Take 1 tablet by mouth Daily. (Patient taking differently: Take 1 tablet by mouth Every Other Day.)    levothyroxine (SYNTHROID, LEVOTHROID) 125 MCG tablet Take 1 tablet by mouth Daily.    LORazepam (ATIVAN) 0.5 MG tablet Take 1 tablet by mouth Every 6 (Six) Hours As Needed.    metoprolol succinate XL (TOPROL-XL) 25 MG 24 hr tablet Take 1 tablet by mouth Daily.    sodium hypochlorite (DAKIN'S 1/4 STRENGTH) 0.125 % solution topical solution 0.125% Apply  topically to the appropriate area as directed Daily.    amiodarone (PACERONE) 200 MG tablet Take 0.5 tablets by mouth Daily.    ascorbic acid (VITAMIN C) 250 MG chewable tablet chewable tablet Chew 1 tablet Daily.    atorvastatin (LIPITOR) 20 MG tablet Take 1 tablet by mouth Every Evening.    Bismuth Tribromoph-Petrolatum (Xeroform Petrolat Gauze 5\"x9\") misc Apply 1 " "each topically to the appropriate area as directed. (Patient not taking: Reported on 7/9/2025)    ferrous sulfate 325 (65 FE) MG tablet Take 1 tablet by mouth 2 (Two) Times a Day.    folic acid (FOLVITE) 1 MG tablet Take 1 tablet by mouth Daily.    HYDROcodone-acetaminophen (NORCO) 5-325 MG per tablet Take 1 tablet by mouth Every 4 (Four) Hours As Needed.    lisinopril (PRINIVIL,ZESTRIL) 40 MG tablet Take 1 tablet by mouth Daily.    multivitamin (MULTIPLE VITAMIN PO) Take 1 tablet by mouth Daily.    ondansetron (ZOFRAN) 4 MG tablet Take 1 tablet by mouth every 6 (six) to 8 (eight) hours as needed for Moderate Pain. (Patient not taking: Reported on 7/9/2025)    pantoprazole (PROTONIX) 40 MG EC tablet Take 1 tablet by mouth Daily.    thiamine (VITAMIN B-1) 100 MG tablet Vitamin B-1     No current facility-administered medications on file prior to visit.      Venous Access Review  Line/IV site: No current IV Access    Antimicrobial Review  Currently on antibiotics/antifungals: YES/NO: YES  Start Date of Therapy: Clindamycin and Levofloxacin 6/26/2025 (not sure if he is taking both)  If therapy completed, date complete: na  Estimated end of treatment date (EOT): 7/26/2025    Review of Systems See HPI.    Vital Signs:  /78   Pulse 65   Temp 98 °F (36.7 °C) (Temporal)   Ht 177.8 cm (70\")   Wt 82.6 kg (182 lb) Comment: stated weight  SpO2 98%   BMI 26.11 kg/m²     Physical Exam  Vital signs - reviewed.  Alert, pleasant, smiling, interactive, and in no distress  Alert, pleasant, no distress  Wound posterior right ankle area showing definite improvement  The more distal wound is granulating well and filling in  The proximal wound has some visible subcu fat/tissue but no purulence, necrosis, or surrounding erythema  There is no odor  There does not appear to be significant signs suggestive of infection at this time  Dressing removed from right heel/calcaneal area and photos taken today:      Pictures of right " heel/calcaneal area taken at wound care yesterday were reviewed and are outlined below.  Scan on 7/8/2025 1135: Wound 06/19/25 Heel Right Wound #1 Right heel surgical           Scan on 7/8/2025 1134: Wound 07/01/25 Heel Right #2 Right posterior heel superior           Lab/Imaging/Other Information:   Prior cultures, path report, and wound care notes reviewed and outlined below.    Microbiology  Surgical cultures June 21, 2025 labeled right heel bone:  Rare white blood cells, no organisms, culture no growth to date.  Gram Stain Result  Rare WBC's (Polymorphonuclear) present  No organisms seen  Culture Surgical  No growth to date P  Anaerobic Culture  No anaerobes to date,will hold 5 days     Tissue Pathology Exam (06/21/2025 13:33)   FINAL DIAGNOSIS:     Right heel bone, biopsy:   Viable bone with focal acute osteomyelitis.   Adherent granulation tissue.     WOUND CARE - SCAN - KRISTI BRYSON MD - PAULETTE - 07/08/2025 (07/08/2025)     Impression & Recommendations:   Diagnoses and all orders for this visit:    1. Ankle wound, right, subsequent encounter (Primary)    He appears to be doing very well.  The right ankle wound appears to be improving.  Feel underlying osteomyelitis is unlikely based on his clinical course and the above information.  To be very conservative would like to continue his antibiotic treatment for 1 more week.  He is tolerating well.  Want to make sure any residual soft tissue infection is completely treated.  He will continue local wound care.  He has follow-up with wound care, his oncologist, and his primary care physician.  Would be happy to reassess if no ongoing improvement or new problems develop.  Otherwise see orders below and follow-up with infectious diseases as needed.    Orders today:  Stop clindamycin after last dose on July 16, 2025  Stop levofloxacin after last dose on July 16, 2025  Keep follow-up with wound care  Would be happy to reassess if signs or symptoms suggestive of  infection-pain, fever, redness, or purulence, or no ongoing granulation/improvement.  Please call if any additional questions for infectious diseases-otherwise follow-up with ID as needed.    Follow Up:   There are no Patient Instructions on file for this visit.  Return if symptoms worsen or fail to improve.  Patient was provided After Visit Summary.     MD Patricia Fang MD Jason Banister, MD

## 2025-07-15 ENCOUNTER — HOSPITAL ENCOUNTER (OUTPATIENT)
Dept: WOUND CARE | Age: 75
Discharge: HOME | End: 2025-07-15
Attending: SURGERY
Payer: MEDICARE

## 2025-07-15 VITALS
BODY MASS INDEX: 23.62 KG/M2 | DIASTOLIC BLOOD PRESSURE: 80 MMHG | RESPIRATION RATE: 18 BRPM | TEMPERATURE: 98.1 F | SYSTOLIC BLOOD PRESSURE: 123 MMHG | WEIGHT: 165 LBS | HEART RATE: 84 BPM | HEIGHT: 70 IN

## 2025-07-15 DIAGNOSIS — S31.819A BUTTOCK WOUND, RIGHT, INITIAL ENCOUNTER: ICD-10-CM

## 2025-07-15 DIAGNOSIS — L97.212 NON-PRESSURE CHRONIC ULCER OF RIGHT CALF WITH FAT LAYER EXPOSED (HCC): ICD-10-CM

## 2025-07-15 DIAGNOSIS — L98.492 ABDOMINAL WALL SKIN ULCER, WITH FAT LAYER EXPOSED (HCC): Primary | ICD-10-CM

## 2025-07-15 DIAGNOSIS — L97.412 ULCER OF RIGHT HEEL, WITH FAT LAYER EXPOSED (HCC): ICD-10-CM

## 2025-07-15 PROCEDURE — 11042 DBRDMT SUBQ TIS 1ST 20SQCM/<: CPT | Performed by: SURGERY

## 2025-07-15 PROCEDURE — 11042 DBRDMT SUBQ TIS 1ST 20SQCM/<: CPT

## 2025-07-15 RX ORDER — NEOMYCIN/BACITRACIN/POLYMYXINB 3.5-400-5K
OINTMENT (GRAM) TOPICAL PRN
OUTPATIENT
Start: 2025-07-15

## 2025-07-15 RX ORDER — BACITRACIN ZINC AND POLYMYXIN B SULFATE 500; 1000 [USP'U]/G; [USP'U]/G
OINTMENT TOPICAL PRN
OUTPATIENT
Start: 2025-07-15

## 2025-07-15 RX ORDER — LIDOCAINE HYDROCHLORIDE 20 MG/ML
JELLY TOPICAL PRN
OUTPATIENT
Start: 2025-07-15

## 2025-07-15 RX ORDER — LIDOCAINE HYDROCHLORIDE 20 MG/ML
JELLY TOPICAL PRN
Status: DISCONTINUED | OUTPATIENT
Start: 2025-07-15 | End: 2025-07-17 | Stop reason: HOSPADM

## 2025-07-15 RX ORDER — LIDOCAINE 50 MG/G
OINTMENT TOPICAL PRN
OUTPATIENT
Start: 2025-07-15

## 2025-07-15 RX ORDER — BETAMETHASONE DIPROPIONATE 0.5 MG/G
CREAM TOPICAL PRN
OUTPATIENT
Start: 2025-07-15

## 2025-07-15 RX ORDER — CLOBETASOL PROPIONATE 0.5 MG/G
OINTMENT TOPICAL PRN
OUTPATIENT
Start: 2025-07-15

## 2025-07-15 RX ORDER — LIDOCAINE HYDROCHLORIDE 40 MG/ML
SOLUTION TOPICAL PRN
OUTPATIENT
Start: 2025-07-15

## 2025-07-15 RX ORDER — LIDOCAINE 40 MG/G
CREAM TOPICAL PRN
OUTPATIENT
Start: 2025-07-15

## 2025-07-15 RX ORDER — GINSENG 100 MG
CAPSULE ORAL PRN
OUTPATIENT
Start: 2025-07-15

## 2025-07-15 RX ORDER — GENTAMICIN SULFATE 1 MG/G
OINTMENT TOPICAL PRN
OUTPATIENT
Start: 2025-07-15

## 2025-07-15 RX ORDER — TRIAMCINOLONE ACETONIDE 1 MG/G
OINTMENT TOPICAL PRN
OUTPATIENT
Start: 2025-07-15

## 2025-07-15 RX ORDER — SODIUM CHLOR/HYPOCHLOROUS ACID 0.033 %
SOLUTION, IRRIGATION IRRIGATION PRN
OUTPATIENT
Start: 2025-07-15

## 2025-07-15 RX ORDER — MUPIROCIN 2 %
OINTMENT (GRAM) TOPICAL PRN
OUTPATIENT
Start: 2025-07-15

## 2025-07-15 RX ORDER — SILVER SULFADIAZINE 10 MG/G
CREAM TOPICAL PRN
OUTPATIENT
Start: 2025-07-15

## 2025-07-15 RX ADMIN — LIDOCAINE HYDROCHLORIDE: 20 JELLY TOPICAL at 10:00

## 2025-07-15 NOTE — PROGRESS NOTES
[facility] Wound Care Center   Progress Note and Procedure Note      Damian You  MEDICAL RECORD NUMBER:  [id number]  AGE: 74 y.o.   GENDER: male  : 1950  EPISODE DATE:  7/15/2025    Subjective:     Chief Complaint   Patient presents with    Wound Check     Patient presents today for recheck right heel wounds.         HISTORY of PRESENT ILLNESS HPI     Damian You is a 74 y.o. male who presents today for wound/ulcer evaluation.   Wound Context: Pt with R heel wound here for eval/treat  Wound/Ulcer Pain Timing/Severity: none  Quality of pain: N/A  Severity:  0 / 10   Modifying Factors: None  Associated Signs/Symptoms: none    Ulcer Identification:  Ulcer Type: non-healing surgical  Contributing Factors: none    Wound: Surgical incision        PAST MEDICAL HISTORY        Diagnosis Date    Atrial fibrillation ([facility]) 2020    CAD (coronary artery disease)     Carcinoma of colon metastatic to liver ([facility]) 02/10/2025    CKD stage 3a, GFR 45-59 ml/min ([facility]) 2022    Colostomy in place ([facility]) 2025    Gout     Hypertension     Hypotension 2020    Mixed hyperlipidemia 2020    Non-ischemic cardiomyopathy ([facility]) 2020    Ejection fraction 5-10% by cath, 2020, 40% by echo, 2020, [facility]      Non-pressure chronic ulcer of right calf with fat layer exposed ([facility]) 2025    Non-ST elevation MI (NSTEMI) ([facility]) 2014    Palliative care patient 2020    Pneumonia due to infectious organism 2020       PAST SURGICAL HISTORY    Past Surgical History:   Procedure Laterality Date    CARDIAC CATHETERIZATION  14  [facility]    angioplasty, stent to LAD. EF 45%    CHOLECYSTECTOMY      EP DEVICE PROCEDURE N/A 2025    Insert PPM dual performed by Calvin Martinez MD at [facility] CARDIAC CATH LAB    FEMUR FRACTURE SURGERY Right 10/30/2020    TFN, SHORT performed by Manuelito Biggs MD at [facility] OR    FOOT DEBRIDEMENT Right 2025    FOOT DEBRIDEMENT INCISION AND DRAINAGE

## 2025-07-15 NOTE — PATIENT INSTRUCTIONS
[facility] Wound Care and Hyperbaric Oxygen Therapy   Physician Orders and Discharge Instructions  [address] [place] [address]  [address]  [place] [address]  Telephone: [phone]      FAX [phone]    NAME:  Damian You  YOB: 1950  MEDICAL RECORD NUMBER:  [id number]  DATE:  7/15/2025    Discharge condition: Stable    Discharge to: Home    Left via:Private automobile    Accompanied by: Family    ECF/HHA: None    Dressing Orders: Right Heel Wounds  Soap and water wash  Dakins moist gauze tucked into the wound, cover with dry gauze roll gauze and medipore tape, change twice daily  Wear heel lift boot or elevate foot off mattress when in bed  High protein diet as tolerated  Continue Antibiotic as prescribed    [facility] follow up visit __________1 week_________________  (Please note your next appointment above and if you are unable to keep, kindly give a 24 hour notice. Thank you.)    If you experience any of the following, please call the Wound Care Center during business hours:    * Increase in Pain  * Temperature over 101  * Increase in drainage from your wound  * Drainage with a foul odor  * Bleeding  * Increase in swelling  * Need for compression bandage changes due to slippage, breakthrough drainage.    If you need medical attention outside of the business hours of the Wound Care Centers please contact your PCP or go to the nearest emergency room.

## 2025-07-16 ENCOUNTER — CLINICAL DOCUMENTATION (OUTPATIENT)
Dept: HEMATOLOGY | Age: 75
End: 2025-07-16

## 2025-07-16 DIAGNOSIS — L27.0 DRUG RASH: Primary | ICD-10-CM

## 2025-07-16 RX ORDER — DOXYCYCLINE HYCLATE 100 MG
100 TABLET ORAL 2 TIMES DAILY
Qty: 20 TABLET | Refills: 0 | Status: SHIPPED | OUTPATIENT
Start: 2025-07-16 | End: 2025-07-26

## 2025-07-16 RX ORDER — CLINDAMYCIN PHOSPHATE 10 UG/ML
LOTION TOPICAL
Qty: 60 ML | Refills: 5 | Status: SHIPPED | OUTPATIENT
Start: 2025-07-16 | End: 2025-08-15

## 2025-07-16 NOTE — PROGRESS NOTES
Sister/Cassie called stating pt has pimple rash on face, neck, belly, legs and private areas. She says he is incontinent of urine and the rash burns really bad when the urine gets on it. She is asking if the oral chemo pill can be decreased. Per review of the treatment plan, pt is currently getting Vectibix, which will cause this rash. Discussed with Dr Boswell who recommends to start Doxycycline 100mg twice a day x10 days and Clindamycin lotion to apply to affected areas twice a day as needed for rash. Both scripts sent to pharmacy. Cassie notified of orders.

## 2025-07-22 ENCOUNTER — HOSPITAL ENCOUNTER (OUTPATIENT)
Dept: WOUND CARE | Age: 75
Discharge: HOME | End: 2025-07-22
Attending: SURGERY
Payer: MEDICARE

## 2025-07-22 VITALS
TEMPERATURE: 97.5 F | DIASTOLIC BLOOD PRESSURE: 80 MMHG | WEIGHT: 165 LBS | SYSTOLIC BLOOD PRESSURE: 119 MMHG | BODY MASS INDEX: 23.62 KG/M2 | RESPIRATION RATE: 18 BRPM | HEIGHT: 70 IN | HEART RATE: 83 BPM

## 2025-07-22 DIAGNOSIS — L97.212 NON-PRESSURE CHRONIC ULCER OF RIGHT CALF WITH FAT LAYER EXPOSED (HCC): ICD-10-CM

## 2025-07-22 DIAGNOSIS — L97.412 ULCER OF RIGHT HEEL, WITH FAT LAYER EXPOSED (HCC): ICD-10-CM

## 2025-07-22 DIAGNOSIS — S31.819A BUTTOCK WOUND, RIGHT, INITIAL ENCOUNTER: Primary | ICD-10-CM

## 2025-07-22 DIAGNOSIS — L98.492 ABDOMINAL WALL SKIN ULCER, WITH FAT LAYER EXPOSED (HCC): ICD-10-CM

## 2025-07-22 PROBLEM — F10.10 ALCOHOL ABUSE: Status: ACTIVE | Noted: 2018-05-07

## 2025-07-22 PROCEDURE — 97597 DBRDMT OPN WND 1ST 20 CM/<: CPT | Performed by: SURGERY

## 2025-07-22 PROCEDURE — 97597 DBRDMT OPN WND 1ST 20 CM/<: CPT

## 2025-07-22 RX ORDER — BETAMETHASONE DIPROPIONATE 0.5 MG/G
CREAM TOPICAL PRN
OUTPATIENT
Start: 2025-07-22

## 2025-07-22 RX ORDER — CLOBETASOL PROPIONATE 0.5 MG/G
OINTMENT TOPICAL PRN
OUTPATIENT
Start: 2025-07-22

## 2025-07-22 RX ORDER — LIDOCAINE HYDROCHLORIDE 20 MG/ML
JELLY TOPICAL PRN
OUTPATIENT
Start: 2025-07-22

## 2025-07-22 RX ORDER — BACITRACIN ZINC AND POLYMYXIN B SULFATE 500; 1000 [USP'U]/G; [USP'U]/G
OINTMENT TOPICAL PRN
OUTPATIENT
Start: 2025-07-22

## 2025-07-22 RX ORDER — SILVER SULFADIAZINE 10 MG/G
CREAM TOPICAL PRN
OUTPATIENT
Start: 2025-07-22

## 2025-07-22 RX ORDER — GINSENG 100 MG
CAPSULE ORAL PRN
OUTPATIENT
Start: 2025-07-22

## 2025-07-22 RX ORDER — FLUCONAZOLE 100 MG/1
100 TABLET ORAL DAILY
Qty: 7 TABLET | Refills: 0 | Status: SHIPPED | OUTPATIENT
Start: 2025-07-22 | End: 2025-07-29

## 2025-07-22 RX ORDER — NEOMYCIN/BACITRACIN/POLYMYXINB 3.5-400-5K
OINTMENT (GRAM) TOPICAL PRN
OUTPATIENT
Start: 2025-07-22

## 2025-07-22 RX ORDER — GENTAMICIN SULFATE 1 MG/G
OINTMENT TOPICAL PRN
OUTPATIENT
Start: 2025-07-22

## 2025-07-22 RX ORDER — LIDOCAINE HYDROCHLORIDE 40 MG/ML
SOLUTION TOPICAL PRN
OUTPATIENT
Start: 2025-07-22

## 2025-07-22 RX ORDER — LIDOCAINE HYDROCHLORIDE 20 MG/ML
JELLY TOPICAL PRN
Status: DISCONTINUED | OUTPATIENT
Start: 2025-07-22 | End: 2025-07-24 | Stop reason: HOSPADM

## 2025-07-22 RX ORDER — LIDOCAINE 50 MG/G
OINTMENT TOPICAL PRN
OUTPATIENT
Start: 2025-07-22

## 2025-07-22 RX ORDER — LIDOCAINE 40 MG/G
CREAM TOPICAL PRN
OUTPATIENT
Start: 2025-07-22

## 2025-07-22 RX ORDER — SODIUM CHLOR/HYPOCHLOROUS ACID 0.033 %
SOLUTION, IRRIGATION IRRIGATION PRN
OUTPATIENT
Start: 2025-07-22

## 2025-07-22 RX ORDER — TRIAMCINOLONE ACETONIDE 1 MG/G
OINTMENT TOPICAL PRN
OUTPATIENT
Start: 2025-07-22

## 2025-07-22 RX ORDER — MUPIROCIN 2 %
OINTMENT (GRAM) TOPICAL PRN
OUTPATIENT
Start: 2025-07-22

## 2025-07-22 RX ADMIN — LIDOCAINE HYDROCHLORIDE: 20 JELLY TOPICAL at 09:09

## 2025-07-22 NOTE — PROGRESS NOTES
[facility] Wound Care Center   Progress Note and Procedure Note      Damian You  MEDICAL RECORD NUMBER:  [id number]  AGE: 74 y.o.   GENDER: male  : 1950  EPISODE DATE:  2025    Subjective:     Chief Complaint   Patient presents with    Wound Check     Pt presents for recheck of right heel wound. Pt c/o a rash all over buttocks, perineum, and trunk         HISTORY of PRESENT ILLNESS HPI     Damian You is a 74 y.o. male who presents today for wound/ulcer evaluation.   Wound Context: Pt with R heel wound here for eval/treat  Wound/Ulcer Pain Timing/Severity: none  Quality of pain: N/A  Severity:  0 / 10   Modifying Factors: None  Associated Signs/Symptoms: none    Ulcer Identification:  Ulcer Type: pressure  Contributing Factors: chronic pressure and immunosuppression    Wound: pressure        PAST MEDICAL HISTORY        Diagnosis Date    Atrial fibrillation ([facility]) 2020    CAD (coronary artery disease)     Carcinoma of colon metastatic to liver ([facility]) 02/10/2025    CKD stage 3a, GFR 45-59 ml/min ([facility]) 2022    Colostomy in place ([facility]) 2025    Gout     Hypertension     Hypotension 2020    Mixed hyperlipidemia 2020    Non-ischemic cardiomyopathy ([facility]) 2020    Ejection fraction 5-10% by cath, 2020, 40% by echo, 2020, [facility]      Non-pressure chronic ulcer of right calf with fat layer exposed ([facility]) 2025    Non-ST elevation MI (NSTEMI) ([facility]) 2014    Palliative care patient 2020    Pneumonia due to infectious organism 2020       PAST SURGICAL HISTORY    Past Surgical History:   Procedure Laterality Date    CARDIAC CATHETERIZATION  14  [facility]    angioplasty, stent to LAD. EF 45%    CHOLECYSTECTOMY      EP DEVICE PROCEDURE N/A 2025    Insert PPM dual performed by Calvin Martinez MD at [facility] CARDIAC CATH LAB    FEMUR FRACTURE SURGERY Right 10/30/2020    TFN, SHORT performed by Manuelito Biggs MD at [facility] OR    FOOT DEBRIDEMENT

## 2025-07-22 NOTE — PATIENT INSTRUCTIONS
[facility] Wound Care and Hyperbaric Oxygen Therapy   Physician Orders and Discharge Instructions  [address] [place] [address]  [address]  [place] [address]  Telephone: [phone]      FAX [phone]    NAME:  Damian You  YOB: 1950  MEDICAL RECORD NUMBER:  [id number]  DATE:  7/22/2025    Discharge condition: Stable    Discharge to: Home    Left via:Private automobile    Accompanied by: Family    ECF/HHA: None    Dressing Orders: Right Heel Wounds  Soap and water wash  Dakins moist gauze tucked into the wound, cover with dry gauze roll gauze and medipore tape, change twice daily  Wear heel lift boot or elevate foot off mattress when in bed  High protein diet as tolerated  Continue Antibiotic and Diflucan as prescribed, follow up with your Oncologist regarding rash    [facility] follow up visit __________1 week___________________  (Please note your next appointment above and if you are unable to keep, kindly give a 24 hour notice. Thank you.)    If you experience any of the following, please call the Wound Care Center during business hours:    * Increase in Pain  * Temperature over 101  * Increase in drainage from your wound  * Drainage with a foul odor  * Bleeding  * Increase in swelling  * Need for compression bandage changes due to slippage, breakthrough drainage.    If you need medical attention outside of the business hours of the Wound Care Centers please contact your PCP or go to the nearest emergency room.

## 2025-07-24 ENCOUNTER — TELEPHONE (OUTPATIENT)
Dept: INFUSION THERAPY | Age: 75
End: 2025-07-24

## 2025-07-24 ENCOUNTER — CARE COORDINATION (OUTPATIENT)
Dept: CARE COORDINATION | Age: 75
End: 2025-07-24

## 2025-07-24 NOTE — TELEPHONE ENCOUNTER
Received a call from Mr. You's sister, Cassie Pope. She states that after his last infusion of vectibix, he developed a severe rash from the waist down. Cassie said it caused a lot of pain & affected Mr. You's mobility. The rash became very red, raw & bloody in some areas. She states it is better now, but is concerned for pt to have another infusion if this may happen again, is asking about the possibility of reducing the dose. Advised that on Monday, 7/28, when Mr. You comes for treatment, Dr. Boswell will need to discuss this with pt & caregivers. Encouraged Cassie to make a list of questions and discuss concerns with his other caregivers so that they can give an accurate account of the severity of his rash & make sure these concerns are addressed. She verbalizes understanding.

## 2025-07-24 NOTE — PROGRESS NOTES
femoral, femoral, popliteal, greater saphenous, and small saphenous veins were imaged in the transverse view and showed normal compressibility. The common femoral, popliteal, and middle femoral veins were imaged in the longitudinal view and showed normal color filling and normal phasic and spontaneous flow.   6/20/25 Initiated Oxaliplatin, Vectibix, Xeloda 1000 mg days 1-14 every 21 days, treatment consent signed  6/20/25 CT Right Foot w/ Contrast: Acute-subacute comminuted displaced fracture of posterior superior calcaneus with adjacent bony lucency/osteopenia suspicious for osteomyelitis; adjacent fluid collection with gas at lateral ankle soft tissue consistent with abscess or infected hematoma; MRI recommended for further evaluation.  6/21/25 Right Heel Bone Biopsy (Dr. Berg, [facility]): Deep muscle/fascia infection and organ space osteomyelitis confirmed; pathology shows viable bone with focal acute osteomyelitis and adherent granulation tissue; calcaneal bone destruction noted.  6/19/25-6/26/25 Patient was admitted to [facility]: Complaints of Right ankle redness, swelling ; sent by wound care. Along with fever and chills.  7/7/25 CEA 50.6          2/27/25 Tempus xT    2/27/25 Tempus xT Tumor Origin      PAST MEDICAL HISTORY:   Past Medical History:   Diagnosis Date    Atrial fibrillation ([facility]) 09/01/2020    CAD (coronary artery disease)     Carcinoma of colon metastatic to liver ([facility]) 02/10/2025    CKD stage 3a, GFR 45-59 ml/min ([facility]) 02/14/2022    Colostomy in place ([facility]) 06/19/2025    Gout     Hypertension     Hypotension 06/23/2020    Mixed hyperlipidemia 09/01/2020    Non-ischemic cardiomyopathy ([facility]) 07/28/2020    Ejection fraction 5-10% by cath, 6/2020, 40% by echo, June/2020, [facility]      Non-pressure chronic ulcer of right calf with fat layer exposed ([facility]) 06/19/2025    Non-ST elevation MI (NSTEMI) ([facility]) 12/28/2014    Palliative care patient 06/29/2020    Pneumonia due

## 2025-07-24 NOTE — CARE COORDINATION
11:00 AM INFUSION SCHEDULE, [facility] Infusion Therapy [phone]    7/29/2025 9:15 AM Yuniel Aponte MD Wound Ostomy [phone]    8/18/2025 11:00 AM INFUSION SCHEDULE, [facility] Infusion Therapy [phone]    9/30/2025 10:00 AM Ramona Smith MD Primary Care [phone]    10/14/2025 9:00 AM Ramona Smith MD Primary Care [phone]    11/5/2025 10:30 AM Lila Camarena, APRN Cardiology [phone]            Follow Up:   Plan for next ACM outreach in approximately 1 week to complete:  - SDOH assessments  - medication review   - follow up appointment with Oncology.   Caregiver is agreeable to this plan.

## 2025-07-28 ENCOUNTER — HOSPITAL ENCOUNTER (OUTPATIENT)
Dept: INFUSION THERAPY | Age: 75
Discharge: HOME | End: 2025-07-28
Payer: MEDICARE

## 2025-07-28 ENCOUNTER — CLINICAL DOCUMENTATION (OUTPATIENT)
Dept: HEMATOLOGY | Age: 75
End: 2025-07-28

## 2025-07-28 ENCOUNTER — OFFICE VISIT (OUTPATIENT)
Dept: HEMATOLOGY | Age: 75
End: 2025-07-28
Payer: MEDICARE

## 2025-07-28 VITALS
DIASTOLIC BLOOD PRESSURE: 79 MMHG | OXYGEN SATURATION: 100 % | WEIGHT: 157 LBS | BODY MASS INDEX: 22.53 KG/M2 | SYSTOLIC BLOOD PRESSURE: 126 MMHG | TEMPERATURE: 96.7 F | HEART RATE: 68 BPM | RESPIRATION RATE: 20 BRPM

## 2025-07-28 DIAGNOSIS — E83.42 HYPOMAGNESEMIA: ICD-10-CM

## 2025-07-28 DIAGNOSIS — Z51.11 CHEMOTHERAPY MANAGEMENT, ENCOUNTER FOR: ICD-10-CM

## 2025-07-28 DIAGNOSIS — R54 FRAILTY SYNDROME IN GERIATRIC PATIENT: ICD-10-CM

## 2025-07-28 DIAGNOSIS — E83.39 HYPOPHOSPHATEMIA: ICD-10-CM

## 2025-07-28 DIAGNOSIS — R53.81 PHYSICAL DECONDITIONING: ICD-10-CM

## 2025-07-28 DIAGNOSIS — R53.83 CHEMOTHERAPY-INDUCED FATIGUE: ICD-10-CM

## 2025-07-28 DIAGNOSIS — R97.8 ABNORMAL TUMOR MARKERS: ICD-10-CM

## 2025-07-28 DIAGNOSIS — E87.1 HYPONATREMIA: ICD-10-CM

## 2025-07-28 DIAGNOSIS — Z51.12 ENCOUNTER FOR ANTINEOPLASTIC IMMUNOTHERAPY: ICD-10-CM

## 2025-07-28 DIAGNOSIS — B35.6 FUNGAL INFECTION OF THE GROIN: Primary | ICD-10-CM

## 2025-07-28 DIAGNOSIS — N28.9 RENAL IMPAIRMENT: ICD-10-CM

## 2025-07-28 DIAGNOSIS — L27.0 DRUG-INDUCED SKIN RASH: ICD-10-CM

## 2025-07-28 DIAGNOSIS — T45.1X5A CHEMOTHERAPY-INDUCED FATIGUE: ICD-10-CM

## 2025-07-28 DIAGNOSIS — L30.4 INTERTRIGO: ICD-10-CM

## 2025-07-28 DIAGNOSIS — T45.1X5A CHEMOTHERAPY INDUCED DIARRHEA: ICD-10-CM

## 2025-07-28 DIAGNOSIS — S31.819A BUTTOCK WOUND, RIGHT, INITIAL ENCOUNTER: ICD-10-CM

## 2025-07-28 DIAGNOSIS — L27.0 DRUG RASH: ICD-10-CM

## 2025-07-28 DIAGNOSIS — D64.81 ANEMIA ASSOCIATED WITH CHEMOTHERAPY: ICD-10-CM

## 2025-07-28 DIAGNOSIS — C18.9 ADENOCARCINOMA OF COLON (HCC): ICD-10-CM

## 2025-07-28 DIAGNOSIS — K52.1 CHEMOTHERAPY INDUCED DIARRHEA: ICD-10-CM

## 2025-07-28 DIAGNOSIS — Z71.89 CARE PLAN DISCUSSED WITH PATIENT: ICD-10-CM

## 2025-07-28 DIAGNOSIS — T45.1X5A ANEMIA ASSOCIATED WITH CHEMOTHERAPY: ICD-10-CM

## 2025-07-28 DIAGNOSIS — D64.9 NORMOCYTIC ANEMIA: ICD-10-CM

## 2025-07-28 DIAGNOSIS — T45.1X5D ADVERSE EFFECT OF CHEMOTHERAPY, SUBSEQUENT ENCOUNTER: ICD-10-CM

## 2025-07-28 LAB
ALBUMIN SERPL-MCNC: 3.3 G/DL (ref 3.5–5.2)
ALP SERPL-CCNC: 154 U/L (ref 40–129)
ALT SERPL-CCNC: 24 U/L (ref 5–41)
ANION GAP SERPL CALCULATED.3IONS-SCNC: 11 MMOL/L (ref 7–19)
AST SERPL-CCNC: 42 U/L (ref 5–40)
BASOPHILS # BLD: 0.09 K/UL (ref 0–0.2)
BASOPHILS NFR BLD: 0.7 % (ref 0–1)
BILIRUB SERPL-MCNC: 0.6 MG/DL (ref 0–1.2)
BUN SERPL-MCNC: 22 MG/DL (ref 8–23)
CALCIUM SERPL-MCNC: 8.6 MG/DL (ref 8.8–10.2)
CEA SERPL-MCNC: 24.8 NG/ML (ref 0–4.7)
CHLORIDE SERPL-SCNC: 103 MMOL/L (ref 98–107)
CO2 SERPL-SCNC: 23 MMOL/L (ref 22–29)
CREAT SERPL-MCNC: 2.3 MG/DL (ref 0.7–1.2)
EOSINOPHIL # BLD: 0.29 K/UL (ref 0–0.6)
EOSINOPHIL NFR BLD: 2.2 % (ref 0–5)
ERYTHROCYTE [DISTWIDTH] IN BLOOD BY AUTOMATED COUNT: 17 % (ref 11.5–14.5)
GLUCOSE SERPL-MCNC: 90 MG/DL (ref 70–99)
HCT VFR BLD AUTO: 32.6 % (ref 42–52)
HGB BLD-MCNC: 10.9 G/DL (ref 14–18)
LYMPHOCYTES # BLD: 1.45 K/UL (ref 1.1–4.5)
LYMPHOCYTES NFR BLD: 11.1 % (ref 20–40)
MAGNESIUM SERPL-MCNC: 1.4 MG/DL (ref 1.6–2.4)
MCH RBC QN AUTO: 29.8 PG (ref 27–31)
MCHC RBC AUTO-ENTMCNC: 33.4 G/DL (ref 33–37)
MCV RBC AUTO: 89.1 FL (ref 80–94)
MONOCYTES # BLD: 0.97 K/UL (ref 0–0.9)
MONOCYTES NFR BLD: 7.4 % (ref 1–10)
NEUTROPHILS # BLD: 10.21 K/UL (ref 1.5–7.5)
NEUTS SEG NFR BLD: 77.8 % (ref 50–65)
PHOSPHATE SERPL-MCNC: 3.6 MG/DL (ref 2.5–4.5)
PLATELET # BLD AUTO: 277 K/UL (ref 130–400)
PMV BLD AUTO: 8.1 FL (ref 9.4–12.4)
POTASSIUM SERPL-SCNC: 4.8 MMOL/L (ref 3.5–5.1)
PROT SERPL-MCNC: 6.5 G/DL (ref 6.4–8.3)
RBC # BLD AUTO: 3.66 M/UL (ref 4.7–6.1)
SODIUM SERPL-SCNC: 137 MMOL/L (ref 136–145)
WBC # BLD AUTO: 13.12 K/UL (ref 4.8–10.8)

## 2025-07-28 PROCEDURE — 82378 CARCINOEMBRYONIC ANTIGEN: CPT

## 2025-07-28 PROCEDURE — 6360000002 HC RX W HCPCS: Performed by: INTERNAL MEDICINE

## 2025-07-28 PROCEDURE — 3074F SYST BP LT 130 MM HG: CPT | Performed by: INTERNAL MEDICINE

## 2025-07-28 PROCEDURE — 36415 COLL VENOUS BLD VENIPUNCTURE: CPT

## 2025-07-28 PROCEDURE — 99213 OFFICE O/P EST LOW 20 MIN: CPT

## 2025-07-28 PROCEDURE — 80053 COMPREHEN METABOLIC PANEL: CPT

## 2025-07-28 PROCEDURE — 3078F DIAST BP <80 MM HG: CPT | Performed by: INTERNAL MEDICINE

## 2025-07-28 PROCEDURE — 3017F COLORECTAL CA SCREEN DOC REV: CPT | Performed by: INTERNAL MEDICINE

## 2025-07-28 PROCEDURE — 1123F ACP DISCUSS/DSCN MKR DOCD: CPT | Performed by: INTERNAL MEDICINE

## 2025-07-28 PROCEDURE — 85025 COMPLETE CBC W/AUTO DIFF WBC: CPT

## 2025-07-28 PROCEDURE — 99214 OFFICE O/P EST MOD 30 MIN: CPT | Performed by: INTERNAL MEDICINE

## 2025-07-28 PROCEDURE — 84100 ASSAY OF PHOSPHORUS: CPT

## 2025-07-28 PROCEDURE — 1159F MED LIST DOCD IN RCRD: CPT | Performed by: INTERNAL MEDICINE

## 2025-07-28 PROCEDURE — 1036F TOBACCO NON-USER: CPT | Performed by: INTERNAL MEDICINE

## 2025-07-28 PROCEDURE — G2211 COMPLEX E/M VISIT ADD ON: HCPCS | Performed by: INTERNAL MEDICINE

## 2025-07-28 PROCEDURE — G8420 CALC BMI NORM PARAMETERS: HCPCS | Performed by: INTERNAL MEDICINE

## 2025-07-28 PROCEDURE — 36591 DRAW BLOOD OFF VENOUS DEVICE: CPT

## 2025-07-28 PROCEDURE — 83735 ASSAY OF MAGNESIUM: CPT

## 2025-07-28 PROCEDURE — G8427 DOCREV CUR MEDS BY ELIG CLIN: HCPCS | Performed by: INTERNAL MEDICINE

## 2025-07-28 RX ORDER — MICONAZOLE NITRATE 2 G/100G
CREAM TOPICAL
Qty: 118 ML | Refills: 5 | Status: SHIPPED | OUTPATIENT
Start: 2025-07-28

## 2025-07-28 RX ORDER — HEPARIN 100 UNIT/ML
500 SYRINGE INTRAVENOUS PRN
Status: DISCONTINUED | OUTPATIENT
Start: 2025-07-28 | End: 2025-07-30 | Stop reason: HOSPADM

## 2025-07-28 RX ORDER — FLUCONAZOLE 200 MG/1
200 TABLET ORAL DAILY
Qty: 7 TABLET | Refills: 0 | Status: SHIPPED | OUTPATIENT
Start: 2025-07-28 | End: 2025-08-04

## 2025-07-28 RX ADMIN — HEPARIN 500 UNITS: 100 SYRINGE at 12:11

## 2025-07-28 NOTE — PROGRESS NOTES
Pt here for tx but w/ c/o generalized rash, tx held today per MD and rx sent in by Provider. Blood draw per port.

## 2025-07-29 ENCOUNTER — HOSPITAL ENCOUNTER (OUTPATIENT)
Dept: WOUND CARE | Age: 75
Discharge: HOME | End: 2025-07-29
Attending: SURGERY
Payer: MEDICARE

## 2025-07-29 VITALS
SYSTOLIC BLOOD PRESSURE: 136 MMHG | RESPIRATION RATE: 20 BRPM | WEIGHT: 157 LBS | TEMPERATURE: 97.4 F | BODY MASS INDEX: 22.48 KG/M2 | HEART RATE: 80 BPM | DIASTOLIC BLOOD PRESSURE: 84 MMHG | HEIGHT: 70 IN

## 2025-07-29 DIAGNOSIS — L97.412 ULCER OF RIGHT HEEL, WITH FAT LAYER EXPOSED (HCC): ICD-10-CM

## 2025-07-29 DIAGNOSIS — L98.492 ABDOMINAL WALL SKIN ULCER, WITH FAT LAYER EXPOSED (HCC): Primary | ICD-10-CM

## 2025-07-29 DIAGNOSIS — L97.212 NON-PRESSURE CHRONIC ULCER OF RIGHT CALF WITH FAT LAYER EXPOSED (HCC): ICD-10-CM

## 2025-07-29 DIAGNOSIS — S31.819A BUTTOCK WOUND, RIGHT, INITIAL ENCOUNTER: ICD-10-CM

## 2025-07-29 PROCEDURE — 11042 DBRDMT SUBQ TIS 1ST 20SQCM/<: CPT | Performed by: SURGERY

## 2025-07-29 PROCEDURE — 11042 DBRDMT SUBQ TIS 1ST 20SQCM/<: CPT

## 2025-07-29 RX ORDER — MUPIROCIN 2 %
OINTMENT (GRAM) TOPICAL PRN
OUTPATIENT
Start: 2025-07-29

## 2025-07-29 RX ORDER — LIDOCAINE HYDROCHLORIDE 40 MG/ML
SOLUTION TOPICAL PRN
OUTPATIENT
Start: 2025-07-29

## 2025-07-29 RX ORDER — TRIAMCINOLONE ACETONIDE 1 MG/G
OINTMENT TOPICAL PRN
OUTPATIENT
Start: 2025-07-29

## 2025-07-29 RX ORDER — GENTAMICIN SULFATE 1 MG/G
OINTMENT TOPICAL PRN
OUTPATIENT
Start: 2025-07-29

## 2025-07-29 RX ORDER — SILVER SULFADIAZINE 10 MG/G
CREAM TOPICAL PRN
OUTPATIENT
Start: 2025-07-29

## 2025-07-29 RX ORDER — BETAMETHASONE DIPROPIONATE 0.5 MG/G
CREAM TOPICAL PRN
OUTPATIENT
Start: 2025-07-29

## 2025-07-29 RX ORDER — SODIUM CHLOR/HYPOCHLOROUS ACID 0.033 %
SOLUTION, IRRIGATION IRRIGATION PRN
OUTPATIENT
Start: 2025-07-29

## 2025-07-29 RX ORDER — LIDOCAINE 40 MG/G
CREAM TOPICAL PRN
OUTPATIENT
Start: 2025-07-29

## 2025-07-29 RX ORDER — GINSENG 100 MG
CAPSULE ORAL PRN
OUTPATIENT
Start: 2025-07-29

## 2025-07-29 RX ORDER — LIDOCAINE 50 MG/G
OINTMENT TOPICAL PRN
OUTPATIENT
Start: 2025-07-29

## 2025-07-29 RX ORDER — LIDOCAINE HYDROCHLORIDE 20 MG/ML
JELLY TOPICAL PRN
OUTPATIENT
Start: 2025-07-29

## 2025-07-29 RX ORDER — CLOBETASOL PROPIONATE 0.5 MG/G
OINTMENT TOPICAL PRN
OUTPATIENT
Start: 2025-07-29

## 2025-07-29 RX ORDER — NEOMYCIN/BACITRACIN/POLYMYXINB 3.5-400-5K
OINTMENT (GRAM) TOPICAL PRN
OUTPATIENT
Start: 2025-07-29

## 2025-07-29 RX ORDER — BACITRACIN ZINC AND POLYMYXIN B SULFATE 500; 1000 [USP'U]/G; [USP'U]/G
OINTMENT TOPICAL PRN
OUTPATIENT
Start: 2025-07-29

## 2025-07-29 RX ORDER — LIDOCAINE HYDROCHLORIDE 20 MG/ML
JELLY TOPICAL PRN
Status: DISCONTINUED | OUTPATIENT
Start: 2025-07-29 | End: 2025-07-31 | Stop reason: HOSPADM

## 2025-07-29 RX ADMIN — LIDOCAINE HYDROCHLORIDE: 20 JELLY TOPICAL at 09:38

## 2025-07-29 NOTE — PATIENT INSTRUCTIONS
[facility] Wound Care and Hyperbaric Oxygen Therapy   Physician Orders and Discharge Instructions  [address] [place] [address]  [address]  [place] [address]  Telephone: [phone]      FAX [phone]    NAME:  Damian You  YOB: 1950  MEDICAL RECORD NUMBER:  [id number]  DATE:  7/29/2025    Discharge condition: Stable    Discharge to: Home    Left via:Private automobile    Accompanied by: Family    Dressing Orders: Right Heel Wounds  Soap and water wash  Dakins moist gauze tucked into the wound, cover with dry gauze roll gauze and medipore tape, change twice daily  Wear heel lift boot or elevate foot off mattress when in bed  High protein diet as tolerated  Continue Antibiotic and Diflucan as prescribed, follow up with your Oncologist regarding rash    [facility] follow up visit __________1 week___________________  (Please note your next appointment above and if you are unable to keep, kindly give a 24 hour notice. Thank you.)    If you experience any of the following, please call the Wound Care Center during business hours:    * Increase in Pain  * Temperature over 101  * Increase in drainage from your wound  * Drainage with a foul odor  * Bleeding  * Increase in swelling  * Need for compression bandage changes due to slippage, breakthrough drainage.    If you need medical attention outside of the business hours of the Wound Care Centers please contact your PCP or go to the nearest emergency room.

## 2025-07-29 NOTE — PROGRESS NOTES
(SYNTHROID) 125 MCG tablet TAKE 1 TABLET BY MOUTH ONCE DAILY 90 tablet 1    capecitabine (XELODA) 500 MG chemo tablet Take 2 tablets by mouth 2 times daily Days 1-14 every 21 days (Two weeks on One week Off) 56 tablet 5    atorvastatin (LIPITOR) 20 MG tablet Take 1 tablet by mouth every evening 90 tablet 3    ferrous sulfate (IRON 325) 325 (65 Fe) MG tablet Take 1 tablet by mouth 2 times daily 180 tablet 1    thiamine mononitrate (THIAMINE) 100 MG tablet Take 1 tablet by mouth daily 30 tablet 0    folic acid (FOLVITE) 1 MG tablet TAKE 1 TABLET BY MOUTH DAILY 90 tablet 1     Current Facility-Administered Medications on File Prior to Encounter   Medication Dose Route Frequency Provider Last Rate Last Admin    heparin (PF) 100 UNIT/ML injection 500 Units  500 Units IntraCATHeter VINAYN Halle Boswell MD   500 Units at 07/28/25 1211       REVIEW OF SYSTEMS    A comprehensive review of systems was negative.    Objective:      /84   Pulse 80   Temp 97.4 °F (36.3 °C) (Temporal)   Resp 20   Ht 1.778 m (5' 10\")   Wt 71.2 kg (157 lb)   BMI 22.53 kg/m²     Wt Readings from Last 3 Encounters:   07/29/25 71.2 kg (157 lb)   07/28/25 71.2 kg (157 lb)   07/22/25 74.8 kg (165 lb)       PHYSICAL EXAM    General Appearance: alert and oriented to person, place and time, well developed and well- nourished, in no acute distress  Skin: warm and dry, no rash or erythema  Head: normocephalic and atraumatic  Eyes: pupils equal, round, and reactive to light, extraocular eye movements intact, conjunctivae normal  ENT: tympanic membrane, external ear and ear canal normal bilaterally, nose without deformity, nasal mucosa and turbinates normal without polyps, lips teeth and gums normal  Neck: supple and non-tender without mass, no thyromegaly or thyroid nodules, no cervical lymphadenopathy  Pulmonary/Chest: clear to auscultation bilaterally- no wheezes, rales or rhonchi, normal air movement, no respiratory

## 2025-08-01 ENCOUNTER — CARE COORDINATION (OUTPATIENT)
Dept: CARE COORDINATION | Age: 75
End: 2025-08-01

## 2025-08-01 NOTE — CARE COORDINATION
Ambulatory Care Coordination Note     2025 11:55 AM     Patient Current Location:  Home: [address] [place], [place] [address]mad[place]  [facility] [id number]     ACM contacted the family by telephone. Verified name and  with family as identifiers.         ACM: Yulia Griffin RN     Challenges to be reviewed by the provider   Additional needs identified to be addressed with provider No               Method of communication with provider: none.    Utilization: Patient has not had any utilization since our last call.     Care Summary Note: Spoke with sister, Cassie (HIPAA contact). Sister reported chem treatment is on hold for further treatment of patient's rash. He is taking oral anti-fungal med and applying topical lotion/cream to affected areas. Pt has intermittent skin break down of coccyx area due to poor mobility. Sister gave teach back demonstrating good understanding of plan of care, excellent support of patient's current needs. He has been eating well, reported the rash is improving and will review this again next week, will decide whether to resume chemo once cleared up. Sister reported patient's heal wounds are improving with shrinking size of wound and clear evidence of granulation tissue growth.  Patient is not in pain and other has not acute needs. Sister recommended contacting [place] next week for follow up call. Encouraged that caregiver/family contact provider - family has demonstrated good pro-active outreach.    Offered patient enrollment in the Remote Patient Monitoring (RPM) program for in-home monitoring: Yes, but did not enroll at this time: controlled chronic disease management and already monitoring with home equipment.     Assessments Completed:   Care Coordination Interventions    Referral from Primary Care Provider: No  Suggested Interventions and Community Resources  Disease Specific Clinic:  (Comment: Oncology, Wound Care, Cardiology)  Palliative Care: Completed (Comment: [facility])  Physical Therapy:

## 2025-08-04 ENCOUNTER — HOSPITAL ENCOUNTER (OUTPATIENT)
Dept: INFUSION THERAPY | Age: 75
Discharge: HOME | End: 2025-08-04
Payer: MEDICARE

## 2025-08-04 VITALS
HEIGHT: 70 IN | HEART RATE: 80 BPM | DIASTOLIC BLOOD PRESSURE: 91 MMHG | BODY MASS INDEX: 22.56 KG/M2 | RESPIRATION RATE: 18 BRPM | SYSTOLIC BLOOD PRESSURE: 153 MMHG | TEMPERATURE: 98.4 F | WEIGHT: 157.6 LBS | OXYGEN SATURATION: 100 %

## 2025-08-04 DIAGNOSIS — C18.9 ADENOCARCINOMA OF COLON (HCC): ICD-10-CM

## 2025-08-04 DIAGNOSIS — Z11.59 ENCOUNTER FOR SCREENING FOR OTHER VIRAL DISEASES: Primary | ICD-10-CM

## 2025-08-04 DIAGNOSIS — E83.42 HYPOMAGNESEMIA: ICD-10-CM

## 2025-08-04 DIAGNOSIS — E83.39 HYPOPHOSPHATEMIA: ICD-10-CM

## 2025-08-04 LAB
ALBUMIN SERPL-MCNC: 3.4 G/DL (ref 3.5–5.2)
ALP SERPL-CCNC: 154 U/L (ref 40–129)
ALT SERPL-CCNC: 18 U/L (ref 5–41)
ANION GAP SERPL CALCULATED.3IONS-SCNC: 11 MMOL/L (ref 7–19)
AST SERPL-CCNC: 32 U/L (ref 5–40)
BASOPHILS # BLD: 0.1 K/UL (ref 0–0.2)
BASOPHILS NFR BLD: 1.3 % (ref 0–1)
BILIRUB SERPL-MCNC: 0.5 MG/DL (ref 0–1.2)
BUN SERPL-MCNC: 27 MG/DL (ref 8–23)
CALCIUM SERPL-MCNC: 8.9 MG/DL (ref 8.8–10.2)
CHLORIDE SERPL-SCNC: 102 MMOL/L (ref 98–107)
CO2 SERPL-SCNC: 23 MMOL/L (ref 22–29)
CREAT SERPL-MCNC: 2.2 MG/DL (ref 0.7–1.2)
EOSINOPHIL # BLD: 0.45 K/UL (ref 0–0.6)
EOSINOPHIL NFR BLD: 5.6 % (ref 0–5)
ERYTHROCYTE [DISTWIDTH] IN BLOOD BY AUTOMATED COUNT: 16.8 % (ref 11.5–14.5)
GLUCOSE SERPL-MCNC: 82 MG/DL (ref 70–99)
HCT VFR BLD AUTO: 32.8 % (ref 42–52)
HGB BLD-MCNC: 10.7 G/DL (ref 14–18)
LYMPHOCYTES # BLD: 1.53 K/UL (ref 1.1–4.5)
LYMPHOCYTES NFR BLD: 19.2 % (ref 20–40)
MAGNESIUM SERPL-MCNC: 1.9 MG/DL (ref 1.6–2.4)
MCH RBC QN AUTO: 30.2 PG (ref 27–31)
MCHC RBC AUTO-ENTMCNC: 32.6 G/DL (ref 33–37)
MCV RBC AUTO: 92.7 FL (ref 80–94)
MONOCYTES # BLD: 0.66 K/UL (ref 0–0.9)
MONOCYTES NFR BLD: 8.3 % (ref 1–10)
NEUTROPHILS # BLD: 5.19 K/UL (ref 1.5–7.5)
NEUTS SEG NFR BLD: 65.1 % (ref 50–65)
PHOSPHATE SERPL-MCNC: 3.7 MG/DL (ref 2.5–4.5)
PLATELET # BLD AUTO: 292 K/UL (ref 130–400)
PMV BLD AUTO: 8.7 FL (ref 9.4–12.4)
POTASSIUM SERPL-SCNC: 4.3 MMOL/L (ref 3.5–5.1)
PROT SERPL-MCNC: 7 G/DL (ref 6.4–8.3)
RBC # BLD AUTO: 3.54 M/UL (ref 4.7–6.1)
SODIUM SERPL-SCNC: 136 MMOL/L (ref 136–145)
WBC # BLD AUTO: 7.97 K/UL (ref 4.8–10.8)

## 2025-08-04 PROCEDURE — 2500000003 HC RX 250 WO HCPCS: Performed by: INTERNAL MEDICINE

## 2025-08-04 PROCEDURE — 6360000002 HC RX W HCPCS: Performed by: INTERNAL MEDICINE

## 2025-08-04 PROCEDURE — 96375 TX/PRO/DX INJ NEW DRUG ADDON: CPT

## 2025-08-04 PROCEDURE — 2580000003 HC RX 258: Performed by: INTERNAL MEDICINE

## 2025-08-04 PROCEDURE — 85025 COMPLETE CBC W/AUTO DIFF WBC: CPT

## 2025-08-04 PROCEDURE — 96413 CHEMO IV INFUSION 1 HR: CPT

## 2025-08-04 PROCEDURE — 96415 CHEMO IV INFUSION ADDL HR: CPT

## 2025-08-04 PROCEDURE — 83735 ASSAY OF MAGNESIUM: CPT

## 2025-08-04 PROCEDURE — 84100 ASSAY OF PHOSPHORUS: CPT

## 2025-08-04 PROCEDURE — 80053 COMPREHEN METABOLIC PANEL: CPT

## 2025-08-04 PROCEDURE — 36415 COLL VENOUS BLD VENIPUNCTURE: CPT

## 2025-08-04 RX ORDER — MEPERIDINE HYDROCHLORIDE 50 MG/ML
12.5 INJECTION INTRAMUSCULAR; INTRAVENOUS; SUBCUTANEOUS PRN
OUTPATIENT
Start: 2025-08-04

## 2025-08-04 RX ORDER — HEPARIN SODIUM (PORCINE) LOCK FLUSH IV SOLN 100 UNIT/ML 100 UNIT/ML
500 SOLUTION INTRAVENOUS PRN
Status: CANCELLED | OUTPATIENT
Start: 2025-08-04

## 2025-08-04 RX ORDER — ALBUTEROL SULFATE 90 UG/1
4 INHALANT RESPIRATORY (INHALATION) PRN
OUTPATIENT
Start: 2025-08-04

## 2025-08-04 RX ORDER — PALONOSETRON 0.05 MG/ML
0.25 INJECTION, SOLUTION INTRAVENOUS ONCE
Status: CANCELLED | OUTPATIENT
Start: 2025-08-04 | End: 2025-08-04

## 2025-08-04 RX ORDER — HEPARIN 100 UNIT/ML
500 SYRINGE INTRAVENOUS PRN
Status: DISCONTINUED | OUTPATIENT
Start: 2025-08-04 | End: 2025-08-05 | Stop reason: HOSPADM

## 2025-08-04 RX ORDER — SODIUM CHLORIDE 0.9 % (FLUSH) 0.9 %
5-40 SYRINGE (ML) INJECTION PRN
Status: CANCELLED | OUTPATIENT
Start: 2025-08-04

## 2025-08-04 RX ORDER — SODIUM CHLORIDE 0.9 % (FLUSH) 0.9 %
5-40 SYRINGE (ML) INJECTION PRN
Status: DISCONTINUED | OUTPATIENT
Start: 2025-08-04 | End: 2025-08-05 | Stop reason: HOSPADM

## 2025-08-04 RX ORDER — FAMOTIDINE 10 MG/ML
20 INJECTION, SOLUTION INTRAVENOUS ONCE
Status: CANCELLED
Start: 2025-08-04 | End: 2025-08-04

## 2025-08-04 RX ORDER — DEXAMETHASONE SODIUM PHOSPHATE 10 MG/ML
10 INJECTION, SOLUTION INTRAMUSCULAR; INTRAVENOUS ONCE
Status: COMPLETED | OUTPATIENT
Start: 2025-08-04 | End: 2025-08-04

## 2025-08-04 RX ORDER — FAMOTIDINE 10 MG/ML
20 INJECTION, SOLUTION INTRAVENOUS ONCE
Status: COMPLETED | OUTPATIENT
Start: 2025-08-04 | End: 2025-08-04

## 2025-08-04 RX ORDER — EPINEPHRINE 1 MG/ML
0.3 INJECTION, SOLUTION, CONCENTRATE INTRAVENOUS PRN
OUTPATIENT
Start: 2025-08-04

## 2025-08-04 RX ORDER — SODIUM CHLORIDE 9 MG/ML
INJECTION, SOLUTION INTRAVENOUS CONTINUOUS
OUTPATIENT
Start: 2025-08-04

## 2025-08-04 RX ORDER — DEXTROSE MONOHYDRATE 50 MG/ML
5-250 INJECTION, SOLUTION INTRAVENOUS PRN
OUTPATIENT
Start: 2025-08-04

## 2025-08-04 RX ORDER — HYDROCORTISONE SODIUM SUCCINATE 100 MG/2ML
100 INJECTION INTRAMUSCULAR; INTRAVENOUS
OUTPATIENT
Start: 2025-08-04

## 2025-08-04 RX ORDER — FAMOTIDINE 10 MG/ML
20 INJECTION, SOLUTION INTRAVENOUS
OUTPATIENT
Start: 2025-08-04

## 2025-08-04 RX ORDER — SODIUM CHLORIDE 9 MG/ML
5-250 INJECTION, SOLUTION INTRAVENOUS PRN
OUTPATIENT
Start: 2025-08-04

## 2025-08-04 RX ORDER — ONDANSETRON 2 MG/ML
8 INJECTION INTRAMUSCULAR; INTRAVENOUS
OUTPATIENT
Start: 2025-08-04

## 2025-08-04 RX ORDER — PALONOSETRON 0.05 MG/ML
0.25 INJECTION, SOLUTION INTRAVENOUS ONCE
Status: COMPLETED | OUTPATIENT
Start: 2025-08-04 | End: 2025-08-04

## 2025-08-04 RX ORDER — DIPHENHYDRAMINE HYDROCHLORIDE 50 MG/ML
50 INJECTION, SOLUTION INTRAMUSCULAR; INTRAVENOUS
OUTPATIENT
Start: 2025-08-04

## 2025-08-04 RX ORDER — ACETAMINOPHEN 325 MG/1
650 TABLET ORAL
OUTPATIENT
Start: 2025-08-04

## 2025-08-04 RX ADMIN — SODIUM CHLORIDE, PRESERVATIVE FREE 10 ML: 5 INJECTION INTRAVENOUS at 14:29

## 2025-08-04 RX ADMIN — DEXAMETHASONE SODIUM PHOSPHATE 10 MG: 10 INJECTION INTRAMUSCULAR; INTRAVENOUS at 12:10

## 2025-08-04 RX ADMIN — PALONOSETRON 0.25 MG: 0.05 INJECTION, SOLUTION INTRAVENOUS at 12:10

## 2025-08-04 RX ADMIN — FAMOTIDINE 20 MG: 10 INJECTION, SOLUTION INTRAVENOUS at 12:10

## 2025-08-04 RX ADMIN — HEPARIN 500 UNITS: 100 SYRINGE at 14:29

## 2025-08-04 RX ADMIN — OXALIPLATIN 95 MG: 5 INJECTION, SOLUTION INTRAVENOUS at 12:23

## 2025-08-05 ENCOUNTER — HOSPITAL ENCOUNTER (OUTPATIENT)
Dept: WOUND CARE | Age: 75
Discharge: HOME | End: 2025-08-05
Attending: SURGERY
Payer: MEDICARE

## 2025-08-05 VITALS
DIASTOLIC BLOOD PRESSURE: 91 MMHG | SYSTOLIC BLOOD PRESSURE: 167 MMHG | HEART RATE: 83 BPM | RESPIRATION RATE: 18 BRPM | TEMPERATURE: 97.7 F

## 2025-08-05 DIAGNOSIS — L97.412 ULCER OF RIGHT HEEL, WITH FAT LAYER EXPOSED (HCC): Primary | ICD-10-CM

## 2025-08-05 DIAGNOSIS — L97.212 NON-PRESSURE CHRONIC ULCER OF RIGHT CALF WITH FAT LAYER EXPOSED (HCC): ICD-10-CM

## 2025-08-05 DIAGNOSIS — S31.819A BUTTOCK WOUND, RIGHT, INITIAL ENCOUNTER: ICD-10-CM

## 2025-08-05 DIAGNOSIS — L98.492 ABDOMINAL WALL SKIN ULCER, WITH FAT LAYER EXPOSED (HCC): ICD-10-CM

## 2025-08-05 PROCEDURE — 97597 DBRDMT OPN WND 1ST 20 CM/<: CPT

## 2025-08-05 PROCEDURE — 97597 DBRDMT OPN WND 1ST 20 CM/<: CPT | Performed by: SURGERY

## 2025-08-05 RX ORDER — GINSENG 100 MG
CAPSULE ORAL PRN
OUTPATIENT
Start: 2025-08-05

## 2025-08-05 RX ORDER — LIDOCAINE HYDROCHLORIDE 40 MG/ML
SOLUTION TOPICAL PRN
OUTPATIENT
Start: 2025-08-05

## 2025-08-05 RX ORDER — CLOBETASOL PROPIONATE 0.5 MG/G
OINTMENT TOPICAL PRN
OUTPATIENT
Start: 2025-08-05

## 2025-08-05 RX ORDER — LIDOCAINE HYDROCHLORIDE 20 MG/ML
JELLY TOPICAL PRN
OUTPATIENT
Start: 2025-08-05

## 2025-08-05 RX ORDER — SILVER SULFADIAZINE 10 MG/G
CREAM TOPICAL PRN
OUTPATIENT
Start: 2025-08-05

## 2025-08-05 RX ORDER — LIDOCAINE 40 MG/G
CREAM TOPICAL PRN
OUTPATIENT
Start: 2025-08-05

## 2025-08-05 RX ORDER — BETAMETHASONE DIPROPIONATE 0.5 MG/G
CREAM TOPICAL PRN
OUTPATIENT
Start: 2025-08-05

## 2025-08-05 RX ORDER — LIDOCAINE 50 MG/G
OINTMENT TOPICAL PRN
OUTPATIENT
Start: 2025-08-05

## 2025-08-05 RX ORDER — SODIUM CHLOR/HYPOCHLOROUS ACID 0.033 %
SOLUTION, IRRIGATION IRRIGATION PRN
OUTPATIENT
Start: 2025-08-05

## 2025-08-05 RX ORDER — LIDOCAINE HYDROCHLORIDE 20 MG/ML
JELLY TOPICAL PRN
Status: DISCONTINUED | OUTPATIENT
Start: 2025-08-05 | End: 2025-08-07 | Stop reason: HOSPADM

## 2025-08-05 RX ORDER — NEOMYCIN/BACITRACIN/POLYMYXINB 3.5-400-5K
OINTMENT (GRAM) TOPICAL PRN
OUTPATIENT
Start: 2025-08-05

## 2025-08-05 RX ORDER — TRIAMCINOLONE ACETONIDE 1 MG/G
OINTMENT TOPICAL PRN
OUTPATIENT
Start: 2025-08-05

## 2025-08-05 RX ORDER — GENTAMICIN SULFATE 1 MG/G
OINTMENT TOPICAL PRN
OUTPATIENT
Start: 2025-08-05

## 2025-08-05 RX ORDER — BACITRACIN ZINC AND POLYMYXIN B SULFATE 500; 1000 [USP'U]/G; [USP'U]/G
OINTMENT TOPICAL PRN
OUTPATIENT
Start: 2025-08-05

## 2025-08-05 RX ORDER — MUPIROCIN 2 %
OINTMENT (GRAM) TOPICAL PRN
OUTPATIENT
Start: 2025-08-05

## 2025-08-05 RX ADMIN — LIDOCAINE HYDROCHLORIDE: 20 JELLY TOPICAL at 10:23

## 2025-08-08 ENCOUNTER — CARE COORDINATION (OUTPATIENT)
Dept: CARE COORDINATION | Age: 75
End: 2025-08-08

## 2025-08-08 ASSESSMENT — SOCIAL DETERMINANTS OF HEALTH (SDOH)
HOW OFTEN DO YOU GET TOGETHER WITH FRIENDS OR RELATIVES?: NEVER
WITHIN THE LAST YEAR, HAVE YOU BEEN AFRAID OF YOUR PARTNER OR EX-PARTNER?: NO
WITHIN THE LAST YEAR, HAVE TO BEEN RAPED OR FORCED TO HAVE ANY KIND OF SEXUAL ACTIVITY BY YOUR PARTNER OR EX-PARTNER?: NO
WITHIN THE LAST YEAR, HAVE YOU BEEN KICKED, HIT, SLAPPED, OR OTHERWISE PHYSICALLY HURT BY YOUR PARTNER OR EX-PARTNER?: NO
WITHIN THE LAST YEAR, HAVE YOU BEEN HUMILIATED OR EMOTIONALLY ABUSED IN OTHER WAYS BY YOUR PARTNER OR EX-PARTNER?: NO
HOW OFTEN DO YOU ATTENT MEETINGS OF THE CLUB OR ORGANIZATION YOU BELONG TO?: NEVER
DO YOU BELONG TO ANY CLUBS OR ORGANIZATIONS SUCH AS CHURCH GROUPS UNIONS, FRATERNAL OR ATHLETIC GROUPS, OR SCHOOL GROUPS?: NO
HOW OFTEN DO YOU ATTEND CHURCH OR RELIGIOUS SERVICES?: NEVER
IN A TYPICAL WEEK, HOW MANY TIMES DO YOU TALK ON THE PHONE WITH FAMILY, FRIENDS, OR NEIGHBORS?: NEVER

## 2025-08-11 DIAGNOSIS — R00.1 SYMPTOMATIC BRADYCARDIA: ICD-10-CM

## 2025-08-11 DIAGNOSIS — I48.0 PAROXYSMAL ATRIAL FIBRILLATION (HCC): ICD-10-CM

## 2025-08-11 DIAGNOSIS — I42.8 NON-ISCHEMIC CARDIOMYOPATHY (HCC): ICD-10-CM

## 2025-08-11 DIAGNOSIS — Z95.0 PACEMAKER: Primary | ICD-10-CM

## 2025-08-14 ENCOUNTER — HOSPITAL ENCOUNTER (EMERGENCY)
Age: 75
Discharge: HOME | End: 2025-08-15
Attending: EMERGENCY MEDICINE
Payer: MEDICARE

## 2025-08-14 VITALS
OXYGEN SATURATION: 100 % | TEMPERATURE: 98.4 F | BODY MASS INDEX: 22.9 KG/M2 | HEIGHT: 70 IN | WEIGHT: 160 LBS | SYSTOLIC BLOOD PRESSURE: 178 MMHG | HEART RATE: 75 BPM | DIASTOLIC BLOOD PRESSURE: 89 MMHG | RESPIRATION RATE: 18 BRPM

## 2025-08-14 DIAGNOSIS — R10.84 GENERALIZED ABDOMINAL PAIN: Primary | ICD-10-CM

## 2025-08-14 LAB
ALBUMIN SERPL-MCNC: 3.6 G/DL (ref 3.5–5.2)
ALP SERPL-CCNC: 139 U/L (ref 40–129)
ALT SERPL-CCNC: 18 U/L (ref 10–50)
ANION GAP SERPL CALCULATED.3IONS-SCNC: 10 MMOL/L (ref 8–16)
AST SERPL-CCNC: 40 U/L (ref 10–50)
BASOPHILS # BLD: 0.1 K/UL (ref 0–0.2)
BASOPHILS NFR BLD: 0.7 % (ref 0–1)
BILIRUB SERPL-MCNC: 0.6 MG/DL (ref 0.2–1.2)
BUN SERPL-MCNC: 42 MG/DL (ref 8–23)
CALCIUM SERPL-MCNC: 9.2 MG/DL (ref 8.8–10.2)
CHLORIDE SERPL-SCNC: 101 MMOL/L (ref 98–107)
CO2 SERPL-SCNC: 24 MMOL/L (ref 22–29)
CREAT SERPL-MCNC: 2.5 MG/DL (ref 0.7–1.2)
EOSINOPHIL # BLD: 0.4 K/UL (ref 0–0.6)
EOSINOPHIL NFR BLD: 3.3 % (ref 0–5)
ERYTHROCYTE [DISTWIDTH] IN BLOOD BY AUTOMATED COUNT: 16.7 % (ref 11.5–14.5)
GLUCOSE SERPL-MCNC: 105 MG/DL (ref 70–99)
HCT VFR BLD AUTO: 35.3 % (ref 42–52)
HGB BLD-MCNC: 11 G/DL (ref 14–18)
IMM GRANULOCYTES # BLD: 0.1 K/UL
LIPASE SERPL-CCNC: 52 U/L (ref 13–60)
LYMPHOCYTES # BLD: 1.4 K/UL (ref 1.1–4.5)
LYMPHOCYTES NFR BLD: 12.6 % (ref 20–40)
MCH RBC QN AUTO: 29.5 PG (ref 27–31)
MCHC RBC AUTO-ENTMCNC: 31.2 G/DL (ref 33–37)
MCV RBC AUTO: 94.6 FL (ref 80–94)
MONOCYTES # BLD: 0.9 K/UL (ref 0–0.9)
MONOCYTES NFR BLD: 8.7 % (ref 0–10)
NEUTROPHILS # BLD: 8 K/UL (ref 1.5–7.5)
NEUTS SEG NFR BLD: 74.1 % (ref 50–65)
PLATELET # BLD AUTO: 285 K/UL (ref 130–400)
PMV BLD AUTO: 9 FL (ref 9.4–12.4)
POTASSIUM SERPL-SCNC: 5 MMOL/L (ref 3.5–5.1)
PROT SERPL-MCNC: 6.9 G/DL (ref 6.4–8.3)
RBC # BLD AUTO: 3.73 M/UL (ref 4.7–6.1)
SODIUM SERPL-SCNC: 135 MMOL/L (ref 136–145)
WBC # BLD AUTO: 10.8 K/UL (ref 4.8–10.8)

## 2025-08-14 PROCEDURE — 80053 COMPREHEN METABOLIC PANEL: CPT

## 2025-08-14 PROCEDURE — 83690 ASSAY OF LIPASE: CPT

## 2025-08-14 PROCEDURE — 93005 ELECTROCARDIOGRAM TRACING: CPT | Performed by: EMERGENCY MEDICINE

## 2025-08-14 PROCEDURE — 85025 COMPLETE CBC W/AUTO DIFF WBC: CPT

## 2025-08-14 PROCEDURE — 36415 COLL VENOUS BLD VENIPUNCTURE: CPT

## 2025-08-14 PROCEDURE — 99284 EMERGENCY DEPT VISIT MOD MDM: CPT

## 2025-08-14 RX ORDER — SODIUM CHLORIDE, SODIUM LACTATE, POTASSIUM CHLORIDE, AND CALCIUM CHLORIDE .6; .31; .03; .02 G/100ML; G/100ML; G/100ML; G/100ML
1000 INJECTION, SOLUTION INTRAVENOUS ONCE
Status: COMPLETED | OUTPATIENT
Start: 2025-08-15 | End: 2025-08-15

## 2025-08-14 ASSESSMENT — PAIN SCALES - GENERAL: PAINLEVEL_OUTOF10: 0

## 2025-08-14 ASSESSMENT — PAIN - FUNCTIONAL ASSESSMENT: PAIN_FUNCTIONAL_ASSESSMENT: 0-10

## 2025-08-15 ENCOUNTER — CARE COORDINATION (OUTPATIENT)
Dept: CARE COORDINATION | Age: 75
End: 2025-08-15

## 2025-08-15 ENCOUNTER — APPOINTMENT (OUTPATIENT)
Dept: CT IMAGING | Age: 75
End: 2025-08-15
Payer: MEDICARE

## 2025-08-15 LAB
BACTERIA URNS QL MICRO: NEGATIVE /HPF
BILIRUB UR QL STRIP: NEGATIVE
CLARITY UR: CLEAR
COLOR UR: YELLOW
CRYSTALS URNS MICRO: NORMAL /HPF
EKG P AXIS: 59 DEGREES
EKG P-R INTERVAL: 158 MS
EKG Q-T INTERVAL: 412 MS
EKG QRS DURATION: 116 MS
EKG QTC CALCULATION (BAZETT): 434 MS
EKG T AXIS: 48 DEGREES
EPI CELLS #/AREA URNS AUTO: 0 /HPF (ref 0–5)
GLUCOSE UR STRIP.AUTO-MCNC: NEGATIVE MG/DL
HGB UR STRIP.AUTO-MCNC: ABNORMAL MG/L
HYALINE CASTS #/AREA URNS AUTO: 0 /HPF (ref 0–8)
KETONES UR STRIP.AUTO-MCNC: NEGATIVE MG/DL
LEUKOCYTE ESTERASE UR QL STRIP.AUTO: NEGATIVE
NITRITE UR QL STRIP.AUTO: NEGATIVE
PH UR STRIP.AUTO: 6 [PH] (ref 5–8)
PROT UR STRIP.AUTO-MCNC: 100 MG/DL
RBC #/AREA URNS AUTO: 2 /HPF (ref 0–4)
SP GR UR STRIP.AUTO: 1.02 (ref 1–1.03)
UROBILINOGEN UR STRIP.AUTO-MCNC: 0.2 E.U./DL
WBC #/AREA URNS AUTO: 1 /HPF (ref 0–5)

## 2025-08-15 PROCEDURE — 2580000003 HC RX 258: Performed by: EMERGENCY MEDICINE

## 2025-08-15 PROCEDURE — 81001 URINALYSIS AUTO W/SCOPE: CPT

## 2025-08-15 PROCEDURE — 93010 ELECTROCARDIOGRAM REPORT: CPT | Performed by: INTERNAL MEDICINE

## 2025-08-15 PROCEDURE — 74176 CT ABD & PELVIS W/O CONTRAST: CPT

## 2025-08-15 RX ADMIN — SODIUM CHLORIDE, SODIUM LACTATE, POTASSIUM CHLORIDE, AND CALCIUM CHLORIDE 1000 ML: .6; .31; .03; .02 INJECTION, SOLUTION INTRAVENOUS at 00:04

## 2025-08-15 ASSESSMENT — ENCOUNTER SYMPTOMS
DIARRHEA: 0
SHORTNESS OF BREATH: 0
VOMITING: 0
BLOOD IN STOOL: 0
ABDOMINAL PAIN: 1

## 2025-08-19 ENCOUNTER — HOSPITAL ENCOUNTER (OUTPATIENT)
Dept: WOUND CARE | Age: 75
Discharge: HOME | End: 2025-08-19
Attending: SURGERY
Payer: MEDICARE

## 2025-08-19 VITALS
RESPIRATION RATE: 18 BRPM | SYSTOLIC BLOOD PRESSURE: 159 MMHG | HEART RATE: 76 BPM | DIASTOLIC BLOOD PRESSURE: 98 MMHG | TEMPERATURE: 97.4 F

## 2025-08-19 DIAGNOSIS — L97.412 ULCER OF RIGHT HEEL, WITH FAT LAYER EXPOSED (HCC): Primary | ICD-10-CM

## 2025-08-19 DIAGNOSIS — L98.492 ABDOMINAL WALL SKIN ULCER, WITH FAT LAYER EXPOSED (HCC): ICD-10-CM

## 2025-08-19 DIAGNOSIS — L97.212 NON-PRESSURE CHRONIC ULCER OF RIGHT CALF WITH FAT LAYER EXPOSED (HCC): ICD-10-CM

## 2025-08-19 DIAGNOSIS — S31.819A BUTTOCK WOUND, RIGHT, INITIAL ENCOUNTER: ICD-10-CM

## 2025-08-19 PROCEDURE — 97597 DBRDMT OPN WND 1ST 20 CM/<: CPT

## 2025-08-19 PROCEDURE — 97597 DBRDMT OPN WND 1ST 20 CM/<: CPT | Performed by: SURGERY

## 2025-08-19 RX ORDER — LIDOCAINE HYDROCHLORIDE 40 MG/ML
SOLUTION TOPICAL PRN
OUTPATIENT
Start: 2025-08-19

## 2025-08-19 RX ORDER — LIDOCAINE HYDROCHLORIDE 20 MG/ML
JELLY TOPICAL PRN
Status: DISCONTINUED | OUTPATIENT
Start: 2025-08-19 | End: 2025-08-21 | Stop reason: HOSPADM

## 2025-08-19 RX ORDER — SILVER SULFADIAZINE 10 MG/G
CREAM TOPICAL PRN
OUTPATIENT
Start: 2025-08-19

## 2025-08-19 RX ORDER — GINSENG 100 MG
CAPSULE ORAL PRN
OUTPATIENT
Start: 2025-08-19

## 2025-08-19 RX ORDER — TRIAMCINOLONE ACETONIDE 1 MG/G
OINTMENT TOPICAL PRN
OUTPATIENT
Start: 2025-08-19

## 2025-08-19 RX ORDER — NEOMYCIN/BACITRACIN/POLYMYXINB 3.5-400-5K
OINTMENT (GRAM) TOPICAL PRN
OUTPATIENT
Start: 2025-08-19

## 2025-08-19 RX ORDER — LIDOCAINE 40 MG/G
CREAM TOPICAL PRN
OUTPATIENT
Start: 2025-08-19

## 2025-08-19 RX ORDER — CLOBETASOL PROPIONATE 0.5 MG/G
OINTMENT TOPICAL PRN
OUTPATIENT
Start: 2025-08-19

## 2025-08-19 RX ORDER — BETAMETHASONE DIPROPIONATE 0.5 MG/G
CREAM TOPICAL PRN
OUTPATIENT
Start: 2025-08-19

## 2025-08-19 RX ORDER — MUPIROCIN 2 %
OINTMENT (GRAM) TOPICAL PRN
OUTPATIENT
Start: 2025-08-19

## 2025-08-19 RX ORDER — BACITRACIN ZINC AND POLYMYXIN B SULFATE 500; 1000 [USP'U]/G; [USP'U]/G
OINTMENT TOPICAL PRN
OUTPATIENT
Start: 2025-08-19

## 2025-08-19 RX ORDER — LIDOCAINE HYDROCHLORIDE 20 MG/ML
JELLY TOPICAL PRN
OUTPATIENT
Start: 2025-08-19

## 2025-08-19 RX ORDER — GENTAMICIN SULFATE 1 MG/G
OINTMENT TOPICAL PRN
OUTPATIENT
Start: 2025-08-19

## 2025-08-19 RX ORDER — SODIUM CHLOR/HYPOCHLOROUS ACID 0.033 %
SOLUTION, IRRIGATION IRRIGATION PRN
OUTPATIENT
Start: 2025-08-19

## 2025-08-19 RX ORDER — LIDOCAINE 50 MG/G
OINTMENT TOPICAL PRN
OUTPATIENT
Start: 2025-08-19

## 2025-08-19 RX ADMIN — LIDOCAINE HYDROCHLORIDE: 20 JELLY TOPICAL at 10:57

## 2025-08-19 ASSESSMENT — PAIN SCALES - GENERAL: PAINLEVEL_OUTOF10: 0

## 2025-08-22 ENCOUNTER — CARE COORDINATION (OUTPATIENT)
Dept: CARE COORDINATION | Age: 75
End: 2025-08-22

## 2025-08-25 ENCOUNTER — HOSPITAL ENCOUNTER (OUTPATIENT)
Dept: INFUSION THERAPY | Age: 75
Discharge: HOME | End: 2025-08-25
Payer: MEDICARE

## 2025-08-25 ENCOUNTER — OFFICE VISIT (OUTPATIENT)
Dept: PALLATIVE CARE | Age: 75
End: 2025-08-25
Payer: MEDICARE

## 2025-08-25 ENCOUNTER — CLINICAL DOCUMENTATION (OUTPATIENT)
Facility: HOSPITAL | Age: 75
End: 2025-08-25

## 2025-08-25 ENCOUNTER — OFFICE VISIT (OUTPATIENT)
Dept: HEMATOLOGY | Age: 75
End: 2025-08-25
Payer: MEDICARE

## 2025-08-25 VITALS
HEIGHT: 70 IN | OXYGEN SATURATION: 100 % | RESPIRATION RATE: 18 BRPM | TEMPERATURE: 98.5 F | WEIGHT: 162.9 LBS | DIASTOLIC BLOOD PRESSURE: 83 MMHG | HEART RATE: 97 BPM | BODY MASS INDEX: 23.32 KG/M2 | SYSTOLIC BLOOD PRESSURE: 163 MMHG

## 2025-08-25 DIAGNOSIS — Z51.11 CHEMOTHERAPY MANAGEMENT, ENCOUNTER FOR: ICD-10-CM

## 2025-08-25 DIAGNOSIS — C18.9 ADENOCARCINOMA OF COLON (HCC): Primary | ICD-10-CM

## 2025-08-25 DIAGNOSIS — D64.81 ANEMIA ASSOCIATED WITH CHEMOTHERAPY: ICD-10-CM

## 2025-08-25 DIAGNOSIS — R53.83 CHEMOTHERAPY-INDUCED FATIGUE: ICD-10-CM

## 2025-08-25 DIAGNOSIS — T45.1X5A ANEMIA ASSOCIATED WITH CHEMOTHERAPY: ICD-10-CM

## 2025-08-25 DIAGNOSIS — R54 FRAILTY SYNDROME IN GERIATRIC PATIENT: ICD-10-CM

## 2025-08-25 DIAGNOSIS — T45.1X5D ADVERSE EFFECT OF CHEMOTHERAPY, SUBSEQUENT ENCOUNTER: ICD-10-CM

## 2025-08-25 DIAGNOSIS — Z51.5 ENCOUNTER FOR PALLIATIVE CARE: ICD-10-CM

## 2025-08-25 DIAGNOSIS — E83.39 HYPOPHOSPHATEMIA: ICD-10-CM

## 2025-08-25 DIAGNOSIS — K59.03 DRUG INDUCED CONSTIPATION: ICD-10-CM

## 2025-08-25 DIAGNOSIS — C78.7 COLON CANCER METASTASIZED TO LIVER (HCC): ICD-10-CM

## 2025-08-25 DIAGNOSIS — C18.9 ADENOCARCINOMA OF COLON (HCC): ICD-10-CM

## 2025-08-25 DIAGNOSIS — E87.6 HYPOKALEMIA: Primary | ICD-10-CM

## 2025-08-25 DIAGNOSIS — C18.9 COLON CANCER METASTASIZED TO LIVER (HCC): ICD-10-CM

## 2025-08-25 DIAGNOSIS — K59.00 CONSTIPATION, UNSPECIFIED CONSTIPATION TYPE: Primary | ICD-10-CM

## 2025-08-25 DIAGNOSIS — Z71.89 CARE PLAN DISCUSSED WITH PATIENT: ICD-10-CM

## 2025-08-25 DIAGNOSIS — Z11.59 ENCOUNTER FOR SCREENING FOR OTHER VIRAL DISEASES: ICD-10-CM

## 2025-08-25 DIAGNOSIS — R53.81 PHYSICAL DECONDITIONING: ICD-10-CM

## 2025-08-25 DIAGNOSIS — Z51.12 ENCOUNTER FOR ANTINEOPLASTIC IMMUNOTHERAPY: ICD-10-CM

## 2025-08-25 DIAGNOSIS — T45.1X5A CHEMOTHERAPY-INDUCED FATIGUE: ICD-10-CM

## 2025-08-25 DIAGNOSIS — E83.42 HYPOMAGNESEMIA: ICD-10-CM

## 2025-08-25 LAB
ALBUMIN SERPL-MCNC: 3.4 G/DL (ref 3.5–5.2)
ALP SERPL-CCNC: 157 U/L (ref 40–129)
ALT SERPL-CCNC: 11 U/L (ref 5–41)
ANION GAP SERPL CALCULATED.3IONS-SCNC: 13 MMOL/L (ref 7–19)
AST SERPL-CCNC: 23 U/L (ref 5–40)
BASOPHILS # BLD: 0.06 K/UL (ref 0–0.2)
BASOPHILS NFR BLD: 0.8 % (ref 0–1)
BILIRUB SERPL-MCNC: 0.5 MG/DL (ref 0–1.2)
BUN SERPL-MCNC: 31 MG/DL (ref 8–23)
CALCIUM SERPL-MCNC: 8.8 MG/DL (ref 8.8–10.2)
CEA SERPL-MCNC: 22 NG/ML (ref 0–4.7)
CHLORIDE SERPL-SCNC: 105 MMOL/L (ref 98–107)
CO2 SERPL-SCNC: 23 MMOL/L (ref 22–29)
CREAT SERPL-MCNC: 2.2 MG/DL (ref 0.7–1.2)
EOSINOPHIL # BLD: 0.2 K/UL (ref 0–0.6)
EOSINOPHIL NFR BLD: 2.5 % (ref 0–5)
ERYTHROCYTE [DISTWIDTH] IN BLOOD BY AUTOMATED COUNT: 18.6 % (ref 11.5–14.5)
GLUCOSE SERPL-MCNC: 106 MG/DL (ref 70–99)
HCT VFR BLD AUTO: 32.4 % (ref 42–52)
HGB BLD-MCNC: 10.7 G/DL (ref 14–18)
LYMPHOCYTES # BLD: 1.22 K/UL (ref 1.1–4.5)
LYMPHOCYTES NFR BLD: 15.4 % (ref 20–40)
MAGNESIUM SERPL-MCNC: 1.6 MG/DL (ref 1.6–2.4)
MCH RBC QN AUTO: 30.2 PG (ref 27–31)
MCHC RBC AUTO-ENTMCNC: 33 G/DL (ref 33–37)
MCV RBC AUTO: 91.5 FL (ref 80–94)
MONOCYTES # BLD: 0.61 K/UL (ref 0–0.9)
MONOCYTES NFR BLD: 7.7 % (ref 1–10)
NEUTROPHILS # BLD: 5.81 K/UL (ref 1.5–7.5)
NEUTS SEG NFR BLD: 73.2 % (ref 50–65)
PHOSPHATE SERPL-MCNC: 3.6 MG/DL (ref 2.5–4.5)
PLATELET # BLD AUTO: 201 K/UL (ref 130–400)
PMV BLD AUTO: 9.2 FL (ref 9.4–12.4)
POTASSIUM SERPL-SCNC: 3.3 MMOL/L (ref 3.5–5.1)
PROT SERPL-MCNC: 6.7 G/DL (ref 6.4–8.3)
RBC # BLD AUTO: 3.54 M/UL (ref 4.7–6.1)
SODIUM SERPL-SCNC: 141 MMOL/L (ref 136–145)
WBC # BLD AUTO: 7.93 K/UL (ref 4.8–10.8)

## 2025-08-25 PROCEDURE — 85025 COMPLETE CBC W/AUTO DIFF WBC: CPT

## 2025-08-25 PROCEDURE — 36415 COLL VENOUS BLD VENIPUNCTURE: CPT

## 2025-08-25 PROCEDURE — 1123F ACP DISCUSS/DSCN MKR DOCD: CPT

## 2025-08-25 PROCEDURE — 96417 CHEMO IV INFUS EACH ADDL SEQ: CPT

## 2025-08-25 PROCEDURE — 96375 TX/PRO/DX INJ NEW DRUG ADDON: CPT

## 2025-08-25 PROCEDURE — 1036F TOBACCO NON-USER: CPT

## 2025-08-25 PROCEDURE — G8420 CALC BMI NORM PARAMETERS: HCPCS

## 2025-08-25 PROCEDURE — 1126F AMNT PAIN NOTED NONE PRSNT: CPT | Performed by: INTERNAL MEDICINE

## 2025-08-25 PROCEDURE — 3077F SYST BP >= 140 MM HG: CPT | Performed by: INTERNAL MEDICINE

## 2025-08-25 PROCEDURE — 99214 OFFICE O/P EST MOD 30 MIN: CPT | Performed by: INTERNAL MEDICINE

## 2025-08-25 PROCEDURE — 3017F COLORECTAL CA SCREEN DOC REV: CPT

## 2025-08-25 PROCEDURE — 96415 CHEMO IV INFUSION ADDL HR: CPT

## 2025-08-25 PROCEDURE — 84100 ASSAY OF PHOSPHORUS: CPT

## 2025-08-25 PROCEDURE — 82378 CARCINOEMBRYONIC ANTIGEN: CPT

## 2025-08-25 PROCEDURE — G8428 CUR MEDS NOT DOCUMENT: HCPCS

## 2025-08-25 PROCEDURE — G8420 CALC BMI NORM PARAMETERS: HCPCS | Performed by: INTERNAL MEDICINE

## 2025-08-25 PROCEDURE — G2211 COMPLEX E/M VISIT ADD ON: HCPCS | Performed by: INTERNAL MEDICINE

## 2025-08-25 PROCEDURE — 3017F COLORECTAL CA SCREEN DOC REV: CPT | Performed by: INTERNAL MEDICINE

## 2025-08-25 PROCEDURE — 3079F DIAST BP 80-89 MM HG: CPT | Performed by: INTERNAL MEDICINE

## 2025-08-25 PROCEDURE — 2580000003 HC RX 258: Performed by: INTERNAL MEDICINE

## 2025-08-25 PROCEDURE — 83735 ASSAY OF MAGNESIUM: CPT

## 2025-08-25 PROCEDURE — 1036F TOBACCO NON-USER: CPT | Performed by: INTERNAL MEDICINE

## 2025-08-25 PROCEDURE — G8427 DOCREV CUR MEDS BY ELIG CLIN: HCPCS | Performed by: INTERNAL MEDICINE

## 2025-08-25 PROCEDURE — 1123F ACP DISCUSS/DSCN MKR DOCD: CPT | Performed by: INTERNAL MEDICINE

## 2025-08-25 PROCEDURE — 2500000003 HC RX 250 WO HCPCS: Performed by: INTERNAL MEDICINE

## 2025-08-25 PROCEDURE — 1159F MED LIST DOCD IN RCRD: CPT

## 2025-08-25 PROCEDURE — 96413 CHEMO IV INFUSION 1 HR: CPT

## 2025-08-25 PROCEDURE — 6360000002 HC RX W HCPCS: Performed by: INTERNAL MEDICINE

## 2025-08-25 PROCEDURE — 1159F MED LIST DOCD IN RCRD: CPT | Performed by: INTERNAL MEDICINE

## 2025-08-25 PROCEDURE — 80053 COMPREHEN METABOLIC PANEL: CPT

## 2025-08-25 PROCEDURE — 99214 OFFICE O/P EST MOD 30 MIN: CPT

## 2025-08-25 PROCEDURE — 96367 TX/PROPH/DG ADDL SEQ IV INF: CPT

## 2025-08-25 RX ORDER — POLYETHYLENE GLYCOL 3350 17 G/17G
17 POWDER, FOR SOLUTION ORAL DAILY
Qty: 510 G | Refills: 0 | Status: SHIPPED | OUTPATIENT
Start: 2025-08-25 | End: 2025-09-24

## 2025-08-25 RX ORDER — PALONOSETRON 0.05 MG/ML
0.25 INJECTION, SOLUTION INTRAVENOUS ONCE
Status: CANCELLED | OUTPATIENT
Start: 2025-08-25 | End: 2025-08-25

## 2025-08-25 RX ORDER — ACETAMINOPHEN 325 MG/1
650 TABLET ORAL
OUTPATIENT
Start: 2025-08-25

## 2025-08-25 RX ORDER — EPINEPHRINE 1 MG/ML
0.3 INJECTION, SOLUTION, CONCENTRATE INTRAVENOUS PRN
OUTPATIENT
Start: 2025-08-25

## 2025-08-25 RX ORDER — SODIUM CHLORIDE 9 MG/ML
INJECTION, SOLUTION INTRAVENOUS CONTINUOUS
OUTPATIENT
Start: 2025-08-25

## 2025-08-25 RX ORDER — DEXAMETHASONE SODIUM PHOSPHATE 10 MG/ML
10 INJECTION, SOLUTION INTRAMUSCULAR; INTRAVENOUS ONCE
Status: COMPLETED | OUTPATIENT
Start: 2025-08-25 | End: 2025-08-25

## 2025-08-25 RX ORDER — ONDANSETRON 2 MG/ML
8 INJECTION INTRAMUSCULAR; INTRAVENOUS
OUTPATIENT
Start: 2025-08-25

## 2025-08-25 RX ORDER — POTASSIUM CHLORIDE 29.8 MG/ML
20 INJECTION INTRAVENOUS ONCE
Status: COMPLETED | OUTPATIENT
Start: 2025-08-25 | End: 2025-08-25

## 2025-08-25 RX ORDER — DEXTROSE MONOHYDRATE 50 MG/ML
5-250 INJECTION, SOLUTION INTRAVENOUS PRN
OUTPATIENT
Start: 2025-08-25

## 2025-08-25 RX ORDER — HYDROCORTISONE SODIUM SUCCINATE 100 MG/2ML
100 INJECTION INTRAMUSCULAR; INTRAVENOUS
OUTPATIENT
Start: 2025-08-25

## 2025-08-25 RX ORDER — FAMOTIDINE 10 MG/ML
20 INJECTION, SOLUTION INTRAVENOUS ONCE
Status: CANCELLED
Start: 2025-08-25 | End: 2025-08-25

## 2025-08-25 RX ORDER — SODIUM CHLORIDE 0.9 % (FLUSH) 0.9 %
5-40 SYRINGE (ML) INJECTION PRN
Status: DISCONTINUED | OUTPATIENT
Start: 2025-08-25 | End: 2025-08-26 | Stop reason: HOSPADM

## 2025-08-25 RX ORDER — ALBUTEROL SULFATE 90 UG/1
4 INHALANT RESPIRATORY (INHALATION) PRN
OUTPATIENT
Start: 2025-08-25

## 2025-08-25 RX ORDER — HEPARIN SODIUM (PORCINE) LOCK FLUSH IV SOLN 100 UNIT/ML 100 UNIT/ML
500 SOLUTION INTRAVENOUS PRN
Status: CANCELLED | OUTPATIENT
Start: 2025-08-25

## 2025-08-25 RX ORDER — PALONOSETRON 0.05 MG/ML
0.25 INJECTION, SOLUTION INTRAVENOUS ONCE
Status: COMPLETED | OUTPATIENT
Start: 2025-08-25 | End: 2025-08-25

## 2025-08-25 RX ORDER — FAMOTIDINE 10 MG/ML
20 INJECTION, SOLUTION INTRAVENOUS
OUTPATIENT
Start: 2025-08-25

## 2025-08-25 RX ORDER — SODIUM CHLORIDE 0.9 % (FLUSH) 0.9 %
5-40 SYRINGE (ML) INJECTION PRN
Status: CANCELLED | OUTPATIENT
Start: 2025-08-25

## 2025-08-25 RX ORDER — SODIUM CHLORIDE 9 MG/ML
5-250 INJECTION, SOLUTION INTRAVENOUS PRN
OUTPATIENT
Start: 2025-08-25

## 2025-08-25 RX ORDER — DIPHENHYDRAMINE HYDROCHLORIDE 50 MG/ML
50 INJECTION, SOLUTION INTRAMUSCULAR; INTRAVENOUS
OUTPATIENT
Start: 2025-08-25

## 2025-08-25 RX ORDER — MEPERIDINE HYDROCHLORIDE 50 MG/ML
12.5 INJECTION INTRAMUSCULAR; INTRAVENOUS; SUBCUTANEOUS PRN
OUTPATIENT
Start: 2025-08-25

## 2025-08-25 RX ORDER — HEPARIN 100 UNIT/ML
500 SYRINGE INTRAVENOUS PRN
Status: DISCONTINUED | OUTPATIENT
Start: 2025-08-25 | End: 2025-08-26 | Stop reason: HOSPADM

## 2025-08-25 RX ORDER — FAMOTIDINE 10 MG/ML
20 INJECTION, SOLUTION INTRAVENOUS ONCE
Status: COMPLETED | OUTPATIENT
Start: 2025-08-25 | End: 2025-08-25

## 2025-08-25 RX ADMIN — HEPARIN 500 UNITS: 100 SYRINGE at 16:34

## 2025-08-25 RX ADMIN — PANITUMUMAB 432 MG: 400 SOLUTION INTRAVENOUS at 15:33

## 2025-08-25 RX ADMIN — OXALIPLATIN 95 MG: 5 INJECTION, SOLUTION INTRAVENOUS at 13:30

## 2025-08-25 RX ADMIN — FAMOTIDINE 20 MG: 10 INJECTION, SOLUTION INTRAVENOUS at 13:17

## 2025-08-25 RX ADMIN — PALONOSETRON 0.25 MG: 0.05 INJECTION, SOLUTION INTRAVENOUS at 13:17

## 2025-08-25 RX ADMIN — SODIUM CHLORIDE, PRESERVATIVE FREE 10 ML: 5 INJECTION INTRAVENOUS at 16:34

## 2025-08-25 RX ADMIN — DEXAMETHASONE SODIUM PHOSPHATE 10 MG: 10 INJECTION, SOLUTION INTRAMUSCULAR; INTRAVENOUS at 13:17

## 2025-08-25 RX ADMIN — POTASSIUM CHLORIDE 20 MEQ: 29.8 INJECTION, SOLUTION INTRAVENOUS at 12:36

## 2025-09-02 ENCOUNTER — HOSPITAL ENCOUNTER (OUTPATIENT)
Dept: WOUND CARE | Age: 75
Discharge: HOME | End: 2025-09-02
Attending: SURGERY
Payer: MEDICARE

## 2025-09-02 VITALS
BODY MASS INDEX: 23.19 KG/M2 | DIASTOLIC BLOOD PRESSURE: 103 MMHG | RESPIRATION RATE: 18 BRPM | SYSTOLIC BLOOD PRESSURE: 178 MMHG | HEART RATE: 93 BPM | HEIGHT: 70 IN | TEMPERATURE: 97.7 F | WEIGHT: 162 LBS

## 2025-09-02 DIAGNOSIS — L97.412 ULCER OF RIGHT HEEL, WITH FAT LAYER EXPOSED (HCC): Primary | ICD-10-CM

## 2025-09-02 DIAGNOSIS — L97.212 NON-PRESSURE CHRONIC ULCER OF RIGHT CALF WITH FAT LAYER EXPOSED (HCC): ICD-10-CM

## 2025-09-02 DIAGNOSIS — S31.819A BUTTOCK WOUND, RIGHT, INITIAL ENCOUNTER: ICD-10-CM

## 2025-09-02 DIAGNOSIS — L98.492 ABDOMINAL WALL SKIN ULCER, WITH FAT LAYER EXPOSED (HCC): ICD-10-CM

## 2025-09-02 PROCEDURE — 97597 DBRDMT OPN WND 1ST 20 CM/<: CPT | Performed by: SURGERY

## 2025-09-02 PROCEDURE — 97597 DBRDMT OPN WND 1ST 20 CM/<: CPT

## 2025-09-02 RX ORDER — LIDOCAINE HYDROCHLORIDE 20 MG/ML
JELLY TOPICAL PRN
OUTPATIENT
Start: 2025-09-02

## 2025-09-02 RX ORDER — LIDOCAINE HYDROCHLORIDE 20 MG/ML
JELLY TOPICAL PRN
Status: DISCONTINUED | OUTPATIENT
Start: 2025-09-02 | End: 2025-09-03 | Stop reason: HOSPADM

## 2025-09-02 RX ORDER — LIDOCAINE HYDROCHLORIDE 40 MG/ML
SOLUTION TOPICAL PRN
OUTPATIENT
Start: 2025-09-02

## 2025-09-02 RX ORDER — LIDOCAINE 50 MG/G
OINTMENT TOPICAL PRN
OUTPATIENT
Start: 2025-09-02

## 2025-09-02 RX ORDER — MUPIROCIN 2 %
OINTMENT (GRAM) TOPICAL PRN
OUTPATIENT
Start: 2025-09-02

## 2025-09-02 RX ORDER — GENTAMICIN SULFATE 1 MG/G
OINTMENT TOPICAL PRN
OUTPATIENT
Start: 2025-09-02

## 2025-09-02 RX ORDER — BETAMETHASONE DIPROPIONATE 0.5 MG/G
CREAM TOPICAL PRN
OUTPATIENT
Start: 2025-09-02

## 2025-09-02 RX ORDER — SILVER SULFADIAZINE 10 MG/G
CREAM TOPICAL PRN
OUTPATIENT
Start: 2025-09-02

## 2025-09-02 RX ORDER — BACITRACIN ZINC AND POLYMYXIN B SULFATE 500; 1000 [USP'U]/G; [USP'U]/G
OINTMENT TOPICAL PRN
OUTPATIENT
Start: 2025-09-02

## 2025-09-02 RX ORDER — CLOBETASOL PROPIONATE 0.5 MG/G
OINTMENT TOPICAL PRN
OUTPATIENT
Start: 2025-09-02

## 2025-09-02 RX ORDER — LIDOCAINE 40 MG/G
CREAM TOPICAL PRN
OUTPATIENT
Start: 2025-09-02

## 2025-09-02 RX ORDER — SODIUM CHLOR/HYPOCHLOROUS ACID 0.033 %
SOLUTION, IRRIGATION IRRIGATION PRN
OUTPATIENT
Start: 2025-09-02

## 2025-09-02 RX ORDER — NEOMYCIN/BACITRACIN/POLYMYXINB 3.5-400-5K
OINTMENT (GRAM) TOPICAL PRN
OUTPATIENT
Start: 2025-09-02

## 2025-09-02 RX ORDER — GINSENG 100 MG
CAPSULE ORAL PRN
OUTPATIENT
Start: 2025-09-02

## 2025-09-02 RX ORDER — TRIAMCINOLONE ACETONIDE 1 MG/G
OINTMENT TOPICAL PRN
OUTPATIENT
Start: 2025-09-02

## 2025-09-02 RX ADMIN — LIDOCAINE HYDROCHLORIDE: 20 JELLY TOPICAL at 10:40

## 2025-09-02 ASSESSMENT — PAIN SCALES - GENERAL: PAINLEVEL_OUTOF10: 0

## 2025-09-05 DIAGNOSIS — D50.0 IRON DEFICIENCY ANEMIA DUE TO CHRONIC BLOOD LOSS: ICD-10-CM

## 2025-09-05 RX ORDER — FERROUS SULFATE 325(65) MG
1 TABLET ORAL 2 TIMES DAILY
Qty: 180 TABLET | Refills: 1 | Status: SHIPPED | OUTPATIENT
Start: 2025-09-05

## 2025-09-07 RX ORDER — METOPROLOL SUCCINATE 25 MG/1
25 TABLET, EXTENDED RELEASE ORAL 2 TIMES DAILY
Qty: 60 TABLET | Refills: 1 | Status: SHIPPED | OUTPATIENT
Start: 2025-09-07 | End: 2025-11-06

## (undated) DEVICE — SYSTEM SKIN CLSR 22CM DERMBND PRINEO

## (undated) DEVICE — ELECTRODE ES AD PED L65IN TEF INSUL MOD NONCORDED NDL TIP

## (undated) DEVICE — STERILE LATEX POWDER FREE SURGICAL GLOVES WITH HYDROGEL COATING: Brand: PROTEXIS

## (undated) DEVICE — AGENT HEMSTAT 3GM OXIDIZED REGENERATED CELOS ABSRB FOR CONT (ORDER MULTIPLES OF 5EA)

## (undated) DEVICE — STERILE POLYISOPRENE POWDER-FREE SURGICAL GLOVES: Brand: PROTEXIS

## (undated) DEVICE — 6617 IOBAN II PATIENT ISOLATION DRAPE 5/BX,4BX/CS: Brand: STERI-DRAPE™ IOBAN™ 2

## (undated) DEVICE — GLOVE SURG SZ 7 CRM LTX FREE POLYISOPRENE POLYMER BEAD ANTI

## (undated) DEVICE — SUTURE ETHILON SZ 3-0 L18IN NONABSORBABLE BLK L24MM FS-1 3/8 663G

## (undated) DEVICE — LIQUIBAND RAPID ADHESIVE 36/CS 0.8ML: Brand: MEDLINE

## (undated) DEVICE — Device

## (undated) DEVICE — ROD RMR L950MM DIA2.5MM W/ EXTN BALL TIP

## (undated) DEVICE — SUTURE VCRL SZ 1 L27IN ABSRB UD L36MM CP-1 1/2 CIR REV CUT J268H

## (undated) DEVICE — SUTURE MONOCRYL SZ 4-0 L18IN ABSRB UD L19MM PS-2 3/8 CIR PRIM Y496G

## (undated) DEVICE — BANDAGE,GAUZE,4.5"X4.1YD,STERILE,LF: Brand: MEDLINE

## (undated) DEVICE — SUTURE VCRL SZ 0 L27IN ABSRB UD L36MM CT-1 1/2 CIR J260H

## (undated) DEVICE — SEALER ENDOSCP NANO COAT OPN DIV CRV L JAW LIGASURE IMPACT

## (undated) DEVICE — COVER POS PERINL POST NS 082501

## (undated) DEVICE — MINOR CDS: Brand: MEDLINE INDUSTRIES, INC.

## (undated) DEVICE — SYSTEM INTRO 7FR L295CM DIL L23CM LNG ORNG PEEL AWAY HEMSTAT

## (undated) DEVICE — MASK VENTILATOR MED AD SUPERNOVA ET

## (undated) DEVICE — BIT DRL L330MM DIA4.2MM CALIB 100MM 3 FLUT QUIK CPL

## (undated) DEVICE — GOWN, ORBIS, XLNG/XXLARGE, STRL: Brand: MEDLINE

## (undated) DEVICE — C-ARM: Brand: UNBRANDED

## (undated) DEVICE — PROVE COVER: Brand: UNBRANDED

## (undated) DEVICE — SUTURE VCRL SZ 2-0 L36IN ABSRB UD L36MM CT-1 1/2 CIR J945H

## (undated) DEVICE — GUIDEWIRE ORTH L400MM DIA3.2MM FOR TFN

## (undated) DEVICE — YANKAUER,POOLE TIP,STERILE,50/CS: Brand: MEDLINE

## (undated) DEVICE — SOLUTION IV IRRIG POUR BRL 0.9% SODIUM CHL 2F7124

## (undated) DEVICE — NEPTUNE E-SEP SMOKE EVACUATION PENCIL, COATED, 70MM BLADE, ROCKER SWITCH: Brand: NEPTUNE E-SEP

## (undated) DEVICE — Z DISCONTINUED USE 2429233 DRESSING FOAM W10XL10CM 5 LAYR SELF ADH VERSATILE SAFETAC

## (undated) DEVICE — GAUZE,SPONGE,FLUFF,6"X6.75",STRL,10/TRAY: Brand: MEDLINE

## (undated) DEVICE — COVER LT HNDL BLU PLAS

## (undated) DEVICE — DRAPE,UTILITY,XL,4/PK,STERILE: Brand: MEDLINE

## (undated) DEVICE — SUTURE PDS + SZ 1 L96IN ABSRB VLT L65MM TP-1 1/2 CIR PDP880G

## (undated) DEVICE — CATHETER IV 14GA L1.75IN OD2.146MM ID1.740MM ORNG VIALON

## (undated) DEVICE — SHEET,DRAPE,53X77,STERILE: Brand: MEDLINE

## (undated) DEVICE — SUTURE VCRL SZ 3-0 L27IN ABSRB UD L26MM SH 1/2 CIR J416H

## (undated) DEVICE — SUTURE VICRYL + SZ 3-0 L27IN ABSRB UD L26MM SH 1/2 CIR VCP416H

## (undated) DEVICE — C-ARMOR C-ARM EQUIPMENT COVERS CLEAR STERILE UNIVERSAL FIT 12 PER CASE: Brand: C-ARMOR

## (undated) DEVICE — PACK,UNIVERSAL,NO GOWNS: Brand: MEDLINE

## (undated) DEVICE — Device: Brand: NOMOLINE™ LH ADULT NASAL CO2 CANNULA WITH O2 4M

## (undated) DEVICE — TIBURON LAPAROTOMY DRAPE: Brand: CONVERTORS

## (undated) DEVICE — FORCEPS BX L240CM JAW DIA2.4MM ORNG L CAP W/ NDL DISP RAD

## (undated) DEVICE — ROYAL SILK SURGICAL GOWN, XXL: Brand: CONVERTORS

## (undated) DEVICE — DRAIN SURG PENROSE 0.5X12 IN PREM SIL STRL

## (undated) DEVICE — DRAPE SHEET: Brand: UNBRANDED

## (undated) DEVICE — TOWEL,OR,DSP,ST,BLUE,DLX,4/PK,20PK/CS: Brand: MEDLINE

## (undated) DEVICE — ENDO KIT,LOURDES HOSPITAL: Brand: MEDLINE INDUSTRIES, INC.

## (undated) DEVICE — PENCIL BTTN S S CAUT TIP W HOLSTER 25 50

## (undated) DEVICE — GLOVE SURG SZ 65 CRM LTX FREE POLYISOPRENE POLYMER BEAD ANTI

## (undated) DEVICE — SYSTEM GWIRE L260CM DIA0035IN STD STEER HYDROPHOBIC STR TIP

## (undated) DEVICE — SPONGE LAP W18XL18IN WHT COT 4 PLY FLD STRUNG RADPQ DISP ST

## (undated) DEVICE — INTRODUCER SHTH L13CM OD7FR SH ORNG HUB SEAMLESS SAFSHTH

## (undated) DEVICE — DRESSING BORDERED ADH GZ UNIV GEN USE 8INX4IN AND 6INX2IN

## (undated) DEVICE — SUTURE PERMAHAND SZ 3-0 L18IN NONABSORBABLE BLK L26MM SH C013D

## (undated) DEVICE — SUTURE ABSORBABLE MONOFILAMENT 3-0 SH 27 IN UD PDS + PDP416H

## (undated) DEVICE — I-GEL, MEDIUM ADULT, SUPRAGLOTTIC AIRWAY, SIZE 4 (50-90KG): Brand: I-GEL, MEDIUM ADULT, SUPRAGLOTTIC AIRWAY, SIZE 4 (50-90KG)

## (undated) DEVICE — SURGICAL PROCEDURE PACK LOWER EXTREMITY LOURDES HOSP

## (undated) DEVICE — UNDERGLOVE SURG SZ 8 FNGR THK0.21MIL GRN LTX BEAD CUF

## (undated) DEVICE — DRESSING FOAM 4X8IN DISP POSTOP MEPILEX BORD AG

## (undated) DEVICE — SUTURE VICRYL COAT  + LIGAPAK LIG REEL 54 IN SZ 0 UD BRAID VCP287G

## (undated) DEVICE — ROD OST L90MM UNIV LOOP FOR SURE FIT NATURA